# Patient Record
Sex: FEMALE | Race: BLACK OR AFRICAN AMERICAN | NOT HISPANIC OR LATINO | Employment: FULL TIME | URBAN - METROPOLITAN AREA
[De-identification: names, ages, dates, MRNs, and addresses within clinical notes are randomized per-mention and may not be internally consistent; named-entity substitution may affect disease eponyms.]

---

## 2017-01-05 ENCOUNTER — OFFICE VISIT (OUTPATIENT)
Dept: DERMATOLOGY | Facility: CLINIC | Age: 57
End: 2017-01-05
Payer: COMMERCIAL

## 2017-01-05 DIAGNOSIS — L65.9 ALOPECIA, UNSPECIFIED: Primary | ICD-10-CM

## 2017-01-05 DIAGNOSIS — D48.9 NEOPLASM OF UNCERTAIN BEHAVIOR: ICD-10-CM

## 2017-01-05 PROCEDURE — 99999 PR PBB SHADOW E&M-EST. PATIENT-LVL III: CPT | Mod: PBBFAC,,,

## 2017-01-05 PROCEDURE — 1159F MED LIST DOCD IN RCRD: CPT | Mod: S$GLB,,,

## 2017-01-05 PROCEDURE — 11100 PR BIOPSY OF SKIN LESION: CPT | Mod: S$GLB,,,

## 2017-01-05 PROCEDURE — 88305 TISSUE EXAM BY PATHOLOGIST: CPT | Performed by: PATHOLOGY

## 2017-01-05 PROCEDURE — 99201 PR OFFICE/OUTPT VISIT,NEW,LEVL I: CPT | Mod: 25,S$GLB,,

## 2017-01-05 PROCEDURE — 88305 TISSUE EXAM BY PATHOLOGIST: CPT | Mod: 26,,, | Performed by: PATHOLOGY

## 2017-01-05 RX ORDER — KETOCONAZOLE 20 MG/ML
SHAMPOO, SUSPENSION TOPICAL
Qty: 120 ML | Refills: 5 | Status: SHIPPED | OUTPATIENT
Start: 2017-01-05 | End: 2019-06-24 | Stop reason: ALTCHOICE

## 2017-01-05 NOTE — PROGRESS NOTES
Subjective:       Patient ID:  Ora Henderson is a 56 y.o. female who presents for   Chief Complaint   Patient presents with    Hair Loss     a few months    Warts     right elbow, more than a few months-less than a year, Tried Compound W.     HPI Comments: 55 yo with complaint of wart to right elbow x a few months. Asymptomatic. She has used compound w with improvement.   Also c/o hair loss to scalp x a few months that began after starting losartan and metoprolol. In addition, pt had hernia repair 1 month ago and has since noticed an increase in hair loss. She has used AG Pro vitamins which helped. + history of perm use every 6 weeks. Scalp is asymptomatic.     Lab Results:       Component                Value               Date                       TSH                      2.529               03/22/2016              Lab Results:       Component                Value               Date                       WBC                      5.75                12/08/2016                 HGB                      12.6                12/08/2016                 HCT                      37.4                12/08/2016                 MCV                      88                  12/08/2016                 PLT                      235                 12/08/2016                  Review of Systems   Constitutional: Negative for fever, chills, weight loss, weight gain, fatigue and malaise.   Genitourinary: Negative for frequency.   Skin: Negative for itching, daily sunscreen use and activity-related sunscreen use.        Objective:    Physical Exam   Constitutional: She appears well-developed and well-nourished. No distress.   Neurological: She is alert and oriented to person, place, and time. She is not disoriented.   Psychiatric: She has a normal mood and affect.   Skin:   Areas Examined (abnormalities noted in diagram):   Scalp / Hair Palpated and Inspected  RUE Inspected                   Diagram Legend      Erythematous scaling macule/papule c/w actinic keratosis       Vascular papule c/w angioma      Pigmented verrucoid papule/plaque c/w seborrheic keratosis      Yellow umbilicated papule c/w sebaceous hyperplasia      Irregularly shaped tan macule c/w lentigo     1-2 mm smooth white papules consistent with Milia      Movable subcutaneous cyst with punctum c/w epidermal inclusion cyst      Subcutaneous movable cyst c/w pilar cyst      Firm pink to brown papule c/w dermatofibroma      Pedunculated fleshy papule(s) c/w skin tag(s)      Evenly pigmented macule c/w junctional nevus     Mildly variegated pigmented, slightly irregular-bordered macule c/w mildly atypical nevus      Flesh colored to evenly pigmented papule c/w intradermal nevus       Pink pearly papule/plaque c/w basal cell carcinoma      Erythematous hyperkeratotic cursted plaque c/w SCC      Surgical scar with no sign of skin cancer recurrence      Open and closed comedones      Inflammatory papules and pustules      Verrucoid papule consistent consistent with wart     Erythematous eczematous patches and plaques     Dystrophic onycholytic nail with subungual debris c/w onychomycosis     Umbilicated papule    Erythematous-base heme-crusted tan verrucoid plaque consistent with inflamed seborrheic keratosis     Erythematous Silvery Scaling Plaque c/w Psoriasis     See annotation      Assessment / Plan:      Pathology Orders:      Normal Orders This Visit    Tissue Specimen To Pathology, Dermatology     Questions:    Directional Terms:  Other(comment)    Clinical information:  r/o verruca vulgaris vs other    Specific Site:  right elbow        Alopecia   Possible components of telogen effluvium 2/2 medications and surgery. Will begin ketoconazole shampoo and biotin 5000 mcg qd. F/u in 3 months to ensure symptoms are improving.   TSH, CBC normal  -     ketoconazole (NIZORAL) 2 % shampoo; Wash hair with medicated shampoo at least 2x/week - let sit on scalp at  least 5 minutes prior to rinsing  Dispense: 120 mL; Refill: 5  Biotin 5000 mcg qd  Discussed avoidance of perms and all damaging hair care techniques     Neoplasm of uncertain behavior- right elbow  -     Tissue Specimen To Pathology, Dermatology  Shave biopsy procedure note:  Shave biopsy performed after verbal consent including risk of infection, scar, recurrence, need for additional treatment of site. Area prepped with alcohol, anesthetized with approximately 1.0cc of 1% lidocaine with epinephrine. Lesional tissue shaved with razor blade. Hemostasis achieved with application of aluminum chloride followed by hyfrecation. No complications. Dressing applied. Wound care explained.             Return in about 3 months (around 4/5/2017).

## 2017-01-05 NOTE — PATIENT INSTRUCTIONS
Shave Biopsy Wound Care    Your doctor has performed a shave biopsy today.  A band aid and vaseline ointment has been placed over the site.  This should remain in place for 24 hours.  It is recommended that you keep the area dry for the first 24 hours.  After 24 hours, you may remove the band aid and wash the area with warm soap and water and apply Vaseline jelly.  Many patients prefer to use Neosporin or Bacitracin ointment.  This is acceptable; however, know that you can develop an allergy to this medication even if you have used it safely for years.  It is important to keep the area moist.  Letting it dry out and get air slows healing time, and will worsen the scar.  Band aid is optional after first 24 hours.      If you notice increasing redness, tenderness, pain, or yellow drainage at the biopsy site, please notify your doctor.  These are signs of an infection.    If your biopsy site is bleeding, apply firm pressure for 15 minutes straight.  Repeat for another 15 minutes, if it is still bleeding.   If the surgical site continues to bleed, then please contact your doctor.      [facility] [place] [address]/ [phone] FAX/ [email or web address]       Hair:  Wash hair with ketoconazole shampoo twice per week  Biotin 5000 mcg qd

## 2017-01-05 NOTE — MR AVS SNAPSHOT
[facility] - Dermatology  [address] [facility] [place] [address]  Phone: [phone]  Fax: [phone]                  Ora Henderson   2017 8:00 AM   Office Visit    Description:  Female : 1960   Provider:  Lynette Nunn NP   Department:  [facility] - Dermatology           Reason for Visit     Hair Loss     Warts           Diagnoses this Visit        Comments    Alopecia, unspecified    -  Primary     Neoplasm of uncertain behavior                To Do List           Future Appointments        Provider Department Dept Phone    2017 9:20 AM ISRRAEL Pittman [facility] Gastroenterology [phone]    2017 8:30 AM Liza Anand MD [facility] Dermatology [phone]    2017 9:00 AM [facility] [phone]      Goals (5 Years of Data)     None       These Medications        Disp Refills Start End    ketoconazole (NIZORAL) 2 % shampoo 120 mL 5 2017     Wash hair with medicated shampoo at least 2x/week - let sit on scalp at least 5 minutes prior to rinsing    Pharmacy: [facility] - [place], [place] - [address] [facility] Ph #: [phone]         [facility]     [facility] Care [facility] -  Assistance  Registered nurses in the [facility] provide clinical advisement, health education, appointment booking, and other advisory services.  Call for this free service at [phone].             Medications           Message regarding Medications     Verify the changes and/or additions to your medication regime listed below are the same as discussed with your clinician today.  If any of these changes or additions are incorrect, please notify your healthcare provider.        START taking these NEW medications        Refills    ketoconazole (NIZORAL) 2 % shampoo 5    Sig: Wash hair with medicated shampoo at least 2x/week - let sit on scalp at least 5 minutes prior to rinsing    Class: Normal           Verify  that the below list of medications is an accurate representation of the medications you are currently taking.  If none reported, the list may be blank. If incorrect, please contact your healthcare provider. Carry this list with you in case of emergency.           Current Medications     albuterol 90 mcg/actuation inhaler Inhale 1-2 puffs into the lungs every 6 (six) hours as needed for Wheezing (cough).    esomeprazole (NEXIUM) 40 MG capsule Take 1 capsule (40 mg total) by mouth before breakfast.    ketoconazole (NIZORAL) 2 % shampoo Wash hair with medicated shampoo at least 2x/week - let sit on scalp at least 5 minutes prior to rinsing    L.acidophilus-Bif. animalis (PROBIOTIC) 5 billion cell CpSP Take 1 tablet by mouth once daily.    losartan (COZAAR) 25 MG tablet Take 1 tablet (25 mg total) by mouth once daily.    metoprolol succinate (TOPROL-XL) 25 MG 24 hr tablet Take 1 tablet (25 mg total) by mouth once daily.    promethazine-dextromethorphan (PROMETHAZINE-DM) 6.25-15 mg/5 mL Syrp Take 5 mLs by mouth every 6 (six) hours as needed. May cause drowsiness    rosuvastatin (CRESTOR) 20 MG tablet Take 1 tablet (20 mg total) by mouth every evening.           Clinical Reference Information           Allergies as of 1/5/2017     Sulfa (Sulfonamide Antibiotics)      Immunizations Administered on Date of Encounter - 1/5/2017     None      Orders Placed During Today's Visit      Normal Orders This Visit    Tissue Specimen To Pathology, Dermatology       Instructions     Shave Biopsy Wound Care    Your doctor has performed a shave biopsy today.  A band aid and vaseline ointment has been placed over the site.  This should remain in place for 24 hours.  It is recommended that you keep the area dry for the first 24 hours.  After 24 hours, you may remove the band aid and wash the area with warm soap and water and apply Vaseline jelly.  Many patients prefer to use Neosporin or Bacitracin ointment.  This is acceptable; however,  know that you can develop an allergy to this medication even if you have used it safely for years.  It is important to keep the area moist.  Letting it dry out and get air slows healing time, and will worsen the scar.  Band aid is optional after first 24 hours.      If you notice increasing redness, tenderness, pain, or yellow drainage at the biopsy site, please notify your doctor.  These are signs of an infection.    If your biopsy site is bleeding, apply firm pressure for 15 minutes straight.  Repeat for another 15 minutes, if it is still bleeding.   If the surgical site continues to bleed, then please contact your doctor.      [address] [place] [address]/ [phone] FAX/ [email or web address]       Hair:  Wash hair with ketoconazole shampoo twice per week  Biotin 5000 mcg qd

## 2017-01-16 ENCOUNTER — TELEPHONE (OUTPATIENT)
Dept: GASTROENTEROLOGY | Facility: CLINIC | Age: 57
End: 2017-01-16

## 2017-01-16 ENCOUNTER — OFFICE VISIT (OUTPATIENT)
Dept: GASTROENTEROLOGY | Facility: CLINIC | Age: 57
End: 2017-01-16
Payer: COMMERCIAL

## 2017-01-16 VITALS
BODY MASS INDEX: 36.17 KG/M2 | DIASTOLIC BLOOD PRESSURE: 82 MMHG | WEIGHT: 204.13 LBS | HEIGHT: 63 IN | SYSTOLIC BLOOD PRESSURE: 150 MMHG | HEART RATE: 80 BPM

## 2017-01-16 DIAGNOSIS — K21.9 GASTROESOPHAGEAL REFLUX DISEASE, ESOPHAGITIS PRESENCE NOT SPECIFIED: ICD-10-CM

## 2017-01-16 PROCEDURE — 1159F MED LIST DOCD IN RCRD: CPT | Mod: S$GLB,,, | Performed by: NURSE PRACTITIONER

## 2017-01-16 PROCEDURE — 3079F DIAST BP 80-89 MM HG: CPT | Mod: S$GLB,,, | Performed by: NURSE PRACTITIONER

## 2017-01-16 PROCEDURE — 99214 OFFICE O/P EST MOD 30 MIN: CPT | Mod: S$GLB,,, | Performed by: NURSE PRACTITIONER

## 2017-01-16 PROCEDURE — 3077F SYST BP >= 140 MM HG: CPT | Mod: S$GLB,,, | Performed by: NURSE PRACTITIONER

## 2017-01-16 PROCEDURE — 99999 PR PBB SHADOW E&M-EST. PATIENT-LVL III: CPT | Mod: PBBFAC,,, | Performed by: NURSE PRACTITIONER

## 2017-01-16 RX ORDER — ESOMEPRAZOLE MAGNESIUM 40 MG/1
40 CAPSULE, DELAYED RELEASE ORAL
Qty: 30 CAPSULE | Refills: 11 | Status: SHIPPED | OUTPATIENT
Start: 2017-01-16 | End: 2017-10-28 | Stop reason: SDUPTHER

## 2017-01-16 NOTE — TELEPHONE ENCOUNTER
----- Message from Apolonia Tripathi sent at 1/16/2017  8:22 AM CST -----  Pt is running late but states she will be here in 10-15 mins.

## 2017-01-16 NOTE — PROGRESS NOTES
Clinic Follow Up:  [facility] Follow Up Note    Reason for Follow Up:  The encounter diagnosis was Gastroesophageal reflux disease, esophagitis presence not specified.    PCP: Zuly Montes       HPI:  This is a 56 y.o. female here for follow up of the above  She reports that since her last visit, she has been feeling well without any major complaints.  She has been taking the PPI daily at the higher dose.  She reports occasional heartburn, diet dependant.   Denies any abdominal pain.  No nausea or vomiting.  No change in bowel pattern.  No melena or hematochezia. No weight loss.      Review of Systems   Constitutional: Negative for chills, fever, malaise/fatigue and weight loss.   Respiratory: Negative for cough.    Cardiovascular: Negative for chest pain.   Gastrointestinal:        Per HPI   Musculoskeletal: Negative for myalgias.   Skin: Negative for itching and rash.   Neurological: Negative for headaches.   Psychiatric/Behavioral: The patient is not nervous/anxious.        Medical History:  Past Medical History   Diagnosis Date    Arthritis     Encounter for blood transfusion     GERD (gastroesophageal reflux disease)     Hepatomegaly      and fatty liver    Hernia, umbilical      reducible    Hyperlipidemia     Hypertension        Surgical History:   Past Surgical History   Procedure Laterality Date    Carpal tunnel release Bilateral     Brain surgery       tumor removal      section      Cervical biopsy  w/ loop electrode excision       [facility] '81    Total abdominal hysterectomy w/ bilateral salpingoophorectomy       STAR/BSO secondary to endometriosis    Colonoscopy N/A 2016     Procedure: COLONOSCOPY;  Surgeon: Quique Paul MD;  Location[facility];  Service: Endoscopy;  Laterality: N/A;       Family History:   Family History   Problem Relation Age of Onset    Heart disease Father     Stroke Father     Hypertension Father     Hypertension Mother      "Cancer Maternal Aunt      pancreas    Cancer Maternal Uncle      pancreas    Heart disease Paternal Uncle     Heart disease Paternal Grandmother     Diabetes Maternal Aunt        Social History:   Social History   Substance Use Topics    Smoking status: Never Smoker    Smokeless tobacco: Never Used    Alcohol use 0.0 oz/week     0 Standard drinks or equivalent per week      Comment: socially -no alcohol 72 hours prior to surgery       Allergies: Reviewed    Home Medications:  Current Outpatient Prescriptions on File Prior to Visit   Medication Sig Dispense Refill    albuterol 90 mcg/actuation inhaler Inhale 1-2 puffs into the lungs every 6 (six) hours as needed for Wheezing (cough). 1 Inhaler 0    ketoconazole (NIZORAL) 2 % shampoo Wash hair with medicated shampoo at least 2x/week - let sit on scalp at least 5 minutes prior to rinsing 120 mL 5    L.acidophilus-Bif. animalis (PROBIOTIC) 5 billion cell CpSP Take 1 tablet by mouth once daily. 10 capsule 0    losartan (COZAAR) 25 MG tablet Take 1 tablet (25 mg total) by mouth once daily. 30 tablet 12    metoprolol succinate (TOPROL-XL) 25 MG 24 hr tablet Take 1 tablet (25 mg total) by mouth once daily. 30 tablet 11    promethazine-dextromethorphan (PROMETHAZINE-DM) 6.25-15 mg/5 mL Syrp Take 5 mLs by mouth every 6 (six) hours as needed. May cause drowsiness 120 mL 0    rosuvastatin (CRESTOR) 20 MG tablet Take 1 tablet (20 mg total) by mouth every evening. 30 tablet 11    [DISCONTINUED] esomeprazole (NEXIUM) 40 MG capsule Take 1 capsule (40 mg total) by mouth before breakfast. 30 capsule 2     No current facility-administered medications on file prior to visit.        Physical Exam:  Vital Signs:  Visit Vitals    BP (!) 150/82    Pulse 80    Ht 5' 3" (1.6 m)    Wt 92.6 kg (204 lb 2.3 oz)    BMI 36.16 kg/m2     Body mass index is 36.16 kg/(m^2).  Physical Exam   Constitutional: She is oriented to person, place, and time. She appears well-developed and " well-nourished.   HENT:   Head: Normocephalic.   Eyes: No scleral icterus.   Neck: Normal range of motion.   Cardiovascular: Normal rate and regular rhythm.    Pulmonary/Chest: Effort normal and breath sounds normal.   Abdominal: Soft. Bowel sounds are normal. She exhibits no distension. There is no tenderness.   Musculoskeletal: Normal range of motion.   Neurological: She is alert and oriented to person, place, and time.   Skin: Skin is warm and dry.   Psychiatric: She has a normal mood and affect.   Vitals reviewed.      Labs: Pertinent labs reviewed.      Assessment:  1. Gastroesophageal reflux disease, esophagitis presence not specified        Recommendations:  Gastroesophageal reflux disease, esophagitis presence not specified  - Stable.    - GERD diet and lifestyle management discussed.  - Risks and benefits of daily PPI discussed.  Pt understands risks, wants to continue medication.   -     esomeprazole (NEXIUM) 40 MG capsule; Take 1 capsule (40 mg total) by mouth before breakfast.  Dispense: 30 capsule; Refill: 11        Return to Clinic:    Return in about 1 year (around 1/16/2018).

## 2017-01-16 NOTE — MR AVS SNAPSHOT
[facility]  [address]  [place] [address]  Phone: [phone]  Fax: [phone]                  Ora Henderson   2017 8:20 AM   Office Visit    Description:  Female : 1960   Provider:  ISRRAEL Pittman   Department:  [facility] - [facility]ology           Reason for Visit     Gastroesophageal Reflux     Medication Refill on Nexium           Diagnoses this Visit        Comments    Gastroesophageal reflux disease, esophagitis presence not specified                To Do List           Future Appointments        Provider Department Dept Phone    2017 8:30 AM Liza Anand MD [facility] - Dermatology [phone]    2017 9:00 AM [facility] [phone]      Goals (5 Years of Data)     None      Follow-Up and Disposition     Return in about 1 year (around 2018).       These Medications        Disp Refills Start End    esomeprazole (NEXIUM) 40 MG capsule 30 capsule 11 2017    Take 1 capsule (40 mg total) by mouth before breakfast. - Oral    Pharmacy: [facility] - [place] - [address] [facility] Ph #: [phone]         [facility]     [facility] Care [facility] -  Assistance  Registered nurses in the [facility] provide clinical advisement, health education, appointment booking, and other advisory services.  Call for this free service at [phone].             Medications           Message regarding Medications     Verify the changes and/or additions to your medication regime listed below are the same as discussed with your clinician today.  If any of these changes or additions are incorrect, please notify your healthcare provider.             Verify that the below list of medications is an accurate representation of the medications you are currently taking.  If none reported, the list may be blank. If incorrect, please contact your healthcare provider.  "Carry this list with you in case of emergency.           Current Medications     albuterol 90 mcg/actuation inhaler Inhale 1-2 puffs into the lungs every 6 (six) hours as needed for Wheezing (cough).    esomeprazole (NEXIUM) 40 MG capsule Take 1 capsule (40 mg total) by mouth before breakfast.    ketoconazole (NIZORAL) 2 % shampoo Wash hair with medicated shampoo at least 2x/week - let sit on scalp at least 5 minutes prior to rinsing    L.acidophilus-Bif. animalis (PROBIOTIC) 5 billion cell CpSP Take 1 tablet by mouth once daily.    losartan (COZAAR) 25 MG tablet Take 1 tablet (25 mg total) by mouth once daily.    metoprolol succinate (TOPROL-XL) 25 MG 24 hr tablet Take 1 tablet (25 mg total) by mouth once daily.    promethazine-dextromethorphan (PROMETHAZINE-DM) 6.25-15 mg/5 mL Syrp Take 5 mLs by mouth every 6 (six) hours as needed. May cause drowsiness    rosuvastatin (CRESTOR) 20 MG tablet Take 1 tablet (20 mg total) by mouth every evening.           Clinical Reference Information           Vital Signs - Last Recorded  Most recent update: 1/16/2017  8:58 AM by Kathleen Tinsley LPN    BP Pulse Ht Wt BMI    (!) 150/82 80 5' 3" (1.6 m) 92.6 kg (204 lb 2.3 oz) 36.16 kg/m2      Blood Pressure          Most Recent Value    BP  (!)  150/82      Allergies as of 1/16/2017     Sulfa (Sulfonamide Antibiotics)      Immunizations Administered on Date of Encounter - 1/16/2017     None      "

## 2017-01-26 ENCOUNTER — OFFICE VISIT (OUTPATIENT)
Dept: INTERNAL MEDICINE | Facility: CLINIC | Age: 57
End: 2017-01-26
Payer: COMMERCIAL

## 2017-01-26 VITALS
WEIGHT: 205.25 LBS | HEART RATE: 73 BPM | BODY MASS INDEX: 36.37 KG/M2 | DIASTOLIC BLOOD PRESSURE: 88 MMHG | HEIGHT: 63 IN | TEMPERATURE: 97 F | SYSTOLIC BLOOD PRESSURE: 128 MMHG

## 2017-01-26 DIAGNOSIS — K21.9 GASTROESOPHAGEAL REFLUX DISEASE, ESOPHAGITIS PRESENCE NOT SPECIFIED: ICD-10-CM

## 2017-01-26 DIAGNOSIS — I73.9 PVD (PERIPHERAL VASCULAR DISEASE): ICD-10-CM

## 2017-01-26 DIAGNOSIS — Z00.00 ROUTINE GENERAL MEDICAL EXAMINATION AT A HEALTH CARE FACILITY: Primary | ICD-10-CM

## 2017-01-26 DIAGNOSIS — R16.0 HEPATOMEGALY: ICD-10-CM

## 2017-01-26 DIAGNOSIS — E78.2 MIXED HYPERLIPIDEMIA: ICD-10-CM

## 2017-01-26 DIAGNOSIS — E66.01 SEVERE OBESITY (BMI 35.0-39.9) WITH COMORBIDITY: ICD-10-CM

## 2017-01-26 DIAGNOSIS — Z01.89 NEEDS SLEEP APNEA ASSESSMENT: ICD-10-CM

## 2017-01-26 DIAGNOSIS — I10 ESSENTIAL HYPERTENSION: ICD-10-CM

## 2017-01-26 PROCEDURE — 99999 PR PBB SHADOW E&M-EST. PATIENT-LVL III: CPT | Mod: PBBFAC,,, | Performed by: FAMILY MEDICINE

## 2017-01-26 PROCEDURE — 99396 PREV VISIT EST AGE 40-64: CPT | Mod: S$GLB,,, | Performed by: FAMILY MEDICINE

## 2017-01-26 PROCEDURE — 3079F DIAST BP 80-89 MM HG: CPT | Mod: S$GLB,,, | Performed by: FAMILY MEDICINE

## 2017-01-26 PROCEDURE — 3074F SYST BP LT 130 MM HG: CPT | Mod: S$GLB,,, | Performed by: FAMILY MEDICINE

## 2017-01-26 NOTE — PROGRESS NOTES
"Subjective:       Patient ID: Ora Henderson is a 56 y.o. female.    Chief Complaint: Back Pain    HPI Comments: 56-year-old -American female patient with Patient Active Problem List:     Hyperlipidemia     Hypertension     Hepatomegaly     Family history of cardiovascular disease     Abnormal ECG     PVD (peripheral vascular disease)     Gastroesophageal reflux disease  Here for routine annual physicals.  Patient reported that her acid reflex symptoms were getting worse for which she has seen gastroenterologist recently, and was placed on Nexium 40 mg daily, was having excessive burping, does not skip meals.  Patient stays physically active with diet and exercise, recently had ventral hernia repair done and has been doing well.   Would like to lose weight and requesting for any suggestion for diet pills.   Denies of any chest pain or shortness of breath, palpitations.   Patient does report having difficulty falling asleep and was notified by her  that she snores.     Back Pain   Pertinent negatives include no abdominal pain, chest pain or headaches.     Review of Systems   Constitutional: Negative for fatigue.   Eyes: Negative for visual disturbance.   Respiratory: Negative for shortness of breath.    Cardiovascular: Negative for chest pain and leg swelling.   Gastrointestinal: Negative for abdominal pain, nausea and vomiting.   Musculoskeletal: Negative for back pain and myalgias.   Skin: Negative for rash.   Neurological: Negative for light-headedness and headaches.   Psychiatric/Behavioral: Positive for sleep disturbance.         Visit Vitals    /88    Pulse 73    Temp 97.1 °F (36.2 °C) (Tympanic)    Ht 5' 3" (1.6 m)    Wt 93.1 kg (205 lb 4 oz)    BMI 36.36 kg/m2     Objective:      Physical Exam   Constitutional: She is oriented to person, place, and time. She appears well-developed and well-nourished.   HENT:   Head: Normocephalic and atraumatic.   Mouth/Throat: " Oropharynx is clear and moist.   Cardiovascular: Normal rate, regular rhythm and normal heart sounds.    No murmur heard.  Pulmonary/Chest: Effort normal and breath sounds normal. She has no wheezes.   Abdominal: Soft. Bowel sounds are normal. There is no tenderness.   Musculoskeletal: She exhibits no edema.   Neurological: She is alert and oriented to person, place, and time.   Skin: Skin is warm and dry. No rash noted.   Psychiatric: She has a normal mood and affect.       Admission on 12/08/2016, Discharged on 12/09/2016   Component Date Value Ref Range Status    WBC 12/08/2016 5.75  3.90 - 12.70 K/uL Final    RBC 12/08/2016 4.27  4.00 - 5.40 M/uL Final    Hemoglobin 12/08/2016 12.6  12.0 - 16.0 g/dL Final    Hematocrit 12/08/2016 37.4  37.0 - 48.5 % Final    MCV 12/08/2016 88  82 - 98 fL Final    MCH 12/08/2016 29.5  27.0 - 31.0 pg Final    MCHC 12/08/2016 33.7  32.0 - 36.0 % Final    RDW 12/08/2016 13.4  11.5 - 14.5 % Final    Platelets 12/08/2016 235  150 - 350 K/uL Final    MPV 12/08/2016 10.1  9.2 - 12.9 fL Final    Gran # 12/08/2016 2.5  1.8 - 7.7 K/uL Final    Lymph # 12/08/2016 2.8  1.0 - 4.8 K/uL Final    Mono # 12/08/2016 0.3  0.3 - 1.0 K/uL Final    Eos # 12/08/2016 0.2  0.0 - 0.5 K/uL Final    Baso # 12/08/2016 0.02  0.00 - 0.20 K/uL Final    Gran% 12/08/2016 42.8  38.0 - 73.0 % Final    Lymph% 12/08/2016 48.5* 18.0 - 48.0 % Final    Mono% 12/08/2016 5.6  4.0 - 15.0 % Final    Eosinophil% 12/08/2016 2.8  0.0 - 8.0 % Final    Basophil% 12/08/2016 0.3  0.0 - 1.9 % Final    Differential Method 12/08/2016 Automated   Final   Lab Visit on 11/14/2016   Component Date Value Ref Range Status    Sodium 11/14/2016 142  136 - 145 mmol/L Final    Potassium 11/14/2016 3.7  3.5 - 5.1 mmol/L Final    Chloride 11/14/2016 106  95 - 110 mmol/L Final    CO2 11/14/2016 29  23 - 29 mmol/L Final    Glucose 11/14/2016 97  70 - 110 mg/dL Final    BUN, Bld 11/14/2016 14  6 - 20 mg/dL Final     Creatinine 11/14/2016 0.9  0.5 - 1.4 mg/dL Final    Calcium 11/14/2016 9.4  8.7 - 10.5 mg/dL Final    Total Protein 11/14/2016 7.2  6.0 - 8.4 g/dL Final    Albumin 11/14/2016 4.0  3.5 - 5.2 g/dL Final    Total Bilirubin 11/14/2016 1.0  0.1 - 1.0 mg/dL Final    Comment: For infants and newborns, interpretation of results should be based  on gestational age, weight and in agreement with clinical  observations.  Premature Infant recommended reference ranges:  Up to 24 hours.............<8.0 mg/dL  Up to 48 hours............<12.0 mg/dL  3-5 days..................<15.0 mg/dL  6-29 days.................<15.0 mg/dL      Alkaline Phosphatase 11/14/2016 61  55 - 135 U/L Final    AST 11/14/2016 17  10 - 40 U/L Final    ALT 11/14/2016 18  10 - 44 U/L Final    Anion Gap 11/14/2016 7* 8 - 16 mmol/L Final    eGFR if African American 11/14/2016 >60.0  >60 mL/min/1.73 m^2 Final    eGFR if non African American 11/14/2016 >60.0  >60 mL/min/1.73 m^2 Final    Comment: Calculation used to obtain the estimated glomerular filtration  rate (eGFR) is the CKD-EPI equation. Since race is unknown   in our information system, the eGFR values for   -American and Non--American patients are given   for each creatinine result.      Cholesterol 11/14/2016 179  120 - 199 mg/dL Final    Comment: The National Cholesterol Education Program (NCEP) has set the  following guidelines (reference ranges) for Cholesterol:  Optimal.....................<200 mg/dL  Borderline High.............200-239 mg/dL  High........................> or = 240 mg/dL      Triglycerides 11/14/2016 135  30 - 150 mg/dL Final    Comment: The National Cholesterol Education Program (NCEP) has set the  following guidelines (reference values) for triglycerides:  Normal......................<150 mg/dL  Borderline High.............150-199 mg/dL  High........................200-499 mg/dL      HDL 11/14/2016 48  40 - 75 mg/dL Final    Comment: The National  Cholesterol Education Program (NCEP) has set the  following guidelines (reference values) for HDL Cholesterol:  Low...............<40 mg/dL  Optimal...........>60 mg/dL      LDL Cholesterol 11/14/2016 104.0  63.0 - 159.0 mg/dL Final    Comment: The National Cholesterol Education Program (NCEP) has set the  following guidelines (reference values) for LDL Cholesterol:  Optimal.......................<130 mg/dL  Borderline High...............130-159 mg/dL  High..........................160-189 mg/dL  Very High.....................>190 mg/dL      HDL/Chol Ratio 11/14/2016 26.8  20.0 - 50.0 % Final    Total Cholesterol/HDL Ratio 11/14/2016 3.7  2.0 - 5.0 Final    Non-HDL Cholesterol 11/14/2016 131  mg/dL Final    Comment: Risk category and Non-HDL cholesterol goals:  Coronary heart disease (CHD)or equivalent (10-year risk of CHD >20%):  Non-HDL cholesterol goal     <130 mg/dL  Two or more CHD risk factors and 10-year risk of CHD <= 20%:  Non-HDL cholesterol goal     <160 mg/dL  0 to 1 CHD risk factor:  Non-HDL cholesterol goal     <190 mg/dL         Assessment:       1. Routine general medical examination at a health care facility    2. Gastroesophageal reflux disease, esophagitis presence not specified    3. Essential hypertension    4. Mixed hyperlipidemia    5. Severe obesity (BMI 35.0-39.9) with comorbidity    6. PVD (peripheral vascular disease)    7. Hepatomegaly    8. Needs sleep apnea assessment        Plan:   Routine general medical examination at a health care facility  Vital signs stable today, clinical exam normal.  Reviewed previous labs which is been stable.  Encouraged to make an appointment with gynecology to discuss about Pap smears status post complete hysterectomy.   Encouraged to continue lifestyle modifications with low-fat and low-cholesterol diet and exercise 30 minutes daily, advised to avoid taking only over-the-counter diet pills      Gastroesophageal reflux disease, esophagitis presence not  specified- currently placed on Nexium 40 mg daily , advised to eat small frequent meals and avoid spicy diet and drink adequate fluids , follow-up with gastroenterology      Essential hypertension-blood pressure stable today currently on losartan 25 mg and metoprolol 25 mg alternatively , as per cardiology     Mixed hyperlipidemia-currently taking Crestor 20 mg daily     Severe obesity (BMI 35.0-39.9) with comorbidity- work on lifestyle modifications with diet and exercise to lose weight with BMI 36.3     PVD (peripheral vascular disease)- advised to discuss further with cardiology , graded exercise regimen recommended      Hepatomegaly- work on lifestyle modifications to lose weight , clinically stable and asymptomatic    Needs sleep apnea assessment  -     Ambulatory referral to Pulmonology  Will refer to pulmonology for further assessment for sleep apnea, as patient having difficulty falling asleep.

## 2017-01-26 NOTE — MR AVS SNAPSHOT
[facility] - Internal Medicine  [address] [facility] [address] [place] [address]  Phone: [phone]  Fax: [phone]                  Ora Henderson   2017 11:40 AM   Office Visit    Description:  Female : 1960   Provider:  Zuly Montes MD   Department:  [facility] - Internal Medicine           Reason for Visit     Back Pain           Diagnoses this Visit        Comments    Routine general medical examination at a health care facility    -  Primary     Gastroesophageal reflux disease, esophagitis presence not specified         Essential hypertension         Mixed hyperlipidemia         Severe obesity (BMI 35.0-39.9) with comorbidity         PVD (peripheral vascular disease)         Hepatomegaly         Needs sleep apnea assessment                To Do List           Future Appointments        Provider Department Dept Phone    2017 8:00 AM Vanessa Oliva NP [facility] - Pulmonary Services [phone]    2017 9:45 AM LIZA Guzman OD [facility] - Ophthalmology [phone]    2017 8:30 AM Liza Anand MD [facility] - Dermatology [phone]    2017 9:00 AM [facility] [id number] [facility] [phone]    2017 10:40 AM Zuly Montes MD [facility] Internal Medicine [phone]      Goals (5 Years of Data)     None      Follow-Up and Disposition     Return in about 6 months (around 2017), or if symptoms worsen or fail to improve.      [facility]     [facility] -  Assistance  Registered nurses in the [facility] provide clinical advisement, health education, appointment booking, and other advisory services.  Call for this free service at [phone].             Medications           Message regarding Medications     Verify the changes and/or additions to your medication regime listed below are the same as discussed with your clinician today.  If any of these changes or additions are incorrect, please notify your healthcare  "provider.             Verify that the below list of medications is an accurate representation of the medications you are currently taking.  If none reported, the list may be blank. If incorrect, please contact your healthcare provider. Carry this list with you in case of emergency.           Current Medications     albuterol 90 mcg/actuation inhaler Inhale 1-2 puffs into the lungs every 6 (six) hours as needed for Wheezing (cough).    esomeprazole (NEXIUM) 40 MG capsule Take 1 capsule (40 mg total) by mouth before breakfast.    ketoconazole (NIZORAL) 2 % shampoo Wash hair with medicated shampoo at least 2x/week - let sit on scalp at least 5 minutes prior to rinsing    L.acidophilus-Bif. animalis (PROBIOTIC) 5 billion cell CpSP Take 1 tablet by mouth once daily.    losartan (COZAAR) 25 MG tablet Take 1 tablet (25 mg total) by mouth once daily.    metoprolol succinate (TOPROL-XL) 25 MG 24 hr tablet Take 1 tablet (25 mg total) by mouth once daily.    promethazine-dextromethorphan (PROMETHAZINE-DM) 6.25-15 mg/5 mL Syrp Take 5 mLs by mouth every 6 (six) hours as needed. May cause drowsiness    rosuvastatin (CRESTOR) 20 MG tablet Take 1 tablet (20 mg total) by mouth every evening.           Clinical Reference Information           Vital Signs - Last Recorded  Most recent update: 1/26/2017 11:41 AM by Bryanna Curtis LPN    BP Pulse Temp Ht Wt BMI    128/88 73 97.1 °F (36.2 °C) (Tympanic) 5' 3" (1.6 m) 93.1 kg (205 lb 4 oz) 36.36 kg/m2      Blood Pressure          Most Recent Value    BP  128/88      Allergies as of 1/26/2017     Sulfa (Sulfonamide Antibiotics)      Immunizations Administered on Date of Encounter - 1/26/2017     None      Orders Placed During Today's Visit      Normal Orders This Visit    Ambulatory referral to Pulmonology       "

## 2017-02-02 ENCOUNTER — OFFICE VISIT (OUTPATIENT)
Dept: OBSTETRICS AND GYNECOLOGY | Facility: CLINIC | Age: 57
End: 2017-02-02
Payer: COMMERCIAL

## 2017-02-02 ENCOUNTER — OFFICE VISIT (OUTPATIENT)
Dept: OPHTHALMOLOGY | Facility: CLINIC | Age: 57
End: 2017-02-02
Payer: COMMERCIAL

## 2017-02-02 ENCOUNTER — OFFICE VISIT (OUTPATIENT)
Dept: PULMONOLOGY | Facility: CLINIC | Age: 57
End: 2017-02-02
Payer: COMMERCIAL

## 2017-02-02 VITALS
SYSTOLIC BLOOD PRESSURE: 122 MMHG | BODY MASS INDEX: 36.14 KG/M2 | WEIGHT: 203.94 LBS | HEIGHT: 63 IN | DIASTOLIC BLOOD PRESSURE: 78 MMHG

## 2017-02-02 VITALS
DIASTOLIC BLOOD PRESSURE: 88 MMHG | RESPIRATION RATE: 18 BRPM | SYSTOLIC BLOOD PRESSURE: 136 MMHG | BODY MASS INDEX: 36.21 KG/M2 | WEIGHT: 204.38 LBS | HEIGHT: 63 IN | HEART RATE: 75 BPM | OXYGEN SATURATION: 100 %

## 2017-02-02 DIAGNOSIS — Z01.419 ENCOUNTER FOR GYNECOLOGICAL EXAMINATION WITHOUT ABNORMAL FINDING: Primary | ICD-10-CM

## 2017-02-02 DIAGNOSIS — G47.30 SLEEP-DISORDERED BREATHING: ICD-10-CM

## 2017-02-02 DIAGNOSIS — F51.8 DISRUPTED SLEEP-WAKE CYCLE: Primary | ICD-10-CM

## 2017-02-02 DIAGNOSIS — Z01.00 VISIT FOR EYE AND VISION EXAM: Primary | ICD-10-CM

## 2017-02-02 DIAGNOSIS — Z13.5 GLAUCOMA SCREENING: ICD-10-CM

## 2017-02-02 DIAGNOSIS — H52.7 REFRACTIVE ERROR: ICD-10-CM

## 2017-02-02 DIAGNOSIS — G47.20 DISRUPTED SLEEP-WAKE CYCLE: Primary | ICD-10-CM

## 2017-02-02 PROCEDURE — 99999 PR PBB SHADOW E&M-EST. PATIENT-LVL II: CPT | Mod: PBBFAC,,, | Performed by: OPTOMETRIST

## 2017-02-02 PROCEDURE — 3074F SYST BP LT 130 MM HG: CPT | Mod: S$GLB,,, | Performed by: OBSTETRICS & GYNECOLOGY

## 2017-02-02 PROCEDURE — 3078F DIAST BP <80 MM HG: CPT | Mod: S$GLB,,, | Performed by: OPTOMETRIST

## 2017-02-02 PROCEDURE — 92004 COMPRE OPH EXAM NEW PT 1/>: CPT | Mod: S$GLB,,, | Performed by: OPTOMETRIST

## 2017-02-02 PROCEDURE — 3075F SYST BP GE 130 - 139MM HG: CPT | Mod: S$GLB,,, | Performed by: NURSE PRACTITIONER

## 2017-02-02 PROCEDURE — 92015 DETERMINE REFRACTIVE STATE: CPT | Mod: S$GLB,,, | Performed by: OPTOMETRIST

## 2017-02-02 PROCEDURE — 3074F SYST BP LT 130 MM HG: CPT | Mod: S$GLB,,, | Performed by: OPTOMETRIST

## 2017-02-02 PROCEDURE — 99204 OFFICE O/P NEW MOD 45 MIN: CPT | Mod: S$GLB,,, | Performed by: NURSE PRACTITIONER

## 2017-02-02 PROCEDURE — 3079F DIAST BP 80-89 MM HG: CPT | Mod: S$GLB,,, | Performed by: NURSE PRACTITIONER

## 2017-02-02 PROCEDURE — 99386 PREV VISIT NEW AGE 40-64: CPT | Mod: S$GLB,,, | Performed by: OBSTETRICS & GYNECOLOGY

## 2017-02-02 PROCEDURE — 3078F DIAST BP <80 MM HG: CPT | Mod: S$GLB,,, | Performed by: OBSTETRICS & GYNECOLOGY

## 2017-02-02 PROCEDURE — 99999 PR PBB SHADOW E&M-EST. PATIENT-LVL III: CPT | Mod: PBBFAC,,, | Performed by: NURSE PRACTITIONER

## 2017-02-02 PROCEDURE — 99999 PR PBB SHADOW E&M-EST. PATIENT-LVL III: CPT | Mod: PBBFAC,,, | Performed by: OBSTETRICS & GYNECOLOGY

## 2017-02-02 NOTE — PROGRESS NOTES
New patient    Subjective:      Patient ID: Ora Henderson is a 56 y.o. female.    Patient Active Problem List   Diagnosis    Hyperlipidemia    Hypertension    Hepatomegaly    Family history of cardiovascular disease    Abnormal ECG    PVD (peripheral vascular disease)    Gastroesophageal reflux disease       Problem list has been reviewed.    she has been referred by Zuly Montes MD for evaluation and management for   Chief Complaint   Patient presents with    Sleep Apnea       Chief Complaint: Sleep Apnea      HPI:  She presents for a sleep evaluation related to having difficulty falling asleep and being told she snores loudly by her .  Patient has observed restless sleep, frequent awakening.   Patient reports non restful' sleep.  She denies morning headache.   Shedenies impairment of activity during wakefulness.  She denies day time napping.   [place] sleepiness score was 9.  Neck circumference is 17 inches  She denies recent weight gain.  Mallampati score 3  Cardiovascular risk factors: hypertension, hyperlipidemia, peripheral vascular disease and obesity  Bed time is 0900  Wake time is 0600  Sleep onset is within  30 Minutes.  Sleep maintenance difficulties related to difficulty falling asleep and non-restful sleep  Wake after sleep onset occurs four times a night.  Nocturia occurs one time a night,   Sleep aids : Yes, aleve pm 2 tabs helps with sleep onset and with awakenings. Taken about 3 nights a week. Was on Zquil over the past 2-3 years 2-3 nights a week.   Dry mouth : No,   Sleep walking: in her youth, No sleep walking as an adult.   Sleep talking : No,   Sleep eating:No  Vivid Dreams : No,   Cataplexy : No,   Hypnogogic hallucinations:  No    STOP - BANG Questionnaire:     1. Snoring : Do you snore loudly ?    Yes    2. Tired : Do you often feel tired, fatigued, or sleepy during daytime? Yes    3. Observed: Has anyone observed you stop breathing during your sleep?   No      4. Blood pressure : Do you have or are you being treated for high blood pressure?   Yes    5. BMI :BMI more than 35 kg/m2?   Yes  Body mass index is 36.2 kg/(m^2).    6. Age : Age over 50 yr old?   Yes    7. Neck circumference: Neck circumference greater than 40 cm?   Yes    8. Gender: Gender male?   No    SCORE: 6    High risk of NATALIE: Yes 5 - 8  Intermediate risk of NATALIE: Yes 3 - 4  Low risk of NATALIE: Yes 0 - 2    Previous Report Reviewed: lab reports, office notes and radiology reports     Past Medical History: The following portions of the patient's history were reviewed and updated as appropriate:   She  has a past surgical history that includes Carpal tunnel release (Bilateral); Brain surgery;  section; Cervical biopsy w/ loop electrode excision; Total abdominal hysterectomy w/ bilateral salpingoophorectomy; Colonoscopy (N/A, 2016); Ventral hernia repair; and Hernia repair.  Her family history includes Cancer in her maternal aunt and maternal uncle; Diabetes in her maternal aunt; Heart disease in her father, paternal grandmother, and paternal uncle; Hypertension in her father and mother; Stroke in her father.  She  reports that she has never smoked. She has never used smokeless tobacco. She reports that she drinks alcohol. She reports that she does not use illicit drugs.  She has a current medication list which includes the following prescription(s): albuterol, esomeprazole, ketoconazole, l.acidophilus-bif. animalis, losartan, promethazine-dextromethorphan, and rosuvastatin.  She is allergic to sulfa (sulfonamide antibiotics)..    Review of Systems   Constitutional: Negative for fever, chills, weight loss, weight gain, activity change, appetite change, fatigue and night sweats.   HENT: Negative for postnasal drip, rhinorrhea, sinus pressure, voice change and congestion.    Eyes: Negative for redness and itching.   Respiratory: Positive for snoring. Negative for cough, sputum production, chest  "tightness, shortness of breath, wheezing, orthopnea, asthma nighttime symptoms, dyspnea on extertion, use of rescue inhaler and somnolence.    Cardiovascular: Negative for chest pain, palpitations and leg swelling.   Genitourinary: Negative for difficulty urinating and hematuria.   Endocrine: Negative for polydipsia, polyphagia, cold intolerance, heat intolerance and polyuria.    Musculoskeletal: Positive for arthralgias (knees). Negative for back pain, gait problem, joint swelling and myalgias.   Skin: Negative.    Gastrointestinal: Negative for nausea, vomiting, abdominal pain and acid reflux.   Neurological: Negative for dizziness, weakness, light-headedness and headaches.   Hematological: Negative for adenopathy. No excessive bruising.   Psychiatric/Behavioral: Positive for sleep disturbance. The patient is not nervous/anxious.         Objective:     Physical Exam   Constitutional: She is oriented to person, place, and time. She appears well-developed and well-nourished.   HENT:   Head: Normocephalic.   Right Ear: External ear normal.   Left Ear: External ear normal.   Nose: Nose normal.   Mouth/Throat: Oropharynx is clear and moist. No oropharyngeal exudate.   Eyes: Conjunctivae are normal.   Neck: Normal range of motion. Neck supple.   Cardiovascular: Normal rate, regular rhythm, normal heart sounds and intact distal pulses.    Pulmonary/Chest: Effort normal and breath sounds normal.   Abdominal: Soft. Bowel sounds are normal.   Musculoskeletal: Normal range of motion.   Neurological: She is alert and oriented to person, place, and time.   Skin: Skin is warm and dry.   Psychiatric: She has a normal mood and affect. Her behavior is normal. Judgment and thought content normal.   Vitals reviewed.    Vitals:    02/02/17 0831   BP: 136/88   BP Location: Right arm   Patient Position: Sitting   BP Method: Manual   Pulse: 75   Resp: 18   SpO2: 100%   Weight: 92.7 kg (204 lb 5.9 oz)   Height: 5' 3" (1.6 m)     Body " mass index is 36.2 kg/(m^2).    Personal Diagnostic Review  none pertinent    Assessment:     1. Disrupted sleep-wake cycle    2. Sleep-disordered breathing      Orders Placed This Encounter   Procedures    Home Sleep Studies     Standing Status:   Future     Standing Expiration Date:   2/2/2018     Plan:     Discussed diagnosis, its evaluation, treatment and usual course. All questions answered.    Disrupted sleep-wake cycle  Comments:  onset improves with benadryl or aleve pm and with soothing herbal non caffeine tea. complete 2 week sleep diary and return at next visit.   Orders:  -     Home Sleep Studies; Future    Sleep-disordered breathing  Comments:  frequent awakening, non restorative sleep, day time sleepiness with fatigue during day. plan home sleep study.   Orders:  -     Home Sleep Studies; Future     she does not want any prescription medication for sleep, she finds she does well on her current medication taken when needed, benadryl or benadryl pm.     TIME SPENT WITH PATIENT: Time spent:35 minutes in face to face  discussion concerning diagnosis, prognosis, review of lab and test results, benefits of treatment as well as management of disease, counseling of patient and coordination of care between various health  care providers . Greater than half the time spent was used for coordination of care and counseling of patient.     Return for Home Sleep Study Follow Up.

## 2017-02-02 NOTE — PROGRESS NOTES
CC: Well woman exam    Ora Henderson is a 56 y.o. female  presents for a well woman exam.  Reports right LLQ/inguinal sharp pain periodically over past 1month. Normal BM/appetite/urinary function  *has 29yo son; her father was preacher    Past Medical History   Diagnosis Date    Arthritis     Encounter for blood transfusion     GERD (gastroesophageal reflux disease)     Hepatomegaly      and fatty liver    Hernia, umbilical      reducible    Hyperlipidemia     Hypertension      Past Surgical History   Procedure Laterality Date    Carpal tunnel release Bilateral     Brain surgery       tumor removal     Cervical biopsy  w/ loop electrode excision       CKC '81    Colonoscopy N/A 2016     Procedure: COLONOSCOPY;  Surgeon: Quique Paul MD;  Location: [facility];  Service: Endoscopy;  Laterality: N/A;    Ventral hernia repair      Hernia repair       2016     section       x 1    Total abdominal hysterectomy w/ bilateral salpingoophorectomy       STAR/BSO secondary to endometriosis    Hysterectomy       fibroids and endometriosis     Family History   Problem Relation Age of Onset    Heart disease Father     Stroke Father     Hypertension Father     Hypertension Mother     Cancer Maternal Aunt      pancreas    Cancer Maternal Uncle      pancreas    Heart disease Paternal Uncle     Heart disease Paternal Grandmother     Diabetes Maternal Aunt      Social History   Substance Use Topics    Smoking status: Never Smoker    Smokeless tobacco: Never Used    Alcohol use 0.0 oz/week     0 Standard drinks or equivalent per week      Comment: socially     OB History      Para Term  AB TAB SAB Ectopic Multiple Living    2 1   1  1   1          Current Outpatient Prescriptions:     albuterol 90 mcg/actuation inhaler, Inhale 1-2 puffs into the lungs every 6 (six) hours as needed for Wheezing (cough)., Disp: 1 Inhaler, Rfl: 0     "esomeprazole (NEXIUM) 40 MG capsule, Take 1 capsule (40 mg total) by mouth before breakfast., Disp: 30 capsule, Rfl: 11    ketoconazole (NIZORAL) 2 % shampoo, Wash hair with medicated shampoo at least 2x/week - let sit on scalp at least 5 minutes prior to rinsing, Disp: 120 mL, Rfl: 5    L.acidophilus-Bif. animalis (PROBIOTIC) 5 billion cell CpSP, Take 1 tablet by mouth once daily., Disp: 10 capsule, Rfl: 0    losartan (COZAAR) 25 MG tablet, Take 1 tablet (25 mg total) by mouth once daily., Disp: 30 tablet, Rfl: 12    promethazine-dextromethorphan (PROMETHAZINE-DM) 6.25-15 mg/5 mL Syrp, Take 5 mLs by mouth every 6 (six) hours as needed. May cause drowsiness, Disp: 120 mL, Rfl: 0    rosuvastatin (CRESTOR) 20 MG tablet, Take 1 tablet (20 mg total) by mouth every evening., Disp: 30 tablet, Rfl: 11    GYNECOLOGY HISTORY:  Abnormal pap s/p LEEP; no other stds    DATA REVIEWED:  Last pap: normal Date: 2012  Last mmg: density in left breast Date: 11/2016; f/u 6m  Colonoscopy: 1/2016 hemorrhoids, repeat 5-10y    Visit Vitals    /78    Ht 5' 3" (1.6 m)    Wt 92.5 kg (203 lb 14.8 oz)    BMI 36.12 kg/m2       ROS:  GENERAL: Denies weight gain or weight loss. Feeling well overall.   SKIN: Denies rash or lesions.   HEAD: Denies head injury or headache.   NODES: Denies enlarged lymph nodes.   CHEST: Denies chest pain or shortness of breath.   CARDIOVASCULAR: Denies palpitations or left sided chest pain.   ABDOMEN: No abdominal pain, constipation, diarrhea, nausea, vomiting or rectal bleeding.   URINARY: No frequency, dysuria, hematuria, or burning on urination.  REPRODUCTIVE: See HPI.   BREASTS: The patient denies pain, lumps, or nipple discharge.   HEMATOLOGIC: No easy bruisability or excessive bleeding.   MUSCULOSKELETAL: Denies joint pain or swelling.   NEUROLOGIC: Denies syncope or weakness.   PSYCHIATRIC: Denies depression, anxiety or mood swings.    PE:   APPEARANCE: Well nourished, well developed, in no " acute distress.  AFFECT: WNL, alert and oriented x 3.  SKIN: No acne or hirsutism.  NECK: Neck symmetric without masses or thyromegaly.  NODES: No inguinal, cervical, axillary or femoral lymph node enlargement.  CHEST: Good respiratory effort.   ABDOMEN: Soft. No tenderness or masses. No hepatosplenomegaly. No hernias.  BREASTS: Symmetrical, no skin changes or visible lesions. No palpable masses, nipple discharge bilaterally.  PELVIC: Normal external female genitalia without lesions. Normal hair distribution. Adequate perineal body, normal urethral meatus. Vagina atrophic without lesions or discharge. No significant cystocele or rectocele. Bimanual exam shows uterus and cervix to be surgically absent. Adnexa without masses or tenderness.  EXTREMITIES: No edema.    Encounter for gynecological examination without abnormal finding    Musculoskeletal pain in RLQ    Patient was counseled today on A.C.S. Pap guidelines (none needed) and recommendations for q3-5y pelvic exams, yearly mammograms starting age 40, and clinical breast exams; to see her PCP for other health maintenance.

## 2017-02-02 NOTE — LETTER
February 2, 2017      Zuly Montes MD  [address] [place] [address]           [facility] - Pulmonary [facility]  [address] [place] [address]  Phone: [phone]  Fax: [phone]          Patient: Ora Henderson   MR Number: [id number]   YOB: 1960   Date of Visit: 2/2/2017       Dear Dr. Zuly Montes:    Thank you for referring Ora Henderson to me for evaluation. Attached you will find relevant portions of my assessment and plan of care.    If you have questions, please do not hesitate to call me. I look forward to following Ora Henderson along with you.    Sincerely,    Vanessa Oliva, NP    Enclosure  CC:  No Recipients    If you would like to receive this communication electronically, please contact [email or web address] or [phone] to request more information on [facility] access.    For providers and/or their staff who would like to refer a patient to [facility], please contact us through our one-stop-shop provider referral line, [facility], at [phone].    If you feel you have received this communication in error or would no longer like to receive these types of communications, please e-mail [email or web address]

## 2017-02-02 NOTE — MR AVS SNAPSHOT
[facility] - Pulmonary Services  [address] [facility] [place] [address]  Phone: [phone]  Fax: [phone]                  Ora Henderson   2017 8:00 AM   Office Visit    Description:  Female : 1960   Provider:  Vanessa Oliva NP   Department:  [facility] - Pulmonary Services           Reason for Visit     Sleep Apnea           Diagnoses this Visit        Comments    Disrupted sleep-wake cycle    -  Primary onset improves with benadryl or aleve pm and with soothing herbal non caffeine tea. complete 2 week sleep diary and return at next visit.     Sleep-disordered breathing     frequent awakening, non restorative sleep, day time sleepiness with fatigue during day. plan home sleep study.            To Do List           Future Appointments        Provider Department Dept Phone    2017 8:30 AM Liza Anand MD [facility] - Dermatology [phone]    2017 9:00 AM [facility] MAMMO2-DX [facility] [phone]    2017 10:40 AM Zuly Montes MD [facility] - Internal Medicine [phone]      Goals (5 Years of Data)     None      Follow-Up and Disposition     Return for Home Sleep Study Follow Up.    Follow-up and Disposition History      [facility]     [facility] On [facility] Nurse Care Line -  Assistance  Registered nurses in the [facility] Center provide clinical advisement, health education, appointment booking, and other advisory services.  Call for this free service at [phone].             Medications           Message regarding Medications     Verify the changes and/or additions to your medication regime listed below are the same as discussed with your clinician today.  If any of these changes or additions are incorrect, please notify your healthcare provider.             Verify that the below list of medications is an accurate representation of the medications you are currently taking.  If none reported, the list may be blank. If incorrect, please  "contact your healthcare provider. Carry this list with you in case of emergency.           Current Medications     albuterol 90 mcg/actuation inhaler Inhale 1-2 puffs into the lungs every 6 (six) hours as needed for Wheezing (cough).    esomeprazole (NEXIUM) 40 MG capsule Take 1 capsule (40 mg total) by mouth before breakfast.    ketoconazole (NIZORAL) 2 % shampoo Wash hair with medicated shampoo at least 2x/week - let sit on scalp at least 5 minutes prior to rinsing    L.acidophilus-Bif. animalis (PROBIOTIC) 5 billion cell CpSP Take 1 tablet by mouth once daily.    losartan (COZAAR) 25 MG tablet Take 1 tablet (25 mg total) by mouth once daily.    promethazine-dextromethorphan (PROMETHAZINE-DM) 6.25-15 mg/5 mL Syrp Take 5 mLs by mouth every 6 (six) hours as needed. May cause drowsiness    rosuvastatin (CRESTOR) 20 MG tablet Take 1 tablet (20 mg total) by mouth every evening.           Clinical Reference Information           Your Vitals Were     BP Pulse Resp Height Weight SpO2    136/88 (BP Location: Right arm, Patient Position: Sitting, BP Method: Manual) 75 18 5' 3" (1.6 m) 92.7 kg (204 lb 5.9 oz) 100%    BMI                36.2 kg/m2          Blood Pressure          Most Recent Value    BP  136/88      Allergies as of 2/2/2017     Sulfa (Sulfonamide Antibiotics)      Immunizations Administered on Date of Encounter - 2/2/2017     None      Orders Placed During Today's Visit     Future Labs/Procedures Expected by Expires    Home Sleep Studies  As directed 2/2/2018      Instructions      Monitoring Your Sleep: Home Sleep Study  A home sleep study tracks and records body functions while youre asleep in your own bed. The results of the study will help diagnose your sleep problem and plan your treatment.    How a home sleep study works  During a sleep study, sensors attached to your body measure your breathing, oxygen level, and other body functions. You will be shown how to attach the sensors to your body. You may " also have help from a technician. At bedtime you plug the sensors into a small computer and turn it on. In the morning, you will remove the sensors and return the computer so the results can be studied.  Tips  Youll be given instructions for how to set up the sensors and the computer. Doing so will be simple. For best results:  · Go through the instructions during the day so youll be ready to use the equipment at bedtime.  · Stick to your normal routine. Ask your healthcare provider if you should do anything differently the night of the study.  · If you get up during the night, reconnect the sensors to the computer or to yourself correctly.  · Get as many hours of sleep as you can.  Getting the results  The results of your sleep study need to be scored and interpreted. Once this is done, your healthcare provider will discuss the findings with you. The sleep study results will show whether you have apnea. This is when your breathing stops temporarily many times during the night, awakening you briefly.  It can also tell how severe the apnea is. The findings help your healthcare provider know which treatment or treatments may be the right ones for you.  © 8353-3366 [employer]. [address] [place] [address]. All rights reserved. This information is not intended as a substitute for professional medical care. Always follow your healthcare professional's instructions.        What Are Snoring and Obstructive Sleep Apnea?  If youve ever had a stuffed-up nose, you know the feeling of trying to breathe through a very narrow passageway. This is what happens in your throat when you snore. While you sleep, structures in your throat partially block your air passage, making the passage narrow and hard to breathe through. If the entire passage becomes blocked and you cant breathe at all, you have sleep apnea.     Air moves freely through the nose, mouth and throat.   Snoring  If your throat structures  are too large or the muscles relax too much during sleep, the air passage may be partially blocked. As air from the nose or mouth passes around this blockage, the throat structures vibrate, causing the familiar sound of snoring. At times, this sound can be so loud that snorers wake up others, or even themselves, during the night. Snoring gets worse as more and more of the air passage is blocked.     Air is blocked in the back of the mouth and throat.   Obstructive sleep apnea  If the structures completely block the throat, air cant flow to the lungs at all. This is called apnea (meaning no breathing). Since the lungs arent getting fresh air, the brain tells the body to wake up just enough to tighten the muscles and unblock the air passage. With a loud gasp, breathing begins again. This process may be repeated over and over again throughout the night, making your sleep fragmented with a lighter stage of sleep. Even though you do not remember waking up many times during the night to a lighter sleep, you feel tired the next day. The lack of sleep and fresh air can also strain your lungs, heart, and other organs, leading to problems such as high blood pressure, heart attack, or stroke.     Air may not be able to move freely past a deviated septum or swollen turbinates.   Problems in the nose and jaw  Problems in the structure of the nose may obstruct breathing. A crooked (deviated) septum or swollen turbinates can make snoring worse or lead to apnea. Also, a receding jaw may make the tongue sit too far back, so its more likely to block the airway when youre asleep.        © 8172-9773 [employer]. [address] [place] [address], [place] [address]. All rights reserved. This information is not intended as a substitute for professional medical care. Always follow your healthcare professional's instructions.             Language Assistance Services     ATTENTION: Language assistance services are available, free  of charge. Please call [phone].      ATENCIÓN: Si habla español, tiene a thurston disposición servicios gratuitos de asistencia lingüística. Llame al [phone].     CHÚ Ý: N?u b?n nói Ti?ng Vi?t, có các d?ch v? h? tr? ngôn ng? mi?n phí dành cho b?n. G?i s? [phone].         [facility] complies with applicable Federal civil rights laws and does not discriminate on the basis of race, color, national origin, age, disability, or sex.

## 2017-02-02 NOTE — PATIENT INSTRUCTIONS
Monitoring Your Sleep: Home Sleep Study  A home sleep study tracks and records body functions while youre asleep in your own bed. The results of the study will help diagnose your sleep problem and plan your treatment.    How a home sleep study works  During a sleep study, sensors attached to your body measure your breathing, oxygen level, and other body functions. You will be shown how to attach the sensors to your body. You may also have help from a technician. At bedtime you plug the sensors into a small computer and turn it on. In the morning, you will remove the sensors and return the computer so the results can be studied.  Tips  Youll be given instructions for how to set up the sensors and the computer. Doing so will be simple. For best results:  · Go through the instructions during the day so youll be ready to use the equipment at bedtime.  · Stick to your normal routine. Ask your healthcare provider if you should do anything differently the night of the study.  · If you get up during the night, reconnect the sensors to the computer or to yourself correctly.  · Get as many hours of sleep as you can.  Getting the results  The results of your sleep study need to be scored and interpreted. Once this is done, your healthcare provider will discuss the findings with you. The sleep study results will show whether you have apnea. This is when your breathing stops temporarily many times during the night, awakening you briefly.  It can also tell how severe the apnea is. The findings help your healthcare provider know which treatment or treatments may be the right ones for you.  © 5083-9796 [employer]. [address] [place] [address], [place] [address]. All rights reserved. This information is not intended as a substitute for professional medical care. Always follow your healthcare professional's instructions.        What Are Snoring and Obstructive Sleep Apnea?  If youve ever had a stuffed-up nose, you know  the feeling of trying to breathe through a very narrow passageway. This is what happens in your throat when you snore. While you sleep, structures in your throat partially block your air passage, making the passage narrow and hard to breathe through. If the entire passage becomes blocked and you cant breathe at all, you have sleep apnea.     Air moves freely through the nose, mouth and throat.   Snoring  If your throat structures are too large or the muscles relax too much during sleep, the air passage may be partially blocked. As air from the nose or mouth passes around this blockage, the throat structures vibrate, causing the familiar sound of snoring. At times, this sound can be so loud that snorers wake up others, or even themselves, during the night. Snoring gets worse as more and more of the air passage is blocked.     Air is blocked in the back of the mouth and throat.   Obstructive sleep apnea  If the structures completely block the throat, air cant flow to the lungs at all. This is called apnea (meaning no breathing). Since the lungs arent getting fresh air, the brain tells the body to wake up just enough to tighten the muscles and unblock the air passage. With a loud gasp, breathing begins again. This process may be repeated over and over again throughout the night, making your sleep fragmented with a lighter stage of sleep. Even though you do not remember waking up many times during the night to a lighter sleep, you feel tired the next day. The lack of sleep and fresh air can also strain your lungs, heart, and other organs, leading to problems such as high blood pressure, heart attack, or stroke.     Air may not be able to move freely past a deviated septum or swollen turbinates.   Problems in the nose and jaw  Problems in the structure of the nose may obstruct breathing. A crooked (deviated) septum or swollen turbinates can make snoring worse or lead to apnea. Also, a receding jaw may make the  tongue sit too far back, so its more likely to block the airway when youre asleep.        © 8061-0546 [employer]. [address] [place] [address], [place] [address]. All rights reserved. This information is not intended as a substitute for professional medical care. Always follow your healthcare professional's instructions.

## 2017-02-02 NOTE — PROGRESS NOTES
HPI     eye exam    Additional comments: pt states that she has problem seeing small print            Comments   No dm  lasik ou  2015         Last edited by Leslie Sewell on 2/2/2017  9:31 AM. (History)            Assessment /Plan     For exam results, see Encounter Report.    Visit for eye and vision exam    Glaucoma screening    Refractive error      OH OK OU.  Spec Rx given versus OTC readers.  RTC one year.

## 2017-02-03 ENCOUNTER — TELEPHONE (OUTPATIENT)
Dept: PULMONOLOGY | Facility: CLINIC | Age: 57
End: 2017-02-03

## 2017-02-11 DIAGNOSIS — I10 ESSENTIAL HYPERTENSION: ICD-10-CM

## 2017-02-13 RX ORDER — METOPROLOL SUCCINATE 25 MG/1
TABLET, EXTENDED RELEASE ORAL
Qty: 30 TABLET | Refills: 12 | Status: SHIPPED | OUTPATIENT
Start: 2017-02-13 | End: 2018-02-18 | Stop reason: SDUPTHER

## 2017-02-13 RX ORDER — LOSARTAN POTASSIUM 25 MG/1
TABLET ORAL
Qty: 30 TABLET | Refills: 12 | Status: SHIPPED | OUTPATIENT
Start: 2017-02-13 | End: 2017-05-08 | Stop reason: ALTCHOICE

## 2017-02-16 ENCOUNTER — OFFICE VISIT (OUTPATIENT)
Dept: SLEEP MEDICINE | Facility: CLINIC | Age: 57
End: 2017-02-16
Payer: COMMERCIAL

## 2017-02-16 DIAGNOSIS — G47.20 DISRUPTED SLEEP-WAKE CYCLE: ICD-10-CM

## 2017-02-16 DIAGNOSIS — F51.8 DISRUPTED SLEEP-WAKE CYCLE: ICD-10-CM

## 2017-02-16 DIAGNOSIS — G47.33 OSA (OBSTRUCTIVE SLEEP APNEA): ICD-10-CM

## 2017-02-16 PROCEDURE — 99999 PR PBB SHADOW E&M-EST. PATIENT-LVL II: CPT | Mod: PBBFAC,,,

## 2017-02-16 PROCEDURE — 99499 UNLISTED E&M SERVICE: CPT | Mod: S$GLB,,, | Performed by: INTERNAL MEDICINE

## 2017-02-16 PROCEDURE — 95806 SLEEP STUDY UNATT&RESP EFFT: CPT | Mod: S$GLB,,, | Performed by: INTERNAL MEDICINE

## 2017-02-16 NOTE — MR AVS SNAPSHOT
[facility]  [address] [facility] [place] [address]  Phone: [phone]                  Ora Henderson   2017 8:00 AM   Office Visit    Description:  Female : 1960   Provider:  HOME SLEEP [facility]   Department:  [facility] Sleep [facility]           Diagnoses this Visit        Comments    Disrupted sleep-wake cycle     onset improves with benadryl or aleve pm and with soothing herbal non caffeine tea. complete 2 week sleep diary and return at next visit.     Sleep-disordered breathing     frequent awakening, non restorative sleep, day time sleepiness with fatigue during day. plan home sleep study.            To Do List           Future Appointments        Provider Department Dept Phone    3/2/2017 8:30 AM [facility] [phone]    3/2/2017 8:45 AM ECHOCARDIOGRAM, [facility] [phone]    3/28/2017 8:00 AM Vinayak Moralez MD [facility] [phone]    2017 8:30 AM Liza Anand MD [facility] Dermatology [phone]    2017 9:00 AM [facility] MAMMO2-DX [facility] [phone]      Goals (5 Years of Data)     None      [facility]     [facility] On Call Nurse Care Line -  Assistance  Registered nurses in the [facility] Center provide clinical advisement, health education, appointment booking, and other advisory services.  Call for this free service at [phone].             Medications           Message regarding Medications     Verify the changes and/or additions to your medication regime listed below are the same as discussed with your clinician today.  If any of these changes or additions are incorrect, please notify your healthcare provider.             Verify that the below list of medications is an accurate representation of the medications you are currently taking.  If none reported, the list may be blank. If incorrect, please contact your healthcare provider. Carry this  list with you in case of emergency.           Current Medications     albuterol 90 mcg/actuation inhaler Inhale 1-2 puffs into the lungs every 6 (six) hours as needed for Wheezing (cough).    esomeprazole (NEXIUM) 40 MG capsule Take 1 capsule (40 mg total) by mouth before breakfast.    ketoconazole (NIZORAL) 2 % shampoo Wash hair with medicated shampoo at least 2x/week - let sit on scalp at least 5 minutes prior to rinsing    L.acidophilus-Bif. animalis (PROBIOTIC) 5 billion cell CpSP Take 1 tablet by mouth once daily.    losartan (COZAAR) 25 MG tablet TAKE ONE TABLET BY MOUTH ONCE DAILY    metoprolol succinate (TOPROL-XL) 25 MG 24 hr tablet TAKE ONE TABLET BY MOUTH ONCE DAILY    promethazine-dextromethorphan (PROMETHAZINE-DM) 6.25-15 mg/5 mL Syrp Take 5 mLs by mouth every 6 (six) hours as needed. May cause drowsiness    rosuvastatin (CRESTOR) 20 MG tablet Take 1 tablet (20 mg total) by mouth every evening.           Clinical Reference Information           Allergies as of 2/16/2017     Sulfa (Sulfonamide Antibiotics)      Immunizations Administered on Date of Encounter - 2/16/2017     None      Orders Placed During Today's Visit      Normal Orders This Visit    Home Sleep Studies       Language Assistance Services     ATTENTION: Language assistance services are available, free of charge. Please call [phone].      ATENCIÓN: Si habla wolfgang, tiene a thurston disposición servicios gratuitos de asistencia lingüística. Llame al [phone].     [facility] Ý: N?u b?n nói Ti?ng Vi?t, có các d?ch v? h? tr? ngôn ng? mi?n phí dành cho b?n. G?i s? [phone].         [facility] complies with applicable Federal civil rights laws and does not discriminate on the basis of race, color, national origin, age, disability, or sex.

## 2017-02-16 NOTE — Clinical Note
Assessment and Recommendations Monitor duration was 7 hours 34 minutes. Invalid respiratory signal 26 minutes, invalid O2 signal 1 minute. Lowest oxygen saturation was 82%. Average heart rate was 72 bpm. Maximum heart rate was 100 bpm. Snoring was recorded about 50 dB for 88% of the time. Apnea-hypopnea index/respiratory event index 6.7 events per hour. Total events were 51. Of this 10 obstructive apneas. Clinical notes indicate high suspicion for sleep-disordered breathing obstructive sleep apnea. Data collected by single night study is supportive of the presence of obstructive sleep apnea. Obstructive sleep apnea is detected. Patient with CPAP would be beneficial. CPAP titration or auto titrating device Weight loss recommended Follow-up with ordering clinician to ensure resolution of symptoms

## 2017-02-17 PROBLEM — G47.33 OSA (OBSTRUCTIVE SLEEP APNEA): Status: ACTIVE | Noted: 2017-02-17

## 2017-02-20 ENCOUNTER — OFFICE VISIT (OUTPATIENT)
Dept: INTERNAL MEDICINE | Facility: CLINIC | Age: 57
End: 2017-02-20
Payer: COMMERCIAL

## 2017-02-20 VITALS
HEIGHT: 63 IN | DIASTOLIC BLOOD PRESSURE: 78 MMHG | SYSTOLIC BLOOD PRESSURE: 124 MMHG | WEIGHT: 204.38 LBS | TEMPERATURE: 98 F | BODY MASS INDEX: 36.21 KG/M2

## 2017-02-20 DIAGNOSIS — Z02.1 PRE-EMPLOYMENT HEALTH SCREENING EXAMINATION: Primary | ICD-10-CM

## 2017-02-20 PROCEDURE — 99213 OFFICE O/P EST LOW 20 MIN: CPT | Mod: S$GLB,,, | Performed by: PHYSICIAN ASSISTANT

## 2017-02-20 PROCEDURE — 3078F DIAST BP <80 MM HG: CPT | Mod: S$GLB,,, | Performed by: PHYSICIAN ASSISTANT

## 2017-02-20 PROCEDURE — 3074F SYST BP LT 130 MM HG: CPT | Mod: S$GLB,,, | Performed by: PHYSICIAN ASSISTANT

## 2017-02-20 PROCEDURE — 86580 TB INTRADERMAL TEST: CPT | Mod: S$GLB,,, | Performed by: PHYSICIAN ASSISTANT

## 2017-02-20 PROCEDURE — 99999 PR PBB SHADOW E&M-EST. PATIENT-LVL III: CPT | Mod: PBBFAC,,, | Performed by: PHYSICIAN ASSISTANT

## 2017-02-20 NOTE — PROGRESS NOTES
Subjective:       Patient ID: Ora Henderson is a 56 y.o.B/ female.    Chief Complaint: need TB for work    HPI         She is here by herself today and has the above need.  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  It's a requirement that she have a PPD done to make sure she has no tuberculosis.  She denies any problem with cough, dyspnea of any modality, CP, or pleurisy.    Review of Systems    Otherwise negative concerning the PULMONARY, CV, and GI system review.    Objective:      Physical Exam    ENT: All within normal limits.  CHEST: Clear BS anterior to posterior.  Breath intensity is normal.  She has no wheeze, rhonchi, or rales.    Assessment:       1. Pre-employment health screening examination        Plan:     1.  PPD was administered today and she is to return on Thursday for interpretation.

## 2017-02-27 ENCOUNTER — TELEPHONE (OUTPATIENT)
Dept: PULMONOLOGY | Facility: CLINIC | Age: 57
End: 2017-02-27

## 2017-02-27 NOTE — TELEPHONE ENCOUNTER
Home sleep study done on 2/13/2017 that revealed shalini with AHI 6.7     Request staff schedule follow up appointment to discuss results and treatment recommendations.     Assessment and Recommendations  Monitor duration was 7 hours 34 minutes.  Invalid respiratory signal 26 minutes, invalid O2 signal 1 minute.  Lowest oxygen saturation was 82%.  Average heart rate was 72 bpm. Maximum heart rate was 100 bpm.  Snoring was recorded about 50 dB for 88% of the time.  Apnea-hypopnea index/respiratory event index 6.7 events per hour. Total events were 51. Of this 10 obstructive  apneas.  Clinical notes indicate high suspicion for sleep-disordered breathing obstructive sleep apnea.  Data collected by single night study is supportive of the presence of obstructive sleep apnea.  Obstructive sleep apnea is detected.  Patient with CPAP would be beneficial.  CPAP titration or auto titrating device  Weight loss recommended  Follow-up with ordering clinician to ensure resolution of symptoms

## 2017-02-28 ENCOUNTER — TELEPHONE (OUTPATIENT)
Dept: PULMONOLOGY | Facility: CLINIC | Age: 57
End: 2017-02-28

## 2017-02-28 NOTE — TELEPHONE ENCOUNTER
----- Message from Yanet Carr sent at 2/28/2017  3:37 PM CST -----  Contact: Pt  Pt is returning the nurse call. Pls call pt back at [phone].

## 2017-03-02 ENCOUNTER — OFFICE VISIT (OUTPATIENT)
Dept: PULMONOLOGY | Facility: CLINIC | Age: 57
End: 2017-03-02
Payer: COMMERCIAL

## 2017-03-02 ENCOUNTER — HOSPITAL ENCOUNTER (OUTPATIENT)
Dept: RADIOLOGY | Facility: HOSPITAL | Age: 57
Discharge: HOME | End: 2017-03-02
Attending: INTERNAL MEDICINE
Payer: COMMERCIAL

## 2017-03-02 ENCOUNTER — CLINICAL SUPPORT (OUTPATIENT)
Dept: CARDIOLOGY | Facility: CLINIC | Age: 57
End: 2017-03-02
Payer: COMMERCIAL

## 2017-03-02 VITALS
RESPIRATION RATE: 18 BRPM | HEART RATE: 87 BPM | HEIGHT: 63 IN | BODY MASS INDEX: 36.32 KG/M2 | OXYGEN SATURATION: 96 % | WEIGHT: 205 LBS | DIASTOLIC BLOOD PRESSURE: 96 MMHG | SYSTOLIC BLOOD PRESSURE: 140 MMHG

## 2017-03-02 DIAGNOSIS — R07.89 ATYPICAL CHEST PAIN: ICD-10-CM

## 2017-03-02 DIAGNOSIS — R94.31 ABNORMAL ECG: ICD-10-CM

## 2017-03-02 DIAGNOSIS — Z82.49 FAMILY HISTORY OF CARDIOVASCULAR DISEASE: ICD-10-CM

## 2017-03-02 DIAGNOSIS — G47.33 MILD OBSTRUCTIVE SLEEP APNEA: Primary | ICD-10-CM

## 2017-03-02 DIAGNOSIS — I10 ESSENTIAL HYPERTENSION: ICD-10-CM

## 2017-03-02 LAB
DIASTOLIC DYSFUNCTION: NO
DIASTOLIC DYSFUNCTION: NO
ESTIMATED PA SYSTOLIC PRESSURE: 27.6
MITRAL VALVE REGURGITATION: NORMAL
RETIRED EF AND QEF - SEE NOTES: 55 (ref 55–65)

## 2017-03-02 PROCEDURE — 78452 HT MUSCLE IMAGE SPECT MULT: CPT | Mod: TC,PO

## 2017-03-02 PROCEDURE — 93306 TTE W/DOPPLER COMPLETE: CPT | Mod: S$GLB,,, | Performed by: INTERNAL MEDICINE

## 2017-03-02 PROCEDURE — 99213 OFFICE O/P EST LOW 20 MIN: CPT | Mod: S$GLB,,, | Performed by: NURSE PRACTITIONER

## 2017-03-02 PROCEDURE — 3077F SYST BP >= 140 MM HG: CPT | Mod: S$GLB,,, | Performed by: NURSE PRACTITIONER

## 2017-03-02 PROCEDURE — 78452 HT MUSCLE IMAGE SPECT MULT: CPT | Mod: 26,,, | Performed by: NUCLEAR MEDICINE

## 2017-03-02 PROCEDURE — 99999 PR PBB SHADOW E&M-EST. PATIENT-LVL III: CPT | Mod: PBBFAC,,, | Performed by: NURSE PRACTITIONER

## 2017-03-02 PROCEDURE — 93015 CV STRESS TEST SUPVJ I&R: CPT | Mod: S$GLB,,, | Performed by: NUCLEAR MEDICINE

## 2017-03-02 PROCEDURE — 3080F DIAST BP >= 90 MM HG: CPT | Mod: S$GLB,,, | Performed by: NURSE PRACTITIONER

## 2017-03-02 PROCEDURE — 1160F RVW MEDS BY RX/DR IN RCRD: CPT | Mod: S$GLB,,, | Performed by: NURSE PRACTITIONER

## 2017-03-02 NOTE — PATIENT INSTRUCTIONS

## 2017-03-02 NOTE — MR AVS SNAPSHOT
[facility] - Pulmonary Services  [address] [place] [address]  Phone: [phone]  Fax: [phone]                  Ora Henderson   3/2/2017 11:00 AM   Office Visit    Description:  Female : 1960   Provider:  Vanessa Oliva NP   Department:  [facility] - Pulmonary Services           Reason for Visit     Sleep Apnea           Diagnoses this Visit        Comments    Mild obstructive sleep apnea    -  Primary initial set for Auto CPAP 4-20 cm. Home auto CPAP trial is pt perference for intiating cpap therapy. -    Obesity, Class II, BMI 35-39.9, with comorbidity     pt is working on janay. restriction and exercise. with a daily work out program, TM, bike, Ellipitical, wt. discussed impact of obesity on gen health/sleep apnea           To Do List           Future Appointments        Provider Department Dept Phone    3/28/2017 8:00 AM Vinayak Moralez MD [facility] Cardiology [phone]    2017 8:30 AM Liza Anand MD [facility] Dermatology [phone]    2017 9:00 AM [facility] [phone]    2017 10:40 AM Zuly Montes MD [facility] Internal Medicine [phone]      Goals (5 Years of Data)     None      Follow-Up and Disposition     Return in about 6 weeks (around 2017) for CPAP compliance follow up.    Follow-up and Disposition History      [facility]     [facility] -  Assistance  Registered nurses in the [facility] provide clinical advisement, health education, appointment booking, and other advisory services.  Call for this free service at [phone].             Medications           Message regarding Medications     Verify the changes and/or additions to your medication regime listed below are the same as discussed with your clinician today.  If any of these changes or additions are incorrect, please notify your healthcare provider.             Verify that the below list of  medications is an accurate representation of the medications you are currently taking.  If none reported, the list may be blank. If incorrect, please contact your healthcare provider. Carry this list with you in case of emergency.           Current Medications     albuterol 90 mcg/actuation inhaler Inhale 1-2 puffs into the lungs every 6 (six) hours as needed for Wheezing (cough).    esomeprazole (NEXIUM) 40 MG capsule Take 1 capsule (40 mg total) by mouth before breakfast.    ketoconazole (NIZORAL) 2 % shampoo Wash hair with medicated shampoo at least 2x/week - let sit on scalp at least 5 minutes prior to rinsing    L.acidophilus-Bif. animalis (PROBIOTIC) 5 billion cell CpSP Take 1 tablet by mouth once daily.    losartan (COZAAR) 25 MG tablet TAKE ONE TABLET BY MOUTH ONCE DAILY    metoprolol succinate (TOPROL-XL) 25 MG 24 hr tablet TAKE ONE TABLET BY MOUTH ONCE DAILY    promethazine-dextromethorphan (PROMETHAZINE-DM) 6.25-15 mg/5 mL Syrp Take 5 mLs by mouth every 6 (six) hours as needed. May cause drowsiness    rosuvastatin (CRESTOR) 20 MG tablet Take 1 tablet (20 mg total) by mouth every evening.           Clinical Reference Information           Your Vitals Were     BP                   140/96           Blood Pressure          Most Recent Value    BP  (!)  140/96      Allergies as of 3/2/2017     Sulfa (Sulfonamide Antibiotics)      Immunizations Administered on Date of Encounter - 3/2/2017     None      Orders Placed During Today's Visit      Normal Orders This Visit    CPAP FOR HOME USE     CPAP/BIPAP SUPPLIES       Instructions      Obstructive Sleep Apnea  Obstructive sleep apnea is a condition that causes your air passages to become narrowed or blocked during sleep. As a result, breathing stops for short periods. Your body wakes up enough for breathing to begin again, though you don't remember it. The cycle of stopped breathing and brief awakenings can repeat dozens of times a night. This prevents the body  from getting to the deeper stages of sleep that are needed for good rest.  Signs of sleep apnea include loud snoring, noisy breathing, and gasping sounds during sleep. Daytime symptoms include waking up tired after a full night's sleep, waking up with headaches, feeling very sleepy or falling asleep during the day, and having problems with memory or concentration.  Risk factors for sleep apnea include:  · Being overweight  · Being a man, or a woman in menopause  · Smoking  · Using alcohol or sedating medications or herbs  · Having enlarged structures in the nose or throat  Home care  Lifestyle changes that can help treat snoring and sleep apnea include the following:  · If you are overweight, lose weight. Talk to your healthcare provider about a weight-loss plan for you.  · Avoid alcohol for 3 to 4 hours before bedtime. Avoid sedating medications. Ask your healthcare provider about the medications you take.  · If you smoke, talk to your healthcare provider about ways to quit.  · Sleep on your side. This can help prevent gravity from pulling relaxed throat tissues into your breathing passages.  · If you have allergies or sinus problems that block your nose, ask your healthcare provider for help.  Follow up  Follow up with your healthcare provider as advised. A diagnosis of sleep apnea is made with a sleep study. Your healthcare provider can tell you more about this test.  When to seek medical care  Sleep apnea can make you more likely to have certain health problems. These include high blood pressure, heart attack, stroke, and sexual dysfunction. If you have sleep apnea, talk to your healthcare provider about the best treatments for you.  Date Last Reviewed: 5/3/2015  © 5033-0345 [employer]. [address] [place] [address], [place] [address]. All rights reserved. This information is not intended as a substitute for professional medical care. Always follow your healthcare professional's instructions.              Language Assistance Services     ATTENTION: Language assistance services are available, free of charge. Please call [phone].      ATENCIÓN: Si habla wolfgang, tiene a thurston disposición servicios gratuitos de asistencia lingüística. Llame al [phone].     CHÚ Ý: N?u b?n nói Ti?ng Vi?t, có các d?ch v? h? tr? ngôn ng? mi?n phí dành cho b?n. G?i s? [phone].         [facility] - Pulmonary Services complies with applicable Federal civil rights laws and does not discriminate on the basis of race, color, national origin, age, disability, or sex.

## 2017-03-02 NOTE — PROGRESS NOTES
Established Patient    Subjective:      Patient ID: Ora Henderson is a 56 y.o. female.    Patient Active Problem List   Diagnosis    Hyperlipidemia    Hypertension    Hepatomegaly    Family history of cardiovascular disease    Obesity, Class II, BMI 35-39.9, with comorbidity    Abnormal ECG    PVD (peripheral vascular disease)    Gastroesophageal reflux disease    NATALIE (obstructive sleep apnea)       Problem list has been reviewed.    she has been referred by No ref. provider found for evaluation and management for   Chief Complaint   Patient presents with    Sleep Apnea     review of HSAT done 2017       Chief Complaint: Sleep Apnea (review of HSAT done 2017)      HPI:  She presents for review of home sleep study that revealed mild sleep apnea with AHI 6.7.   Once discussed results with patient and treatment options of preferred in lab cpap titration vs home auto cpap titration.  Patient states she would like the home auto cpap trial to begin treatment for her sleep apnea.     Previous Report Reviewed: lab reports, office notes and radiology reports     Past Medical History: The following portions of the patient's history were reviewed and updated as appropriate:   She  has a past surgical history that includes Carpal tunnel release (Bilateral); Brain surgery; Cervical biopsy w/ loop electrode excision; Colonoscopy (N/A, 2016); Ventral hernia repair; Hernia repair;  section; Total abdominal hysterectomy w/ bilateral salpingoophorectomy; and Hysterectomy ().  Her family history includes Cancer in her maternal aunt and maternal uncle; Diabetes in her maternal aunt; Heart disease in her father, paternal grandmother, and paternal uncle; Hypertension in her father and mother; Stroke in her father.  She  reports that she has never smoked. She has never used smokeless tobacco. She reports that she drinks alcohol. She reports that she does not use illicit drugs.  She has  a current medication list which includes the following prescription(s): albuterol, esomeprazole, ketoconazole, l.acidophilus-bif. animalis, losartan, metoprolol succinate, promethazine-dextromethorphan, and rosuvastatin.  She is allergic to sulfa (sulfonamide antibiotics)..    Review of Systems   Constitutional: Negative for fever, chills, weight loss, weight gain, activity change, appetite change, fatigue and night sweats.   HENT: Negative for postnasal drip, rhinorrhea, sinus pressure, voice change and congestion.    Eyes: Negative for redness and itching.   Respiratory: Positive for snoring. Negative for cough, sputum production, chest tightness, shortness of breath, wheezing, orthopnea, asthma nighttime symptoms, dyspnea on extertion, use of rescue inhaler and somnolence.    Cardiovascular: Negative for chest pain, palpitations and leg swelling.   Genitourinary: Negative for difficulty urinating and hematuria.   Endocrine: Negative for polydipsia, polyphagia, cold intolerance, heat intolerance and polyuria.    Musculoskeletal: Positive for arthralgias (knees). Negative for back pain, gait problem, joint swelling and myalgias.   Skin: Negative.    Gastrointestinal: Negative for nausea, vomiting, abdominal pain and acid reflux.   Neurological: Negative for dizziness, weakness, light-headedness and headaches.   Hematological: Negative for adenopathy. No excessive bruising.   Psychiatric/Behavioral: Positive for sleep disturbance. The patient is not nervous/anxious.         Objective:     Physical Exam   Constitutional: She is oriented to person, place, and time. She appears well-developed and well-nourished.   HENT:   Head: Normocephalic.   Right Ear: External ear normal.   Left Ear: External ear normal.   Nose: Nose normal.   Mouth/Throat: Oropharynx is clear and moist. No oropharyngeal exudate.   Eyes: Conjunctivae are normal.   Neck: Normal range of motion. Neck supple.   Cardiovascular: Normal rate, regular  "rhythm, normal heart sounds and intact distal pulses.    Pulmonary/Chest: Effort normal and breath sounds normal.   Abdominal: Soft. Bowel sounds are normal.   Musculoskeletal: Normal range of motion.   Neurological: She is alert and oriented to person, place, and time.   Skin: Skin is warm and dry.   Psychiatric: She has a normal mood and affect. Her behavior is normal. Judgment and thought content normal.   Vitals reviewed.    Vitals:    03/02/17 1206   BP: (!) 140/96   Pulse: 87   Resp: 18   SpO2: 96%   Weight: 93 kg (205 lb 0.4 oz)   Height: 5' 3" (1.6 m)     Body mass index is 36.32 kg/(m^2).    Personal Diagnostic Review  HSAT 2/13/2017  Assessment and Recommendations  Monitor duration was 7 hours 34 minutes.  Invalid respiratory signal 26 minutes, invalid O2 signal 1 minute.  Lowest oxygen saturation was 82%.  Average heart rate was 72 bpm. Maximum heart rate was 100 bpm.  Snoring was recorded about 50 dB for 88% of the time.  Apnea-hypopnea index/respiratory event index 6.7 events per hour. Total events were 51. Of this 10 obstructive  apneas.  Clinical notes indicate high suspicion for sleep-disordered breathing obstructive sleep apnea.  Data collected by single night study is supportive of the presence of obstructive sleep apnea.  Obstructive sleep apnea is detected.  Patient with CPAP would be beneficial.  CPAP titration or auto titrating device  Weight loss recommended  Follow-up with ordering clinician to ensure resolution of symptoms    Assessment:     1. Mild obstructive sleep apnea    2. Obesity, Class II, BMI 35-39.9, with comorbidity      Orders Placed This Encounter   Procedures    CPAP FOR HOME USE     Initial set up for auto cpap. Positive for shalini on home sleep study with AHI 6.7.     Order Specific Question:   Type:     Answer:   Auto CPAP     Order Specific Question:   Auto CPAP pressure setting range (cmH20):     Answer:   4-20 cm     Order Specific Question:   Length of need (1-99 months): " "    Answer:   99     Order Specific Question:   Humidification:     Answer:   Heated     Order Specific Question:   Type of mask:     Answer:   FFM     Comments:   or patient preference     Order Specific Question:   Headgear?     Answer:   Yes     Order Specific Question:   Tubing?     Answer:   Yes     Order Specific Question:   Humidifier chamber?     Answer:   Yes     Order Specific Question:   Chin strap?     Answer:   Yes     Order Specific Question:   Filters?     Answer:   Yes    CPAP/BIPAP SUPPLIES     Order Specific Question:   Height:     Answer:   5' 3" (1.6 m)     Order Specific Question:   Weight:     Answer:   93 kg (205 lb 0.4 oz)     Order Specific Question:   Does patient have medical equipment at home?     Answer:   CPAP     Order Specific Question:   Type of mask:     Answer:   FFM     Comments:   Patient preference     Order Specific Question:   Tubing?     Answer:   Yes     Order Specific Question:   Humidifier chamber?     Answer:   Yes     Order Specific Question:   Chin strap?     Answer:   Yes     Order Specific Question:   Filters?     Answer:   Yes     Order Specific Question:   Length of need (1-99 months):     Answer:   99     Plan:     Discussed diagnosis, its evaluation, treatment and usual course. All questions answered.    Mild obstructive sleep apnea  Comments:  initial set for Auto CPAP 4-20 cm. Home auto CPAP trial is pt perference for intiating cpap therapy. -  Orders:  -     CPAP FOR HOME USE  -     CPAP/BIPAP SUPPLIES    Obesity, Class II, BMI 35-39.9, with comorbidity  Comments:  pt is working on janay. restriction and exercise. with a daily work out program, TM, bike, Ellipitical, wt. discussed impact of obesity on gen health/sleep apnea      TIME SPENT WITH PATIENT: Time spent:20  minutes in face to face  discussion concerning diagnosis, prognosis, review of lab and test results, benefits of treatment as well as management of disease, counseling of patient and coordination " of care between various health  care providers . Greater than half the time spent was used for coordination of care and counseling of patient.     Reviewed therapeutic goals for positive airway pressure therapy Auto CPAP  Ideal is usage 100% of nights for 6 - 8 hours per night. Minimum usage is 70% of night for at least 4 hours per night used. Pateint expressed understanding.     Return in about 6 weeks (around 4/13/2017) for CPAP compliance follow up.

## 2017-04-06 ENCOUNTER — OFFICE VISIT (OUTPATIENT)
Dept: URGENT CARE | Facility: CLINIC | Age: 57
End: 2017-04-06
Payer: COMMERCIAL

## 2017-04-06 VITALS
OXYGEN SATURATION: 95 % | TEMPERATURE: 98 F | SYSTOLIC BLOOD PRESSURE: 130 MMHG | DIASTOLIC BLOOD PRESSURE: 80 MMHG | HEIGHT: 63 IN | WEIGHT: 205.25 LBS | HEART RATE: 80 BPM | BODY MASS INDEX: 36.37 KG/M2

## 2017-04-06 DIAGNOSIS — H60.502 ACUTE OTITIS EXTERNA OF LEFT EAR, UNSPECIFIED TYPE: Primary | ICD-10-CM

## 2017-04-06 PROCEDURE — 3075F SYST BP GE 130 - 139MM HG: CPT | Mod: S$GLB,,, | Performed by: NURSE PRACTITIONER

## 2017-04-06 PROCEDURE — 3079F DIAST BP 80-89 MM HG: CPT | Mod: S$GLB,,, | Performed by: NURSE PRACTITIONER

## 2017-04-06 PROCEDURE — 99213 OFFICE O/P EST LOW 20 MIN: CPT | Mod: S$GLB,,, | Performed by: NURSE PRACTITIONER

## 2017-04-06 PROCEDURE — 99999 PR PBB SHADOW E&M-EST. PATIENT-LVL IV: CPT | Mod: PBBFAC,,, | Performed by: NURSE PRACTITIONER

## 2017-04-06 PROCEDURE — 1160F RVW MEDS BY RX/DR IN RCRD: CPT | Mod: S$GLB,,, | Performed by: NURSE PRACTITIONER

## 2017-04-06 RX ORDER — NEOMYCIN SULFATE, POLYMYXIN B SULFATE AND HYDROCORTISONE 10; 3.5; 1 MG/ML; MG/ML; [USP'U]/ML
4 SUSPENSION/ DROPS AURICULAR (OTIC) 3 TIMES DAILY
Qty: 10 ML | Refills: 0 | Status: SHIPPED | OUTPATIENT
Start: 2017-04-06 | End: 2017-04-19

## 2017-04-06 NOTE — MR AVS SNAPSHOT
[facility]  [address] [facility] [place] [address]  Phone: [phone]  Fax: [phone]                  Ora Henderson   2017 6:20 PM   Office Visit    Description:  Female : 1960   Provider:  George Bird NP   Department:  [facility]           Reason for Visit     Otalgia           Diagnoses this Visit        Comments    Acute otitis externa of left ear, unspecified type    -  Primary            To Do List           Future Appointments        Provider Department Dept Phone    2017 4:20 PM Zuly Montes MD [facility] [phone]    2017 4:40 PM Vinayak Moralez MD [facility] Cardiology [phone]    2017 9:00 AM [facility] [phone]      Goals (5 Years of Data)     None      Follow-Up and Disposition     Return if symptoms worsen or fail to improve.       These Medications        Disp Refills Start End    neomycin-polymyxin-hydrocortisone (CORTISPORIN) 3.5-10,000-1 mg/mL-unit/mL-% otic suspension 10 mL 0 2017     Place 4 drops into the left ear 3 (three) times daily. - Left Ear    Pharmacy: [facility] - [place] - [address] [facility] Ph #: [phone]         [facility]     [facility] -  Assistance  Unless otherwise directed by your provider, please contact [facility], our nurse care line that is available for  assistance.     Registered nurses in the [facility] provide: appointment scheduling, clinical advisement, health education, and other advisory services.  Call: [phone] (toll free)               Medications           Message regarding Medications     Verify the changes and/or additions to your medication regime listed below are the same as discussed with your clinician today.  If any of these changes or additions are incorrect, please notify your healthcare provider.        START taking these NEW  "medications        Refills    neomycin-polymyxin-hydrocortisone (CORTISPORIN) 3.5-10,000-1 mg/mL-unit/mL-% otic suspension 0    Sig: Place 4 drops into the left ear 3 (three) times daily.    Class: Normal    Route: Left Ear           Verify that the below list of medications is an accurate representation of the medications you are currently taking.  If none reported, the list may be blank. If incorrect, please contact your healthcare provider. Carry this list with you in case of emergency.           Current Medications     albuterol 90 mcg/actuation inhaler Inhale 1-2 puffs into the lungs every 6 (six) hours as needed for Wheezing (cough).    esomeprazole (NEXIUM) 40 MG capsule Take 1 capsule (40 mg total) by mouth before breakfast.    ketoconazole (NIZORAL) 2 % shampoo Wash hair with medicated shampoo at least 2x/week - let sit on scalp at least 5 minutes prior to rinsing    L.acidophilus-Bif. animalis (PROBIOTIC) 5 billion cell CpSP Take 1 tablet by mouth once daily.    losartan (COZAAR) 25 MG tablet TAKE ONE TABLET BY MOUTH ONCE DAILY    metoprolol succinate (TOPROL-XL) 25 MG 24 hr tablet TAKE ONE TABLET BY MOUTH ONCE DAILY    promethazine-dextromethorphan (PROMETHAZINE-DM) 6.25-15 mg/5 mL Syrp Take 5 mLs by mouth every 6 (six) hours as needed. May cause drowsiness    rosuvastatin (CRESTOR) 20 MG tablet Take 1 tablet (20 mg total) by mouth every evening.    neomycin-polymyxin-hydrocortisone (CORTISPORIN) 3.5-10,000-1 mg/mL-unit/mL-% otic suspension Place 4 drops into the left ear 3 (three) times daily.           Clinical Reference Information           Your Vitals Were     BP Pulse Temp Height Weight SpO2    130/80 (BP Location: Right arm, Patient Position: Sitting, BP Method: Manual) 80 97.8 °F (36.6 °C) (Tympanic) 5' 3" (1.6 m) 93.1 kg (205 lb 4 oz) 95%    BMI                36.36 kg/m2          Blood Pressure          Most Recent Value    BP  130/80      Allergies as of 4/6/2017     Sulfa (Sulfonamide " Antibiotics)      Immunizations Administered on Date of Encounter - 4/6/2017     None      Instructions      External Ear Infection (Adult)    External otitis (also called swimmers ear) is an infection in the ear canal. It is often caused by bacteria or fungus. It can occur a few days after water gets trapped in the ear canal (from swimming or bathing). It can also occur after cleaning too deeply in the ear canal with a cotton swab or other object. Sometimes, hair care products get into the ear canal and cause this problem.  Symptoms can include pain, fever, itching, redness, drainage, or swelling of the ear canal. Temporary hearing loss may also occur.  Home care  · Do not try to clean the ear canal. This can push pus and bacteria deeper into the canal.  · Use prescribed ear drops as directed. These help reduce swelling and fight the infection. If an ear wick was placed in the ear canal, apply drops right onto the end of the wick. The wick will draw the medication into the ear canal even if it is swollen closed.  · A cotton ball may be loosely placed in the outer ear to absorb any drainage.  · You may use acetaminophen or ibuprofen to control pain, unless another medication was prescribed. Note: If you have chronic liver or kidney disease or ever had a stomach ulcer or GI bleeding, talk to your health care provider before taking any of these medications.  · Do not allow water to get into your ear when bathing. Also, avoid swimming until the infection has cleared.  Prevention  · Keep your ears dry. This helps lower the risk of infection. Dry your ears with a towel or hair dryer after getting wet. Also, use ear plugs when swimming.  · Do not stick any objects in the ear to remove wax.  · If you feel water trapped in your ear, use ear drops right away. You can get these drops over the counter at most drugstores. They work by removing water from the ear canal.  Follow-up care  Follow up with your health care  provider in one week, or as advised.  When to seek medical advice  Call your health care provider right away if any of these occur:  · Ear pain becomes worse or doesnt improve after 3 days of treatment  · Redness or swelling of the outer ear occurs or gets worse  · Headache  · Painful or stiff neck  · Drowsiness or confusion  · Fever of 100.4ºF (38ºC) or higher, or as directed by your health care provider  · Seizure  Date Last Reviewed: 3/22/2015  © 7201-7595 [employer]. [address] [place] [address]. All rights reserved. This information is not intended as a substitute for professional medical care. Always follow your healthcare professional's instructions.             Language Assistance Services     ATTENTION: Language assistance services are available, free of charge. Please call [phone].      ATENCIÓN: Si alessia goss, tiene a thurston disposición servicios gratuitos de asistencia lingüística. Llame al [phone].     CHÚ Ý: N?u b?n nói Ti?ng Vi?t, có các d?ch v? h? tr? ngôn ng? mi?n phí dành cho b?n. G?i s? [phone].         [facility] complies with applicable Federal civil rights laws and does not discriminate on the basis of race, color, national origin, age, disability, or sex.

## 2017-04-07 NOTE — PATIENT INSTRUCTIONS

## 2017-04-19 ENCOUNTER — OFFICE VISIT (OUTPATIENT)
Dept: URGENT CARE | Facility: CLINIC | Age: 57
End: 2017-04-19
Payer: COMMERCIAL

## 2017-04-19 VITALS
SYSTOLIC BLOOD PRESSURE: 140 MMHG | WEIGHT: 207.69 LBS | OXYGEN SATURATION: 95 % | TEMPERATURE: 98 F | BODY MASS INDEX: 33.38 KG/M2 | DIASTOLIC BLOOD PRESSURE: 82 MMHG | HEART RATE: 78 BPM | HEIGHT: 66 IN

## 2017-04-19 DIAGNOSIS — H92.02 OTALGIA OF LEFT EAR: Primary | ICD-10-CM

## 2017-04-19 DIAGNOSIS — S40.021A ARM BRUISE, RIGHT, INITIAL ENCOUNTER: ICD-10-CM

## 2017-04-19 DIAGNOSIS — B37.9 ANTIBIOTIC-INDUCED YEAST INFECTION: ICD-10-CM

## 2017-04-19 DIAGNOSIS — T36.95XA ANTIBIOTIC-INDUCED YEAST INFECTION: ICD-10-CM

## 2017-04-19 DIAGNOSIS — H66.92 LEFT OTITIS MEDIA, UNSPECIFIED CHRONICITY, UNSPECIFIED OTITIS MEDIA TYPE: ICD-10-CM

## 2017-04-19 PROCEDURE — 3077F SYST BP >= 140 MM HG: CPT | Mod: S$GLB,,, | Performed by: NURSE PRACTITIONER

## 2017-04-19 PROCEDURE — 3079F DIAST BP 80-89 MM HG: CPT | Mod: S$GLB,,, | Performed by: NURSE PRACTITIONER

## 2017-04-19 PROCEDURE — 99214 OFFICE O/P EST MOD 30 MIN: CPT | Mod: S$GLB,,, | Performed by: NURSE PRACTITIONER

## 2017-04-19 PROCEDURE — 1160F RVW MEDS BY RX/DR IN RCRD: CPT | Mod: S$GLB,,, | Performed by: NURSE PRACTITIONER

## 2017-04-19 PROCEDURE — 99999 PR PBB SHADOW E&M-EST. PATIENT-LVL IV: CPT | Mod: PBBFAC,,, | Performed by: NURSE PRACTITIONER

## 2017-04-19 RX ORDER — IBUPROFEN 600 MG/1
600 TABLET ORAL 3 TIMES DAILY
Qty: 30 TABLET | Refills: 0 | Status: SHIPPED | OUTPATIENT
Start: 2017-04-19 | End: 2017-05-08

## 2017-04-19 RX ORDER — AMOXICILLIN AND CLAVULANATE POTASSIUM 875; 125 MG/1; MG/1
1 TABLET, FILM COATED ORAL 2 TIMES DAILY
Qty: 20 TABLET | Refills: 0 | Status: SHIPPED | OUTPATIENT
Start: 2017-04-19 | End: 2017-04-29

## 2017-04-19 RX ORDER — FLUCONAZOLE 150 MG/1
150 TABLET ORAL DAILY
Qty: 2 TABLET | Refills: 0 | Status: SHIPPED | OUTPATIENT
Start: 2017-04-19 | End: 2017-04-22

## 2017-04-19 NOTE — MR AVS SNAPSHOT
[facility]  [address]  [place] [address]  Phone: [phone]  Fax: [phone]                  Ora Henderson   2017 6:50 PM   Office Visit    Description:  Female : 1960   Provider:  Ly Menjivar NP   Department:  [facility]           Reason for Visit     Otalgia     Cough     Mass           Diagnoses this Visit        Comments    Otalgia of left ear    -  Primary Take Ibuprofen as ordered.     Left otitis media, unspecified chronicity, unspecified otitis media type     Take antibiotics until completed.  Hydrate.     Arm bruise, right, initial encounter     Continue to monitor area for any worsening changes.  If these changes occur, follow up.            To Do List           Future Appointments        Provider Department Dept Phone    2017 4:20 PM Zuly Montes MD [facility] - Internal Medicine [phone]    2017 4:40 PM Vinayak Moralez MD [facility] Cardiology [phone]    2017 9:00 AM [facility] [phone]      Goals (5 Years of Data)     None       These Medications        Disp Refills Start End    amoxicillin-clavulanate 875-125mg (AUGMENTIN) 875-125 mg per tablet 20 tablet 0 2017    Take 1 tablet by mouth 2 (two) times daily. Take with food. - Oral    Pharmacy: [facility] [address] [place] [address] [address] [facility] Ph #: [phone]       ibuprofen (ADVIL,MOTRIN) 600 MG tablet 30 tablet 0 2017     Take 1 tablet (600 mg total) by mouth 3 (three) times daily. With food - Oral    Pharmacy: [facility] [address] [place] [address] - [address] [facility] Ph #: [phone]         [facility]     [facility] Care [facility] - 24/ Assistance  Unless otherwise directed by your provider, please contact [facility], our nurse care line that is available for 24/7 assistance.     Registered nurses in the [facility] provide: appointment  scheduling, clinical advisement, health education, and other advisory services.  Call: [phone] (toll free)               Medications           Message regarding Medications     Verify the changes and/or additions to your medication regime listed below are the same as discussed with your clinician today.  If any of these changes or additions are incorrect, please notify your healthcare provider.        START taking these NEW medications        Refills    amoxicillin-clavulanate 875-125mg (AUGMENTIN) 875-125 mg per tablet 0    Sig: Take 1 tablet by mouth 2 (two) times daily. Take with food.    Class: Normal    Route: Oral    ibuprofen (ADVIL,MOTRIN) 600 MG tablet 0    Sig: Take 1 tablet (600 mg total) by mouth 3 (three) times daily. With food    Class: Normal    Route: Oral      STOP taking these medications     neomycin-polymyxin-hydrocortisone (CORTISPORIN) 3.5-10,000-1 mg/mL-unit/mL-% otic suspension Place 4 drops into the left ear 3 (three) times daily.           Verify that the below list of medications is an accurate representation of the medications you are currently taking.  If none reported, the list may be blank. If incorrect, please contact your healthcare provider. Carry this list with you in case of emergency.           Current Medications     albuterol 90 mcg/actuation inhaler Inhale 1-2 puffs into the lungs every 6 (six) hours as needed for Wheezing (cough).    esomeprazole (NEXIUM) 40 MG capsule Take 1 capsule (40 mg total) by mouth before breakfast.    ketoconazole (NIZORAL) 2 % shampoo Wash hair with medicated shampoo at least 2x/week - let sit on scalp at least 5 minutes prior to rinsing    L.acidophilus-Bif. animalis (PROBIOTIC) 5 billion cell CpSP Take 1 tablet by mouth once daily.    losartan (COZAAR) 25 MG tablet TAKE ONE TABLET BY MOUTH ONCE DAILY    metoprolol succinate (TOPROL-XL) 25 MG 24 hr tablet TAKE ONE TABLET BY MOUTH ONCE DAILY    promethazine-dextromethorphan (PROMETHAZINE-DM)  "6.25-15 mg/5 mL Syrp Take 5 mLs by mouth every 6 (six) hours as needed. May cause drowsiness    rosuvastatin (CRESTOR) 20 MG tablet Take 1 tablet (20 mg total) by mouth every evening.    amoxicillin-clavulanate 875-125mg (AUGMENTIN) 875-125 mg per tablet Take 1 tablet by mouth 2 (two) times daily. Take with food.    ibuprofen (ADVIL,MOTRIN) 600 MG tablet Take 1 tablet (600 mg total) by mouth 3 (three) times daily. With food           Clinical Reference Information           Your Vitals Were     BP Pulse Temp Height    140/82 (BP Location: Left arm, Patient Position: Sitting, BP Method: Manual) 78 98.1 °F (36.7 °C) (Tympanic) 5' 5.5" (1.664 m)    Weight SpO2 BMI    94.2 kg (207 lb 10.8 oz) 95% 34.03 kg/m2      Blood Pressure          Most Recent Value    BP  (!)  140/82      Allergies as of 4/19/2017     Sulfa (Sulfonamide Antibiotics)      Immunizations Administered on Date of Encounter - 4/19/2017     None      Instructions      Common Middle Ear Problems  Your middle ear may have been injured or infected recently. Over time, certain growths or bone disease can also harm the middle ear. Left untreated, middle ear problems often lead to lifelong hearing loss. There are two types of hearing loss: conductive and sensorineural. One or both kinds can occur. Injury, infection, certain growths, or bone disease can cause your symptoms. A ruptured eardrum or a long-lasting (chronic) ear infection may be painful and decrease hearing.  Symptoms  · Hearing loss in one or both ears  · Fluid, often smelly, draining from the ear  · Pain, pressure, or discomfort in the ear  · Ringing in the ear   Conductive and sensorineural hearing loss    Sound waves may be disrupted before they reach the inner ear. If this happens, conductive hearing loss may occur. The ear canal can be blocked by wax, infection, a tumor, or a foreign object. The eardrum can be injured or infected. Abnormal bone growth, infection, or tumors in the middle ear " can block sound waves.  Sound waves may not be processed correctly in the inner ear. If this happens, sensorineural hearing loss may occur. Permanent hearing loss is most commonly associated with sensorineural problems.  The tests and evaluations used to diagnose what type of hearing problem you have will depend on your symptoms.   Date Last Reviewed: 10/1/2016  © 3379-4079 [employer]. [address] [place] [address]. All rights reserved. This information is not intended as a substitute for professional medical care. Always follow your healthcare professional's instructions.             Language Assistance Services     ATTENTION: Language assistance services are available, free of charge. Please call [phone].      ATENCIÓN: Si duongla wolfgang, tiene a thurston disposición servicios gratuitos de asistencia lingüística. Llame al [phone].     CHÚ Ý: N?u b?n nói Ti?ng Vi?t, có các d?ch v? h? tr? ngôn ng? mi?n phí dành cho b?n. G?i s? [phone].         [facility] complies with applicable Federal civil rights laws and does not discriminate on the basis of race, color, national origin, age, disability, or sex.

## 2017-04-20 NOTE — PATIENT INSTRUCTIONS
Common Middle Ear Problems  Your middle ear may have been injured or infected recently. Over time, certain growths or bone disease can also harm the middle ear. Left untreated, middle ear problems often lead to lifelong hearing loss. There are two types of hearing loss: conductive and sensorineural. One or both kinds can occur. Injury, infection, certain growths, or bone disease can cause your symptoms. A ruptured eardrum or a long-lasting (chronic) ear infection may be painful and decrease hearing.  Symptoms  · Hearing loss in one or both ears  · Fluid, often smelly, draining from the ear  · Pain, pressure, or discomfort in the ear  · Ringing in the ear   Conductive and sensorineural hearing loss    Sound waves may be disrupted before they reach the inner ear. If this happens, conductive hearing loss may occur. The ear canal can be blocked by wax, infection, a tumor, or a foreign object. The eardrum can be injured or infected. Abnormal bone growth, infection, or tumors in the middle ear can block sound waves.  Sound waves may not be processed correctly in the inner ear. If this happens, sensorineural hearing loss may occur. Permanent hearing loss is most commonly associated with sensorineural problems.  The tests and evaluations used to diagnose what type of hearing problem you have will depend on your symptoms.   Date Last Reviewed: 10/1/2016  © 3320-8015 [employer]. [address] [place] [address], [place] [address]. All rights reserved. This information is not intended as a substitute for professional medical care. Always follow your healthcare professional's instructions.

## 2017-04-24 ENCOUNTER — PATIENT OUTREACH (OUTPATIENT)
Dept: ADMINISTRATIVE | Facility: HOSPITAL | Age: 57
End: 2017-04-24
Payer: COMMERCIAL

## 2017-04-24 NOTE — PROGRESS NOTES
Subjective:       Patient ID: Ora Henderson is a 56 y.o. female.    Chief Complaint: Otalgia ((L)); Cough; and Mass ((R) upper arm)    Otalgia    There is pain in the left ear. This is a recurrent problem. The current episode started 1 to 4 weeks ago. The problem occurs constantly. The problem has been unchanged. There has been no fever. The pain is at a severity of 9/10. The pain is moderate. Associated symptoms include coughing. Pertinent negatives include no ear discharge, headaches, hearing loss, neck pain, rhinorrhea or sore throat. She has tried ear drops for the symptoms. The treatment provided no relief.   Cough   This is a new problem. The current episode started 1 to 4 weeks ago. The problem has been unchanged. The problem occurs every few minutes. Associated symptoms include ear congestion, ear pain and nasal congestion. Pertinent negatives include no chills, eye redness, fever, headaches, myalgias, rhinorrhea, sore throat or shortness of breath. Nothing aggravates the symptoms. She has tried nothing for the symptoms.     Review of Systems   Constitutional: Negative for chills and fever.   HENT: Positive for congestion and ear pain. Negative for ear discharge, hearing loss, rhinorrhea and sore throat.    Eyes: Negative for discharge and redness.   Respiratory: Positive for cough. Negative for shortness of breath.    Genitourinary: Negative for dysuria.   Musculoskeletal: Negative for myalgias and neck pain.   Skin: Negative for color change and pallor.   Allergic/Immunologic: Negative.    Neurological: Negative for dizziness and headaches.   Psychiatric/Behavioral: Negative for agitation and confusion.       Objective:      Physical Exam   Constitutional: She is oriented to person, place, and time. Vital signs are normal. She appears well-developed and well-nourished.   HENT:   Head: Normocephalic and atraumatic.   Right Ear: Hearing, tympanic membrane, external ear and ear canal normal.    Left Ear: Hearing, external ear and ear canal normal. Tympanic membrane is erythematous.   Nose: Mucosal edema present.   Mouth/Throat: No oropharyngeal exudate or posterior oropharyngeal erythema.   Neck: Normal range of motion.   Cardiovascular: Normal rate and regular rhythm.    Pulmonary/Chest: Effort normal and breath sounds normal.   Musculoskeletal: Normal range of motion.   Neurological: She is alert and oriented to person, place, and time.   Skin: Skin is warm and dry. Bruising noted.        Bruise noted to right upper arm.  Tender to palpation.  Reports area has improved.     Psychiatric: She has a normal mood and affect. Her behavior is normal.   Nursing note and vitals reviewed.      Assessment:       1. Otalgia of left ear    2. Left otitis media, unspecified chronicity, unspecified otitis media type    3. Arm bruise, right, initial encounter    4. Antibiotic-induced yeast infection        Plan:         Otalgia of left ear  Comments:  Take Ibuprofen as ordered.   Orders:  -     amoxicillin-clavulanate 875-125mg (AUGMENTIN) 875-125 mg per tablet; Take 1 tablet by mouth 2 (two) times daily. Take with food.  Dispense: 20 tablet; Refill: 0  -     ibuprofen (ADVIL,MOTRIN) 600 MG tablet; Take 1 tablet (600 mg total) by mouth 3 (three) times daily. With food  Dispense: 30 tablet; Refill: 0    Left otitis media, unspecified chronicity, unspecified otitis media type  Comments:  Take antibiotics until completed.  Hydrate.   Orders:  -     amoxicillin-clavulanate 875-125mg (AUGMENTIN) 875-125 mg per tablet; Take 1 tablet by mouth 2 (two) times daily. Take with food.  Dispense: 20 tablet; Refill: 0    Arm bruise, right, initial encounter  Comments:  Continue to monitor area for any worsening changes.  If these changes occur, follow up.   Orders:  -     ibuprofen (ADVIL,MOTRIN) 600 MG tablet; Take 1 tablet (600 mg total) by mouth 3 (three) times daily. With food  Dispense: 30 tablet; Refill:  0    Antibiotic-induced yeast infection  -     fluconazole (DIFLUCAN) 150 MG Tab; Take 1 tablet (150 mg total) by mouth once daily. Take one tablet today and one in 72 hours.  Dispense: 2 tablet; Refill: 0        Instructed to take all medications as ordered.  Informed if no improvement or symptoms worsens to follow up with primary care physician.  Instructed to monitor bruise for any worsening signs and follow up with PCP.  Printed and review after visit summary with patient.

## 2017-05-08 ENCOUNTER — OFFICE VISIT (OUTPATIENT)
Dept: INTERNAL MEDICINE | Facility: CLINIC | Age: 57
End: 2017-05-08
Payer: COMMERCIAL

## 2017-05-08 ENCOUNTER — LAB VISIT (OUTPATIENT)
Dept: LAB | Facility: HOSPITAL | Age: 57
End: 2017-05-08
Attending: FAMILY MEDICINE
Payer: COMMERCIAL

## 2017-05-08 ENCOUNTER — OFFICE VISIT (OUTPATIENT)
Dept: CARDIOLOGY | Facility: CLINIC | Age: 57
End: 2017-05-08
Payer: COMMERCIAL

## 2017-05-08 VITALS
BODY MASS INDEX: 36.29 KG/M2 | SYSTOLIC BLOOD PRESSURE: 122 MMHG | WEIGHT: 204.81 LBS | HEIGHT: 63 IN | DIASTOLIC BLOOD PRESSURE: 88 MMHG | TEMPERATURE: 98 F

## 2017-05-08 VITALS
HEIGHT: 63 IN | SYSTOLIC BLOOD PRESSURE: 138 MMHG | WEIGHT: 205 LBS | DIASTOLIC BLOOD PRESSURE: 100 MMHG | BODY MASS INDEX: 36.32 KG/M2 | HEART RATE: 76 BPM

## 2017-05-08 DIAGNOSIS — Z82.49 FAMILY HISTORY OF CARDIOVASCULAR DISEASE: ICD-10-CM

## 2017-05-08 DIAGNOSIS — R94.31 ABNORMAL ECG: ICD-10-CM

## 2017-05-08 DIAGNOSIS — E78.2 MIXED HYPERLIPIDEMIA: Chronic | ICD-10-CM

## 2017-05-08 DIAGNOSIS — K21.9 GASTROESOPHAGEAL REFLUX DISEASE, ESOPHAGITIS PRESENCE NOT SPECIFIED: ICD-10-CM

## 2017-05-08 DIAGNOSIS — G47.33 OSA (OBSTRUCTIVE SLEEP APNEA): ICD-10-CM

## 2017-05-08 DIAGNOSIS — I10 ESSENTIAL HYPERTENSION: Primary | Chronic | ICD-10-CM

## 2017-05-08 DIAGNOSIS — I10 ESSENTIAL HYPERTENSION: Chronic | ICD-10-CM

## 2017-05-08 DIAGNOSIS — I73.9 PVD (PERIPHERAL VASCULAR DISEASE): ICD-10-CM

## 2017-05-08 DIAGNOSIS — Z01.419 WELL WOMAN EXAM: ICD-10-CM

## 2017-05-08 PROCEDURE — 80061 LIPID PANEL: CPT

## 2017-05-08 PROCEDURE — 1160F RVW MEDS BY RX/DR IN RCRD: CPT | Mod: S$GLB,,, | Performed by: INTERNAL MEDICINE

## 2017-05-08 PROCEDURE — 99214 OFFICE O/P EST MOD 30 MIN: CPT | Mod: S$GLB,,, | Performed by: INTERNAL MEDICINE

## 2017-05-08 PROCEDURE — 3074F SYST BP LT 130 MM HG: CPT | Mod: S$GLB,,, | Performed by: FAMILY MEDICINE

## 2017-05-08 PROCEDURE — 36415 COLL VENOUS BLD VENIPUNCTURE: CPT | Mod: PO

## 2017-05-08 PROCEDURE — 3079F DIAST BP 80-89 MM HG: CPT | Mod: S$GLB,,, | Performed by: FAMILY MEDICINE

## 2017-05-08 PROCEDURE — 99999 PR PBB SHADOW E&M-EST. PATIENT-LVL III: CPT | Mod: PBBFAC,,, | Performed by: INTERNAL MEDICINE

## 2017-05-08 PROCEDURE — 1160F RVW MEDS BY RX/DR IN RCRD: CPT | Mod: S$GLB,,, | Performed by: FAMILY MEDICINE

## 2017-05-08 PROCEDURE — 80053 COMPREHEN METABOLIC PANEL: CPT

## 2017-05-08 PROCEDURE — 99214 OFFICE O/P EST MOD 30 MIN: CPT | Mod: S$GLB,,, | Performed by: FAMILY MEDICINE

## 2017-05-08 PROCEDURE — 3078F DIAST BP <80 MM HG: CPT | Mod: S$GLB,,, | Performed by: INTERNAL MEDICINE

## 2017-05-08 PROCEDURE — 3075F SYST BP GE 130 - 139MM HG: CPT | Mod: S$GLB,,, | Performed by: INTERNAL MEDICINE

## 2017-05-08 PROCEDURE — 99999 PR PBB SHADOW E&M-EST. PATIENT-LVL III: CPT | Mod: PBBFAC,,, | Performed by: FAMILY MEDICINE

## 2017-05-08 RX ORDER — LOSARTAN POTASSIUM 50 MG/1
50 TABLET ORAL DAILY
Qty: 30 TABLET | Refills: 12 | Status: SHIPPED | OUTPATIENT
Start: 2017-05-08 | End: 2018-07-31

## 2017-05-08 NOTE — MR AVS SNAPSHOT
[facility] - Cardiology  [address] [facility] [address] [facility] [place] [address]  Phone: [phone]  Fax: [phone]                  Ora Henderson   2017 4:40 PM   Office Visit    Description:  Female : 1960   Provider:  Vinayak Moralez MD   Department:  [facility] - Cardiology           Reason for Visit     Hyperlipidemia     Hypertension     Risk Factor Management For Atherosclerosis           Diagnoses this Visit        Comments    NATALIE (obstructive sleep apnea)    -  Primary     Obesity, Class II, BMI 35-39.9, with comorbidity         Essential hypertension         Mixed hyperlipidemia         Family history of cardiovascular disease         Abnormal ECG                To Do List           Future Appointments        Provider Department Dept Phone    2017 9:00 AM YOEL MAMMO2-DX [facility] [phone]    2017 10:00 AM Radha Hoffman CNM [facility] - OB/ -[phone]    2017 11:20 AM ISRRAEL Pittman [facility] - Gastroenterology [phone]      Goals (5 Years of Data)     None      Follow-Up and Disposition     Return in about 3 months (around 2017).       These Medications        Disp Refills Start End    losartan (COZAAR) 50 MG tablet 30 tablet 12 2017    Take 1 tablet (50 mg total) by mouth once daily. - Oral    Pharmacy: [id number] - [place] - [address] [facility] Ph #: [phone]         [facility]     [facility] -  Assistance  Unless otherwise directed by your provider, please contact [facility], our nurse care line that is available for  assistance.     Registered nurses in the [facility] provide: appointment scheduling, clinical advisement, health education, and other advisory services.  Call: [phone] (toll free)               Medications           Message regarding Medications     Verify the changes and/or additions to your medication regime listed below are the  "same as discussed with your clinician today.  If any of these changes or additions are incorrect, please notify your healthcare provider.        START taking these NEW medications        Refills    losartan (COZAAR) 50 MG tablet 12    Sig: Take 1 tablet (50 mg total) by mouth once daily.    Class: Normal    Route: Oral           Verify that the below list of medications is an accurate representation of the medications you are currently taking.  If none reported, the list may be blank. If incorrect, please contact your healthcare provider. Carry this list with you in case of emergency.           Current Medications     albuterol 90 mcg/actuation inhaler Inhale 1-2 puffs into the lungs every 6 (six) hours as needed for Wheezing (cough).    esomeprazole (NEXIUM) 40 MG capsule Take 1 capsule (40 mg total) by mouth before breakfast.    ketoconazole (NIZORAL) 2 % shampoo Wash hair with medicated shampoo at least 2x/week - let sit on scalp at least 5 minutes prior to rinsing    L.acidophilus-Bif. animalis (PROBIOTIC) 5 billion cell CpSP Take 1 tablet by mouth once daily.    metoprolol succinate (TOPROL-XL) 25 MG 24 hr tablet TAKE ONE TABLET BY MOUTH ONCE DAILY    rosuvastatin (CRESTOR) 20 MG tablet Take 1 tablet (20 mg total) by mouth every evening.    losartan (COZAAR) 50 MG tablet Take 1 tablet (50 mg total) by mouth once daily.           Clinical Reference Information           Your Vitals Were     BP Pulse Height Weight BMI    138/100 (BP Location: Left arm, Patient Position: Sitting) 76 5' 3" (1.6 m) 93 kg (205 lb 0.4 oz) 36.32 kg/m2      Blood Pressure          Most Recent Value    Right Arm BP - Sitting  150/90    Left Arm BP - Sitting  138/100    BP  (!)  138/100      Allergies as of 5/8/2017     Sulfa (Sulfonamide Antibiotics)      Immunizations Administered on Date of Encounter - 5/8/2017     None      Language Assistance Services     ATTENTION: Language assistance services are available, free of charge. Please " call [phone].      ATENCIÓN: Si habla español, tiene a thurston disposición servicios gratuitos de asistencia lingüística. Llame al [phone].     CHÚ Ý: N?u b?n nói Ti?ng Vi?t, có các d?ch v? h? tr? ngôn ng? mi?n phí dành cho b?n. G?i s? [phone].         [facility] - Cardiology complies with applicable Federal civil rights laws and does not discriminate on the basis of race, color, national origin, age, disability, or sex.

## 2017-05-08 NOTE — PROGRESS NOTES
Subjective:    Patient ID:  Ora Henderson is a 56 y.o. female who presents for evaluation of Hyperlipidemia; Hypertension; and Risk Factor Management For Atherosclerosis      HPI Mrs. Henderson presents for yearly f/u.  Her current medical conditions include HTN, hyperlipidemia, obesity. Nonsmoker.   She has family h/o cad. Sisters x 2  of MIs. Brother  of MI. Father  of MI 42.   h/o LHC about 20 years ago, no specific findings.   H/o chronic abnormal ecgs showing nonspecific ST-T abnl.  Now here for f/u.  No typical anginal sxs.  No typical cp sxs with exertion.  No dyspnea, pnd/orthpnea.   Stress MPI 3/17 shows no ischemia.  Echo 3/17 shows normal LV function.  Lipids have improved on current dose of rosuvastatin.  HTN above goal today, on diastolic number.  No associated sxs.  DBP also runs high at home.   Has natalie but does not wear cpap due to expense.   Obesity stable, no significant gain/loss.      Patient Active Problem List   Diagnosis    Hyperlipidemia    Hypertension    Hepatomegaly    Family history of cardiovascular disease    Obesity, Class II, BMI 35-39.9, with comorbidity    Abnormal ECG    Gastroesophageal reflux disease    NATALIE (obstructive sleep apnea)     Past Medical History:   Diagnosis Date    Arthritis     Encounter for blood transfusion     GERD (gastroesophageal reflux disease)     Hepatomegaly     and fatty liver    Hernia, umbilical     reducible    Hyperlipidemia     Hypertension        Current Outpatient Prescriptions:     albuterol 90 mcg/actuation inhaler, Inhale 1-2 puffs into the lungs every 6 (six) hours as needed for Wheezing (cough)., Disp: 1 Inhaler, Rfl: 0    esomeprazole (NEXIUM) 40 MG capsule, Take 1 capsule (40 mg total) by mouth before breakfast., Disp: 30 capsule, Rfl: 11    ketoconazole (NIZORAL) 2 % shampoo, Wash hair with medicated shampoo at least 2x/week - let sit on scalp at least 5 minutes prior to rinsing, Disp: 120 mL,  "Rfl: 5    L.acidophilus-Bif. animalis (PROBIOTIC) 5 billion cell CpSP, Take 1 tablet by mouth once daily., Disp: 10 capsule, Rfl: 0    metoprolol succinate (TOPROL-XL) 25 MG 24 hr tablet, TAKE ONE TABLET BY MOUTH ONCE DAILY, Disp: 30 tablet, Rfl: 12    rosuvastatin (CRESTOR) 20 MG tablet, Take 1 tablet (20 mg total) by mouth every evening., Disp: 30 tablet, Rfl: 11    losartan (COZAAR) 50 MG tablet, Take 1 tablet (50 mg total) by mouth once daily., Disp: 30 tablet, Rfl: 12      Review of Systems   Constitution: Negative.   HENT: Negative.    Eyes: Negative.    Cardiovascular: Negative.    Respiratory: Negative.    Endocrine: Negative.    Hematologic/Lymphatic: Negative.    Skin: Negative.    Musculoskeletal: Negative.    Gastrointestinal: Negative.    Genitourinary: Negative.    Neurological: Negative.    Psychiatric/Behavioral: Negative.    Allergic/Immunologic: Negative.        BP (!) 138/100 (BP Location: Left arm, Patient Position: Sitting)  Pulse 76  Ht 5' 3" (1.6 m)  Wt 93 kg (205 lb 0.4 oz)  BMI 36.32 kg/m2    Wt Readings from Last 3 Encounters:   05/08/17 93 kg (205 lb 0.4 oz)   05/08/17 92.9 kg (204 lb 12.9 oz)   04/19/17 94.2 kg (207 lb 10.8 oz)     Temp Readings from Last 3 Encounters:   05/08/17 98.2 °F (36.8 °C) (Tympanic)   04/19/17 98.1 °F (36.7 °C) (Tympanic)   04/06/17 97.8 °F (36.6 °C) (Tympanic)     BP Readings from Last 3 Encounters:   05/08/17 (!) 138/100   05/08/17 122/88   04/19/17 (!) 140/82     Pulse Readings from Last 3 Encounters:   05/08/17 76   04/19/17 78   04/06/17 80          Objective:    Physical Exam   Constitutional: She is oriented to person, place, and time. Vital signs are normal. She appears well-developed and well-nourished. She is active and cooperative. She does not have a sickly appearance. She does not appear ill. No distress.   HENT:   Head: Normocephalic.   Neck: Neck supple. Normal carotid pulses, no hepatojugular reflux and no JVD present. Carotid bruit is not " present. No thyromegaly present.   Cardiovascular: Normal rate, regular rhythm, S1 normal, S2 normal, normal heart sounds and normal pulses.  PMI is not displaced.  Exam reveals no gallop and no friction rub.    No murmur heard.  Pulses:       Radial pulses are 2+ on the right side, and 2+ on the left side.   Pulmonary/Chest: Effort normal and breath sounds normal. She has no wheezes. She has no rales.   Abdominal: Soft. Normal appearance, normal aorta and bowel sounds are normal. She exhibits no pulsatile liver, no abdominal bruit, no ascites and no mass. There is no splenomegaly or hepatomegaly. There is no tenderness.   Musculoskeletal: She exhibits no edema.   Lymphadenopathy:     She has no cervical adenopathy.   Neurological: She is alert and oriented to person, place, and time.   Skin: Skin is warm. She is not diaphoretic.   Psychiatric: She has a normal mood and affect. Her behavior is normal.   Nursing note and vitals reviewed.      I have reviewed all pertinent labs and cardiac studies.      Chemistry        Component Value Date/Time     11/14/2016 0814    K 3.7 11/14/2016 0814     11/14/2016 0814    CO2 29 11/14/2016 0814    BUN 14 11/14/2016 0814    CREATININE 0.9 11/14/2016 0814    GLU 97 11/14/2016 0814        Component Value Date/Time    CALCIUM 9.4 11/14/2016 0814    ALKPHOS 61 11/14/2016 0814    AST 17 11/14/2016 0814    ALT 18 11/14/2016 0814    BILITOT 1.0 11/14/2016 0814        Lab Results   Component Value Date    WBC 5.75 12/08/2016    HGB 12.6 12/08/2016    HCT 37.4 12/08/2016    MCV 88 12/08/2016     12/08/2016     Lab Results   Component Value Date    HGBA1C 5.9 10/13/2014     Lab Results   Component Value Date    CHOL 179 11/14/2016    CHOL 234 (H) 06/30/2016    CHOL 218 (H) 02/23/2016     Lab Results   Component Value Date    HDL 48 11/14/2016    HDL 50 06/30/2016    HDL 50 02/23/2016     Lab Results   Component Value Date    LDLCALC 104.0 11/14/2016    LDLCALC 158.0  06/30/2016    LDLCALC 137.8 02/23/2016     Lab Results   Component Value Date    TRIG 135 11/14/2016    TRIG 130 06/30/2016    TRIG 151 (H) 02/23/2016     Lab Results   Component Value Date    CHOLHDL 26.8 11/14/2016    CHOLHDL 21.4 06/30/2016    CHOLHDL 22.9 02/23/2016     Narrative   Date of Procedure: 03/02/2017        TEST DESCRIPTION   Technical Quality: This is a technically adequate study.     Aorta: The aortic root is normal in size, measuring 2.3 cm at sinotubular junction and 2.4 cm at Sinuses of Valsalva. The proximal ascending aorta is normal in size, measuring 3.1 cm across.     Left Atrium: The left atrium is normal in size, measuring 3.3 cm across in the parasternal view, and 4.9 cm across in the apical view.     Left Ventricle: The left ventricle is normal in size, with an end-diastolic diameter of 4.0 cm, and an end-systolic diameter of 3.0 cm. Septal wall thickness is mildly increased, and posterior wall thickness is increased, with the septum measuring 1.3 cm   and the posterior wall measuring 1.5 cm across. Global left ventricular systolic function appears normal. Visually estimated ejection fraction is 55-60%. The LV Doppler derived stroke volume equals 70.0 ccs.   The E/e'(lat) is 7, consistent with normal diastolic function.     Right Atrium: The right atrium is normal in size, measuring 4.1 cm in length and 2.9 cm in width in the apical view.     Right Ventricle: The right ventricle is normal in size measuring 2.6 cm at the base in the apical right ventricle-focused view. Global right ventricular systolic function appears normal. The estimated PA systolic pressure is 28 mmHg.     Aortic Valve:  The peak gradient obtained across the aortic valve is 8.0 mmHg, with a mean gradient of 5.0 mmHg. Using a left ventricular outflow tract diameter of 2.0 cm, a left ventricular outflow tract velocity time integral of 22 cm, and a peak   instantaneous transvalvular velocity time integral of 35 cm, the  calculated aortic valve area is 1.99 cm2.     Mitral Valve:  There is trivial mitral regurgitation.     IVC: IVC is normal in size and collapses > 50% with a sniff, suggesting normal right atrial pressure of 3 mmHg.     Atrial Septum: The atrial septum is intact.     Intracavitary: There is no evidence of pericardial effusion, intracavity mass, thrombi, or vegetation.         CONCLUSIONS     1 - Normal left ventricular systolic function (EF 55-60%).     2 - Normal left ventricular diastolic function.     3 - Normal right ventricular systolic function .     4 - The estimated PA systolic pressure is 28 mmHg.     5 - Trivial mitral regurgitation.             This document has been electronically    SIGNED BY: Rachel Frazier MD On: 03/02/2017 17:31   Narrative   Date of Procedure: 03/02/2017    PRE-TEST DATA   EKG: Resting electrocardiogram reveals sinus arrhythmia at a rate of 77 bpm. There was non-specific abnormality, ST segment, and/or T wave.     TEST DESCRIPTION   The patient exercised for 6.0 minutes on a Christiano protocol, corresponding to a functional capacity of 7 estimated METS, achieving a peak heart rate of 139 bpm, which is 89% of the age predicted maximum heart rate. .     EKG Conclusions:    1. The EKG portion of this study is negative for ischemia at a moderate workload, and peak heart rate of 139 bpm (89% of predicted).   2. Blood pressure remained stable throughout the protocol  (Presenting BP: 148/107 Peak BP: 220/100).   3. No significant arrhythmias were present.   4. There were no symptoms of chest discomfort or significant dyspnea throughout the protocol.     Nuclear Procedure:  Following a single isotope protocol, 8.3 mCi of Tc99 labeled Tetrofosmin was given at rest and tomographic imaging was performed. Treadmill exercise testing was performed. At the height of maximal exercise (89% and 7 METS after 6 minutes on a Christiano   protocol), 28.2 mCi of Tc99 labeled Tetrofosmin was given and tomographic  imaging was performed. The site of the IV injection was the left AC. Images were obtained on a Sm[employer] Remark[employer] camera.     Comments:  This is a technically adequate study. Inspection of the transaxial images demonstrated no significant cranial, caudal, or lateral patient motion in the camera between rest and stress acquisitions. There is homogeneous uptake of radiotracer in all walls   of the myocardium on stress and rest images. The extracardiac distribution of radioactivity is normal. The left ventricular cavity is normal in size and does not increase with stress. On gated SPECT, left ventricular motion is normal at rest.     Nuclear Quantitative Functional Analysis:   LVEF: 69 %    Impression: NORMAL MYOCARDIAL PERFUSION  1. The perfusion scan is free of evidence for myocardial ischemia or injury.   2. Resting wall motion is physiologic.   3. Resting LV function is normal.   4. The ventricular volumes are normal at rest and stress.   5. The extracardiac distribution of radioactivity is normal.           This document has been electronically    SIGNED BY: Dash Narayan MD On: 03/02/2017 16:04                 Assessment:       1. Essential hypertension    2. NATALIE (obstructive sleep apnea)    3. Obesity, Class II, BMI 35-39.9, with comorbidity    4. Mixed hyperlipidemia    5. Family history of cardiovascular disease    6. Abnormal ECG         Plan:             INCREASE LOSARTAN FROM 25 MG DAILY TO 50 MG DAILY FOR MORE OPTIMAL HTN CONTROL.  PT COUNSELED ON NEED TO GET CPAP FOR NATALIE.  DISCUSSED RAMIFICATIONS OF UNTREATED NATALIE WITH PT.  CONTINUE OTHER MEDS.  CARDIAC DIET  CONTINUE REGULAR EXERCISE.  ALL TEST RESULTS REVIEWED WITH PT IN FULL DETAIL.  F/U 3 MONTHS TO REEVALUATE HTN.

## 2017-05-08 NOTE — PROGRESS NOTES
"Subjective:       Patient ID: Ora Henderson is a 56 y.o. female.    Chief Complaint: Follow-up    HPI Comments: 56-year-old female patient with Patient Active Problem List:     Hyperlipidemia     Hypertension     Hepatomegaly     Family history of cardiovascular disease     Obesity, Class II, BMI 35-39.9, with comorbidity     Abnormal ECG     PVD (peripheral vascular disease)     Gastroesophageal reflux disease     NATALIE (obstructive sleep apnea)  Here for follow-up on chronic medical conditions.  Patient reports that she's been having worsening acid reflex symptoms in spite of taking Nexium 40 mg daily, has belching on empty stomach with drinking water.  Patient denies of any chest pain or shortness of breath, abdominal discomfort nausea vomiting.  Occasionally has leg cramps    Review of Systems   Constitutional: Negative for fatigue.   Eyes: Negative for visual disturbance.   Respiratory: Negative for shortness of breath.    Cardiovascular: Negative for chest pain and leg swelling.   Gastrointestinal: Negative for abdominal pain, nausea and vomiting.   Musculoskeletal: Positive for myalgias.   Skin: Negative for rash.   Neurological: Negative for light-headedness and headaches.   Psychiatric/Behavioral: Negative for sleep disturbance.         /88  Temp 98.2 °F (36.8 °C) (Tympanic)   Ht 5' 3" (1.6 m)  Wt 92.9 kg (204 lb 12.9 oz)  BMI 36.28 kg/m2  Objective:      Physical Exam   Constitutional: She is oriented to person, place, and time. She appears well-developed and well-nourished.   HENT:   Head: Normocephalic and atraumatic.   Mouth/Throat: Oropharynx is clear and moist.   Cardiovascular: Normal rate, regular rhythm and normal heart sounds.    No murmur heard.  Pulmonary/Chest: Effort normal and breath sounds normal. She has no wheezes.   Abdominal: Soft. Bowel sounds are normal. There is no tenderness.   Musculoskeletal: She exhibits no edema or tenderness.   Neurological: She is " alert and oriented to person, place, and time.   Skin: Skin is warm and dry. No rash noted.   Psychiatric: She has a normal mood and affect.         Assessment:       1. Essential hypertension    2. Mixed hyperlipidemia    3. Gastroesophageal reflux disease, esophagitis presence not specified    4. NATALIE (obstructive sleep apnea)    5. Obesity, Class II, BMI 35-39.9, with comorbidity    6. PVD (peripheral vascular disease)    7. Well woman exam        Plan:   Essential hypertension  -     Comprehensive metabolic panel; Future; Expected date: 5/8/17  -     Lipid panel; Future; Expected date: 5/8/17  Blood pressure stable today currently on losartan 25 mg and metoprolol 25 mg daily    Mixed hyperlipidemia  -     Lipid panel; Future; Expected date: 5/8/17  Currently doing well on Crestor 20 mg daily    Gastroesophageal reflux disease, esophagitis presence not specified  -     Ambulatory referral to Gastroenterology  Continue Nexium 40 mg daily, encouraged to drink adequate fluids and avoid caffeine and carbonated drinks, will refer to Gastro Enterology for further evaluation    NATALIE (obstructive sleep apnea)-stable on CPAP machine    Obesity, Class II, BMI 35-39.9, with comorbidity-continue lifestyle modifications with diet and exercise    PVD (peripheral vascular disease)-continue graded Exercise regimen    Well woman exam  -     Ambulatory referral to Gynecology  Will refer to gynecology as patient would like to get Pap smear in spite of having complete hysterectomy

## 2017-05-08 NOTE — MR AVS SNAPSHOT
[facility]  [address] [facility] [place] [address]  Phone: [phone]  Fax: [phone]                  Ora Henderson   2017 4:20 PM   Office Visit    Description:  Female : 1960   Provider:  Zuly Montes MD   Department:  [facility] - Internal Medicine           Reason for Visit     Follow-up           Diagnoses this Visit        Comments    Essential hypertension    -  Primary     Mixed hyperlipidemia         Gastroesophageal reflux disease, esophagitis presence not specified         NATALIE (obstructive sleep apnea)         Obesity, Class II, BMI 35-39.9, with comorbidity         PVD (peripheral vascular disease)         Well woman exam                To Do List           Future Appointments        Provider Department Dept Phone    2017 5:10 PM [facility]AT[facility], [facility] [phone]    2017 9:00 AM [facility]2-DX [facility] [phone]    2017 10:00 AM Radha Hoffman CN[facility] OB/ -[phone]    2017 11:20 AM ISRRAEL Pittman [facility] Gastroenterology [phone]      Goals (5 Years of Data)     None      Follow-Up and Disposition     Return in about 6 months (around 2017), or if symptoms worsen or fail to improve.      [facility]     [facility] Care [facility] - 24/ Assistance  Unless otherwise directed by your provider, please contact [facility], our nurse care line that is available for  assistance.     Registered nurses in the [facility] provide: appointment scheduling, clinical advisement, health education, and other advisory services.  Call: [phone] (toll free)               Medications           Message regarding Medications     Verify the changes and/or additions to your medication regime listed below are the same as discussed with your clinician today.  If any of these changes or additions are incorrect, please notify your healthcare  "provider.        STOP taking these medications     ibuprofen (ADVIL,MOTRIN) 600 MG tablet Take 1 tablet (600 mg total) by mouth 3 (three) times daily. With food    promethazine-dextromethorphan (PROMETHAZINE-DM) 6.25-15 mg/5 mL Syrp Take 5 mLs by mouth every 6 (six) hours as needed. May cause drowsiness           Verify that the below list of medications is an accurate representation of the medications you are currently taking.  If none reported, the list may be blank. If incorrect, please contact your healthcare provider. Carry this list with you in case of emergency.           Current Medications     albuterol 90 mcg/actuation inhaler Inhale 1-2 puffs into the lungs every 6 (six) hours as needed for Wheezing (cough).    esomeprazole (NEXIUM) 40 MG capsule Take 1 capsule (40 mg total) by mouth before breakfast.    ketoconazole (NIZORAL) 2 % shampoo Wash hair with medicated shampoo at least 2x/week - let sit on scalp at least 5 minutes prior to rinsing    L.acidophilus-Bif. animalis (PROBIOTIC) 5 billion cell CpSP Take 1 tablet by mouth once daily.    losartan (COZAAR) 25 MG tablet TAKE ONE TABLET BY MOUTH ONCE DAILY    metoprolol succinate (TOPROL-XL) 25 MG 24 hr tablet TAKE ONE TABLET BY MOUTH ONCE DAILY    rosuvastatin (CRESTOR) 20 MG tablet Take 1 tablet (20 mg total) by mouth every evening.           Clinical Reference Information           Your Vitals Were     BP Temp Height Weight BMI    122/88 98.2 °F (36.8 °C) (Tympanic) 5' 3" (1.6 m) 92.9 kg (204 lb 12.9 oz) 36.28 kg/m2      Blood Pressure          Most Recent Value    BP  122/88      Allergies as of 5/8/2017     Sulfa (Sulfonamide Antibiotics)      Immunizations Administered on Date of Encounter - 5/8/2017     None      Orders Placed During Today's Visit      Normal Orders This Visit    Ambulatory referral to Gastroenterology     Ambulatory referral to Gynecology     Future Labs/Procedures Expected by Expires    Comprehensive metabolic panel  5/8/2017 " 7/7/2018    Lipid panel  5/8/2017 7/7/2018      Language Assistance Services     ATTENTION: Language assistance services are available, free of charge. Please call [phone].      ATENCIÓN: Si alessia goss, tiene a thurston disposición servicios gratuitos de asistencia lingüística. Llame al [phone].     CHÚ Ý: N?u b?n nói Ti?ng Vi?t, có các d?ch v? h? tr? ngôn ng? mi?n phí dành cho b?n. G?i s? [phone].         [facility] - [facility] complies with applicable Federal civil rights laws and does not discriminate on the basis of race, color, national origin, age, disability, or sex.

## 2017-05-09 LAB
ALBUMIN SERPL BCP-MCNC: 4.1 G/DL
ALP SERPL-CCNC: 65 U/L
ALT SERPL W/O P-5'-P-CCNC: 15 U/L
ANION GAP SERPL CALC-SCNC: 8 MMOL/L
AST SERPL-CCNC: 16 U/L
BILIRUB SERPL-MCNC: 0.7 MG/DL
BUN SERPL-MCNC: 11 MG/DL
CALCIUM SERPL-MCNC: 9.7 MG/DL
CHLORIDE SERPL-SCNC: 104 MMOL/L
CHOLEST/HDLC SERPL: 3.7 {RATIO}
CO2 SERPL-SCNC: 29 MMOL/L
CREAT SERPL-MCNC: 0.9 MG/DL
EST. GFR  (AFRICAN AMERICAN): >60 ML/MIN/1.73 M^2
EST. GFR  (NON AFRICAN AMERICAN): >60 ML/MIN/1.73 M^2
GLUCOSE SERPL-MCNC: 90 MG/DL
HDL/CHOLESTEROL RATIO: 27.4 %
HDLC SERPL-MCNC: 190 MG/DL
HDLC SERPL-MCNC: 52 MG/DL
LDLC SERPL CALC-MCNC: 112.4 MG/DL
NONHDLC SERPL-MCNC: 138 MG/DL
POTASSIUM SERPL-SCNC: 3.9 MMOL/L
PROT SERPL-MCNC: 7.5 G/DL
SODIUM SERPL-SCNC: 141 MMOL/L
TRIGL SERPL-MCNC: 128 MG/DL

## 2017-05-25 ENCOUNTER — TELEPHONE (OUTPATIENT)
Dept: OBSTETRICS AND GYNECOLOGY | Facility: CLINIC | Age: 57
End: 2017-05-25

## 2017-05-25 NOTE — TELEPHONE ENCOUNTER
Left detailed message requesting a call back in regards to rescheduling tomorrow appt with midwife.

## 2017-06-02 ENCOUNTER — HOSPITAL ENCOUNTER (OUTPATIENT)
Dept: RADIOLOGY | Facility: HOSPITAL | Age: 57
Discharge: HOME | End: 2017-06-02
Attending: FAMILY MEDICINE
Payer: COMMERCIAL

## 2017-06-02 ENCOUNTER — OFFICE VISIT (OUTPATIENT)
Dept: INTERNAL MEDICINE | Facility: CLINIC | Age: 57
End: 2017-06-02
Payer: COMMERCIAL

## 2017-06-02 VITALS
DIASTOLIC BLOOD PRESSURE: 82 MMHG | BODY MASS INDEX: 36.83 KG/M2 | HEIGHT: 63 IN | TEMPERATURE: 97 F | WEIGHT: 207.88 LBS | OXYGEN SATURATION: 99 % | SYSTOLIC BLOOD PRESSURE: 136 MMHG

## 2017-06-02 DIAGNOSIS — I10 ESSENTIAL HYPERTENSION: Primary | Chronic | ICD-10-CM

## 2017-06-02 DIAGNOSIS — Z87.898 HISTORY OF PITUITARY TUMOR: ICD-10-CM

## 2017-06-02 DIAGNOSIS — G47.33 OSA (OBSTRUCTIVE SLEEP APNEA): ICD-10-CM

## 2017-06-02 DIAGNOSIS — R92.8 FOLLOW-UP EXAMINATION OF ABNORMAL MAMMOGRAM: ICD-10-CM

## 2017-06-02 DIAGNOSIS — Z01.419 WELL WOMAN EXAM: ICD-10-CM

## 2017-06-02 DIAGNOSIS — H61.23 CERUMINOSIS, BILATERAL: ICD-10-CM

## 2017-06-02 DIAGNOSIS — E78.2 MIXED HYPERLIPIDEMIA: Chronic | ICD-10-CM

## 2017-06-02 DIAGNOSIS — K21.9 GASTROESOPHAGEAL REFLUX DISEASE, ESOPHAGITIS PRESENCE NOT SPECIFIED: ICD-10-CM

## 2017-06-02 PROCEDURE — 99214 OFFICE O/P EST MOD 30 MIN: CPT | Mod: S$GLB,,, | Performed by: FAMILY MEDICINE

## 2017-06-02 PROCEDURE — 99999 PR PBB SHADOW E&M-EST. PATIENT-LVL III: CPT | Mod: PBBFAC,,, | Performed by: FAMILY MEDICINE

## 2017-06-02 PROCEDURE — 77062 BREAST TOMOSYNTHESIS BI: CPT | Mod: 26,,, | Performed by: RADIOLOGY

## 2017-06-02 PROCEDURE — 77066 DX MAMMO INCL CAD BI: CPT | Mod: 26,,, | Performed by: RADIOLOGY

## 2017-06-02 PROCEDURE — 77062 BREAST TOMOSYNTHESIS BI: CPT | Mod: TC

## 2017-06-02 NOTE — PROGRESS NOTES
"Subjective:       Patient ID: Ora Henderson is a 57 y.o. female.    Chief Complaint: Annual Exam    57-year-old Afro-American female patient with Patien Active Problem List:     Hyperlipidemia     Hypertension     Hepatomegaly     Family history of cardiovascular disease     Obesity, Class II, BMI 35-39.9, with comorbidity     Abnormal ECG     Gastroesophageal reflux disease     NATALIE (obstructive sleep apnea)  Here for follow-up on chronic medical conditions.   Patient has been taking her medications regularly and does not need any refills today  Secondary to minimal changes noted in mammogram patient repeated mammogram in 6 months and no acute findings noted today.   Patient would like to get Pap smears status post complete hysterectomy and requesting gynecology appointment  Denies of any chest pain or shortness of breath, abdominal discomfort nausea vomiting  Patient has minimal discomfort in her ears and would like to get checked especially the left ear  Has been using Q-tips  Patient has not been using CPAP machine with history of sleep apnea  Has been exercising regularly  Patient with history of pituitary tumor status post removal, has not seen neurology since 2012, denies of any symptoms but would like to see neurology for follow-up      Review of Systems   Constitutional: Negative for fatigue.   HENT: Positive for ear pain.    Eyes: Negative for visual disturbance.   Respiratory: Negative for shortness of breath.    Cardiovascular: Negative for chest pain and leg swelling.   Gastrointestinal: Negative for abdominal pain, nausea and vomiting.   Musculoskeletal: Negative for myalgias.   Skin: Negative for rash.   Neurological: Negative for light-headedness and headaches.   Psychiatric/Behavioral: Negative for sleep disturbance.         /82   Temp 97.3 °F (36.3 °C) (Tympanic)   Ht 5' 3" (1.6 m)   Wt 94.3 kg (207 lb 14.3 oz)   SpO2 99%   BMI 36.83 kg/m²   Objective:      Physical Exam "   Constitutional: She is oriented to person, place, and time. She appears well-developed and well-nourished.   HENT:   Head: Normocephalic and atraumatic.   Mouth/Throat: Oropharynx is clear and moist.   Excess cerumen noted in bilateral tympanic membranes   Cardiovascular: Normal rate, regular rhythm and normal heart sounds.    No murmur heard.  Pulmonary/Chest: Effort normal and breath sounds normal. She has no wheezes.   Abdominal: Soft. Bowel sounds are normal. There is no tenderness.   Musculoskeletal: She exhibits no edema.   Neurological: She is alert and oriented to person, place, and time.   Skin: Skin is warm and dry. No rash noted.   Psychiatric: She has a normal mood and affect.       Lab Visit on 05/08/2017   Component Date Value Ref Range Status    Sodium 05/09/2017 141  136 - 145 mmol/L Final    Potassium 05/09/2017 3.9  3.5 - 5.1 mmol/L Final    Chloride 05/09/2017 104  95 - 110 mmol/L Final    CO2 05/09/2017 29  23 - 29 mmol/L Final    Glucose 05/09/2017 90  70 - 110 mg/dL Final    BUN, Bld 05/09/2017 11  6 - 20 mg/dL Final    Creatinine 05/09/2017 0.9  0.5 - 1.4 mg/dL Final    Calcium 05/09/2017 9.7  8.7 - 10.5 mg/dL Final    Total Protein 05/09/2017 7.5  6.0 - 8.4 g/dL Final    Albumin 05/09/2017 4.1  3.5 - 5.2 g/dL Final    Total Bilirubin 05/09/2017 0.7  0.1 - 1.0 mg/dL Final    Comment: For infants and newborns, interpretation of results should be based  on gestational age, weight and in agreement with clinical  observations.  Premature Infant recommended reference ranges:  Up to 24 hours.............<8.0 mg/dL  Up to 48 hours............<12.0 mg/dL  3-5 days..................<15.0 mg/dL  6-29 days.................<15.0 mg/dL      Alkaline Phosphatase 05/09/2017 65  55 - 135 U/L Final    AST 05/09/2017 16  10 - 40 U/L Final    ALT 05/09/2017 15  10 - 44 U/L Final    Anion Gap 05/09/2017 8  8 - 16 mmol/L Final    eGFR if African American 05/09/2017 >60.0  >60 mL/min/1.73 m^2  Final    eGFR if non African American 05/09/2017 >60.0  >60 mL/min/1.73 m^2 Final    Comment: Calculation used to obtain the estimated glomerular filtration  rate (eGFR) is the CKD-EPI equation. Since race is unknown   in our information system, the eGFR values for   -American and Non--American patients are given   for each creatinine result.      Cholesterol 05/09/2017 190  120 - 199 mg/dL Final    Comment: The National Cholesterol Education Program (NCEP) has set the  following guidelines (reference ranges) for Cholesterol:  Optimal.....................<200 mg/dL  Borderline High.............200-239 mg/dL  High........................> or = 240 mg/dL      Triglycerides 05/09/2017 128  30 - 150 mg/dL Final    Comment: The National Cholesterol Education Program (NCEP) has set the  following guidelines (reference values) for triglycerides:  Normal......................<150 mg/dL  Borderline High.............150-199 mg/dL  High........................200-499 mg/dL      HDL 05/09/2017 52  40 - 75 mg/dL Final    Comment: The National Cholesterol Education Program (NCEP) has set the  following guidelines (reference values) for HDL Cholesterol:  Low...............<40 mg/dL  Optimal...........>60 mg/dL      LDL Cholesterol 05/09/2017 112.4  63.0 - 159.0 mg/dL Final    Comment: The National Cholesterol Education Program (NCEP) has set the  following guidelines (reference values) for LDL Cholesterol:  Optimal.......................<130 mg/dL  Borderline High...............130-159 mg/dL  High..........................160-189 mg/dL  Very High.....................>190 mg/dL      HDL/Chol Ratio 05/09/2017 27.4  20.0 - 50.0 % Final    Total Cholesterol/HDL Ratio 05/09/2017 3.7  2.0 - 5.0 Final    Non-HDL Cholesterol 05/09/2017 138  mg/dL Final    Comment: Risk category and Non-HDL cholesterol goals:  Coronary heart disease (CHD)or equivalent (10-year risk of CHD >20%):  Non-HDL cholesterol goal     <130  mg/dL  Two or more CHD risk factors and 10-year risk of CHD <= 20%:  Non-HDL cholesterol goal     <160 mg/dL  0 to 1 CHD risk factor:  Non-HDL cholesterol goal     <190 mg/dL         Assessment:       1. Essential hypertension    2. Mixed hyperlipidemia    3. Gastroesophageal reflux disease, esophagitis presence not specified    4. NATALIE (obstructive sleep apnea)    5. Obesity, Class II, BMI 35-39.9, with comorbidity    6. Well woman exam    7. History of pituitary tumor    8. Ceruminosis, bilateral        Plan:   Essential hypertension-blood pressure stable today currently taking losartan 50 mg and metoprolol 25 mg daily  Encouraged to restrict salt intake and eat low-fat and low-cholesterol diet and exercise 30 minutes daily  Repeat blood pressure stable today  Reviewed labs available which looked stable    Mixed hyperlipidemia-stable on Crestor 20 mg daily    Gastroesophageal reflux disease, esophagitis presence not specified-doing well on Nexium 40 mg daily    NATALIE (obstructive sleep apnea) -encouraged to start using CPAP machine if having any sleep disturbances.    Obesity, Class II, BMI 35-39.9, with comorbidity-lifestyle modifications recommended with diet and exercise to lower BMI of 36    Well woman exam  -     Ambulatory referral to Gynecology  Patient status post hysterectomy  Would like to get Pap smear  Did mammogram today showing no acute findings in the left breast follow-up recommended in one year    History of pituitary tumor  -     Ambulatory referral to Neurology  Status post pituitary surgery  Clinically asymptomatic  Will refer to neurology for further exam as requested by patient today    Secondary to excess cerumen in both the ears, advised to avoid using Q-tips and start using over-the-counter Debrox earwax drops weekly

## 2017-06-05 ENCOUNTER — DOCUMENTATION ONLY (OUTPATIENT)
Dept: RADIOLOGY | Facility: HOSPITAL | Age: 57
End: 2017-06-05

## 2017-06-05 NOTE — PROGRESS NOTES
Breast Care Management Follow-Up:    Date of Mammogram:06/02/17    Mammogram Reason:Follow-up at short-interval from prior study    Mammogram Results:Stable small intramammary lymph nodes in the left breast      Referrals/Recommendations:Annual mammo        Patient Status:06/05/17 Results letter and report mailed to pt.

## 2017-06-17 ENCOUNTER — HOSPITAL ENCOUNTER (OUTPATIENT)
Dept: RADIOLOGY | Facility: HOSPITAL | Age: 57
Discharge: HOME | End: 2017-06-17
Attending: NURSE PRACTITIONER
Payer: COMMERCIAL

## 2017-06-17 ENCOUNTER — OFFICE VISIT (OUTPATIENT)
Dept: URGENT CARE | Facility: CLINIC | Age: 57
End: 2017-06-17
Payer: COMMERCIAL

## 2017-06-17 VITALS
HEIGHT: 63 IN | HEART RATE: 78 BPM | SYSTOLIC BLOOD PRESSURE: 145 MMHG | DIASTOLIC BLOOD PRESSURE: 88 MMHG | TEMPERATURE: 98 F | BODY MASS INDEX: 36.45 KG/M2 | WEIGHT: 205.69 LBS | OXYGEN SATURATION: 98 %

## 2017-06-17 DIAGNOSIS — T36.95XA ANTIBIOTIC-INDUCED YEAST INFECTION: ICD-10-CM

## 2017-06-17 DIAGNOSIS — J01.91 ACUTE RECURRENT SINUSITIS, UNSPECIFIED LOCATION: Primary | ICD-10-CM

## 2017-06-17 DIAGNOSIS — B37.9 ANTIBIOTIC-INDUCED YEAST INFECTION: ICD-10-CM

## 2017-06-17 DIAGNOSIS — M25.511 ACUTE PAIN OF RIGHT SHOULDER: ICD-10-CM

## 2017-06-17 PROCEDURE — 73030 X-RAY EXAM OF SHOULDER: CPT | Mod: TC,PO,RT

## 2017-06-17 PROCEDURE — 99213 OFFICE O/P EST LOW 20 MIN: CPT | Mod: 25,S$GLB,, | Performed by: NURSE PRACTITIONER

## 2017-06-17 PROCEDURE — 96372 THER/PROPH/DIAG INJ SC/IM: CPT | Mod: S$GLB,,, | Performed by: NURSE PRACTITIONER

## 2017-06-17 PROCEDURE — 99999 PR PBB SHADOW E&M-EST. PATIENT-LVL IV: CPT | Mod: PBBFAC,,, | Performed by: NURSE PRACTITIONER

## 2017-06-17 PROCEDURE — 73030 X-RAY EXAM OF SHOULDER: CPT | Mod: 26,RT,, | Performed by: RADIOLOGY

## 2017-06-17 RX ORDER — AMOXICILLIN AND CLAVULANATE POTASSIUM 875; 125 MG/1; MG/1
1 TABLET, FILM COATED ORAL 2 TIMES DAILY
Qty: 20 TABLET | Refills: 0 | Status: SHIPPED | OUTPATIENT
Start: 2017-06-17 | End: 2017-06-27

## 2017-06-17 RX ORDER — KETOROLAC TROMETHAMINE 30 MG/ML
30 INJECTION, SOLUTION INTRAMUSCULAR; INTRAVENOUS
Status: COMPLETED | OUTPATIENT
Start: 2017-06-17 | End: 2017-06-17

## 2017-06-17 RX ORDER — MELOXICAM 7.5 MG/1
7.5 TABLET ORAL DAILY
Qty: 30 TABLET | Refills: 0 | Status: SHIPPED | OUTPATIENT
Start: 2017-06-17 | End: 2017-08-19 | Stop reason: SDUPTHER

## 2017-06-17 RX ORDER — FLUCONAZOLE 150 MG/1
150 TABLET ORAL ONCE
Qty: 2 TABLET | Refills: 0 | Status: SHIPPED | OUTPATIENT
Start: 2017-06-17 | End: 2017-06-17

## 2017-06-17 RX ORDER — TRAMADOL HYDROCHLORIDE 50 MG/1
50 TABLET ORAL NIGHTLY PRN
Qty: 15 TABLET | Refills: 0 | Status: SHIPPED | OUTPATIENT
Start: 2017-06-17 | End: 2017-12-29

## 2017-06-17 RX ADMIN — KETOROLAC TROMETHAMINE 30 MG: 30 INJECTION, SOLUTION INTRAMUSCULAR; INTRAVENOUS at 12:06

## 2017-06-17 NOTE — PROGRESS NOTES
"Subjective:      Patient ID: Ora Henderson is a 57 y.o. female.    Chief Complaint: Arm Pain    Off balance x 2-3 days with quick movements. Feels like when she had vertigo in the past. Has been having some sinus symptoms over the last few weeks that are progressively worsening: congestion, facial pressure, ears popping, and PND.       Shoulder Pain    The pain is present in the right shoulder. This is a new problem. The current episode started in the past 7 days. There has been no history of extremity trauma. The problem occurs constantly. The problem has been gradually worsening. The quality of the pain is described as aching. The pain is moderate. Pertinent negatives include no fever, inability to bear weight, joint locking, joint swelling, limited range of motion, numbness or tingling. The symptoms are aggravated by lying down (pressure on shoulder). She has tried NSAIDS, rest and heat for the symptoms. The treatment provided no relief. Family history includes arthritis. There is no history of Injuries to Extremity.     Review of Systems   Constitutional: Negative.  Negative for fever.   HENT: Positive for congestion, postnasal drip, rhinorrhea and sinus pressure.    Respiratory: Negative.    Cardiovascular: Negative.    Gastrointestinal: Negative.    Musculoskeletal: Positive for arthralgias (right shoulder).   Skin: Negative.    Neurological: Positive for dizziness (intermittent; occurs with quick changes in position). Negative for tingling and numbness.   Hematological: Negative.        Objective:   BP (!) 145/88   Pulse 78   Temp 98.4 °F (36.9 °C)   Ht 5' 3" (1.6 m)   Wt 93.3 kg (205 lb 11 oz)   SpO2 98%   BMI 36.44 kg/m²   Physical Exam   Constitutional: She is oriented to person, place, and time. She appears well-developed and well-nourished. She is active and cooperative. No distress.   HENT:   Head: Normocephalic and atraumatic.   Right Ear: A middle ear effusion is present. "   Left Ear: A middle ear effusion is present.   Nose: Mucosal edema and sinus tenderness present.   Mouth/Throat: Uvula is midline and mucous membranes are normal. Posterior oropharyngeal erythema present. No oropharyngeal exudate or posterior oropharyngeal edema.   Eyes: Right eye exhibits no discharge. Left eye exhibits no discharge.   Neck: Normal range of motion. Neck supple.   Cardiovascular: Regular rhythm and normal heart sounds.    Pulmonary/Chest: Effort normal and breath sounds normal. She has no wheezes.   Musculoskeletal: Normal range of motion.        Right shoulder: She exhibits tenderness and bony tenderness. She exhibits normal range of motion, no swelling, no effusion, no crepitus, no deformity, no laceration, no pain, no spasm, normal pulse and normal strength.   Lymphadenopathy:     She has no cervical adenopathy.   Neurological: She is alert and oriented to person, place, and time.   Skin: Skin is warm. No rash noted. She is not diaphoretic.   Nursing note and vitals reviewed.    Assessment:      1. Acute recurrent sinusitis, unspecified location    2. Acute pain of right shoulder    3. Antibiotic-induced yeast infection       Plan:   Acute recurrent sinusitis, unspecified location  -     amoxicillin-clavulanate 875-125mg (AUGMENTIN) 875-125 mg per tablet; Take 1 tablet by mouth 2 (two) times daily.  Dispense: 20 tablet; Refill: 0    Acute pain of right shoulder  -     ketorolac injection 30 mg; Inject 30 mg into the muscle one time.  -     meloxicam (MOBIC) 7.5 MG tablet; Take 1 tablet (7.5 mg total) by mouth once daily.  Dispense: 30 tablet; Refill: 0  -     tramadol (ULTRAM) 50 mg tablet; Take 1 tablet (50 mg total) by mouth nightly as needed (severe pain).  Dispense: 15 tablet; Refill: 0  -     X-ray Shoulder 2 or More Views Right; Future; Expected date: 06/17/2017    Antibiotic-induced yeast infection  -     fluconazole (DIFLUCAN) 150 MG Tab; Take 1 tablet (150 mg total) by mouth once. May  repeat in 72 hours if symptoms persist.  Dispense: 2 tablet; Refill: 0    I will follow up with Ms. Henderson when official radiology reading of the shoulder x-ray is available.  Instructions, follow up, and supportive care as per AVS.  Follow up with PCP if not improved or for any new or worsening symptoms.

## 2017-06-17 NOTE — PATIENT INSTRUCTIONS
Take meloxicam with food to protect the stomach.  Rest.   Ice to shoulder 4-5 times daily for 20 minutes at a time.    · Rest and increase fluids.   · May apply warm compresses as needed.   · Saline nasal spray or saline irrigation (Neti pot) to loosen nasal congestion.  · Flonase or Nasacort to reduce inflammation in the sinus cavities.  · Take antibiotics exactly as prescribed. Make sure to complete the entire course of antibiotics even if you start feeling better. This will prevent recurrence of your infection and bacterial resistance.   · Do not drive, drink alcohol, or take any other sedating medications or substances while taking cough syrup.   · Follow up with your primary care provider or with ENT if not improved within a few days or sooner for any new or worsening symptoms.   · Go to the ER for any fever that does not improve with Tylenol/Ibuprofen, neck stiffness, rash, severe headache, vision changes, shortness of breath, chest pain, severe facial pain or swelling, or for any other new and concerning symptoms.     Sinusitis (Antibiotic Treatment)    The sinuses are air-filled spaces within the bones of the face. They connect to the inside of the nose. Sinusitis is an inflammation of the tissue lining the sinus cavity. Sinus inflammation can occur during a cold. It can also be due to allergies to pollens and other particles in the air. Sinusitis can cause symptoms of sinus congestion and fullness. A sinus infection causes fever, headache and facial pain. There is often green or yellow drainage from the nose or into the back of the throat (post-nasal drip). You have been given antibiotics to treat this condition.  Home care:  · Take the full course of antibiotics as instructed. Do not stop taking them, even if you feel better.  · Drink plenty of water, hot tea, and other liquids. This may help thin mucus. It also may promote sinus drainage.  · Heat may help soothe painful areas of the face. Use a towel  soaked in hot water. Or,  the shower and direct the hot spray onto your face. Using a vaporizer along with a menthol rub at night may also help.   · An expectorant containing guaifenesin may help thin the mucus and promote drainage from the sinuses.  · Over-the-counter decongestants may be used unless a similar medicine was prescribed. Nasal sprays work the fastest. Use one that contains phenylephrine or oxymetazoline. First blow the nose gently. Then use the spray. Do not use these medicines more often than directed on the label or symptoms may get worse. You may also use tablets containing pseudoephedrine. Avoid products that combine ingredients, because side effects may be increased. Read labels. You can also ask the pharmacist for help. (NOTE: Persons with high blood pressure should not use decongestants. They can raise blood pressure.)  · Over-the-counter antihistamines may help if allergies contributed to your sinusitis.    · Do not use nasal rinses or irrigation during an acute sinus infection, unless told to by your health care provider. Rinsing may spread the infection to other sinuses.  · Use acetaminophen or ibuprofen to control pain, unless another pain medicine was prescribed. (If you have chronic liver or kidney disease or ever had a stomach ulcer, talk with your doctor before using these medicines. Aspirin should never be used in anyone under 18 years of age who is ill with a fever. It may cause severe liver damage.)  · Don't smoke. This can worsen symptoms.  Follow-up care  Follow up with your healthcare provider or our staff if you are not improving within the next week.  When to seek medical advice  Call your healthcare provider if any of these occur:  · Facial pain or headache becoming more severe  · Stiff neck  · Unusual drowsiness or confusion  · Swelling of the forehead or eyelids  · Vision problems, including blurred or double vision  · Fever of 100.4ºF (38ºC) or higher, or as directed  by your healthcare provider  · Seizure  · Breathing problems  · Symptoms not resolving within 10 days  Date Last Reviewed: 4/13/2015 © 2000-2016 [employer]. [address] [place] [address], [place] [address]. All rights reserved. This information is not intended as a substitute for professional medical care. Always follow your healthcare professional's instructions.        R.I.C.E.    R.I.C.E. stands for Rest, Ice, Compression, and Elevation. Doing these things helps limit pain and swelling after an injury. R.I.C.E. also helps injuries heal faster. Use R.I.C.E. for sprains, strains, and severe bruises or bumps. Follow the tips on this handout and begin R.I.C.E. as soon as possible after an injury.  ? Rest  Pain is your bodys way of telling you to rest an injured area. Whether you have hurt an elbow, hand, foot, or knee, limiting its use will prevent further injury and help you heal.  ? Ice  Applying ice right after an injury helps prevent swelling and reduce pain. Dont place ice directly on your skin.  · Wrap a cold pack or bag of ice in a thin cloth. Place it over the injured area.  · Ice for 10 minutes every 3 hours. Dont ice for more than 20 minutes at a time.  ? Compression  Putting pressure (compression) on an injury helps prevent swelling and provides support.  · Wrap the injured area firmly with an elastic bandage. If your hand or foot tingles, becomes discolored, or feels cold to the touch, the bandage may be too tight. Rewrap it more loosely.  · If your bandage becomes too loose, rewrap it.  · Do not wear an elastic bandage overnight.  ? Elevation  Keeping an injury elevated helps reduce swelling, pain, and throbbing. Elevation is most effective when the injury is kept elevated higher than the heart.     Call your healthcare provider if you notice any of the following:  · Fingers or toes feel numb, are cold to the touch, or change color  · Skin looks shiny or tight  · Pain, swelling, or bruising  worsens and is not improved with elevation   Date Last Reviewed: 9/3/2015  © 9467-8874 [employer], [employer]. [address] [place] [address], [place] [address]. All rights reserved. This information is not intended as a substitute for professional medical care. Always follow your healthcare professional's instructions.

## 2017-06-17 NOTE — PROGRESS NOTES
Administered ketorolac injection 30 mg IM injection into patient's left ventrogluteal. Patient tolerated well. Patient was instructed to wait 20 minutes in lobby and if any adverse reactions occur to let the  know so we can address it. Patient stated understanding.

## 2017-08-02 ENCOUNTER — HOSPITAL ENCOUNTER (OUTPATIENT)
Dept: RADIOLOGY | Facility: HOSPITAL | Age: 57
Discharge: HOME | End: 2017-08-02
Attending: FAMILY MEDICINE
Payer: COMMERCIAL

## 2017-08-02 ENCOUNTER — OFFICE VISIT (OUTPATIENT)
Dept: INTERNAL MEDICINE | Facility: CLINIC | Age: 57
End: 2017-08-02
Payer: COMMERCIAL

## 2017-08-02 ENCOUNTER — OFFICE VISIT (OUTPATIENT)
Dept: PHYSICAL MEDICINE AND REHAB | Facility: CLINIC | Age: 57
End: 2017-08-02
Payer: COMMERCIAL

## 2017-08-02 VITALS
DIASTOLIC BLOOD PRESSURE: 103 MMHG | HEART RATE: 72 BPM | SYSTOLIC BLOOD PRESSURE: 159 MMHG | RESPIRATION RATE: 14 BRPM | HEIGHT: 63 IN | BODY MASS INDEX: 35.44 KG/M2 | WEIGHT: 200 LBS

## 2017-08-02 VITALS
WEIGHT: 200.81 LBS | TEMPERATURE: 97 F | DIASTOLIC BLOOD PRESSURE: 95 MMHG | HEIGHT: 63 IN | OXYGEN SATURATION: 96 % | HEART RATE: 71 BPM | BODY MASS INDEX: 35.58 KG/M2 | SYSTOLIC BLOOD PRESSURE: 145 MMHG

## 2017-08-02 DIAGNOSIS — M19.011 DJD OF RIGHT AC (ACROMIOCLAVICULAR) JOINT: ICD-10-CM

## 2017-08-02 DIAGNOSIS — M25.511 CHRONIC RIGHT SHOULDER PAIN: Primary | ICD-10-CM

## 2017-08-02 DIAGNOSIS — G89.29 CHRONIC RIGHT SHOULDER PAIN: Primary | ICD-10-CM

## 2017-08-02 DIAGNOSIS — M75.81 TENDINITIS OF RIGHT ROTATOR CUFF: ICD-10-CM

## 2017-08-02 DIAGNOSIS — M75.41 ROTATOR CUFF IMPINGEMENT SYNDROME OF RIGHT SHOULDER: Primary | ICD-10-CM

## 2017-08-02 DIAGNOSIS — I10 ESSENTIAL HYPERTENSION: Chronic | ICD-10-CM

## 2017-08-02 DIAGNOSIS — M25.511 CHRONIC RIGHT SHOULDER PAIN: ICD-10-CM

## 2017-08-02 DIAGNOSIS — G89.29 CHRONIC RIGHT SHOULDER PAIN: ICD-10-CM

## 2017-08-02 PROCEDURE — 73030 X-RAY EXAM OF SHOULDER: CPT | Mod: TC,PO,RT

## 2017-08-02 PROCEDURE — 73030 X-RAY EXAM OF SHOULDER: CPT | Mod: 26,RT,, | Performed by: RADIOLOGY

## 2017-08-02 PROCEDURE — 99999 PR PBB SHADOW E&M-EST. PATIENT-LVL V: CPT | Mod: PBBFAC,,, | Performed by: FAMILY MEDICINE

## 2017-08-02 PROCEDURE — 96372 THER/PROPH/DIAG INJ SC/IM: CPT | Mod: S$GLB,,, | Performed by: FAMILY MEDICINE

## 2017-08-02 PROCEDURE — 99999 PR PBB SHADOW E&M-EST. PATIENT-LVL III: CPT | Mod: PBBFAC,,, | Performed by: PHYSICAL MEDICINE & REHABILITATION

## 2017-08-02 PROCEDURE — 99214 OFFICE O/P EST MOD 30 MIN: CPT | Mod: S$GLB,,, | Performed by: PHYSICAL MEDICINE & REHABILITATION

## 2017-08-02 PROCEDURE — 99214 OFFICE O/P EST MOD 30 MIN: CPT | Mod: 25,S$GLB,, | Performed by: FAMILY MEDICINE

## 2017-08-02 RX ORDER — KETOROLAC TROMETHAMINE 30 MG/ML
15 INJECTION, SOLUTION INTRAMUSCULAR; INTRAVENOUS
Status: DISCONTINUED | OUTPATIENT
Start: 2017-08-02 | End: 2017-08-02

## 2017-08-02 RX ORDER — KETOROLAC TROMETHAMINE 30 MG/ML
15 INJECTION, SOLUTION INTRAMUSCULAR; INTRAVENOUS
Status: COMPLETED | OUTPATIENT
Start: 2017-08-02 | End: 2017-08-02

## 2017-08-02 RX ORDER — METHYLPREDNISOLONE 4 MG/1
TABLET ORAL
Qty: 1 PACKAGE | Refills: 0 | Status: SHIPPED | OUTPATIENT
Start: 2017-08-02 | End: 2017-08-15

## 2017-08-02 RX ORDER — TIZANIDINE 2 MG/1
2 TABLET ORAL NIGHTLY PRN
Qty: 30 TABLET | Refills: 1 | Status: SHIPPED | OUTPATIENT
Start: 2017-08-02 | End: 2017-12-29

## 2017-08-02 RX ADMIN — KETOROLAC TROMETHAMINE 15 MG: 30 INJECTION, SOLUTION INTRAMUSCULAR; INTRAVENOUS at 08:08

## 2017-08-02 NOTE — PROGRESS NOTES
"  HISTORY OF PRESENT ILLNESS      PROBLEM/CONDITION: NEW PROBLEM reported is shoulder pain. LOCATION is right shoulder, predominantly the superior and lateral aspect of the right shoulder. EXACERBATING FACTORS include abduction against resistance. ONSET of symptoms was several months ago, without clear precipi tating injury. However, she feels that over the last couple of days through various manual labor activities, she "overdid it", and she woke up this morning with the pain much worse. She also reports sleeping on that shoulder as and EXACERBATING FACTOR. She rates the pain as MODERATELY SEVERE. Physical exam is remarkable for tenderness on palpation over the lateral aspect of the shoulder, reproduction of pain on abduction again st resistance and supraspinatus stress test. We discussed differential diagnosis and presumptive diagnosis of chronic rotator cuff tendinitis with acute exacerbation. Given that this problem has been going on for so long, she agreed to accept my referral to podiatry for further evaluation and treatment. She has prescription analgesics to use as needed. She says that she has done well with IM analgesic injections (presumably Toradol) in  the past, and I agreed to accommodate her request for this today.     PROBLEM/CONDITION: Her hypertension appears worse, explainable by her pain. It was agreed that she would return to see her primary care physician for reevaluation and treatment of her hypertension.    No other complaints or concerns reported.      REVIEW OF SYSTEMS     CONSTITUTIONAL: No fever or chills reported.  CARDIOVASCULAR: No chest pain reported.  PULMONARY: No trouble breathing " reported.      PHYSICAL EXAM    CONSTITUTIONAL: Vital signs (BP, P, T, RR, et al) noted. No apparent distress. Does not appear acutely ill or sep tic. Appears adequately hydrated.  HEAD: Normocephalic.  ENT: Oropharynx moist.  PULM: Lungs clear. Breathing unlabored.  CV: Heart regular.  DERM: Warm and moist.  PSYCHIATRIC: Alert and oriented x 3. Mood is grossly neutral. Affect appropriate. Judgment and insight not grossly compromised.  MUSCULOSKELETAL: Grossly normal stance and gait. (Additional description of exam of this system is documented above in HPI.)     CHIEF COMPLAINT  Shoulder Pain      HISTORY OF PRESENT ILLNESS     Problem List Items Addressed This Visit     Chronic right shoulder pain - Primary    Relevant Medications    ketorolac injection 15 mg (Completed)    Other Relevant Orders    X-ray Shoulder 2 or More Views Right (Completed)    Ambulatory Referral to Physiatry    Chronic right rotator cuff tendonitis    Relevant Medications    ketorolac injection 15 mg (Completed)    Other Relevant Orders    X-ray Shoulder 2 or More Views Right (Completed)    Ambulatory Referral to Physiatry    Hypertension (Chronic)      Other Visit Diagnoses    None.         PAST MEDICAL HISTORY, FAMILY HISTORY, SOCIAL HISTORY, CURRENT MEDICATION LIST, and ALLERGY LIST reviewed by me (CHRISTIANO Davison MD) and are updated consistent with the patient's report.    ASSESSMENT and PLAN  Chronic right shoulder pain  -     Discontinue: ketorolac injection 15 mg; Inject 15 mg into the vein one time.  -     X-ray Shoulder 2 or More Views Right; Future; Expected date: 08/02/2017  -     Cancel: Ambulatory referral to Physical Medicine Rehab  -     Ambulatory Referral to Physiatry  -     ketorolac injection 15 mg; Inject 15 mg into the muscle one time.    Chronic right rotator cuff tendonitis  -      Discontinue: ketorolac injection 15 mg; Inject 15 mg into the vein one time.  -     X-ray Shoulder 2 or More Views Right; Future; Expected date: 08/02/2017  -     Cancel: Ambulatory referral to Physical Medicine Rehab  -     Ambulatory Referral to Physiatry  -     ketorolac injection 15 mg; Inject 15 mg into the muscle one time.    Essential hypertension        Medication List with Changes/Refills   New Medications    METHYLPREDNISOLONE (MEDROL DOSEPACK) 4 MG TABLET    use as directed    TIZANIDINE (ZANAFLEX) 2 MG TABLET    Take 1 tablet (2 mg total) by mouth nightly as needed.   Current Medications    ALBUTEROL 90 MCG/ACTUATION INHALER    Inhale 1-2 puffs into the lungs every 6 (six) hours as needed for Wheezing (cough).    ESOMEPRAZOLE (NEXIUM) 40 MG CAPSULE    Take 1 capsule (40 mg total) by mouth before breakfast.    KETOCONAZOLE (NIZORAL) 2 % SHAMPOO    Wash hair with medicated shampoo at least 2x/week - let sit on scalp at least 5 minutes prior to rinsing    L.ACIDOPHILUS-BIF. ANIMALIS (PROBIOTIC) 5 BILLION CELL CPSP    Take 1 tablet by mouth once daily.    LOSARTAN (COZAAR) 50 MG TABLET    Take 1 tablet (50 mg total) by mouth once daily.    MELOXICAM (MOBIC) 7.5 MG TABLET    Take 1 tablet (7.5 mg total) by mouth once daily.    METOPROLOL SUCCINATE (TOPROL-XL) 25 MG 24 HR TABLET    TAKE ONE TABLET BY MOUTH ONCE DAILY    ROSUVASTATIN (CRESTOR) 20 MG TABLET    Take 1 tablet (20 mg total) by mouth every evening.    TRAMADOL (ULTRAM) 50 MG TABLET    Take 1 tablet (50 mg total) by mouth nightly as needed (severe pain).       Return if symptoms worsen or fail to improve.    ABOUT THIS DOCUMENTATION:  1. The order of the conditions listed in the HPI is one of convenience and does not necessarily reflect the chronology of the appointment, nor the relative importance of a condition. It is possible that additional description or status details about condition(s) may be found elsewhere in the documentation for today's  encounter.  2. Documentation entered by me for this encounter was done in part using speech-recognition technology. Although I have made an effort to ensure accuracy, malapropisms may exist and should be interpreted in context.                        -CHRISTIANO Davison MD

## 2017-08-02 NOTE — PROGRESS NOTES
PM&R CLINIC NOTE    Chief Complaint   Patient presents with    Shoulder Pain     right, to upper arm       HPI: This is a 57 y.o.  female being seen in clinic today for evaluation of right shoulder/upper arm pain that worsened after overworking for a surprise party and doing housework. She complains of achy pain and tightness with throbbing. She tried ice and heat. In the past she was exercising daily at [employer] (cardio and light weights, core strengthening)    History obtained from patient    Review of Systems:     ROS  General- denies lethargy, weight change, fever, chills  Head/neck- denies swallowing difficulties  ENT- denies hearing changes+tinnitus   Cardiovascular-denies chest pain  Pulmonary- denies shortness of breath  GI- denies constipation or bowel incontinence  - denies bladder incontinence  Skin- denies wounds or rashes  Musculoskeletal- denies weakness, +pain  Neurologic- +/-numbness and tingling  Psychiatric- denies depressive or psychotic features, denies anxiety  Lymphatic-denies swelling  Endocrine- denies hypoglycemic symptoms/DM history      Physical Examination:  General: Well developed, well nourished female, NAD    Spinal Examination: CERVICAL  Active ROM is within normal limits.  Inspection: No deformity of spinal alignment.  Palpation: ttp trapezius muscles, rhomboids on right    Bilateral upper and lower Extremities:  Shoulder/Elbow/Wrist/Hand ROM wnl except limited rom at right shoulder in all planes, +berry, neers, painful arc, neg drop arm  Hip,knee, ankle ROM wnl  Bilateral Extremities show normal capillary refill.  No signs of cyanosis, rubor, edema, skin changes, or dysvascular changes of appendages.  Nails appear intact.    Neurological Exam:  Cranial Nerves:  II-XII grossly intact    Manual Muscle Testing: (Motor 5=normal)    RIGHT Upper extremity: Shoulder abduction 3p/5, Biceps 4+p/5, Triceps 4+p/5, Wrist extension 5/5, Abductor pollicis brevis 5/5, Ulnar hand  intrinsics 5/5,  LEFT Upper extremity: Shoulder abduction 5/5, Biceps 5/5, Triceps 5/5, Wrist extension 5/5, Abductor pollicis brevis 5/5, Ulnar hand intrinsics 5/5,    No focal atrophy is noted of either upper or lower extremity.  Gait: Narrow base and good arm swing.    IMPRESSION/PLAN: This is a 57 y.o.  female with right rotator cuff impingement, possible tear, calcific tendonitis, h/o cervical DJD, myofascial pain, rotator cuff weakness    1. Rx for [facility]--Rotator cuff ROM, dec pain, modalities, strength, HEP  2. Medrol dose pack and zanaflex 2mg  3. Ice/heat compress 20 min alternation  4. OTC topical compound  5. If not improving with conservative measures will get MRI and refer to ortho  6. Handouts on rot cuff impingement, DJD, and exercises provided    Amanda Up M.D.  Physical Medicine and Rehab

## 2017-08-02 NOTE — PATIENT INSTRUCTIONS
AC Arthritis (Acromioclavicular Arthritis)  Arthritis is a type of damage to a joint that can cause inflammation. AC arthritis affects the acromioclavicular (AC) joint. This joins the shoulder blade (scapula) and the collarbone (clavicle). The joint has ligaments and cartilage. Various things can inflame the area around the joint. The damage is done by wear and tear over time or other causes. It can create pain, swelling, and a stiff feeling in the joint. This can limit a persons range of motion. AC arthritis is fairly common in older adults.  What causes AC arthritis?  AC arthritis can be caused by:  · Osteoarthritis. This happens from gradual wear and tear. Over time, the outer cartilage of the joint is worn away. This causes the bones of the joint to scrape together. It leads to stiffness, limited motion, pain, and inflammation.  · Rheumatoid arthritis (RA). The immune system attacks the lining of the joint. This leads to pain, limited motion, and inflammation.  · Injury to the joint. This can be a fracture or a shoulder separation. For example, a shoulder separation damages the ligaments around the AC joint. The bones may no longer line up correctly, and they may scrape against each other. This can cause inflammation, limited motion, and pain.  · Bacterial infection of the joint. This can cause sudden and severe inflammation of the joint.  Certain factors may increase your risk for AC arthritis. These include:  · Being an older adult  · Arthritis in other joints  · Having family members with arthritis  · Injury of your AC joint in the past  · A history of AC joint overuse  · A weak immune system, which increases the risk for joint infection  · Use of illegal intravenous medicines, which increases the risk for joint infection  Symptoms of AC arthritis  In most cases, the symptoms of AC arthritis start slowly and get worse with time. Some common symptoms include:  · Pain at the top of the shoulder that may  spread to the side of the neck  · Snapping or clicking sound as you move your shoulder  · Limited range of motion, such as when lifting up your arm  Your symptoms might get worse with certain activities that use the joint. For example, lifting something over your head might cause pain. Crossing your arm in front of your body might hurt. Over time, the joint pain may even keep you from sleeping well at night, especially when you lie on that shoulder.  Many people who have AC arthritis have symptoms in other joints or parts of the body. For example, if you have osteoarthritis, you may also have arthritis of your fingers, knees, or hips. People with RA also often get symptoms in several joints. If you have RA, you may have problems with other organs like your heart and lungs. And a person with a joint infection may have very sudden symptoms with fever and a lot of swelling.  Diagnosing AC arthritis  AC arthritis can be diagnosed by your primary healthcare provider. Or, it may be diagnosed by an orthopedic healthcare provider or rheumatologist. Your healthcare provider will ask about your medical history and your symptoms. He or she will give you a physical exam. This will include a careful exam of the shoulder, arm, and collarbone. This will likely include a test of your range of motion, your strength, and joint pain. Your healthcare provider may check your whole body for joint problems.  Your healthcare provider may also order imaging tests. These are to help diagnose the cause of your arthritis and see how severe it is. An X-ray is the most common first test. You may also need other tests, such as:  · Bone scan, to get more information  · MRI, if more detail is needed  · Injection of a pain medicine or steroid in the joint (diagnostic injection)  · Ultrasound of the joint, often done at the same time as a diagnostic injection  Treatment for AC arthritis  Your healthcare provider may advise these treatments for your  AC arthritis:  · Changing the way you move your arm to avoid pain  · Taking over-the-counter pain medicines  · Doing physical therapy exercises  · Icing your shoulder a couple of times a day  · Taking a medicine to treat rheumatoid arthritis, if you have RA  · Taking dietary supplements such as glucosamine  · Having corticosteroid injections in the joint to ease inflammation  If you still have severe symptoms after trying other treatments, your healthcare provider may talk with you about surgery. An orthopedic healthcare provider might advise a surgery called distal clavicle resection. This surgery removes a small amount of bone from the end of the collarbone. The bone then no longer scrapes the other bones of the joint. Your healthcare provider may do this through a small incision, using small tools and a tiny camera. Talk with your healthcare provider about the benefits and possible complications of this surgery.  Date Last Reviewed: 1/27/2016 © 2000-2016 [employer]. [address] [place] [address]. All rights reserved. This information is not intended as a substitute for professional medical care. Always follow your healthcare professional's instructions.        Shoulder Impingement Syndrome  The rotator cuff is a group of muscles and tendons that surround the shoulder joint. These muscles and tendons hold the arm in its joint. They help the shoulder move. The rotator cuff muscles and tendons can become irritated from repeated rubbing against the shoulder bone. This is called shoulder impingement syndrome or rotator cuff tendonitis.    If your case is mild, you may only need to rest the shoulder and then do certain exercises to strengthen the muscles. You can also take anti-inflammatory medicines. Steroid injections into the shoulder can ease inflammation. But you can have only a limited number of these. If the condition gets worse, your shoulder muscles may become thin and weak. This  can lead to a rotator cuff tear.  Symptoms of shoulder impingement syndrome may include:  · Shoulder pain that gets worse when you raise your arm overhead  · Weakness of the shoulder muscles when you use your arm overhead  · Popping and clicking when you move your shoulder  · Shoulder pain that wakes you up at night, especially when you sleep on the affected shoulder  · Sudden pain in your shoulder when you lift or reach  Home care  Follow these tips to take care of yourself at home:  · Avoid activities that make your pain worse. These include raising your arms overhead, repeating the same motion over and over, or lifting heavy objects.  · Dont hold your arm in one position for a long time. Keep it moving.  · Put an ice pack on the sore area for 20 minutes every 1 to 2 hours for the first day. You can make an ice pack by putting ice cubes in a plastic bag. Wrap the bag in a towel before putting it on your shoulder. A frozen bag of peas or something similar can also be used as an ice pack. Use the ice packs 3 to 4 times a day for the next 2 days. Continue using the ice to relieve of pain and swelling as needed.  · You may take acetaminophen or ibuprofen to control pain, unless another medicine was prescribed. If prednisone was prescribed, dont take anti-inflammatory medicines. If you have chronic liver or kidney disease or ever had a stomach ulcer or gastrointestinal bleeding, talk with your doctor before using these medicines.  · After your symptoms ease, you may get physical therapy or start a home exercise program. This can strengthen your shoulder muscles and help your range of motion. Talk with your doctor about what is best for your condition.  Follow-up care  Follow up with your healthcare provider, or as advised.  When to seek medical advice  Call your healthcare provider right away if any of these occur:  · Shoulder pain that gets worse and wakes you up at night  · Your shoulder or arm swells  · Numbness,  tingling, or pain that travels down the arm to the hand  · Loss of shoulder strength  · Fever or chills  Date Last Reviewed: 8/1/2016 © 2000-2016 [employer]. [address] [place] [address], [place] [address]. All rights reserved. This information is not intended as a substitute for professional medical care. Always follow your healthcare professional's instructions.        Understanding Shoulder Impingement Syndrome    Shoulder impingement syndrome is a problem with the shoulder joint. It occurs when certain parts within the joint swell and are pinched. This can cause nagging pain and problems with moving the arm.  What causes shoulder impingement syndrome?  It is possible to develop impingement after years of normal shoulder use. But in most cases the condition occurs because of repeated overhead movements. These include such things as stocking shelves, painting, swimming, and throwing. These movements can irritate parts of the shoulder, leading to swelling. Swollen parts of the shoulder take up more room, making the joint space smaller. Some parts that may be involved include:  · A sac of fluid (bursa) that cushions the shoulder joint.  When the bursa is irritated, it may lead to a condition called bursitis. This is when the bursa swells with fluid, filling and squeezing the joint space.  · Fibrous tissues (tendons) that connect muscle to bone. When tendons are irritated, they may become swollen. This is called tendonitis.  · The end (acromion) of the shoulder blade. This bone may be flat or hooked. If the acromion is hooked, the joint space may be smaller than normal. Growths (bone spurs) on the acromion can also narrow the joint space. Acromion problems dont often cause impingement. But they can make it worse.  Symptoms of shoulder impingement syndrome  Symptoms include pain, pinching, or stiffness in your shoulder. Pain often comes with movement, particularly reaching overhead or backward. It may also  be felt when the shoulder is at rest. Pain at night during sleep is common.  Treatment for shoulder impingement syndrome  Treatment will depend on the cause of the problem, how bad the problem is, and if other parts of the shoulder are damaged. Treatment may include the following:  · Active rest. This allows the shoulder to heal. It means using the arm and shoulder, but avoiding activities that cause pain. These likely include reaching overhead or sleeping on your shoulder.  · Cold packs. These help reduce swelling and relieve pain.  · Prescription or over-the-counter pain medicines. These help relieve pain and swelling.  · Arm and shoulder exercises. These help keep your shoulder joint mobile as it heals. They also help improve muscle strength around the joint.  · Shots of medicine into the joint. This can help reduce swelling and pain for a short time.  If other measures dont work to relieve symptoms, you may need surgery. This opens up space in the joint to allow pain-free motion.  Possible complications of shoulder impingement syndrome  It might be tempting to stop using your shoulder completely to avoid pain. But doing so may lead to a condition called frozen shoulder. To help prevent this, follow instructions you are given for active rest and for doing exercises to help your shoulder heal.  When to call your healthcare provider  Call your healthcare provider right away if you have any of these:  · Fever of 100.4°F (38°C) or higher, or as directed  · Symptoms that dont get better, or get worse  · New symptoms   Date Last Reviewed: 3/10/2016  © 6690-9268 [employer]. [address] [place] [address], [place] [address]. All rights reserved. This information is not intended as a substitute for professional medical care. Always follow your healthcare professional's instructions.        Nonsurgical Treatment Options for Shoulder Impingement    Rest is key to healing your shoulder. If an activity hurts, dont  do it. Otherwise, you may prevent healing and increase pain. Your shoulder needs active rest. This means avoiding overhead movements and activities that cause pain. But DO NOT stop using your shoulder completely. This can cause it to stiffen or freeze. In addition to rest, impingement can be treated a number of ways. Your healthcare provider can help you find which of these is best for you.  A physical therapist can also help you with exercises specific for your condition.  ? Ice  Ice reduces inflammation and relieves pain. Apply an ice pack for about 15 minutes, 3 times a day. You can also use a bag of frozen peas instead of an ice pack. A pillow placed under your arm may help make you more comfortable.  Note: Dont put the cold item directly on your skin. Place it on top of your shirt, or wrap it in a thin towel or washcloth.  ? Heat  Heat may soothe aching muscles, but it wont reduce inflammation. Use a heating pad or take a warm shower or bath. Do this for 15 minutes at a time.  Note: Avoid heat when pain is constant. Heat is best when used for warming up before an activity. You can also alternate ice and heat.  ? Medicine  To relieve pain and inflammation, try over-the-counter pain relievers, such as acetaminophen or ibuprofen. Or, your healthcare provider may prescribe medicines. Ask how and when to take your medicine. Be sure to follow all instructions youre given.  ? Electrical stimulation  Electrical stimulation can help reduce pain and swelling. Your healthcare provider attaches small pads to your shoulder. A mild electric current then flows into your shoulder. You may feel tingling, but you should not feel pain.  ? Ultrasound  Ultrasound can help reduce pain. First a slick gel or medicated cream is applied to your shoulder. Then your healthcare provider places a small device over the area. The device uses sound waves to loosen shoulder tightness. This treatment should be pain-free.  ? Injection  therapy  Injection therapy may be used to help diagnose your problem. It may also be used to reduce pain and inflammation. The injection typically includes two medicines. One is an anesthetic to numb the shoulder. The other is a steroid, such as cortisone, to help reduce painful swelling. It can take from a few hours to a couple of days before the injection helps. Talk to your healthcare provider about the possible risks and benefits of this therapy.  Date Last Reviewed: 10/14/2015  © 8556-5952 [employer]. [address] [place] [address]. All rights reserved. This information is not intended as a substitute for professional medical care. Always follow your healthcare professional's instructions.        Shoulder Clock Exercise    To start, stand tall with your ears, shoulders, and hips in line. Your feet should be slightly apart, positioned just under your hips. Focus your eyes directly in front of you.  this position for a few seconds before starting your exercise. This helps increase your awareness of proper posture.  · Imagine that your right shoulder is the center of a clock. With the outer point of your shoulder, roll it around to slowly trace the outer edge of the clock.  · Move clockwise first, then counterclockwise.  · Repeat 3 to 5 times. Switch shoulders.   Date Last Reviewed: 10/2/2015  © 2054-6217 [employer]. [address] [place] [address]. All rights reserved. This information is not intended as a substitute for professional medical care. Always follow your healthcare professional's instructions.        Shoulder Girdle Stretch      To start, sit in a chair with your feet flat on the floor. Your weight should be slightly forward so that youre balanced evenly on your buttocks. Relax your shoulders and keep your head level. Using a chair with arms may help you keep your balance:  · Place 1 hand on the outside elbow of the other arm.  · Pull the arm  across your body. Hold for 30 to 60 seconds. Repeat once.  · Switch sides.  For your safety, check with your healthcare provider before starting an exercise program.   Date Last Reviewed: 8/16/2015 © 2000-2016 [employer]. [address] [place] [address]. All rights reserved. This information is not intended as a substitute for professional medical care. Always follow your healthcare professional's instructions.        Shoulder Shrug Exercise  To start, sit in a chair with your feet flat on the floor. Shift your weight slightly forward to avoid rounding your back. Relax. Keep your ears, shoulders, and hips aligned:  · Raise both of your shoulders as high as you can, as if you were trying to touch them to your ears. Keep your head and neck still and relaxed.  · Hold for a count of 10. Release.  · Repeat 5 times.  For your safety, check with your healthcare provider before starting an exercise program.   Date Last Reviewed: 8/16/2015 © 2000-2016 [employer]. [address] [place] [address]. All rights reserved. This information is not intended as a substitute for professional medical care. Always follow your healthcare professional's instructions.        Shoulder Squeeze Exercise      To start, sit in a chair with your feet flat on the floor. Shift your weight slightly forward to avoid rounding your back. Relax. Keep your ears, shoulders, and hips aligned:  · Raise your arms to shoulder height, elbows bent and palms forward.  · Move your arms back, squeezing your shoulder blades together.  · Hold for 10 seconds. Return to starting position.   · Repeat 5 times.   For your safety, check with your healthcare provider before starting an exercise program.   Date Last Reviewed: 8/16/2015 © 2000-2016 [employer]. [address] [place] [address]. All rights reserved. This information is not intended as a substitute for professional medical care. Always  follow your healthcare professional's instructions.        Pendulum (Flexibility)    1. Lean over next to a table, with your left arm supporting your weight on the table.  2. Relax your right arm and let it hang straight down.  3. Slowly begin to swing your right arm in a small [place]. Gradually make the [place] bigger if you can. Change direction after 1 minute of motion.  4. Next, swing your right arm backward and forward. Then move it side to side. Change direction after 1 minute of motion.  5. Repeat these movements for about 5 minutes.  6. Do this exercise 3 times a day, or as often as instructed.  Date Last Reviewed: 3/10/2016  © 6975-6828 [employer]. [address] [place] [address]. All rights reserved. This information is not intended as a substitute for professional medical care. Always follow your healthcare professional's instructions.        Shoulder Internal Rotation, Isometric (Strength)    7. Bend your right arm in front of your body, palm up.  Hold your wrist with your left hand.   8. Try to push your right arm inward, while pulling back with your left arm.  Try not to let either arm move.  Push and pull both arms firmly in opposite directions.  9. Hold for 5 seconds. Then relax.  10. Repeat 5 times.  11. Repeat this exercise 3 times a day, or as instructed.  Date Last Reviewed: 3/10/2016  © 8929-5776 [employer]. [address] [place] [address]. All rights reserved. This information is not intended as a substitute for professional medical care. Always follow your healthcare professional's instructions.        Shoulder Exercises: Wall Pushup    Strengthening exercises help make your injured shoulder more stable by making the muscles that support your shoulder stronger. To warm up, do flexibility (stretching) exercises first.  · With feet and hands shoulder-width apart, place your palms on the wall, standing about an arms length away.  · Keeping your  knees straight and heels on the floor, bend your elbows and lean forward as far as you comfortably can. Your elbows should be pointing downward. Then push away from the wall to the starting position. Repeat.  · Work up to 15 wall pushups.     Note: Wear shoes that keep you from slipping.   Date Last Reviewed: 9/3/2015  © 7209-9093 [employer]. [address] [place] [address]. All rights reserved. This information is not intended as a substitute for professional medical care. Always follow your healthcare professional's instructions.        Shoulder Exercises: Internal Rotation    Strengthening exercises help make your injured shoulder more stable. To warm up, do flexibility (stretching) exercises first. Your healthcare provider will tell you what size hand weights to use for the strengthening exercise below. If you dont have hand weights, try using cans of soup instead:  · With knees bent, lie on a firm surface. Using the hand on the same side as your injured shoulder, grasp a weight. Bend that arm to a right angle (90 degrees).  · Rest your elbow on the floor.  · Keeping your elbow next to your side, lower your forearm toward the floor, away from your body. Do not lower your hand all the way to the floor.  · Slowly return your forearm to your side. Repeat.  · Work up to 5 to 15 lifts.     Note: Support your head and neck with a pillow.   Date Last Reviewed: 9/30/2015  © 7324-9299 [employer]. [address] [place] [address]. All rights reserved. This information is not intended as a substitute for professional medical care. Always follow your healthcare professional's instructions.

## 2017-08-15 ENCOUNTER — OFFICE VISIT (OUTPATIENT)
Dept: CARDIOLOGY | Facility: CLINIC | Age: 57
End: 2017-08-15
Payer: COMMERCIAL

## 2017-08-15 ENCOUNTER — OFFICE VISIT (OUTPATIENT)
Dept: OBSTETRICS AND GYNECOLOGY | Facility: CLINIC | Age: 57
End: 2017-08-15
Payer: COMMERCIAL

## 2017-08-15 ENCOUNTER — OFFICE VISIT (OUTPATIENT)
Dept: GASTROENTEROLOGY | Facility: CLINIC | Age: 57
End: 2017-08-15
Payer: COMMERCIAL

## 2017-08-15 VITALS
BODY MASS INDEX: 34.82 KG/M2 | DIASTOLIC BLOOD PRESSURE: 102 MMHG | SYSTOLIC BLOOD PRESSURE: 148 MMHG | HEIGHT: 64 IN | WEIGHT: 203.94 LBS | HEART RATE: 74 BPM

## 2017-08-15 VITALS
SYSTOLIC BLOOD PRESSURE: 177 MMHG | HEIGHT: 64 IN | WEIGHT: 203.94 LBS | DIASTOLIC BLOOD PRESSURE: 99 MMHG | BODY MASS INDEX: 34.82 KG/M2

## 2017-08-15 VITALS
SYSTOLIC BLOOD PRESSURE: 148 MMHG | WEIGHT: 202 LBS | HEIGHT: 63 IN | BODY MASS INDEX: 35.79 KG/M2 | DIASTOLIC BLOOD PRESSURE: 92 MMHG | HEART RATE: 74 BPM

## 2017-08-15 DIAGNOSIS — E78.2 MIXED HYPERLIPIDEMIA: Chronic | ICD-10-CM

## 2017-08-15 DIAGNOSIS — I10 ESSENTIAL HYPERTENSION: Chronic | ICD-10-CM

## 2017-08-15 DIAGNOSIS — K21.9 GASTROESOPHAGEAL REFLUX DISEASE, ESOPHAGITIS PRESENCE NOT SPECIFIED: Primary | ICD-10-CM

## 2017-08-15 DIAGNOSIS — Z82.49 FAMILY HISTORY OF CARDIOVASCULAR DISEASE: Primary | ICD-10-CM

## 2017-08-15 DIAGNOSIS — Z01.419 ENCOUNTER FOR GYNECOLOGICAL EXAMINATION WITHOUT ABNORMAL FINDING: Primary | ICD-10-CM

## 2017-08-15 PROCEDURE — 3077F SYST BP >= 140 MM HG: CPT | Mod: S$GLB,,, | Performed by: NURSE PRACTITIONER

## 2017-08-15 PROCEDURE — 3008F BODY MASS INDEX DOCD: CPT | Mod: S$GLB,,, | Performed by: NURSE PRACTITIONER

## 2017-08-15 PROCEDURE — 3080F DIAST BP >= 90 MM HG: CPT | Mod: S$GLB,,, | Performed by: INTERNAL MEDICINE

## 2017-08-15 PROCEDURE — 99999 PR PBB SHADOW E&M-EST. PATIENT-LVL III: CPT | Mod: PBBFAC,,, | Performed by: INTERNAL MEDICINE

## 2017-08-15 PROCEDURE — 3077F SYST BP >= 140 MM HG: CPT | Mod: S$GLB,,, | Performed by: INTERNAL MEDICINE

## 2017-08-15 PROCEDURE — 3080F DIAST BP >= 90 MM HG: CPT | Mod: S$GLB,,, | Performed by: NURSE PRACTITIONER

## 2017-08-15 PROCEDURE — 99214 OFFICE O/P EST MOD 30 MIN: CPT | Mod: S$GLB,,, | Performed by: NURSE PRACTITIONER

## 2017-08-15 PROCEDURE — 99396 PREV VISIT EST AGE 40-64: CPT | Mod: S$GLB,,, | Performed by: NURSE PRACTITIONER

## 2017-08-15 PROCEDURE — 99999 PR PBB SHADOW E&M-EST. PATIENT-LVL III: CPT | Mod: PBBFAC,,, | Performed by: NURSE PRACTITIONER

## 2017-08-15 PROCEDURE — 99213 OFFICE O/P EST LOW 20 MIN: CPT | Mod: S$GLB,,, | Performed by: INTERNAL MEDICINE

## 2017-08-15 PROCEDURE — 3008F BODY MASS INDEX DOCD: CPT | Mod: S$GLB,,, | Performed by: INTERNAL MEDICINE

## 2017-08-15 PROCEDURE — 88175 CYTOPATH C/V AUTO FLUID REDO: CPT

## 2017-08-15 NOTE — PROGRESS NOTES
"CC: Well woman exam    Ora Henderson is a 57 y.o. female  presents for a well woman exam.  No issues, problems, or complaints.      Past Medical History:   Diagnosis Date    Arthritis     Encounter for blood transfusion     GERD (gastroesophageal reflux disease)     Hepatomegaly     and fatty liver    Hernia, umbilical     reducible    Hyperlipidemia     Hypertension      Past Surgical History:   Procedure Laterality Date    BRAIN SURGERY      tumor removal     CARPAL TUNNEL RELEASE Bilateral     CERVICAL BIOPSY  W/ LOOP ELECTRODE EXCISION      CKC '81     SECTION      x 1    COLONOSCOPY N/A 2016    Procedure: COLONOSCOPY;  Surgeon: Quique Paul MD;  Location: [facility] ENDO;  Service: Endoscopy;  Laterality: N/A;    HERNIA REPAIR      2016    HYSTERECTOMY  2006    fibroids and endometriosis    TOTAL ABDOMINAL HYSTERECTOMY W/ BILATERAL SALPINGOOPHORECTOMY      STAR/BSO secondary to endometriosis    VENTRAL HERNIA REPAIR       Family History   Problem Relation Age of Onset    Heart disease Father     Stroke Father     Hypertension Father     Hypertension Mother     Cancer Maternal Aunt      pancreas    Cancer Maternal Uncle      pancreas    Heart disease Paternal Uncle     Heart disease Paternal Grandmother     Diabetes Maternal Aunt      Social History   Substance Use Topics    Smoking status: Never Smoker    Smokeless tobacco: Never Used    Alcohol use 0.0 oz/week      Comment: socially     OB History      Para Term  AB Living    2 1     1 1    SAB TAB Ectopic Multiple Live Births    1       1          BP (!) 177/99 (BP Location: Left arm)   Ht 5' 3.6" (1.615 m)   Wt 92.5 kg (203 lb 14.8 oz)   BMI 35.45 kg/m²     ROS:  GENERAL: Denies weight gain or weight loss. Feeling well overall.   SKIN: Denies rash or lesions.   HEAD: Denies head injury or headache.   NODES: Denies enlarged lymph nodes.   CHEST: Denies chest pain or " shortness of breath.   CARDIOVASCULAR: Denies palpitations or left sided chest pain.   ABDOMEN: No abdominal pain, constipation, diarrhea, nausea, vomiting or rectal bleeding.   URINARY: No frequency, dysuria, hematuria, or burning on urination.  REPRODUCTIVE: See HPI.   BREASTS: The patient performs breast self-examination and denies pain, lumps, or nipple discharge.   HEMATOLOGIC: No easy bruisability or excessive bleeding.   MUSCULOSKELETAL: Denies joint pain or swelling.   NEUROLOGIC: Denies syncope or weakness.   PSYCHIATRIC: Denies depression, anxiety or mood swings.    PE:   APPEARANCE: Well nourished, well developed, in no acute distress.  AFFECT: WNL, alert and oriented x 3.  SKIN: No acne or hirsutism.  NECK: Neck symmetric without masses or thyromegaly.  NODES: No inguinal, cervical, axillary or femoral lymph node enlargement.  CHEST: Good respiratory effort.   ABDOMEN: Soft. No tenderness or masses. No hepatosplenomegaly. No hernias.  BREASTS: Symmetrical, no skin changes or visible lesions. No palpable masses, nipple discharge bilaterally.  PELVIC: Normal external female genitalia without lesions. Normal hair distribution. Adequate perineal body, normal urethral meatus. Vagina atrophic without lesions or discharge. No significant cystocele or rectocele. Bimanual exam shows uterus and cervix to be surgically absent. Adnexa absent.     1. Encounter for gynecological examination without abnormal finding  Liquid-based pap smear, screening    and PLAN:    Patient was counseled today on A.C.S. Pap guidelines and recommendations for yearly pelvic exams, mammograms and monthly self breast exams; to see her PCP for other health maintenance.       MMG done and was normal to repeat in one year

## 2017-08-15 NOTE — LETTER
August 15, 2017      Zuly Montes MD  [address] [facility] [place] [address]           [facility] - Gastroenterology  [address] [facility] [address] [place] [address]  Phone: [phone]  Fax: [phone]          Patient: Ora Henderson   MR Number: [id number]   YOB: 1960   Date of Visit: 8/15/2017       Dear Dr. Zuly Montes:    Thank you for referring Ora Henderson to me for evaluation. Attached you will find relevant portions of my assessment and plan of care.    If you have questions, please do not hesitate to call me. I look forward to following Ora Henderson along with you.    Sincerely,    Morelia Garcia, [facility]    Enclosure  CC:  No Recipients    If you would like to receive this communication electronically, please contact [email or web address] or [phone] to request more information on [facility] Link access.    For providers and/or their staff who would like to refer a patient to [facility], please contact us through our one-stop-shop provider referral line, [facility] , at [phone].    If you feel you have received this communication in error or would no longer like to receive these types of communications, please e-mail [email or web address]

## 2017-08-15 NOTE — PROGRESS NOTES
Clinic Follow Up:  [facility] Follow Up Note    Reason for Follow Up:  The primary encounter diagnosis was Gastroesophageal reflux disease, esophagitis presence not specified. A diagnosis of Essential hypertension was also pertinent to this visit.    PCP: Zuly Montes   [address] [place] [address]    HPI:  This is a 57 y.o. female here for follow up of the above  She reports that since her last visit, she has been overall feeling well without any major complaints.  She has been taking her PPI daily without only a few breakthrough symptoms, mostly diet related.   Today, her BP is elevated, however, she reports she has not taken her medication yet.  She was seen by cardiology today as well.     Review of Systems   Constitutional: Negative for chills, fever, malaise/fatigue and weight loss.   Respiratory: Negative for cough.    Cardiovascular: Negative for chest pain.   Gastrointestinal:        Per HPI   Musculoskeletal: Negative for myalgias.   Skin: Negative for itching and rash.   Neurological: Negative for headaches.   Psychiatric/Behavioral: The patient is not nervous/anxious.        Medical History:  Past Medical History:   Diagnosis Date    Arthritis     Encounter for blood transfusion     GERD (gastroesophageal reflux disease)     Hepatomegaly     and fatty liver    Hernia, umbilical     reducible    Hyperlipidemia     Hypertension        Surgical History:   Past Surgical History:   Procedure Laterality Date    BRAIN SURGERY      tumor removal     CARPAL TUNNEL RELEASE Bilateral     CERVICAL BIOPSY  W/ LOOP ELECTRODE EXCISION      CKC '81     SECTION      x 1    COLONOSCOPY N/A 2016    Procedure: COLONOSCOPY;  Surgeon: Quique Paul MD;  Location: [facility];  Service: Endoscopy;  Laterality: N/A;    HERNIA REPAIR      2016    HYSTERECTOMY  2006    fibroids and endometriosis    TOTAL ABDOMINAL HYSTERECTOMY W/ BILATERAL  SALPINGOOPHORECTOMY      STAR/BSO secondary to endometriosis    VENTRAL HERNIA REPAIR         Family History:   Family History   Problem Relation Age of Onset    Heart disease Father     Stroke Father     Hypertension Father     Hypertension Mother     Cancer Maternal Aunt      pancreas    Cancer Maternal Uncle      pancreas    Heart disease Paternal Uncle     Heart disease Paternal Grandmother     Diabetes Maternal Aunt        Social History:   Social History   Substance Use Topics    Smoking status: Never Smoker    Smokeless tobacco: Never Used    Alcohol use 0.0 oz/week      Comment: socially       Allergies: Reviewed    Home Medications:  Current Outpatient Prescriptions on File Prior to Visit   Medication Sig Dispense Refill    albuterol 90 mcg/actuation inhaler Inhale 1-2 puffs into the lungs every 6 (six) hours as needed for Wheezing (cough). 1 Inhaler 0    esomeprazole (NEXIUM) 40 MG capsule Take 1 capsule (40 mg total) by mouth before breakfast. 30 capsule 11    ketoconazole (NIZORAL) 2 % shampoo Wash hair with medicated shampoo at least 2x/week - let sit on scalp at least 5 minutes prior to rinsing 120 mL 5    L.acidophilus-Bif. animalis (PROBIOTIC) 5 billion cell CpSP Take 1 tablet by mouth once daily. 10 capsule 0    losartan (COZAAR) 50 MG tablet Take 1 tablet (50 mg total) by mouth once daily. 30 tablet 12    meloxicam (MOBIC) 7.5 MG tablet Take 1 tablet (7.5 mg total) by mouth once daily. 30 tablet 0    metoprolol succinate (TOPROL-XL) 25 MG 24 hr tablet TAKE ONE TABLET BY MOUTH ONCE DAILY 30 tablet 12    rosuvastatin (CRESTOR) 20 MG tablet Take 1 tablet (20 mg total) by mouth every evening. 30 tablet 11    tizanidine (ZANAFLEX) 2 MG tablet Take 1 tablet (2 mg total) by mouth nightly as needed. 30 tablet 1    tramadol (ULTRAM) 50 mg tablet Take 1 tablet (50 mg total) by mouth nightly as needed (severe pain). 15 tablet 0    [DISCONTINUED] methylPREDNISolone (MEDROL DOSEPACK) 4 mg  "tablet use as directed 1 Package 0     No current facility-administered medications on file prior to visit.        Physical Exam:  Vital Signs:  BP (!) 148/102   Pulse 74   Ht 5' 3.6" (1.615 m)   Wt 92.5 kg (203 lb 14.8 oz)   BMI 35.45 kg/m²   Body mass index is 35.45 kg/m².  Physical Exam   Constitutional: She is oriented to person, place, and time. She appears well-developed and well-nourished.   HENT:   Head: Normocephalic.   Eyes: No scleral icterus.   Neck: Normal range of motion.   Cardiovascular: Normal rate and regular rhythm.    Pulmonary/Chest: Effort normal and breath sounds normal.   Abdominal: Soft. Bowel sounds are normal. She exhibits no distension. There is no tenderness.   Musculoskeletal: Normal range of motion.   Neurological: She is alert and oriented to person, place, and time.   Skin: Skin is warm and dry.   Psychiatric: She has a normal mood and affect.   Vitals reviewed.      Labs: Pertinent labs reviewed.      Assessment:  1. Gastroesophageal reflux disease, esophagitis presence not specified    2. Essential hypertension        Recommendations:  Gastroesophageal reflux disease, esophagitis presence not specified  - Stable  - Continue current medications  - GERD diet and lifestyle discussed    Essential hypertension  - Follow up with cardiology as planned  - Monitor BP at home.         Return to Clinic:    Return in about 1 year (around 8/15/2018).      "

## 2017-08-15 NOTE — PROGRESS NOTES
"Subjective:    Patient ID:  Ora Henderson is a 57 y.o. female who presents for evaluation of Hypertension; Hyperlipidemia; and Risk Factor Management For Atherosclerosis      HPI Mrs. Henderson presents for  f/u.  Her current medical conditions include HTN, hyperlipidemia, obesity. Nonsmoker.   She has family h/o cad. Sisters x 2  of MIs. Brother  of MI. Father  of MI 42.   h/o LHC about 20 years ago, no specific findings.   H/o chronic abnormal ecgs showing nonspecific ST-T abnl.  Stress MPI 3/17 showed no ischemia.  Echo 3/17 showed normal LV function.  She feels good overall.   No cp sxs or dyspnea.  States her BP is controlled most of time at home with bp in range of 130/80.  On steroids right now.   Losartan increased last appt.  Exercises.  Mindful of diet.  Lipids controlled on statin.    Review of Systems   Constitution: Negative.   HENT: Negative.    Eyes: Negative.    Cardiovascular: Negative.    Respiratory: Negative.    Endocrine: Negative.    Hematologic/Lymphatic: Negative.    Skin: Negative.    Musculoskeletal: Positive for arthritis and joint pain.   Gastrointestinal: Negative.    Genitourinary: Negative.    Neurological: Negative.    Psychiatric/Behavioral: Negative.    Allergic/Immunologic: Negative.        Blood pressure (!) 148/92, pulse 74, height 5' 3" (1.6 m), weight 91.6 kg (202 lb).    Wt Readings from Last 3 Encounters:   08/15/17 91.6 kg (202 lb)   17 90.7 kg (200 lb)   17 91.1 kg (200 lb 13.4 oz)     Temp Readings from Last 3 Encounters:   17 97.3 °F (36.3 °C) (Tympanic)   17 98.4 °F (36.9 °C)   17 97.3 °F (36.3 °C) (Tympanic)     BP Readings from Last 3 Encounters:   08/15/17 (!) 148/92   17 (!) 159/103   17 (!) 145/95     Pulse Readings from Last 3 Encounters:   08/15/17 74   17 72   17 71          Objective:    Physical Exam   Constitutional: She is oriented to person, place, and time. Vital signs are " normal. She appears well-developed and well-nourished. She is active and cooperative. She does not have a sickly appearance. She does not appear ill. No distress.   HENT:   Head: Normocephalic.   Neck: Neck supple. No JVD present. Carotid bruit is not present.   Cardiovascular: Normal rate, regular rhythm, S1 normal, S2 normal, normal heart sounds and normal pulses.  PMI is not displaced.  Exam reveals no gallop and no friction rub.    No murmur heard.  Pulses:       Radial pulses are 2+ on the right side, and 2+ on the left side.   Pulmonary/Chest: Effort normal and breath sounds normal. She has no wheezes. She has no rales.   Abdominal: Soft. Normal appearance and bowel sounds are normal. She exhibits no abdominal bruit. There is no tenderness.   Musculoskeletal: She exhibits no edema.   Lymphadenopathy:     She has no cervical adenopathy.   Neurological: She is alert and oriented to person, place, and time.   Skin: Skin is warm. She is not diaphoretic.   Psychiatric: She has a normal mood and affect. Her behavior is normal.   Nursing note and vitals reviewed.      I have reviewed all pertinent labs and cardiac studies.      Chemistry        Component Value Date/Time     05/08/2017 1752    K 3.9 05/08/2017 1752     05/08/2017 1752    CO2 29 05/08/2017 1752    BUN 11 05/08/2017 1752    CREATININE 0.9 05/08/2017 1752    GLU 90 05/08/2017 1752        Component Value Date/Time    CALCIUM 9.7 05/08/2017 1752    ALKPHOS 65 05/08/2017 1752    AST 16 05/08/2017 1752    ALT 15 05/08/2017 1752    BILITOT 0.7 05/08/2017 1752    ESTGFRAFRICA >60.0 05/08/2017 1752    EGFRNONAA >60.0 05/08/2017 1752        Lab Results   Component Value Date    WBC 5.75 12/08/2016    HGB 12.6 12/08/2016    HCT 37.4 12/08/2016    MCV 88 12/08/2016     12/08/2016     Lab Results   Component Value Date    CHOL 190 05/08/2017    CHOL 179 11/14/2016    CHOL 234 (H) 06/30/2016     Lab Results   Component Value Date    HDL 52  05/08/2017    HDL 48 11/14/2016    HDL 50 06/30/2016     Lab Results   Component Value Date    LDLCALC 112.4 05/08/2017    LDLCALC 104.0 11/14/2016    LDLCALC 158.0 06/30/2016     Lab Results   Component Value Date    TRIG 128 05/08/2017    TRIG 135 11/14/2016    TRIG 130 06/30/2016     Lab Results   Component Value Date    CHOLHDL 27.4 05/08/2017    CHOLHDL 26.8 11/14/2016    CHOLHDL 21.4 06/30/2016           Assessment:       Stable cv conditions.    1. Family history of cardiovascular disease    2. Mixed hyperlipidemia    3. Essential hypertension         Plan:             Continue current medical tx.  Pt counseled on risk factor modification and BP goals.  Daily exercise  Cardiac low salt diet  She will call if BP at home runs > 140/90 on consistent basis.    F/u 6 months.

## 2017-08-19 ENCOUNTER — OFFICE VISIT (OUTPATIENT)
Dept: URGENT CARE | Facility: CLINIC | Age: 57
End: 2017-08-19
Payer: COMMERCIAL

## 2017-08-19 VITALS
BODY MASS INDEX: 35.86 KG/M2 | HEIGHT: 63 IN | SYSTOLIC BLOOD PRESSURE: 126 MMHG | WEIGHT: 202.38 LBS | DIASTOLIC BLOOD PRESSURE: 72 MMHG

## 2017-08-19 DIAGNOSIS — M75.41 ROTATOR CUFF IMPINGEMENT SYNDROME OF RIGHT SHOULDER: Primary | ICD-10-CM

## 2017-08-19 PROCEDURE — 99999 PR PBB SHADOW E&M-EST. PATIENT-LVL III: CPT | Mod: PBBFAC,,, | Performed by: FAMILY MEDICINE

## 2017-08-19 PROCEDURE — 96372 THER/PROPH/DIAG INJ SC/IM: CPT | Mod: S$GLB,,, | Performed by: FAMILY MEDICINE

## 2017-08-19 PROCEDURE — 3078F DIAST BP <80 MM HG: CPT | Mod: S$GLB,,, | Performed by: FAMILY MEDICINE

## 2017-08-19 PROCEDURE — 3008F BODY MASS INDEX DOCD: CPT | Mod: S$GLB,,, | Performed by: FAMILY MEDICINE

## 2017-08-19 PROCEDURE — 3074F SYST BP LT 130 MM HG: CPT | Mod: S$GLB,,, | Performed by: FAMILY MEDICINE

## 2017-08-19 PROCEDURE — 99214 OFFICE O/P EST MOD 30 MIN: CPT | Mod: 25,S$GLB,, | Performed by: FAMILY MEDICINE

## 2017-08-19 RX ORDER — MELOXICAM 7.5 MG/1
7.5 TABLET ORAL DAILY
Qty: 30 TABLET | Refills: 0 | Status: SHIPPED | OUTPATIENT
Start: 2017-08-19 | End: 2017-10-02 | Stop reason: SDUPTHER

## 2017-08-19 RX ORDER — KETOROLAC TROMETHAMINE 30 MG/ML
15 INJECTION, SOLUTION INTRAMUSCULAR; INTRAVENOUS ONCE
Status: COMPLETED | OUTPATIENT
Start: 2017-08-19 | End: 2017-08-19

## 2017-08-19 RX ADMIN — KETOROLAC TROMETHAMINE 15 MG: 30 INJECTION, SOLUTION INTRAMUSCULAR; INTRAVENOUS at 09:08

## 2017-08-19 NOTE — PROGRESS NOTES
Subjective:       Patient ID: Ora Henderson is a 57 y.o. female.    Chief Complaint: Arm Pain (right)      HPI  Ms. Henderson presents to clinic today for complaints of right arm pain.   This is a chronic problem.   She was diagnosed with rotator cuff impingement syndrome by Dr. Up.   She has tried zanaflex and ice without any relief.   She is also using topical cream without relief.   She has been to PT once.   She states she has a few sessions next week.   She is in a lot of pain now and really needs a shot.     Review of Systems   Musculoskeletal: Positive for arthralgias.        Right arm pain        Medication List with Changes/Refills   Current Medications    ALBUTEROL 90 MCG/ACTUATION INHALER    Inhale 1-2 puffs into the lungs every 6 (six) hours as needed for Wheezing (cough).    ESOMEPRAZOLE (NEXIUM) 40 MG CAPSULE    Take 1 capsule (40 mg total) by mouth before breakfast.    KETOCONAZOLE (NIZORAL) 2 % SHAMPOO    Wash hair with medicated shampoo at least 2x/week - let sit on scalp at least 5 minutes prior to rinsing    L.ACIDOPHILUS-BIF. ANIMALIS (PROBIOTIC) 5 BILLION CELL CPSP    Take 1 tablet by mouth once daily.    LOSARTAN (COZAAR) 50 MG TABLET    Take 1 tablet (50 mg total) by mouth once daily.    METOPROLOL SUCCINATE (TOPROL-XL) 25 MG 24 HR TABLET    TAKE ONE TABLET BY MOUTH ONCE DAILY    ROSUVASTATIN (CRESTOR) 20 MG TABLET    Take 1 tablet (20 mg total) by mouth every evening.    TIZANIDINE (ZANAFLEX) 2 MG TABLET    Take 1 tablet (2 mg total) by mouth nightly as needed.    TRAMADOL (ULTRAM) 50 MG TABLET    Take 1 tablet (50 mg total) by mouth nightly as needed (severe pain).   Changed and/or Refilled Medications    Modified Medication Previous Medication    MELOXICAM (MOBIC) 7.5 MG TABLET meloxicam (MOBIC) 7.5 MG tablet       Take 1 tablet (7.5 mg total) by mouth once daily.    Take 1 tablet (7.5 mg total) by mouth once daily.       Patient Active Problem List   Diagnosis     Hyperlipidemia    Hypertension    Hepatomegaly    Family history of cardiovascular disease    Obesity, Class II, BMI 35-39.9, with comorbidity    Abnormal ECG    Gastroesophageal reflux disease    NATALIE (obstructive sleep apnea)    Chronic right shoulder pain    Chronic right rotator cuff tendonitis    Rotator cuff impingement syndrome of right shoulder    DJD of right AC (acromioclavicular) joint         Objective:     Physical Exam   Constitutional: She is oriented to person, place, and time. She appears well-developed and well-nourished. No distress.   HENT:   Head: Normocephalic and atraumatic.   Right Ear: External ear normal.   Left Ear: External ear normal.   Eyes: EOM are normal. Right eye exhibits no discharge. Left eye exhibits no discharge.   Cardiovascular: Normal rate and regular rhythm.    Pulmonary/Chest: Effort normal and breath sounds normal. No respiratory distress. She has no wheezes.   Musculoskeletal:   Upper extremity: Shoulder abduction 3p/5,  Wrist extension 5/5,  is mildly weak on right vs left   Sensation is  Intact    Neurological: She is alert and oriented to person, place, and time.   Skin: Skin is warm and dry. She is not diaphoretic. No erythema.   Psychiatric: She has a normal mood and affect.   Vitals reviewed.    Vitals:    08/19/17 0917   BP: 126/72       Assessment/  PLAN     Rotator cuff impingement syndrome of right shoulder  -     ketorolac injection 15 mg; Inject 15 mg into the muscle once.  -     meloxicam (MOBIC) 7.5 MG tablet; Take 1 tablet (7.5 mg total) by mouth once daily.  Dispense: 30 tablet; Refill: 0  - continue PT , follow up with PM&R for possible MRI     Plan as above   rtc prn     Preethi Menjivar MD  [facility]

## 2017-08-21 ENCOUNTER — PATIENT MESSAGE (OUTPATIENT)
Dept: OBSTETRICS AND GYNECOLOGY | Facility: CLINIC | Age: 57
End: 2017-08-21

## 2017-09-18 ENCOUNTER — OFFICE VISIT (OUTPATIENT)
Dept: PHYSICAL MEDICINE AND REHAB | Facility: CLINIC | Age: 57
End: 2017-09-18
Payer: COMMERCIAL

## 2017-09-18 VITALS
SYSTOLIC BLOOD PRESSURE: 150 MMHG | HEIGHT: 63 IN | BODY MASS INDEX: 35.79 KG/M2 | RESPIRATION RATE: 14 BRPM | DIASTOLIC BLOOD PRESSURE: 93 MMHG | HEART RATE: 77 BPM | WEIGHT: 202 LBS

## 2017-09-18 DIAGNOSIS — M75.41 ROTATOR CUFF IMPINGEMENT SYNDROME OF RIGHT SHOULDER: ICD-10-CM

## 2017-09-18 DIAGNOSIS — M75.101 TEAR OF RIGHT ROTATOR CUFF, UNSPECIFIED TEAR EXTENT: Primary | ICD-10-CM

## 2017-09-18 PROCEDURE — 3080F DIAST BP >= 90 MM HG: CPT | Mod: S$GLB,,, | Performed by: PHYSICAL MEDICINE & REHABILITATION

## 2017-09-18 PROCEDURE — 3008F BODY MASS INDEX DOCD: CPT | Mod: S$GLB,,, | Performed by: PHYSICAL MEDICINE & REHABILITATION

## 2017-09-18 PROCEDURE — 99214 OFFICE O/P EST MOD 30 MIN: CPT | Mod: S$GLB,,, | Performed by: PHYSICAL MEDICINE & REHABILITATION

## 2017-09-18 PROCEDURE — 3077F SYST BP >= 140 MM HG: CPT | Mod: S$GLB,,, | Performed by: PHYSICAL MEDICINE & REHABILITATION

## 2017-09-18 PROCEDURE — 99999 PR PBB SHADOW E&M-EST. PATIENT-LVL III: CPT | Mod: PBBFAC,,, | Performed by: PHYSICAL MEDICINE & REHABILITATION

## 2017-09-18 NOTE — PROGRESS NOTES
PM&R CLINIC NOTE    Chief Complaint   Patient presents with    Shoulder Pain     right, to the arm       HPI: This is a 57 y.o.  female being seen in clinic today for follow up of right shoulder/upper arm pain that worsened after overworking for a surprise party and doing housework in August. She has some improvement of discomfort with PT over the past 6 wks, but is still having significant loss of ROM-abduction and flexion and increased pain at the end of the day.  She has limitations at work due to being right-handed. Aleve provides minimal relief.     History obtained from patient    Review of Systems:     General- denies lethargy, weight change, fever, chills  Head/neck- denies swallowing difficulties  ENT- denies hearing changes+tinnitus   Cardiovascular-denies chest pain  Pulmonary- denies shortness of breath  GI- denies constipation or bowel incontinence  - denies bladder incontinence  Skin- denies wounds or rashes  Musculoskeletal- +weakness, +pain  Neurologic- denies numbness and tingling  Psychiatric- denies depressive or psychotic features, denies anxiety  Lymphatic-denies swelling  Endocrine- denies hypoglycemic symptoms/DM history      Physical Examination:  General: Well developed, well nourished female, NAD  HEENT: NCAT EOMI  Pulmonary: Normal respirations    Spinal Examination: CERVICAL  Active ROM is within normal limits.  Inspection: No deformity of spinal alignment.  Palpation: still ttp trapezius muscles, rhomboids on right    Bilateral upper and lower Extremities:  Shoulder/Elbow/Wrist/Hand ROM wnl except limited rom at right shoulder in abd, flex, ex. +berry, neers, unable to elevate above horizontal, limited forward flexion  Hip,knee, ankle ROM wnl  Bilateral Extremities show normal capillary refill.  No signs of cyanosis, rubor, edema, skin changes, or dysvascular changes of appendages.  Nails appear intact.    Neurological Exam:  Cranial Nerves:  II-XII grossly intact    Manual Muscle  Testing: (Motor 5=normal)    RIGHT Upper extremity: Shoulder abduction 3p/5, Biceps 4+p/5, Triceps 5/5, Wrist extension 5/5, Abductor pollicis brevis 5/5, Ulnar hand intrinsics 5/5,  LEFT Upper extremity: Shoulder abduction 5/5, Biceps 5/5, Triceps 5/5, Wrist extension 5/5, Abductor pollicis brevis 5/5, Ulnar hand intrinsics 5/5,    No focal atrophy is noted of either upper or lower extremity.  Gait: Narrow base and good arm swing.    IMPRESSION/PLAN: This is a 57 y.o.  female with right rotator cuff impingement, probable tear, calcific tendonitis    1. Cont PT for now--Rotator cuff ROM, dec pain, modalities, strength, HEP  2. Cont aleve and anaflex 2mg prn  3. Ice/heat compress 20 min alternation  4. OTC topical compound  5. Will order MRI and refer to orthopedics for surgical consultation    Amanda Up M.D.  Physical Medicine and Rehab

## 2017-09-18 NOTE — PATIENT INSTRUCTIONS
Understanding a Rotator Cuff Tendon Tear    The rotator cuff is a group of 4 muscles and their tendons in the shoulder. The muscles are located in the front, back, and top of the shoulder joint. They each have a strong band of tissue (tendon) that attaches to the top of the upper arm bone. This helps keep the arm bone firmly in place in the socket of the shoulder joint. The muscles and tendons of the rotator cuff also help the shoulder joint with certain movements. These include reaching the arm over the head and rotating the arm.  Any one of the rotator cuff tendons can fray or tear from causes such as injury and overuse. A tear may be partial, with some of the tendon still intact. Or it may be a complete tear, with the tendon fully torn. Both types can cause pain and weakness, and limit arm and shoulder movement. A rotator cuff tear often needs treatment to heal properly.  Causes of a rotator cuff tendon tear  Causes can include:  · Wear and tear of the tendons from aging or normal use over time  · Overuse of the tendons from sports or work activities, especially those that involve repeated overhead movements  · Injury to the tendons from a fall or other accident  Symptoms of a rotator cuff tendon tear  Some people with a rotator cuff tendon tear have few or no symptoms. Others may have symptoms that range from mild to severe. Possible symptoms include:  · Pain in your shoulder, which may be worse with overhead movements or at night from lying on the affected side  · Weakness in your arm and shoulder  · Trouble lifting your arm up or rotating your arm  · Clicking or crackling sounds when moving or using your arm and shoulder  Treating a rotator cuff tendon tear  Treatment for a rotator cuff tendon tear depends on several factors. These include the severity of the tear and your symptoms. Options may include:  · Resting your arm and shoulder. This involves limiting certain movements, such as reaching above your  head or lifting your arm up. These can slow healing and worsen symptoms. You may also need to avoid certain sports and types of work for a time.  · Cold packs or heat packs. These help reduce pain and swelling.  · Prescription or over-the-counter pain medicines. These help reduce pain and swelling.  · Injections of medicine into your shoulder. These help relieve pain and swelling for a time.  · Physical therapy and exercises.  These help improve strength, flexibility, and range of motion in your arm and shoulder.   · Surgery. You may need surgery if your tendon is completely torn or if other treatments dont relieve your symptoms. Different options are available. In many cases, the damaged tendon is repaired. It is then reattached to your arm bone.  Possible complications  · If a partial tear isnt given time to heal, it may get larger or tear completely. You may then need more treatment.  · Even with treatment, a partial or complete tear may sometimes have trouble healing. The problem may become long-term (chronic). This can cause ongoing pain, weakness, and limited movement of your arm and shoulder.     When to call your healthcare provider  Call your healthcare provider right away if you have any of these:  · Fever of 100.4°F (38°C) or higher, or as directed  · Symptoms that dont get better with treatment, or get worse  · After surgery, symptoms at the incision sites such as redness, warmth, swelling, bleeding, or drainage  · New symptoms   Date Last Reviewed: 3/10/2016  © 6017-6291 [employer]. [address] [place] [address]. All rights reserved. This information is not intended as a substitute for professional medical care. Always follow your healthcare professional's instructions.        Understanding Shoulder Impingement Syndrome    Shoulder impingement syndrome is a problem with the shoulder joint. It occurs when certain parts within the joint swell and are pinched. This can cause  nagging pain and problems with moving the arm.  What causes shoulder impingement syndrome?  It is possible to develop impingement after years of normal shoulder use. But in most cases the condition occurs because of repeated overhead movements. These include such things as stocking shelves, painting, swimming, and throwing. These movements can irritate parts of the shoulder, leading to swelling. Swollen parts of the shoulder take up more room, making the joint space smaller. Some parts that may be involved include:  · A sac of fluid (bursa) that cushions the shoulder joint.  When the bursa is irritated, it may lead to a condition called bursitis. This is when the bursa swells with fluid, filling and squeezing the joint space.  · Fibrous tissues (tendons) that connect muscle to bone. When tendons are irritated, they may become swollen. This is called tendonitis.  · The end (acromion) of the shoulder blade. This bone may be flat or hooked. If the acromion is hooked, the joint space may be smaller than normal. Growths (bone spurs) on the acromion can also narrow the joint space. Acromion problems dont often cause impingement. But they can make it worse.  Symptoms of shoulder impingement syndrome  Symptoms include pain, pinching, or stiffness in your shoulder. Pain often comes with movement, particularly reaching overhead or backward. It may also be felt when the shoulder is at rest. Pain at night during sleep is common.  Treatment for shoulder impingement syndrome  Treatment will depend on the cause of the problem, how bad the problem is, and if other parts of the shoulder are damaged. Treatment may include the following:  · Active rest. This allows the shoulder to heal. It means using the arm and shoulder, but avoiding activities that cause pain. These likely include reaching overhead or sleeping on your shoulder.  · Cold packs. These help reduce swelling and relieve pain.  · Prescription or over-the-counter pain  medicines. These help relieve pain and swelling.  · Arm and shoulder exercises. These help keep your shoulder joint mobile as it heals. They also help improve muscle strength around the joint.  · Shots of medicine into the joint. This can help reduce swelling and pain for a short time.  If other measures dont work to relieve symptoms, you may need surgery. This opens up space in the joint to allow pain-free motion.  Possible complications of shoulder impingement syndrome  It might be tempting to stop using your shoulder completely to avoid pain. But doing so may lead to a condition called frozen shoulder. To help prevent this, follow instructions you are given for active rest and for doing exercises to help your shoulder heal.  When to call your healthcare provider  Call your healthcare provider right away if you have any of these:  · Fever of 100.4°F (38°C) or higher, or as directed  · Symptoms that dont get better, or get worse  · New symptoms   Date Last Reviewed: 3/10/2016  © 2148-9585 [employer] [address] [place] [address], [place] [address]. All rights reserved. This information is not intended as a substitute for professional medical care. Always follow your healthcare professional's instructions.

## 2017-09-22 ENCOUNTER — TELEPHONE (OUTPATIENT)
Dept: RADIOLOGY | Facility: HOSPITAL | Age: 57
End: 2017-09-22

## 2017-09-22 ENCOUNTER — TELEPHONE (OUTPATIENT)
Dept: INTERNAL MEDICINE | Facility: CLINIC | Age: 57
End: 2017-09-22

## 2017-09-23 ENCOUNTER — HOSPITAL ENCOUNTER (OUTPATIENT)
Dept: RADIOLOGY | Facility: HOSPITAL | Age: 57
Discharge: HOME | End: 2017-09-23
Attending: PHYSICAL MEDICINE & REHABILITATION
Payer: COMMERCIAL

## 2017-09-23 DIAGNOSIS — M75.101 TEAR OF RIGHT ROTATOR CUFF, UNSPECIFIED TEAR EXTENT: ICD-10-CM

## 2017-09-23 DIAGNOSIS — M75.41 ROTATOR CUFF IMPINGEMENT SYNDROME OF RIGHT SHOULDER: ICD-10-CM

## 2017-09-23 PROCEDURE — 73221 MRI JOINT UPR EXTREM W/O DYE: CPT | Mod: 26,RT,, | Performed by: RADIOLOGY

## 2017-09-23 PROCEDURE — 73221 MRI JOINT UPR EXTREM W/O DYE: CPT | Mod: TC,PO,RT

## 2017-09-25 ENCOUNTER — PATIENT MESSAGE (OUTPATIENT)
Dept: PHYSICAL MEDICINE AND REHAB | Facility: CLINIC | Age: 57
End: 2017-09-25

## 2017-09-29 NOTE — PROGRESS NOTES
Subjective:          Chief Complaint: Ora Henderson is a 57 y.o. female who had no chief complaint listed for this encounter.    HPI    ROS                Objective:        General: Ora is well-developed, well-nourished, appears stated age, in no acute distress, alert and oriented to time, place and person.     Ortho/SPM Exam            Assessment:       No diagnosis found.       Plan:                           Patient questionnaires may have been collected.

## 2017-10-01 NOTE — PROGRESS NOTES
Subjective:      Patient ID: Ora Henderson is a 57 y.o. female.    Chief Complaint: No chief complaint on file.    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   HENT: Negative for hearing loss.    Eyes: Negative for blurred vision and visual disturbance.   Cardiovascular: Negative for chest pain and leg swelling.   Respiratory: Negative for shortness of breath.    Endocrine: Negative for polyuria.   Hematologic/Lymphatic: Negative for bleeding problem.   Skin: Negative for rash.   Musculoskeletal: Negative for back pain, joint pain, joint swelling, muscle cramps and muscle weakness.   Gastrointestinal: Negative for melena.   Genitourinary: Negative for hematuria.   Neurological: Negative for loss of balance, numbness and paresthesias.   Psychiatric/Behavioral: Negative for altered mental status.         Objective:    Ortho Exam       Assessment:       No diagnosis found.      Plan:       ***

## 2017-10-02 ENCOUNTER — HOSPITAL ENCOUNTER (OUTPATIENT)
Dept: RADIOLOGY | Facility: HOSPITAL | Age: 57
Discharge: HOME | End: 2017-10-02
Attending: ORTHOPAEDIC SURGERY
Payer: COMMERCIAL

## 2017-10-02 ENCOUNTER — OFFICE VISIT (OUTPATIENT)
Dept: ORTHOPEDICS | Facility: CLINIC | Age: 57
End: 2017-10-02
Payer: COMMERCIAL

## 2017-10-02 VITALS
DIASTOLIC BLOOD PRESSURE: 104 MMHG | SYSTOLIC BLOOD PRESSURE: 170 MMHG | WEIGHT: 201.94 LBS | HEART RATE: 76 BPM | TEMPERATURE: 98 F | BODY MASS INDEX: 35.77 KG/M2

## 2017-10-02 DIAGNOSIS — S46.001A ROTATOR CUFF INJURY, RIGHT, INITIAL ENCOUNTER: ICD-10-CM

## 2017-10-02 DIAGNOSIS — M19.011 DJD OF RIGHT AC (ACROMIOCLAVICULAR) JOINT: ICD-10-CM

## 2017-10-02 DIAGNOSIS — M25.511 RIGHT SHOULDER PAIN, UNSPECIFIED CHRONICITY: ICD-10-CM

## 2017-10-02 DIAGNOSIS — G89.29 CHRONIC RIGHT SHOULDER PAIN: Primary | ICD-10-CM

## 2017-10-02 DIAGNOSIS — M75.41 ROTATOR CUFF IMPINGEMENT SYNDROME OF RIGHT SHOULDER: ICD-10-CM

## 2017-10-02 DIAGNOSIS — M25.511 RIGHT SHOULDER PAIN, UNSPECIFIED CHRONICITY: Primary | ICD-10-CM

## 2017-10-02 DIAGNOSIS — M25.511 CHRONIC RIGHT SHOULDER PAIN: Primary | ICD-10-CM

## 2017-10-02 PROCEDURE — 3077F SYST BP >= 140 MM HG: CPT | Mod: S$GLB,,, | Performed by: ORTHOPAEDIC SURGERY

## 2017-10-02 PROCEDURE — 3080F DIAST BP >= 90 MM HG: CPT | Mod: S$GLB,,, | Performed by: ORTHOPAEDIC SURGERY

## 2017-10-02 PROCEDURE — 3008F BODY MASS INDEX DOCD: CPT | Mod: S$GLB,,, | Performed by: ORTHOPAEDIC SURGERY

## 2017-10-02 PROCEDURE — 73030 X-RAY EXAM OF SHOULDER: CPT | Mod: 26,RT,, | Performed by: RADIOLOGY

## 2017-10-02 PROCEDURE — 99999 PR PBB SHADOW E&M-EST. PATIENT-LVL IV: CPT | Mod: PBBFAC,,, | Performed by: ORTHOPAEDIC SURGERY

## 2017-10-02 PROCEDURE — 73030 X-RAY EXAM OF SHOULDER: CPT | Mod: TC,PO,RT

## 2017-10-02 PROCEDURE — 99204 OFFICE O/P NEW MOD 45 MIN: CPT | Mod: S$GLB,,, | Performed by: ORTHOPAEDIC SURGERY

## 2017-10-02 RX ORDER — MELOXICAM 7.5 MG/1
7.5 TABLET ORAL DAILY
Qty: 30 TABLET | Refills: 0 | Status: SHIPPED | OUTPATIENT
Start: 2017-10-02 | End: 2018-07-31

## 2017-10-02 NOTE — PROGRESS NOTES
Subjective:          Chief Complaint: Ora Henderson is a 57 y.o. female who had no chief complaint listed for this encounter.    Shoulder Injury          ROS                Objective:        General: Ora is well-developed, well-nourished, appears stated age, in no acute distress, alert and oriented to time, place and person.     Ortho/SPM Exam            Assessment:       No diagnosis found.       Plan:                           Patient questionnaires may have been collected.

## 2017-10-02 NOTE — PROGRESS NOTES
Subjective:          Chief Complaint: Ora Henderson is a 57 y.o. female who had concerns including Pain of the Right Shoulder.    Ora Henderson is a 57 y.o. female here for right shoulder pain. Worsened in 2017. Did some PT since then with mild improvement.      Pain   This is a new problem. The current episode started more than 1 year ago. The problem occurs daily. The problem has been gradually improving. Associated symptoms include joint swelling and numbness. Pertinent negatives include no chest pain, fever or rash. The symptoms are aggravated by bending. She has tried NSAIDs, ice and heat for the symptoms. The treatment provided mild relief.       Review of Systems   Constitution: Negative for fever and night sweats.   HENT: Negative for hearing loss.    Eyes: Negative for blurred vision and visual disturbance.   Cardiovascular: Negative for chest pain and leg swelling.   Respiratory: Negative for shortness of breath.    Endocrine: Negative for polyuria.   Hematologic/Lymphatic: Negative for bleeding problem.   Skin: Negative for rash.   Musculoskeletal: Positive for joint pain, joint swelling and muscle weakness. Negative for back pain and muscle cramps.   Gastrointestinal: Negative for melena.   Genitourinary: Negative for hematuria.   Neurological: Positive for numbness. Negative for loss of balance and paresthesias.   Psychiatric/Behavioral: Negative for altered mental status.     Past Medical History:   Diagnosis Date    Arthritis     Encounter for blood transfusion     GERD (gastroesophageal reflux disease)     Hepatomegaly     and fatty liver    Hernia, umbilical     reducible    Hyperlipidemia     Hypertension      Past Surgical History:   Procedure Laterality Date    BRAIN SURGERY      tumor removal     CARPAL TUNNEL RELEASE Bilateral     CERVICAL BIOPSY  W/ LOOP ELECTRODE EXCISION      [facility] '81     SECTION      x 1    COLONOSCOPY N/A  1/8/2016    Procedure: COLONOSCOPY;  Surgeon: Quique Paul MD;  Location[facility];  Service: Endoscopy;  Laterality: N/A;    HERNIA REPAIR      12/08/2016    HYSTERECTOMY  2006    fibroids and endometriosis    TOTAL ABDOMINAL HYSTERECTOMY W/ BILATERAL SALPINGOOPHORECTOMY      STAR/BSO secondary to endometriosis    VENTRAL HERNIA REPAIR       Social History     Social History    Marital status:      Spouse name: N/A    Number of children: 1    Years of education: N/A     Occupational History    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[facility]     Social History Main Topics    Smoking status: Never Smoker    Smokeless tobacco: Never Used    Alcohol use 0.0 oz/week      Comment: socially    Drug use: No    Sexual activity: Yes     Partners: Male     Birth control/ protection: None, Surgical     Other Topics Concern    Not on file     Social History Narrative    No narrative on file     Vitals:    10/02/17 0902   BP: (!) 170/104   Pulse: 76   Temp: 98.4 °F (36.9 °C)                   Objective:        General: Ora is well-developed, well-nourished, appears stated age, in no acute distress, alert and oriented to time, place and person.     General    Vitals reviewed.  Constitutional: She is oriented to person, place, and time. She appears well-developed and well-nourished. No distress.   HENT:   Nose: Nose normal.   Mouth/Throat: No oropharyngeal exudate.   Eyes: Pupils are equal, round, and reactive to light. Right eye exhibits no discharge. Left eye exhibits no discharge.   Neck: Normal range of motion.   Cardiovascular: Normal rate and intact distal pulses.    Pulmonary/Chest: Effort normal and breath sounds normal. No respiratory distress.   Neurological: She is alert and oriented to person, place, and time. She has normal reflexes. She displays normal reflexes. No cranial nerve deficit. Coordination normal.   Psychiatric: She has a normal mood and affect. Her behavior is normal. Judgment and  thought content normal.         Right Shoulder Exam     Inspection/Observation   Swelling: absent  Bruising: absent  Scars: absent  Deformity: absent  Scapular Winging: absent  Scapular Dyskinesia: negative  Atrophy: absent    Tenderness   The patient is tender to palpation of the biceps tendon.    Range of Motion   Active Abduction:  90 normal   Passive Abduction:  100 normal   Extension:  0 normal   Forward Flexion:  180 normal   Forward Elevation: 180 normal  Adduction: 40 normal  External Rotation 0 degrees:  50 normal   External Rotation 90 degrees: 90 normal  Internal Rotation 0 degrees:  T8 normal   Internal Rotation 90 degrees:  30 normal     Tests & Signs   Apprehension: negative  Cross Arm: negative  Drop Arm: negative  Hawkin's test: negative  Impingement: negative  Sulcus: absent  Anterosuperior Escape: negative  Lag Sign 0 degrees: negative  Lag Sign 90 degrees: negative  Lift Off Sign: negative  Belly Press: negative  Active Compression Test ([place]'s Sign): positive  Yergason's Test: negative  Speed's Test: positive  Anterior Drawer Test: 1+   Posterior Drawer Test: 1+  Relocation 90 degrees: negative  Relocation > 90 degrees: negative  Bear Hug: negative  Moving Valgus: negative  Jerk Test: negative    Other   Sensation: normal    Left Shoulder Exam     Inspection/Observation   Swelling: absent  Bruising: absent  Scars: absent  Deformity: absent  Scapular Winging: absent  Scapular Dyskinesia: negative  Atrophy: absent    Tenderness   The patient is experiencing no tenderness.         Range of Motion   Active Abduction:  90 normal   Passive Abduction:  100 normal   Extension:  0 normal   Forward Flexion:  180 normal   Forward Elevation: 180 normal  Adduction: 40 normal  External Rotation 0 degrees:  50 normal   External Rotation 90 degrees: 90 normal  Internal Rotation 0 degrees:  T8 normal   Internal Rotation 90 degrees:  30 normal     Tests & Signs   Apprehension: negative  Cross Arm:  negative  Drop Arm: negative  Hawkin's test: negative  Impingement: negative  Sulcus: absent  Anterosuperior Escape: negative  Lag Sign 0 degrees: negative  Lag Sign 90 degrees: negative  Lift Off Sign: negative  Belly Press: negative  Active Compression test ([place]'s Sign): negative  Yergasons's Test: negative  Speed's Test: negative  Anterior Drawer Test: 1+  Posterior Drawer Test: 1+  Relocation 90 degrees: negative  Relocation > 90 degrees: negative  Bear Hug: negative  Moving Valgus: negative  Jerk Test: negative    Other   Sensation: normal       Muscle Strength   Right Upper Extremity   Shoulder Abduction: 5/5   Shoulder Internal Rotation: 5/5   Shoulder External Rotation: 5/5   Supraspinatus: 4/5/5   Subscapularis: 5/5/5   Biceps: 5/5/5   Left Upper Extremity  Shoulder Abduction: 5/5   Shoulder Internal Rotation: 5/5   Shoulder External Rotation: 5/5   Supraspinatus: 5/5/5   Subscapularis: 5/5/5   Biceps: 5/5/5     Reflexes     Left Side  Biceps:  2+  Triceps:  2+  Brachioradialis:  2+    Right Side   Biceps:  2+  Triceps:  2+  Brachioradialis:  2+    Vascular Exam     Right Pulses      Radial:                    2+      Left Pulses      Radial:                    2+      Capillary Refill  Right Hand: normal capillary refill  Left Hand: normal capillary refill      Technique: Standard multiplanar multisequence imaging of the right shoulder was performed.    Findings: There is motion artifact on selected sequences which limits evaluation.    There is a full-thickness tear of the distal supraspinatus tendon with a possible 0.5 cm fluid filled tendon defect demonstrated.  Distal full-thickness or high-grade partial infraspinatus tendon tear also noted with attenuation and poor delineation of the distal tendon fibers.  Fluid and edema extends proximally along the infraspinatus myotendinous junction.  There are chronic tendinosis changes of the subscapularis tendon which is otherwise intact.  Teres minor tendon  is unremarkable.  There is contiguous complex fluid within the glenohumeral joint, subacromial subdeltoid bursa, subscapularis recess, and biceps tendon sheath.  Small loose bodies or debris within the aforementioned fluid fluid with concomitant synovitis changes.    There is degenerative osseous and capsular hypertrophy of the acromioclavicular joint.  The acromion is a type II configuration with a concave undersurface.    There is chronic degeneration of the proximal intracapsular course of the biceps tendon and biceps labral anchor.  No discrete labral tear or detachment.  Mild intraosseous cystic or edematous changes regional to the greater tuberosity of the humerus and superior glenoid.   Impression         1.  Full-thickness retracted tear supraspinatus tendon and probable smaller full-thickness or high-grade partial tear distal infraspinatus tendon.    2.  Degenerative arthropathy of the acromioclavicular joint.    3.  Complex joint and bursal fluid with concomitant synovitis changes.    4.  Chronic degeneration biceps tendon and biceps labral anchor.             Assessment:       Encounter Diagnoses   Name Primary?    Chronic right shoulder pain Yes    Rotator cuff injury, right, initial encounter     Rotator cuff impingement syndrome of right shoulder     DJD of right AC (acromioclavicular) joint           Plan:       1. We reviewed with Ora today, the pathology and natural history of her diagnosis. We also discussed a variety of non operative treatment options including medications, physical therapy, alternative treatments as well as surgery.    All of the patient's questions were answered. At this time Ora would not like to proceed with surgery at this time. She declined injection today, will call when she is ready for one.    2. Continue with Mobic    3. Continue home exercise program                    Patient questionnaires may have been collected.

## 2017-10-02 NOTE — PATIENT INSTRUCTIONS
Shoulder Arthroscopy  The shoulder is your bodys most flexible joint. It lets the arm move in almost any direction. But this flexibility has a price -- it makes the joint prone to injury. If you have a shoulder problem, a surgical procedure called arthroscopy can help.     A camera in the arthroscope allows your surgeon to view your shoulder joint on a monitor.   Your orthopaedic evaluation  Your doctor will ask about your symptoms and the history of your shoulder problem. He or she will examine your shoulder and may give you tests, such as an X-ray or MRI. These help your doctor find the cause of your shoulder problem.  Arthroscopy: Looking inside your joint  Arthroscopy is a procedure that allows your doctor to see and work inside your shoulder joint. Your doctor makes small incision in your shoulder and inserts a long, thin, lighted instrument, called an arthroscope. During surgery, the arthroscope sends live video images from inside your joint to a screen that your doctor views. Using these images, your doctor can diagnose and treat your shoulder problem. Because arthroscopy uses much smaller incisions than open surgery, recovery is often shorter and less painful.  Risks and possible complications of shoulder arthroscopy  · Stiffness or ongoing pain in your shoulder  · Bleeding or blood clots  · Infection  · Damage to nerves or blood vessels  You may still need open surgery after having arthroscopy.  Date Last Reviewed: 9/10/2015  © 1870-1123 [employer]. [address] [place] [address], [place] [address]. All rights reserved. This information is not intended as a substitute for professional medical care. Always follow your healthcare professional's instructions.        Shoulder Exercises: External Rotation    Strengthening exercises help make your injured shoulder more stable. To warm up, do flexibility (stretching) exercises first. Your healthcare provider will tell you what size hand weights to use  for the strengthening exercise below. If you dont have hand weights, try using cans of soup instead:  · Lie on your uninjured side with your head supported by a pillow or your arm. Place a small rolled-up towel under your top elbow.  · Grasp a hand weight with your top hand and bend that arm to a right angle, resting your forearm against your stomach.  · Keeping your elbow against the towel, slowly lift the weight until your forearm is slightly higher than your elbow. Return to the starting position. Repeat.  · Work up to 5 to 15 lifts.  Date Last Reviewed: 8/16/2015  © 7816-7043 [employer]. [address] [place] [address], [place] [address]. All rights reserved. This information is not intended as a substitute for professional medical care. Always follow your healthcare professional's instructions.        After Shoulder Arthroscopy     Physical therapy can help you regain full use of your shoulder. Your program will be tailored to your surgery.   After your arthroscopy, you will recover in the hospital or surgery center for a few hours. In some cases, you may stay overnight. When you are able to go home, your healthcare provider will instruct you how to relieve any pain and how to care for your shoulder as it heals. To help with healing, your healthcare provider may prescribe a program of physical therapy (PT).  In the recovery room  After surgery, youll be taken to a recovery area to rest. Youll have a bandage to protect your incisions, and a sling to hold your arm in place. In some cases, cold packs or a cooling unit may be used to reduce swelling in your shoulder.  Going home  Before leaving the hospital or surgery center, be sure to know how to care for your shoulder at home. Ask any questions you have. Also know who to contact if you have questions later. When you are ready to leave the hospital or surgery center, an adult family member or friend will need to drive you home.  At home  · Take  prescribed pain medicines as directed. Dont wait for pain to get bad before you take them.  · Ice your shoulder 3 times a day for 20 minutes at a time. Use an ice machine (if given one) or a bag of ice or frozen peas. Put a thin cloth between your skin and the ice source.  · Take care of your incisions as directed. You can begin bathing again in 3 days.  · See your doctor for a follow-up visit, typically around 5 days after surgery.  · Wear your sling as directed until your healthcare provider indicates you no longer need it.  · Complete your physical therapy program.  · Avoid these activities: _____________, _____________, ______________  Call your healthcare provider if you have  · Fever over 100.4°F (38°C)  · Bleeding from an incision  · Increased shoulder pain or swelling  · A red or oozing incision  · Numbness or tingling that doesnt go away 24 hours after surgery  Date Last Reviewed: 9/13/2015 © 2000-2017 [employer]. [address] [place] [address]. All rights reserved. This information is not intended as a substitute for professional medical care. Always follow your healthcare professional's instructions.        Shoulder Arthroscopy: Conditions Treated  You will be given instructions for how to prepare for your surgery and when you should arrive at the hospital or surgery center. Just before surgery, a doctor will talk to you about the anesthesia that will be used to keep you free of pain during surgery. You may be asked by several people to confirm which shoulder is being operated on. This is for your safety. Below are common shoulder problems and how they are treated during arthroscopy.       Impingement  · Repeated overhead movements can inflame your rotator cuff and bursa. A bone spur may also form. This causes pain and problems with certain arm movements. Impingement is also called bursitis or tendinitis.  · During surgery, an inflamed bursa may be removed. Bone may be  trimmed. And the coracoacromial ligament may be detached. These steps make more room, relieving pressure and allowing the arm to move more freely.    Torn rotator cuff  · A rotator cuff can tear due to a sudden injury or from overuse. This can cause pain, weakness, and loss of normal shoulder movement.  · During surgery, torn rotator cuff tendons may be trimmed. The tendons are then reattached to the humerus. This is done with stitches, anchors, or surgical tacks.    Stretched capsule  · A stretched capsule will remain loose. A loose capsule cant hold the joint firmly in place. The bones of the joint may feel like they move too much and the shoulder can dislocate.  · During arthroscopy, a stretched capsule is folded over itself and sutured in place. (This is done from inside the capsule.) This tightens the capsule, helping make the shoulder joint more stable.    Torn labrum  · The shoulder is a ball and socket joint.  The labrum normally supports and cushions the shoulder joint. In the case of a torn labrum, the socket that the ball (the upper end of your arm) fits into is not very deep. The shallow socket increases the  potential for instability.  · The labrum may tear off the rim of the glenoid. This can cause the joint to catch or feel like its slipping out of place. The shoulder may even dislocate.  · A torn labrum is repaired by reattaching it to the glenoid. This is often done with special anchors put into the glenoid bone. Sutures attached to the anchors are tied to hold the labrum in place. The joint then feels more stable.       Arthritis and loose bodies  · Arthritis is damage to joint cartilage with age and use. Injury or disease can also cause it. Wear and tear may also lead to loose bodies (pieces of bone or cartilage) or bone spurs in a joint.  · During surgery, bone spurs are removed. Rough parts of the joint are smoothed. Any loose body is removed from the joint. Bone may also be scraped or shaved  to promote new cartilage growth.  Date Last Reviewed: 8/31/2015  © 1364-0376 [employer]. [address] [place] [address], [place] [address]. All rights reserved. This information is not intended as a substitute for professional medical care. Always follow your healthcare professional's instructions.

## 2017-10-03 ENCOUNTER — TELEPHONE (OUTPATIENT)
Dept: ORTHOPEDICS | Facility: CLINIC | Age: 57
End: 2017-10-03

## 2017-10-16 ENCOUNTER — LAB VISIT (OUTPATIENT)
Dept: LAB | Facility: HOSPITAL | Age: 57
End: 2017-10-16
Attending: NURSE PRACTITIONER
Payer: COMMERCIAL

## 2017-10-16 ENCOUNTER — OFFICE VISIT (OUTPATIENT)
Dept: INTERNAL MEDICINE | Facility: CLINIC | Age: 57
End: 2017-10-16
Payer: COMMERCIAL

## 2017-10-16 VITALS
HEART RATE: 80 BPM | TEMPERATURE: 97 F | HEIGHT: 63 IN | DIASTOLIC BLOOD PRESSURE: 100 MMHG | WEIGHT: 207.81 LBS | OXYGEN SATURATION: 97 % | BODY MASS INDEX: 36.82 KG/M2 | SYSTOLIC BLOOD PRESSURE: 130 MMHG

## 2017-10-16 DIAGNOSIS — M79.651 PAIN IN BOTH THIGHS: Primary | ICD-10-CM

## 2017-10-16 DIAGNOSIS — R16.0 HEPATOMEGALY: ICD-10-CM

## 2017-10-16 DIAGNOSIS — I10 ESSENTIAL HYPERTENSION: Chronic | ICD-10-CM

## 2017-10-16 DIAGNOSIS — M79.652 PAIN IN BOTH THIGHS: Primary | ICD-10-CM

## 2017-10-16 DIAGNOSIS — M79.18 MUSCULOSKELETAL PAIN: ICD-10-CM

## 2017-10-16 LAB
ALBUMIN SERPL BCP-MCNC: 3.8 G/DL
ALP SERPL-CCNC: 60 U/L
ALT SERPL W/O P-5'-P-CCNC: 21 U/L
ANION GAP SERPL CALC-SCNC: 7 MMOL/L
AST SERPL-CCNC: 18 U/L
BILIRUB SERPL-MCNC: 0.8 MG/DL
BUN SERPL-MCNC: 13 MG/DL
CALCIUM SERPL-MCNC: 9.7 MG/DL
CHLORIDE SERPL-SCNC: 104 MMOL/L
CO2 SERPL-SCNC: 32 MMOL/L
CREAT SERPL-MCNC: 0.8 MG/DL
EST. GFR  (AFRICAN AMERICAN): >60 ML/MIN/1.73 M^2
EST. GFR  (NON AFRICAN AMERICAN): >60 ML/MIN/1.73 M^2
GLUCOSE SERPL-MCNC: 99 MG/DL
POTASSIUM SERPL-SCNC: 3.7 MMOL/L
PROT SERPL-MCNC: 7.3 G/DL
SODIUM SERPL-SCNC: 143 MMOL/L

## 2017-10-16 PROCEDURE — 99213 OFFICE O/P EST LOW 20 MIN: CPT | Mod: S$GLB,,, | Performed by: NURSE PRACTITIONER

## 2017-10-16 PROCEDURE — 36415 COLL VENOUS BLD VENIPUNCTURE: CPT | Mod: PO

## 2017-10-16 PROCEDURE — 80053 COMPREHEN METABOLIC PANEL: CPT | Mod: PO

## 2017-10-16 PROCEDURE — 99999 PR PBB SHADOW E&M-EST. PATIENT-LVL IV: CPT | Mod: PBBFAC,,, | Performed by: NURSE PRACTITIONER

## 2017-10-16 NOTE — PROGRESS NOTES
Subjective:       Patient ID: Ora Henderson is a 57 y.o. female.    Chief Complaint: Hip Pain (back every morning for x2 wks)    Patient presents with bilateral leg pain (thigh) pain that started about 2 weeks ago.  Was diagnosed with tendon tear of right shoulder about 2 weeks ago.  Reports that she's been sleeping on left side only for the past two weeks due to right shoulder. Patient requesting labs for kidney/liver function.  Has hepatomegaly (mildly).          Leg Pain    There was no injury mechanism. The pain is present in the left thigh and right thigh. The quality of the pain is described as aching. The pain is at a severity of 6/10. The pain is moderate. The pain has been intermittent since onset. Pertinent negatives include no numbness or tingling. She reports no foreign bodies present. The symptoms are aggravated by movement. She has tried NSAIDs for the symptoms. The treatment provided mild relief.     Review of Systems   Constitutional: Negative for chills and fever.   Respiratory: Negative for cough and shortness of breath.    Musculoskeletal: Positive for myalgias. Negative for gait problem, joint swelling and neck pain.   Skin: Negative for color change and rash.   Neurological: Negative for tingling and numbness.   Psychiatric/Behavioral: Negative for agitation and confusion.       Objective:      Physical Exam   Constitutional: She appears well-developed and well-nourished.   HENT:   Head: Normocephalic and atraumatic.   Neck: Normal range of motion.   Cardiovascular: Normal rate.    Pulmonary/Chest: Effort normal.   Musculoskeletal: Normal range of motion. She exhibits tenderness. She exhibits no edema.        Left upper leg: She exhibits tenderness. She exhibits no bony tenderness and no swelling.        Legs:  Neurological: She is alert.   Skin: Skin is warm.   Psychiatric: She has a normal mood and affect. Her behavior is normal.       Assessment:       1. Pain in both thighs     2. Hepatomegaly    3. Essential hypertension    4. Musculoskeletal pain        Plan:         Pain in both thighs    Hepatomegaly  -     COMPREHENSIVE METABOLIC PANEL; Future; Expected date: 10/16/2017    Essential hypertension  -     COMPREHENSIVE METABOLIC PANEL; Future; Expected date: 10/16/2017    Musculoskeletal pain        Instructed to restart taking Zanaflex at bedtime.  Will notify of lab report when available.  If no improvement in symptoms, instructed to follow up.

## 2017-10-28 DIAGNOSIS — K21.9 GASTROESOPHAGEAL REFLUX DISEASE, ESOPHAGITIS PRESENCE NOT SPECIFIED: ICD-10-CM

## 2017-10-28 DIAGNOSIS — E78.5 HYPERLIPIDEMIA: ICD-10-CM

## 2017-10-28 RX ORDER — ROSUVASTATIN CALCIUM 20 MG/1
TABLET, COATED ORAL
Qty: 30 TABLET | Refills: 11 | Status: SHIPPED | OUTPATIENT
Start: 2017-10-28 | End: 2019-02-14 | Stop reason: DRUGHIGH

## 2017-10-29 RX ORDER — ESOMEPRAZOLE MAGNESIUM 40 MG/1
CAPSULE, DELAYED RELEASE ORAL
Qty: 30 CAPSULE | Refills: 11 | Status: SHIPPED | OUTPATIENT
Start: 2017-10-29 | End: 2018-12-10 | Stop reason: SDUPTHER

## 2017-11-20 ENCOUNTER — OFFICE VISIT (OUTPATIENT)
Dept: NEUROLOGY | Facility: CLINIC | Age: 57
End: 2017-11-20
Payer: COMMERCIAL

## 2017-11-20 VITALS
HEART RATE: 84 BPM | SYSTOLIC BLOOD PRESSURE: 172 MMHG | HEIGHT: 63 IN | WEIGHT: 209.69 LBS | DIASTOLIC BLOOD PRESSURE: 80 MMHG | BODY MASS INDEX: 37.15 KG/M2

## 2017-11-20 DIAGNOSIS — Z86.018 HISTORY OF BENIGN PITUITARY TUMOR: ICD-10-CM

## 2017-11-20 PROCEDURE — 99205 OFFICE O/P NEW HI 60 MIN: CPT | Mod: S$GLB,,, | Performed by: PSYCHIATRY & NEUROLOGY

## 2017-11-20 PROCEDURE — 99999 PR PBB SHADOW E&M-EST. PATIENT-LVL III: CPT | Mod: PBBFAC,,, | Performed by: PSYCHIATRY & NEUROLOGY

## 2017-11-20 NOTE — PROGRESS NOTES
Subjective:       Patient ID: Ora Henderson is a 57 y.o. female.    Chief Complaint:  Pituitary Tumor s/p resection    HPI  HISTORY OF PRESENT ILLNESS:  This is a 57-year-old right-handed pleasant lady,   presented today in the clinic for checkup.  She said that she has been diagnosed   with pituitary tumor back in  and it was resected in [facility] in   [place] in .  Since then, she was having serial checkup with MRI of the   brain for seeing if there is any recurrence or regrowth.  Last MRI was done in   2012 in January.  She said that before the tumor she had severe headache and   also visual disturbances.  Those symptoms are normal.  She has history of high   blood pressure and cholesterol problem for a long time.  She takes medications.    Otherwise, she is stable, fully functional lady.      Past Medical History:   Diagnosis Date    Arthritis     Encounter for blood transfusion     GERD (gastroesophageal reflux disease)     Hepatomegaly     and fatty liver    Hernia, umbilical     reducible    Hyperlipidemia     Hypertension      Past Surgical History:   Procedure Laterality Date    BRAIN SURGERY      tumor removal     CARPAL TUNNEL RELEASE Bilateral     CERVICAL BIOPSY  W/ LOOP ELECTRODE EXCISION      [facility] '81     SECTION      x 1    COLONOSCOPY N/A 2016    Procedure: COLONOSCOPY;  Surgeon: Quique Paul MD;  Location: [facility];  Service: Endoscopy;  Laterality: N/A;    HERNIA REPAIR      2016    HYSTERECTOMY  2006    fibroids and endometriosis    TOTAL ABDOMINAL HYSTERECTOMY W/ BILATERAL SALPINGOOPHORECTOMY      STAR/BSO secondary to endometriosis    VENTRAL HERNIA REPAIR       Family History   Problem Relation Age of Onset    Heart disease Father     Stroke Father     Hypertension Father     Hypertension Mother     Cancer Maternal Aunt      pancreas    Cancer Maternal Uncle      pancreas    Heart disease Paternal Uncle      Heart disease Paternal Grandmother     Diabetes Maternal Aunt    .  Social History   Substance Use Topics    Smoking status: Never Smoker    Smokeless tobacco: Never Used    Alcohol use 0.0 oz/week      Comment: socially     Review of patient's allergies indicates:   Allergen Reactions    Sulfa (sulfonamide antibiotics) Hives       Scheduled Meds:          Review of Systems   Constitutional: Negative for activity change, appetite change, diaphoresis, fatigue, fever and unexpected weight change.   HENT: Negative for drooling, facial swelling, hearing loss, tinnitus and voice change.    Eyes: Negative for photophobia, pain and visual disturbance.   Respiratory: Negative for apnea, cough, chest tightness and shortness of breath.    Cardiovascular: Negative for chest pain, palpitations and leg swelling.   Gastrointestinal: Negative for nausea.   Genitourinary: Negative for difficulty urinating, dyspareunia, flank pain, frequency and pelvic pain.   Musculoskeletal: Positive for arthralgias and joint swelling. Negative for back pain, gait problem, myalgias, neck pain and neck stiffness.   Skin: Negative for pallor and rash.   Neurological: Negative for dizziness, tremors, seizures, syncope, facial asymmetry, speech difficulty, weakness, light-headedness, numbness and headaches.   Hematological: Does not bruise/bleed easily.   Psychiatric/Behavioral: Negative for agitation, behavioral problems, confusion, decreased concentration, dysphoric mood, hallucinations, sleep disturbance and suicidal ideas. The patient is not nervous/anxious.        Objective:      Neurologic Exam     Mental Status   Oriented to person, place, and time.   Recall at 5 minutes: recalls 3 of 3 objects. Follows 3 step commands.   Speech: speech is normal   Level of consciousness: alert  Knowledge: good and consistent with education. Able to perform simple calculations.   Able to name object. Able to read. Able to repeat. Normal comprehension.      Cranial Nerves     CN II   Visual fields full to confrontation.   Visual acuity: normal    CN III, IV, VI   Pupils are equal, round, and reactive to light.  Extraocular motions are normal.   Right pupil: Shape: regular. Reactivity: brisk. Consensual response: intact. Accommodation: intact.   Left pupil: Shape: regular. Reactivity: brisk. Consensual response: intact. Accommodation: intact.   CN III: no CN III palsy  CN VI: no CN VI palsy  Nystagmus: none   Diplopia: none  Ophthalmoparesis: none  Upgaze: normal  Downgaze: normal  Conjugate gaze: present  Vestibulo-ocular reflex: present    CN VIII   CN VIII normal.     CN IX, X   CN IX normal.   CN X normal.   Palate: symmetric  Right gag reflex: normal  Left gag reflex: normal    CN XI   CN XI normal.     CN XII   CN XII normal.     Motor Exam   Muscle bulk: normal  Overall muscle tone: normal  Right arm tone: normal  Left arm tone: normal  Right leg tone: normal  Left leg tone: normal    Strength   Strength 5/5 throughout.     Sensory Exam   Light touch normal.   Proprioception normal.   Pinprick normal.   Graphesthesia: normal  Stereognosis: normal    Gait, Coordination, and Reflexes     Gait  Gait: normal    Coordination   Romberg: negative  Finger to nose coordination: normal  Heel to shin coordination: normal  Tandem walking coordination: normal    Tremor   Resting tremor: absent  Intention tremor: absent  Action tremor: absent    Reflexes   Right brachioradialis: 2+  Left brachioradialis: 2+  Right biceps: 2+  Left biceps: 2+  Right triceps: 2+  Left triceps: 2+  Right patellar: 2+  Left patellar: 2+  Right achilles: 2+  Left achilles: 2+  Right : 2+  Left : 2+  Right plantar: normal  Left plantar: normal  Right ankle clonus: absent  Left ankle clonus: absent           Laboratory:  Lab Results   Component Value Date    WBC 5.75 12/08/2016    HGB 12.6 12/08/2016    HCT 37.4 12/08/2016     12/08/2016    CHOL 190 05/08/2017    TRIG 128  05/08/2017    HDL 52 05/08/2017    ALT 21 10/16/2017    AST 18 10/16/2017     10/16/2017    K 3.7 10/16/2017     10/16/2017    CREATININE 0.8 10/16/2017    BUN 13 10/16/2017    CO2 32 (H) 10/16/2017    TSH 2.529 03/22/2016    INR 1.0 12/03/2012    HGBA1C 5.9 10/13/2014   MRI of the brain: 1/24/2017    IMPRESSION:   1.  POST SURGICAL CHANGES NOTED INVOLVING THE SELLA AS DESCRIBED.  2.  NO GROSS EVIDENCE OF RESIDUAL OR RECURRENT PITUITARY TUMOR.  3.  ADDITIONAL INCIDENTAL FINDINGS AS DESCRIBED IN THE BODY OF THE   REPORT.  FOLLOW UP IMAGING AND/OR FURTHER EVALUATION SHOULD BE BASED ON   CLINICAL AND LABORATORY FINDINGS      Assessment:     Problem List Items Addressed This Visit     History of benign pituitary tumor    Overview     She had pituitary Tumor s/p resection in 2001. She is asymptomatic but still concern of recurrence. She had no MRI follow up for long time. It was explained that there is remote chance of recurrence but can happens.                 Patient Active Problem List   Diagnosis    Hyperlipidemia    Hypertension    Hepatomegaly    Family history of cardiovascular disease    Obesity, Class II, BMI 35-39.9, with comorbidity    Abnormal ECG    Gastroesophageal reflux disease    NATALIE (obstructive sleep apnea)    Chronic right shoulder pain    Chronic right rotator cuff tendonitis    Rotator cuff impingement syndrome of right shoulder    DJD of right AC (acromioclavicular) joint    Rotator cuff injury, right, initial encounter       Plan:    will do MRI of the brain focusing on Pituitary Tumor.

## 2017-11-24 ENCOUNTER — OFFICE VISIT (OUTPATIENT)
Dept: INTERNAL MEDICINE | Facility: CLINIC | Age: 57
End: 2017-11-24
Payer: COMMERCIAL

## 2017-11-24 VITALS
RESPIRATION RATE: 16 BRPM | HEART RATE: 72 BPM | TEMPERATURE: 99 F | OXYGEN SATURATION: 98 % | BODY MASS INDEX: 37.13 KG/M2 | HEIGHT: 63 IN | WEIGHT: 209.56 LBS | SYSTOLIC BLOOD PRESSURE: 122 MMHG | DIASTOLIC BLOOD PRESSURE: 92 MMHG

## 2017-11-24 DIAGNOSIS — J98.8 RESPIRATORY INFECTION: Primary | ICD-10-CM

## 2017-11-24 DIAGNOSIS — K29.00 OTHER ACUTE GASTRITIS WITHOUT HEMORRHAGE: ICD-10-CM

## 2017-11-24 PROBLEM — M19.90 ARTHRITIS: Status: ACTIVE | Noted: 2017-11-24

## 2017-11-24 PROBLEM — M25.511 CHRONIC RIGHT SHOULDER PAIN: Chronic | Status: ACTIVE | Noted: 2017-08-02

## 2017-11-24 PROBLEM — G89.29 CHRONIC RIGHT SHOULDER PAIN: Chronic | Status: ACTIVE | Noted: 2017-08-02

## 2017-11-24 PROBLEM — G47.33 OSA (OBSTRUCTIVE SLEEP APNEA): Chronic | Status: ACTIVE | Noted: 2017-02-17

## 2017-11-24 PROBLEM — M19.90 ARTHRITIS: Chronic | Status: ACTIVE | Noted: 2017-11-24

## 2017-11-24 PROBLEM — Z86.018 HISTORY OF BENIGN PITUITARY TUMOR: Chronic | Status: ACTIVE | Noted: 2017-11-20

## 2017-11-24 PROBLEM — M75.81 TENDINITIS OF RIGHT ROTATOR CUFF: Chronic | Status: ACTIVE | Noted: 2017-08-02

## 2017-11-24 PROCEDURE — 99213 OFFICE O/P EST LOW 20 MIN: CPT | Mod: S$GLB,,, | Performed by: PHYSICIAN ASSISTANT

## 2017-11-24 PROCEDURE — 99999 PR PBB SHADOW E&M-EST. PATIENT-LVL IV: CPT | Mod: PBBFAC,,, | Performed by: PHYSICIAN ASSISTANT

## 2017-11-24 RX ORDER — NICOTINE POLACRILEX 2 MG
1 GUM BUCCAL DAILY
COMMUNITY
End: 2019-03-08

## 2017-11-24 RX ORDER — AZITHROMYCIN 500 MG/1
500 TABLET, FILM COATED ORAL DAILY
Qty: 5 TABLET | Refills: 0 | Status: SHIPPED | OUTPATIENT
Start: 2017-11-24 | End: 2017-11-29

## 2017-11-24 RX ORDER — FAMOTIDINE 20 MG/1
20 TABLET, FILM COATED ORAL
COMMUNITY
End: 2018-07-31

## 2017-11-24 RX ORDER — NAPROXEN SODIUM 220 MG
220 TABLET ORAL
COMMUNITY
End: 2020-06-09 | Stop reason: ALTCHOICE

## 2017-11-24 RX ORDER — NAPROXEN SODIUM 220 MG/1
81 TABLET, FILM COATED ORAL
COMMUNITY

## 2017-11-24 RX ORDER — ONDANSETRON 4 MG/1
4 TABLET, ORALLY DISINTEGRATING ORAL EVERY 8 HOURS PRN
Qty: 12 TABLET | Refills: 2 | Status: SHIPPED | OUTPATIENT
Start: 2017-11-24 | End: 2018-02-27

## 2017-11-24 RX ORDER — AMOXICILLIN 250 MG
1 CAPSULE ORAL
COMMUNITY
End: 2019-12-23

## 2017-11-24 NOTE — PROGRESS NOTES
Subjective:       Patient ID: Ora Henderson is a 57 y.o.B/ female.    Chief Complaint: Cough (w/ sore throat); Emesis (Last episode last night); and Leg Pain (BL)    HPI         She comes in by herself today and has the above problem.  She just got sick about 7 days ago and he got really worse in the past 3.  She's had nose congestion, chest congestion, hamstring aches, coughing a lot, coughing so severe it has produced some emesis on Wednesday night and also Thursday but none today.  She is also had a low-grade fever but hasn't taken her temperature at home, her nose became very congested 2 days ago on Wednesday and she can hardly breathe her nose.  She started getting a sore throat but it's very mild at this point and she also developed some hoarseness.  Prior to her vomiting she has had increased salivation.  She's had no diarrhea to this point.        She has just been taking some generic homemade honey ENT and concoction that she made up.  She really has not been taking any expectorant's or antihistamines.    Review of Systems    Otherwise negative concerning the ENT, RESPIRATORY, PULMONARY, CV, GI, and  system review.    Objective:      Physical Exam    When I entered the exam room to see her, she was laying on her left side of the exam table.  She was not in the fetal position.  EARS: Usual amount of wax is present in the canals and they are not swollen or red.  Her tympanic membranes are transparent clear.  The left one has a rim of redness in the superior posterior quadrant.  NOSE: Quite a bit of mucopurulence is present on the left side of the nose but the right side is fairly open.  The turbinates are noninflamed and there is no swelling of them.  THROAT: Appears normal without any swelling redness or PND evident.  She has no exudates or halitosis.  There are no tender glands or adenopathy present.  CHEST: Clear BS anterior to posterior.  There is no wheeze, rhonchi, or rales.  Breath  intensity is 85% of normal.  She is not in any respiratory distress.    Assessment:       1. Respiratory infection    2. Other acute gastritis without hemorrhage        Plan:     1.  Although she looks and acts miserable, she is not as bad as she seems.  2.  Will start antihistamine/decongestion of choice and also Mucinex DM 1200 mg twice a day with 6 ounces fluid 4 times a day.  3.  Start Tessalon Perles 200 mg daily at bedtime to suppress cough.  4.  Gave her Zofran 4 mg ODT to help stop the nausea.  5.  Also take Zithromax 500 mg daily with food ×5 days.  6.  Increased rest, liquids, Tylenol, and recheck in 5 days if symptoms increase or persist.

## 2017-12-16 ENCOUNTER — PATIENT MESSAGE (OUTPATIENT)
Dept: ADMINISTRATIVE | Facility: OTHER | Age: 57
End: 2017-12-16

## 2017-12-23 ENCOUNTER — OFFICE VISIT (OUTPATIENT)
Dept: URGENT CARE | Facility: CLINIC | Age: 57
End: 2017-12-23
Payer: COMMERCIAL

## 2017-12-23 ENCOUNTER — HOSPITAL ENCOUNTER (OUTPATIENT)
Dept: RADIOLOGY | Facility: HOSPITAL | Age: 57
Discharge: HOME | End: 2017-12-23
Attending: PHYSICIAN ASSISTANT
Payer: COMMERCIAL

## 2017-12-23 VITALS
HEART RATE: 104 BPM | SYSTOLIC BLOOD PRESSURE: 140 MMHG | WEIGHT: 210.88 LBS | DIASTOLIC BLOOD PRESSURE: 90 MMHG | BODY MASS INDEX: 37.35 KG/M2 | OXYGEN SATURATION: 98 % | TEMPERATURE: 98 F

## 2017-12-23 DIAGNOSIS — M54.42 ACUTE MIDLINE LOW BACK PAIN WITH BILATERAL SCIATICA: ICD-10-CM

## 2017-12-23 DIAGNOSIS — M54.41 ACUTE MIDLINE LOW BACK PAIN WITH BILATERAL SCIATICA: ICD-10-CM

## 2017-12-23 DIAGNOSIS — M54.42 ACUTE MIDLINE LOW BACK PAIN WITH BILATERAL SCIATICA: Primary | ICD-10-CM

## 2017-12-23 DIAGNOSIS — M54.41 ACUTE MIDLINE LOW BACK PAIN WITH BILATERAL SCIATICA: Primary | ICD-10-CM

## 2017-12-23 PROCEDURE — 72100 X-RAY EXAM L-S SPINE 2/3 VWS: CPT | Mod: TC,PO

## 2017-12-23 PROCEDURE — 96372 THER/PROPH/DIAG INJ SC/IM: CPT | Mod: S$GLB,,, | Performed by: FAMILY MEDICINE

## 2017-12-23 PROCEDURE — 72100 X-RAY EXAM L-S SPINE 2/3 VWS: CPT | Mod: 26,,, | Performed by: RADIOLOGY

## 2017-12-23 PROCEDURE — 99999 PR PBB SHADOW E&M-EST. PATIENT-LVL III: CPT | Mod: PBBFAC,,, | Performed by: PHYSICIAN ASSISTANT

## 2017-12-23 PROCEDURE — 99214 OFFICE O/P EST MOD 30 MIN: CPT | Mod: 25,S$GLB,, | Performed by: PHYSICIAN ASSISTANT

## 2017-12-23 RX ORDER — KETOROLAC TROMETHAMINE 30 MG/ML
30 INJECTION, SOLUTION INTRAMUSCULAR; INTRAVENOUS
Status: COMPLETED | OUTPATIENT
Start: 2017-12-23 | End: 2017-12-23

## 2017-12-23 RX ADMIN — KETOROLAC TROMETHAMINE 30 MG: 30 INJECTION, SOLUTION INTRAMUSCULAR; INTRAVENOUS at 02:12

## 2017-12-23 NOTE — PROGRESS NOTES
Subjective:      Patient ID: Ora Henderson is a 57 y.o. female.    Chief Complaint: Hip Pain    No trauma/injury  No recent strenuous activity      Back Pain   This is a new problem. The current episode started more than 1 month ago (2mths). The pain is present in the lumbar spine. The pain radiates to the right thigh and left thigh. Pertinent negatives include no fever, numbness or weakness. Treatments tried: Zanaflex (doesn't like to take d/t drowsiness), heat, Tylenol, Aleve.     Review of Systems   Constitutional: Negative for chills, diaphoresis and fever.   Cardiovascular: Negative for leg swelling.   Genitourinary:        (-) bowel/bladder dysfunction   Musculoskeletal: Positive for arthralgias (bilateral thighs) and back pain. Negative for joint swelling.   Skin: Negative for color change, rash and wound.   Neurological: Negative for weakness and numbness.       Objective:   BP (!) 140/90 (BP Location: Right arm, Patient Position: Sitting, BP Method: Large (Manual))   Pulse 104   Temp 97.8 °F (36.6 °C) (Tympanic)   Wt 95.7 kg (210 lb 13.9 oz)   SpO2 98%   BMI 37.35 kg/m²   Physical Exam   Constitutional: She appears well-developed and well-nourished. She does not appear ill. No distress.   Cardiovascular: Normal rate, regular rhythm and normal heart sounds.    No murmur heard.  Pulmonary/Chest: Effort normal and breath sounds normal. No respiratory distress. She has no decreased breath sounds. She has no wheezes. She has no rhonchi. She has no rales.   Musculoskeletal:        Lumbar back: She exhibits tenderness and pain. She exhibits normal range of motion, no swelling and no deformity.   Normal gait  (-) decreased ROM   Skin: Skin is warm and dry. No rash noted. She is not diaphoretic.   Psychiatric: She has a normal mood and affect. Her speech is normal and behavior is normal. Thought content normal.     Assessment:      1. Acute midline low back pain with bilateral sciatica        Plan:   Acute midline low back pain with bilateral sciatica  -     ketorolac injection 30 mg; Inject 30 mg into the muscle one time.  -     X-Ray Lumbar Spine AP And Lateral; Future; Expected date: 12/23/2017    Discussed d/c Mobic and Aleve until tomorrow d/t Toradol.  Only take 1 NSAID at a time.  Tylenol or heat as needed for breakthrough pain.  Take muscle relaxer as needed.     Patient requesting x-ray at today's appointment.  Discussed that without trauma/injury would not expect any significant findings, patient would like to proceed.  Will janay with x-ray results (likely Tuesday).    Schedule follow up with PCP to determine whether further eval or referral is necessary.     Gave handout on low back pain.  Printed AVS and reviewed treatment plan in detail.    Discussed worsening signs/symptoms and when to return to clinic or go to ED.   Patient expresses understanding and agrees with treatment plan.

## 2017-12-23 NOTE — PATIENT INSTRUCTIONS
Causes of Lumbar (Low Back) Pain  Low back pain can be caused by problems with any part of the lumbar spine. A disk can herniate (push out) and press on a nerve. Vertebrae can rub against each other or slip out of place. This can irritate facet joints and nerves. It can also lead to stenosis, a narrowing of the spinal canal or foramen.  Pressure from a disk  Constant wear and tear on a disk can cause it to weaken and push outward. Part of the disk may then press on nearby nerves. There are two common types of herniated disks:  Contained means the soft nucleus is protruding outward.   Extruded means the firm annulus has torn, letting the soft center squeeze through.     Pressure from bone  An unstable spine   With age, a disk may thin and wear out. Vertebrae above and below the disk may begin to touch. This can put pressure on nerves. It can also cause bone spurs (growths) to form where the bones rub together.    Stenosis results when bone spurs narrow the foramen or spinal canal. This also puts pressure on nerves. Slipping vertebrae can irritate nerves and joints. They can also worsen stenosis.    In some cases, vertebrae become unstable and slip forward. This is called spondylolisthesis.     Date Last Reviewed: 10/12/2015  © 1291-7115 [employer]. [address] [place] [address], [place] [address]. All rights reserved. This information is not intended as a substitute for professional medical care. Always follow your healthcare professional's instructions.

## 2017-12-29 ENCOUNTER — OFFICE VISIT (OUTPATIENT)
Dept: INTERNAL MEDICINE | Facility: CLINIC | Age: 57
End: 2017-12-29
Payer: COMMERCIAL

## 2017-12-29 VITALS
TEMPERATURE: 98 F | WEIGHT: 211 LBS | HEIGHT: 63 IN | DIASTOLIC BLOOD PRESSURE: 80 MMHG | SYSTOLIC BLOOD PRESSURE: 130 MMHG | HEART RATE: 91 BPM | BODY MASS INDEX: 37.39 KG/M2 | OXYGEN SATURATION: 99 %

## 2017-12-29 DIAGNOSIS — M47.812 DJD (DEGENERATIVE JOINT DISEASE), CERVICAL: Chronic | ICD-10-CM

## 2017-12-29 DIAGNOSIS — I10 ESSENTIAL HYPERTENSION: Chronic | ICD-10-CM

## 2017-12-29 DIAGNOSIS — M47.26 OSTEOARTHRITIS OF SPINE WITH RADICULOPATHY, LUMBAR REGION: Primary | ICD-10-CM

## 2017-12-29 PROCEDURE — 99999 PR PBB SHADOW E&M-EST. PATIENT-LVL IV: CPT | Mod: PBBFAC,,, | Performed by: FAMILY MEDICINE

## 2017-12-29 PROCEDURE — 99214 OFFICE O/P EST MOD 30 MIN: CPT | Mod: S$GLB,,, | Performed by: FAMILY MEDICINE

## 2017-12-29 RX ORDER — GABAPENTIN 300 MG/1
300 CAPSULE ORAL NIGHTLY
Qty: 30 CAPSULE | Refills: 0 | Status: SHIPPED | OUTPATIENT
Start: 2017-12-29 | End: 2018-07-31

## 2017-12-29 NOTE — PROGRESS NOTES
"Subjective:       Patient ID: Ora Henderson is a 57 y.o. female.    Chief Complaint: Follow-up (go over X-ray results )    57-year-old Afro-American female patient with Patient Active Problem List:     DJD (degenerative joint disease), cervical-mild     Hyperlipidemia     Hypertension     Hepatomegaly     Family history of cardiovascular disease     Obesity, Class II, BMI 35-39.9, with comorbidity     GERD (gastroesophageal reflux disease)     NATALIE (obstructive sleep apnea)     Chronic right rotator cuff tendonitis     Rotator cuff impingement syndrome of right shoulder     DJD of right AC (acromioclavicular) joint     History of benign pituitary tumor     Arthritis  Here with complaint of bilateral lower legs, with  sharp shooting pains up to the knees off and on lately for the past few weeks, denies of any back pain.   Reports shooting pains in the legs as 5/10, usually gets worse early in the morning, 8/10, and gets better as the day goes by.  Patient has been taking Mobic and Aleve as needed but still continues to have ongoing pains  Denies of any injury or trauma  Denies of any tingling or numbness but reports as sharp shooting pains  Patient came into urgent care and had x-rays and would like to go over x-ray test results.         Review of Systems   Constitutional: Negative for fever.   Eyes: Negative for visual disturbance.   Respiratory: Negative for shortness of breath.    Cardiovascular: Negative for chest pain and leg swelling.   Gastrointestinal: Negative for abdominal pain, nausea and vomiting.   Musculoskeletal: Positive for arthralgias and myalgias. Negative for back pain.   Skin: Negative for rash.   Neurological: Negative for weakness, light-headedness, numbness and headaches.   Psychiatric/Behavioral: Negative for sleep disturbance.         /80   Pulse 91   Temp 97.5 °F (36.4 °C) (Tympanic)   Ht 5' 3" (1.6 m)   Wt 95.7 kg (210 lb 15.7 oz)   SpO2 99%   BMI 37.37 kg/m² "   Objective:      Physical Exam   Constitutional: She is oriented to person, place, and time. She appears well-developed and well-nourished.   HENT:   Head: Normocephalic and atraumatic.   Mouth/Throat: Oropharynx is clear and moist.   Cardiovascular: Normal rate, regular rhythm and normal heart sounds.    No murmur heard.  Pulmonary/Chest: Effort normal and breath sounds normal. She has no wheezes.   Abdominal: Soft. Bowel sounds are normal. There is no tenderness.   Musculoskeletal: She exhibits tenderness. She exhibits no edema.   Positive for tenderness to bilateral thighs posteriorly.   Minimal paraspinal lumbar muscle tenderness noted in the midline and bilaterally.   Straight leg raise test negative bilaterally   Neurological: She is alert and oriented to person, place, and time.   Skin: Skin is warm and dry. No rash noted. No erythema.   Psychiatric: She has a normal mood and affect.         Assessment:       1. Osteoarthritis of spine with radiculopathy, lumbar region    2. DJD (degenerative joint disease), cervical-mild    3. Essential hypertension    4. Obesity, Class II, BMI 35-39.9, with comorbidity        Plan:   Osteoarthritis of spine with radiculopathy, lumbar region  -     gabapentin (NEURONTIN) 300 MG capsule; Take 1 capsule (300 mg total) by mouth every evening.  Dispense: 30 capsule; Refill: 0  Patient has been taking Mobic/Aleve as needed, will add Neurontin 300 mg daily at bedtime.   Follow-up in one month  Reviewed x-ray of the lumbar spine showing degenerative changes with arthritis.  Discussed test results with patient  Findings: The vertebral bodies demonstrate normal height.  There is grade 1 spondylolisthesis of L4 on L5. There is mild disc space narrowing and spondylosis present at the L1-L2 through the L4-L5 levels. Prominent facet arthropathy noted bilaterally at L4-L5 level.       Handout on back exercises has been given to patient today    DJD (degenerative joint disease),  cervical-mild-previous x-rays of the cervical spine done in 2013 showed mild arthritis  Patient clinically asymptomatic at this time    Essential hypertension  -     Hypertension Digital Medicine (HDMP) Enrollment Order  Reports usually has stable blood pressures at home, just took her blood pressure medication prior to the visit currently on losartan 50 mg  Will enroll in hypertension Digital  program    Obesity, Class II, BMI 35-39.9, with comorbidity  -Lifestyle modifications recommended with diet and exercise to lower her weight with BMI 37    Follow-up with me in 1 month for back pain as well as blood pressure and physicals

## 2017-12-29 NOTE — PATIENT INSTRUCTIONS
Exercises to Strengthen Your Lower Back  Strong lower back and abdominal muscles work together to support your spine. The exercises below will help strengthen the lower back. It is important that you begin exercising slowly and increase levels gradually.  Always begin any exercise program with stretching. If you feel pain while doing any of these exercises, stop and talk to your doctor about a more specific exercise program that better suits your condition.   Low back stretch  The point of stretching is to make you more flexible and increase your range of motion. Stretch only as much as you are able. Stretch slowly. Do not push your stretch to the limit. If at any point you feel pain while stretching, this is your (temporary) limit.  · Lie on your back with your knees bent and both feet on the ground.  · Slowly raise your left knee to your chest as you flatten your lower back against the floor. Hold for 5 seconds.  · Relax and repeat the exercise with your right knee.  · Do 10 of these exercises for each leg.  · Repeat hugging both knees to your chest at the same time.  Building lower back strength  Start your exercise routine with 10 to 30 minutes a day, 1 to 3 times a day.  Initial exercises  Lying on your back:  1. Ankle pumps: Move your foot up and down, towards your head, and then away. Repeat 10 times with each foot.  2. Heel slides: Slowly bend your knee, drawing the heel of your foot towards you. Then slide your heel/foot from you, straightening your knee. Do not lift your foot off the floor (this is not a leg lift).  3. Abdominal contraction: Bend your knees and put your hands on your stomach. Tighten your stomach muscles. Hold for 5 seconds, then relax. Repeat 10 times.  4. Straight leg raise: Bend one leg at the knee and keep the other leg straight. Tighten your stomach muscles. Slowly lift your straight leg 6 to 12 inches off the floor and hold for up to 5 seconds. Repeat 10 times on each  side.  Standin. Wall squats: Stand with your back against the wall. Move your feet about 12 inches away from the wall. Tighten your stomach muscles, and slowly bend your knees until they are at about a 45 degree angle. Do not go down too far. Hold about 5 seconds. Then slowly return to your starting position. Repeat 10 times.  2. Heel raises: Stand facing the wall. Slowly raise the heels of your feet up and down, while keeping your toes on the floor. If you have trouble balancing, you can touch the wall with your hands. Repeat 10 times.  More advanced exercises  When you feel comfortable enough, try these exercises.  1. Kneeling lumbar extension: Begin on your hands and knees. At the same time, raise and straighten your right arm and left leg until they are parallel to the ground. Hold for 2 seconds and come back slowly to a starting position. Repeat with left arm and right leg, alternating 10 times.  2. Prone lumbar extension: Lie face down, arms extended overhead, palms on the floor. At the same time, raise your right arm and left leg as high as comfortably possible. Hold for 10 seconds and slowly return to start. Repeat with left arm and right leg, alternating 10 times. Gradually build up to 20 times. (Advanced: Repeat this exercise raising both arms and both legs a few inches off the floor at the same time. Hold for 5 seconds and release.)  3. Pelvic tilt: Lie on the floor on your back with your knees bent at 90 degrees. Your feet should be flat on the floor. Inhale, exhale, then slowly contract your abdominal muscles bringing your navel toward your spine. Let your pelvis rock back until your lower back is flat on the floor. Hold for 10 seconds while breathing smoothly.  4. Abdominal crunch: Perform a pelvic tilt (above) flattening your lower back against the floor. Holding the tension in your abdominal muscles, take another breath and raise your shoulder blades off the ground (this is not a full sit-up).  Keep your head in line with your body (dont bend your neck forward). Hold for 2 seconds, then slowly lower.  Date Last Reviewed: 6/1/2016  © 1552-1761 [employer]. [address] [place] [address], [place] [address]. All rights reserved. This information is not intended as a substitute for professional medical care. Always follow your healthcare professional's instructions.

## 2018-01-04 ENCOUNTER — HOSPITAL ENCOUNTER (OUTPATIENT)
Dept: RADIOLOGY | Facility: HOSPITAL | Age: 58
Discharge: HOME | End: 2018-01-04
Attending: PSYCHIATRY & NEUROLOGY
Payer: COMMERCIAL

## 2018-01-04 DIAGNOSIS — Z86.018 HISTORY OF BENIGN PITUITARY TUMOR: ICD-10-CM

## 2018-01-04 PROCEDURE — 25500020 PHARM REV CODE 255: Mod: PO | Performed by: PSYCHIATRY & NEUROLOGY

## 2018-01-04 PROCEDURE — A9585 GADOBUTROL INJECTION: HCPCS | Mod: PO | Performed by: PSYCHIATRY & NEUROLOGY

## 2018-01-04 PROCEDURE — 70553 MRI BRAIN STEM W/O & W/DYE: CPT | Mod: 26,,, | Performed by: RADIOLOGY

## 2018-01-04 PROCEDURE — 70553 MRI BRAIN STEM W/O & W/DYE: CPT | Mod: TC,PO

## 2018-01-04 RX ORDER — GADOBUTROL 604.72 MG/ML
4.5 INJECTION INTRAVENOUS
Status: COMPLETED | OUTPATIENT
Start: 2018-01-04 | End: 2018-01-04

## 2018-01-04 RX ADMIN — GADOBUTROL 4.5 ML: 604.72 INJECTION INTRAVENOUS at 08:01

## 2018-01-15 ENCOUNTER — OFFICE VISIT (OUTPATIENT)
Dept: URGENT CARE | Facility: CLINIC | Age: 58
End: 2018-01-15
Payer: COMMERCIAL

## 2018-01-15 VITALS
DIASTOLIC BLOOD PRESSURE: 100 MMHG | OXYGEN SATURATION: 97 % | TEMPERATURE: 99 F | HEIGHT: 63 IN | SYSTOLIC BLOOD PRESSURE: 160 MMHG | BODY MASS INDEX: 37.11 KG/M2 | HEART RATE: 110 BPM | WEIGHT: 209.44 LBS

## 2018-01-15 DIAGNOSIS — J11.1 INFLUENZA-LIKE ILLNESS: Primary | ICD-10-CM

## 2018-01-15 DIAGNOSIS — R09.81 SINUS CONGESTION: ICD-10-CM

## 2018-01-15 DIAGNOSIS — R05.9 COUGH: ICD-10-CM

## 2018-01-15 PROCEDURE — 99999 PR PBB SHADOW E&M-EST. PATIENT-LVL V: CPT | Mod: PBBFAC,,, | Performed by: NURSE PRACTITIONER

## 2018-01-15 PROCEDURE — 99214 OFFICE O/P EST MOD 30 MIN: CPT | Mod: S$GLB,,, | Performed by: NURSE PRACTITIONER

## 2018-01-15 RX ORDER — FLUTICASONE PROPIONATE 50 MCG
2 SPRAY, SUSPENSION (ML) NASAL DAILY
Qty: 16 G | Refills: 0 | Status: SHIPPED | OUTPATIENT
Start: 2018-01-15 | End: 2018-02-14

## 2018-01-15 RX ORDER — OSELTAMIVIR PHOSPHATE 75 MG/1
75 CAPSULE ORAL 2 TIMES DAILY
Qty: 10 CAPSULE | Refills: 0 | Status: SHIPPED | OUTPATIENT
Start: 2018-01-15 | End: 2018-01-20

## 2018-01-15 RX ORDER — PROMETHAZINE HYDROCHLORIDE AND DEXTROMETHORPHAN HYDROBROMIDE 6.25; 15 MG/5ML; MG/5ML
5 SYRUP ORAL NIGHTLY PRN
Qty: 118 ML | Refills: 0 | Status: SHIPPED | OUTPATIENT
Start: 2018-01-15 | End: 2018-01-25

## 2018-01-15 RX ORDER — BENZONATATE 100 MG/1
100 CAPSULE ORAL 3 TIMES DAILY PRN
Qty: 30 CAPSULE | Refills: 0 | Status: SHIPPED | OUTPATIENT
Start: 2018-01-15 | End: 2018-01-25

## 2018-01-15 RX ORDER — LORATADINE 10 MG/1
10 TABLET ORAL DAILY
Qty: 30 TABLET | Refills: 0 | Status: SHIPPED | OUTPATIENT
Start: 2018-01-15 | End: 2019-03-08

## 2018-01-15 NOTE — PATIENT INSTRUCTIONS

## 2018-01-15 NOTE — PROGRESS NOTES
Subjective:       Patient ID: Ora Henderson is a 57 y.o. female.    Chief Complaint: Generalized Body Aches    Pt is a 57 year old female to clinic today with complaints of cough, congestion, myalgia, ST and fever (99.8) that began yesterday.       Sinusitis   This is a new problem. The current episode started yesterday. The problem has been gradually worsening since onset. Maximum temperature: 99.8. Her pain is at a severity of 10/10. The pain is severe. Associated symptoms include chills, congestion, coughing, headaches, sinus pressure and a sore throat. Pertinent negatives include no diaphoresis, ear pain, hoarse voice, neck pain, shortness of breath, sneezing or swollen glands. Treatments tried: robitussin. The treatment provided no relief.     Review of Systems   Constitutional: Positive for chills, fatigue and fever. Negative for diaphoresis.   HENT: Positive for congestion, postnasal drip, rhinorrhea, sinus pressure and sore throat. Negative for ear discharge, ear pain, hoarse voice, sinus pain, sneezing and trouble swallowing.    Eyes: Negative for pain.   Respiratory: Positive for cough. Negative for shortness of breath and wheezing.    Cardiovascular: Negative for chest pain and palpitations.   Gastrointestinal: Positive for nausea. Negative for abdominal pain, diarrhea and vomiting.   Genitourinary: Negative for dysuria.   Musculoskeletal: Positive for myalgias. Negative for back pain and neck pain.   Skin: Negative for rash.   Neurological: Positive for headaches. Negative for dizziness and light-headedness.       Objective:      Physical Exam   Constitutional: She is oriented to person, place, and time. She appears well-developed and well-nourished. No distress.   HENT:   Head: Normocephalic.   Right Ear: Tympanic membrane, external ear and ear canal normal. No tenderness. Tympanic membrane is not bulging.   Left Ear: Tympanic membrane, external ear and ear canal normal. Tympanic  membrane is not bulging.   Nose: Mucosal edema and rhinorrhea present. Right sinus exhibits frontal sinus tenderness. Right sinus exhibits no maxillary sinus tenderness. Left sinus exhibits frontal sinus tenderness. Left sinus exhibits no maxillary sinus tenderness.   Mouth/Throat: Uvula is midline, oropharynx is clear and moist and mucous membranes are normal. No oropharyngeal exudate, posterior oropharyngeal edema or posterior oropharyngeal erythema.   Eyes: Conjunctivae and EOM are normal. Pupils are equal, round, and reactive to light.   Neck: Normal range of motion. Neck supple.   Cardiovascular: Normal rate, regular rhythm, S1 normal, S2 normal and normal heart sounds.  Exam reveals no gallop and no friction rub.    No murmur heard.  Pulmonary/Chest: Effort normal and breath sounds normal. No accessory muscle usage. No apnea, no tachypnea and no bradypnea. No respiratory distress. She has no decreased breath sounds. She has no wheezes. She has no rhonchi. She has no rales.   Lymphadenopathy:        Head (right side): No submental, no submandibular and no tonsillar adenopathy present.        Head (left side): No submental, no submandibular and no tonsillar adenopathy present.     She has no cervical adenopathy.   Neurological: She is alert and oriented to person, place, and time.   Skin: Skin is warm and dry. No rash noted. She is not diaphoretic.   Psychiatric: She has a normal mood and affect. Her speech is normal and behavior is normal. Thought content normal.   Nursing note and vitals reviewed.      Assessment:       1. Influenza-like illness    2. Cough    3. Sinus congestion        Plan:   Influenza-like illness  -     oseltamivir (TAMIFLU) 75 MG capsule; Take 1 capsule (75 mg total) by mouth 2 (two) times daily.  Dispense: 10 capsule; Refill: 0    Cough  -     promethazine-dextromethorphan (PROMETHAZINE-DM) 6.25-15 mg/5 mL Syrp; Take 5 mLs by mouth nightly as needed.  Dispense: 118 mL; Refill: 0  -      benzonatate (TESSALON) 100 MG capsule; Take 1 capsule (100 mg total) by mouth 3 (three) times daily as needed for Cough (Do not take more than 6 capsules in a 24 hour period.).  Dispense: 30 capsule; Refill: 0    Sinus congestion  -     loratadine (CLARITIN) 10 mg tablet; Take 1 tablet (10 mg total) by mouth once daily.  Dispense: 30 tablet; Refill: 0  -     fluticasone (FLONASE) 50 mcg/actuation nasal spray; 2 sprays (100 mcg total) by Each Nare route once daily.  Dispense: 16 g; Refill: 0      · Your symptoms are likely due to a viral infection. These infections can last up to 14 days, but you should notice some improvement of your symptoms within the first 7-10 days. Viral infections will not improve with antibiotics. If your symptoms persist >10 days without improvement or if you have any new or worsening symptoms, this is an indication that you may have developed a bacterial infection and should return to your primary care provider or to Urgent Care.   · Getting plenty of rest is very important to fighting infections.  · Increase fluids.   · May apply warm compresses as needed for facial pain and congestion.   · Saline nasal spray to loosen nasal congestion.  · Flonase or Nasacort to reduce inflammation in the sinus cavities.  · You may take an over the counter antihistamine for allergy symptoms such as sneezing, itchy/watery eyes, scratchy throat, or congestion.  · Take Tylenol or Ibuprofen as needed for sore throat, body aches, or fever.  · Don't drive, drink alcohol, or take any other medication or substance that causes sedation while taking cough syrup.   · Follow up with your primary care provider if symptoms persist >10 days or sooner for any new or worsening symptoms.   · Go to the ER for any fever that does not improve with Tylenol/Ibuprofen, neck stiffness, rash, severe headache, vision changes, shortness of breath, chest pain, facial swelling, severe facial pain, or any other new and concerning  symptoms.

## 2018-01-18 ENCOUNTER — TELEPHONE (OUTPATIENT)
Dept: INTERNAL MEDICINE | Facility: CLINIC | Age: 58
End: 2018-01-18

## 2018-01-18 NOTE — TELEPHONE ENCOUNTER
Spoke with patient. States she was dx with flu on 1/15/2018 at urgent care. Pt was Rx tamiflu, tessalon perles, promethazine DM, sinus medication and nasal spray. C/o chest congestion. Request Rx for chest congestion to be sent to [facility] in [place]. Please advise.//ddw

## 2018-01-18 NOTE — TELEPHONE ENCOUNTER
----- Message from Ana Bledsoe sent at 1/18/2018  7:33 AM CST -----  Contact: self  Patient states went to UC on Monday   Pt states was diagnosed with flu.   Patient states need to speak with nurse  Pt requesting if doctor can call in medication chest congested.  Please call pt for more detail info [phone]

## 2018-01-21 ENCOUNTER — OFFICE VISIT (OUTPATIENT)
Dept: URGENT CARE | Facility: CLINIC | Age: 58
End: 2018-01-21
Payer: COMMERCIAL

## 2018-01-21 VITALS
HEART RATE: 88 BPM | TEMPERATURE: 98 F | OXYGEN SATURATION: 95 % | HEIGHT: 63 IN | BODY MASS INDEX: 36.6 KG/M2 | WEIGHT: 206.56 LBS

## 2018-01-21 DIAGNOSIS — B37.0 ORAL THRUSH: ICD-10-CM

## 2018-01-21 DIAGNOSIS — R06.2 WHEEZE: ICD-10-CM

## 2018-01-21 DIAGNOSIS — R05.9 COUGH: ICD-10-CM

## 2018-01-21 DIAGNOSIS — J02.9 PHARYNGITIS, UNSPECIFIED ETIOLOGY: Primary | ICD-10-CM

## 2018-01-21 LAB
CTP QC/QA: YES
S PYO RRNA THROAT QL PROBE: NEGATIVE

## 2018-01-21 PROCEDURE — 87081 CULTURE SCREEN ONLY: CPT

## 2018-01-21 PROCEDURE — 87880 STREP A ASSAY W/OPTIC: CPT | Mod: QW,S$GLB,, | Performed by: NURSE PRACTITIONER

## 2018-01-21 PROCEDURE — 99213 OFFICE O/P EST LOW 20 MIN: CPT | Mod: S$GLB,,, | Performed by: NURSE PRACTITIONER

## 2018-01-21 PROCEDURE — 99999 PR PBB SHADOW E&M-EST. PATIENT-LVL V: CPT | Mod: PBBFAC,,, | Performed by: NURSE PRACTITIONER

## 2018-01-21 RX ORDER — METHYLPREDNISOLONE 4 MG/1
TABLET ORAL
Qty: 1 PACKAGE | Refills: 0 | Status: SHIPPED | OUTPATIENT
Start: 2018-01-21 | End: 2018-01-27 | Stop reason: ALTCHOICE

## 2018-01-21 RX ORDER — NYSTATIN 100000 [USP'U]/ML
SUSPENSION ORAL
Qty: 60 ML | Refills: 1 | Status: SHIPPED | OUTPATIENT
Start: 2018-01-21 | End: 2018-02-27

## 2018-01-21 RX ORDER — AZITHROMYCIN 250 MG/1
TABLET, FILM COATED ORAL
Qty: 6 TABLET | Refills: 0 | Status: SHIPPED | OUTPATIENT
Start: 2018-01-21 | End: 2018-01-27

## 2018-01-21 NOTE — PATIENT INSTRUCTIONS
Candida Infection: Thrush  Thrush is a type of fungal infection in the mouth and throat. Thrush does not usually affect healthy adults. It is more common in people with a weakened immune system. It is also more likely if you take antibiotics. Thrush is normally not contagious.  Understanding fungus in the mouth and throat  Your mouth and throat normally contain millions of tiny organisms. These include bacteria and yeasts. Many of these do not cause any problems. In fact, they may help fight disease.  Yeasts are a type of fungus. A type of yeast called Candida normally lives on your skin. It is also found on the membranes of your mouth and throat. Usually, this yeast grows only in small amounts and is harmless. But in some cases, Candida can grow out of control and cause thrush. Thrush is related to other kinds of Candida infections that can grow all over the body. Thrush refers to an infection of only the mouth and throat.  What causes thrush?  Thrush happens when something lets too much Candida grow inside your mouth and throat. Certain things that change the normal balance of organisms in the mouth can lead to thrush. One example is antibiotic medicine. This medicine may kill some of the normal bacteria in your mouth. Candida can then grow freely. People on antibiotics have an increased risk for thrush.  You have a higher risk for thrush if you:  · Wear dentures  · Are getting chemotherapy  · Are getting radiation therapy  · Have diabetes  · Have a transplanted organ  · Use corticosteroids  · Have a weakened immune system, such as from AIDS  · Are an older adult  Symptoms of thrush  Some people with thrush do not have any symptoms. Symptoms of thrush can include:  · A dry, cottony feeling in your mouth  · Loss of taste  · Pain while eating or swallowing  · White or red patches inside your mouth or on the back of your throat  Diagnosing thrush  Your health care provider will ask about your medical history and  your symptoms. He or she will look closely at your mouth and throat. White or red patches will be scraped with a tongue depressor. The sample will be sent to a lab to test. This test can usually confirm thrush.  If you have thrush, you may also have esophageal candidiasis. This is common in people who have HIV. Your health care provider may check for this condition with an upper endoscopy. This is a procedure to look at the esophagus. A tissue sample may be taken to test.  Treatment for thrush  It is important to treat thrush early. Untreated, it may turn into a more serious form of widespread infection. Thrush is usually treated with antifungal medicine. The medicine is put directly in your mouth and throat. You may be given a swish and swallow medicine or an antifungal lozenge.  In some cases, you may need an antifungal pill. This can remove Candida throughout your body. Or you may need medicine through an IV. These treatments depend on how severe your infection is, and what other health conditions you have.  If you are at high risk for thrush, you may need to keep taking oral antifungal medicine. This is to help prevent thrush in the future.  What happens if you dont get treated for thrush?  If untreated, the Candida may spread throughout your body. They may even enter your bloodstream. This can cause serious problems, such as organ failure and even death. Bloodstream infection may need to be treated with high doses of antifungal medicine through an IV.  Systemic infection is much more likely in people who are very ill. It is also more common in those who have serious problems with their immune system. Additional risk factors for systemic infection in very ill people include:  · Central venous lines  · IV nutrition  · Broad-spectrum antibiotics  · Kidney failure  · Recent surgery  Preventing thrush  You may be able to help prevent some cases of thrush. Make sure to:  · Practice good oral hygiene. Try using a  chlorhexidine mouthwash.  · Clean your dentures regularly as instructed. Make sure they fit you correctly.  · After using a corticosteroid inhaler, rinse out your mouth with water or mouthwash.  · Do not use broad-spectrum antibiotics, if possible.  · Get treated for health problems that increase your risk for thrush, such as diabetes.     When to call the health care provider  Call your health care provider right away if you have any of these:  · Cottony feeling in your mouth  · Loss of taste  · Pain while eating or swallowing  · White or red patches inside your mouth or on the back of your throat   Date Last Reviewed: 3/19/2015  © 6076-7653 [employer]. [address] [place] [address], [place] [address]. All rights reserved. This information is not intended as a substitute for professional medical care. Always follow your healthcare professional's instructions.        Candida Infection: Thrush  Thrush is a type of fungal infection in the mouth and throat. Thrush does not usually affect healthy adults. It is more common in people with a weakened immune system. It is also more likely if you take antibiotics. Thrush is normally not contagious.  Understanding fungus in the mouth and throat  Your mouth and throat normally contain millions of tiny organisms. These include bacteria and yeasts. Many of these do not cause any problems. In fact, they may help fight disease.  Yeasts are a type of fungus. A type of yeast called Candida normally lives on your skin. It is also found on the membranes of your mouth and throat. Usually, this yeast grows only in small amounts and is harmless. But in some cases, Candida can grow out of control and cause thrush. Thrush is related to other kinds of Candida infections that can grow all over the body. Thrush refers to an infection of only the mouth and throat.  What causes thrush?  Thrush happens when something lets too much Candida grow inside your mouth and throat. Certain things  that change the normal balance of organisms in the mouth can lead to thrush. One example is antibiotic medicine. This medicine may kill some of the normal bacteria in your mouth. Candida can then grow freely. People on antibiotics have an increased risk for thrush.  You have a higher risk for thrush if you:  · Wear dentures  · Are getting chemotherapy  · Are getting radiation therapy  · Have diabetes  · Have a transplanted organ  · Use corticosteroids  · Have a weakened immune system, such as from AIDS  · Are an older adult  Symptoms of thrush  Some people with thrush do not have any symptoms. Symptoms of thrush can include:  · A dry, cottony feeling in your mouth  · Loss of taste  · Pain while eating or swallowing  · White or red patches inside your mouth or on the back of your throat  Diagnosing thrush  Your health care provider will ask about your medical history and your symptoms. He or she will look closely at your mouth and throat. White or red patches will be scraped with a tongue depressor. The sample will be sent to a lab to test. This test can usually confirm thrush.  If you have thrush, you may also have esophageal candidiasis. This is common in people who have HIV. Your health care provider may check for this condition with an upper endoscopy. This is a procedure to look at the esophagus. A tissue sample may be taken to test.  Treatment for thrush  It is important to treat thrush early. Untreated, it may turn into a more serious form of widespread infection. Thrush is usually treated with antifungal medicine. The medicine is put directly in your mouth and throat. You may be given a swish and swallow medicine or an antifungal lozenge.  In some cases, you may need an antifungal pill. This can remove Candida throughout your body. Or you may need medicine through an IV. These treatments depend on how severe your infection is, and what other health conditions you have.  If you are at high risk for thrush,  you may need to keep taking oral antifungal medicine. This is to help prevent thrush in the future.  What happens if you dont get treated for thrush?  If untreated, the Candida may spread throughout your body. They may even enter your bloodstream. This can cause serious problems, such as organ failure and even death. Bloodstream infection may need to be treated with high doses of antifungal medicine through an IV.  Systemic infection is much more likely in people who are very ill. It is also more common in those who have serious problems with their immune system. Additional risk factors for systemic infection in very ill people include:  · Central venous lines  · IV nutrition  · Broad-spectrum antibiotics  · Kidney failure  · Recent surgery  Preventing thrush  You may be able to help prevent some cases of thrush. Make sure to:  · Practice good oral hygiene. Try using a chlorhexidine mouthwash.  · Clean your dentures regularly as instructed. Make sure they fit you correctly.  · After using a corticosteroid inhaler, rinse out your mouth with water or mouthwash.  · Do not use broad-spectrum antibiotics, if possible.  · Get treated for health problems that increase your risk for thrush, such as diabetes.     When to call the health care provider  Call your health care provider right away if you have any of these:  · Cottony feeling in your mouth  · Loss of taste  · Pain while eating or swallowing  · White or red patches inside your mouth or on the back of your throat   Date Last Reviewed: 3/19/2015  © 2323-1952 [employer]. [address] [place] [address], [place] [address]. All rights reserved. This information is not intended as a substitute for professional medical care. Always follow your healthcare professional's instructions.        Candida Infection: Thrush  Thrush is a type of fungal infection in the mouth and throat. Thrush does not usually affect healthy adults. It is more common in people with a  weakened immune system. It is also more likely if you take antibiotics. Thrush is normally not contagious.  Understanding fungus in the mouth and throat  Your mouth and throat normally contain millions of tiny organisms. These include bacteria and yeasts. Many of these do not cause any problems. In fact, they may help fight disease.  Yeasts are a type of fungus. A type of yeast called Candida normally lives on your skin. It is also found on the membranes of your mouth and throat. Usually, this yeast grows only in small amounts and is harmless. But in some cases, Candida can grow out of control and cause thrush. Thrush is related to other kinds of Candida infections that can grow all over the body. Thrush refers to an infection of only the mouth and throat.  What causes thrush?  Thrush happens when something lets too much Candida grow inside your mouth and throat. Certain things that change the normal balance of organisms in the mouth can lead to thrush. One example is antibiotic medicine. This medicine may kill some of the normal bacteria in your mouth. Candida can then grow freely. People on antibiotics have an increased risk for thrush.  You have a higher risk for thrush if you:  · Wear dentures  · Are getting chemotherapy  · Are getting radiation therapy  · Have diabetes  · Have a transplanted organ  · Use corticosteroids  · Have a weakened immune system, such as from AIDS  · Are an older adult  Symptoms of thrush  Some people with thrush do not have any symptoms. Symptoms of thrush can include:  · A dry, cottony feeling in your mouth  · Loss of taste  · Pain while eating or swallowing  · White or red patches inside your mouth or on the back of your throat  Diagnosing thrush  Your health care provider will ask about your medical history and your symptoms. He or she will look closely at your mouth and throat. White or red patches will be scraped with a tongue depressor. The sample will be sent to a lab to test.  This test can usually confirm thrush.  If you have thrush, you may also have esophageal candidiasis. This is common in people who have HIV. Your health care provider may check for this condition with an upper endoscopy. This is a procedure to look at the esophagus. A tissue sample may be taken to test.  Treatment for thrush  It is important to treat thrush early. Untreated, it may turn into a more serious form of widespread infection. Thrush is usually treated with antifungal medicine. The medicine is put directly in your mouth and throat. You may be given a swish and swallow medicine or an antifungal lozenge.  In some cases, you may need an antifungal pill. This can remove Candida throughout your body. Or you may need medicine through an IV. These treatments depend on how severe your infection is, and what other health conditions you have.  If you are at high risk for thrush, you may need to keep taking oral antifungal medicine. This is to help prevent thrush in the future.  What happens if you dont get treated for thrush?  If untreated, the Candida may spread throughout your body. They may even enter your bloodstream. This can cause serious problems, such as organ failure and even death. Bloodstream infection may need to be treated with high doses of antifungal medicine through an IV.  Systemic infection is much more likely in people who are very ill. It is also more common in those who have serious problems with their immune system. Additional risk factors for systemic infection in very ill people include:  · Central venous lines  · IV nutrition  · Broad-spectrum antibiotics  · Kidney failure  · Recent surgery  Preventing thrush  You may be able to help prevent some cases of thrush. Make sure to:  · Practice good oral hygiene. Try using a chlorhexidine mouthwash.  · Clean your dentures regularly as instructed. Make sure they fit you correctly.  · After using a corticosteroid inhaler, rinse out your mouth with  water or mouthwash.  · Do not use broad-spectrum antibiotics, if possible.  · Get treated for health problems that increase your risk for thrush, such as diabetes.     When to call the health care provider  Call your health care provider right away if you have any of these:  · Cottony feeling in your mouth  · Loss of taste  · Pain while eating or swallowing  · White or red patches inside your mouth or on the back of your throat   Date Last Reviewed: 3/19/2015  © 1561-8271 [employer]. [address] [place] [address]. All rights reserved. This information is not intended as a substitute for professional medical care. Always follow your healthcare professional's instructions.        Candida Infection: Thrush  Thrush is a type of fungal infection in the mouth and throat. Thrush does not usually affect healthy adults. It is more common in people with a weakened immune system. It is also more likely if you take antibiotics. Thrush is normally not contagious.  Understanding fungus in the mouth and throat  Your mouth and throat normally contain millions of tiny organisms. These include bacteria and yeasts. Many of these do not cause any problems. In fact, they may help fight disease.  Yeasts are a type of fungus. A type of yeast called Candida normally lives on your skin. It is also found on the membranes of your mouth and throat. Usually, this yeast grows only in small amounts and is harmless. But in some cases, Candida can grow out of control and cause thrush. Thrush is related to other kinds of Candida infections that can grow all over the body. Thrush refers to an infection of only the mouth and throat.  What causes thrush?  Thrush happens when something lets too much Candida grow inside your mouth and throat. Certain things that change the normal balance of organisms in the mouth can lead to thrush. One example is antibiotic medicine. This medicine may kill some of the normal bacteria in your  mouth. Candida can then grow freely. People on antibiotics have an increased risk for thrush.  You have a higher risk for thrush if you:  · Wear dentures  · Are getting chemotherapy  · Are getting radiation therapy  · Have diabetes  · Have a transplanted organ  · Use corticosteroids  · Have a weakened immune system, such as from AIDS  · Are an older adult  Symptoms of thrush  Some people with thrush do not have any symptoms. Symptoms of thrush can include:  · A dry, cottony feeling in your mouth  · Loss of taste  · Pain while eating or swallowing  · White or red patches inside your mouth or on the back of your throat  Diagnosing thrush  Your health care provider will ask about your medical history and your symptoms. He or she will look closely at your mouth and throat. White or red patches will be scraped with a tongue depressor. The sample will be sent to a lab to test. This test can usually confirm thrush.  If you have thrush, you may also have esophageal candidiasis. This is common in people who have HIV. Your health care provider may check for this condition with an upper endoscopy. This is a procedure to look at the esophagus. A tissue sample may be taken to test.  Treatment for thrush  It is important to treat thrush early. Untreated, it may turn into a more serious form of widespread infection. Thrush is usually treated with antifungal medicine. The medicine is put directly in your mouth and throat. You may be given a swish and swallow medicine or an antifungal lozenge.  In some cases, you may need an antifungal pill. This can remove Candida throughout your body. Or you may need medicine through an IV. These treatments depend on how severe your infection is, and what other health conditions you have.  If you are at high risk for thrush, you may need to keep taking oral antifungal medicine. This is to help prevent thrush in the future.  What happens if you dont get treated for thrush?  If untreated, the  Candida may spread throughout your body. They may even enter your bloodstream. This can cause serious problems, such as organ failure and even death. Bloodstream infection may need to be treated with high doses of antifungal medicine through an IV.  Systemic infection is much more likely in people who are very ill. It is also more common in those who have serious problems with their immune system. Additional risk factors for systemic infection in very ill people include:  · Central venous lines  · IV nutrition  · Broad-spectrum antibiotics  · Kidney failure  · Recent surgery  Preventing thrush  You may be able to help prevent some cases of thrush. Make sure to:  · Practice good oral hygiene. Try using a chlorhexidine mouthwash.  · Clean your dentures regularly as instructed. Make sure they fit you correctly.  · After using a corticosteroid inhaler, rinse out your mouth with water or mouthwash.  · Do not use broad-spectrum antibiotics, if possible.  · Get treated for health problems that increase your risk for thrush, such as diabetes.     When to call the health care provider  Call your health care provider right away if you have any of these:  · Cottony feeling in your mouth  · Loss of taste  · Pain while eating or swallowing  · White or red patches inside your mouth or on the back of your throat   Date Last Reviewed: 3/19/2015  © 8051-3619 [employer]. [address] [place] [address], [place] [address]. All rights reserved. This information is not intended as a substitute for professional medical care. Always follow your healthcare professional's instructions.      Candida Infection: Thrush  Thrush is a type of fungal infection in the mouth and throat. Thrush does not usually affect healthy adults. It is more common in people with a weakened immune system. It is also more likely if you take antibiotics. Thrush is normally not contagious.  Understanding fungus in the mouth and throat  Your mouth and throat  normally contain millions of tiny organisms. These include bacteria and yeasts. Many of these do not cause any problems. In fact, they may help fight disease.  Yeasts are a type of fungus. A type of yeast called Candida normally lives on your skin. It is also found on the membranes of your mouth and throat. Usually, this yeast grows only in small amounts and is harmless. But in some cases, Candida can grow out of control and cause thrush. Thrush is related to other kinds of Candida infections that can grow all over the body. Thrush refers to an infection of only the mouth and throat.  What causes thrush?  Thrush happens when something lets too much Candida grow inside your mouth and throat. Certain things that change the normal balance of organisms in the mouth can lead to thrush. One example is antibiotic medicine. This medicine may kill some of the normal bacteria in your mouth. Candida can then grow freely. People on antibiotics have an increased risk for thrush.  You have a higher risk for thrush if you:  · Wear dentures  · Are getting chemotherapy  · Are getting radiation therapy  · Have diabetes  · Have a transplanted organ  · Use corticosteroids  · Have a weakened immune system, such as from AIDS  · Are an older adult  Symptoms of thrush  Some people with thrush do not have any symptoms. Symptoms of thrush can include:  · A dry, cottony feeling in your mouth  · Loss of taste  · Pain while eating or swallowing  · White or red patches inside your mouth or on the back of your throat  Diagnosing thrush  Your health care provider will ask about your medical history and your symptoms. He or she will look closely at your mouth and throat. White or red patches will be scraped with a tongue depressor. The sample will be sent to a lab to test. This test can usually confirm thrush.  If you have thrush, you may also have esophageal candidiasis. This is common in people who have HIV. Your health care provider may check  for this condition with an upper endoscopy. This is a procedure to look at the esophagus. A tissue sample may be taken to test.  Treatment for thrush  It is important to treat thrush early. Untreated, it may turn into a more serious form of widespread infection. Thrush is usually treated with antifungal medicine. The medicine is put directly in your mouth and throat. You may be given a swish and swallow medicine or an antifungal lozenge.  In some cases, you may need an antifungal pill. This can remove Candida throughout your body. Or you may need medicine through an IV. These treatments depend on how severe your infection is, and what other health conditions you have.  If you are at high risk for thrush, you may need to keep taking oral antifungal medicine. This is to help prevent thrush in the future.  What happens if you dont get treated for thrush?  If untreated, the Candida may spread throughout your body. They may even enter your bloodstream. This can cause serious problems, such as organ failure and even death. Bloodstream infection may need to be treated with high doses of antifungal medicine through an IV.  Systemic infection is much more likely in people who are very ill. It is also more common in those who have serious problems with their immune system. Additional risk factors for systemic infection in very ill people include:  · Central venous lines  · IV nutrition  · Broad-spectrum antibiotics  · Kidney failure  · Recent surgery  Preventing thrush  You may be able to help prevent some cases of thrush. Make sure to:  · Practice good oral hygiene. Try using a chlorhexidine mouthwash.  · Clean your dentures regularly as instructed. Make sure they fit you correctly.  · After using a corticosteroid inhaler, rinse out your mouth with water or mouthwash.  · Do not use broad-spectrum antibiotics, if possible.  · Get treated for health problems that increase your risk for thrush, such as diabetes.     When to  call the health care provider  Call your health care provider right away if you have any of these:  · Cottony feeling in your mouth  · Loss of taste  · Pain while eating or swallowing  · White or red patches inside your mouth or on the back of your throat   Date Last Reviewed: 3/19/2015  © 7967-8021 [employer]. [address] [place] [address], [place] [address]. All rights reserved. This information is not intended as a substitute for professional medical care. Always follow your healthcare professional's instructions.

## 2018-01-21 NOTE — PROGRESS NOTES
"Subjective:       Patient ID: Ora Henderson is a 57 y.o. female.    Chief Complaint: Sore Throat (x5 days) and Blister (In mouth)    HPI  Ora presents with a cootony feeling in her mouth and a white coating on her tongue. She states she can't taste anything. She states her throat has been hurting as well. Symptoms started 2 days ago. She was tested for the flu last week and given several medications. She continues to cough with a rattle in her chest. The cough and rattle is worse at night. She is using Mucinex DM with some relief.   Pulse 88   Temp 97.5 °F (36.4 °C) (Tympanic)   Ht 5' 3" (1.6 m)   Wt 93.7 kg (206 lb 9.1 oz)   SpO2 95%   BMI 36.59 kg/m²     Review of Systems   Constitutional: Negative for chills, diaphoresis and fever.   HENT: Positive for mouth sores and sore throat. Negative for congestion, postnasal drip and sinus pressure.    Respiratory: Negative for cough, chest tightness and shortness of breath.    Cardiovascular: Negative for chest pain and palpitations.   Gastrointestinal: Negative for abdominal distention, abdominal pain, diarrhea, nausea and vomiting.   Genitourinary: Negative for difficulty urinating.   Musculoskeletal: Negative for myalgias.   Skin: Negative for rash and wound.   Allergic/Immunologic: Negative for immunocompromised state.   Neurological: Negative for headaches.   Hematological: Does not bruise/bleed easily.   Psychiatric/Behavioral: Negative for behavioral problems and confusion.       Objective:      Physical Exam   Constitutional: She is oriented to person, place, and time. She appears well-developed and well-nourished. No distress.   HENT:   Head: Normocephalic and atraumatic.   Right Ear: Tympanic membrane and ear canal normal.   Left Ear: Tympanic membrane and ear canal normal.   Nose: Nose normal. No mucosal edema. Right sinus exhibits no maxillary sinus tenderness and no frontal sinus tenderness. Left sinus exhibits no maxillary sinus " tenderness and no frontal sinus tenderness.   Mouth/Throat: Uvula is midline. Posterior oropharyngeal erythema present. No oropharyngeal exudate or posterior oropharyngeal edema.   Raised white coating over tongue.    Eyes: Conjunctivae and EOM are normal. Pupils are equal, round, and reactive to light.   Neck: Neck supple.   Cardiovascular: Normal rate, regular rhythm and intact distal pulses.    No murmur heard.  Pulmonary/Chest: Breath sounds normal. No respiratory distress. She has no wheezes.   Abdominal: Soft. Bowel sounds are normal. There is no tenderness.   Musculoskeletal: Normal range of motion. She exhibits no edema or deformity.   Lymphadenopathy:     She has no cervical adenopathy.   Neurological: She is alert and oriented to person, place, and time.   Skin: Skin is warm and dry. No rash noted. She is not diaphoretic.   Psychiatric: She has a normal mood and affect. Her behavior is normal.   Vitals reviewed.      Assessment:       1. Pharyngitis, unspecified etiology    2. Oral thrush    3. Cough    4. Wheeze        Plan:       Ora was seen today for sore throat and blister.    Diagnoses and all orders for this visit:    Pharyngitis, unspecified etiology  -     POCT Rapid Strep A  -     Strep A culture, throat    Oral thrush  -     nystatin (MYCOSTATIN) 100,000 unit/mL suspension; Use 2.5 mL on each side of mouth. Retain in mouth as long as possible before swallowing. Continue for 2 days after resolution    Cough  -     methylPREDNISolone (MEDROL DOSEPACK) 4 mg tablet; use as directed    Wheeze  -     methylPREDNISolone (MEDROL DOSEPACK) 4 mg tablet; use as directed    Other orders  -     azithromycin (Z-RAJESH) 250 MG tablet; Take 2 tablets by mouth on day 1; Take 1 tablet by mouth on days 2-5      Will not use inhaled meds with the thrush. Will cover with antibiotic since using steroids after the flu.   If symptoms worsen or fail to improve, follow-up with primary care doctor or nearest ER. After  visit summary given and discussed. Patient verbalized understanding and agrees with treatment plan. Patient remained stable and was discharged in no acute distress.

## 2018-01-23 LAB — BACTERIA THROAT CULT: NORMAL

## 2018-01-27 ENCOUNTER — OFFICE VISIT (OUTPATIENT)
Dept: URGENT CARE | Facility: CLINIC | Age: 58
End: 2018-01-27
Payer: COMMERCIAL

## 2018-01-27 VITALS
HEIGHT: 63 IN | TEMPERATURE: 98 F | BODY MASS INDEX: 36.32 KG/M2 | DIASTOLIC BLOOD PRESSURE: 82 MMHG | WEIGHT: 205 LBS | HEART RATE: 92 BPM | OXYGEN SATURATION: 98 % | SYSTOLIC BLOOD PRESSURE: 130 MMHG

## 2018-01-27 DIAGNOSIS — R09.89 CHEST CONGESTION: ICD-10-CM

## 2018-01-27 DIAGNOSIS — J40 BRONCHITIS: Primary | ICD-10-CM

## 2018-01-27 PROCEDURE — 99999 PR PBB SHADOW E&M-EST. PATIENT-LVL V: CPT | Mod: PBBFAC,,, | Performed by: NURSE PRACTITIONER

## 2018-01-27 PROCEDURE — 99214 OFFICE O/P EST MOD 30 MIN: CPT | Mod: S$GLB,,, | Performed by: NURSE PRACTITIONER

## 2018-01-27 RX ORDER — ALBUTEROL SULFATE 90 UG/1
1-2 AEROSOL, METERED RESPIRATORY (INHALATION) EVERY 4 HOURS PRN
Qty: 1 INHALER | Refills: 0 | Status: SHIPPED | OUTPATIENT
Start: 2018-01-27 | End: 2020-06-09

## 2018-01-27 RX ORDER — GUAIFENESIN 600 MG/1
600 TABLET, EXTENDED RELEASE ORAL 2 TIMES DAILY
Qty: 30 TABLET | Refills: 0 | Status: SHIPPED | OUTPATIENT
Start: 2018-01-27 | End: 2018-02-06

## 2018-01-27 NOTE — PROGRESS NOTES
Subjective:       Patient ID: Ora Henderson is a 57 y.o. female.    Chief Complaint: Cough    Pt is a 57 year old female to clinic today with complaints of cough that began 2 weeks ago. Pt was recently dx with influenza and tx with tamiflu on 01/15/2018.       Cough   This is a recurrent problem. The current episode started 1 to 4 weeks ago. The problem has been gradually worsening. The problem occurs every few minutes. The cough is productive of sputum. Associated symptoms include headaches, nasal congestion, postnasal drip, rhinorrhea and wheezing. Pertinent negatives include no chest pain, chills, ear congestion, ear pain, fever, heartburn, hemoptysis, myalgias, rash, sore throat, shortness of breath, sweats or weight loss. Treatments tried: robitussin dm. The treatment provided mild relief. Her past medical history is significant for bronchitis. There is no history of asthma or pneumonia.     Review of Systems   Constitutional: Negative for chills, diaphoresis, fatigue, fever and weight loss.   HENT: Positive for congestion, postnasal drip and rhinorrhea. Negative for ear discharge, ear pain, sinus pain, sinus pressure, sneezing, sore throat and trouble swallowing.    Eyes: Negative for pain.   Respiratory: Positive for cough and wheezing. Negative for hemoptysis, chest tightness and shortness of breath.    Cardiovascular: Negative for chest pain and palpitations.   Gastrointestinal: Negative for abdominal pain, diarrhea, heartburn, nausea and vomiting.   Genitourinary: Negative for dysuria.   Musculoskeletal: Negative for back pain, myalgias and neck pain.   Skin: Negative for rash.   Neurological: Positive for headaches. Negative for dizziness and light-headedness.       Objective:      Physical Exam   Constitutional: She is oriented to person, place, and time. She appears well-developed and well-nourished. No distress.   HENT:   Head: Normocephalic.   Right Ear: Tympanic membrane, external  ear and ear canal normal. No tenderness. Tympanic membrane is not bulging.   Left Ear: Tympanic membrane, external ear and ear canal normal. No tenderness. Tympanic membrane is not bulging.   Nose: Mucosal edema present. No rhinorrhea. Right sinus exhibits no maxillary sinus tenderness and no frontal sinus tenderness. Left sinus exhibits no maxillary sinus tenderness and no frontal sinus tenderness.   Mouth/Throat: Uvula is midline, oropharynx is clear and moist and mucous membranes are normal. No oropharyngeal exudate, posterior oropharyngeal edema or posterior oropharyngeal erythema.   Eyes: Conjunctivae and EOM are normal. Pupils are equal, round, and reactive to light.   Neck: Normal range of motion. Neck supple.   Cardiovascular: Normal rate, regular rhythm, S1 normal, S2 normal and normal heart sounds.  Exam reveals no gallop and no friction rub.    No murmur heard.  Pulmonary/Chest: Effort normal and breath sounds normal. No accessory muscle usage. No apnea, no tachypnea and no bradypnea. No respiratory distress. She has no decreased breath sounds. She has no wheezes. She has no rhonchi. She has no rales.   Lymphadenopathy:        Head (right side): No submental, no submandibular and no tonsillar adenopathy present.        Head (left side): No submental, no submandibular and no tonsillar adenopathy present.     She has no cervical adenopathy.   Neurological: She is alert and oriented to person, place, and time.   Skin: Skin is warm and dry. No rash noted. She is not diaphoretic.   Psychiatric: She has a normal mood and affect. Her speech is normal and behavior is normal. Thought content normal.   Nursing note and vitals reviewed.      Assessment:       1. Bronchitis    2. Chest congestion        Plan:   Bronchitis  -     albuterol 90 mcg/actuation inhaler; Inhale 1-2 puffs into the lungs every 4 (four) hours as needed for Wheezing or Shortness of Breath (cough).  Dispense: 1 Inhaler; Refill: 0    Chest  congestion  -     guaiFENesin (MUCINEX) 600 mg 12 hr tablet; Take 1 tablet (600 mg total) by mouth 2 (two) times daily.  Dispense: 30 tablet; Refill: 0      · Your symptoms are likely due to a viral infection. These infections can last up to 14 days, but you should notice some improvement of your symptoms within the first 7-10 days. Viral infections will not improve with antibiotics. If your symptoms persist >10 days without improvement or if you have any new or worsening symptoms, this is an indication that you may have developed a bacterial infection and should return to your primary care provider or to Urgent Care.   · Getting plenty of rest is very important to fighting infections.  · Increase fluids.   · May apply warm compresses as needed for facial pain and congestion.   · Saline nasal spray to loosen nasal congestion.  · Flonase or Nasacort to reduce inflammation in the sinus cavities.  · You may take an over the counter antihistamine for allergy symptoms such as sneezing, itchy/watery eyes, scratchy throat, or congestion.  · Take Tylenol or Ibuprofen as needed for sore throat, body aches, or fever.  · Don't drive, drink alcohol, or take any other medication or substance that causes sedation while taking cough syrup.   · Follow up with your primary care provider if symptoms persist >10 days or sooner for any new or worsening symptoms.   · Go to the ER for any fever that does not improve with Tylenol/Ibuprofen, neck stiffness, rash, severe headache, vision changes, shortness of breath, chest pain, facial swelling, severe facial pain, or any other new and concerning symptoms.

## 2018-01-27 NOTE — PATIENT INSTRUCTIONS
What Is Acute Bronchitis?  Acute bronchitis is when the airways in your lungs (bronchial tubes) become red and swollen (inflamed). It is usually caused by a viral infection. But it can also occur because of a bacteria or allergen. Symptoms include a cough that produces yellow or greenish mucus and can last for days or sometimes weeks.  Inside healthy lungs    Air travels in and out of the lungs through the airways. The linings of these airways produce sticky mucus. This mucus traps particles that enter the lungs. Tiny structures called cilia then sweep the particles out of the airways.     Healthy airway: Airways are normally open. Air moves in and out easily.      Healthy cilia: Tiny, hairlike cilia sweep mucus and particles up and out of the airways.   Lungs with bronchitis  Bronchitis often occurs with a cold or the flu virus. The airways become inflamed (red and swollen). There is a deep hacking cough from the extra mucus. Other symptoms may include:  · Wheezing  · Chest discomfort  · Shortness of breath  · Mild fever  A second infection, this time due to bacteria, may then occur. And airways irritated by allergens or smoke are more likely to get infected.        Inflamed airway: Inflammation and extra mucus narrow the airway, causing shortness of breath.      Impaired cilia: Extra mucus impairs cilia, causing congestion and wheezing. Smoking makes the problem worse.   Making a diagnosis  A physical exam, health history, and certain tests help your healthcare provider make the diagnosis.  Health history  Your healthcare provider will ask you about your symptoms.  The exam  Your provider listens to your chest for signs of congestion. He or she may also check your ears, nose, and throat.  Possible tests  · A sputum test for bacteria. This requires a sample of mucus from your lungs.  · A nasal or throat swab. This tests to see if you have a bacterial infection.  · A chest X-ray. This is done if your healthcare  provider thinks you have pneumonia.  · Tests to check for an underlying condition. Other tests may be done to check for things such as allergies, asthma, or COPD (chronic obstructive pulmonary disease). You may need to see a specialist for more lung function testing.  Treating a cough  The main treatment for bronchitis is easing symptoms. Avoiding smoke, allergens, and other things that trigger coughing can often help. If the infection is bacterial, you may be given antibiotics. During the illness, it's important to get plenty of sleep. To ease symptoms:  · Dont smoke. Also avoid secondhand smoke.  · Use a humidifier. Or try breathing in steam from a hot shower. This may help loosen mucus.  · Drink a lot of water and juice. They can soothe the throat and may help thin mucus.  · Sit up or use extra pillows when in bed. This helps to lessen coughing and congestion.  · Ask your provider about using medicine. Ask about using cough medicine, pain and fever medicine, or a decongestant.  Antibiotics  Most cases of bronchitis are caused by cold or flu viruses. They dont need antibiotics to treat them, even if your mucus is thick and green or yellow. Antibiotics dont treat viral illness and antibiotics have not been shown to have any benefit in cases of acute bronchitis. Taking antibiotics when they are not needed increases your risk of getting an infection later that is antibiotic-resistant. Antibiotics can also cause severe cases of diarrhea that require other antibiotics to treat.  It is important that you accept your healthcare provider's opinion to not use antibiotics. Your provider will prescribe antibiotics if the infection is caused by bacteria. If they are prescribed:  · Take all of the medicine. Take the medicine until it is used up, even if symptoms have improved. If you dont, the bronchitis may come back.  · Take the medicines as directed. For instance, some medicines should be taken with food.  · Ask about  side effects. Ask your provider or pharmacist what side effects are common, and what to do about them.  Follow-up care  You should see your provider again in 2 to 3 weeks. By this time, symptoms should have improved. An infection that lasts longer may mean you have a more serious problem.  Prevention  · Avoid tobacco smoke. If you smoke, quit. Stay away from smoky places. Ask friends and family not to smoke around you, or in your home or car.  · Get checked for allergies.  · Ask your provider about getting a yearly flu shot. Also ask about pneumococcal or pneumonia shots.  · Wash your hands often. This helps reduce the chance of picking up viruses that cause colds and flu.  Call your healthcare provider if:  · Symptoms worsen, or you have new symptoms  · Breathing problems worsen or  become severe  · Symptoms dont get better within a week, or within 3 days of taking antibiotics   Date Last Reviewed: 2/1/2017  © 3057-1026 [employer]. [address] [place] [address], [place] [address]. All rights reserved. This information is not intended as a substitute for professional medical care. Always follow your healthcare professional's instructions.

## 2018-02-18 DIAGNOSIS — I10 ESSENTIAL HYPERTENSION: ICD-10-CM

## 2018-02-18 RX ORDER — METOPROLOL SUCCINATE 25 MG/1
TABLET, EXTENDED RELEASE ORAL
Qty: 30 TABLET | Refills: 12 | Status: SHIPPED | OUTPATIENT
Start: 2018-02-18 | End: 2018-11-12

## 2018-02-27 ENCOUNTER — OFFICE VISIT (OUTPATIENT)
Dept: NEUROLOGY | Facility: CLINIC | Age: 58
End: 2018-02-27
Payer: COMMERCIAL

## 2018-02-27 ENCOUNTER — OFFICE VISIT (OUTPATIENT)
Dept: INTERNAL MEDICINE | Facility: CLINIC | Age: 58
End: 2018-02-27
Payer: COMMERCIAL

## 2018-02-27 VITALS
SYSTOLIC BLOOD PRESSURE: 130 MMHG | HEIGHT: 63 IN | TEMPERATURE: 98 F | HEART RATE: 97 BPM | OXYGEN SATURATION: 96 % | DIASTOLIC BLOOD PRESSURE: 84 MMHG | BODY MASS INDEX: 36.99 KG/M2 | WEIGHT: 208.75 LBS

## 2018-02-27 VITALS
HEIGHT: 63 IN | WEIGHT: 208.75 LBS | SYSTOLIC BLOOD PRESSURE: 118 MMHG | BODY MASS INDEX: 36.99 KG/M2 | DIASTOLIC BLOOD PRESSURE: 80 MMHG | HEART RATE: 72 BPM

## 2018-02-27 DIAGNOSIS — Z86.018 HISTORY OF BENIGN PITUITARY TUMOR: Chronic | ICD-10-CM

## 2018-02-27 DIAGNOSIS — J30.1 CHRONIC SEASONAL ALLERGIC RHINITIS DUE TO POLLEN: ICD-10-CM

## 2018-02-27 DIAGNOSIS — H81.10 BENIGN PAROXYSMAL POSITIONAL VERTIGO, UNSPECIFIED LATERALITY: Primary | ICD-10-CM

## 2018-02-27 DIAGNOSIS — M75.41 ROTATOR CUFF IMPINGEMENT SYNDROME OF RIGHT SHOULDER: ICD-10-CM

## 2018-02-27 DIAGNOSIS — M47.26 OSTEOARTHRITIS OF SPINE WITH RADICULOPATHY, LUMBAR REGION: ICD-10-CM

## 2018-02-27 DIAGNOSIS — K21.9 GASTROESOPHAGEAL REFLUX DISEASE, ESOPHAGITIS PRESENCE NOT SPECIFIED: Chronic | ICD-10-CM

## 2018-02-27 DIAGNOSIS — E78.2 MIXED HYPERLIPIDEMIA: Chronic | ICD-10-CM

## 2018-02-27 DIAGNOSIS — G47.33 OSA (OBSTRUCTIVE SLEEP APNEA): Chronic | ICD-10-CM

## 2018-02-27 DIAGNOSIS — M47.812 OSTEOARTHRITIS OF CERVICAL SPINE, UNSPECIFIED SPINAL OSTEOARTHRITIS COMPLICATION STATUS: ICD-10-CM

## 2018-02-27 DIAGNOSIS — I10 ESSENTIAL HYPERTENSION: Chronic | ICD-10-CM

## 2018-02-27 PROBLEM — M75.81 TENDINITIS OF RIGHT ROTATOR CUFF: Chronic | Status: RESOLVED | Noted: 2017-08-02 | Resolved: 2018-02-27

## 2018-02-27 PROCEDURE — 99215 OFFICE O/P EST HI 40 MIN: CPT | Mod: S$GLB,,, | Performed by: PSYCHIATRY & NEUROLOGY

## 2018-02-27 PROCEDURE — 99214 OFFICE O/P EST MOD 30 MIN: CPT | Mod: S$GLB,,, | Performed by: FAMILY MEDICINE

## 2018-02-27 PROCEDURE — 99999 PR PBB SHADOW E&M-EST. PATIENT-LVL III: CPT | Mod: PBBFAC,,, | Performed by: PSYCHIATRY & NEUROLOGY

## 2018-02-27 PROCEDURE — 3008F BODY MASS INDEX DOCD: CPT | Mod: S$GLB,,, | Performed by: FAMILY MEDICINE

## 2018-02-27 PROCEDURE — 99999 PR PBB SHADOW E&M-EST. PATIENT-LVL III: CPT | Mod: PBBFAC,,, | Performed by: FAMILY MEDICINE

## 2018-02-27 RX ORDER — MECLIZINE HCL 12.5 MG 12.5 MG/1
12.5 TABLET ORAL 2 TIMES DAILY PRN
Qty: 30 TABLET | Refills: 0 | Status: SHIPPED | OUTPATIENT
Start: 2018-02-27 | End: 2018-07-31

## 2018-02-27 NOTE — PROGRESS NOTES
Subjective:       Patient ID: Ora Henderson is a 57 y.o. female.    Chief Complaint: Follow-up (possible ear infection)    57-year-old Afro-American female patient with Patient Active Problem List:     DJD (degenerative joint disease), cervical-mild     Hyperlipidemia     Hypertension     Hepatomegaly     Family history of cardiovascular disease     Obesity, Class II, BMI 35-39.9, with comorbidity     GERD (gastroesophageal reflux disease)     NATALIE (obstructive sleep apnea)     Rotator cuff impingement syndrome of right shoulder     DJD of right AC (acromioclavicular) joint     History of benign pituitary tumor     Arthritis     Hx of colonic polyp  Reports that she's been having dizziness lately with position changes, has been having tinnitus for a long time.  Patient has been drinking adequate fluids.  Reports that she has chronic low back pain, radiating to lower extremities for which she has been to chiropractor, patient does have minimal neck pain, but has been doing well with chiropractor.   Denies of any discomfort secondary to rotator cuff dysfunction to the right shoulder with therapy.   Has been taking her medications regularly  Reports that she has been doing well with sleep taking melatonin, has not been using CPAP machine.   Patient has been taking Mobic as needed for low back pain.  Reports low back pain as 8/10      Review of Systems   Constitutional: Negative for fatigue.   HENT: Positive for tinnitus. Negative for ear discharge.    Eyes: Negative for visual disturbance.   Respiratory: Negative for shortness of breath.    Cardiovascular: Negative for chest pain, palpitations and leg swelling.   Gastrointestinal: Negative for abdominal pain, nausea and vomiting.   Musculoskeletal: Positive for back pain, myalgias and neck pain.   Skin: Negative for rash.   Neurological: Positive for dizziness, light-headedness and numbness. Negative for syncope, weakness and headaches.  "  Psychiatric/Behavioral: Negative for sleep disturbance.         /84 (BP Location: Right arm, Patient Position: Sitting)   Pulse 97   Temp 97.8 °F (36.6 °C) (Tympanic)   Ht 5' 3" (1.6 m)   Wt 94.7 kg (208 lb 12.4 oz)   SpO2 96%   BMI 36.98 kg/m²   Objective:      Physical Exam   Constitutional: She is oriented to person, place, and time. She appears well-developed and well-nourished.   HENT:   Head: Normocephalic and atraumatic.   Right Ear: External ear normal.   Left Ear: External ear normal.   Mouth/Throat: Oropharynx is clear and moist. No oropharyngeal exudate.   Cardiovascular: Normal rate, regular rhythm and normal heart sounds.    No murmur heard.  Negative for carotid bruit bilaterally   Pulmonary/Chest: Effort normal and breath sounds normal. She has no wheezes.   Abdominal: Soft. Bowel sounds are normal. There is no tenderness.   Musculoskeletal: She exhibits tenderness. She exhibits no edema.   Positive for paraspinal cervical and lumbar muscle tenderness in the midline   Neurological: She is alert and oriented to person, place, and time.   Skin: Skin is warm and dry. No rash noted.   Psychiatric: She has a normal mood and affect.         Assessment:       1. Benign paroxysmal positional vertigo, unspecified laterality    2. Osteoarthritis of spine with radiculopathy, lumbar region    3. Osteoarthritis of cervical spine, unspecified spinal osteoarthritis complication status    4. Chronic seasonal allergic rhinitis due to pollen    5. History of benign pituitary tumor    6. NATALIE (obstructive sleep apnea)    7. Essential hypertension    8. Obesity, Class II, BMI 35-39.9, with comorbidity    9. Rotator cuff impingement syndrome of right shoulder    10. Gastroesophageal reflux disease, esophagitis presence not specified    11. Mixed hyperlipidemia        Plan:   Benign paroxysmal positional vertigo, unspecified laterality  -     meclizine (ANTIVERT) 12.5 mg tablet; Take 1 tablet (12.5 mg total) " by mouth 2 (two) times daily as needed.  Dispense: 30 tablet; Refill: 0  -     CBC auto differential; Future; Expected date: 02/27/2018  Likely secondary to vertigo.  Meclizine prescribed today advised to take it sparingly.  Patient was advised to be cautious with position changes and drink adequate fluids  If symptoms continue to persist may refer to ENT    Osteoarthritis of spine with radiculopathy, lumbar region  -     Ambulatory referral to Pain Clinic  -     CBC auto differential; Future; Expected date: 02/27/2018  Osteoarthritis of cervical spine, unspecified spinal osteoarthritis complication status  -     Ambulatory referral to Pain Clinic  -     CBC auto differential; Future; Expected date: 02/27/2018  Reviewed previous x-rays showing arthritis to the lumbar as well as cervical spine.   Will refer to spine specialist as patient has been having ongoing pain  Advised to take Mobic as needed for pain    Chronic seasonal allergic rhinitis due to pollen-patient reports that she has Flonase nasal spray and takes Claritin as needed    History of benign pituitary tumor-has been followed by urology and had recent MRI showing no acute changes    NATALIE (obstructive sleep apnea)- reports that she's currently not on CPAP machine and has been sleeping well on melatonin    Essential hypertension  -     Comprehensive metabolic panel; Future; Expected date: 02/27/2018  -     Lipid panel; Future; Expected date: 02/27/2018  Blood pressure stable today currently on losartan 50 mg daily and metoprolol 25 mg daily    Obesity, Class II, BMI 35-39.9, with comorbidity  Lifestyle modifications recommended to lose weight with BMI 36    Rotator cuff impingement syndrome of right shoulder-stable and asymptomatic after physical therapy    Gastroesophageal reflux disease, esophagitis presence not specified-doing well on Nexium 40 mg daily and Pepcid 20 mg as needed  Advised to eat small frequent meals and avoid spicy diet    Mixed  hyperlipidemia-currently taking Crestor 20 mg daily

## 2018-02-28 ENCOUNTER — CLINICAL SUPPORT (OUTPATIENT)
Dept: INTERNAL MEDICINE | Facility: CLINIC | Age: 58
End: 2018-02-28
Payer: COMMERCIAL

## 2018-02-28 ENCOUNTER — LAB VISIT (OUTPATIENT)
Dept: LAB | Facility: HOSPITAL | Age: 58
End: 2018-02-28
Attending: FAMILY MEDICINE
Payer: COMMERCIAL

## 2018-02-28 DIAGNOSIS — H81.10 BENIGN PAROXYSMAL POSITIONAL VERTIGO, UNSPECIFIED LATERALITY: ICD-10-CM

## 2018-02-28 DIAGNOSIS — M47.812 OSTEOARTHRITIS OF CERVICAL SPINE, UNSPECIFIED SPINAL OSTEOARTHRITIS COMPLICATION STATUS: ICD-10-CM

## 2018-02-28 DIAGNOSIS — Z29.9 PREVENTIVE MEASURE: Primary | ICD-10-CM

## 2018-02-28 DIAGNOSIS — I10 ESSENTIAL HYPERTENSION: Chronic | ICD-10-CM

## 2018-02-28 DIAGNOSIS — M47.26 OSTEOARTHRITIS OF SPINE WITH RADICULOPATHY, LUMBAR REGION: ICD-10-CM

## 2018-02-28 LAB
ALBUMIN SERPL BCP-MCNC: 3.8 G/DL
ALP SERPL-CCNC: 64 U/L
ALT SERPL W/O P-5'-P-CCNC: 20 U/L
ANION GAP SERPL CALC-SCNC: 6 MMOL/L
AST SERPL-CCNC: 16 U/L
BASOPHILS # BLD AUTO: 0.03 K/UL
BASOPHILS NFR BLD: 0.5 %
BILIRUB SERPL-MCNC: 1.1 MG/DL
BUN SERPL-MCNC: 14 MG/DL
CALCIUM SERPL-MCNC: 9.6 MG/DL
CHLORIDE SERPL-SCNC: 104 MMOL/L
CHOLEST SERPL-MCNC: 185 MG/DL
CHOLEST/HDLC SERPL: 3.7 {RATIO}
CO2 SERPL-SCNC: 30 MMOL/L
CREAT SERPL-MCNC: 0.9 MG/DL
DIFFERENTIAL METHOD: ABNORMAL
EOSINOPHIL # BLD AUTO: 0.3 K/UL
EOSINOPHIL NFR BLD: 4.6 %
ERYTHROCYTE [DISTWIDTH] IN BLOOD BY AUTOMATED COUNT: 13.2 %
EST. GFR  (AFRICAN AMERICAN): >60 ML/MIN/1.73 M^2
EST. GFR  (NON AFRICAN AMERICAN): >60 ML/MIN/1.73 M^2
GLUCOSE SERPL-MCNC: 105 MG/DL
HCT VFR BLD AUTO: 36.7 %
HDLC SERPL-MCNC: 50 MG/DL
HDLC SERPL: 27 %
HGB BLD-MCNC: 12.1 G/DL
IMM GRANULOCYTES # BLD AUTO: 0.01 K/UL
IMM GRANULOCYTES NFR BLD AUTO: 0.2 %
LDLC SERPL CALC-MCNC: 107 MG/DL
LYMPHOCYTES # BLD AUTO: 2.7 K/UL
LYMPHOCYTES NFR BLD: 47.8 %
MCH RBC QN AUTO: 29.3 PG
MCHC RBC AUTO-ENTMCNC: 33 G/DL
MCV RBC AUTO: 89 FL
MONOCYTES # BLD AUTO: 0.5 K/UL
MONOCYTES NFR BLD: 8.7 %
NEUTROPHILS # BLD AUTO: 2.1 K/UL
NEUTROPHILS NFR BLD: 38.2 %
NONHDLC SERPL-MCNC: 135 MG/DL
NRBC BLD-RTO: 0 /100 WBC
PLATELET # BLD AUTO: 286 K/UL
PMV BLD AUTO: 10.8 FL
POTASSIUM SERPL-SCNC: 3.8 MMOL/L
PROT SERPL-MCNC: 7.3 G/DL
RBC # BLD AUTO: 4.13 M/UL
SODIUM SERPL-SCNC: 140 MMOL/L
TRIGL SERPL-MCNC: 140 MG/DL
WBC # BLD AUTO: 5.61 K/UL

## 2018-02-28 PROCEDURE — 99999 PR PBB SHADOW E&M-EST. PATIENT-LVL II: CPT | Mod: PBBFAC,,,

## 2018-02-28 PROCEDURE — 80061 LIPID PANEL: CPT

## 2018-02-28 PROCEDURE — 80053 COMPREHEN METABOLIC PANEL: CPT

## 2018-02-28 PROCEDURE — 86580 TB INTRADERMAL TEST: CPT | Mod: S$GLB,,, | Performed by: FAMILY MEDICINE

## 2018-02-28 PROCEDURE — 36415 COLL VENOUS BLD VENIPUNCTURE: CPT | Mod: PO

## 2018-02-28 PROCEDURE — 85025 COMPLETE CBC W/AUTO DIFF WBC: CPT

## 2018-02-28 NOTE — PROGRESS NOTES
Subjective:       Follow up;Pituitary Tumor s/p resection    HPI  She is here to go over the test results.    Past Medical History:   Diagnosis Date    Arthritis     Encounter for blood transfusion     GERD (gastroesophageal reflux disease)     Hepatomegaly     and fatty liver    Hernia, umbilical     reducible    Hyperlipidemia     Hypertension      Past Surgical History:   Procedure Laterality Date    BRAIN SURGERY      tumor removal     CARPAL TUNNEL RELEASE Bilateral     CERVICAL BIOPSY  W/ LOOP ELECTRODE EXCISION      [facility] '81     SECTION      x 1    COLONOSCOPY N/A 2016    Procedure: COLONOSCOPY;  Surgeon: Quique Paul MD;  Location: [facility];  Service: Endoscopy;  Laterality: N/A;    HERNIA REPAIR      2016    HYSTERECTOMY  2006    fibroids and endometriosis    TOTAL ABDOMINAL HYSTERECTOMY W/ BILATERAL SALPINGOOPHORECTOMY      STAR/BSO secondary to endometriosis    VENTRAL HERNIA REPAIR       Family History   Problem Relation Age of Onset    Heart disease Father     Stroke Father     Hypertension Father     Hypertension Mother     Cancer Maternal Aunt      pancreas    Cancer Maternal Uncle      pancreas    Heart disease Paternal Uncle     Heart disease Paternal Grandmother     Diabetes Maternal Aunt    .  Social History   Substance Use Topics    Smoking status: Never Smoker    Smokeless tobacco: Never Used    Alcohol use 0.0 oz/week      Comment: socially     Review of patient's allergies indicates:   Allergen Reactions    Sulfa (sulfonamide antibiotics) Hives       Review of Systems   Constitutional: Negative for activity change, appetite change, diaphoresis, fatigue, fever and unexpected weight change.   HENT: Negative for drooling, facial swelling, hearing loss, tinnitus and voice change.    Eyes: Negative for photophobia, pain and visual disturbance.   Respiratory: Negative for apnea, cough, chest tightness and shortness of breath.     Cardiovascular: Negative for chest pain, palpitations and leg swelling.   Gastrointestinal: Negative for nausea.   Genitourinary: Negative for difficulty urinating, dyspareunia, flank pain, frequency and pelvic pain.   Musculoskeletal: Positive for arthralgias and joint swelling. Negative for back pain, gait problem, myalgias, neck pain and neck stiffness.   Skin: Negative for pallor and rash.   Neurological: Negative for dizziness, tremors, seizures, syncope, facial asymmetry, speech difficulty, weakness, light-headedness, numbness and headaches.   Hematological: Does not bruise/bleed easily.   Psychiatric/Behavioral: Negative for agitation, behavioral problems, confusion, decreased concentration, dysphoric mood, hallucinations, sleep disturbance and suicidal ideas. The patient is not nervous/anxious.        Objective:      Neurologic Exam     Mental Status   Oriented to person, place, and time.   Recall at 5 minutes: recalls 3 of 3 objects. Follows 3 step commands.   Speech: speech is normal   Level of consciousness: alert  Knowledge: good and consistent with education. Able to perform simple calculations.   Able to name object. Able to read. Able to repeat. Normal comprehension.     Cranial Nerves     CN II   Visual fields full to confrontation.   Visual acuity: normal    CN III, IV, VI   Pupils are equal, round, and reactive to light.  Extraocular motions are normal.   Right pupil: Shape: regular. Reactivity: brisk. Consensual response: intact. Accommodation: intact.   Left pupil: Shape: regular. Reactivity: brisk. Consensual response: intact. Accommodation: intact.   CN III: no CN III palsy  CN VI: no CN VI palsy  Nystagmus: none   Diplopia: none  Ophthalmoparesis: none  Upgaze: normal  Downgaze: normal  Conjugate gaze: present  Vestibulo-ocular reflex: present    CN VIII   CN VIII normal.     CN IX, X   CN IX normal.   CN X normal.   Palate: symmetric  Right gag reflex: normal  Left gag reflex: normal    CN  XI   CN XI normal.     CN XII   CN XII normal.     Motor Exam   Muscle bulk: normal  Overall muscle tone: normal  Right arm tone: normal  Left arm tone: normal  Right leg tone: normal  Left leg tone: normal    Strength   Strength 5/5 throughout.     Sensory Exam   Light touch normal.   Proprioception normal.   Pinprick normal.   Graphesthesia: normal  Stereognosis: normal    Gait, Coordination, and Reflexes     Gait  Gait: normal    Coordination   Romberg: negative  Finger to nose coordination: normal  Heel to shin coordination: normal  Tandem walking coordination: normal    Tremor   Resting tremor: absent  Intention tremor: absent  Action tremor: absent    Reflexes   Right brachioradialis: 2+  Left brachioradialis: 2+  Right biceps: 2+  Left biceps: 2+  Right triceps: 2+  Left triceps: 2+  Right patellar: 2+  Left patellar: 2+  Right achilles: 2+  Left achilles: 2+  Right : 2+  Left : 2+  Right plantar: normal  Left plantar: normal  Right ankle clonus: absent  Left ankle clonus: absent           Laboratory:  Lab Results   Component Value Date    WBC 5.75 12/08/2016    HGB 12.6 12/08/2016    HCT 37.4 12/08/2016     12/08/2016    CHOL 190 05/08/2017    TRIG 128 05/08/2017    HDL 52 05/08/2017    ALT 21 10/16/2017    AST 18 10/16/2017     10/16/2017    K 3.7 10/16/2017     10/16/2017    CREATININE 0.8 10/16/2017    BUN 13 10/16/2017    CO2 32 (H) 10/16/2017    TSH 2.529 03/22/2016    INR 1.0 12/03/2012    HGBA1C 5.9 10/13/2014   MRI of the brain: 1/24/2012    IMPRESSION:   1.  POST SURGICAL CHANGES NOTED INVOLVING THE SELLA AS DESCRIBED.  2.  NO GROSS EVIDENCE OF RESIDUAL OR RECURRENT PITUITARY TUMOR.  3.  ADDITIONAL INCIDENTAL FINDINGS AS DESCRIBED IN THE BODY OF THE   REPORT.  FOLLOW UP IMAGING AND/OR FURTHER EVALUATION SHOULD BE BASED ON   CLINICAL AND LABORATORY FINDINGS    MRI of the brain: 1/4/2018  Impression           1.  Stable exam.  Postsurgical changes again noted involving the  sella as detailed above.    2.  No evidence of recurrent pituitary tumor.    3.  Minimal bilateral ethmoid and sphenoid sinus disease.    4.  Additional incidental findings as above.  Followup and/or further evaluation as warranted.            Assessment:     1. Post pituitary surgery changes but stable.    Patient Active Problem List   Diagnosis    Hyperlipidemia    Hypertension    Hepatomegaly    Family history of cardiovascular disease    Obesity, Class II, BMI 35-39.9, with comorbidity    Abnormal ECG    Gastroesophageal reflux disease    NATALIE (obstructive sleep apnea)    Chronic right shoulder pain    Chronic right rotator cuff tendonitis    Rotator cuff impingement syndrome of right shoulder    DJD of right AC (acromioclavicular) joint    Rotator cuff injury, right, initial encounter       Plan:    Will see PRN.

## 2018-02-28 NOTE — PROGRESS NOTES
PPD administered to Lt forearm.  Requested to remain in room 15 min to monitor for s/s adverse reaction.  Pt tolerated well./rpr

## 2018-03-02 LAB
TB INDURATION - 48 HR READ: 0 MM
TB INDURATION - 72 HR READ: NORMAL MM
TB SKIN TEST - 48 HR READ: NEGATIVE
TB SKIN TEST - 72 HR READ: NORMAL

## 2018-03-05 PROBLEM — I10 ESSENTIAL HYPERTENSION: Status: ACTIVE | Noted: 2018-03-05

## 2018-04-20 ENCOUNTER — TELEPHONE (OUTPATIENT)
Dept: INTERNAL MEDICINE | Facility: CLINIC | Age: 58
End: 2018-04-20

## 2018-04-20 NOTE — TELEPHONE ENCOUNTER
----- Message from Mary Falk sent at 4/20/2018  8:23 AM CDT -----  Contact: Pt   Pt requested a callback to discuss injections in her back to help ease the pain in her back and legs..[phone] (home)

## 2018-04-20 NOTE — TELEPHONE ENCOUNTER
Returned call to pt regarding getting injections in her back? No answer but left a message just advising to call us back to get more information in detail as to why she would need the injections. YONATHAN

## 2018-04-23 ENCOUNTER — TELEPHONE (OUTPATIENT)
Dept: INTERNAL MEDICINE | Facility: CLINIC | Age: 58
End: 2018-04-23

## 2018-04-23 NOTE — TELEPHONE ENCOUNTER
Pt requesting a referral to someone for back pain and to receive an injection.  I've attached a referral to Dr. Charlton if you agree.  Please advise./rpr

## 2018-04-23 NOTE — TELEPHONE ENCOUNTER
----- Message from Apolonia Major sent at 4/23/2018  8:00 AM CDT -----  Patient state she is having back pain and state she would like to be referred to someone to get an injection. Please adv/call [phone].//[email or web address]

## 2018-04-24 ENCOUNTER — TELEPHONE (OUTPATIENT)
Dept: INTERNAL MEDICINE | Facility: CLINIC | Age: 58
End: 2018-04-24

## 2018-04-24 NOTE — TELEPHONE ENCOUNTER
LVM for pt advisng her to call appt desk to sched appt w/ Dr. Charlton/[facility] since referral in chart since placed 02/2018; message sent to that dept as well for their office to call pt and sched.

## 2018-04-24 NOTE — TELEPHONE ENCOUNTER
----- Message from Kirsten Norton sent at 4/24/2018  8:52 AM CDT -----  Contact: Patient  Pateint needs to speak to nurse regarding a referral, please call her  [phone]. Thank you

## 2018-05-21 ENCOUNTER — TELEPHONE (OUTPATIENT)
Dept: INTERNAL MEDICINE | Facility: CLINIC | Age: 58
End: 2018-05-21

## 2018-05-21 DIAGNOSIS — Z12.39 BREAST SCREENING: Primary | ICD-10-CM

## 2018-05-21 NOTE — TELEPHONE ENCOUNTER
----- Message from Radha Ellison sent at 5/21/2018  1:43 PM CDT -----  Contact: Pt   Pt is requesting orders for her annual mammogram         Pt can be contacted at [phone]

## 2018-06-13 ENCOUNTER — HOSPITAL ENCOUNTER (OUTPATIENT)
Dept: RADIOLOGY | Facility: HOSPITAL | Age: 58
Discharge: HOME | End: 2018-06-13
Attending: FAMILY MEDICINE
Payer: COMMERCIAL

## 2018-06-13 VITALS — HEIGHT: 63 IN | BODY MASS INDEX: 36.86 KG/M2 | WEIGHT: 208 LBS

## 2018-06-13 DIAGNOSIS — Z12.39 BREAST SCREENING: ICD-10-CM

## 2018-06-13 PROCEDURE — 77067 SCR MAMMO BI INCL CAD: CPT | Mod: TC,PO

## 2018-06-13 PROCEDURE — 77067 SCR MAMMO BI INCL CAD: CPT | Mod: 26,,, | Performed by: RADIOLOGY

## 2018-07-12 ENCOUNTER — TELEPHONE (OUTPATIENT)
Dept: INTERNAL MEDICINE | Facility: CLINIC | Age: 58
End: 2018-07-12

## 2018-07-12 NOTE — TELEPHONE ENCOUNTER
----- Message from Lisbeth Angeles sent at 7/12/2018 12:51 PM CDT -----  Contact: Pt   States she's calling to follow up on paperwork that was brought in for Dr to fill out /wants to know if it's ready and can be reached at [phone]/[email or web address]

## 2018-07-12 NOTE — TELEPHONE ENCOUNTER
Returned call to pt regarding paperwork, advised that Dr. Montes does have paperwork in her office but hasn't not signed it yet. Informed that I will give her a call back once paperwork is completed. YONTAHAN

## 2018-07-31 ENCOUNTER — OFFICE VISIT (OUTPATIENT)
Dept: INTERNAL MEDICINE | Facility: CLINIC | Age: 58
End: 2018-07-31
Payer: COMMERCIAL

## 2018-07-31 ENCOUNTER — CLINICAL SUPPORT (OUTPATIENT)
Dept: CARDIOLOGY | Facility: CLINIC | Age: 58
End: 2018-07-31
Payer: COMMERCIAL

## 2018-07-31 ENCOUNTER — LAB VISIT (OUTPATIENT)
Dept: LAB | Facility: HOSPITAL | Age: 58
End: 2018-07-31
Attending: FAMILY MEDICINE
Payer: COMMERCIAL

## 2018-07-31 VITALS
OXYGEN SATURATION: 99 % | HEIGHT: 63 IN | WEIGHT: 207.69 LBS | BODY MASS INDEX: 36.8 KG/M2 | TEMPERATURE: 96 F | HEART RATE: 80 BPM | SYSTOLIC BLOOD PRESSURE: 134 MMHG | DIASTOLIC BLOOD PRESSURE: 88 MMHG

## 2018-07-31 DIAGNOSIS — M47.26 OSTEOARTHRITIS OF SPINE WITH RADICULOPATHY, LUMBAR REGION: ICD-10-CM

## 2018-07-31 DIAGNOSIS — Z29.9 PREVENTIVE MEASURE: ICD-10-CM

## 2018-07-31 DIAGNOSIS — M75.41 ROTATOR CUFF IMPINGEMENT SYNDROME OF RIGHT SHOULDER: ICD-10-CM

## 2018-07-31 DIAGNOSIS — G47.33 OSA (OBSTRUCTIVE SLEEP APNEA): Chronic | ICD-10-CM

## 2018-07-31 DIAGNOSIS — I10 ESSENTIAL HYPERTENSION: ICD-10-CM

## 2018-07-31 DIAGNOSIS — N64.4 BREAST PAIN, LEFT: ICD-10-CM

## 2018-07-31 DIAGNOSIS — K21.9 GASTROESOPHAGEAL REFLUX DISEASE, ESOPHAGITIS PRESENCE NOT SPECIFIED: Chronic | ICD-10-CM

## 2018-07-31 DIAGNOSIS — Z86.018 HISTORY OF BENIGN PITUITARY TUMOR: Chronic | ICD-10-CM

## 2018-07-31 DIAGNOSIS — E78.2 MIXED HYPERLIPIDEMIA: Chronic | ICD-10-CM

## 2018-07-31 DIAGNOSIS — M47.812 DJD (DEGENERATIVE JOINT DISEASE), CERVICAL: Chronic | ICD-10-CM

## 2018-07-31 LAB
ALBUMIN SERPL BCP-MCNC: 3.9 G/DL
ALP SERPL-CCNC: 73 U/L
ALT SERPL W/O P-5'-P-CCNC: 21 U/L
ANION GAP SERPL CALC-SCNC: 5 MMOL/L
AST SERPL-CCNC: 18 U/L
BASOPHILS # BLD AUTO: 0.04 K/UL
BASOPHILS NFR BLD: 0.6 %
BILIRUB SERPL-MCNC: 0.5 MG/DL
BUN SERPL-MCNC: 14 MG/DL
CALCIUM SERPL-MCNC: 9.6 MG/DL
CHLORIDE SERPL-SCNC: 104 MMOL/L
CHOLEST SERPL-MCNC: 196 MG/DL
CHOLEST/HDLC SERPL: 4.3 {RATIO}
CK SERPL-CCNC: 195 U/L
CO2 SERPL-SCNC: 31 MMOL/L
CREAT SERPL-MCNC: 0.9 MG/DL
DIFFERENTIAL METHOD: ABNORMAL
EOSINOPHIL # BLD AUTO: 0.3 K/UL
EOSINOPHIL NFR BLD: 5 %
ERYTHROCYTE [DISTWIDTH] IN BLOOD BY AUTOMATED COUNT: 12.9 %
EST. GFR  (AFRICAN AMERICAN): >60 ML/MIN/1.73 M^2
EST. GFR  (NON AFRICAN AMERICAN): >60 ML/MIN/1.73 M^2
GLUCOSE SERPL-MCNC: 106 MG/DL
HCT VFR BLD AUTO: 37.3 %
HDLC SERPL-MCNC: 46 MG/DL
HDLC SERPL: 23.5 %
HGB BLD-MCNC: 11.7 G/DL
IMM GRANULOCYTES # BLD AUTO: 0.01 K/UL
IMM GRANULOCYTES NFR BLD AUTO: 0.2 %
LDLC SERPL CALC-MCNC: 127.2 MG/DL
LYMPHOCYTES # BLD AUTO: 2.5 K/UL
LYMPHOCYTES NFR BLD: 41 %
MCH RBC QN AUTO: 29.2 PG
MCHC RBC AUTO-ENTMCNC: 31.4 G/DL
MCV RBC AUTO: 93 FL
MONOCYTES # BLD AUTO: 0.5 K/UL
MONOCYTES NFR BLD: 7.8 %
NEUTROPHILS # BLD AUTO: 2.8 K/UL
NEUTROPHILS NFR BLD: 45.4 %
NONHDLC SERPL-MCNC: 150 MG/DL
NRBC BLD-RTO: 0 /100 WBC
PLATELET # BLD AUTO: 267 K/UL
PMV BLD AUTO: 10 FL
POTASSIUM SERPL-SCNC: 4.4 MMOL/L
PROT SERPL-MCNC: 7.2 G/DL
RBC # BLD AUTO: 4.01 M/UL
SODIUM SERPL-SCNC: 140 MMOL/L
TRIGL SERPL-MCNC: 114 MG/DL
TROPONIN I SERPL DL<=0.01 NG/ML-MCNC: <0.006 NG/ML
TSH SERPL DL<=0.005 MIU/L-ACNC: 1.84 UIU/ML
WBC # BLD AUTO: 6.19 K/UL

## 2018-07-31 PROCEDURE — 3075F SYST BP GE 130 - 139MM HG: CPT | Mod: CPTII,S$GLB,, | Performed by: FAMILY MEDICINE

## 2018-07-31 PROCEDURE — 3008F BODY MASS INDEX DOCD: CPT | Mod: CPTII,S$GLB,, | Performed by: FAMILY MEDICINE

## 2018-07-31 PROCEDURE — 99214 OFFICE O/P EST MOD 30 MIN: CPT | Mod: S$GLB,,, | Performed by: FAMILY MEDICINE

## 2018-07-31 PROCEDURE — 93000 ELECTROCARDIOGRAM COMPLETE: CPT | Mod: S$GLB,,, | Performed by: INTERNAL MEDICINE

## 2018-07-31 PROCEDURE — 80061 LIPID PANEL: CPT

## 2018-07-31 PROCEDURE — 3079F DIAST BP 80-89 MM HG: CPT | Mod: CPTII,S$GLB,, | Performed by: FAMILY MEDICINE

## 2018-07-31 PROCEDURE — 80053 COMPREHEN METABOLIC PANEL: CPT

## 2018-07-31 PROCEDURE — 84484 ASSAY OF TROPONIN QUANT: CPT

## 2018-07-31 PROCEDURE — 36415 COLL VENOUS BLD VENIPUNCTURE: CPT | Mod: PO

## 2018-07-31 PROCEDURE — 84443 ASSAY THYROID STIM HORMONE: CPT

## 2018-07-31 PROCEDURE — 82550 ASSAY OF CK (CPK): CPT

## 2018-07-31 PROCEDURE — 99999 PR PBB SHADOW E&M-EST. PATIENT-LVL III: CPT | Mod: PBBFAC,,, | Performed by: FAMILY MEDICINE

## 2018-07-31 PROCEDURE — 85025 COMPLETE CBC W/AUTO DIFF WBC: CPT

## 2018-07-31 RX ORDER — LOSARTAN POTASSIUM AND HYDROCHLOROTHIAZIDE 12.5; 5 MG/1; MG/1
1 TABLET ORAL DAILY
Qty: 30 TABLET | Refills: 1 | Status: SHIPPED | OUTPATIENT
Start: 2018-07-31 | End: 2018-10-25 | Stop reason: SDUPTHER

## 2018-07-31 NOTE — PROGRESS NOTES
Subjective:       Patient ID: Ora Henderson is a 58 y.o. female.    Chief Complaint: Breast Pain    58-year-old  female patient with Patient Active Problem List:     DJD (degenerative joint disease), cervical-mild     Hyperlipidemia     Hepatomegaly     Family history of cardiovascular disease     Obesity, Class II, BMI 35-39.9, with comorbidity     GERD (gastroesophageal reflux disease)     NATALIE (obstructive sleep apnea)     Rotator cuff impingement syndrome of right shoulder     DJD of right AC (acromioclavicular) joint     History of benign pituitary tumor     Arthritis     Hx of colonic polyp     Essential hypertension     Osteoarthritis of spine with radiculopathy, lumbar region  Here with complaint of left breast pain for the past 2 weeks off and on lately, patient recently had mammogram done which has been normal, reports that her pain has been between 5 to 6/10 and just started having some discomfort to the right breast this morning.  Reports that she has been drinking excess coffee and has been trying to cut down.  Denies any chest pain or difficulty breathing .  Has been taking her blood pressure medications regularly, reports that she always has mildly elevated diastolic blood pressure  Patient secondary to low back pain has been followed by Dr. Cortés and had a steroid injection a month ago  Patient continues to have right-sided shoulder pain secondary to rotator cuff.   Acid reflux has been stable on Nexium  Denies any other complaints today          Review of Systems   Constitutional: Negative for fatigue.   Eyes: Negative for visual disturbance.   Respiratory: Negative for shortness of breath.         Positive for left breast pain   Cardiovascular: Negative for chest pain and leg swelling.   Gastrointestinal: Negative for abdominal pain, nausea and vomiting.   Musculoskeletal: Positive for arthralgias, back pain, myalgias and neck pain.   Skin: Negative for rash.  "  Neurological: Negative for weakness, light-headedness, numbness and headaches.   Psychiatric/Behavioral: Negative for sleep disturbance.         /88 (BP Location: Left arm, Patient Position: Sitting)   Pulse 80   Temp 96.3 °F (35.7 °C) (Tympanic)   Ht 5' 3" (1.6 m)   Wt 94.2 kg (207 lb 10.8 oz)   SpO2 99%   BMI 36.79 kg/m²   Objective:      Physical Exam   Constitutional: She is oriented to person, place, and time. She appears well-developed and well-nourished.   HENT:   Head: Normocephalic and atraumatic.   Mouth/Throat: Oropharynx is clear and moist.   Cardiovascular: Normal rate, regular rhythm and normal heart sounds.    No murmur heard.  Normal bilateral breast exam   Pulmonary/Chest: Effort normal and breath sounds normal. She has no wheezes.   Abdominal: Soft. Bowel sounds are normal. There is no tenderness.   Musculoskeletal: She exhibits tenderness. She exhibits no edema.   Neurological: She is alert and oriented to person, place, and time.   Skin: Skin is warm and dry. No rash noted.   Psychiatric: She has a normal mood and affect.         Assessment:       1. Breast pain, left    2. Essential hypertension    3. Mixed hyperlipidemia    4. Gastroesophageal reflux disease, esophagitis presence not specified    5. DJD (degenerative joint disease), cervical-mild    6. Rotator cuff impingement syndrome of right shoulder    7. Osteoarthritis of spine with radiculopathy, lumbar region    8. History of benign pituitary tumor    9. NATALIE (obstructive sleep apnea)    10. Obesity, Class II, BMI 35-39.9, with comorbidity    11. Preventive measure        Plan:   Breast pain, left  -     US Breast Left Complete; Future; Expected date: 07/31/2018  -     EKG 12-lead; Future  -     CK; Future; Expected date: 07/31/2018  Reviewed recent mammogram test results which was normal but will get ultrasound of the left breast and diagnostic mammogram for further evaluation  Patient was advised to limit caffeine and can " take Aleve as needed for pain    Essential hypertension  -     losartan-hydrochlorothiazide 50-12.5 mg (HYZAAR) 50-12.5 mg per tablet; Take 1 tablet by mouth once daily.  Dispense: 30 tablet; Refill: 1  Noted blood pressure with mildly elevated diastolic blood pressure, will change losartan 50 mg to losartan hydrochlorothiazide 50/12.5 mg and continue taking metoprolol 25 mg daily  Restrict salt intake and eat low-fat and low-cholesterol diet    Mixed hyperlipidemia-currently taking Crestor 20 mg daily    Gastroesophageal reflux disease, esophagitis presence not specified-stable on Nexium 40 mg daily    DJD (degenerative joint disease), cervical-mild  Rotator cuff impingement syndrome of right shoulder  Osteoarthritis of spine with radiculopathy, lumbar region  Followed by Dr. Cortés, status post ARMANDO and taking Aleve as needed for pain    History of benign pituitary tumor    NATALIE (obstructive sleep apnea)-stable    Obesity, Class II, BMI 35-39.9, with comorbidity-Lifestyle modifications recommended to lose weight with BMI 36 with diet and exercise    Preventive measure  -     losartan-hydrochlorothiazide 50-12.5 mg (HYZAAR) 50-12.5 mg per tablet; Take 1 tablet by mouth once daily.  Dispense: 30 tablet; Refill: 1  -     CBC auto differential; Future; Expected date: 07/31/2018  -     Comprehensive metabolic panel; Future; Expected date: 07/31/2018  -     Lipid panel; Future; Expected date: 07/31/2018  -     TSH; Future; Expected date: 07/31/2018  Will check fasting labs today in follow-up in 1 week

## 2018-08-01 DIAGNOSIS — R94.31 ABNORMAL EKG: Primary | ICD-10-CM

## 2018-08-02 ENCOUNTER — HOSPITAL ENCOUNTER (OUTPATIENT)
Dept: RADIOLOGY | Facility: HOSPITAL | Age: 58
Discharge: HOME | End: 2018-08-02
Attending: FAMILY MEDICINE
Payer: COMMERCIAL

## 2018-08-02 DIAGNOSIS — N64.4 BREAST PAIN, LEFT: ICD-10-CM

## 2018-08-02 PROCEDURE — 76642 ULTRASOUND BREAST LIMITED: CPT | Mod: TC,PO,LT

## 2018-08-02 PROCEDURE — 77065 DX MAMMO INCL CAD UNI: CPT | Mod: TC,PO,LT

## 2018-08-02 PROCEDURE — 77065 DX MAMMO INCL CAD UNI: CPT | Mod: 26,LT,, | Performed by: RADIOLOGY

## 2018-08-02 PROCEDURE — 76642 ULTRASOUND BREAST LIMITED: CPT | Mod: 26,LT,, | Performed by: RADIOLOGY

## 2018-08-06 ENCOUNTER — PATIENT MESSAGE (OUTPATIENT)
Dept: INTERNAL MEDICINE | Facility: CLINIC | Age: 58
End: 2018-08-06

## 2018-08-07 ENCOUNTER — TELEPHONE (OUTPATIENT)
Dept: INTERNAL MEDICINE | Facility: CLINIC | Age: 58
End: 2018-08-07

## 2018-08-07 NOTE — TELEPHONE ENCOUNTER
Returned call to pt, stated that se was running late today so she decided to reschedule her appt. Pt rescheduled appt for 8/13/2018 at 11:40am. YONATHAN

## 2018-08-13 ENCOUNTER — OFFICE VISIT (OUTPATIENT)
Dept: INTERNAL MEDICINE | Facility: CLINIC | Age: 58
End: 2018-08-13
Payer: COMMERCIAL

## 2018-08-13 VITALS
WEIGHT: 206.38 LBS | HEIGHT: 63 IN | BODY MASS INDEX: 36.57 KG/M2 | OXYGEN SATURATION: 99 % | SYSTOLIC BLOOD PRESSURE: 126 MMHG | HEART RATE: 97 BPM | TEMPERATURE: 98 F | DIASTOLIC BLOOD PRESSURE: 86 MMHG

## 2018-08-13 DIAGNOSIS — E78.2 MIXED HYPERLIPIDEMIA: Chronic | ICD-10-CM

## 2018-08-13 DIAGNOSIS — Z86.010 HX OF COLONIC POLYP: ICD-10-CM

## 2018-08-13 DIAGNOSIS — M75.41 ROTATOR CUFF IMPINGEMENT SYNDROME OF RIGHT SHOULDER: ICD-10-CM

## 2018-08-13 DIAGNOSIS — Z00.00 ROUTINE GENERAL MEDICAL EXAMINATION AT A HEALTH CARE FACILITY: Primary | ICD-10-CM

## 2018-08-13 DIAGNOSIS — K21.9 GASTROESOPHAGEAL REFLUX DISEASE, ESOPHAGITIS PRESENCE NOT SPECIFIED: Chronic | ICD-10-CM

## 2018-08-13 DIAGNOSIS — Z86.018 HISTORY OF BENIGN PITUITARY TUMOR: Chronic | ICD-10-CM

## 2018-08-13 DIAGNOSIS — M47.26 OSTEOARTHRITIS OF SPINE WITH RADICULOPATHY, LUMBAR REGION: ICD-10-CM

## 2018-08-13 DIAGNOSIS — M47.812 DJD (DEGENERATIVE JOINT DISEASE), CERVICAL: Chronic | ICD-10-CM

## 2018-08-13 DIAGNOSIS — I10 ESSENTIAL HYPERTENSION: ICD-10-CM

## 2018-08-13 DIAGNOSIS — G47.33 OSA (OBSTRUCTIVE SLEEP APNEA): Chronic | ICD-10-CM

## 2018-08-13 PROCEDURE — 3074F SYST BP LT 130 MM HG: CPT | Mod: CPTII,S$GLB,, | Performed by: FAMILY MEDICINE

## 2018-08-13 PROCEDURE — 99999 PR PBB SHADOW E&M-EST. PATIENT-LVL IV: CPT | Mod: PBBFAC,,, | Performed by: FAMILY MEDICINE

## 2018-08-13 PROCEDURE — 3079F DIAST BP 80-89 MM HG: CPT | Mod: CPTII,S$GLB,, | Performed by: FAMILY MEDICINE

## 2018-08-13 PROCEDURE — 99396 PREV VISIT EST AGE 40-64: CPT | Mod: S$GLB,,, | Performed by: FAMILY MEDICINE

## 2018-08-13 NOTE — PROGRESS NOTES
Subjective:       Patient ID: Ora Henderson is a 58 y.o. female.    Chief Complaint: Follow-up    58-year-old  female patient with Patient Active Problem List:     DJD (degenerative joint disease), cervical-mild     Hyperlipidemia     Hepatomegaly     Family history of cardiovascular disease     Obesity, Class II, BMI 35-39.9, with comorbidity     GERD (gastroesophageal reflux disease)     NATALIE (obstructive sleep apnea)     Rotator cuff impingement syndrome of right shoulder     DJD of right AC (acromioclavicular) joint     History of benign pituitary tumor     Arthritis     Hx of colonic polyp     Essential hypertension     Osteoarthritis of spine with radiculopathy, lumbar region  Here for routine annual physicals and to discuss about her mammogram test results secondary to the breast pain.   Patient reports that she continues to have chronic neck and back pain and right shoulder pain, patient has been followed by Dr. Cortés, who has been giving steroid injections to her back, does not want to go back to orthopedic physician regarding chronic right shoulder pain due to rotator cuff impairment, and will discuss further with Dr. Cortés.   Patient has not seen Cardiology for more than a year, denies any chest pain or shortness of breath.   No changes noted in the recent ultrasound and diagnostic mammogram secondary to breast pain  Has been taking over-the-counter Aleve secondary to ongoing right shoulder pain, reports pain as 7 to 8/10 intermittently, denies any tingling or numbness sensation to extremities      Review of Systems   Constitutional: Negative for fatigue.   Eyes: Negative for visual disturbance.   Respiratory: Negative for shortness of breath.    Cardiovascular: Negative for chest pain and leg swelling.   Gastrointestinal: Negative for abdominal pain, nausea and vomiting.   Musculoskeletal: Positive for arthralgias, back pain, myalgias and neck pain.   Skin: Negative for  "rash.   Neurological: Negative for weakness, light-headedness, numbness and headaches.   Psychiatric/Behavioral: Negative for sleep disturbance.         /86 (BP Location: Left arm, Patient Position: Sitting)   Pulse 97   Temp 98.1 °F (36.7 °C) (Tympanic)   Ht 5' 3" (1.6 m)   Wt 93.6 kg (206 lb 5.6 oz)   SpO2 99%   BMI 36.55 kg/m²   Objective:      Physical Exam   Constitutional: She is oriented to person, place, and time. She appears well-developed and well-nourished.   HENT:   Head: Normocephalic and atraumatic.   Mouth/Throat: Oropharynx is clear and moist.   Cardiovascular: Normal rate, regular rhythm and normal heart sounds.   No murmur heard.  Pulmonary/Chest: Effort normal and breath sounds normal. She has no wheezes. She exhibits no tenderness.   Abdominal: Soft. Bowel sounds are normal. There is no tenderness.   Musculoskeletal: She exhibits tenderness. She exhibits no edema.   Positive for paraspinal cervical, lumbar muscle tenderness bilaterally and right shoulder tenderness anteriorly, no restricted range of motion noted on exam today   Neurological: She is alert and oriented to person, place, and time.   Skin: Skin is warm and dry. No rash noted. No erythema.   Psychiatric: She has a normal mood and affect.       Lab Visit on 07/31/2018   Component Date Value Ref Range Status    WBC 07/31/2018 6.19  3.90 - 12.70 K/uL Final    RBC 07/31/2018 4.01  4.00 - 5.40 M/uL Final    Hemoglobin 07/31/2018 11.7* 12.0 - 16.0 g/dL Final    Hematocrit 07/31/2018 37.3  37.0 - 48.5 % Final    MCV 07/31/2018 93  82 - 98 fL Final    MCH 07/31/2018 29.2  27.0 - 31.0 pg Final    MCHC 07/31/2018 31.4* 32.0 - 36.0 g/dL Final    RDW 07/31/2018 12.9  11.5 - 14.5 % Final    Platelets 07/31/2018 267  150 - 350 K/uL Final    MPV 07/31/2018 10.0  9.2 - 12.9 fL Final    Immature Granulocytes 07/31/2018 0.2  0.0 - 0.5 % Final    Gran # (ANC) 07/31/2018 2.8  1.8 - 7.7 K/uL Final    Immature Grans (Abs) " 07/31/2018 0.01  0.00 - 0.04 K/uL Final    Comment: Mild elevation in immature granulocytes is non specific and   can be seen in a variety of conditions including stress response,   acute inflammation, trauma and pregnancy. Correlation with other   laboratory and clinical findings is essential.      Lymph # 07/31/2018 2.5  1.0 - 4.8 K/uL Final    Mono # 07/31/2018 0.5  0.3 - 1.0 K/uL Final    Eos # 07/31/2018 0.3  0.0 - 0.5 K/uL Final    Baso # 07/31/2018 0.04  0.00 - 0.20 K/uL Final    nRBC 07/31/2018 0  0 /100 WBC Final    Gran% 07/31/2018 45.4  38.0 - 73.0 % Final    Lymph% 07/31/2018 41.0  18.0 - 48.0 % Final    Mono% 07/31/2018 7.8  4.0 - 15.0 % Final    Eosinophil% 07/31/2018 5.0  0.0 - 8.0 % Final    Basophil% 07/31/2018 0.6  0.0 - 1.9 % Final    Differential Method 07/31/2018 Automated   Final    Sodium 07/31/2018 140  136 - 145 mmol/L Final    Potassium 07/31/2018 4.4  3.5 - 5.1 mmol/L Final    Chloride 07/31/2018 104  95 - 110 mmol/L Final    CO2 07/31/2018 31* 23 - 29 mmol/L Final    Glucose 07/31/2018 106  70 - 110 mg/dL Final    BUN, Bld 07/31/2018 14  6 - 20 mg/dL Final    Creatinine 07/31/2018 0.9  0.5 - 1.4 mg/dL Final    Calcium 07/31/2018 9.6  8.7 - 10.5 mg/dL Final    Total Protein 07/31/2018 7.2  6.0 - 8.4 g/dL Final    Albumin 07/31/2018 3.9  3.5 - 5.2 g/dL Final    Total Bilirubin 07/31/2018 0.5  0.1 - 1.0 mg/dL Final    Comment: For infants and newborns, interpretation of results should be based  on gestational age, weight and in agreement with clinical  observations.  Premature Infant recommended reference ranges:  Up to 24 hours.............<8.0 mg/dL  Up to 48 hours............<12.0 mg/dL  3-5 days..................<15.0 mg/dL  6-29 days.................<15.0 mg/dL      Alkaline Phosphatase 07/31/2018 73  55 - 135 U/L Final    AST 07/31/2018 18  10 - 40 U/L Final    ALT 07/31/2018 21  10 - 44 U/L Final    Anion Gap 07/31/2018 5* 8 - 16 mmol/L Final    eGFR if   07/31/2018 >60.0  >60 mL/min/1.73 m^2 Final    eGFR if non African American 07/31/2018 >60.0  >60 mL/min/1.73 m^2 Final    Comment: Calculation used to obtain the estimated glomerular filtration  rate (eGFR) is the CKD-EPI equation.       Cholesterol 07/31/2018 196  120 - 199 mg/dL Final    Comment: The National Cholesterol Education Program (NCEP) has set the  following guidelines (reference ranges) for Cholesterol:  Optimal.....................<200 mg/dL  Borderline High.............200-239 mg/dL  High........................> or = 240 mg/dL      Triglycerides 07/31/2018 114  30 - 150 mg/dL Final    Comment: The National Cholesterol Education Program (NCEP) has set the  following guidelines (reference values) for triglycerides:  Normal......................<150 mg/dL  Borderline High.............150-199 mg/dL  High........................200-499 mg/dL      HDL 07/31/2018 46  40 - 75 mg/dL Final    Comment: The National Cholesterol Education Program (NCEP) has set the  following guidelines (reference values) for HDL Cholesterol:  Low...............<40 mg/dL  Optimal...........>60 mg/dL      LDL Cholesterol 07/31/2018 127.2  63.0 - 159.0 mg/dL Final    Comment: The National Cholesterol Education Program (NCEP) has set the  following guidelines (reference values) for LDL Cholesterol:  Optimal.......................<130 mg/dL  Borderline High...............130-159 mg/dL  High..........................160-189 mg/dL  Very High.....................>190 mg/dL      HDL/Chol Ratio 07/31/2018 23.5  20.0 - 50.0 % Final    Total Cholesterol/HDL Ratio 07/31/2018 4.3  2.0 - 5.0 Final    Non-HDL Cholesterol 07/31/2018 150  mg/dL Final    Comment: Risk category and Non-HDL cholesterol goals:  Coronary heart disease (CHD)or equivalent (10-year risk of CHD >20%):  Non-HDL cholesterol goal     <130 mg/dL  Two or more CHD risk factors and 10-year risk of CHD <= 20%:  Non-HDL cholesterol goal     <160 mg/dL  0  to 1 CHD risk factor:  Non-HDL cholesterol goal     <190 mg/dL      TSH 07/31/2018 1.845  0.400 - 4.000 uIU/mL Final    CPK 07/31/2018 195* 20 - 180 U/L Final    Troponin I 07/31/2018 <0.006  0.000 - 0.026 ng/mL Final    Comment: The reference interval for Troponin I represents the 99th percentile   cutoff   for our facility and is consistent with 3rd generation assay   performance.         Assessment:       1. Routine general medical examination at a health care facility    2. Essential hypertension    3. Mixed hyperlipidemia    4. Gastroesophageal reflux disease, esophagitis presence not specified    5. Rotator cuff impingement syndrome of right shoulder    6. DJD (degenerative joint disease), cervical-mild    7. Osteoarthritis of spine with radiculopathy, lumbar region    8. Hx of colonic polyp    9. NATALIE (obstructive sleep apnea)    10. History of benign pituitary tumor    11. Obesity, Class II, BMI 35-39.9, with comorbidity        Plan:   Routine general medical examination at a health care facility  Vital signs stable today.  Clinical exam stable.  Reviewed recent labs which looks stable  Encouraged to eat iron and protein rich diet and maintain lifestyle modifications with low-fat and low-cholesterol diet and exercise 30 min daily to lose weight with BMI 36  Up-to-date with screenings  Does not get yearly flu vaccinations    Essential hypertension-blood pressure is stable today currently on losartan hydrochlorothiazide 50/12.5 mg and metoprolol 25 mg, patient was advised to discuss further with Cardiology regarding to recent EKG changes, clinically stable and asymptomatic at this time    Mixed hyperlipidemia-stable cholesterol levels on Crestor 20 mg daily    Gastroesophageal reflux disease, esophagitis presence not specified-stable on Nexium 40 mg daily    Rotator cuff impingement syndrome of right shoulder  DJD (degenerative joint disease), cervical-mild  Osteoarthritis of spine with radiculopathy, lumbar  region  Patient would like to discuss further with Dr. Cortés secondary to ongoing pain, currently taking over-the-counter Aleve as needed for pain    Hx of colonic polyp    NATALIE (obstructive sleep apnea)-stable    History of benign pituitary tumor    Obesity, Class II, BMI 35-39.9, with comorbidity-Lifestyle modifications recommended as noted

## 2018-09-23 ENCOUNTER — OFFICE VISIT (OUTPATIENT)
Dept: URGENT CARE | Facility: CLINIC | Age: 58
End: 2018-09-23
Payer: COMMERCIAL

## 2018-09-23 VITALS
SYSTOLIC BLOOD PRESSURE: 130 MMHG | BODY MASS INDEX: 36.78 KG/M2 | TEMPERATURE: 99 F | HEIGHT: 63 IN | RESPIRATION RATE: 17 BRPM | WEIGHT: 207.56 LBS | HEART RATE: 83 BPM | DIASTOLIC BLOOD PRESSURE: 86 MMHG | OXYGEN SATURATION: 97 %

## 2018-09-23 DIAGNOSIS — B37.31 CANDIDIASIS OF VAGINA: Primary | ICD-10-CM

## 2018-09-23 LAB
BILIRUB SERPL-MCNC: NEGATIVE MG/DL
BLOOD URINE, POC: NEGATIVE
COLOR, POC UA: YELLOW
GLUCOSE UR QL STRIP: NORMAL
KETONES UR QL STRIP: NEGATIVE
LEUKOCYTE ESTERASE URINE, POC: NEGATIVE
NITRITE, POC UA: NEGATIVE
PH, POC UA: 7
PROTEIN, POC: NORMAL
SPECIFIC GRAVITY, POC UA: 1.01
UROBILINOGEN, POC UA: NORMAL

## 2018-09-23 PROCEDURE — 99213 OFFICE O/P EST LOW 20 MIN: CPT | Mod: 25,S$GLB,, | Performed by: FAMILY MEDICINE

## 2018-09-23 PROCEDURE — 3008F BODY MASS INDEX DOCD: CPT | Mod: CPTII,S$GLB,, | Performed by: FAMILY MEDICINE

## 2018-09-23 PROCEDURE — 3075F SYST BP GE 130 - 139MM HG: CPT | Mod: CPTII,S$GLB,, | Performed by: FAMILY MEDICINE

## 2018-09-23 PROCEDURE — 3079F DIAST BP 80-89 MM HG: CPT | Mod: CPTII,S$GLB,, | Performed by: FAMILY MEDICINE

## 2018-09-23 PROCEDURE — 99999 PR PBB SHADOW E&M-EST. PATIENT-LVL IV: CPT | Mod: PBBFAC,,, | Performed by: FAMILY MEDICINE

## 2018-09-23 PROCEDURE — 81002 URINALYSIS NONAUTO W/O SCOPE: CPT | Mod: S$GLB,,, | Performed by: FAMILY MEDICINE

## 2018-09-23 RX ORDER — FLUCONAZOLE 150 MG/1
150 TABLET ORAL DAILY
Qty: 1 TABLET | Refills: 3 | Status: SHIPPED | OUTPATIENT
Start: 2018-09-23 | End: 2018-09-27

## 2018-09-23 NOTE — PROGRESS NOTES
Subjective:       Patient ID: Ora Henderson is a 58 y.o. female.    Chief Complaint: Vaginitis    Yeast infection  O: 1 wk ago  L: vagina  D: constant  C: burning, itching  Mj: monistat  Exac:        Review of Systems   Respiratory: Negative for shortness of breath.    Cardiovascular: Negative for chest pain.   Gastrointestinal: Negative for abdominal pain.       Objective:      Physical Exam   Constitutional: She appears well-developed and well-nourished. She appears distressed.   HENT:   Head: Normocephalic and atraumatic.   Pulmonary/Chest: Effort normal and breath sounds normal. No respiratory distress. She has no wheezes.   Skin: Skin is warm and dry. No rash noted. She is not diaphoretic. No erythema.   Nursing note and vitals reviewed.      Assessment:       1. Candidiasis of vagina        Plan:     Problem List Items Addressed This Visit        Renal/    Candidiasis of vagina - Primary    Relevant Medications    fluconazole (DIFLUCAN) 150 MG Tab    Other Relevant Orders    POCT URINE DIPSTICK WITHOUT MICROSCOPE

## 2018-10-09 ENCOUNTER — OFFICE VISIT (OUTPATIENT)
Dept: URGENT CARE | Facility: CLINIC | Age: 58
End: 2018-10-09
Payer: COMMERCIAL

## 2018-10-09 VITALS
HEIGHT: 63 IN | HEART RATE: 81 BPM | DIASTOLIC BLOOD PRESSURE: 92 MMHG | BODY MASS INDEX: 36.72 KG/M2 | RESPIRATION RATE: 17 BRPM | TEMPERATURE: 98 F | WEIGHT: 207.25 LBS | SYSTOLIC BLOOD PRESSURE: 140 MMHG | OXYGEN SATURATION: 97 %

## 2018-10-09 DIAGNOSIS — R10.9 ABDOMINAL WALL PAIN: Primary | ICD-10-CM

## 2018-10-09 PROCEDURE — 3008F BODY MASS INDEX DOCD: CPT | Mod: CPTII,S$GLB,, | Performed by: FAMILY MEDICINE

## 2018-10-09 PROCEDURE — 99999 PR PBB SHADOW E&M-EST. PATIENT-LVL III: CPT | Mod: PBBFAC,,, | Performed by: FAMILY MEDICINE

## 2018-10-09 PROCEDURE — 3077F SYST BP >= 140 MM HG: CPT | Mod: CPTII,S$GLB,, | Performed by: FAMILY MEDICINE

## 2018-10-09 PROCEDURE — 99213 OFFICE O/P EST LOW 20 MIN: CPT | Mod: S$GLB,,, | Performed by: FAMILY MEDICINE

## 2018-10-09 PROCEDURE — 3080F DIAST BP >= 90 MM HG: CPT | Mod: CPTII,S$GLB,, | Performed by: FAMILY MEDICINE

## 2018-10-09 NOTE — PROGRESS NOTES
"Subjective:       Patient ID: Ora Henderson is a 58 y.o. female.    Chief Complaint: Abdominal Pain    BP (!) 140/92   Pulse 81   Temp 97.8 °F (36.6 °C) (Tympanic)   Resp 17   Ht 5' 3" (1.6 m)   Wt 94 kg (207 lb 3.7 oz)   SpO2 97%   BMI 36.71 kg/m²     HPI  Abdominal pain around naval for a week. Hx of abdominal hernia repair in the same area 2 years ago. Has been doing 60 sit ups lately for the past few weeks.   Took Pepto bismol and Alkasazer without relief. Stool is a greenish color, formed. Pt brought photos to show. Wonders if its blood. Ate blackberries    Review of Systems   Gastrointestinal: Negative for abdominal distention, constipation, diarrhea, nausea, rectal pain and vomiting.       Objective:      Physical Exam   Constitutional: She is oriented to person, place, and time. She appears well-developed and well-nourished. No distress.   HENT:   Head: Normocephalic and atraumatic.   Eyes: EOM are normal. Pupils are equal, round, and reactive to light.   Neck: Normal range of motion. Neck supple.   Cardiovascular: Normal rate.   Pulmonary/Chest: Effort normal. No respiratory distress.   Abdominal: Soft. Bowel sounds are normal. She exhibits no distension and no mass. There is tenderness (around umblicus more proximal). There is no rebound and no guarding. No hernia.   Musculoskeletal: Normal range of motion.   Neurological: She is alert and oriented to person, place, and time. No cranial nerve deficit.   Skin: Skin is warm and dry. She is not diaphoretic.   Nursing note and vitals reviewed.      Assessment:       1. Abdominal wall pain        Plan:     Ora was seen today for abdominal pain.    Diagnoses and all orders for this visit:    Abdominal wall pain - likely adhesion of old surgical scar      Instructions  1. No sit ups for a week.   2. OTC ibuprofen 2-3 pills every 6-8 hours as needed for pain    Discussed with pt/family all information and results pertaining to this " visit. Discussed diagnosis and plan of treatment.  All questions and concerns were addressed at this time. Pt/family expresses understanding of information and instructions.  Care and follow up instruction provided.

## 2018-10-25 DIAGNOSIS — Z29.9 PREVENTIVE MEASURE: ICD-10-CM

## 2018-10-25 DIAGNOSIS — I10 ESSENTIAL HYPERTENSION: ICD-10-CM

## 2018-10-25 RX ORDER — LOSARTAN POTASSIUM AND HYDROCHLOROTHIAZIDE 12.5; 5 MG/1; MG/1
1 TABLET ORAL DAILY
Qty: 90 TABLET | Refills: 1 | Status: SHIPPED | OUTPATIENT
Start: 2018-10-25 | End: 2018-10-26 | Stop reason: SDUPTHER

## 2018-10-26 ENCOUNTER — IMMUNIZATION (OUTPATIENT)
Dept: INTERNAL MEDICINE | Facility: CLINIC | Age: 58
End: 2018-10-26
Payer: COMMERCIAL

## 2018-10-26 PROCEDURE — 90471 IMMUNIZATION ADMIN: CPT | Mod: S$GLB,,, | Performed by: FAMILY MEDICINE

## 2018-10-26 PROCEDURE — 99999 PR PBB SHADOW E&M-EST. PATIENT-LVL II: CPT | Mod: PBBFAC,,,

## 2018-10-26 PROCEDURE — 90686 IIV4 VACC NO PRSV 0.5 ML IM: CPT | Mod: S$GLB,,, | Performed by: FAMILY MEDICINE

## 2018-10-26 RX ORDER — LOSARTAN POTASSIUM AND HYDROCHLOROTHIAZIDE 12.5; 5 MG/1; MG/1
1 TABLET ORAL DAILY
Qty: 90 TABLET | Refills: 1 | Status: SHIPPED | OUTPATIENT
Start: 2018-10-26 | End: 2018-11-12

## 2018-11-11 NOTE — PROGRESS NOTES
Subjective:    Patient ID:  Ora Henderson is a 58 y.o. female who presents for evaluation of Follow-up; Hypertension; Hyperlipidemia; and Risk Factor Management For Atherosclerosis      HPI Mrs. Henderson presents for yearly f/u.  Her current medical conditions include HTN, hyperlipidemia, obesity. Nonsmoker.   She has family h/o cad. Sisters x 2  of MIs. Brother  of MI. Father  of MI 42.   Past hx pertinent for following:  h/o LHC about 20 years ago, no specific findings.   H/o chronic abnormal ecgs showing nonspecific ST-T abnl.  Stress MPI 3/17 showed no ischemia.  Echo 3/17 showed normal LV function.  Here for f/u.  She has arthritis issues, shoulder pains/rotator cuff sxs.  BP above goal.  She states she doesn't take her BP meds in am of doctor appts.  No associated sxs.  She denies chest pain sxs.  She has some occasional RICHARDSON, stable.   She is exercising.  ecg 18 NSR, low precordial R waves.   Lipids controlled, on statin tx.       Current Outpatient Medications:     aspirin 81 MG Chew, Take 81 mg by mouth as needed., Disp: , Rfl:     esomeprazole (NEXIUM) 40 MG capsule, TAKE ONE CAPSULE BY MOUTH BEFORE BREAKFAST, Disp: 30 capsule, Rfl: 11    MULTIVIT,CALC,MINS/IRON/FOLIC (DAILY MULTIPLE FOR WOMEN ORAL), Take 1 tablet by mouth once daily., Disp: , Rfl:     naproxen sodium (ALEVE) 220 MG tablet, Take 220 mg by mouth as needed., Disp: , Rfl:     rosuvastatin (CRESTOR) 20 MG tablet, TAKE ONE TABLET BY MOUTH IN THE EVENING, Disp: 30 tablet, Rfl: 11    albuterol 90 mcg/actuation inhaler, Inhale 1-2 puffs into the lungs every 4 (four) hours as needed for Wheezing or Shortness of Breath (cough)., Disp: 1 Inhaler, Rfl: 0    A5-M7-L1-B5-B6-iron-met-choln (GERITOL TONIC WITH FERREX 18) 2.5 mg-50 mg-18 iron/15 mL Liqd, Take 5 mLs by mouth once daily., Disp: , Rfl:     biotin 1 mg Cap, Take 1 capsule by mouth once daily., Disp: , Rfl:     fluconazole (DIFLUCAN) 150 MG Tab, ,  "Disp: , Rfl:     ketoconazole (NIZORAL) 2 % shampoo, Wash hair with medicated shampoo at least 2x/week - let sit on scalp at least 5 minutes prior to rinsing, Disp: 120 mL, Rfl: 5    L.acidophilus-Bif. animalis (PROBIOTIC) 5 billion cell CpSP, Take 1 tablet by mouth once daily., Disp: 10 capsule, Rfl: 0    loratadine (CLARITIN) 10 mg tablet, Take 1 tablet (10 mg total) by mouth once daily., Disp: 30 tablet, Rfl: 0    losartan-hydrochlorothiazide 100-25 mg (HYZAAR) 100-25 mg per tablet, Take 1 tablet by mouth once daily., Disp: 90 tablet, Rfl: 3    senna-docusate 8.6-50 mg (PERICOLACE) 8.6-50 mg per tablet, Take 1 tablet by mouth as needed for Constipation., Disp: , Rfl:       Review of Systems   Constitution: Negative.   HENT: Negative.    Eyes: Negative.    Cardiovascular: Negative.    Respiratory: Negative.    Endocrine: Negative.    Hematologic/Lymphatic: Negative.    Skin: Negative.    Musculoskeletal: Positive for arthritis and joint pain.   Gastrointestinal: Negative.    Genitourinary: Negative.    Neurological: Negative.    Psychiatric/Behavioral: Negative.    Allergic/Immunologic: Negative.        BP (!) 160/90 (BP Location: Right arm, Patient Position: Sitting, BP Method: Large (Manual))   Pulse 80   Ht 5' 3" (1.6 m)   Wt 95.1 kg (209 lb 10.5 oz)   BMI 37.14 kg/m²     Wt Readings from Last 3 Encounters:   11/12/18 95.1 kg (209 lb 10.5 oz)   10/09/18 94 kg (207 lb 3.7 oz)   09/23/18 94.2 kg (207 lb 9 oz)     Temp Readings from Last 3 Encounters:   10/09/18 97.8 °F (36.6 °C) (Tympanic)   09/23/18 98.5 °F (36.9 °C) (Tympanic)   08/13/18 98.1 °F (36.7 °C) (Tympanic)     BP Readings from Last 3 Encounters:   11/12/18 (!) 160/90   10/09/18 (!) 140/92   09/23/18 130/86     Pulse Readings from Last 3 Encounters:   11/12/18 80   10/09/18 81   09/23/18 83          Objective:    Physical Exam   Constitutional: She is oriented to person, place, and time. Vital signs are normal. She appears well-developed and " well-nourished. She is active and cooperative. She does not have a sickly appearance. She does not appear ill. No distress.   HENT:   Head: Normocephalic.   Neck: Neck supple. Normal carotid pulses, no hepatojugular reflux and no JVD present. Carotid bruit is not present. No thyromegaly present.   Cardiovascular: Normal rate, regular rhythm, S1 normal, S2 normal, normal heart sounds and normal pulses. PMI is not displaced. Exam reveals no gallop and no friction rub.   No murmur heard.  Pulses:       Radial pulses are 2+ on the right side, and 2+ on the left side.   Pulmonary/Chest: Effort normal and breath sounds normal. She has no wheezes. She has no rales.   Abdominal: Soft. Normal appearance, normal aorta and bowel sounds are normal. She exhibits no pulsatile liver, no abdominal bruit, no ascites and no mass. There is no splenomegaly or hepatomegaly. There is no tenderness.   OBESE   Musculoskeletal: She exhibits no edema.   Lymphadenopathy:     She has no cervical adenopathy.   Neurological: She is alert and oriented to person, place, and time.   Skin: Skin is warm. She is not diaphoretic.   Psychiatric: She has a normal mood and affect. Her behavior is normal.   Nursing note and vitals reviewed.      I have reviewed all pertinent labs and cardiac studies.      Chemistry        Component Value Date/Time     07/31/2018 0924    K 4.4 07/31/2018 0924     07/31/2018 0924    CO2 31 (H) 07/31/2018 0924    BUN 14 07/31/2018 0924    CREATININE 0.9 07/31/2018 0924     07/31/2018 0924        Component Value Date/Time    CALCIUM 9.6 07/31/2018 0924    ALKPHOS 73 07/31/2018 0924    AST 18 07/31/2018 0924    ALT 21 07/31/2018 0924    BILITOT 0.5 07/31/2018 0924    ESTGFRAFRICA >60.0 07/31/2018 0924    EGFRNONAA >60.0 07/31/2018 0924        Lab Results   Component Value Date    WBC 6.19 07/31/2018    HGB 11.7 (L) 07/31/2018    HCT 37.3 07/31/2018    MCV 93 07/31/2018     07/31/2018     Lab Results    Component Value Date    HGBA1C 5.9 10/13/2014     Lab Results   Component Value Date    CHOL 196 07/31/2018    CHOL 185 02/28/2018    CHOL 190 05/08/2017     Lab Results   Component Value Date    HDL 46 07/31/2018    HDL 50 02/28/2018    HDL 52 05/08/2017     Lab Results   Component Value Date    LDLCALC 127.2 07/31/2018    LDLCALC 107.0 02/28/2018    LDLCALC 112.4 05/08/2017     Lab Results   Component Value Date    TRIG 114 07/31/2018    TRIG 140 02/28/2018    TRIG 128 05/08/2017     Lab Results   Component Value Date    CHOLHDL 23.5 07/31/2018    CHOLHDL 27.0 02/28/2018    CHOLHDL 27.4 05/08/2017           Assessment:       1. Mixed hyperlipidemia    2. Essential hypertension    3. Family history of cardiovascular disease    4. NATALIE (obstructive sleep apnea)    5. Abnormal ECG    6. Class 2 obesity due to excess calories without serious comorbidity with body mass index (BMI) of 37.0 to 37.9 in adult         Plan:             Suboptimal HTN control.  Increase Hyzaar to 100/25 mg qd.  Indications, side effects discussed.  Goal BP < 130/80, preferably near 115 - 120/60's.    CPAP for NATALIE advised  Exercise  Weight loss needed  Continue statin for lipids  CMP one month  F/u 6 weeks (take BP pill before coming to clinic).

## 2018-11-12 ENCOUNTER — OFFICE VISIT (OUTPATIENT)
Dept: CARDIOLOGY | Facility: CLINIC | Age: 58
End: 2018-11-12
Payer: COMMERCIAL

## 2018-11-12 VITALS
DIASTOLIC BLOOD PRESSURE: 90 MMHG | SYSTOLIC BLOOD PRESSURE: 160 MMHG | HEART RATE: 80 BPM | WEIGHT: 209.69 LBS | BODY MASS INDEX: 37.15 KG/M2 | HEIGHT: 63 IN

## 2018-11-12 DIAGNOSIS — E78.2 MIXED HYPERLIPIDEMIA: Primary | Chronic | ICD-10-CM

## 2018-11-12 DIAGNOSIS — I10 ESSENTIAL HYPERTENSION: ICD-10-CM

## 2018-11-12 DIAGNOSIS — G47.33 OSA (OBSTRUCTIVE SLEEP APNEA): Chronic | ICD-10-CM

## 2018-11-12 DIAGNOSIS — E66.09 CLASS 2 OBESITY DUE TO EXCESS CALORIES WITHOUT SERIOUS COMORBIDITY WITH BODY MASS INDEX (BMI) OF 37.0 TO 37.9 IN ADULT: ICD-10-CM

## 2018-11-12 DIAGNOSIS — R94.31 ABNORMAL ECG: ICD-10-CM

## 2018-11-12 DIAGNOSIS — Z82.49 FAMILY HISTORY OF CARDIOVASCULAR DISEASE: ICD-10-CM

## 2018-11-12 PROBLEM — E66.812 CLASS 2 OBESITY DUE TO EXCESS CALORIES WITHOUT SERIOUS COMORBIDITY WITH BODY MASS INDEX (BMI) OF 37.0 TO 37.9 IN ADULT: Status: ACTIVE | Noted: 2018-11-12

## 2018-11-12 PROCEDURE — 3077F SYST BP >= 140 MM HG: CPT | Mod: CPTII,S$GLB,, | Performed by: INTERNAL MEDICINE

## 2018-11-12 PROCEDURE — 3080F DIAST BP >= 90 MM HG: CPT | Mod: CPTII,S$GLB,, | Performed by: INTERNAL MEDICINE

## 2018-11-12 PROCEDURE — 99214 OFFICE O/P EST MOD 30 MIN: CPT | Mod: S$GLB,,, | Performed by: INTERNAL MEDICINE

## 2018-11-12 PROCEDURE — 3008F BODY MASS INDEX DOCD: CPT | Mod: CPTII,S$GLB,, | Performed by: INTERNAL MEDICINE

## 2018-11-12 PROCEDURE — 99999 PR PBB SHADOW E&M-EST. PATIENT-LVL III: CPT | Mod: PBBFAC,,, | Performed by: INTERNAL MEDICINE

## 2018-11-12 RX ORDER — FLUCONAZOLE 150 MG/1
TABLET ORAL
COMMUNITY
Start: 2018-10-01 | End: 2019-02-06

## 2018-11-12 RX ORDER — LOSARTAN POTASSIUM AND HYDROCHLOROTHIAZIDE 25; 100 MG/1; MG/1
1 TABLET ORAL DAILY
Qty: 90 TABLET | Refills: 3 | Status: SHIPPED | OUTPATIENT
Start: 2018-11-12 | End: 2022-01-11 | Stop reason: SDUPTHER

## 2018-11-26 DIAGNOSIS — K21.9 GASTROESOPHAGEAL REFLUX DISEASE, ESOPHAGITIS PRESENCE NOT SPECIFIED: ICD-10-CM

## 2018-11-26 RX ORDER — ESOMEPRAZOLE MAGNESIUM 40 MG/1
CAPSULE, DELAYED RELEASE ORAL
Qty: 30 CAPSULE | Refills: 11 | OUTPATIENT
Start: 2018-11-26

## 2018-12-10 ENCOUNTER — LAB VISIT (OUTPATIENT)
Dept: LAB | Facility: HOSPITAL | Age: 58
End: 2018-12-10
Attending: INTERNAL MEDICINE
Payer: COMMERCIAL

## 2018-12-10 ENCOUNTER — OFFICE VISIT (OUTPATIENT)
Dept: INTERNAL MEDICINE | Facility: CLINIC | Age: 58
End: 2018-12-10
Payer: COMMERCIAL

## 2018-12-10 VITALS
BODY MASS INDEX: 36.41 KG/M2 | OXYGEN SATURATION: 98 % | HEART RATE: 82 BPM | TEMPERATURE: 98 F | SYSTOLIC BLOOD PRESSURE: 132 MMHG | WEIGHT: 205.5 LBS | HEIGHT: 63 IN | DIASTOLIC BLOOD PRESSURE: 94 MMHG

## 2018-12-10 DIAGNOSIS — I10 ESSENTIAL HYPERTENSION: ICD-10-CM

## 2018-12-10 DIAGNOSIS — K21.9 GASTROESOPHAGEAL REFLUX DISEASE, ESOPHAGITIS PRESENCE NOT SPECIFIED: ICD-10-CM

## 2018-12-10 DIAGNOSIS — M67.432 GANGLION CYST OF WRIST, LEFT: Primary | ICD-10-CM

## 2018-12-10 DIAGNOSIS — Z23 NEED FOR PNEUMOCOCCAL VACCINATION: ICD-10-CM

## 2018-12-10 DIAGNOSIS — M79.2 NEURALGIA: ICD-10-CM

## 2018-12-10 LAB
ALBUMIN SERPL BCP-MCNC: 3.9 G/DL
ALP SERPL-CCNC: 66 U/L
ALT SERPL W/O P-5'-P-CCNC: 17 U/L
ANION GAP SERPL CALC-SCNC: 9 MMOL/L
AST SERPL-CCNC: 16 U/L
BILIRUB SERPL-MCNC: 0.6 MG/DL
BUN SERPL-MCNC: 13 MG/DL
CALCIUM SERPL-MCNC: 9.8 MG/DL
CHLORIDE SERPL-SCNC: 104 MMOL/L
CO2 SERPL-SCNC: 29 MMOL/L
CREAT SERPL-MCNC: 0.9 MG/DL
EST. GFR  (AFRICAN AMERICAN): >60 ML/MIN/1.73 M^2
EST. GFR  (NON AFRICAN AMERICAN): >60 ML/MIN/1.73 M^2
GLUCOSE SERPL-MCNC: 98 MG/DL
POTASSIUM SERPL-SCNC: 4 MMOL/L
PROT SERPL-MCNC: 7.3 G/DL
SODIUM SERPL-SCNC: 142 MMOL/L

## 2018-12-10 PROCEDURE — 3080F DIAST BP >= 90 MM HG: CPT | Mod: CPTII,S$GLB,, | Performed by: PHYSICIAN ASSISTANT

## 2018-12-10 PROCEDURE — 3075F SYST BP GE 130 - 139MM HG: CPT | Mod: CPTII,S$GLB,, | Performed by: PHYSICIAN ASSISTANT

## 2018-12-10 PROCEDURE — 90732 PPSV23 VACC 2 YRS+ SUBQ/IM: CPT | Mod: S$GLB,,, | Performed by: PHYSICIAN ASSISTANT

## 2018-12-10 PROCEDURE — 3008F BODY MASS INDEX DOCD: CPT | Mod: CPTII,S$GLB,, | Performed by: PHYSICIAN ASSISTANT

## 2018-12-10 PROCEDURE — 36415 COLL VENOUS BLD VENIPUNCTURE: CPT | Mod: PO

## 2018-12-10 PROCEDURE — 99999 PR PBB SHADOW E&M-EST. PATIENT-LVL V: CPT | Mod: PBBFAC,,, | Performed by: PHYSICIAN ASSISTANT

## 2018-12-10 PROCEDURE — 80053 COMPREHEN METABOLIC PANEL: CPT

## 2018-12-10 PROCEDURE — 99214 OFFICE O/P EST MOD 30 MIN: CPT | Mod: 25,S$GLB,, | Performed by: PHYSICIAN ASSISTANT

## 2018-12-10 PROCEDURE — 90471 IMMUNIZATION ADMIN: CPT | Mod: S$GLB,,, | Performed by: PHYSICIAN ASSISTANT

## 2018-12-10 RX ORDER — GABAPENTIN 300 MG/1
300 CAPSULE ORAL NIGHTLY
Qty: 30 CAPSULE | Refills: 1 | Status: SHIPPED | OUTPATIENT
Start: 2018-12-10 | End: 2019-07-02

## 2018-12-10 RX ORDER — ESOMEPRAZOLE MAGNESIUM 40 MG/1
40 CAPSULE, DELAYED RELEASE ORAL
Qty: 90 CAPSULE | Refills: 0 | Status: SHIPPED | OUTPATIENT
Start: 2018-12-10 | End: 2019-02-07 | Stop reason: SDUPTHER

## 2018-12-10 NOTE — PROGRESS NOTES
Subjective:      Patient ID: Ora Henderson is a 58 y.o. female.    Chief Complaint: knot to hand (left hand)    Also reports a shooting pain that goes down her legs intermittently. Worse at night. Better with activity.       Hand Pain    Incident onset: 1 year. There was no injury mechanism. Pain location: left hand. The quality of the pain is described as aching, shooting and stabbing. The pain does not radiate. The pain is moderate. The pain has been constant since the incident. Associated symptoms include numbness and tingling. Pertinent negatives include no chest pain or muscle weakness. Nothing aggravates the symptoms. She has tried heat and NSAIDs for the symptoms. The treatment provided no relief.   Pain   This is a new problem. The current episode started 1 to 4 weeks ago. The problem occurs intermittently. The problem has been waxing and waning. Associated symptoms include arthralgias and numbness. Pertinent negatives include no abdominal pain, anorexia, change in bowel habit, chest pain, chills, congestion, coughing, diaphoresis, fatigue, fever, headaches, joint swelling, myalgias, nausea, neck pain, rash, sore throat, swollen glands, urinary symptoms, vertigo, visual change, vomiting or weakness. Nothing aggravates the symptoms. She has tried NSAIDs and acetaminophen for the symptoms. The treatment provided moderate relief.   Additionally, pt is requesting a refill on her nexium for reflex. Scheduled for follow up with pcp in feb. States that reflux is controlled with nexium.     Review of Systems   Constitutional: Negative for activity change, appetite change, chills, diaphoresis, fatigue, fever and unexpected weight change.   HENT: Negative.  Negative for congestion, hearing loss, postnasal drip, rhinorrhea, sore throat, trouble swallowing and voice change.    Eyes: Negative.  Negative for visual disturbance.   Respiratory: Negative.  Negative for cough, choking and chest tightness.   "  Cardiovascular: Negative for chest pain and leg swelling.   Gastrointestinal: Negative for abdominal distention, abdominal pain, anorexia, blood in stool, change in bowel habit, constipation, diarrhea, nausea and vomiting.   Endocrine: Negative for cold intolerance, heat intolerance, polydipsia and polyuria.   Genitourinary: Negative.  Negative for difficulty urinating and frequency.   Musculoskeletal: Positive for arthralgias. Negative for back pain, gait problem, joint swelling, myalgias, neck pain and neck stiffness.   Skin: Negative for color change, pallor, rash and wound.   Neurological: Positive for tingling and numbness. Negative for vertigo, tremors, seizures, syncope, speech difficulty, weakness, light-headedness and headaches.   Hematological: Negative for adenopathy.   Psychiatric/Behavioral: Negative for behavioral problems, self-injury and sleep disturbance.     Objective:   BP (!) 132/94   Pulse 82   Temp 98.2 °F (36.8 °C) (Tympanic)   Ht 5' 3" (1.6 m)   Wt 93.2 kg (205 lb 7.5 oz)   SpO2 98%   BMI 36.40 kg/m²     Physical Exam   Constitutional: She is oriented to person, place, and time. She appears well-developed and well-nourished. No distress.   HENT:   Head: Normocephalic and atraumatic.   Right Ear: External ear normal.   Left Ear: External ear normal.   Nose: Nose normal.   Mouth/Throat: Oropharynx is clear and moist.   Eyes: Conjunctivae and EOM are normal. Pupils are equal, round, and reactive to light.   Neck: Normal range of motion. Neck supple.   Cardiovascular: Normal rate, regular rhythm and normal heart sounds. Exam reveals no gallop and no friction rub.   No murmur heard.  Pulmonary/Chest: Effort normal and breath sounds normal. No respiratory distress. She has no wheezes. She has no rales. She exhibits no tenderness.   Musculoskeletal: Normal range of motion. She exhibits no edema or deformity.        Hands:  Neurological: She is alert and oriented to person, place, and time. "   Skin: Skin is warm and dry. No rash noted. She is not diaphoretic. No erythema.   Psychiatric: She has a normal mood and affect. Her behavior is normal. Judgment and thought content normal.   Vitals reviewed.      Assessment:     1. Ganglion cyst of wrist, left    2. Gastroesophageal reflux disease, esophagitis presence not specified    3. Neuralgia    4. Need for pneumococcal vaccination      Plan:   Ganglion cyst of wrist, left  -     Ambulatory referral to Orthopedics  -splint, NSAIDS  -RICE  -Educational handout on over-the-counter medications and at-home conservative care, pertinent to the patients diagnosis today, was handed to the patient and discussed in detail.    Gastroesophageal reflux disease, esophagitis presence not specified  -     esomeprazole (NEXIUM) 40 MG capsule; Take 1 capsule (40 mg total) by mouth before breakfast.  Dispense: 90 capsule; Refill: 0    Neuralgia  -     gabapentin (NEURONTIN) 300 MG capsule; Take 1 capsule (300 mg total) by mouth every evening.  Dispense: 30 capsule; Refill: 1  -advised that gabapentin can cause drowsiness.     Need for pneumococcal vaccination  -     (In Office Administered) Pneumococcal Polysaccharide Vaccine (23 Valent) (SQ/IM)    -scheduled for follow up in February with PCP.     Follow-up if symptoms worsen or fail to improve.

## 2018-12-10 NOTE — PATIENT INSTRUCTIONS
Sciatica    Sciatica is a condition that causes pain in the lower back that spreads down into the buttock, hip, and leg. Sometimes the leg pain can happen without any back pain. Sciatica happens when a spinal nerve is irritated or has pressure put on it as comes out of the spinal canal in the lower back. This most often happens when a bulge or rupture of a nearby spinal disk presses on the nerve. Sciatica can also be caused by a narrowing of the spinal canal (spinal stenosis) or spasm of the muscle in the buttocks that the sciatic nerve passes through (pyriform muscle). Sciatica is also called lumbar radiculopathy.  Sciatica may begin after a sudden twisting or bending force, such as in a car accident. Or it can happen after a simple awkward movement. In either case, muscle spasm often also happens. Muscle spasm makes the pain worse.  A healthcare provider makes a diagnosis of sciatica from your symptoms and a physical exam. Unless you had an injury from a car accident or fall, you usually wont have X-rays taken at this time. This is because the nerves and disks in your back cant be seen on an X-ray. If the provider sees signs of a compressed nerve, you will need to schedule an MRI scan as an outpatient. Signs of a compressed nerve include loss of strength in a leg.  Most sciatica gets better with medicine, exercise, and physical therapy. If your symptoms continue after at least 3 months of medical treatment, you may need surgery or injections to your lower back.  Home care  Follow these tips when caring for yourself at home:  · You may need to stay in bed the first few days. But as soon as possible, begin sitting up or walking. This will help you avoid problems that come from staying in bed for long periods.  · When in bed, try to find a position that is comfortable. A firm mattress is best. Try lying flat on your back with pillows under your knees. You can also try lying on your side with your knees bent up  toward your chest and a pillow between your knees.  · Avoid sitting for long periods. This puts more stress on your lower back than standing or walking.  · Use heat from a hot shower, hot bath, or heating pad to help ease pain. Massage can also help. You can also try using an ice pack. You can make your own ice pack by putting ice cubes in a plastic bag. Wrap the bag in a thin towel. Try both heat and cold to see which works best. Use the method that feels best for 20 minutes several times a day.  · You may use acetaminophen or ibuprofen to ease pain, unless another pain medicine was prescribed. Note: If you have chronic liver or kidney disease, talk with your healthcare provider before taking these medicines. Also talk with your provider if youve had a stomach ulcer or gastrointestinal bleeding.  · Use safe lifting methods. Dont lift anything heavier than 15 pounds until all of the pain is gone.  Follow-up care  Follow up with your healthcare provider, or as advised. You may need physical therapy or additional tests.  If X-rays were taken, a radiologist will look at them. You will be told of any new findings that may affect your care.  When to seek medical advice  Call your healthcare provider right away if any of these occur:  · Pain gets worse even after taking prescribed medicine  · Weakness or numbness in 1 or both legs or hips  · Numbness in your groin or genital area  · You cant control your bowel or bladder  · Fever  · Redness or swelling over your back or spine   Date Last Reviewed: 8/1/2016  © 7089-8966 [employer]. [address] [place] [address], [place] [address]. All rights reserved. This information is not intended as a substitute for professional medical care. Always follow your healthcare professional's instructions.        Understanding Lumbar Radiculopathy    Lumbar radiculopathy is irritation or inflammation of a nerve root in the low back. It causes symptoms that spread out from the back  down one or both legs. To understand this condition, it helps to understand the parts of the spine:  · Vertebrae. These are bones that stack to form the spine. The lumbar spine contains the 5 bottom vertebrae.  · Disks. These are soft pads of tissue between the vertebrae. They act as shock absorbers for the spine.  · Spinal canal. This is a tunnel formed within the stacked vertebrae. In the lumbar spine, nerves run through this canal.  · Nerves. These branch off and leave the spinal canal, traveling out to parts of the body. As they leave the spinal canal, nerves pass through openings between the vertebrae. The nerve root is the part of the nerve that is closest to the spinal canal.  · Sciatic nerve. This is a large nerve formed from several nerve roots in the low back. This nerve extends down the back of the leg to the foot.  With lumbar radiculopathy, nerve roots in the low back become irritated. This leads to pain and symptoms. The sciatic nerve is commonly involved, so the condition is often called sciatica.  What causes lumbar radiculopathy?  Aging, injury, poor posture, extra body weight, and other issues can lead to problems in the low back. These problems may then irritate nerve roots. They include:  · Damage to a disk in the lumbar spine. The damaged disk may then press on nearby nerve roots.  · Degeneration from wear and tear, and aging. This can lead to narrowing (stenosis) of the openings between the vertebrae. The narrowed openings press on nerve roots as they leave the spinal canal.  · Unstable spine. This is when a vertebra slips forward. It can then press on a nerve root.  Other, less common things can put pressure on nerves in the low back. These include diabetes, infection, or a tumor.  Symptoms of lumbar radiculopathy  These include:  · Pain in the low back  · Pain, numbness, tingling, or weakness that travels into the buttocks, hip, groin, or leg  · Muscle spasms  Treatment for lumbar  radiculopathy  In most cases, your healthcare provider will first try treatments that help relieve symptoms. These may include:  · Prescription and over-the-counter pain medicines. These help relieve pain, swelling, and irritation.  · Limits on positions and activities that increase pain. But lying in bed or avoiding all movement is only recommended for a short period of time.  · Physical therapy, including exercises and stretches. This helps decrease pain and increase movement and function.  · Steroid shots into the lower back. This may help relieve symptoms for a time.  · Weight-loss program. If you are overweight, losing extra pounds may help relieve symptoms.  In some cases, you may need surgery to fix the underlying problem. This depends on the cause, the symptoms, and how long the pain has lasted.  Possible complications  Over time, an irritated and inflamed nerve may become damaged. This may lead to long-lasting (permanent) numbness or weakness in your legs and feet. If symptoms change suddenly or get worse, be sure to let your healthcare provider know.  When to call your healthcare provider  Call your healthcare provider right away if you have any of these:  · New pain or pain that gets worse  · New or increasing weakness, tingling, or numbness in your leg or foot  · Problems controlling your bladder or bowel   Date Last Reviewed: 3/10/2016  © 2643-5098 [employer]. [address] [place] [address], [place] [address]. All rights reserved. This information is not intended as a substitute for professional medical care. Always follow your healthcare professional's instructions.        Ganglion Cyst    A ganglion cyst usually is a painless bump on the wrist or finger joint. It connects to the joint capsule and grows like a balloon on a stalk. It is filled with joint fluid. The cause of a ganglion cyst is not known.   If the cyst puts pressure on a nearby nerve it may cause pain. Otherwise, cysts are painless  and harmless. Most tend to disappear over time without treatment. Do not try to drain or break the cyst at home. This can cause harm and does not cure the problem.  If you are having pain from the cyst, a temporary wrist splint may be helpful to limit wrist motion. If this does not help, the fluid can be removed from the cyst. This should shrink the size of the cyst. If this doesnt give relief, the ganglion can be removed by surgery.  Home care  · If you are having wrist pain, use a wrist splint for 1-2 weeks at a time. You can buy one at many drug stores without a prescription.  · You may use over-the-counter pain medicine to control pain, unless another medicine was prescribed. If you have chronic liver or kidney disease or ever had a stomach ulcer or GI bleeding, talk with your healthcare provider before using these medicines.    · If a needle was used to drain the cyst fluid or inject medicine into it, keep the site clean and covered with a bandage for the first 24 hours. If a pressure dressing was applied, leave it in place for the time advised.  Follow-up care  Follow up with your healthcare provider, or as advised by our staff. Make an appointment for a repeat exam if pain continues for more than 2 weeks in a wrist splint.  When to seek medical advice  Call your healthcare provider right away if any of these occur:  · Increasing pain in the wrist  · Redness over the cyst  · Fluid draining from the cyst  · Numbness or tingling in the hand or arm  Date Last Reviewed: 11/20/2015  © 0702-9363 [employer]. [address] [place] [address], [place] [address]. All rights reserved. This information is not intended as a substitute for professional medical care. Always follow your healthcare professional's instructions.        Ganglion Cyst: Hand    A ganglion cyst is a firm, fluid-filled lump that can suddenly appear on the front or back of the wrist or at the base of a finger. These cysts grow from normal tissue  in the wrist and fingers, and range in size from a pea to a peach pit. Although ganglion cysts are common, they dont spread, and they dont become cancerous. They can occur after an injury, but many times it isnt known why they grow. Ganglion cysts can change in size, and may go away on their own.  Symptoms  A ganglion cyst is sometimes painful, especially when it first occurs. Constantly using your hand or wrist can make the cyst enlarge and hurt more. Some hand and wrist movements, such as grasping things, may also be difficult.  How a ganglion cyst develops  Your wrist and hand are made up of many small bones that meet at joints. Tendons attach muscles to the bones at the joints. The tendons allow the joints to bend and straighten. Both tendons and joints are lined with tissue called synovium. This tissue makes a thick fluid that keeps the joints and tendons moving easily. Sometimes the tissue balloons out from the joint or tendons and forms a cyst. As the cyst fills with fluid and grows, it appears as a lump you can feel.  Where ganglion cysts occur  A ganglion cyst can occur anywhere on the hand near a joint. Cysts most commonly appear on the back or palm side of the wrist, or on the palm at the base of a finger. Your doctor can usually diagnose a cyst by examining the lump. He or she may draw off a little fluid or order an X-ray to rule out other problems.  Treating a ganglion cyst  Your healthcare provider may just watch your ganglion cyst. Many shrink and become painless without treatment. Some disappear altogether. If the cyst is unsightly or painful, or makes it hard for you to use your hand, your healthcare provider can treat it or, if needed, remove it surgically.  Nonsurgical treatment  To shrink the cyst, your provider may remove (aspirate) the fluid with a needle. If the cyst hurts, your provider may also give you an injection of an anti-inflammatory, such as cortisone, to relieve the irritation.  Your hand may then be wrapped to help keep the cyst from recurring.  Surgery  If the cyst reappears after treatment, your healthcare provider may remove it surgically. A section of the tissue that lines the joint or tendon is removed along with the cyst. This helps prevent another cyst from forming, although recurrence of the cyst is still possible after surgery. Usually, only your hand or arm is numbed, and you can go home a few hours after surgery. Your hand may be in a splint for several days.  Date Last Reviewed: 9/10/2015  © 8580-6403 [employer]. [address] [place] [address]. All rights reserved. This information is not intended as a substitute for professional medical care. Always follow your healthcare professional's instructions.

## 2018-12-11 ENCOUNTER — TELEPHONE (OUTPATIENT)
Dept: ORTHOPEDICS | Facility: CLINIC | Age: 58
End: 2018-12-11

## 2018-12-11 ENCOUNTER — HOSPITAL ENCOUNTER (OUTPATIENT)
Dept: RADIOLOGY | Facility: HOSPITAL | Age: 58
Discharge: HOME | End: 2018-12-11
Attending: PHYSICIAN ASSISTANT
Payer: COMMERCIAL

## 2018-12-11 ENCOUNTER — OFFICE VISIT (OUTPATIENT)
Dept: ORTHOPEDICS | Facility: CLINIC | Age: 58
End: 2018-12-11
Payer: COMMERCIAL

## 2018-12-11 VITALS
HEIGHT: 63 IN | DIASTOLIC BLOOD PRESSURE: 89 MMHG | RESPIRATION RATE: 12 BRPM | HEART RATE: 85 BPM | SYSTOLIC BLOOD PRESSURE: 137 MMHG | BODY MASS INDEX: 36.41 KG/M2 | WEIGHT: 205.5 LBS

## 2018-12-11 DIAGNOSIS — R22.32 MASS OF LEFT HAND: Primary | ICD-10-CM

## 2018-12-11 DIAGNOSIS — M25.532 LEFT WRIST PAIN: ICD-10-CM

## 2018-12-11 DIAGNOSIS — M25.532 LEFT WRIST PAIN: Primary | ICD-10-CM

## 2018-12-11 PROCEDURE — 99999 PR PBB SHADOW E&M-EST. PATIENT-LVL V: CPT | Mod: PBBFAC,,, | Performed by: PHYSICIAN ASSISTANT

## 2018-12-11 PROCEDURE — 3075F SYST BP GE 130 - 139MM HG: CPT | Mod: CPTII,S$GLB,, | Performed by: PHYSICIAN ASSISTANT

## 2018-12-11 PROCEDURE — 73110 X-RAY EXAM OF WRIST: CPT | Mod: TC,FY,PO,LT

## 2018-12-11 PROCEDURE — 99214 OFFICE O/P EST MOD 30 MIN: CPT | Mod: S$GLB,,, | Performed by: PHYSICIAN ASSISTANT

## 2018-12-11 PROCEDURE — 3008F BODY MASS INDEX DOCD: CPT | Mod: CPTII,S$GLB,, | Performed by: PHYSICIAN ASSISTANT

## 2018-12-11 PROCEDURE — 73110 X-RAY EXAM OF WRIST: CPT | Mod: 26,LT,, | Performed by: RADIOLOGY

## 2018-12-11 PROCEDURE — 3079F DIAST BP 80-89 MM HG: CPT | Mod: CPTII,S$GLB,, | Performed by: PHYSICIAN ASSISTANT

## 2018-12-11 NOTE — LETTER
December 11, 2018      Luma Burton PA-C  [address] [facility] [place] [address]           [facility]  [address] [facility] [address] [place] [address]  Phone: [phone]  Fax: [phone]          Patient: Ora Henderson   MR Number: [id number]   YOB: 1960   Date of Visit: 12/11/2018       Dear Luma Burton:    Thank you for referring Ora Henderson to me for evaluation. Attached you will find relevant portions of my assessment and plan of care.    If you have questions, please do not hesitate to call me. I look forward to following Ora Henderson along with you.    Sincerely,    Leslie Person PA-C    Enclosure  CC:  No Recipients    If you would like to receive this communication electronically, please contact [email or web address] or [phone] to request more information on [email or web address] Link access.    For providers and/or their staff who would like to refer a patient to [facility], please contact us through our one-stop-shop provider referral line, [facility], at [phone].    If you feel you have received this communication in error or would no longer like to receive these types of communications, please e-mail [email or web address]

## 2018-12-11 NOTE — PROGRESS NOTES
Subjective:      Patient ID: Ora Henderson is a 58 y.o. female.    Chief Complaint: Pain of the Left Wrist      HPI: Ora Henderson  is a 58 y.o. female who c/o Pain of the Left Wrist   for duration of close to a year now.  She has minimal discomfort during the day.  However at nighttime, pain flares up on her significantly.  Alleviating factors include Aleve in warm water.  She feels like the mass moves any time she flexes and extends her middle and ring fingers specifically on the left hand.  Quality is occasional sharp and burning.  It is intermittent in nature.  She denies associated fevers.  She denies numbness and tingling.    Past Medical History:   Diagnosis Date    Arthritis     GERD (gastroesophageal reflux disease)     Hepatomegaly     and fatty liver    Hernia, umbilical     reducible    Hyperlipidemia     Hypertension      Past Surgical History:   Procedure Laterality Date    BRAIN SURGERY      tumor removal     CARPAL TUNNEL RELEASE Bilateral     CERVICAL BIOPSY  W/ LOOP ELECTRODE EXCISION      [facility] '81     SECTION      x 1    COLONOSCOPY N/A 2016    Procedure: COLONOSCOPY;  Surgeon: Quique Paul MD;  Location: [facility];  Service: Endoscopy;  Laterality: N/A;    COLONOSCOPY N/A 2016    Performed by Quique Paul MD at [facility] ENDO    EGD (ESOPHAGOGASTRODUODENOSCOPY) N/A 2013    Performed by Holly Guajardo MD at [facility] ENDO    HERNIA REPAIR      2016    HYSTERECTOMY  2006    fibroids and endometriosis    TOTAL ABDOMINAL HYSTERECTOMY W/ BILATERAL SALPINGOOPHORECTOMY      STAR/BSO secondary to endometriosis    VENTRAL HERNIA REPAIR      XI ROBOTIC ASSISTED VENTRAL HERNIA REPAIR N/A 2016    Performed by Gio Acosta MD at [facility] OR     Family History   Problem Relation Age of Onset    Heart disease Father     Stroke Father     Hypertension Father     Hypertension Mother     Cancer Maternal Aunt         pancreas     Cancer Maternal Uncle         pancreas    Heart disease Paternal Uncle     Heart disease Paternal Grandmother     Diabetes Maternal Aunt      Social History     Socioeconomic History    Marital status:      Spouse name: Not on file    Number of children: 1    Years of education: Not on file    Highest education level: Not on file   Social Needs    Financial resource strain: Not on file    Food insecurity - worry: Not on file    Food insecurity - inability: Not on file    Transportation needs - medical: Not on file    Transportation needs - non-medical: Not on file   Occupational History    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 If you have any questions, ask your nurse or doctor.               CONTINUE taking these medications    albuterol 90 mcg/actuation inhaler  Commonly known as:  PROVENTIL/VENTOLIN HFA  Inhale 1-2 puffs into the lungs every 4 (four) hours as needed for Wheezing or Shortness of Breath (cough).     ALEVE 220 MG tablet  Generic drug:  naproxen sodium     aspirin 81 MG Chew     biotin 1 mg Cap     DAILY MULTIPLE FOR WOMEN ORAL     esomeprazole 40 MG capsule  Commonly known as:  NEXIUM  Take 1 capsule (40 mg total) by mouth before breakfast.     fluconazole 150 MG Tab  Commonly known as:  DIFLUCAN     gabapentin 300 MG capsule  Commonly known as:  NEURONTIN  Take 1 capsule (300 mg total) by mouth every evening.     GERITOL TONIC WITH FERREX 18 2.5 mg-50 mg-18 iron/15 mL Liqd  Generic drug:  I6-I3-O8-B5-B6-iron-met-choln      ketoconazole 2 % shampoo  Commonly known as:  NIZORAL  Wash hair with medicated shampoo at least 2x/week - let sit on scalp at least 5 minutes prior to rinsing     Lacto.acidophilus-Bif.animalis 5 billion cell Cpsp  Commonly known as:  PROBIOTIC  Take 1 tablet by mouth once daily.     loratadine 10 mg tablet  Commonly known as:  CLARITIN  Take 1 tablet (10 mg total) by mouth once daily.     losartan-hydrochlorothiazide 100-25 mg 100-25 mg per tablet  Commonly known as:  HYZAAR  Take 1 tablet by mouth once daily.     rosuvastatin 20 MG tablet  Commonly known as:  CRESTOR  TAKE ONE TABLET BY MOUTH IN THE EVENING     senna-docusate 8.6-50 mg 8.6-50 mg per tablet  Commonly known as:  PERICOLACE          Review of patient's allergies indicates:   Allergen Reactions    Sulfa (sulfonamide antibiotics) Hives       Review of Systems   Constitution: Negative for fever.   Cardiovascular: Negative for chest pain.   Respiratory: Negative for cough and shortness of breath.    Skin: Negative for rash.   Musculoskeletal: Negative for joint pain, joint swelling and stiffness.   Gastrointestinal: Negative for heartburn.   Neurological: Negative for headaches and numbness.         Objective:        General    Nursing note and vitals reviewed.  Constitutional: She is oriented to person, place, and time. She appears well-developed and well-nourished.   HENT:   Head: Normocephalic and atraumatic.   Eyes: EOM are normal.   Cardiovascular: Normal rate and regular rhythm.    Pulmonary/Chest: Effort normal.   Abdominal: Soft.   Neurological: She is alert and oriented to person, place, and time.   Psychiatric: She has a normal mood and affect. Her behavior is normal.         Right Ankle/Foot Exam     Tests   Heel Walk: able to perform  Tiptoe Walk: able to perform    Left Ankle/Foot Exam     Tests   Heel Walk: able to perform  Tiptoe Walk: able to perform  Back (L-Spine & T-Spine) / Neck (C-Spine) Exam     Tenderness Right paramedian  tenderness of the Lower L-Spine. Left paramedian tenderness of the Lower L-Spine.     Spinal Sensation   Right Side Sensation  L-Spine Level: normal  Left Side Sensation  L-Spine Level: normal    Back (L-Spine & T-Spine) Tests   Right Side Tests  Squat Test: able to perform  Left Side Tests  Squat: able to perform    Comments:  (-) SLR test BLE.      Right Hand/Wrist Exam     Inspection   Scars: Wrist - absent Hand -  absent  Effusion: Wrist - absent Hand -  absent  Bruising: Wrist - absent Hand -  absent  Deformity: Wrist - deformity Hand -  deformity    Range of Motion     Wrist   Extension: normal   Flexion: normal   Pronation: normal   Supination: normal     Tests   Phalens Sign: negative  Tinel's sign (median nerve): negative  Finkelstein's test: negative  Carpal Tunnel Compression Test: negative  Cubital Tunnel Compression Test: negative    Atrophy   Thenar:  negative  Hypothenar:  negative  Intrinsic:  negative  1st Dorsal Interosseous: negative    Other     Neuorologic Exam    Median Distribution: normal  Ulnar Distribution: normal  Radial Distribution: normal    Comments:  2+ radial pulse      Left Hand/Wrist Exam     Inspection   Scars: Wrist - absent Hand -  absent  Effusion: Wrist - absent Hand -  absent  Bruising: Wrist - absent Hand -  absent  Deformity: Wrist - absent Hand -  absent    Range of Motion     Wrist   Extension: normal   Flexion: normal   Pronation: normal   Supination: normal     Tests   Phalens Sign: negative  Tinel's sign (median nerve): negative  Finkelstein's test: negative  Carpal Tunnel Compression Test: negative  Cubital Tunnel Compression Test: negative    Atrophy  Thenar:  Negative  Hypothenar:  negative  Intrinsic: negative  1st Dorsal Interosseous:  negative    Other     Sensory Exam  Median Distribution: normal  Ulnar Distribution: normal  Radial Distribution: normal    Comments:  2+ radial pulse  Mass dorsal wrist  Nonpulsatile  No erythema  No fluid fluctuance  2cm x 1cm -  appears to overlie the extensor tendons      Right Elbow Exam     Tests   Tinel's sign (cubital tunnel): negative      Left Elbow Exam     Tests   Tinel's sign (cubital tunnel): negative        Muscle Strength   Right Upper Extremity   Wrist extension:    Wrist flexion:    :    Pinch Mechanism:   Left Upper Extremity  Wrist extension:    Wrist flexion:    :     Pinch Mechanism:   Right Lower Extremity   Hip Abduction:    Hip Flexion:    Quadriceps:     Hamstrin/5   Anterior tibial:    Gastrocsoleus:    EHL:    Left Lower Extremity   Hip Abduction:    Hip Flexion:    Quadriceps:     Hamstrin/5   Anterior tibial:     Gastrocsoleus:    EHL:      Reflexes     Left Side  Quadriceps:  2+  Achilles:  2+    Right Side   Quadriceps:  2+  Achilles:  2+    Vascular Exam       Capillary Refill  Right Hand: normal capillary refill  Left Hand: normal capillary refill            Xray:   Left wrist from today images and report were reviewed today.  I agree with the radiologist's interpretation.  No acute fracture or dislocation.  Mild degenerative changes noted at the 1st CMC, triscaphe joints and radiocarpal joints    Assessment:       Encounter Diagnosis   Name Primary?    Mass of left hand Yes          Plan:       Ora was seen today for pain.    Diagnoses and all orders for this visit:    Mass of left hand  -     MRI Hand Fingers W WO Contrast Left; Future  -     Ambulatory referral to Orthopedics     Ms. Hudson comes in today for new problem of the left wrist. She has a mass overlying the extensor tendons dorsally. It is causing her some discomfort, particularly at nighttime.  At this point, I recommend further workup.  It does not appear to be a ganglion cyst.  I have ordered an MRI with and without contrast for further evaluation. I would like her to see Dr. Ojeda, hand specialist at [facility] once the MRI is  completed.  I will defer any further workup or treatment to him directly.  She verbalizes understanding and agrees.    Follow-up for consult with Dr. Ojeda at [facility].          The patient understands, chooses and consents to this plan and accepts all   the risks which include but are not limited to the risks mentioned above.     Disclaimer: This note was prepared using a voice recognition system and is likely to have sound alike errors within the text.

## 2018-12-11 NOTE — TELEPHONE ENCOUNTER
Appt Tues 12/18/18 at 830 w/Dr Ojeda at the [facility] scheduled for after patient's MRI this thursday. Patient aware and verbalized understanding.

## 2018-12-12 ENCOUNTER — TELEPHONE (OUTPATIENT)
Dept: RADIOLOGY | Facility: HOSPITAL | Age: 58
End: 2018-12-12

## 2018-12-13 ENCOUNTER — HOSPITAL ENCOUNTER (OUTPATIENT)
Dept: RADIOLOGY | Facility: HOSPITAL | Age: 58
Discharge: HOME | End: 2018-12-13
Attending: PHYSICIAN ASSISTANT
Payer: COMMERCIAL

## 2018-12-13 DIAGNOSIS — R22.32 MASS OF LEFT HAND: ICD-10-CM

## 2018-12-13 PROCEDURE — 25500020 PHARM REV CODE 255: Mod: PO | Performed by: PHYSICIAN ASSISTANT

## 2018-12-13 PROCEDURE — A9585 GADOBUTROL INJECTION: HCPCS | Mod: PO | Performed by: PHYSICIAN ASSISTANT

## 2018-12-13 PROCEDURE — 73220 MRI UPPR EXTREMITY W/O&W/DYE: CPT | Mod: TC,PO,LT

## 2018-12-13 PROCEDURE — 73220 MRI UPPR EXTREMITY W/O&W/DYE: CPT | Mod: 26,LT,, | Performed by: RADIOLOGY

## 2018-12-13 RX ORDER — GADOBUTROL 604.72 MG/ML
9 INJECTION INTRAVENOUS
Status: COMPLETED | OUTPATIENT
Start: 2018-12-13 | End: 2018-12-13

## 2018-12-13 RX ADMIN — GADOBUTROL 9 ML: 604.72 INJECTION INTRAVENOUS at 09:12

## 2018-12-16 ENCOUNTER — PATIENT MESSAGE (OUTPATIENT)
Dept: INTERNAL MEDICINE | Facility: CLINIC | Age: 58
End: 2018-12-16

## 2018-12-24 ENCOUNTER — OFFICE VISIT (OUTPATIENT)
Dept: CARDIOLOGY | Facility: CLINIC | Age: 58
End: 2018-12-24
Payer: COMMERCIAL

## 2018-12-24 VITALS
DIASTOLIC BLOOD PRESSURE: 82 MMHG | SYSTOLIC BLOOD PRESSURE: 138 MMHG | BODY MASS INDEX: 36.99 KG/M2 | HEIGHT: 63 IN | WEIGHT: 208.75 LBS | HEART RATE: 82 BPM

## 2018-12-24 DIAGNOSIS — E66.09 CLASS 2 OBESITY DUE TO EXCESS CALORIES WITHOUT SERIOUS COMORBIDITY WITH BODY MASS INDEX (BMI) OF 37.0 TO 37.9 IN ADULT: ICD-10-CM

## 2018-12-24 DIAGNOSIS — I10 ESSENTIAL HYPERTENSION: ICD-10-CM

## 2018-12-24 DIAGNOSIS — R94.31 ABNORMAL ECG: ICD-10-CM

## 2018-12-24 DIAGNOSIS — Z01.810 PREOP CARDIOVASCULAR EXAM: Primary | ICD-10-CM

## 2018-12-24 DIAGNOSIS — G47.33 OSA (OBSTRUCTIVE SLEEP APNEA): Chronic | ICD-10-CM

## 2018-12-24 DIAGNOSIS — E78.2 MIXED HYPERLIPIDEMIA: Chronic | ICD-10-CM

## 2018-12-24 DIAGNOSIS — Z82.49 FAMILY HISTORY OF CARDIOVASCULAR DISEASE: ICD-10-CM

## 2018-12-24 PROCEDURE — 99999 PR PBB SHADOW E&M-EST. PATIENT-LVL III: CPT | Mod: PBBFAC,,, | Performed by: INTERNAL MEDICINE

## 2018-12-24 PROCEDURE — 3075F SYST BP GE 130 - 139MM HG: CPT | Mod: CPTII,S$GLB,, | Performed by: INTERNAL MEDICINE

## 2018-12-24 PROCEDURE — 99214 OFFICE O/P EST MOD 30 MIN: CPT | Mod: S$GLB,,, | Performed by: INTERNAL MEDICINE

## 2018-12-24 PROCEDURE — 3079F DIAST BP 80-89 MM HG: CPT | Mod: CPTII,S$GLB,, | Performed by: INTERNAL MEDICINE

## 2018-12-24 PROCEDURE — 3008F BODY MASS INDEX DOCD: CPT | Mod: CPTII,S$GLB,, | Performed by: INTERNAL MEDICINE

## 2018-12-24 NOTE — PROGRESS NOTES
Subjective:    Patient ID:  Ora Henderson is a 58 y.o. female who presents for evaluation of Preop Evaluation, Risk Factor Modification,  Hypertension and Hyperlipidemia        HPI Mrs. Henderson presents for f/u.  Her current medical conditions include HTN, hyperlipidemia, obesity. Nonsmoker.   She has family h/o cad. Sisters x 2  of MIs. Brother  of MI. Father  of MI 42.   Past hx pertinent for following:  h/o LHC about 20 years ago, no specific findings.   H/o chronic abnormal ecgs showing nonspecific ST-T abnl.  Stress MPI 3/17 showed no ischemia.  Echo 3/17 showed normal LV function.  Now here for f/u.  She is here to get clearance for hand surgery with Dr. DALTON Ojeda, [facility], next few weeks.  She denies cp sxs.   No unusual dyspnea, pnd/orthopnea.  Hyzaar increased last appt and she checks her BP at home and most numbers seem < 130/80.  Obesity unchanged, no significant weight gain/loss since last visit.  Some exercise.  Compliant with meds.  Has some sciatica sxs.  Last ecg  NSR, low precordial R waves, no acute changes.   Has not tolerated CPAP in past for NATALIE.  Lipids controlled on statin.      Current Outpatient Medications:     albuterol 90 mcg/actuation inhaler, Inhale 1-2 puffs into the lungs every 4 (four) hours as needed for Wheezing or Shortness of Breath (cough)., Disp: 1 Inhaler, Rfl: 0    aspirin 81 MG Chew, Take 81 mg by mouth as needed., Disp: , Rfl:     I6-E1-U4-B5-B6-iron-met-choln (GERITOL TONIC WITH FERREX 18) 2.5 mg-50 mg-18 iron/15 mL Liqd, Take 5 mLs by mouth once daily., Disp: , Rfl:     biotin 1 mg Cap, Take 1 capsule by mouth once daily., Disp: , Rfl:     esomeprazole (NEXIUM) 40 MG capsule, Take 1 capsule (40 mg total) by mouth before breakfast., Disp: 90 capsule, Rfl: 0    fluconazole (DIFLUCAN) 150 MG Tab, , Disp: , Rfl:     gabapentin (NEURONTIN) 300 MG capsule, Take 1 capsule (300 mg total) by mouth every evening., Disp:  "30 capsule, Rfl: 1    ketoconazole (NIZORAL) 2 % shampoo, Wash hair with medicated shampoo at least 2x/week - let sit on scalp at least 5 minutes prior to rinsing, Disp: 120 mL, Rfl: 5    L.acidophilus-Bif. animalis (PROBIOTIC) 5 billion cell CpSP, Take 1 tablet by mouth once daily., Disp: 10 capsule, Rfl: 0    loratadine (CLARITIN) 10 mg tablet, Take 1 tablet (10 mg total) by mouth once daily., Disp: 30 tablet, Rfl: 0    losartan-hydrochlorothiazide 100-25 mg (HYZAAR) 100-25 mg per tablet, Take 1 tablet by mouth once daily., Disp: 90 tablet, Rfl: 3    MULTIVIT,CALC,MINS/IRON/FOLIC (DAILY MULTIPLE FOR WOMEN ORAL), Take 1 tablet by mouth once daily., Disp: , Rfl:     naproxen sodium (ALEVE) 220 MG tablet, Take 220 mg by mouth as needed., Disp: , Rfl:     rosuvastatin (CRESTOR) 20 MG tablet, TAKE ONE TABLET BY MOUTH IN THE EVENING, Disp: 30 tablet, Rfl: 11    senna-docusate 8.6-50 mg (PERICOLACE) 8.6-50 mg per tablet, Take 1 tablet by mouth as needed for Constipation., Disp: , Rfl:       Review of Systems   Constitution: Negative.   HENT: Negative.    Eyes: Negative.    Cardiovascular: Negative.    Respiratory: Negative.    Endocrine: Negative.    Hematologic/Lymphatic: Negative.    Skin: Negative.    Musculoskeletal: Positive for arthritis and joint pain.   Gastrointestinal: Negative.    Genitourinary: Negative.    Neurological: Negative.    Psychiatric/Behavioral: Negative.    Allergic/Immunologic: Negative.        /82 (BP Location: Right arm, Patient Position: Sitting, BP Method: Large (Manual))   Pulse 82   Ht 5' 3" (1.6 m)   Wt 94.7 kg (208 lb 12.4 oz)   BMI 36.98 kg/m²     Wt Readings from Last 3 Encounters:   12/24/18 94.7 kg (208 lb 12.4 oz)   12/11/18 93.2 kg (205 lb 7.5 oz)   12/10/18 93.2 kg (205 lb 7.5 oz)     Temp Readings from Last 3 Encounters:   12/10/18 98.2 °F (36.8 °C) (Tympanic)   10/09/18 97.8 °F (36.6 °C) (Tympanic)   09/23/18 98.5 °F (36.9 °C) (Tympanic)     BP Readings from " Last 3 Encounters:   12/24/18 138/82   12/11/18 137/89   12/10/18 (!) 132/94     Pulse Readings from Last 3 Encounters:   12/24/18 82   12/11/18 85   12/10/18 82          Objective:    Physical Exam   Constitutional: She is oriented to person, place, and time. Vital signs are normal. She appears well-developed and well-nourished. She is active and cooperative. She does not have a sickly appearance. She does not appear ill. No distress.   HENT:   Head: Normocephalic.   Neck: Neck supple. Normal carotid pulses, no hepatojugular reflux and no JVD present. Carotid bruit is not present. No thyromegaly present.   Cardiovascular: Normal rate, regular rhythm, S1 normal, S2 normal, normal heart sounds and normal pulses. PMI is not displaced. Exam reveals no gallop and no friction rub.   No murmur heard.  Pulses:       Radial pulses are 2+ on the right side, and 2+ on the left side.   Pulmonary/Chest: Effort normal and breath sounds normal. She has no wheezes. She has no rales.   Abdominal: Soft. Normal appearance and bowel sounds are normal. She exhibits no mass. There is no tenderness.   Musculoskeletal: She exhibits no edema.   Lymphadenopathy:     She has no cervical adenopathy.   Neurological: She is alert and oriented to person, place, and time.   Skin: Skin is warm. She is not diaphoretic.   Psychiatric: She has a normal mood and affect. Her behavior is normal.   Nursing note and vitals reviewed.      I have reviewed all pertinent labs and cardiac studies.      Chemistry        Component Value Date/Time     12/10/2018 0955    K 4.0 12/10/2018 0955     12/10/2018 0955    CO2 29 12/10/2018 0955    BUN 13 12/10/2018 0955    CREATININE 0.9 12/10/2018 0955    GLU 98 12/10/2018 0955        Component Value Date/Time    CALCIUM 9.8 12/10/2018 0955    ALKPHOS 66 12/10/2018 0955    AST 16 12/10/2018 0955    ALT 17 12/10/2018 0955    BILITOT 0.6 12/10/2018 0955    ESTGFRAFRICA >60.0 12/10/2018 0955    EGFRNONAA >60.0  12/10/2018 0955        Lab Results   Component Value Date    WBC 6.19 07/31/2018    HGB 11.7 (L) 07/31/2018    HCT 37.3 07/31/2018    MCV 93 07/31/2018     07/31/2018     Lab Results   Component Value Date    CHOL 196 07/31/2018    CHOL 185 02/28/2018    CHOL 190 05/08/2017     Lab Results   Component Value Date    HDL 46 07/31/2018    HDL 50 02/28/2018    HDL 52 05/08/2017     Lab Results   Component Value Date    LDLCALC 127.2 07/31/2018    LDLCALC 107.0 02/28/2018    LDLCALC 112.4 05/08/2017     Lab Results   Component Value Date    TRIG 114 07/31/2018    TRIG 140 02/28/2018    TRIG 128 05/08/2017     Lab Results   Component Value Date    CHOLHDL 23.5 07/31/2018    CHOLHDL 27.0 02/28/2018    CHOLHDL 27.4 05/08/2017           Assessment:       1. Preop cardiovascular exam    2. Mixed hyperlipidemia    3. Class 2 obesity due to excess calories without serious comorbidity with body mass index (BMI) of 37.0 to 37.9 in adult    4. Abnormal ECG    5. Essential hypertension    6. Family history of cardiovascular disease    7. NATALIE (obstructive sleep apnea)         Plan:             Pt may proceed with surgery at low CV risk.  Will order preop labs and fax clearance to her surgeon.  Stable CV conditions on current medical tx.  Continue current meds.  Cardiac diet  Exercise daily  Weight loss advised  CPAP for NATALIE  F/u 6 months.

## 2019-02-05 ENCOUNTER — OFFICE VISIT (OUTPATIENT)
Dept: INTERNAL MEDICINE | Facility: CLINIC | Age: 59
End: 2019-02-05
Payer: COMMERCIAL

## 2019-02-05 VITALS
BODY MASS INDEX: 36.83 KG/M2 | DIASTOLIC BLOOD PRESSURE: 82 MMHG | TEMPERATURE: 99 F | OXYGEN SATURATION: 98 % | HEIGHT: 63 IN | SYSTOLIC BLOOD PRESSURE: 140 MMHG | WEIGHT: 207.88 LBS | HEART RATE: 82 BPM

## 2019-02-05 DIAGNOSIS — H10.9 BACTERIAL CONJUNCTIVITIS OF LEFT EYE: Primary | ICD-10-CM

## 2019-02-05 PROCEDURE — 3079F PR MOST RECENT DIASTOLIC BLOOD PRESSURE 80-89 MM HG: ICD-10-PCS | Mod: CPTII,S$GLB,, | Performed by: FAMILY MEDICINE

## 2019-02-05 PROCEDURE — 99214 PR OFFICE/OUTPT VISIT, EST, LEVL IV, 30-39 MIN: ICD-10-PCS | Mod: S$GLB,,, | Performed by: FAMILY MEDICINE

## 2019-02-05 PROCEDURE — 99214 OFFICE O/P EST MOD 30 MIN: CPT | Mod: S$GLB,,, | Performed by: FAMILY MEDICINE

## 2019-02-05 PROCEDURE — 3077F SYST BP >= 140 MM HG: CPT | Mod: CPTII,S$GLB,, | Performed by: FAMILY MEDICINE

## 2019-02-05 PROCEDURE — 99999 PR PBB SHADOW E&M-EST. PATIENT-LVL III: ICD-10-PCS | Mod: PBBFAC,,, | Performed by: FAMILY MEDICINE

## 2019-02-05 PROCEDURE — 3008F BODY MASS INDEX DOCD: CPT | Mod: CPTII,S$GLB,, | Performed by: FAMILY MEDICINE

## 2019-02-05 PROCEDURE — 3008F PR BODY MASS INDEX (BMI) DOCUMENTED: ICD-10-PCS | Mod: CPTII,S$GLB,, | Performed by: FAMILY MEDICINE

## 2019-02-05 PROCEDURE — 3079F DIAST BP 80-89 MM HG: CPT | Mod: CPTII,S$GLB,, | Performed by: FAMILY MEDICINE

## 2019-02-05 PROCEDURE — 99999 PR PBB SHADOW E&M-EST. PATIENT-LVL III: CPT | Mod: PBBFAC,,, | Performed by: FAMILY MEDICINE

## 2019-02-05 PROCEDURE — 3077F PR MOST RECENT SYSTOLIC BLOOD PRESSURE >= 140 MM HG: ICD-10-PCS | Mod: CPTII,S$GLB,, | Performed by: FAMILY MEDICINE

## 2019-02-05 RX ORDER — POLYMYXIN B SULFATE AND TRIMETHOPRIM 1; 10000 MG/ML; [USP'U]/ML
1 SOLUTION OPHTHALMIC EVERY 6 HOURS
Qty: 10 ML | Refills: 0 | Status: SHIPPED | OUTPATIENT
Start: 2019-02-05 | End: 2019-02-06

## 2019-02-05 NOTE — PROGRESS NOTES
"Subjective:       Patient ID: Ora Henderson is a 58 y.o. female.    Chief Complaint: Conjunctivitis    57 yo female here with redness to left eye, itchy, "sand paper" feel that started when she woke this morning. +mucus discharge from eye. No h/o eye disease. Otherwise feeling ok. No photophobia or eye pain. No surrounding eyelid edema or erythema.       does not have any pertinent problems on file.  Past Medical History:   Diagnosis Date    Arthritis     GERD (gastroesophageal reflux disease)     Hepatomegaly     and fatty liver    Hernia, umbilical     reducible    Hyperlipidemia     Hypertension      Past Surgical History:   Procedure Laterality Date    BRAIN SURGERY      tumor removal     CARPAL TUNNEL RELEASE Bilateral     CERVICAL BIOPSY  W/ LOOP ELECTRODE EXCISION      [facility] '81     SECTION      x 1    COLONOSCOPY N/A 2016    Performed by Quique Paul [facility] at [facility] ENDO    EGD (ESOPHAGOGASTRODUODENOSCOPY) N/A 2013    Performed by Holly Guajardo MD at [facility] ENDO    HERNIA REPAIR      2016    HYSTERECTOMY  2006    fibroids and endometriosis    TOTAL ABDOMINAL HYSTERECTOMY W/ BILATERAL SALPINGOOPHORECTOMY      STAR/BSO secondary to endometriosis    VENTRAL HERNIA REPAIR      XI ROBOTIC ASSISTED VENTRAL HERNIA REPAIR N/A 2016    Performed by Gio Acosta MD at [facility] OR     Family History   Problem Relation Age of Onset    Heart disease Father     Stroke Father     Hypertension Father     Hypertension Mother     Cancer Maternal Aunt         pancreas    Cancer Maternal Uncle         pancreas    Heart disease Paternal Uncle     Heart disease Paternal Grandmother     Diabetes Maternal Aunt      Social History     Socioeconomic History    Marital status:      Spouse name: Not on file    Number of children: 1    Years of education: Not on file    Highest education level: Not on file   Social Needs    Financial resource strain: Not on " file    Food insecurity - worry: Not on file    Food insecurity - inability: Not on file    Transportation needs - medical: Not on file    Transportation needs - non-medical: Not on file   Occupational History    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   HENT: Negative.    Eyes: Positive for discharge, redness and itching. Negative for photophobia, pain and visual disturbance.   All other systems reviewed and are negative.      Objective:     Vitals:    02/05/19 0826   BP: (!) 140/82   Pulse: 82   Temp: 98.5 °F (36.9 °C)        Physical Exam   Constitutional: She is oriented to person, place, and time. She appears well-developed and well-nourished.   HENT:   Head: Normocephalic and atraumatic.   Right Ear: External ear normal.   Left Ear: External ear normal.   Nose: Nose normal.   Eyes: Pupils are equal, round, and reactive to light. Left eye exhibits discharge and exudate. Left eye exhibits no chemosis and no hordeolum. No foreign body present in the left eye. Left conjunctiva is injected. Left conjunctiva has no hemorrhage. No scleral icterus. Left eye exhibits normal extraocular motion and no nystagmus.   Neurological: She is alert and oriented to person, place, and time.   Normal gait. No speech abnormality   Psychiatric: She has a normal mood and affect. Her behavior is normal. Judgment and thought content normal.   Nursing note and vitals reviewed.      Assessment:       1. Bacterial  conjunctivitis of left eye        Plan:           Problem List Items Addressed This Visit     None      Visit Diagnoses     Bacterial conjunctivitis of left eye    -  Primary

## 2019-02-05 NOTE — LETTER
February 5, 2019                   [facility] Internal Medicine  Internal Medicine  [address]  [place] [address]  Phone: [phone]  Fax: [phone]   February 5, 2019     Patient: Ora Henderson   YOB: 1960   Date of Visit: 2/5/2019       To Whom it May Concern:    Ora Henderson was seen in my clinic on 2/5/2019. She may return to work on 2/6/2019.    If you have any questions or concerns, please don't hesitate to call.    Sincerely,         Sonja Friedman M.D.

## 2019-02-05 NOTE — PATIENT INSTRUCTIONS
Conjunctivitis Caused by Infection     Wash hands often to help prevent spreading infection.     Infections are caused by viruses or germs (bacteria). Treatment includes keeping your eyes and hands clean. Your healthcare provider may prescribe eye drops, and tell you to stay home from work or school if youre contagious. Untreated infections can be serious. It's important to see your provider for a diagnosis.  Viral infections  A cold, flu, or other virus can spread to your eyes. This causes a watery discharge. Your eyes may burn or itch and get red. Your eyelids may also be puffy and sore.  Treatment  Most viral infections go away on their own. Artificial tears and warm compresses can relieve symptoms. Your provider may also prescribe eye drops. A viral infection can be very contagious and spreads quickly. To prevent this, wash your hands often. Use a separate tissue to wipe each eye. Dont touch your eyes or share bedding or towels.   Bacterial infections  Bacterial infections often occur in one eye. There may be a watery or a thick discharge from the eye. These infections can cause serious damage to your eye if not treated promptly.  Treatment  Your provider may prescribe eye drops or ointment to kill the bacteria. Warm compresses can help keep the eyelids clean. To keep the bacteria from spreading, wash your hands often. Use a separate tissue to wipe each eye. Dont touch your eyes or share bedding or towels.  Date Last Reviewed: 6/11/2015  © 1647-3403 [employer]. [address] [place] [address], [place] [address]. All rights reserved. This information is not intended as a substitute for professional medical care. Always follow your healthcare professional's instructions.

## 2019-02-06 ENCOUNTER — OFFICE VISIT (OUTPATIENT)
Dept: URGENT CARE | Facility: CLINIC | Age: 59
End: 2019-02-06
Payer: COMMERCIAL

## 2019-02-06 VITALS
SYSTOLIC BLOOD PRESSURE: 148 MMHG | BODY MASS INDEX: 36.83 KG/M2 | OXYGEN SATURATION: 96 % | RESPIRATION RATE: 18 BRPM | HEART RATE: 94 BPM | DIASTOLIC BLOOD PRESSURE: 96 MMHG | WEIGHT: 207.88 LBS

## 2019-02-06 DIAGNOSIS — T36.95XA ANTIBIOTIC-INDUCED YEAST INFECTION: ICD-10-CM

## 2019-02-06 DIAGNOSIS — H10.9 CONJUNCTIVITIS, UNSPECIFIED CONJUNCTIVITIS TYPE, UNSPECIFIED LATERALITY: ICD-10-CM

## 2019-02-06 DIAGNOSIS — B37.9 ANTIBIOTIC-INDUCED YEAST INFECTION: ICD-10-CM

## 2019-02-06 DIAGNOSIS — J32.9 SINUSITIS, UNSPECIFIED CHRONICITY, UNSPECIFIED LOCATION: Primary | ICD-10-CM

## 2019-02-06 PROCEDURE — 3080F PR MOST RECENT DIASTOLIC BLOOD PRESSURE >= 90 MM HG: ICD-10-PCS | Mod: CPTII,S$GLB,, | Performed by: NURSE PRACTITIONER

## 2019-02-06 PROCEDURE — 99999 PR PBB SHADOW E&M-EST. PATIENT-LVL IV: CPT | Mod: PBBFAC,,, | Performed by: NURSE PRACTITIONER

## 2019-02-06 PROCEDURE — 3008F PR BODY MASS INDEX (BMI) DOCUMENTED: ICD-10-PCS | Mod: CPTII,S$GLB,, | Performed by: NURSE PRACTITIONER

## 2019-02-06 PROCEDURE — 96372 THER/PROPH/DIAG INJ SC/IM: CPT | Mod: S$GLB,,, | Performed by: NURSE PRACTITIONER

## 2019-02-06 PROCEDURE — 3077F SYST BP >= 140 MM HG: CPT | Mod: CPTII,S$GLB,, | Performed by: NURSE PRACTITIONER

## 2019-02-06 PROCEDURE — 99214 OFFICE O/P EST MOD 30 MIN: CPT | Mod: 25,S$GLB,, | Performed by: NURSE PRACTITIONER

## 2019-02-06 PROCEDURE — 99214 PR OFFICE/OUTPT VISIT, EST, LEVL IV, 30-39 MIN: ICD-10-PCS | Mod: 25,S$GLB,, | Performed by: NURSE PRACTITIONER

## 2019-02-06 PROCEDURE — 3077F PR MOST RECENT SYSTOLIC BLOOD PRESSURE >= 140 MM HG: ICD-10-PCS | Mod: CPTII,S$GLB,, | Performed by: NURSE PRACTITIONER

## 2019-02-06 PROCEDURE — 3008F BODY MASS INDEX DOCD: CPT | Mod: CPTII,S$GLB,, | Performed by: NURSE PRACTITIONER

## 2019-02-06 PROCEDURE — 96372 PR INJECTION,THERAP/PROPH/DIAG2ST, IM OR SUBCUT: ICD-10-PCS | Mod: S$GLB,,, | Performed by: NURSE PRACTITIONER

## 2019-02-06 PROCEDURE — 99999 PR PBB SHADOW E&M-EST. PATIENT-LVL IV: ICD-10-PCS | Mod: PBBFAC,,, | Performed by: NURSE PRACTITIONER

## 2019-02-06 PROCEDURE — 3080F DIAST BP >= 90 MM HG: CPT | Mod: CPTII,S$GLB,, | Performed by: NURSE PRACTITIONER

## 2019-02-06 RX ORDER — AZITHROMYCIN 250 MG/1
TABLET, FILM COATED ORAL
Qty: 6 TABLET | Refills: 0 | Status: SHIPPED | OUTPATIENT
Start: 2019-02-06 | End: 2019-02-11

## 2019-02-06 RX ORDER — OFLOXACIN 3 MG/ML
1 SOLUTION/ DROPS OPHTHALMIC 4 TIMES DAILY
Qty: 10 ML | Refills: 0 | Status: SHIPPED | OUTPATIENT
Start: 2019-02-06 | End: 2019-02-16

## 2019-02-06 RX ORDER — FLUCONAZOLE 150 MG/1
150 TABLET ORAL ONCE
Qty: 2 TABLET | Refills: 0 | Status: SHIPPED | OUTPATIENT
Start: 2019-02-06 | End: 2019-02-06

## 2019-02-06 RX ORDER — PROMETHAZINE HYDROCHLORIDE AND DEXTROMETHORPHAN HYDROBROMIDE 6.25; 15 MG/5ML; MG/5ML
5 SYRUP ORAL NIGHTLY PRN
Qty: 118 ML | Refills: 0 | Status: SHIPPED | OUTPATIENT
Start: 2019-02-06 | End: 2019-02-16

## 2019-02-06 RX ORDER — BETAMETHASONE SODIUM PHOSPHATE AND BETAMETHASONE ACETATE 3; 3 MG/ML; MG/ML
6 INJECTION, SUSPENSION INTRA-ARTICULAR; INTRALESIONAL; INTRAMUSCULAR; SOFT TISSUE
Status: COMPLETED | OUTPATIENT
Start: 2019-02-06 | End: 2019-02-06

## 2019-02-06 RX ADMIN — BETAMETHASONE SODIUM PHOSPHATE AND BETAMETHASONE ACETATE 6 MG: 3; 3 INJECTION, SUSPENSION INTRA-ARTICULAR; INTRALESIONAL; INTRAMUSCULAR; SOFT TISSUE at 08:02

## 2019-02-06 NOTE — LETTER
February 6, 2019      [facility]  [address] [place] [address]  Phone: [phone]  Fax: [phone]       Patient: Ora Henderson   YOB: 1960  Date of Visit: 02/06/2019    To Whom It May Concern:    Kassandra Henderson  was at [facility] on 02/06/2019. She may return to work/school on 2/9/19 with no restrictions. If you have any questions or concerns, or if I can be of further assistance, please do not hesitate to contact me.    Sincerely,    Viridiana Stewart, NP

## 2019-02-07 DIAGNOSIS — K21.9 GASTROESOPHAGEAL REFLUX DISEASE, ESOPHAGITIS PRESENCE NOT SPECIFIED: ICD-10-CM

## 2019-02-07 RX ORDER — ESOMEPRAZOLE MAGNESIUM 40 MG/1
CAPSULE, DELAYED RELEASE ORAL
Qty: 90 CAPSULE | Refills: 0 | Status: SHIPPED | OUTPATIENT
Start: 2019-02-07 | End: 2019-03-06 | Stop reason: SDUPTHER

## 2019-02-07 NOTE — PATIENT INSTRUCTIONS
· May use Landry's baby shampoo to clean crust/drainage from eyes.  · Do not wear eye make up. Discard any makeup that may have come into contact with eyes just prior to onset of symptoms.  · Do not wear contact lenses and discard contacts used just prior to/during symptoms.    · Stop eye drops if eyes hurt/burn or worsen with treatment and call or return to clinic.   · Wash all of your towels, pillow cases, and sheets in hot water.  · Wash your hands or use hand  frequently and especially before touching anyone to prevent spread of infection.  · If symptoms not improving in 2 days or if symptoms worsen at any point, please follow up with ophthalmology or your primary care provider.   · Go to the ER for any vision changes (especially loss of vision), severe headache or eye pain, vomiting, or any other new and concerning symptoms.       · Rest and increase fluids.   · May apply warm compresses as needed.   · Saline nasal spray or saline irrigation (Neti pot) to loosen nasal congestion.  · Flonase or Nasacort to reduce inflammation in the sinus cavities.  · Take antibiotics exactly as prescribed. Make sure to complete the entire course of antibiotics even if you start feeling better. This will prevent recurrence of your infection and bacterial resistance.   · Do not drive, drink alcohol, or take any other sedating medications or substances while taking cough syrup.   · Follow up with your primary care provider or with ENT if not improved within a few days or sooner for any new or worsening symptoms.   · Go to the ER for any fever that does not improve with Tylenol/Ibuprofen, neck stiffness, rash, severe headache, vision changes, shortness of breath, chest pain, severe facial pain or swelling, or for any other new and concerning symptoms.     Conjunctivitis, Nonspecific    The membrane that covers the white part of your eye (the conjunctiva) is inflamed. Inflammation happens when your body responds to an  injury, allergic reaction, infection, or illness. Symptoms of inflammation in the eye may include redness, irritation, itching, swelling, or burning. These symptoms should go away within the next 24 hours. Conjunctivitis may be related to a particle that was in your eye. If so, it may wash out with your tears or irrigation treatment. Being exposed to liquid chemicals or fumes may also cause this reaction.   Home care  · Apply a cold pack (ice in a plastic bag, wrapped in a towel) over the eye for 20 minutes at a time. This will reduce pain.  · Artificial tears may be prescribed to reduce irritation or redness.  These should be used 3 to 4 times a day.  · You may use acetaminophen or ibuprofen to control pain, unless another medicine was prescribed.(Note: If you have chronic liver or kidney disease, or if you have ever had a stomach ulcer or gastrointestinal bleeding, talk with your healthcare provider before using these medicines.)  Follow-up care  Follow up with your healthcare provider, or as advised.  When to seek medical advice  Call your healthcare provider right away if any of these occur:  · Increased eyelid swelling  · Increased eye pain  · Increased redness or drainage from the eye  · Increased blurry vision or increased sensitivity to light  · Failure of normal vision to return within 24 to 48 hours  Date Last Reviewed: 6/14/2015  © 3790-0535 [employer]. [address] [place] [address], [place] [address]. All rights reserved. This information is not intended as a substitute for professional medical care. Always follow your healthcare professional's instructions.

## 2019-02-07 NOTE — PROGRESS NOTES
Subjective:      Patient ID: Ora Henderson is a 58 y.o. female.    Chief Complaint: Shortness of Breath    Ms. Henderson presents to Urgent Care today with complaints of cough, congestion, and sinus pressure. Also has redness and irritation to left eye (was seen by primary care for this issue yesterday and using prescribed drops with some improvement since yesterday). Here today for treatment of the sinus symptoms, cough, and congestion. She denies any fever. No rash or sores in the mouth or throat. No facial swelling. She denies wheezing. Reports some SOB with coughing spells. No orthopnea or dyspnea on exertion. Tried OTC cough/cold meds with no improvement.       Review of Systems   Constitutional: Positive for fatigue. Negative for fever.   HENT: Positive for congestion, postnasal drip and sinus pressure.    Eyes: Positive for discharge (improving), redness (improving) and itching (improving). Negative for photophobia and visual disturbance.   Respiratory: Positive for cough and shortness of breath. Negative for wheezing.    Cardiovascular: Negative.    Gastrointestinal: Negative.    Musculoskeletal: Negative.    Skin: Negative.    Neurological: Negative.    Hematological: Negative.        Objective:   BP (!) 148/96   Pulse 94   Resp 18   Wt 94.3 kg (207 lb 14.3 oz)   SpO2 96%   BMI 36.83 kg/m²   Physical Exam   Constitutional: She is oriented to person, place, and time. She appears well-developed and well-nourished. She is active and cooperative. No distress.   HENT:   Head: Normocephalic and atraumatic.   Right Ear: A middle ear effusion is present.   Left Ear: A middle ear effusion is present.   Nose: Mucosal edema and sinus tenderness present.   Mouth/Throat: Uvula is midline and mucous membranes are normal. Posterior oropharyngeal erythema present. No oropharyngeal exudate or posterior oropharyngeal edema.   Eyes: EOM and lids are normal. Pupils are equal, round, and reactive to  light. Right eye exhibits no chemosis, no discharge and no exudate. Left eye exhibits no chemosis, no discharge and no exudate. Right conjunctiva is injected (mild). Left conjunctiva is injected.   Neck: Normal range of motion. Neck supple.   Cardiovascular: Regular rhythm and normal heart sounds.   Pulmonary/Chest: Effort normal and breath sounds normal. She has no wheezes.   Musculoskeletal: Normal range of motion.   Lymphadenopathy:     She has no cervical adenopathy.   Neurological: She is alert and oriented to person, place, and time.   Skin: Skin is warm. No rash noted. She is not diaphoretic.   Nursing note and vitals reviewed.    Assessment:      1. Sinusitis, unspecified chronicity, unspecified location    2. Conjunctivitis, unspecified conjunctivitis type, unspecified laterality    3. Antibiotic-induced yeast infection       Plan:   Sinusitis, unspecified chronicity, unspecified location  -     betamethasone acetate-betamethasone sodium phosphate injection 6 mg  -     azithromycin (Z-RAJESH) 250 MG tablet; Take 2 tablets by mouth on day 1; Take 1 tablet by mouth on days 2-5  Dispense: 6 tablet; Refill: 0  -     promethazine-dextromethorphan (PROMETHAZINE-DM) 6.25-15 mg/5 mL Syrp; Take 5 mLs by mouth nightly as needed.  Dispense: 118 mL; Refill: 0    Conjunctivitis, unspecified conjunctivitis type, unspecified laterality  -     ofloxacin (OCUFLOX) 0.3 % ophthalmic solution; Place 1 drop into the left eye 4 (four) times daily. for 10 days  Dispense: 10 mL; Refill: 0  -     Ambulatory referral to Ophthalmology    Antibiotic-induced yeast infection  -     fluconazole (DIFLUCAN) 150 MG Tab; Take 1 tablet (150 mg total) by mouth once. May repeat after 72 hours if symptoms persist for 1 dose  Dispense: 2 tablet; Refill: 0    Possible allergy to Bactrim. Listed in allergy list as sulfa allergy, which does not prompt ordering provider in Epic of possible contraindication to sulf-trimethoprim ingredient in the eye  drops. Will switch as a safety measure, although, Ms. Henderson reports that her eye has improved a good bit since starting the drops yesterday.   Instructions, follow up, and supportive care as per AVS.  Advised of potential for drowsiness with cough syrup. No driving, alcohol, or other potentially sedating medications while taking this medication.

## 2019-02-13 ENCOUNTER — OFFICE VISIT (OUTPATIENT)
Dept: INTERNAL MEDICINE | Facility: CLINIC | Age: 59
End: 2019-02-13
Payer: COMMERCIAL

## 2019-02-13 ENCOUNTER — HOSPITAL ENCOUNTER (OUTPATIENT)
Dept: RADIOLOGY | Facility: HOSPITAL | Age: 59
Discharge: HOME | End: 2019-02-13
Attending: FAMILY MEDICINE
Payer: COMMERCIAL

## 2019-02-13 VITALS
TEMPERATURE: 98 F | OXYGEN SATURATION: 99 % | BODY MASS INDEX: 37.23 KG/M2 | HEART RATE: 92 BPM | DIASTOLIC BLOOD PRESSURE: 84 MMHG | SYSTOLIC BLOOD PRESSURE: 120 MMHG | HEIGHT: 63 IN | WEIGHT: 210.13 LBS

## 2019-02-13 DIAGNOSIS — I10 ESSENTIAL HYPERTENSION: ICD-10-CM

## 2019-02-13 DIAGNOSIS — J40 SINOBRONCHITIS: ICD-10-CM

## 2019-02-13 DIAGNOSIS — J32.9 SINOBRONCHITIS: ICD-10-CM

## 2019-02-13 DIAGNOSIS — J32.9 SINOBRONCHITIS: Primary | ICD-10-CM

## 2019-02-13 DIAGNOSIS — M47.26 OSTEOARTHRITIS OF SPINE WITH RADICULOPATHY, LUMBAR REGION: ICD-10-CM

## 2019-02-13 DIAGNOSIS — R16.0 HEPATOMEGALY: ICD-10-CM

## 2019-02-13 DIAGNOSIS — K21.9 GASTROESOPHAGEAL REFLUX DISEASE, ESOPHAGITIS PRESENCE NOT SPECIFIED: Chronic | ICD-10-CM

## 2019-02-13 DIAGNOSIS — Z86.018 HISTORY OF BENIGN PITUITARY TUMOR: Chronic | ICD-10-CM

## 2019-02-13 DIAGNOSIS — E55.9 VITAMIN D DEFICIENCY: ICD-10-CM

## 2019-02-13 DIAGNOSIS — E78.2 MIXED HYPERLIPIDEMIA: Chronic | ICD-10-CM

## 2019-02-13 DIAGNOSIS — M47.22 OSTEOARTHRITIS OF SPINE WITH RADICULOPATHY, CERVICAL REGION: Chronic | ICD-10-CM

## 2019-02-13 DIAGNOSIS — J40 SINOBRONCHITIS: Primary | ICD-10-CM

## 2019-02-13 DIAGNOSIS — Z86.010 HX OF COLONIC POLYP: ICD-10-CM

## 2019-02-13 DIAGNOSIS — G47.33 OSA (OBSTRUCTIVE SLEEP APNEA): Chronic | ICD-10-CM

## 2019-02-13 DIAGNOSIS — E66.01 SEVERE OBESITY (BMI 35.0-35.9 WITH COMORBIDITY): ICD-10-CM

## 2019-02-13 PROCEDURE — 3074F PR MOST RECENT SYSTOLIC BLOOD PRESSURE < 130 MM HG: ICD-10-PCS | Mod: CPTII,S$GLB,, | Performed by: FAMILY MEDICINE

## 2019-02-13 PROCEDURE — 99999 PR PBB SHADOW E&M-EST. PATIENT-LVL III: ICD-10-PCS | Mod: PBBFAC,,, | Performed by: FAMILY MEDICINE

## 2019-02-13 PROCEDURE — 99214 OFFICE O/P EST MOD 30 MIN: CPT | Mod: S$GLB,,, | Performed by: FAMILY MEDICINE

## 2019-02-13 PROCEDURE — 99999 PR PBB SHADOW E&M-EST. PATIENT-LVL III: CPT | Mod: PBBFAC,,, | Performed by: FAMILY MEDICINE

## 2019-02-13 PROCEDURE — 71046 X-RAY EXAM CHEST 2 VIEWS: CPT | Mod: 26,,, | Performed by: RADIOLOGY

## 2019-02-13 PROCEDURE — 3008F BODY MASS INDEX DOCD: CPT | Mod: CPTII,S$GLB,, | Performed by: FAMILY MEDICINE

## 2019-02-13 PROCEDURE — 71046 X-RAY EXAM CHEST 2 VIEWS: CPT | Mod: TC

## 2019-02-13 PROCEDURE — 99214 PR OFFICE/OUTPT VISIT, EST, LEVL IV, 30-39 MIN: ICD-10-PCS | Mod: S$GLB,,, | Performed by: FAMILY MEDICINE

## 2019-02-13 PROCEDURE — 3008F PR BODY MASS INDEX (BMI) DOCUMENTED: ICD-10-PCS | Mod: CPTII,S$GLB,, | Performed by: FAMILY MEDICINE

## 2019-02-13 PROCEDURE — 3074F SYST BP LT 130 MM HG: CPT | Mod: CPTII,S$GLB,, | Performed by: FAMILY MEDICINE

## 2019-02-13 PROCEDURE — 3079F PR MOST RECENT DIASTOLIC BLOOD PRESSURE 80-89 MM HG: ICD-10-PCS | Mod: CPTII,S$GLB,, | Performed by: FAMILY MEDICINE

## 2019-02-13 PROCEDURE — 71046 XR CHEST PA AND LATERAL: ICD-10-PCS | Mod: 26,,, | Performed by: RADIOLOGY

## 2019-02-13 PROCEDURE — 3079F DIAST BP 80-89 MM HG: CPT | Mod: CPTII,S$GLB,, | Performed by: FAMILY MEDICINE

## 2019-02-13 RX ORDER — MONTELUKAST SODIUM 10 MG/1
10 TABLET ORAL NIGHTLY
Qty: 30 TABLET | Refills: 0 | Status: SHIPPED | OUTPATIENT
Start: 2019-02-13 | End: 2019-03-15

## 2019-02-13 RX ORDER — HYDROCODONE BITARTRATE AND ACETAMINOPHEN 5; 325 MG/1; MG/1
TABLET ORAL
COMMUNITY
Start: 2018-12-31 | End: 2019-02-13

## 2019-02-13 RX ORDER — FLUCONAZOLE 150 MG/1
TABLET ORAL
COMMUNITY
Start: 2019-02-06 | End: 2019-02-13 | Stop reason: ALTCHOICE

## 2019-02-13 NOTE — PROGRESS NOTES
Subjective:       Patient ID: Ora Henderson is a 58 y.o. female.    Chief Complaint: Conjunctivitis    58-year-old  female patient with Patient Active Problem List:     DJD (degenerative joint disease), cervical-mild     Hyperlipidemia     Hepatomegaly     Family history of cardiovascular disease     GERD (gastroesophageal reflux disease)     NATALIE (obstructive sleep apnea)     Rotator cuff impingement syndrome of right shoulder     DJD of right AC (acromioclavicular) joint     History of benign pituitary tumor     Arthritis     Hx of colonic polyp     Essential hypertension     Osteoarthritis of spine with radiculopathy, lumbar region     Candidiasis of vagina     Abnormal ECG     Severe obesity (BMI 35.0-35.9 with comorbidity)     Neuralgia  Here with complaint of productive cough occasionally dry off and on for the past few weeks, with scratchy throat.  Patient unable to get rid of cough, denies any wheezing, fever with chills, nausea vomiting.   Patient has been treated for sinuses recently at urgent care with Z-Aaron, steroid shot, cough syrup but still continues to have ongoing symptoms.  Patient has been clearing up from bacterial conjunctivitis to the left eye.   Denies any chest pain or difficulty breathing      Review of Systems   Constitutional: Negative for chills, fatigue and fever.   HENT: Positive for congestion and postnasal drip.    Eyes: Negative for discharge, redness, itching and visual disturbance.   Respiratory: Positive for cough. Negative for shortness of breath and wheezing.    Cardiovascular: Negative for chest pain and leg swelling.   Gastrointestinal: Negative for abdominal pain, nausea and vomiting.   Musculoskeletal: Negative for myalgias.   Skin: Negative for rash.   Neurological: Negative for light-headedness and headaches.   Psychiatric/Behavioral: Negative for sleep disturbance.         /84   Pulse 92   Temp 98 °F (36.7 °C) (Tympanic)   Ht 5'  "3" (1.6 m)   Wt 95.3 kg (210 lb 1.6 oz)   SpO2 99%   BMI 37.22 kg/m²   Objective:      Physical Exam   Constitutional: She is oriented to person, place, and time. She appears well-developed and well-nourished.   HENT:   Head: Normocephalic and atraumatic.   Right Ear: External ear normal.   Left Ear: External ear normal.   Mouth/Throat: Oropharynx is clear and moist. No oropharyngeal exudate.   Neck: Neck supple.   Cardiovascular: Normal rate, regular rhythm and normal heart sounds.   No murmur heard.  Pulmonary/Chest: Effort normal and breath sounds normal. She has no wheezes.   Abdominal: Soft. Bowel sounds are normal. There is no tenderness.   Musculoskeletal: She exhibits no edema.   Neurological: She is alert and oriented to person, place, and time.   Skin: Skin is warm and dry. No rash noted.   Psychiatric: She has a normal mood and affect.         Assessment:       1. Sinobronchitis    2. Essential hypertension    3. Vitamin D deficiency    4. Mixed hyperlipidemia    5. Gastroesophageal reflux disease, esophagitis presence not specified    6. Osteoarthritis of spine with radiculopathy, cervical region    7. Osteoarthritis of spine with radiculopathy, lumbar region    8. NATALIE (obstructive sleep apnea)    9. History of benign pituitary tumor    10. Hepatomegaly    11. Severe obesity (BMI 35.0-35.9 with comorbidity)    12. Hx of colonic polyp        Plan:   Sinobronchitis  -     X-Ray Chest PA And Lateral; Future; Expected date: 02/13/2019  -     montelukast (SINGULAIR) 10 mg tablet; Take 1 tablet (10 mg total) by mouth every evening.  Dispense: 30 tablet; Refill: 0  -     CBC auto differential; Future; Expected date: 02/13/2019  Will get chest x-ray to look into further etiology secondary to ongoing cough but differential includes acid reflex versus allergies.   Patient currently on Nexium 40 mg daily  Will add Singulair in addition to Zyrtec and advised to drink adequate fluids  Patient to use albuterol " inhaler as needed for persistent cough  Continue promethazine DM cough syrup and Mucinex as needed  Recently finished Z-Aaron and had received steroid injection    Essential hypertension  -     Comprehensive metabolic panel; Future; Expected date: 02/13/2019  -     Lipid panel; Future; Expected date: 02/13/2019  -     TSH; Future; Expected date: 02/13/2019  -     Hemoglobin A1c; Future; Expected date: 02/13/2019  Blood pressure stable today currently on losartan hydrochlorothiazide 100/25 mg daily    Vitamin D deficiency  -     Vitamin D; Future; Expected date: 02/13/2019  Will check vitamin-D levels as it was low in the past    Mixed hyperlipidemia-currently on Crestor 20 mg daily, Will check nonfasting labs today    Gastroesophageal reflux disease, esophagitis presence not specified-stable on Nexium 40 mg daily    Osteoarthritis of spine with radiculopathy, cervical region  Osteoarthritis of spine with radiculopathy, lumbar region    NATALIE (obstructive sleep apnea)    History of benign pituitary tumor    Hepatomegaly    Severe obesity (BMI 35.0-35.9 with comorbidity)-Lifestyle modifications recommended to lose weight with BMI 37    Hx of colonic polyp

## 2019-02-14 DIAGNOSIS — E78.2 MIXED HYPERLIPIDEMIA: Primary | Chronic | ICD-10-CM

## 2019-02-14 RX ORDER — ROSUVASTATIN CALCIUM 40 MG/1
40 TABLET, COATED ORAL NIGHTLY
Qty: 90 TABLET | Refills: 3 | Status: SHIPPED | OUTPATIENT
Start: 2019-02-14 | End: 2020-06-09 | Stop reason: SDUPTHER

## 2019-03-01 ENCOUNTER — OFFICE VISIT (OUTPATIENT)
Dept: OPHTHALMOLOGY | Facility: CLINIC | Age: 59
End: 2019-03-01
Payer: COMMERCIAL

## 2019-03-01 DIAGNOSIS — H10.9 BACTERIAL CONJUNCTIVITIS OF LEFT EYE: Primary | ICD-10-CM

## 2019-03-01 PROCEDURE — 92012 PR EYE EXAM, EST PATIENT,INTERMED: ICD-10-PCS | Mod: S$GLB,,, | Performed by: OPTOMETRIST

## 2019-03-01 PROCEDURE — 99999 PR PBB SHADOW E&M-EST. PATIENT-LVL I: ICD-10-PCS | Mod: PBBFAC,,, | Performed by: OPTOMETRIST

## 2019-03-01 PROCEDURE — 92012 INTRM OPH EXAM EST PATIENT: CPT | Mod: S$GLB,,, | Performed by: OPTOMETRIST

## 2019-03-01 PROCEDURE — 99999 PR PBB SHADOW E&M-EST. PATIENT-LVL I: CPT | Mod: PBBFAC,,, | Performed by: OPTOMETRIST

## 2019-03-01 NOTE — LETTER
March 1, 2019      [place]  [address]  [place] [address]           [facility]  [address]  [place] [address]  Phone: [phone]  Fax: [phone]          Patient: Ora Henderson   MR Number: [id number]   YOB: 1960   Date of Visit: 3/1/2019       Dear Viridiana Stewart:    Thank you for referring Ora Henderson to me for evaluation. Attached you will find relevant portions of my assessment and plan of care.    If you have questions, please do not hesitate to call me. I look forward to following Ora Henderson along with you.    Sincerely,    Anibal Bojorquez, OD    Enclosure  CC:  No Recipients    If you would like to receive this communication electronically, please contact [email or web address] or [phone] to request more information on [employer] access.    For providers and/or their staff who would like to refer a patient to [facility], please contact us through our one-stop-shop provider referral line, [facility] , at [phone].    If you feel you have received this communication in error or would no longer like to receive these types of communications, please e-mail [email or web address]

## 2019-03-01 NOTE — PROGRESS NOTES
HPI     PT states she was diagnosed with conjunctivitis 2/5 in her left eye. PT   states her eyes have gotten better she just wants to make sure they are   ok.   Was given ofloxacin which she has been using qid.  C/o dryness.   No longer experiencing discharge and rednses.   Last exam 2/2/17 with [facility].  Pain Scale:  0  Onset:   1 month  OD, OS, OU:   OS*  Discharge:   no  A.M. Matting:  no  Itch:   no  Redness:   no  Photophobia:   no  Foreign body sensation:   no  Deep pain:   no  Previous occurrence:   no  Drops:   Ofloxacin qid OU      Last edited by Shireen Rich MA on 3/1/2019  8:09 AM. (History)            Assessment /Plan     For exam results, see Encounter Report.    Bacterial conjunctivitis of left eye      Resolved nicely   Clear tear lake  No conjunctivitis today, reassurance to pt  Ok to d/c ocuflox gtts  Continue AT PRN    RTC PRN

## 2019-03-06 DIAGNOSIS — K21.9 GASTROESOPHAGEAL REFLUX DISEASE, ESOPHAGITIS PRESENCE NOT SPECIFIED: ICD-10-CM

## 2019-03-06 RX ORDER — ESOMEPRAZOLE MAGNESIUM 40 MG/1
40 CAPSULE, DELAYED RELEASE ORAL
Qty: 90 CAPSULE | Refills: 0 | Status: SHIPPED | OUTPATIENT
Start: 2019-03-06 | End: 2019-08-12 | Stop reason: SDUPTHER

## 2019-03-06 NOTE — TELEPHONE ENCOUNTER
----- Message from Dana Chi sent at 3/6/2019  7:20 AM CST -----  Contact: self  Type:  RX Refill Request    Who Called:ms patricio  Refill or New Rx-refill  RX Name and Strength:esomeprazole (NEXIUM) 40 MG capsule  How is the patient currently taking it? (ex. 1XDay):1xday  Is this a 30 day or 90 day RX:30 day  Preferred Pharmacy with phone number:  [facility] - [place], [place] - [address] [facility]  [address] [facility]  [place] [address]  Phone: [phone] Fax: [phone]  Local or Mail Order:local  Ordering Provider:dr umanzor  Would the patient rather a call back or a response via [facility]? call  Best Call Back Number:[phone]  Additional Information: n/a

## 2019-03-08 ENCOUNTER — HOSPITAL ENCOUNTER (EMERGENCY)
Facility: HOSPITAL | Age: 59
Discharge: HOME | End: 2019-03-09
Attending: EMERGENCY MEDICINE
Payer: COMMERCIAL

## 2019-03-08 ENCOUNTER — OFFICE VISIT (OUTPATIENT)
Dept: URGENT CARE | Facility: CLINIC | Age: 59
End: 2019-03-08
Payer: COMMERCIAL

## 2019-03-08 VITALS
DIASTOLIC BLOOD PRESSURE: 100 MMHG | HEIGHT: 63 IN | TEMPERATURE: 99 F | SYSTOLIC BLOOD PRESSURE: 132 MMHG | HEART RATE: 87 BPM | RESPIRATION RATE: 20 BRPM | BODY MASS INDEX: 37.19 KG/M2 | WEIGHT: 209.88 LBS | OXYGEN SATURATION: 98 %

## 2019-03-08 VITALS
WEIGHT: 209.13 LBS | RESPIRATION RATE: 19 BRPM | SYSTOLIC BLOOD PRESSURE: 139 MMHG | TEMPERATURE: 98 F | OXYGEN SATURATION: 97 % | HEART RATE: 74 BPM | DIASTOLIC BLOOD PRESSURE: 86 MMHG | HEIGHT: 63 IN | BODY MASS INDEX: 37.05 KG/M2

## 2019-03-08 DIAGNOSIS — R07.89 CHEST PRESSURE: Primary | ICD-10-CM

## 2019-03-08 DIAGNOSIS — B34.9 VIRAL SYNDROME: Primary | ICD-10-CM

## 2019-03-08 DIAGNOSIS — R07.9 CHEST PAIN: ICD-10-CM

## 2019-03-08 LAB
ALBUMIN SERPL BCP-MCNC: 3.9 G/DL
ALP SERPL-CCNC: 70 U/L
ALT SERPL W/O P-5'-P-CCNC: 12 U/L
ANION GAP SERPL CALC-SCNC: 8 MMOL/L
AST SERPL-CCNC: 13 U/L
BASOPHILS # BLD AUTO: 0.03 K/UL
BASOPHILS NFR BLD: 0.4 %
BILIRUB SERPL-MCNC: 0.6 MG/DL
BNP SERPL-MCNC: <10 PG/ML
BUN SERPL-MCNC: 9 MG/DL
CALCIUM SERPL-MCNC: 10.1 MG/DL
CHLORIDE SERPL-SCNC: 102 MMOL/L
CO2 SERPL-SCNC: 31 MMOL/L
CREAT SERPL-MCNC: 1 MG/DL
DIFFERENTIAL METHOD: ABNORMAL
EOSINOPHIL # BLD AUTO: 0.2 K/UL
EOSINOPHIL NFR BLD: 3 %
ERYTHROCYTE [DISTWIDTH] IN BLOOD BY AUTOMATED COUNT: 13.5 %
EST. GFR  (AFRICAN AMERICAN): >60 ML/MIN/1.73 M^2
EST. GFR  (NON AFRICAN AMERICAN): >60 ML/MIN/1.73 M^2
GLUCOSE SERPL-MCNC: 98 MG/DL
HCT VFR BLD AUTO: 35.4 %
HGB BLD-MCNC: 11.8 G/DL
LYMPHOCYTES # BLD AUTO: 4.2 K/UL
LYMPHOCYTES NFR BLD: 51.4 %
MCH RBC QN AUTO: 29.6 PG
MCHC RBC AUTO-ENTMCNC: 33.3 G/DL
MCV RBC AUTO: 89 FL
MONOCYTES # BLD AUTO: 0.5 K/UL
MONOCYTES NFR BLD: 5.8 %
NEUTROPHILS # BLD AUTO: 3.2 K/UL
NEUTROPHILS NFR BLD: 39.4 %
PLATELET # BLD AUTO: 261 K/UL
PMV BLD AUTO: 9.7 FL
POTASSIUM SERPL-SCNC: 3.5 MMOL/L
PROT SERPL-MCNC: 7.3 G/DL
RBC # BLD AUTO: 3.98 M/UL
SODIUM SERPL-SCNC: 141 MMOL/L
TROPONIN I SERPL DL<=0.01 NG/ML-MCNC: <0.006 NG/ML
WBC # BLD AUTO: 8.11 K/UL

## 2019-03-08 PROCEDURE — 93010 ELECTROCARDIOGRAM REPORT: CPT | Mod: ,,, | Performed by: INTERNAL MEDICINE

## 2019-03-08 PROCEDURE — 3078F PR MOST RECENT DIASTOLIC BLOOD PRESSURE < 80 MM HG: ICD-10-PCS | Mod: CPTII,S$GLB,, | Performed by: PHYSICIAN ASSISTANT

## 2019-03-08 PROCEDURE — 84484 ASSAY OF TROPONIN QUANT: CPT

## 2019-03-08 PROCEDURE — 99214 OFFICE O/P EST MOD 30 MIN: CPT | Mod: S$GLB,,, | Performed by: PHYSICIAN ASSISTANT

## 2019-03-08 PROCEDURE — 83880 ASSAY OF NATRIURETIC PEPTIDE: CPT

## 2019-03-08 PROCEDURE — 3078F DIAST BP <80 MM HG: CPT | Mod: CPTII,S$GLB,, | Performed by: PHYSICIAN ASSISTANT

## 2019-03-08 PROCEDURE — 93010 EKG 12-LEAD: ICD-10-PCS | Mod: ,,, | Performed by: NUCLEAR MEDICINE

## 2019-03-08 PROCEDURE — 3075F PR MOST RECENT SYSTOLIC BLOOD PRESS GE 130-139MM HG: ICD-10-PCS | Mod: CPTII,S$GLB,, | Performed by: PHYSICIAN ASSISTANT

## 2019-03-08 PROCEDURE — 80053 COMPREHEN METABOLIC PANEL: CPT

## 2019-03-08 PROCEDURE — 25000003 PHARM REV CODE 250: Performed by: EMERGENCY MEDICINE

## 2019-03-08 PROCEDURE — 99999 PR PBB SHADOW E&M-EST. PATIENT-LVL V: ICD-10-PCS | Mod: PBBFAC,,, | Performed by: PHYSICIAN ASSISTANT

## 2019-03-08 PROCEDURE — 85025 COMPLETE CBC W/AUTO DIFF WBC: CPT

## 2019-03-08 PROCEDURE — 3008F BODY MASS INDEX DOCD: CPT | Mod: CPTII,S$GLB,, | Performed by: PHYSICIAN ASSISTANT

## 2019-03-08 PROCEDURE — 3008F PR BODY MASS INDEX (BMI) DOCUMENTED: ICD-10-PCS | Mod: CPTII,S$GLB,, | Performed by: PHYSICIAN ASSISTANT

## 2019-03-08 PROCEDURE — 99285 EMERGENCY DEPT VISIT HI MDM: CPT | Mod: 25

## 2019-03-08 PROCEDURE — 99999 PR PBB SHADOW E&M-EST. PATIENT-LVL V: CPT | Mod: PBBFAC,,, | Performed by: PHYSICIAN ASSISTANT

## 2019-03-08 PROCEDURE — 99214 PR OFFICE/OUTPT VISIT, EST, LEVL IV, 30-39 MIN: ICD-10-PCS | Mod: S$GLB,,, | Performed by: PHYSICIAN ASSISTANT

## 2019-03-08 PROCEDURE — 93010 EKG 12-LEAD: ICD-10-PCS | Mod: ,,, | Performed by: INTERNAL MEDICINE

## 2019-03-08 PROCEDURE — 3075F SYST BP GE 130 - 139MM HG: CPT | Mod: CPTII,S$GLB,, | Performed by: PHYSICIAN ASSISTANT

## 2019-03-08 PROCEDURE — 93010 ELECTROCARDIOGRAM REPORT: CPT | Mod: ,,, | Performed by: NUCLEAR MEDICINE

## 2019-03-08 RX ORDER — ASPIRIN 325 MG
325 TABLET ORAL
Status: COMPLETED | OUTPATIENT
Start: 2019-03-08 | End: 2019-03-08

## 2019-03-08 RX ADMIN — NITROGLYCERIN 1 INCH: 20 OINTMENT TOPICAL at 08:03

## 2019-03-08 RX ADMIN — ASPIRIN 325 MG ORAL TABLET 325 MG: 325 PILL ORAL at 08:03

## 2019-03-09 NOTE — DISCHARGE INSTRUCTIONS
Alternate Motrin and Tylenol every 4 hr for fevers or body aches.  Warm baths for breakthrough fevers.  Warm salt water gargles and Chloraseptic for throat pain.  Robitussin for cough.  Follow up with her doctor on Monday for re-evaluation.  Your heart workup is normal and benign.

## 2019-03-09 NOTE — PROGRESS NOTES
"Subjective:      Patient ID: Ora Henderson is a 58 y.o. female.    Chief Complaint: Sean Payan is a 58 year old female who presents to urgent care with intermittent substernal chest pressure that does not radiate since last night.  She has been coughing for the past three days, and the pain is worse with the cough and she also admits.  She thinks the pain is from the cough, but she is worried if it could be related to her heart.  The pain is worst with coughing and not worse with exertion and deep breathing.  She denies palpitations, numbness.        Cough   Associated symptoms include chest pain. Pertinent negatives include no chills, eye redness, fever, rash, shortness of breath or wheezing.     Review of Systems   Constitutional: Negative for chills and fever.   Eyes: Negative for pain and redness.   Respiratory: Positive for cough. Negative for shortness of breath and wheezing.    Cardiovascular: Positive for chest pain. Negative for palpitations.   Gastrointestinal: Negative for abdominal pain, diarrhea, nausea and vomiting.   Skin: Negative for rash.   Neurological: Negative for facial asymmetry, weakness and numbness.       Objective:   BP (!) 132/100 (BP Location: Right arm, Patient Position: Sitting)   Pulse 87   Temp 99.1 °F (37.3 °C) (Oral)   Resp 20   Ht 5' 3" (1.6 m)   Wt 95.2 kg (209 lb 14.1 oz)   SpO2 98%   BMI 37.18 kg/m²   Physical Exam   Constitutional: She is oriented to person, place, and time. She appears well-developed and well-nourished.  Non-toxic appearance. She does not have a sickly appearance. She does not appear ill. No distress.   Cardiovascular: Normal rate, regular rhythm, S1 normal, S2 normal and normal heart sounds.   Pulses:       Radial pulses are 2+ on the right side, and 2+ on the left side.   Pulmonary/Chest: Effort normal and breath sounds normal. No accessory muscle usage. No apnea, no tachypnea and no bradypnea. No respiratory distress. She has " no decreased breath sounds. She has no wheezes. She has no rhonchi. She has no rales. She exhibits no tenderness and no bony tenderness.   Neurological: She is alert and oriented to person, place, and time. She is not disoriented. No cranial nerve deficit or sensory deficit.   Skin: Skin is warm and dry. She is not diaphoretic.     Assessment:      1. Chest pressure       Plan:   Chest pressure  -     SCHEDULED EKG 12-LEAD (to Muse); Future  -     Troponin I; Future; Expected date: 03/08/2019    Substernal Chest Pressure    -  Onset of chest pressure last night - has had a cough and she thinks it is likely related- but Ora is concerned that it could be her heart - EKG and Ora was willing to drive to [facility] location for troponin lab draw -  However, in office EKG showed t wave changes - recommended further evaluation in the ED and patient agreed to go to ED        AVS provided and instructions reviewed with patient.      Juanita Jewell PA-C   Physician Assistant   [facility]

## 2019-03-09 NOTE — ED PROVIDER NOTES
SCRIBE #1 NOTE: I, Arnaldo Gilmore, am scribing for, and in the presence of, Elmer Reynoso Jr., MD. I have scribed the entire note.       History     Chief Complaint   Patient presents with    Abnormal ECG     had ekg at urgent care was told to come in      Review of patient's allergies indicates:   Allergen Reactions    Sulfa (sulfonamide antibiotics) Hives         History of Present Illness     HPI    3/8/2019, 8:20 PM  History obtained from the patient      History of Present Illness: Ora Henderson is a 58 y.o. female patient with a PMHx of HLD, hernia who presents to the Emergency Department for evaluation of CP which onset yesterday. Pt was seen at an urgent care today and recommended to f/u in the ED after having an abnormal ECG.  Symptoms are intermittent and moderate in severity. Coughing exacerbates. Nothing mitigates. Associated sxs include cough and sinus pressure. Patient denies any palpitations, leg swelling, numbness, weakness, dizziness, HA, lightheadedness, SOB, n/v/d, rhinorrhea, sore throat, and all other sxs at this time. No further complaints or concerns at this time.         Arrival mode: Personal vehicle    PCP: Zuly Montes MD        Past Medical History:  Past Medical History:   Diagnosis Date    Arthritis     GERD (gastroesophageal reflux disease)     Hepatomegaly     and fatty liver    Hernia, umbilical     reducible    Hyperlipidemia     Hypertension        Past Surgical History:  Past Surgical History:   Procedure Laterality Date    BRAIN SURGERY      tumor removal     CARPAL TUNNEL RELEASE Bilateral     CERVICAL BIOPSY  W/ LOOP ELECTRODE EXCISION      [facility] '81     SECTION      x 1    COLONOSCOPY N/A 2016    Performed by Quique Paul [facility] at [facility]    CYST REMOVAL Left 2018    EGD (ESOPHAGOGASTRODUODENOSCOPY) N/A 2013    Performed by Holly Guajardo MD at [facility] ENDO    HERNIA REPAIR      2016    HYSTERECTOMY       fibroids and endometriosis    TOTAL ABDOMINAL HYSTERECTOMY W/ BILATERAL SALPINGOOPHORECTOMY      STAR/BSO secondary to endometriosis    VENTRAL HERNIA REPAIR      XI ROBOTIC ASSISTED VENTRAL HERNIA REPAIR N/A 12/8/2016    Performed [facility]io [facility]osta [facility]         Family History:  Family History   Problem Relation Age of Onset    Heart disease Father     Stroke Father     Hypertension Father     Hypertension Mother     Cancer Maternal Aunt         pancreas    Cancer Maternal Uncle         pancreas    Heart disease Paternal Uncle     Heart disease Paternal Grandmother     Diabetes Maternal Aunt        Social History:  Social History     Tobacco Use    Smoking status: Never Smoker    Smokeless tobacco: Never Used   Substance and Sexual Activity    Alcohol use: Yes     Alcohol/week: 0.0 oz     Comment: socially    Drug use: No    Sexual activity: Yes     Partners: Male     Birth control/protection: None, Surgical        Review of Systems     Review of Systems   Constitutional: Negative for chills and fever.   HENT: Positive for sinus pressure. Negative for ear discharge, ear pain and sore throat.    Respiratory: Positive for cough. Negative for choking and shortness of breath.    Cardiovascular: Positive for chest pain. Negative for palpitations and leg swelling.   Gastrointestinal: Negative for abdominal pain, diarrhea, nausea and vomiting.   Genitourinary: Negative for dysuria.   Musculoskeletal: Negative for back pain.   Skin: Negative for rash.   Neurological: Negative for dizziness, weakness, light-headedness, numbness and headaches.   Hematological: Does not bruise/bleed easily.   All other systems reviewed and are negative.     Physical Exam     Initial Vitals [03/08/19 1947]   BP Pulse Resp Temp SpO2   (!) 171/103 77 18 98 °F (36.7 °C) 99 %      MAP       --          Physical Exam  Nursing Notes and Vital Signs Reviewed.  Constitutional: Patient is in no acute distress. Well-developed and  "well-nourished.  Head: Atraumatic. Normocephalic.  Eyes: PERRL. EOM intact. Conjunctivae are not pale. No scleral icterus.  ENT: Mucous membranes are moist. Oropharynx is clear and symmetric.    Neck: Supple. Full ROM. No lymphadenopathy.  Cardiovascular: Regular rate. Regular rhythm. No murmurs, rubs, or gallops. Distal pulses are 2+ and symmetric.  Pulmonary/Chest: No respiratory distress. Clear to auscultation bilaterally. No wheezing or rales.  Abdominal: Soft and non-distended.  There is no tenderness.  No rebound, guarding, or rigidity.  Musculoskeletal: Moves all extremities. No obvious deformities. No edema. No calf tenderness.  Skin: Warm and dry. No rash.  Neurological:  Alert, awake, and appropriate.  Normal speech.  No acute focal neurological deficits are appreciated.  Psychiatric: Normal affect. Good eye contact. Appropriate in content.     ED Course   Procedures  ED Vital Signs:  Vitals:    03/08/19 1947 03/08/19 2003   BP: (!) 171/103    Pulse: 77 68   Resp: 18    Temp: 98 °F (36.7 °C)    TempSrc: Oral    SpO2: 99%    Weight: 94.8 kg (209 lb 1.7 oz)    Height: 5' 3" (1.6 m)        Abnormal Lab Results:  Labs Reviewed   CBC W/ AUTO DIFFERENTIAL - Abnormal; Notable for the following components:       Result Value    RBC 3.98 (*)     Hemoglobin 11.8 (*)     Hematocrit 35.4 (*)     Lymph% 51.4 (*)     All other components within normal limits   COMPREHENSIVE METABOLIC PANEL - Abnormal; Notable for the following components:    CO2 31 (*)     All other components within normal limits   TROPONIN I   B-TYPE NATRIURETIC PEPTIDE        All Lab Results:  Results for orders placed or performed during the hospital encounter of 03/08/19   CBC auto differential   Result Value Ref Range    WBC 8.11 3.90 - 12.70 K/uL    RBC 3.98 (L) 4.00 - 5.40 M/uL    Hemoglobin 11.8 (L) 12.0 - 16.0 g/dL    Hematocrit 35.4 (L) 37.0 - 48.5 %    MCV 89 82 - 98 fL    MCH 29.6 27.0 - 31.0 pg    MCHC 33.3 32.0 - 36.0 g/dL    RDW 13.5 " 11.5 - 14.5 %    Platelets 261 150 - 350 K/uL    MPV 9.7 9.2 - 12.9 fL    Gran # (ANC) 3.2 1.8 - 7.7 K/uL    Lymph # 4.2 1.0 - 4.8 K/uL    Mono # 0.5 0.3 - 1.0 K/uL    Eos # 0.2 0.0 - 0.5 K/uL    Baso # 0.03 0.00 - 0.20 K/uL    Gran% 39.4 38.0 - 73.0 %    Lymph% 51.4 (H) 18.0 - 48.0 %    Mono% 5.8 4.0 - 15.0 %    Eosinophil% 3.0 0.0 - 8.0 %    Basophil% 0.4 0.0 - 1.9 %    Differential Method Automated    Comprehensive metabolic panel   Result Value Ref Range    Sodium 141 136 - 145 mmol/L    Potassium 3.5 3.5 - 5.1 mmol/L    Chloride 102 95 - 110 mmol/L    CO2 31 (H) 23 - 29 mmol/L    Glucose 98 70 - 110 mg/dL    BUN, Bld 9 6 - 20 mg/dL    Creatinine 1.0 0.5 - 1.4 mg/dL    Calcium 10.1 8.7 - 10.5 mg/dL    Total Protein 7.3 6.0 - 8.4 g/dL    Albumin 3.9 3.5 - 5.2 g/dL    Total Bilirubin 0.6 0.1 - 1.0 mg/dL    Alkaline Phosphatase 70 55 - 135 U/L    AST 13 10 - 40 U/L    ALT 12 10 - 44 U/L    Anion Gap 8 8 - 16 mmol/L    eGFR if African American >60 >60 mL/min/1.73 m^2    eGFR if non African American >60 >60 mL/min/1.73 m^2   Troponin I #1   Result Value Ref Range    Troponin I <0.006 0.000 - 0.026 ng/mL   B-Type natriuretic peptide (BNP)   Result Value Ref Range    BNP <10 0 - 99 pg/mL         Imaging Results:  Imaging Results          X-Ray Chest AP Portable (Final result)  Result time 03/08/19 21:25:48    Final result by Jaquan Milton Jr., MD (03/08/19 21:25:48)                 Impression:      No acute findings.      Electronically signed by: Jaquan Milton MD  Date:    03/08/2019  Time:    21:25             Narrative:    EXAMINATION:  XR CHEST AP PORTABLE    CLINICAL HISTORY:  Chest Pain;    COMPARISON:  02/13/2019    FINDINGS:  Heart size is normal.  Lungs appear clear of active disease.  No infiltrates or effusions.  No suspicious mass. No significant change                                 Initial ECG:  Interpretation time: 0940  Rate: 71 BPM  Rhythm: normal sinus rhythm  Interpretation: Cannot rule out  anterior infarct. No STEMI.      The EKG was ordered, reviewed, and independently interpreted by the ED provider.  Interpretation time: 1957  Rate: 74 BPM  Rhythm: normal sinus rhythm and sinus arrhythmia  Interpretation: Low voltage QRS. No STEMI.             The Emergency Provider reviewed the vital signs and test results, which are outlined above.     ED Discussion     9:34 PM: Reassessed pt at this time.  Pt states her condition has improved at this time. Discussed with pt all pertinent ED information and results. Discussed pt dx and plan of tx. Gave pt all f/u and return to the ED instructions. All questions and concerns were addressed at this time. Pt expresses understanding of information and instructions, and is comfortable with plan to discharge. Pt is stable for discharge.    I have discussed with patient and/or family/caretaker chest pain precautions, specifically to return for worsening chest pain, shortness of breath, fever, or any concern.  I have low suspicion for cardiopulmonary, vascular, infectious, respiratory, or other emergent medical condition based on my evaluation in the ED.    I discussed with patient and/or family/caretaker that evaluation in the ED does not suggest any emergent or life threatening medical conditions requiring immediate intervention beyond what was provided in the ED, and I believe patient is safe for discharge.  Regardless, an unremarkable evaluation in the ED does not preclude the development or presence of a serious of life threatening condition. As such, patient was instructed to return immediately for any worsening or change in current symptoms.    9:36 PM  Patient is stable nontoxic.  Cardiac workup is negative. EKG is normal as well.  She has had prolonged symptoms over 24 hr.  Clinically the patient has a viral syndrome with cough and chest pain secondary to her coughing.  She has no wheezing.  Lungs are clear vital signs are good.  She is safe for discharge in my  opinion.    ED Medication(s):  Medications   aspirin tablet 325 mg (325 mg Oral Given 3/8/19 2043)   nitroGLYCERIN 2% TD oint ointment 1 inch (1 inch Topical (Top) Given 3/8/19 2043)       New Prescriptions    No medications on file       Follow-up Information     Zuly Montes MD In 2 days.    Specialty:  Family Medicine  Contact information:  [address]  [place] [address] [phone]                         Medical Decision Making:   Clinical Tests:   Lab Tests: Ordered and Reviewed  Radiological Study: Ordered and Reviewed  Medical Tests: Ordered and Reviewed             Scribe Attestation:   Scribe #1: I performed the above scribed service and the documentation accurately describes the services I performed. I attest to the accuracy of the note.     Attending:   Physician Attestation Statement for Scribe #1: I, Elmer Reynoso Jr., MD, personally performed the services described in this documentation, as scribed by Arnaldo Gilmore, in my presence, and it is both accurate and complete.           Clinical Impression       ICD-10-CM ICD-9-CM   1. Viral syndrome B34.9 079.99   2. Chest pain R07.9 786.50       Disposition:   Disposition: Discharged  Condition: Stable         Elmer Reynoso Jr., MD  03/08/19 4929

## 2019-04-24 ENCOUNTER — OFFICE VISIT (OUTPATIENT)
Dept: INTERNAL MEDICINE | Facility: CLINIC | Age: 59
End: 2019-04-24
Payer: COMMERCIAL

## 2019-04-24 VITALS
HEART RATE: 85 BPM | SYSTOLIC BLOOD PRESSURE: 150 MMHG | WEIGHT: 203.25 LBS | HEIGHT: 63 IN | DIASTOLIC BLOOD PRESSURE: 94 MMHG | TEMPERATURE: 98 F | BODY MASS INDEX: 36.01 KG/M2 | OXYGEN SATURATION: 100 %

## 2019-04-24 DIAGNOSIS — I10 ESSENTIAL HYPERTENSION: ICD-10-CM

## 2019-04-24 DIAGNOSIS — J06.9 VIRAL URI WITH COUGH: Primary | ICD-10-CM

## 2019-04-24 PROCEDURE — 96372 THER/PROPH/DIAG INJ SC/IM: CPT | Mod: S$GLB,,, | Performed by: PHYSICIAN ASSISTANT

## 2019-04-24 PROCEDURE — 3077F PR MOST RECENT SYSTOLIC BLOOD PRESSURE >= 140 MM HG: ICD-10-PCS | Mod: CPTII,S$GLB,, | Performed by: PHYSICIAN ASSISTANT

## 2019-04-24 PROCEDURE — 96372 PR INJECTION,THERAP/PROPH/DIAG2ST, IM OR SUBCUT: ICD-10-PCS | Mod: S$GLB,,, | Performed by: PHYSICIAN ASSISTANT

## 2019-04-24 PROCEDURE — 99213 OFFICE O/P EST LOW 20 MIN: CPT | Mod: 25,S$GLB,, | Performed by: PHYSICIAN ASSISTANT

## 2019-04-24 PROCEDURE — 99999 PR PBB SHADOW E&M-EST. PATIENT-LVL IV: ICD-10-PCS | Mod: PBBFAC,,, | Performed by: PHYSICIAN ASSISTANT

## 2019-04-24 PROCEDURE — 3080F PR MOST RECENT DIASTOLIC BLOOD PRESSURE >= 90 MM HG: ICD-10-PCS | Mod: CPTII,S$GLB,, | Performed by: PHYSICIAN ASSISTANT

## 2019-04-24 PROCEDURE — 3008F PR BODY MASS INDEX (BMI) DOCUMENTED: ICD-10-PCS | Mod: CPTII,S$GLB,, | Performed by: PHYSICIAN ASSISTANT

## 2019-04-24 PROCEDURE — 3077F SYST BP >= 140 MM HG: CPT | Mod: CPTII,S$GLB,, | Performed by: PHYSICIAN ASSISTANT

## 2019-04-24 PROCEDURE — 99213 PR OFFICE/OUTPT VISIT, EST, LEVL III, 20-29 MIN: ICD-10-PCS | Mod: 25,S$GLB,, | Performed by: PHYSICIAN ASSISTANT

## 2019-04-24 PROCEDURE — 3080F DIAST BP >= 90 MM HG: CPT | Mod: CPTII,S$GLB,, | Performed by: PHYSICIAN ASSISTANT

## 2019-04-24 PROCEDURE — 99999 PR PBB SHADOW E&M-EST. PATIENT-LVL IV: CPT | Mod: PBBFAC,,, | Performed by: PHYSICIAN ASSISTANT

## 2019-04-24 PROCEDURE — 3008F BODY MASS INDEX DOCD: CPT | Mod: CPTII,S$GLB,, | Performed by: PHYSICIAN ASSISTANT

## 2019-04-24 RX ORDER — BETAMETHASONE SODIUM PHOSPHATE AND BETAMETHASONE ACETATE 3; 3 MG/ML; MG/ML
6 INJECTION, SUSPENSION INTRA-ARTICULAR; INTRALESIONAL; INTRAMUSCULAR; SOFT TISSUE
Status: COMPLETED | OUTPATIENT
Start: 2019-04-24 | End: 2019-04-24

## 2019-04-24 RX ADMIN — BETAMETHASONE SODIUM PHOSPHATE AND BETAMETHASONE ACETATE 6 MG: 3; 3 INJECTION, SUSPENSION INTRA-ARTICULAR; INTRALESIONAL; INTRAMUSCULAR; SOFT TISSUE at 09:04

## 2019-04-24 NOTE — PROGRESS NOTES
Subjective:      Patient ID: Ora Henderson is a 58 y.o. female.    Chief Complaint: Cough; Emesis; and Sore Throat    URI    This is a new problem. The current episode started in the past 7 days. The problem has been gradually worsening. Associated symptoms include congestion, coughing, diarrhea, nausea, rhinorrhea, sinus pain, a sore throat and vomiting. Pertinent negatives include no abdominal pain, chest pain, dysuria, ear pain, headaches, joint pain, joint swelling, neck pain, plugged ear sensation, rash, sneezing, swollen glands or wheezing. Treatments tried: tylenol sinus, mucinex DM. The treatment provided mild relief.   Blood pressure a little elevated today because she forgot to take her blood pressure medication. Patient states that she has the medication on her and will take it as soon as she gets some water. Denies any chest pain, palpitations, or shortness of breath.   Requesting steroid shot today    Review of Systems   Constitutional: Negative for activity change, appetite change, chills, diaphoresis, fatigue, fever and unexpected weight change.   HENT: Positive for congestion, postnasal drip, rhinorrhea, sinus pressure, sinus pain and sore throat. Negative for ear pain, hearing loss, sneezing, trouble swallowing and voice change.    Eyes: Negative.  Negative for visual disturbance.   Respiratory: Positive for cough. Negative for choking, chest tightness, shortness of breath and wheezing.    Cardiovascular: Negative for chest pain, palpitations and leg swelling.   Gastrointestinal: Positive for diarrhea, nausea and vomiting. Negative for abdominal distention, abdominal pain, anal bleeding, blood in stool, constipation and rectal pain.   Endocrine: Negative for cold intolerance, heat intolerance, polydipsia and polyuria.   Genitourinary: Negative.  Negative for difficulty urinating, dysuria and frequency.   Musculoskeletal: Negative for arthralgias, back pain, gait problem, joint  "pain, joint swelling, myalgias and neck pain.   Skin: Negative for color change, pallor, rash and wound.   Neurological: Negative for dizziness, tremors, weakness, light-headedness, numbness and headaches.   Hematological: Negative for adenopathy.   Psychiatric/Behavioral: Negative for behavioral problems, confusion, self-injury, sleep disturbance and suicidal ideas. The patient is not nervous/anxious.      Objective:   BP (!) 150/94   Pulse 85   Temp 97.5 °F (36.4 °C) (Tympanic)   Ht 5' 3" (1.6 m)   Wt 92.2 kg (203 lb 4.2 oz)   SpO2 100%   BMI 36.01 kg/m²     Physical Exam   Constitutional: She is oriented to person, place, and time. She appears well-developed and well-nourished. No distress.   HENT:   Head: Normocephalic and atraumatic.   Right Ear: Hearing, tympanic membrane, external ear and ear canal normal. No tenderness.   Left Ear: Hearing, tympanic membrane, external ear and ear canal normal. No tenderness.   Nose: Mucosal edema and rhinorrhea present. No sinus tenderness. Right sinus exhibits maxillary sinus tenderness and frontal sinus tenderness. Left sinus exhibits maxillary sinus tenderness and frontal sinus tenderness.   Mouth/Throat: Uvula is midline and mucous membranes are normal. No oral lesions. Normal dentition. No dental abscesses, uvula swelling or dental caries. Oropharyngeal exudate present. No posterior oropharyngeal edema, posterior oropharyngeal erythema or tonsillar abscesses. No tonsillar exudate.   Eyes: Pupils are equal, round, and reactive to light. Conjunctivae and EOM are normal. Right eye exhibits no discharge. Left eye exhibits no discharge.   Neck: Normal range of motion. Neck supple.   Cardiovascular: Normal rate, regular rhythm and normal heart sounds. Exam reveals no gallop and no friction rub.   No murmur heard.  Pulmonary/Chest: Effort normal and breath sounds normal. No respiratory distress. She has no wheezes. She has no rales.   Lymphadenopathy:     She has no " cervical adenopathy.   Neurological: She is alert and oriented to person, place, and time.   Skin: Skin is warm. No rash noted. She is not diaphoretic. No erythema. No pallor.   Psychiatric: She has a normal mood and affect. Her behavior is normal. Judgment and thought content normal.   Nursing note and vitals reviewed.      Assessment:     1. Viral URI with cough    2. Essential hypertension      Plan:   Viral URI with cough  -     betamethasone acetate-betamethasone sodium phosphate injection 6 mg  -Educational handout on over-the-counter medications and at-home conservative care, pertinent to the patients diagnosis today, was handed to the patient and discussed in detail.  -coricidin HBP     Essential hypertension  -advised patient to take her bp meds as soon as possible. Monitor. If elevated above 140/90, needs follow up with pcp.   -Steroids can increase blood sugar and blood pressure. Therefore, diabetics need to check their blood sugar regularly while taking a steroid. Patients with hypertension need to check their blood pressure regularly as well.     Follow up if symptoms worsen or fail to improve.

## 2019-04-24 NOTE — PATIENT INSTRUCTIONS
Viral Upper Respiratory Illness (Adult)  You have a viral upper respiratory illness (URI), which is another term for the common cold. This illness is contagious during the first few days. It is spread through the air by coughing and sneezing. It may also be spread by direct contact (touching the sick person and then touching your own eyes, nose, or mouth). Frequent handwashing will decrease risk of spread. Most viral illnesses go away within 7 to 10 days with rest and simple home remedies. Sometimes the illness may last for several weeks. Antibiotics will not kill a virus, and they are generally not prescribed for this condition.    Home care  · If symptoms are severe, rest at home for the first 2 to 3 days. When you resume activity, don't let yourself get too tired.  · Avoid being exposed to cigarette smoke (yours or others).  · You may use acetaminophen or ibuprofen to control pain and fever, unless another medicine was prescribed. (Note: If you have chronic liver or kidney disease, have ever had a stomach ulcer or gastrointestinal bleeding, or are taking blood-thinning medicines, talk with your healthcare provider before using these medicines.) Aspirin should never be given to anyone under 18 years of age who is ill with a viral infection or fever. It may cause severe liver or brain damage.  · Your appetite may be poor, so a light diet is fine. Avoid dehydration by drinking 6 to 8 glasses of fluids per day (water, soft drinks, juices, tea, or soup). Extra fluids will help loosen secretions in the nose and lungs.  · Over-the-counter cold medicines will not shorten the length of time youre sick, but they may be helpful for the following symptoms: cough, sore throat, and nasal and sinus congestion. (Note: Do not use decongestants if you have high blood pressure.)  Follow-up care  Follow up with your healthcare provider, or as advised.  When to seek medical advice  Call your healthcare provider right away if any  of these occur:  · Cough with lots of colored sputum (mucus)  · Severe headache; face, neck, or ear pain  · Difficulty swallowing due to throat pain  · Fever of 100.4°F (38°C)  Call 911, or get immediate medical care  Call emergency services right away if any of these occur:  · Chest pain, shortness of breath, wheezing, or difficulty breathing  · Coughing up blood  · Inability to swallow due to throat pain  Date Last Reviewed: 9/13/2015  © 1384-4458 [employer]. [address] [place] [address]. All rights reserved. This information is not intended as a substitute for professional medical care. Always follow your healthcare professional's instructions.

## 2019-06-24 ENCOUNTER — PATIENT MESSAGE (OUTPATIENT)
Dept: CARDIOLOGY | Facility: CLINIC | Age: 59
End: 2019-06-24

## 2019-06-24 ENCOUNTER — PATIENT MESSAGE (OUTPATIENT)
Dept: ADMINISTRATIVE | Facility: OTHER | Age: 59
End: 2019-06-24

## 2019-06-24 ENCOUNTER — OFFICE VISIT (OUTPATIENT)
Dept: CARDIOLOGY | Facility: CLINIC | Age: 59
End: 2019-06-24
Payer: COMMERCIAL

## 2019-06-24 VITALS
BODY MASS INDEX: 36.14 KG/M2 | HEIGHT: 63 IN | DIASTOLIC BLOOD PRESSURE: 82 MMHG | SYSTOLIC BLOOD PRESSURE: 134 MMHG | WEIGHT: 203.94 LBS | HEART RATE: 80 BPM

## 2019-06-24 DIAGNOSIS — E66.01 SEVERE OBESITY (BMI 35.0-35.9 WITH COMORBIDITY): ICD-10-CM

## 2019-06-24 DIAGNOSIS — Z82.49 FAMILY HISTORY OF CARDIOVASCULAR DISEASE: ICD-10-CM

## 2019-06-24 DIAGNOSIS — G47.33 OSA (OBSTRUCTIVE SLEEP APNEA): Chronic | ICD-10-CM

## 2019-06-24 DIAGNOSIS — R94.31 ABNORMAL ECG: ICD-10-CM

## 2019-06-24 DIAGNOSIS — E78.2 MIXED HYPERLIPIDEMIA: Chronic | ICD-10-CM

## 2019-06-24 DIAGNOSIS — I10 ESSENTIAL HYPERTENSION: Primary | ICD-10-CM

## 2019-06-24 PROCEDURE — 99214 OFFICE O/P EST MOD 30 MIN: CPT | Mod: S$GLB,,, | Performed by: INTERNAL MEDICINE

## 2019-06-24 PROCEDURE — 3079F PR MOST RECENT DIASTOLIC BLOOD PRESSURE 80-89 MM HG: ICD-10-PCS | Mod: CPTII,S$GLB,, | Performed by: INTERNAL MEDICINE

## 2019-06-24 PROCEDURE — 3075F SYST BP GE 130 - 139MM HG: CPT | Mod: CPTII,S$GLB,, | Performed by: INTERNAL MEDICINE

## 2019-06-24 PROCEDURE — 99999 PR PBB SHADOW E&M-EST. PATIENT-LVL II: CPT | Mod: PBBFAC,,, | Performed by: INTERNAL MEDICINE

## 2019-06-24 PROCEDURE — 99214 PR OFFICE/OUTPT VISIT, EST, LEVL IV, 30-39 MIN: ICD-10-PCS | Mod: S$GLB,,, | Performed by: INTERNAL MEDICINE

## 2019-06-24 PROCEDURE — 3075F PR MOST RECENT SYSTOLIC BLOOD PRESS GE 130-139MM HG: ICD-10-PCS | Mod: CPTII,S$GLB,, | Performed by: INTERNAL MEDICINE

## 2019-06-24 PROCEDURE — 3008F PR BODY MASS INDEX (BMI) DOCUMENTED: ICD-10-PCS | Mod: CPTII,S$GLB,, | Performed by: INTERNAL MEDICINE

## 2019-06-24 PROCEDURE — 99999 PR PBB SHADOW E&M-EST. PATIENT-LVL II: ICD-10-PCS | Mod: PBBFAC,,, | Performed by: INTERNAL MEDICINE

## 2019-06-24 PROCEDURE — 3079F DIAST BP 80-89 MM HG: CPT | Mod: CPTII,S$GLB,, | Performed by: INTERNAL MEDICINE

## 2019-06-24 PROCEDURE — 3008F BODY MASS INDEX DOCD: CPT | Mod: CPTII,S$GLB,, | Performed by: INTERNAL MEDICINE

## 2019-06-24 NOTE — PROGRESS NOTES
Subjective:    Patient ID:  Ora Henderson is a 59 y.o. female who presents for evaluation of Obesity,  Hypertension; Hyperlipidemia; and Risk Factor Management For Atherosclerosis      HPI Mrs. Henderson presents for f/u.  Her current medical conditions include HTN, hyperlipidemia, NATALIE, obesity. Nonsmoker.   She has family h/o cad. Sisters x 2  of MIs. Brother  of MI. Father  of MI 42.   Past hx pertinent for following:  h/o LHC about 20 years ago, no specific findings.   H/o chronic abnormal ecgs showing nonspecific ST-T abnl.  Stress MPI 3/17 showed no ischemia.  Echo 3/17 showed normal LV function.  ecg  NSR, low precordial R waves, no acute changes.   Has not tolerated CPAP in past for NATALIE.  Now here.   ecg 3/8/19 NSR, nonspecific st- t abnl.  No chest pain sxs.  No CHF sxs. Denies dyspnea, pnd/orthopnea.  BP controlled on current meds but above goal when she checks it at home.  On Hyzaar.  She does not want additional HTN meds.  No CPAP for NATALIE.  Exercises daily.  Weight stable.  Lipids worse.  On Rosuvastatin.      Current Outpatient Medications:     albuterol 90 mcg/actuation inhaler, Inhale 1-2 puffs into the lungs every 4 (four) hours as needed for Wheezing or Shortness of Breath (cough)., Disp: 1 Inhaler, Rfl: 0    aspirin 81 MG Chew, Take 81 mg by mouth as needed., Disp: , Rfl:     esomeprazole (NEXIUM) 40 MG capsule, Take 1 capsule (40 mg total) by mouth before breakfast., Disp: 90 capsule, Rfl: 0    gabapentin (NEURONTIN) 300 MG capsule, Take 1 capsule (300 mg total) by mouth every evening., Disp: 30 capsule, Rfl: 1    L.acidophilus-Bif. animalis (PROBIOTIC) 5 billion cell CpSP, Take 1 tablet by mouth once daily., Disp: 10 capsule, Rfl: 0    losartan-hydrochlorothiazide 100-25 mg (HYZAAR) 100-25 mg per tablet, Take 1 tablet by mouth once daily., Disp: 90 tablet, Rfl: 3    MULTIVIT,CALC,MINS/IRON/FOLIC (DAILY MULTIPLE FOR WOMEN ORAL), Take 1 tablet by mouth once  "daily., Disp: , Rfl:     naproxen sodium (ALEVE) 220 MG tablet, Take 220 mg by mouth as needed., Disp: , Rfl:     rosuvastatin (CRESTOR) 40 MG Tab, Take 1 tablet (40 mg total) by mouth every evening. (Patient taking differently: Take 40 mg by mouth once daily. ), Disp: 90 tablet, Rfl: 3    senna-docusate 8.6-50 mg (PERICOLACE) 8.6-50 mg per tablet, Take 1 tablet by mouth as needed for Constipation., Disp: , Rfl:       Review of Systems   Constitution: Negative.   HENT: Negative.    Eyes: Negative.    Cardiovascular: Negative.    Respiratory: Negative.    Endocrine: Negative.    Hematologic/Lymphatic: Negative.    Skin: Negative.    Musculoskeletal: Negative.    Gastrointestinal: Negative.    Genitourinary: Negative.    Neurological: Negative.    Psychiatric/Behavioral: Negative.    Allergic/Immunologic: Negative.        /82 (BP Location: Right arm, Patient Position: Sitting, BP Method: Large (Manual))   Pulse 80   Ht 5' 3" (1.6 m)   Wt 92.5 kg (203 lb 14.8 oz)   BMI 36.12 kg/m²     Wt Readings from Last 3 Encounters:   06/24/19 92.5 kg (203 lb 14.8 oz)   04/24/19 92.2 kg (203 lb 4.2 oz)   03/08/19 94.8 kg (209 lb 1.7 oz)     Temp Readings from Last 3 Encounters:   04/24/19 97.5 °F (36.4 °C) (Tympanic)   03/08/19 98.4 °F (36.9 °C) (Oral)   03/08/19 99.1 °F (37.3 °C) (Oral)     BP Readings from Last 3 Encounters:   06/24/19 134/82   04/24/19 (!) 150/94   03/08/19 139/86     Pulse Readings from Last 3 Encounters:   06/24/19 80   04/24/19 85   03/08/19 74          Objective:    Physical Exam   Constitutional: She is oriented to person, place, and time. Vital signs are normal. She appears well-developed and well-nourished. She is active and cooperative. She does not have a sickly appearance. She does not appear ill. No distress.   HENT:   Head: Normocephalic.   Neck: Neck supple. Normal carotid pulses, no hepatojugular reflux and no JVD present. Carotid bruit is not present. No thyromegaly present. "   Cardiovascular: Normal rate, regular rhythm, S1 normal, S2 normal, normal heart sounds and normal pulses. PMI is not displaced. Exam reveals no gallop and no friction rub.   No murmur heard.  Pulses:       Radial pulses are 2+ on the right side, and 2+ on the left side.   Pulmonary/Chest: Effort normal and breath sounds normal. She has no wheezes. She has no rales.   Abdominal: Soft. Normal appearance, normal aorta and bowel sounds are normal. She exhibits no pulsatile liver, no abdominal bruit, no ascites and no mass. There is no splenomegaly or hepatomegaly. There is no tenderness.   Musculoskeletal: She exhibits no edema.   Lymphadenopathy:     She has no cervical adenopathy.   Neurological: She is alert and oriented to person, place, and time.   Skin: Skin is warm. She is not diaphoretic.   Psychiatric: She has a normal mood and affect. Her behavior is normal.   Nursing note and vitals reviewed.      I have reviewed all pertinent labs and cardiac studies.      Chemistry        Component Value Date/Time     03/08/2019 2036    K 3.5 03/08/2019 2036     03/08/2019 2036    CO2 31 (H) 03/08/2019 2036    BUN 9 03/08/2019 2036    CREATININE 1.0 03/08/2019 2036    GLU 98 03/08/2019 2036        Component Value Date/Time    CALCIUM 10.1 03/08/2019 2036    ALKPHOS 70 03/08/2019 2036    AST 13 03/08/2019 2036    ALT 12 03/08/2019 2036    BILITOT 0.6 03/08/2019 2036    ESTGFRAFRICA >60 03/08/2019 2036    EGFRNONAA >60 03/08/2019 2036        Lab Results   Component Value Date    WBC 8.11 03/08/2019    HGB 11.8 (L) 03/08/2019    HCT 35.4 (L) 03/08/2019    MCV 89 03/08/2019     03/08/2019     Lab Results   Component Value Date    HGBA1C 6.0 (H) 02/13/2019     Lab Results   Component Value Date    CHOL 227 (H) 02/13/2019    CHOL 196 07/31/2018    CHOL 185 02/28/2018     Lab Results   Component Value Date    HDL 59 02/13/2019    HDL 46 07/31/2018    HDL 50 02/28/2018     Lab Results   Component Value Date     LDLCALC 126.0 02/13/2019    LDLCALC 127.2 07/31/2018    LDLCALC 107.0 02/28/2018     Lab Results   Component Value Date    TRIG 210 (H) 02/13/2019    TRIG 114 07/31/2018    TRIG 140 02/28/2018     Lab Results   Component Value Date    CHOLHDL 26.0 02/13/2019    CHOLHDL 23.5 07/31/2018    CHOLHDL 27.0 02/28/2018           Assessment:       1. Essential hypertension    2. Severe obesity (BMI 35.0-35.9 with comorbidity)    3. NATALIE (obstructive sleep apnea)    4. Mixed hyperlipidemia    5. Family history of cardiovascular disease    6. Abnormal ECG         Plan:             Discussed HTN goals with pt.  Goal < 130/80.  She is advised to get BP to goal.  Risk factor modification discussed.  Enroll in digital HTN program.   Discussed how program works.  She does not want additional HTN med right now.  She was advised of adverse CV health effects of HTN above goal.  Exercise daily.  Weight loss.  CPAP for NATALIE but has not tolerated.  Stable abnl ecg.  Lower lipids to goal.  Recheck lipids -- phone review.   F/u 6 months.

## 2019-06-28 ENCOUNTER — PATIENT OUTREACH (OUTPATIENT)
Dept: OTHER | Facility: OTHER | Age: 59
End: 2019-06-28

## 2019-06-28 NOTE — LETTER
June 28, 2019     [facility]  [address] [place] [place] [address]       Dear Ora,    Welcome to [facility]! Our goal is to make care effective, proactive and convenient by using data you send us from home to better treat your chronic conditions.          My name is Courtney Chi, and I am your dedicated Digital Medicine clinician. As an expert in medication management, I will help ensure that the medications you are taking continue to provide the intended benefits and help you reach your goals. You can reach me directly at [phone] or by sending me a message directly through your [facility] account.      I am Ashvin Burt and I will be your health . My job is to help you identify lifestyle changes to improve your disease control. We will talk about nutrition, exercise, and other ways you may be able to adjust your current habits to better your health. Additionally, we will help ensure you are completing the tests and screenings that are necessary to help manage your conditions. You can reach me directly at [phone] or by sending me a message directly through your [facility] account.    Most importantly, YOU are at the center of this team. Together, we will work to improve your overall health and encourage you to meet your goals for a healthier lifestyle.     What we expect from YOU:  · Please take frequent home blood pressure measurements. We ask that you take at least 1 blood pressure reading per week, but more information will better help us get you know you. Be sure you rest for a few minutes before taking the reading in a quiet, comfortable place.     Be available to receive phone calls or [facility] messages, when appropriate, from your care team. Please let us know if there are any specific days or times that work best for us to reach you via phone.     Complete routine tests and screenings. Dont worry, we will help keep you on track!            What you should expect from your Digital Medicine Care Team:   We will work with you to create a personalized plan of care and provide you with encouragement and education, including regarding lifestyle changes, that could help you manage your disease states.     We will adjust your current medications, if needed, and continue to monitor your long-term progress.     We will provide you and your physician with monthly progress reports after you have been in the program for more than 30 days.     We will send you reminders through [facility] and text messages to help ensure you do not miss any testing deadlines to help manage your disease states.    You will be able to reach us by phone or through your [facility] account by clicking our names under Care Team on the right side of the home screen.    I look forward to working with you to achieve your blood pressure goals!    We look forward to working with you to help manage your health,    Sincerely,    Your Digital Medicine Team    Please visit our websites to learn more:   · Hypertension: [email or web address]      Remember, we are not available for emergencies. If you have an emergency, please contact your doctors office directly or call [facility] on-call ([phone] or [phone]) or 115.

## 2019-06-28 NOTE — PROGRESS NOTES
"Last 5 Patient Entered Readings                                      Current 30 Day Average: 133/93     Recent Readings 6/27/2019 6/27/2019 6/26/2019 6/26/2019 6/26/2019    SBP (mmHg) 133 170 134 144 151    DBP (mmHg) 90 102 91 101 91    Pulse 83 83 83 92 103        Digital Medicine: Health  Introduction    Introduced Mrs. Ora Henderson to Digital Medicine. Discussed health  role and recommended lifestyle modifications.    Lifestyle Assessment:  Current Dietary Habits(i.e. low sodium, food labels, dining out):  Patient stated that she will usually eat oatmeal or an egg white delight from [employer] (750 mg of sodium), sometimes for lunch she will eat a "healthy" frozen tv dinner, or 1/2 broiled chicken from [employer]. Patient states that she and her  try to stay away from fried foods. Patient and her  will eat out about once a week. Patient also tries not to eat past 8 o'clock. Patient states that she will have a cup of coffee on somedays but not all days. Reviewed AHA recommendations for daily water intake.     Exercise:  Patient works out 6 days a week for 30 minutes to an hour daily. She'll do treadmill workouts, kickboxing, regular boxing, some resistance training, and aerobics style training    Alcohol/Tobacco:  No tobacco products and patient will drink on occasion.   Reminded patient that moderation is smart and that if she is going to have more than 1-2 drinks than to make sure she's having a glass of water between every drink.    Medication Adherence: has been compliant with the medicaiton regimen  No symptoms or side effects to report.    Reviewed AHA/AACE recommendations:  Limit sodium intake to <2000mg/day  Perform 150 minutes of physical activity per week    Reviewed the importance of self-monitoring, medication adherence, and that the health  can be used as a resource for lifestyle modifications to help reduce or maintain a healthy lifestyle. Reviewed that the " Digital Medicine team is not available for emergencies and instructed the patient to call 911 or [facility] ([phone] or [phone]) if one arises.    Notes:  Will f/u on patient's diet and go over proper BP protocol and snacking at next outreach.

## 2019-07-02 ENCOUNTER — OFFICE VISIT (OUTPATIENT)
Dept: INTERNAL MEDICINE | Facility: CLINIC | Age: 59
End: 2019-07-02
Payer: COMMERCIAL

## 2019-07-02 VITALS
WEIGHT: 202.19 LBS | HEIGHT: 63 IN | OXYGEN SATURATION: 98 % | BODY MASS INDEX: 35.82 KG/M2 | HEART RATE: 75 BPM | SYSTOLIC BLOOD PRESSURE: 124 MMHG | TEMPERATURE: 98 F | DIASTOLIC BLOOD PRESSURE: 84 MMHG

## 2019-07-02 DIAGNOSIS — Z98.890 S/P BILATERAL CARPAL TUNNEL RELEASE: ICD-10-CM

## 2019-07-02 DIAGNOSIS — M75.41 ROTATOR CUFF IMPINGEMENT SYNDROME OF RIGHT SHOULDER: ICD-10-CM

## 2019-07-02 DIAGNOSIS — K21.9 GASTROESOPHAGEAL REFLUX DISEASE, ESOPHAGITIS PRESENCE NOT SPECIFIED: ICD-10-CM

## 2019-07-02 DIAGNOSIS — M47.26 OSTEOARTHRITIS OF SPINE WITH RADICULOPATHY, LUMBAR REGION: ICD-10-CM

## 2019-07-02 DIAGNOSIS — M25.532 CHRONIC PAIN OF LEFT WRIST: Primary | ICD-10-CM

## 2019-07-02 DIAGNOSIS — I10 ESSENTIAL HYPERTENSION: ICD-10-CM

## 2019-07-02 DIAGNOSIS — E78.2 MIXED HYPERLIPIDEMIA: Chronic | ICD-10-CM

## 2019-07-02 DIAGNOSIS — M47.22 OSTEOARTHRITIS OF SPINE WITH RADICULOPATHY, CERVICAL REGION: Chronic | ICD-10-CM

## 2019-07-02 DIAGNOSIS — Z98.890 S/P EXCISION OF GANGLION CYST: ICD-10-CM

## 2019-07-02 DIAGNOSIS — G89.29 CHRONIC PAIN OF LEFT WRIST: Primary | ICD-10-CM

## 2019-07-02 PROBLEM — M79.2 NEURALGIA: Status: RESOLVED | Noted: 2018-12-10 | Resolved: 2019-07-02

## 2019-07-02 PROBLEM — B37.31 CANDIDIASIS OF VAGINA: Status: RESOLVED | Noted: 2018-09-23 | Resolved: 2019-07-02

## 2019-07-02 PROBLEM — Z01.810 PREOP CARDIOVASCULAR EXAM: Status: RESOLVED | Noted: 2018-12-24 | Resolved: 2019-07-02

## 2019-07-02 PROBLEM — R94.31 ABNORMAL ECG: Status: RESOLVED | Noted: 2018-11-12 | Resolved: 2019-07-02

## 2019-07-02 PROCEDURE — 99214 PR OFFICE/OUTPT VISIT, EST, LEVL IV, 30-39 MIN: ICD-10-PCS | Mod: S$GLB,,, | Performed by: FAMILY MEDICINE

## 2019-07-02 PROCEDURE — 3008F PR BODY MASS INDEX (BMI) DOCUMENTED: ICD-10-PCS | Mod: CPTII,S$GLB,, | Performed by: FAMILY MEDICINE

## 2019-07-02 PROCEDURE — 3008F BODY MASS INDEX DOCD: CPT | Mod: CPTII,S$GLB,, | Performed by: FAMILY MEDICINE

## 2019-07-02 PROCEDURE — 3074F SYST BP LT 130 MM HG: CPT | Mod: CPTII,S$GLB,, | Performed by: FAMILY MEDICINE

## 2019-07-02 PROCEDURE — 3079F PR MOST RECENT DIASTOLIC BLOOD PRESSURE 80-89 MM HG: ICD-10-PCS | Mod: CPTII,S$GLB,, | Performed by: FAMILY MEDICINE

## 2019-07-02 PROCEDURE — 99999 PR PBB SHADOW E&M-EST. PATIENT-LVL IV: ICD-10-PCS | Mod: PBBFAC,,, | Performed by: FAMILY MEDICINE

## 2019-07-02 PROCEDURE — 3074F PR MOST RECENT SYSTOLIC BLOOD PRESSURE < 130 MM HG: ICD-10-PCS | Mod: CPTII,S$GLB,, | Performed by: FAMILY MEDICINE

## 2019-07-02 PROCEDURE — 99214 OFFICE O/P EST MOD 30 MIN: CPT | Mod: S$GLB,,, | Performed by: FAMILY MEDICINE

## 2019-07-02 PROCEDURE — 3079F DIAST BP 80-89 MM HG: CPT | Mod: CPTII,S$GLB,, | Performed by: FAMILY MEDICINE

## 2019-07-02 PROCEDURE — 99999 PR PBB SHADOW E&M-EST. PATIENT-LVL IV: CPT | Mod: PBBFAC,,, | Performed by: FAMILY MEDICINE

## 2019-07-02 NOTE — PROGRESS NOTES
Subjective:       Patient ID: Ora Henderson is a 59 y.o. female.    Chief Complaint: Left wrist swollen    59-year-old  female patient with Patient Active Problem List:     DJD (degenerative joint disease), cervical-mild     Hyperlipidemia     Hepatomegaly     Family history of cardiovascular disease     GERD (gastroesophageal reflux disease)     NATALIE (obstructive sleep apnea)     Rotator cuff impingement syndrome of right shoulder     DJD of right AC (acromioclavicular) joint     History of benign pituitary tumor     Arthritis     Hx of colonic polyp     Essential hypertension     Osteoarthritis of spine with radiculopathy, lumbar region     Severe obesity (BMI 35.0-35.9 with comorbidity)  Reports that she has been having left wrist swelling and pain off and on lately, has had ganglion cyst removed on December 31st by Dr. Sanchez  Patient had bilateral carpal tunnel release surgery done in the past but has been having bilateral wrist pain and left wrist, pain , occasionally causing tingling and numbness sensation  Patient does not want to get rotator cuff surgery to the right shoulder but has been having numbness from the right hand radiating to the shoulder.   Has been taking Aleve daily  Patient would like to get a 2nd opinion discussing further with our orthopedic physician here    Reports that she has been having worsening acid reflux symptoms with burping in spite of taking Nexium 40 mg daily  Denies any chest pain or difficulty breathing      Review of Systems   Constitutional: Negative for fatigue.   Eyes: Negative for visual disturbance.   Respiratory: Negative for shortness of breath.    Cardiovascular: Negative for chest pain and leg swelling.   Gastrointestinal: Negative for abdominal pain, nausea and vomiting.   Musculoskeletal: Positive for arthralgias, joint swelling and myalgias.   Skin: Negative for rash.   Neurological: Negative for light-headedness and headaches.  "  Psychiatric/Behavioral: Negative for sleep disturbance.         /84 (BP Location: Right arm, Patient Position: Sitting)   Pulse 75   Temp 97.9 °F (36.6 °C) (Tympanic)   Ht 5' 3" (1.6 m)   Wt 91.7 kg (202 lb 2.6 oz)   SpO2 98%   BMI 35.81 kg/m²   Objective:      Physical Exam   Constitutional: She is oriented to person, place, and time. She appears well-developed and well-nourished.   HENT:   Head: Normocephalic and atraumatic.   Mouth/Throat: Oropharynx is clear and moist.   Cardiovascular: Normal rate, regular rhythm and normal heart sounds.   No murmur heard.  Pulmonary/Chest: Effort normal and breath sounds normal. She has no wheezes.   Abdominal: Soft. Bowel sounds are normal. There is no tenderness.   Musculoskeletal: She exhibits tenderness. She exhibits no edema.   Positive for tenderness to the bilateral wrists and right shoulder   Neurological: She is alert and oriented to person, place, and time.   Skin: Skin is warm and dry. No rash noted.   Noted scars to the left wrist dorsally status post ganglion cyst removal   Psychiatric: She has a normal mood and affect.         Assessment/Plan:   1. Chronic pain of left wrist  - Ambulatory Referral to Orthopedics  2. S/P excision of ganglion cyst  - Ambulatory Referral to Orthopedics  3. S/p bilateral carpal tunnel release  - Ambulatory Referral to Orthopedics  6. Rotator cuff impingement syndrome of right shoulder  - Ambulatory Referral to Orthopedics    Will refer to Orthopedic for further evaluation  Patient was advised to avoid taking NSAIDs which can worsen acid reflux symptoms  Advised to take extra-strength Tylenol and discuss further with orthopedic physician  Patient has done physical therapy for the shoulder in the past    4. Essential hypertension  Blood pressure is stable but mildly elevated today currently on losartan hydrochlorothiazide 100/25 mg, currently enrolled with hypertension digital program    5. Gastroesophageal reflux " disease, esophagitis presence not specified  - ranitidine (ZANTAC) 150 MG tablet; Take 1 tablet (150 mg total) by mouth 2 (two) times daily.  Dispense: 60 tablet; Refill: 0  - Ambulatory consult to Gastroenterology  Will add Zantac 150 mg twice daily in addition to Nexium but secondary to excessive burping Will refer to Gastroenterology for further evaluation  Patient was advised to avoid taking NSAIDs    7. Mixed hyperlipidemia  Currently on Crestor 40 mg daily    8. Osteoarthritis of spine with radiculopathy, cervical region  9. Osteoarthritis of spine with radiculopathy, lumbar region

## 2019-07-03 ENCOUNTER — TELEPHONE (OUTPATIENT)
Dept: ORTHOPEDICS | Facility: CLINIC | Age: 59
End: 2019-07-03

## 2019-07-19 DIAGNOSIS — M25.532 PAIN IN BOTH WRISTS: Primary | ICD-10-CM

## 2019-07-19 DIAGNOSIS — M25.531 PAIN IN BOTH WRISTS: Primary | ICD-10-CM

## 2019-07-22 ENCOUNTER — HOSPITAL ENCOUNTER (OUTPATIENT)
Dept: RADIOLOGY | Facility: HOSPITAL | Age: 59
Discharge: HOME | End: 2019-07-22
Attending: PHYSICIAN ASSISTANT
Payer: COMMERCIAL

## 2019-07-22 ENCOUNTER — OFFICE VISIT (OUTPATIENT)
Dept: ORTHOPEDICS | Facility: CLINIC | Age: 59
End: 2019-07-22
Payer: COMMERCIAL

## 2019-07-22 ENCOUNTER — PATIENT MESSAGE (OUTPATIENT)
Dept: ADMINISTRATIVE | Facility: OTHER | Age: 59
End: 2019-07-22

## 2019-07-22 ENCOUNTER — TELEPHONE (OUTPATIENT)
Dept: PAIN MEDICINE | Facility: CLINIC | Age: 59
End: 2019-07-22

## 2019-07-22 VITALS
HEART RATE: 86 BPM | BODY MASS INDEX: 35.79 KG/M2 | HEIGHT: 63 IN | SYSTOLIC BLOOD PRESSURE: 142 MMHG | DIASTOLIC BLOOD PRESSURE: 88 MMHG | WEIGHT: 202 LBS

## 2019-07-22 DIAGNOSIS — R20.2 NUMBNESS AND TINGLING OF LEFT UPPER EXTREMITY: Primary | ICD-10-CM

## 2019-07-22 DIAGNOSIS — M54.2 NECK PAIN: Primary | ICD-10-CM

## 2019-07-22 DIAGNOSIS — R20.0 NUMBNESS AND TINGLING OF LEFT UPPER EXTREMITY: Primary | ICD-10-CM

## 2019-07-22 DIAGNOSIS — M54.2 NECK PAIN: ICD-10-CM

## 2019-07-22 DIAGNOSIS — M25.532 PAIN IN BOTH WRISTS: ICD-10-CM

## 2019-07-22 DIAGNOSIS — M25.531 PAIN IN BOTH WRISTS: ICD-10-CM

## 2019-07-22 DIAGNOSIS — R20.0 NUMBNESS AND TINGLING OF RIGHT UPPER EXTREMITY: ICD-10-CM

## 2019-07-22 DIAGNOSIS — R20.2 NUMBNESS AND TINGLING OF RIGHT UPPER EXTREMITY: ICD-10-CM

## 2019-07-22 PROCEDURE — 3008F PR BODY MASS INDEX (BMI) DOCUMENTED: ICD-10-PCS | Mod: CPTII,S$GLB,, | Performed by: PHYSICIAN ASSISTANT

## 2019-07-22 PROCEDURE — 3077F PR MOST RECENT SYSTOLIC BLOOD PRESSURE >= 140 MM HG: ICD-10-PCS | Mod: CPTII,S$GLB,, | Performed by: PHYSICIAN ASSISTANT

## 2019-07-22 PROCEDURE — 3079F PR MOST RECENT DIASTOLIC BLOOD PRESSURE 80-89 MM HG: ICD-10-PCS | Mod: CPTII,S$GLB,, | Performed by: PHYSICIAN ASSISTANT

## 2019-07-22 PROCEDURE — 99999 PR PBB SHADOW E&M-EST. PATIENT-LVL IV: CPT | Mod: PBBFAC,,, | Performed by: PHYSICIAN ASSISTANT

## 2019-07-22 PROCEDURE — 73110 X-RAY EXAM OF WRIST: CPT | Mod: 26,RT,, | Performed by: RADIOLOGY

## 2019-07-22 PROCEDURE — 73110 PR  X-RAY WRIST 3+ VW: ICD-10-PCS | Mod: 26,LT,, | Performed by: RADIOLOGY

## 2019-07-22 PROCEDURE — 72050 X-RAY EXAM NECK SPINE 4/5VWS: CPT | Mod: TC

## 2019-07-22 PROCEDURE — 99999 PR PBB SHADOW E&M-EST. PATIENT-LVL IV: ICD-10-PCS | Mod: PBBFAC,,, | Performed by: PHYSICIAN ASSISTANT

## 2019-07-22 PROCEDURE — 99214 OFFICE O/P EST MOD 30 MIN: CPT | Mod: S$GLB,,, | Performed by: PHYSICIAN ASSISTANT

## 2019-07-22 PROCEDURE — 99214 PR OFFICE/OUTPT VISIT, EST, LEVL IV, 30-39 MIN: ICD-10-PCS | Mod: S$GLB,,, | Performed by: PHYSICIAN ASSISTANT

## 2019-07-22 PROCEDURE — 3079F DIAST BP 80-89 MM HG: CPT | Mod: CPTII,S$GLB,, | Performed by: PHYSICIAN ASSISTANT

## 2019-07-22 PROCEDURE — 3008F BODY MASS INDEX DOCD: CPT | Mod: CPTII,S$GLB,, | Performed by: PHYSICIAN ASSISTANT

## 2019-07-22 PROCEDURE — 73110 X-RAY EXAM OF WRIST: CPT | Mod: 26,LT,, | Performed by: RADIOLOGY

## 2019-07-22 PROCEDURE — 72050 X-RAY EXAM NECK SPINE 4/5VWS: CPT | Mod: 26,,, | Performed by: RADIOLOGY

## 2019-07-22 PROCEDURE — 73110 X-RAY EXAM OF WRIST: CPT | Mod: 50,TC

## 2019-07-22 PROCEDURE — 72050 XR CERVICAL SPINE COMPLETE 5 VIEW: ICD-10-PCS | Mod: 26,,, | Performed by: RADIOLOGY

## 2019-07-22 PROCEDURE — 3077F SYST BP >= 140 MM HG: CPT | Mod: CPTII,S$GLB,, | Performed by: PHYSICIAN ASSISTANT

## 2019-07-22 RX ORDER — DIAZEPAM 5 MG/1
TABLET ORAL
Qty: 2 TABLET | Refills: 0 | Status: SHIPPED | OUTPATIENT
Start: 2019-07-22 | End: 2019-09-17

## 2019-07-22 NOTE — LETTER
July 22, 2019      Zuly Montes MD  [address] [place] [address]           [facility]  [address] [place] [address]  Phone: [phone]  Fax: [phone]          Patient: Ora Henderson   MR Number: [id number]   YOB: 1960   Date of Visit: 7/22/2019       Dear Dr. Zuly Montes:    Thank you for referring Ora Henderson to me for evaluation. Attached you will find relevant portions of my assessment and plan of care.    If you have questions, please do not hesitate to call me. I look forward to following Ora Henderson along with you.    Sincerely,    TAD Heardosure  CC:  No Recipients    If you would like to receive this communication electronically, please contact [email or web address] or [phone] to request more information on [employer] [facility] access.    For providers and/or their staff who would like to refer a patient to [facility], please contact us through our one-stop-shop provider referral line, [facility], at [phone].    If you feel you have received this communication in error or would no longer like to receive these types of communications, please e-mail [email or web address]

## 2019-07-22 NOTE — TELEPHONE ENCOUNTER
----- Message from Uriah Padilla MA sent at 7/22/2019  4:49 PM CDT -----  Can you please get this patient scheduled

## 2019-07-22 NOTE — PROGRESS NOTES
Patient ID: Ora Henderson is a 59 y.o. female.    Chief Complaint: Pain of the Right Wrist; Pain of the Left Wrist; Pain of the Left Hand; Pain of the Right Hand; and Pain of the Neck      HPI: Ora Henderson  is a 59 y.o. female who c/o Pain of the Right Wrist; Pain of the Left Wrist; Pain of the Left Hand; Pain of the Right Hand; and Pain of the Neck   for duration of 3 or 4 months.  She denies any inciting injury.  She comes in today to be evaluated for bilateral carpal tunnel syndrome.  She started developing numbness and tingling in the bilateral upper extremities.  She states to that radiates all the way down the arm into the forearm and into the hands.  Severity is 6/10.  Quality is intermittent aching pain.  Alleviating factors include shaking her arms out.  Aggravating factors include nothing in particular that she can think of.  Of note, she had a ganglion cyst removal done by Dr. Ojeda on the left wrist back in December.  Her symptoms are new since then.    Past Medical History:   Diagnosis Date    Arthritis     GERD (gastroesophageal reflux disease)     Hepatomegaly     and fatty liver    Hernia, umbilical     reducible    Hyperlipidemia     Hypertension     Sleep apnea      Past Surgical History:   Procedure Laterality Date    BRAIN SURGERY      tumor removal     CARPAL TUNNEL RELEASE Bilateral     CERVICAL BIOPSY  W/ LOOP ELECTRODE EXCISION      [facility] '81     SECTION      x 1    COLONOSCOPY N/A 2016    Performed by Quique Paul MD at [facility]    CYST REMOVAL Left 2018    EGD (ESOPHAGOGASTRODUODENOSCOPY) N/A 2013    Performed by Holly Guajardo MD at [facility] END[facility]    HERNIA REPAIR      2016    HYSTERECTOMY  2006    fibroids and endometriosis    TOTAL ABDOMINAL HYSTERECTOMY W/ BILATERAL SALPINGOOPHORECTOMY      STAR/BSO secondary to endometriosis    VENTRAL HERNIA REPAIR      XI ROBOTIC ASSISTED VENTRAL HERNIA REPAIR  N/A 12/8/2016    Performed by Gio Acosta [facility] at [facility]     Family History   Problem Relation Age of Onset    Heart disease Father     Stroke Father     Hypertension Father     Hypertension Mother     Cancer Maternal Aunt         pancreas    Cancer Maternal Uncle         pancreas    Heart disease Paternal Uncle     Heart disease Paternal Grandmother     Diabetes Maternal Aunt      Social History     Socioeconomic History    Marital status:      Spouse name: Not on file    Number of children: 1    Years of education: Not on file    Highest education level: Not on file   Occupational History    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Employer: [employer]   Social Needs    Financial resource strain: Not on file    Food insecurity:     Worry: Not on file     Inability: Not on file    Transportation needs:     Medical: Not on file     Non-medical: Not on file   Tobacco Use    Smoking status: Never Smoker    Smokeless tobacco: Never Used   Substance and Sexual Activity    Alcohol use: Yes     Alcohol/week: 0.0 oz     Comment: socially    Drug use: No    Sexual activity: Yes     Partners: Male     Birth control/protection: None, Surgical   Lifestyle    Physical activity:     Days per week: Not on file     Minutes per session: Not on file    Stress: Not on file   Relationships    Social connections:     Talks on phone: Not on file     Gets together: Not on file     Attends Spiritism service: Not on file     Active member of club or organization: Not on file     Attends meetings of clubs or organizations: Not on file     Relationship status: Not on file   Other Topics Concern    Not on file   Social History Narrative    Not on file     Medication List with Changes/Refills   New Medications    DIAZEPAM (VALIUM) 5 MG TABLET    1 PO 1hr prior to nerve test.  Repeat 20 min prior to test if needed.  Do not drive   Current Medications    ALBUTEROL 90 MCG/ACTUATION INHALER    Inhale 1-2 puffs  into the lungs every 4 (four) hours as needed for Wheezing or Shortness of Breath (cough).    ASPIRIN 81 MG CHEW    Take 81 mg by mouth as needed.    ESOMEPRAZOLE (NEXIUM) 40 MG CAPSULE    Take 1 capsule (40 mg total) by mouth before breakfast.    L.ACIDOPHILUS-BIF. ANIMALIS (PROBIOTIC) 5 BILLION CELL CPSP    Take 1 tablet by mouth once daily.    LOSARTAN-HYDROCHLOROTHIAZIDE 100-25 MG (HYZAAR) 100-25 MG PER TABLET    Take 1 tablet by mouth once daily.    MULTIVIT,CALC,MINS/IRON/FOLIC (DAILY MULTIPLE FOR WOMEN ORAL)    Take 1 tablet by mouth once daily.    NAPROXEN SODIUM (ALEVE) 220 MG TABLET    Take 220 mg by mouth as needed.    RANITIDINE (ZANTAC) 150 MG TABLET    Take 1 tablet (150 mg total) by mouth 2 (two) times daily.    ROSUVASTATIN (CRESTOR) 40 MG TAB    Take 1 tablet (40 mg total) by mouth every evening.    SENNA-DOCUSATE 8.6-50 MG (PERICOLACE) 8.6-50 MG PER TABLET    Take 1 tablet by mouth as needed for Constipation.     Review of patient's allergies indicates:   Allergen Reactions    Sulfa (sulfonamide antibiotics) Hives           Objective:        General    Nursing note and vitals reviewed.  Constitutional: She is oriented to person, place, and time. She appears well-developed and well-nourished.   HENT:   Head: Normocephalic and atraumatic.   Eyes: EOM are normal.   Cardiovascular: Normal rate and regular rhythm.    Pulmonary/Chest: Effort normal.   Abdominal: Soft.   Neurological: She is alert and oriented to person, place, and time.   Psychiatric: She has a normal mood and affect. Her behavior is normal.         Back (L-Spine & T-Spine) / Neck (C-Spine) Exam     Spinal Sensation   Right Side Sensation  C-Spine Level: normal   Left Side Sensation  C-Spine Level: normal      Right Hand/Wrist Exam     Inspection   Scars: Wrist - absent Hand -  absent  Effusion: Wrist - absent Hand -  absent  Bruising: Wrist - absent Hand -  absent  Deformity: Wrist - deformity Hand -  deformity    Range of Motion      Wrist   Extension: normal   Flexion: normal   Pronation: normal   Supination: normal     Tests   Phalens Sign: negative  Tinel's sign (median nerve): negative  Finkelstein's test: negative  Carpal Tunnel Compression Test: negative  Cubital Tunnel Compression Test: negative    Atrophy   Thenar:  negative  Hypothenar:  negative  Intrinsic:  negative  1st Dorsal Interosseous: negative    Other     Neuorologic Exam    Median Distribution: normal  Ulnar Distribution: normal  Radial Distribution: normal    Comments:  2+ radial pulse      Left Hand/Wrist Exam     Inspection   Scars: Wrist - present (healed - No SOI) Hand -  absent  Effusion: Wrist - absent Hand -  absent  Bruising: Wrist - absent Hand -  absent  Deformity: Wrist - absent Hand -  absent    Range of Motion     Wrist   Extension: normal   Flexion: normal   Pronation: normal   Supination: normal     Tests   Phalens Sign: negative  Tinel's sign (median nerve): negative  Finkelstein's test: negative  Carpal Tunnel Compression Test: negative  Cubital Tunnel Compression Test: negative    Atrophy  Thenar:  Negative  Hypothenar:  negative  Intrinsic: negative  1st Dorsal Interosseous:  negative    Other     Sensory Exam  Median Distribution: normal  Ulnar Distribution: normal  Radial Distribution: normal    Comments:  2+ radial pulse      Right Elbow Exam     Tests   Tinel's sign (cubital tunnel): negative      Left Elbow Exam     Tests   Tinel's sign (cubital tunnel): negative    Right Shoulder Exam     Inspection/Observation   Swelling: absent  Bruising: absent  Scars: absent  Deformity: absent  Scapular Dyskinesia: negative    Tenderness   The patient is experiencing no tenderness.    Range of Motion   Active abduction: normal   Passive abduction: normal   Extension: normal   Forward Flexion: normal   Forward Elevation: normal  Adduction: normal  External Rotation 0 degrees: normal   Internal rotation 0 degrees: normal     Tests & Signs   Cross arm:  negative  Drop arm: negative  Foreman test: negative  Impingement: negative  Active Compression Test ([place]'s Sign): negative  Speed's Test: negative    Other   Sensation: normal    Left Shoulder Exam     Inspection/Observation   Swelling: absent  Bruising: absent  Scars: absent  Deformity: absent  Scapular Dyskinesia: negative    Range of Motion   Active abduction: normal   Passive abduction: normal   Extension: normal   Forward Flexion: normal   Forward Elevation: normal  Adduction: normal  External Rotation 0 degrees: normal   Internal rotation 0 degrees: normal     Other   Sensation: normal       Muscle Strength   Right Upper Extremity   Shoulder Abduction: 5/5   Shoulder Internal Rotation: 5/5   Shoulder External Rotation: 5/5   Subscapularis: 5/5/5   Biceps: 5/5/5   Deltoid:  5/5  Triceps:  5/5  Wrist extension: 5/5/5   Wrist flexion: 5/5/5   : 5/5/5   Finger Flexors:  5/5  Finger Extensors:  5/5  Pinch Mechanism: 5/5  Left Upper Extremity  Shoulder Abduction: 5/5   Shoulder Internal Rotation: 5/5   Shoulder External Rotation: 5/5   Subscapularis: 5/5/5   Biceps: 5/5/5   Deltoid:  5/5  Triceps:  5/5  Wrist extension: 4/5/5   Wrist flexion: 5/5/5   :  5/5/5   Finger Flexors:  5/5  Finger Extensors:  5/5  Pinch Mechanism: 5/5    Vascular Exam     Right Pulses      Radial:                    2+      Left Pulses      Radial:                    2+      Capillary Refill  Right Hand: normal capillary refill  Left Hand: normal capillary refill            Xray Images and report were reviewed today.  I agree with the radiologist's interpretation.    X-Ray Cervical Spine Complete 5 view  Narrative: EXAMINATION:  XR CERVICAL SPINE COMPLETE 5 VIEW    CLINICAL HISTORY:  . Cervicalgia    TECHNIQUE:  AP, Lateral, bilateral oblique and open mouth views of the cervical spine were performed.    COMPARISON:  07/05/2013    FINDINGS:  There is some straightening of the normal cervical lordosis with slight retrolisthesis  of C5 on C6. Anterior disc osteophyte complex production and possible slight disc height loss noted at C5-6.  No appreciable change from prior.  Posterior elements appear intact without acute fractures or subluxations demonstrated.  Odontoid process appears intact.  Atlantoaxial articulations appear normal.  Prevertebral soft tissues are within normal limits.  Impression: Degenerative changes as above.  No acute findings.    Electronically signed by: George Bojorquez MD  Date:    07/22/2019  Time:    16:21  X-Ray Wrist Complete Bilateral  Narrative: EXAMINATION:  XR WRIST COMPLETE 3 VIEWS BILATERAL    CLINICAL HISTORY:  Pain in right wrist    TECHNIQUE:  AP, lateral, and oblique views of both wrists were performed.    COMPARISON:  12/11/2018    FINDINGS:  Right:    There is no radiographic evidence of acute osseous, articular, or soft tissue abnormality. There are mild degenerative changes present at the 1st CMC, triscaphe, and radiocarpal joints.  Carpal alignment is within normal limits.  No erosive osseous changes demonstrated.    Left:    Suggestion of possible nonspecific soft tissue edema along the dorsal surface of the wrist.  No acute fractures or dislocations visualized.  There are mild degenerative findings involving the 1st CMC, triscaphe, and radiocarpal joints which appear unchanged from prior.  Carpal alignment is within normal limits.  No erosive osseous changes demonstrated.  Impression: As Above    Electronically signed by: George Bojorquez MD  Date:    07/22/2019  Time:    16:20        Assessment:       Encounter Diagnoses   Name Primary?    Numbness and tingling of left upper extremity Yes    Numbness and tingling of right upper extremity           Plan:       Ora was seen today for pain, pain, pain, pain and pain.    Diagnoses and all orders for this visit:    Numbness and tingling of left upper extremity  -     Nerve conduction test; Future  -     diazePAM (VALIUM) 5 MG tablet; 1 PO 1hr  prior to nerve test.  Repeat 20 min prior to test if needed.  Do not drive    Numbness and tingling of right upper extremity  -     Nerve conduction test; Future  -     diazePAM (VALIUM) 5 MG tablet; 1 PO 1hr prior to nerve test.  Repeat 20 min prior to test if needed.  Do not drive        Ora Henderson is an established pt who comes in today for evaluation of new problems as above. I am concerned that this could be radicular in nature originally being from the cervical spine. I would like her to get an EMG nerve conduction study to further evaluate.  I will plan to see her back in the office after the nerve study has been completed.  She is extremely leery of needles, so I have given her prescription of Valium as above to take 1 hr prior to the test.  She has been instructed not to drive herself after taking the Valium.  Her right shoulder exam is essentially normal today despite having complaints of pain in the right shoulder.  This may also indicate radiculopathy.  She verbalizes understanding and agrees.    No follow-ups on file.    The patient understands, chooses and consents to this plan and accepts all   the risks which include but are not limited to the risks mentioned above.     Disclaimer: This note was prepared using a voice recognition system and is likely to have sound alike errors within the text.

## 2019-07-23 ENCOUNTER — TELEPHONE (OUTPATIENT)
Dept: PHYSICAL MEDICINE AND REHAB | Facility: CLINIC | Age: 59
End: 2019-07-23

## 2019-07-23 ENCOUNTER — PATIENT MESSAGE (OUTPATIENT)
Dept: PHYSICAL MEDICINE AND REHAB | Facility: CLINIC | Age: 59
End: 2019-07-23

## 2019-07-24 ENCOUNTER — PATIENT MESSAGE (OUTPATIENT)
Dept: ORTHOPEDICS | Facility: CLINIC | Age: 59
End: 2019-07-24

## 2019-07-26 ENCOUNTER — OFFICE VISIT (OUTPATIENT)
Dept: PHYSICAL MEDICINE AND REHAB | Facility: CLINIC | Age: 59
End: 2019-07-26
Payer: COMMERCIAL

## 2019-07-26 ENCOUNTER — PATIENT OUTREACH (OUTPATIENT)
Dept: OTHER | Facility: OTHER | Age: 59
End: 2019-07-26

## 2019-07-26 ENCOUNTER — TELEPHONE (OUTPATIENT)
Dept: ORTHOPEDICS | Facility: CLINIC | Age: 59
End: 2019-07-26

## 2019-07-26 VITALS
DIASTOLIC BLOOD PRESSURE: 94 MMHG | SYSTOLIC BLOOD PRESSURE: 144 MMHG | WEIGHT: 202 LBS | HEIGHT: 63 IN | HEART RATE: 88 BPM | BODY MASS INDEX: 35.79 KG/M2

## 2019-07-26 DIAGNOSIS — G56.03 BILATERAL CARPAL TUNNEL SYNDROME: ICD-10-CM

## 2019-07-26 PROCEDURE — 3080F DIAST BP >= 90 MM HG: CPT | Mod: CPTII,S$GLB,, | Performed by: PHYSICAL MEDICINE & REHABILITATION

## 2019-07-26 PROCEDURE — 95911 PR NERVE CONDUCTION STUDY; 9-10 STUDIES: ICD-10-PCS | Mod: S$GLB,,, | Performed by: PHYSICAL MEDICINE & REHABILITATION

## 2019-07-26 PROCEDURE — 99214 OFFICE O/P EST MOD 30 MIN: CPT | Mod: 25,S$GLB,, | Performed by: PHYSICAL MEDICINE & REHABILITATION

## 2019-07-26 PROCEDURE — 95911 NRV CNDJ TEST 9-10 STUDIES: CPT | Mod: S$GLB,,, | Performed by: PHYSICAL MEDICINE & REHABILITATION

## 2019-07-26 PROCEDURE — 3008F BODY MASS INDEX DOCD: CPT | Mod: CPTII,S$GLB,, | Performed by: PHYSICAL MEDICINE & REHABILITATION

## 2019-07-26 PROCEDURE — 3077F SYST BP >= 140 MM HG: CPT | Mod: CPTII,S$GLB,, | Performed by: PHYSICAL MEDICINE & REHABILITATION

## 2019-07-26 PROCEDURE — 3008F PR BODY MASS INDEX (BMI) DOCUMENTED: ICD-10-PCS | Mod: CPTII,S$GLB,, | Performed by: PHYSICAL MEDICINE & REHABILITATION

## 2019-07-26 PROCEDURE — 3080F PR MOST RECENT DIASTOLIC BLOOD PRESSURE >= 90 MM HG: ICD-10-PCS | Mod: CPTII,S$GLB,, | Performed by: PHYSICAL MEDICINE & REHABILITATION

## 2019-07-26 PROCEDURE — 3077F PR MOST RECENT SYSTOLIC BLOOD PRESSURE >= 140 MM HG: ICD-10-PCS | Mod: CPTII,S$GLB,, | Performed by: PHYSICAL MEDICINE & REHABILITATION

## 2019-07-26 PROCEDURE — 99214 PR OFFICE/OUTPT VISIT, EST, LEVL IV, 30-39 MIN: ICD-10-PCS | Mod: 25,S$GLB,, | Performed by: PHYSICAL MEDICINE & REHABILITATION

## 2019-07-26 PROCEDURE — 99999 PR PBB SHADOW E&M-EST. PATIENT-LVL III: CPT | Mod: PBBFAC,,, | Performed by: PHYSICAL MEDICINE & REHABILITATION

## 2019-07-26 PROCEDURE — 99999 PR PBB SHADOW E&M-EST. PATIENT-LVL III: ICD-10-PCS | Mod: PBBFAC,,, | Performed by: PHYSICAL MEDICINE & REHABILITATION

## 2019-07-26 NOTE — TELEPHONE ENCOUNTER
Phoned patient at all numbers to reschedule their appointment for 07/29/2019 due to Leslie Person PA-C being out of clinic. Left a message for patient to call back to reschedule their appointment.

## 2019-07-26 NOTE — PROGRESS NOTES
Last 5 Patient Entered Readings                                      Current 30 Day Average: 145/93     Recent Readings 7/26/2019 7/26/2019 7/19/2019 7/19/2019 7/16/2019    SBP (mmHg) 152 157 137 149 147    DBP (mmHg) 95 99 91 90 92    Pulse 81 82 102 86 107        Digital Medicine: Health  Follow Up    Lifestyle Modifications:  Dietary Modifications (Sodium intake <2,000mg/day, food labels, dining out):   Patient states that her and her  are checking out nutrition labels and eat out about once per week.   Patient mentioned [employer], [employer], and [employer]. I reminded patient that these places served plenty of fried food and that they have their nutrition facts posted on their menus (usually) and also on their website.     Physical Activity:   Patient states that she is still working out 6x per week and only takes off Sundays.     Medication Therapy:   Patient has been compliant with the medication regimen.    Patient has the following medication side effects/concerns: none  (Frequency/Alleviating factors/Precipitating factors, etc.)     Follow up with Mrs. Ora HOFFMAN Earline Henderson completed. No further questions or concerns. Will continue to follow up to achieve health goals.    Notes:  Patient seems to be making some progress. BP is trending down slightly. Encouraged patient to keep reading those nutrition labels and to pick low sodium options when eating out.  Will f/u in 4 weeks.

## 2019-07-26 NOTE — PROGRESS NOTES
[facility]   [address]  [place] [address]  Phone: [phone]        Full Name: sintia anderson YOB: 1960  Patient ID: [id number]      Visit Date: 7/26/2019 09:12  Age: 59 Years 1 Months Old  Examining Physician: Amanda Up M.D.  Referring Physician: damien  Reason for Referral: uex numbness      Chief Complaint   Patient presents with    Numbness     bilateral hand/arm numbness       HPI: This is a 59 y.o.  female being seen in clinic today for evaluation of chronic numbness/tingling and occasional achy pain in her hands.  With increased hand usage her symptoms worsen.  She has a history of CTS with release surgeries in the past.  Wearing a wrist brace does provide some relief.      History obtained from patient    Past family, medical, social, and surgical history reviewed in chart    Review of Systems:     General- denies lethargy, weight change, fever, chills  Head/neck- denies swallowing difficulties  ENT- denies hearing changes  Cardiovascular-denies chest pain  Pulmonary- denies shortness of breath  GI- denies constipation or bowel incontinence  - denies bladder incontinence  Skin- denies wounds or rashes  Musculoskeletal- +/-weakness, +pain  Neurologic- +numbness and tingling  Psychiatric- denies depressive or psychotic features, denies anxiety  Lymphatic-denies swelling  Endocrine- denies hypoglycemic symptoms/DM history  All other pertinent systems negative     Physical Examination:  General: Well developed, well nourished female, NAD  HEENT:NCAT EOMI bilaterally   Pulmonary:Normal respirations    Spinal Examination: CERVICAL  Active ROM is within normal limits.  Inspection: No deformity of spinal alignment.    Spinal Examination: LUMBAR or THORACIC  Active ROM is within normal limits.  Inspection: No deformity of spinal alignment.      Musculoskeletal Tests:  Phalen: neg  Elbow compression (ulnar): neg  Tinels at wrist: +bilaterally    Bilateral Upper  and Lower Extremities:  Pulses are 2+ at radial bilaterally.  Shoulder/Elbow/Wrist/Hand ROM wnl  Hip/Knee/Ankle ROM   Bilateral Extremities show normal capillary refill.  No signs of cyanosis, rubor, edema, skin changes, or dysvascular changes of appendages.  Nails appear intact.    Neurological Exam:  Cranial Nerves:  II-XII grossly intact    Manual Muscle Testing: (Motor 5=normal)  5/5 strength bilateral upper extremities    No focal atrophy is noted of either upper extremity.    Bilateral Reflexes:hypo at bic tric br  Persaud's response is absent bilaterally.  Sensation: tested to light touch  - intact in arms  Gait: Narrow base and good arm swing.      Entire procedure explained to patient prior to proceeding.  Verbal consent obtained          SNC      Nerve / Sites Rec. Site Onset Lat Peak Lat Amp Segments Distance Velocity     ms ms µV  mm m/s   L Median - Digit II (Antidromic)      Wrist Dig II 3.1 4.0 15.7 Wrist - Dig  45   R Median - Digit II (Antidromic)      Wrist Dig II 3.1 4.0 22.5 Wrist - Dig  45   L Ulnar - Digit V (Antidromic)      Wrist Dig V 2.6 3.5 12.2 Wrist - Dig V 140 55   R Ulnar - Digit V (Antidromic)      Wrist Dig V 2.9 3.7 16.4 Wrist - Dig V 140 48   L Radial - Anatomical snuff box (Forearm)      Forearm Wrist 1.7 2.1 12.5 Forearm - Wrist 100 60   R Radial - Anatomical snuff box (Forearm)      Forearm Wrist 2.0 2.3 18.2 Forearm - Wrist 100 51       MNC      Nerve / Sites Muscle Latency Amplitude Duration Rel Amp Segments Distance Lat Diff Velocity     ms mV ms %  mm ms m/s   L Median - APB      Wrist APB 4.0 6.8 6.5 100 Wrist - APB 80        Elbow APB 8.2 6.0 6.5 88.7 Elbow - Wrist 220 4.3 52   R Median - APB      Wrist APB 4.0 9.7 6.7 100 Wrist - APB 80        Elbow APB 8.1 8.0 6.7 82.4 Elbow - Wrist 220 4.1 53   L Ulnar - ADM      Wrist ADM 3.1 9.2 5.8 100 Wrist - ADM 80        B.Elbow ADM 7.4 9.1 6.3 98.7 B.Elbow - Wrist 220 4.3 51      A.Elbow ADM 9.6 7.9 6.8 87.1 A.Elbow -  B.Elbow 110 2.2 50         A.Elbow - Wrist  6.5    R Ulnar - ADM      Wrist ADM 3.3 11.1 5.3 100 Wrist - ADM 80        B.Elbow ADM 6.8 9.1 6.0 82.1 B.Elbow - Wrist 210 3.5 60      A.Elbow ADM 8.4 10.8 5.5 118 A.Elbow - B.Elbow 110 1.7 66         A.Elbow - Wrist  5.2                                        INTERPRETATION  -Bilateral median motor nerve conduction study showed normal latency, amplitude, and conduction velocity  -Bilateral median sensory nerve conduction study showed prolonged peak latency and normal amplitude  -Bilateral ulnar motor nerve conduction study showed normal latency, amplitude, and conduction velocity  -Bilateral ulnar sensory nerve conduction study showed normal peak latency and amplitude  -Bilateral radial sensory nerve conduction study showed normal peak latency and amplitude      IMPRESSION  1. ABNORMAL study  2. There is electrodiagnostic evidence of a MILD demyelinating median neuropathy (Carpal tunnel syndrome) across BILATERAL wrists    PLAN  1. Follow up with referring provider: Leslie Person  2. Handouts on CTS prevention provided.  Cont wrist braces. Consider OT for body mechanics, nerve desensitization  3. This study is good for one year. If symptoms worsen or do not improve, please re-consult.    Amanda Up M.D.  Physical Medicine and Rehab

## 2019-07-26 NOTE — LETTER
July 26, 2019      Leslie Person PA-C  [address] [facility] [address]           [facility]  [address] [place] [address]  Phone: [phone]  Fax: [phone]          Patient: Ora Henderson   MR Number: [id number]   YOB: 1960   Date of Visit: 7/26/2019       Dear Leslie Person:    Thank you for referring Ora Henderson to me for evaluation. Attached you will find relevant portions of my assessment and plan of care.    If you have questions, please do not hesitate to call me. I look forward to following Ora Henderson along with you.    Sincerely,    Amanda Up MD    Enclosure  CC:  No Recipients    If you would like to receive this communication electronically, please contact [email or web address] or [phone] to request more information on [facility] Link access.    For providers and/or their staff who would like to refer a patient to [facility], please contact us through our one-stop-shop provider referral line, [facility], at [phone].    If you feel you have received this communication in error or would no longer like to receive these types of communications, please e-mail [email or web address]

## 2019-07-26 NOTE — PATIENT INSTRUCTIONS
Carpal Tunnel Syndrome    Carpal tunnel syndrome is a painful condition of the wrist and arm. It is caused by pressure on the median nerve.  The median nerve is one of the nerves that give feeling and movement to the hand. It passes through a tunnel in the wrist called the carpal tunnel. This tunnel is made up of bones and ligaments. Narrowing of this tunnel or swelling of the tissues inside the tunnel puts pressure on the median nerve. This causes numbness, pins and needles, or electric shooting pains in your hand and forearm. Often the pain is worse at night and may wake you when you are asleep.  Carpal tunnel syndrome may occur during pregnancy and with use of birth control pills. It is more common in workers who must often bend their wrists. It is also common in people who work with power tools that cause strong vibrations.  Home care  · Rest the painful wrist. Avoid repeated bending of the wrist back and forth. This puts pressure on the median nerve. Avoid using power tools with strong vibrations.  · If you were given a splint, wear it at night while you sleep. You may also wear it during the day for comfort.  · Move your fingers and wrists often to avoid stiffness.  · Elevate your arms on pillows when you lie down.  · Try using the unaffected hand more.  · Try not to hold your wrists in a bent, downward position.  · Sometimes changes in the work place may ease symptoms. If you type most of the day, it may help to change the position of your keyboard or add a wrist support. Your wrist should be in a neutral position and not bent back when typing.  · You may use over-the-counter pain medicine to treat pain and inflammation, unless another medicine was prescribed. Anti-inflammatory pain medicines, such as ibuprofen or naproxen may be more effective than acetaminophen, which treats pain, but not inflammation. If you have chronic liver or kidney disease or ever had a stomach ulcer or GI bleeding, talk with your  doctor before using these medicines.  · Opioid pain medicine will only give temporary relief and does not treat the problem. If pain continues, you may need a shot of a steroid drug into your wrist.  · If the above methods fail, you may need surgery. This will open the carpal tunnel and release the pressure on the trapped nerve.  Follow-up care  Follow up with your healthcare provider, or as advised, if the pain doesnt begin to improve within the next week.  If X-rays were taken, you will be notified of any new findings that may affect your care.  When to seek medical advice  Call your healthcare provider right away if any of these occur:  · Pain not improving with the above treatment  · Fingers or hand become cold, blue, numb, or tingly  · Your whole arm becomes swollen or weak  Date Last Reviewed: 11/23/2015  © 8386-8611 [employer]. [address] [place] [address]. All rights reserved. This information is not intended as a substitute for professional medical care. Always follow your healthcare professional's instructions.        Carpal Tunnel Syndrome Prevention Tips  Some repetitive hand activities put you at higher risk for carpal tunnel syndrome (CTS). But you can reduce your risk. Learn how to change the way you use your hands. Below are tips for at home and on the job. Be sure to also follow the hand and wrist safety policies at your workplace.      Keep your wrist in a neutral (straight) position when exercising.      Keep your wrist in neutral  Keep a neutral (straight) wrist position as often as you can. Dont use your wrist in a bent (flexed) position for long periods of time. This includes extended or twisted positions.  Watch your   Dont just use your thumb and index finger to grasp or lift. This can put stress on your wrist. When you can, use your whole hand and all its fingers to grasp an object.  Minimize repetition  Dont move your arms or hands or hold an object in  the same way for long periods of time. Even simple, light tasks can cause injury this way. Instead, alternate tasks or switch hands.  Rest your hands  Give your hands a break from time to time with a rest. Even a few minutes once an hour can help.  Reduce speed and force  Slow down the speed in which you do a forceful, repetitive motion. This gives your wrist time to recover from the effort. Use power tools to help reduce the force.  Strengthen the muscles  Weak muscles may lead to a poor wrist or arm position. Exercises will make your hand and arm muscles stronger. This can help you keep a better position.  Date Last Reviewed: 9/11/2015 © 2000-2017 [employer]. [address] [place] [address], [place] [address]. All rights reserved. This information is not intended as a substitute for professional medical care. Always follow your healthcare professional's instructions.        Understanding Carpal Tunnel Syndrome    The carpal tunnel is a narrow space inside the wrist. It is ringed by bone and a band of tough tissue called the transverse carpal ligament. A major nerve called the median nerve runs from the forearm into the hand through the carpal tunnel. Tendons also run through the carpal tunnel.  With carpal tunnel syndrome, the tendons or nearby tissues within the carpal tunnel may swell or thicken. Or the transverse carpal ligament may harden and shorten. This narrows the space in the carpal tunnel and puts pressure on the median nerve. This pressure leads to tingling and numbness of the hand and wrist. In time, the condition can make even simple tasks hard to do.  What causes carpal tunnel syndrome?  Doctors arent entirely clear why the condition occurs. Certain things may make a person more likely to have it. These include:  · Being female  · Being pregnant  · Being overweight  · Having diabetes or rheumatoid arthritis  Symptoms of carpal tunnel syndrome  Symptoms often come and go. At first, symptoms  may occur mainly at night. Later, they may be noticed during the day as well. They may get worse with activities such as driving, reading, typing, or holding a phone. Symptoms can include:  · Tingling and numbness in the hand or wrist  · Sharp pain that shoots up the arm or down to the fingers  · Hand stiffness or cramping, especially in the morning  · Trouble making a fist  · Hand weakness and clumsiness  Treatment for carpal tunnel syndrome  Certain treatments help reduce the pressure on the median nerve and relieve symptoms. Choices for treatment may include one or more of the following:  · Wrist splint. This involves wearing a special brace on the wrist and hand. The splint holds the wrist straight, in a neutral position. This helps keep the carpal tunnel as open as possible.  · Cortisone shots. Cortisone is a medicine that helps reduce swelling. It is injected directly into the wrist. It helps shrink tissues inside the carpal tunnel. This relieves symptoms for a time.  · Pain medicines. You may take over-the-counter or prescription medicines to help reduce swelling and relieve symptoms.  · Surgery. If the condition doesnt respond to other treatments and doesnt go away on its own, you may need surgery. During surgery, the surgeon cuts the transverse carpal ligament to relieve pressure on the median nerve.     When to call your healthcare provider  Call your healthcare provider right away if you have any of these:  · Fever of 100.4°F (38°C) or higher, or as directed  · Symptoms that dont get better, or get worse  · New symptoms   Date Last Reviewed: 3/10/2016  © 7753-3615 [employer]. [address] [place] [address], [place] [address]. All rights reserved. This information is not intended as a substitute for professional medical care. Always follow your healthcare professional's instructions.

## 2019-08-01 ENCOUNTER — TELEPHONE (OUTPATIENT)
Dept: INTERNAL MEDICINE | Facility: CLINIC | Age: 59
End: 2019-08-01

## 2019-08-01 DIAGNOSIS — Z12.39 BREAST SCREENING: Primary | ICD-10-CM

## 2019-08-01 NOTE — TELEPHONE ENCOUNTER
----- Message from Gianna Ahmadi MA sent at 7/31/2019 12:11 PM CDT -----  Patient would like MMG order placed so she can be able to schedule. Please Advise.  ----- Message -----  From: Eliz Krishnamurthy  Sent: 7/31/2019  11:18 AM  To: [facility] Staff    .Type:  Mammogram    Caller is requesting to schedule their annual mammogram appointment.  Order is not listed in EPIC.  Please enter order and contact patient to schedule.  Name of Caller:self  Where would they like the mammogram performed[facility]  Would the patient rather a call back or a response via [facility]? call  Best Call Back Number:.[phone] (home)   Additional Information: preferably 8/8 at [facility] after 4

## 2019-08-05 ENCOUNTER — LAB VISIT (OUTPATIENT)
Dept: LAB | Facility: HOSPITAL | Age: 59
End: 2019-08-05
Attending: INTERNAL MEDICINE
Payer: COMMERCIAL

## 2019-08-05 DIAGNOSIS — E78.2 MIXED HYPERLIPIDEMIA: Chronic | ICD-10-CM

## 2019-08-05 PROCEDURE — 36415 COLL VENOUS BLD VENIPUNCTURE: CPT | Mod: PO

## 2019-08-05 PROCEDURE — 80061 LIPID PANEL: CPT

## 2019-08-05 PROCEDURE — 80053 COMPREHEN METABOLIC PANEL: CPT

## 2019-08-06 ENCOUNTER — PATIENT OUTREACH (OUTPATIENT)
Dept: OTHER | Facility: OTHER | Age: 59
End: 2019-08-06

## 2019-08-06 ENCOUNTER — PATIENT MESSAGE (OUTPATIENT)
Dept: ADMINISTRATIVE | Facility: OTHER | Age: 59
End: 2019-08-06

## 2019-08-06 ENCOUNTER — PATIENT MESSAGE (OUTPATIENT)
Dept: CARDIOLOGY | Facility: CLINIC | Age: 59
End: 2019-08-06

## 2019-08-06 ENCOUNTER — PATIENT MESSAGE (OUTPATIENT)
Dept: INTERNAL MEDICINE | Facility: CLINIC | Age: 59
End: 2019-08-06

## 2019-08-06 LAB
ALBUMIN SERPL BCP-MCNC: 4 G/DL (ref 3.5–5.2)
ALP SERPL-CCNC: 59 U/L (ref 55–135)
ALT SERPL W/O P-5'-P-CCNC: 18 U/L (ref 10–44)
ANION GAP SERPL CALC-SCNC: 10 MMOL/L (ref 8–16)
AST SERPL-CCNC: 17 U/L (ref 10–40)
BILIRUB SERPL-MCNC: 0.7 MG/DL (ref 0.1–1)
BUN SERPL-MCNC: 15 MG/DL (ref 6–20)
CALCIUM SERPL-MCNC: 9.9 MG/DL (ref 8.7–10.5)
CHLORIDE SERPL-SCNC: 102 MMOL/L (ref 95–110)
CHOLEST SERPL-MCNC: 154 MG/DL (ref 120–199)
CHOLEST/HDLC SERPL: 3.1 {RATIO} (ref 2–5)
CO2 SERPL-SCNC: 30 MMOL/L (ref 23–29)
CREAT SERPL-MCNC: 0.9 MG/DL (ref 0.5–1.4)
EST. GFR  (AFRICAN AMERICAN): >60 ML/MIN/1.73 M^2
EST. GFR  (NON AFRICAN AMERICAN): >60 ML/MIN/1.73 M^2
GLUCOSE SERPL-MCNC: 97 MG/DL (ref 70–110)
HDLC SERPL-MCNC: 49 MG/DL (ref 40–75)
HDLC SERPL: 31.8 % (ref 20–50)
LDLC SERPL CALC-MCNC: 82.2 MG/DL (ref 63–159)
NONHDLC SERPL-MCNC: 105 MG/DL
POTASSIUM SERPL-SCNC: 3.8 MMOL/L (ref 3.5–5.1)
PROT SERPL-MCNC: 7.2 G/DL (ref 6–8.4)
SODIUM SERPL-SCNC: 142 MMOL/L (ref 136–145)
TRIGL SERPL-MCNC: 114 MG/DL (ref 30–150)

## 2019-08-06 NOTE — PROGRESS NOTES
HPI:  Called Mrs. Ora Nguyenspoon [facility] to introduce myself as her clinician for the hypertension digital medicine program. Patient reports compliance to medication regimen with no complaints. She takes blood pressure medication in the morning once she arrives to work. It is important to note that she does not take medications at night because they cause her to have hallucinations and bad dreams.    Patient has rotator cuff injury that has her in constant pain which she believes is also impacting her blood pressure. She is under care with a provider for this issue. She is very active where she works out 6 days a week (morning) excluding Sundays.    Reported blood pressure readings are mostly in the morning without medications on board. She will measure reading this evening when she returns home.    Last 5 Patient Entered Readings                                      Current 30 Day Average: 146/93     Recent Readings 8/5/2019 8/5/2019 8/4/2019 8/4/2019 8/4/2019    SBP (mmHg) 143 146 157 154 150    DBP (mmHg) 92 98 88 111 95    Pulse 93 95 72 80 85        Patient denies s/s of hypotension (lightheadedness, dizziness, nausea, fatigue) associated with low readings. Instructed patient to inform me if this occurs, patient confirms understanding.    Patient denies s/s of hypertension (SOB, CP, severe headaches, changes in vision) associated with high readings. Instructed patient to go to the ED if BP >180/110 and accompanied by hypertensive s/s, patient confirms understanding.    Assessment:  Patient's current 30-day average is uncontrolled, not at goal of <130/80 mmHg. However, most of her reported readings are first thing in the before BEFORE she has taken blood pressure medications.  Reviewed labs from 8/5/19: electrolytes are good  Discussed HCTZ diuretic: counseled on hydration and substitution of water with Pedialyte, Gatorade or Powerade during workouts to prevent electrolytes from  lowering    Plan:  Continue current regimen.  Patient to start measuring blood pressure 1 hour or more after medication has been taken. Will measure at least once daily.  Patients health , Ashvin Burt, will follow-up as scheduled.    I will continue to monitor regularly and will follow-up in 2 weeks, sooner if blood pressure begins to trend upward or downward.     Current medication regimen:  Hypertension Medications             losartan-hydrochlorothiazide 100-25 mg (HYZAAR) 100-25 mg per tablet Take 1 tablet by mouth once daily.        Patient has my contact information and knows to call with any concerns or clinical changes.

## 2019-08-08 ENCOUNTER — HOSPITAL ENCOUNTER (OUTPATIENT)
Dept: RADIOLOGY | Facility: HOSPITAL | Age: 59
Discharge: HOME | End: 2019-08-08
Attending: FAMILY MEDICINE
Payer: COMMERCIAL

## 2019-08-08 ENCOUNTER — OFFICE VISIT (OUTPATIENT)
Dept: ORTHOPEDICS | Facility: CLINIC | Age: 59
End: 2019-08-08
Payer: COMMERCIAL

## 2019-08-08 VITALS
WEIGHT: 202 LBS | WEIGHT: 202 LBS | HEART RATE: 85 BPM | HEIGHT: 63 IN | BODY MASS INDEX: 35.79 KG/M2 | BODY MASS INDEX: 35.79 KG/M2 | DIASTOLIC BLOOD PRESSURE: 83 MMHG | SYSTOLIC BLOOD PRESSURE: 137 MMHG | HEIGHT: 63 IN

## 2019-08-08 DIAGNOSIS — Z12.39 BREAST SCREENING: ICD-10-CM

## 2019-08-08 DIAGNOSIS — M54.2 NECK PAIN: ICD-10-CM

## 2019-08-08 DIAGNOSIS — G56.03 CARPAL TUNNEL SYNDROME, BILATERAL: ICD-10-CM

## 2019-08-08 DIAGNOSIS — M79.18 MYOFASCIAL PAIN: Primary | ICD-10-CM

## 2019-08-08 PROCEDURE — 77067 SCR MAMMO BI INCL CAD: CPT | Mod: 26,,, | Performed by: RADIOLOGY

## 2019-08-08 PROCEDURE — 99214 PR OFFICE/OUTPT VISIT, EST, LEVL IV, 30-39 MIN: ICD-10-PCS | Mod: S$GLB,,, | Performed by: PHYSICIAN ASSISTANT

## 2019-08-08 PROCEDURE — 77067 MAMMO DIGITAL SCREENING BILAT WITH TOMOSYNTHESIS_CAD: ICD-10-PCS | Mod: 26,,, | Performed by: RADIOLOGY

## 2019-08-08 PROCEDURE — 77063 MAMMO DIGITAL SCREENING BILAT WITH TOMOSYNTHESIS_CAD: ICD-10-PCS | Mod: 26,,, | Performed by: RADIOLOGY

## 2019-08-08 PROCEDURE — 77067 SCR MAMMO BI INCL CAD: CPT | Mod: TC

## 2019-08-08 PROCEDURE — 3079F PR MOST RECENT DIASTOLIC BLOOD PRESSURE 80-89 MM HG: ICD-10-PCS | Mod: CPTII,S$GLB,, | Performed by: PHYSICIAN ASSISTANT

## 2019-08-08 PROCEDURE — 99999 PR PBB SHADOW E&M-EST. PATIENT-LVL IV: ICD-10-PCS | Mod: PBBFAC,,, | Performed by: PHYSICIAN ASSISTANT

## 2019-08-08 PROCEDURE — 99999 PR PBB SHADOW E&M-EST. PATIENT-LVL IV: CPT | Mod: PBBFAC,,, | Performed by: PHYSICIAN ASSISTANT

## 2019-08-08 PROCEDURE — 3008F BODY MASS INDEX DOCD: CPT | Mod: CPTII,S$GLB,, | Performed by: PHYSICIAN ASSISTANT

## 2019-08-08 PROCEDURE — 3008F PR BODY MASS INDEX (BMI) DOCUMENTED: ICD-10-PCS | Mod: CPTII,S$GLB,, | Performed by: PHYSICIAN ASSISTANT

## 2019-08-08 PROCEDURE — 3075F PR MOST RECENT SYSTOLIC BLOOD PRESS GE 130-139MM HG: ICD-10-PCS | Mod: CPTII,S$GLB,, | Performed by: PHYSICIAN ASSISTANT

## 2019-08-08 PROCEDURE — 99214 OFFICE O/P EST MOD 30 MIN: CPT | Mod: S$GLB,,, | Performed by: PHYSICIAN ASSISTANT

## 2019-08-08 PROCEDURE — 77063 BREAST TOMOSYNTHESIS BI: CPT | Mod: 26,,, | Performed by: RADIOLOGY

## 2019-08-08 PROCEDURE — 3075F SYST BP GE 130 - 139MM HG: CPT | Mod: CPTII,S$GLB,, | Performed by: PHYSICIAN ASSISTANT

## 2019-08-08 PROCEDURE — 3079F DIAST BP 80-89 MM HG: CPT | Mod: CPTII,S$GLB,, | Performed by: PHYSICIAN ASSISTANT

## 2019-08-08 RX ORDER — METHYLPREDNISOLONE 4 MG/1
TABLET ORAL
Qty: 1 PACKAGE | Refills: 0 | Status: SHIPPED | OUTPATIENT
Start: 2019-08-08 | End: 2019-08-19

## 2019-08-08 RX ORDER — METHOCARBAMOL 500 MG/1
500 TABLET, FILM COATED ORAL 3 TIMES DAILY PRN
Qty: 20 TABLET | Refills: 1 | Status: SHIPPED | OUTPATIENT
Start: 2019-08-08 | End: 2019-12-23

## 2019-08-08 NOTE — Clinical Note
I'm gonna have this lady see you for myofascial pain.  I gave her some exercises to do.  She said she has already done needling but can't take off work right now for more PT.  I also gave her some medrol and robaxin.  I really think its more myofasial than shoulder.  Kind of surprised its not a radic.  The CTS didn't seem to be bothering her too much, but I can always inject later on if needed.

## 2019-08-08 NOTE — PROGRESS NOTES
Patient ID: Ora Henderson is a 59 y.o. female.    Chief Complaint: Pain of the Left Shoulder      HPI: Ora Henderson  is a 59 y.o. female who c/o Pain of the Left Shoulder   for duration of months.  I sent her for a nerve conduction study of the bilateral upper extremities to evaluate for cervical radiculopathy.  She comes in today for those results.  Pain is moderate in severity.  She complains of radiating numbness shooting from the neck all the way down into the hand.  Her biggest complaint today is a myofascial region on the right side as well as the radiating numbness from the neck and shoulder down.  The hand does not seem to be bothering her too much.  Quality aching, throbbing, sharp, shooting.  Alleviating factors include nothing.  She has failed dry needling with physical therapy.  Aggravating factors morning time, nighttime, and overuse.    Past Medical History:   Diagnosis Date    Arthritis     GERD (gastroesophageal reflux disease)     Hepatomegaly     and fatty liver    Hernia, umbilical     reducible    Hyperlipidemia     Hypertension     Sleep apnea      Past Surgical History:   Procedure Laterality Date    BRAIN SURGERY      tumor removal     CARPAL TUNNEL RELEASE Bilateral     CERVICAL BIOPSY  W/ LOOP ELECTRODE EXCISION      [facility] '81     SECTION      x 1    COLONOSCOPY N/A 2016    Performed by [facility]O    CYST REMOVAL Left 2018    EGD (ESOPHAGOGASTRODUODENOSCOPY) N/A 2013    [facility] Holly [facility]ardo [facility] at [facility] ENDO    HERNIA REPAIR      2016    HYSTERECTOMY  2006    fibroids and endometriosis    TOTAL ABDOMINAL HYSTERECTOMY W/ BILATERAL SALPINGOOPHORECTOMY      STAR/BSO secondary to endometriosis    VENTRAL HERNIA REPAIR      XI ROBOTIC ASSISTED VENTRAL HERNIA REPAIR N/A 2016    [facility]formed by [facility]osta [facility] at [facility] OR     Family History   Problem Relation Age of Onset    Heart  disease Father     Stroke Father     Hypertension Father     Hypertension Mother     Cancer Maternal Aunt         pancreas    Cancer Maternal Uncle         pancreas    Heart disease Paternal Uncle     Heart disease Paternal Grandmother     Diabetes Maternal Aunt      Social History     Socioeconomic History    Marital status:      Spouse name: Not on file    Number of children: 1    Years of education: Not on file    Highest education level: Not on file   Occupational History    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Employer: [employer]   Social Needs    Financial resource strain: Not on file    Food insecurity:     Worry: Not on file     Inability: Not on file    Transportation needs:     Medical: Not on file     Non-medical: Not on file   Tobacco Use    Smoking status: Never Smoker    Smokeless tobacco: Never Used   Substance and Sexual Activity    Alcohol use: Yes     Alcohol/week: 0.0 oz     Comment: socially    Drug use: No    Sexual activity: Yes     Partners: Male     Birth control/protection: None, Surgical   Lifestyle    Physical activity:     Days per week: Not on file     Minutes per session: Not on file    Stress: Not on file   Relationships    Social connections:     Talks on phone: Not on file     Gets together: Not on file     Attends Voodoo service: Not on file     Active member of club or organization: Not on file     Attends meetings of clubs or organizations: Not on file     Relationship status: Not on file   Other Topics Concern    Not on file   Social History Narrative    Not on file     Medication List with Changes/Refills   New Medications    METHOCARBAMOL (ROBAXIN) 500 MG TAB    Take 1 tablet (500 mg total) by mouth 3 (three) times daily as needed (muscle spasms).    METHYLPREDNISOLONE (MEDROL DOSEPACK) 4 MG TABLET    use as directed   Current Medications    ALBUTEROL 90 MCG/ACTUATION INHALER    Inhale 1-2 puffs into the lungs every 4 (four) hours as  needed for Wheezing or Shortness of Breath (cough).    ASPIRIN 81 MG CHEW    Take 81 mg by mouth as needed.    DIAZEPAM (VALIUM) 5 MG TABLET    1 PO 1hr prior to nerve test.  Repeat 20 min prior to test if needed.  Do not drive    ESOMEPRAZOLE (NEXIUM) 40 MG CAPSULE    Take 1 capsule (40 mg total) by mouth before breakfast.    L.ACIDOPHILUS-BIF. ANIMALIS (PROBIOTIC) 5 BILLION CELL CPSP    Take 1 tablet by mouth once daily.    LOSARTAN-HYDROCHLOROTHIAZIDE 100-25 MG (HYZAAR) 100-25 MG PER TABLET    Take 1 tablet by mouth once daily.    MULTIVIT,CALC,MINS/IRON/FOLIC (DAILY MULTIPLE FOR WOMEN ORAL)    Take 1 tablet by mouth once daily.    NAPROXEN SODIUM (ALEVE) 220 MG TABLET    Take 220 mg by mouth as needed.    RANITIDINE (ZANTAC) 150 MG TABLET    Take 1 tablet (150 mg total) by mouth 2 (two) times daily.    ROSUVASTATIN (CRESTOR) 40 MG TAB    Take 1 tablet (40 mg total) by mouth every evening.    SENNA-DOCUSATE 8.6-50 MG (PERICOLACE) 8.6-50 MG PER TABLET    Take 1 tablet by mouth as needed for Constipation.     Review of patient's allergies indicates:   Allergen Reactions    Sulfa (sulfonamide antibiotics) Hives           Objective:        General    Nursing note and vitals reviewed.  Constitutional: She is oriented to person, place, and time. She appears well-developed and well-nourished.   HENT:   Head: Normocephalic and atraumatic.   Eyes: EOM are normal.   Cardiovascular: Normal rate and regular rhythm.    Pulmonary/Chest: Effort normal.   Abdominal: Soft.   Neurological: She is alert and oriented to person, place, and time.   Psychiatric: She has a normal mood and affect. Her behavior is normal.         Back (L-Spine & T-Spine) / Neck (C-Spine) Exam     Spinal Sensation   Right Side Sensation  C-Spine Level: normal   Left Side Sensation  C-Spine Level: normal      Right Hand/Wrist Exam     Inspection   Scars: Wrist - absent Hand -  absent  Effusion: Wrist - absent Hand -  absent  Bruising: Wrist - absent Hand -   absent  Deformity: Wrist - deformity Hand -  deformity    Range of Motion     Wrist   Extension: normal   Flexion: normal   Pronation: normal   Supination: normal     Tests   Phalens Sign: negative  Tinel's sign (median nerve): negative  Finkelstein's test: negative  Carpal Tunnel Compression Test: negative  Cubital Tunnel Compression Test: negative    Atrophy   Thenar:  negative  Hypothenar:  negative  Intrinsic:  negative  1st Dorsal Interosseous: negative    Other     Neuorologic Exam    Median Distribution: normal  Ulnar Distribution: normal  Radial Distribution: normal    Comments:  2+ radial pulse      Left Hand/Wrist Exam     Inspection   Scars: Wrist - present (healed - No SOI) Hand -  absent  Effusion: Wrist - absent Hand -  absent  Bruising: Wrist - absent Hand -  absent  Deformity: Wrist - absent Hand -  absent    Range of Motion     Wrist   Extension: normal   Flexion: normal   Pronation: normal   Supination: normal     Tests   Phalens Sign: negative  Tinel's sign (median nerve): negative  Finkelstein's test: negative  Carpal Tunnel Compression Test: negative  Cubital Tunnel Compression Test: negative    Atrophy  Thenar:  Negative  Hypothenar:  negative  Intrinsic: negative  1st Dorsal Interosseous:  negative    Other     Sensory Exam  Median Distribution: normal  Ulnar Distribution: normal  Radial Distribution: normal    Comments:  2+ radial pulse      Right Elbow Exam     Tests   Tinel's sign (cubital tunnel): negative      Left Elbow Exam     Tests   Tinel's sign (cubital tunnel): negative    Right Shoulder Exam     Inspection/Observation   Swelling: absent  Bruising: absent  Scars: absent  Deformity: absent  Scapular Dyskinesia: negative    Tenderness   The patient is experiencing no tenderness.    Range of Motion   Active abduction: normal   Passive abduction: normal   Extension: normal   Forward Flexion: normal   Forward Elevation: normal  Adduction: normal  External Rotation 0 degrees: normal    Internal rotation 0 degrees: normal     Tests & Signs   Cross arm: negative  Drop arm: negative  Foreman test: negative  Impingement: negative  Active Compression Test (Karnes's Sign): negative  Speed's Test: negative    Other   Sensation: normal    Left Shoulder Exam     Inspection/Observation   Swelling: absent  Bruising: absent  Scars: absent  Deformity: absent  Scapular Dyskinesia: negative    Range of Motion   Active abduction: normal   Passive abduction: normal   Extension: normal   Forward Flexion: normal   Forward Elevation: normal  Adduction: normal  External Rotation 0 degrees: normal   Internal rotation 0 degrees: normal     Other   Sensation: normal       Muscle Strength   Right Upper Extremity   Shoulder Abduction: 5/5   Shoulder Internal Rotation: 5/5   Shoulder External Rotation: 5/5   Subscapularis: 5/5/5   Biceps: 5/5/5   Deltoid:  5/5  Triceps:  5/5  Wrist extension: 5/5/5   Wrist flexion: 5/5/5   : 5/5/5   Finger Flexors:  5/5  Finger Extensors:  5/5  Pinch Mechanism: 5/5  Left Upper Extremity  Shoulder Abduction: 5/5   Shoulder Internal Rotation: 5/5   Shoulder External Rotation: 5/5   Subscapularis: 5/5/5   Biceps: 5/5/5   Deltoid:  5/5  Triceps:  5/5  Wrist extension: 4/5/5   Wrist flexion: 5/5/5   :  5/5/5   Finger Flexors:  5/5  Finger Extensors:  5/5  Pinch Mechanism: 5/5    Vascular Exam     Right Pulses      Radial:                    2+      Left Pulses      Radial:                    2+      Capillary Refill  Right Hand: normal capillary refill  Left Hand: normal capillary refill          EMG/NCS from 7/26/19 with Dr. Anne  IMPRESSION  1. ABNORMAL study  2. There is electrodiagnostic evidence of a MILD demyelinating median neuropathy (Carpal tunnel syndrome) across BILATERAL wrists      Assessment:       Encounter Diagnoses   Name Primary?    Myofascial pain Yes    Neck pain     Carpal tunnel syndrome, bilateral           Plan:       Ora was seen today for  pain.    Diagnoses and all orders for this visit:    Myofascial pain  -     methylPREDNISolone (MEDROL DOSEPACK) 4 mg tablet; use as directed  -     methocarbamol (ROBAXIN) 500 MG Tab; Take 1 tablet (500 mg total) by mouth 3 (three) times daily as needed (muscle spasms).  -     Ambulatory referral to Physiatry    Neck pain  -     methylPREDNISolone (MEDROL DOSEPACK) 4 mg tablet; use as directed  -     methocarbamol (ROBAXIN) 500 MG Tab; Take 1 tablet (500 mg total) by mouth 3 (three) times daily as needed (muscle spasms).  -     Ambulatory referral to Physiatry    Carpal tunnel syndrome, bilateral  -     methylPREDNISolone (MEDROL DOSEPACK) 4 mg tablet; use as directed  -     methocarbamol (ROBAXIN) 500 MG Tab; Take 1 tablet (500 mg total) by mouth 3 (three) times daily as needed (muscle spasms).        Ora HOFFMAN Earline Henderson is an established pt here for follow-up on nerve conduction study. She is still complaining of radiating numbness and tingling from the neck down to the hand.  We briefly discussed a carpal tunnel injection on the right side.  However, I do not think that would help the symptoms further up the arm.  On her exam, pain seems to be more related to myofascial pain than truly to the shoulder region. I would like her to see Dr. Ott to discuss possible trigger point injections. I have offered formal therapy.  She has done that in the past with dry needling in minimal relief.  She states she really cannot take off more time from work.  I have given her some stretches to work on on her own instead.  Additionally, I have given her a Medrol Dosepak as well as a prescription for Robaxin as above. I will be happy to see her back in the office in 6 weeks to re-evaluate the carpal tunnels and shoulders if symptoms persist.  She verbalizes understanding and agrees.    Follow up for consult with Dr. Up for myofascial pain .          The patient understands, chooses and consents to this plan  and accepts all   the risks which include but are not limited to the risks mentioned above.     Disclaimer: This note was prepared using a voice recognition system and is likely to have sound alike errors within the text.

## 2019-08-10 DIAGNOSIS — K21.9 GASTROESOPHAGEAL REFLUX DISEASE, ESOPHAGITIS PRESENCE NOT SPECIFIED: ICD-10-CM

## 2019-08-12 ENCOUNTER — PATIENT MESSAGE (OUTPATIENT)
Dept: INTERNAL MEDICINE | Facility: CLINIC | Age: 59
End: 2019-08-12

## 2019-08-12 DIAGNOSIS — K21.9 GASTROESOPHAGEAL REFLUX DISEASE, ESOPHAGITIS PRESENCE NOT SPECIFIED: ICD-10-CM

## 2019-08-12 RX ORDER — ESOMEPRAZOLE MAGNESIUM 40 MG/1
40 CAPSULE, DELAYED RELEASE ORAL
Qty: 90 CAPSULE | Refills: 3 | Status: SHIPPED | OUTPATIENT
Start: 2019-08-12 | End: 2023-01-20

## 2019-08-12 RX ORDER — ESOMEPRAZOLE MAGNESIUM 40 MG/1
CAPSULE, DELAYED RELEASE ORAL
Qty: 90 CAPSULE | Refills: 0 | Status: SHIPPED | OUTPATIENT
Start: 2019-08-12 | End: 2019-08-12 | Stop reason: SDUPTHER

## 2019-08-12 NOTE — TELEPHONE ENCOUNTER
See mychart request, refill request sent to Dr. Montes for authorization [Nexium].    LV 07/02/19  NV 09/03/19

## 2019-08-19 ENCOUNTER — OFFICE VISIT (OUTPATIENT)
Dept: PHYSICAL MEDICINE AND REHAB | Facility: CLINIC | Age: 59
End: 2019-08-19
Payer: COMMERCIAL

## 2019-08-19 VITALS
WEIGHT: 202 LBS | HEART RATE: 87 BPM | DIASTOLIC BLOOD PRESSURE: 85 MMHG | BODY MASS INDEX: 35.79 KG/M2 | RESPIRATION RATE: 16 BRPM | SYSTOLIC BLOOD PRESSURE: 135 MMHG | HEIGHT: 63 IN

## 2019-08-19 DIAGNOSIS — M53.82 MUSCULOSKELETAL DISORDER OF THE SUBOCCIPITAL: ICD-10-CM

## 2019-08-19 DIAGNOSIS — M75.81 TENDINITIS OF RIGHT ROTATOR CUFF: ICD-10-CM

## 2019-08-19 PROCEDURE — 3075F SYST BP GE 130 - 139MM HG: CPT | Mod: CPTII,S$GLB,, | Performed by: PHYSICAL MEDICINE & REHABILITATION

## 2019-08-19 PROCEDURE — 99999 PR PBB SHADOW E&M-EST. PATIENT-LVL III: CPT | Mod: PBBFAC,,, | Performed by: PHYSICAL MEDICINE & REHABILITATION

## 2019-08-19 PROCEDURE — 99214 PR OFFICE/OUTPT VISIT, EST, LEVL IV, 30-39 MIN: ICD-10-PCS | Mod: 25,S$GLB,, | Performed by: PHYSICAL MEDICINE & REHABILITATION

## 2019-08-19 PROCEDURE — 20553 NJX 1/MLT TRIGGER POINTS 3/>: CPT | Mod: S$GLB,,, | Performed by: PHYSICAL MEDICINE & REHABILITATION

## 2019-08-19 PROCEDURE — 3079F DIAST BP 80-89 MM HG: CPT | Mod: CPTII,S$GLB,, | Performed by: PHYSICAL MEDICINE & REHABILITATION

## 2019-08-19 PROCEDURE — 3075F PR MOST RECENT SYSTOLIC BLOOD PRESS GE 130-139MM HG: ICD-10-PCS | Mod: CPTII,S$GLB,, | Performed by: PHYSICAL MEDICINE & REHABILITATION

## 2019-08-19 PROCEDURE — 99214 OFFICE O/P EST MOD 30 MIN: CPT | Mod: 25,S$GLB,, | Performed by: PHYSICAL MEDICINE & REHABILITATION

## 2019-08-19 PROCEDURE — 3079F PR MOST RECENT DIASTOLIC BLOOD PRESSURE 80-89 MM HG: ICD-10-PCS | Mod: CPTII,S$GLB,, | Performed by: PHYSICAL MEDICINE & REHABILITATION

## 2019-08-19 PROCEDURE — 20553 PR INJECT TRIGGER POINTS, > 3: ICD-10-PCS | Mod: S$GLB,,, | Performed by: PHYSICAL MEDICINE & REHABILITATION

## 2019-08-19 PROCEDURE — 99999 PR PBB SHADOW E&M-EST. PATIENT-LVL III: ICD-10-PCS | Mod: PBBFAC,,, | Performed by: PHYSICAL MEDICINE & REHABILITATION

## 2019-08-19 PROCEDURE — 3008F PR BODY MASS INDEX (BMI) DOCUMENTED: ICD-10-PCS | Mod: CPTII,S$GLB,, | Performed by: PHYSICAL MEDICINE & REHABILITATION

## 2019-08-19 PROCEDURE — 3008F BODY MASS INDEX DOCD: CPT | Mod: CPTII,S$GLB,, | Performed by: PHYSICAL MEDICINE & REHABILITATION

## 2019-08-19 RX ORDER — METHYLPREDNISOLONE ACETATE 40 MG/ML
40 INJECTION, SUSPENSION INTRA-ARTICULAR; INTRALESIONAL; INTRAMUSCULAR; SOFT TISSUE
Status: COMPLETED | OUTPATIENT
Start: 2019-08-19 | End: 2019-08-19

## 2019-08-19 RX ADMIN — METHYLPREDNISOLONE ACETATE 40 MG: 40 INJECTION, SUSPENSION INTRA-ARTICULAR; INTRALESIONAL; INTRAMUSCULAR; SOFT TISSUE at 01:08

## 2019-08-19 NOTE — PROGRESS NOTES
[facility] CLINIC NOTE    Chief Complaint   Patient presents with    Shoulder Pain     right, to upper arm       HPI: This is a 59 y.o.  female being seen in clinic today for re-evaluation of chronic muscle tightness and spasms.  She has a know rotator cuff tear and has focused on conservative measures for relief/strengthening.    History obtained from patient    Past family, medical, social, and surgical history reviewed in chart    Review of Systems:     General- denies lethargy, weight change, fever, chills  Head/neck- denies swallowing difficulties  ENT- denies hearing changes  Cardiovascular-denies chest pain  Pulmonary- denies shortness of breath  GI- denies constipation or bowel incontinence  - denies bladder incontinence  Skin- denies wounds or rashes  Musculoskeletal- +/-weakness, +pain  Neurologic- +numbness and tingling  Psychiatric- denies depressive or psychotic features, denies anxiety  Lymphatic-denies swelling  Endocrine- denies hypoglycemic symptoms/DM history  All other pertinent systems negative     Physical Examination:  General: Well developed, well nourished female, NAD  HEENT:NCAT EOMI bilaterally   Pulmonary:Normal respirations    Spinal Examination: CERVICAL  Active ROM is within normal limits.  Inspection: No deformity of spinal alignment.  Very tight and tender bands at right trapezius, teres minor, deltoid, and triceps , local twitch at right trapezius and deltoid    Spinal Examination: LUMBAR or THORACIC  Active ROM is within normal limits.  Inspection: No deformity of spinal alignment.      Musculoskeletal Tests:  Phalen: neg  Elbow compression (ulnar): neg  Tinels at wrist: +bilaterally    Bilateral Upper and Lower Extremities:  Pulses are 2+ at radial bilaterally.  Shoulder/Elbow/Wrist/Hand ROM wnl  Hip/Knee/Ankle ROM   Bilateral Extremities show normal capillary refill.  No signs of cyanosis, rubor, edema, skin changes, or dysvascular changes of appendages.  Nails appear  intact.    Neurological Exam:  Cranial Nerves:  II-XII grossly intact    Manual Muscle Testing: (Motor 5=normal)  5/5 strength bilateral upper extremities    No focal atrophy is noted of either upper extremity.    Bilateral Reflexes:hypo at bic tric br  Persaud's response is absent bilaterally.  Sensation: tested to light touch  - intact in arms  Gait: Narrow base and good arm swing.    IMPRESSION/PLAN: This is a 57 y.o.  female with right rotator cuff tendonopathy, myofascial pain     1. Cont exercises  2. Cont aleve and muscle relaxant prn  3. Ice/heat compress 20 min alternation  4. Trigger pt injections     Amanda Up M.D.  Physical Medicine and Rehab    PROCEDURE NOTE    Diagnosis: Myofascial pain  Procedure: trigger point injections to right trapezius, teres minor, deltoid, triceps    Risks and benefits of procedure explained to patient including risks of infection, bleeding, pain, or damage to surrounding tissues. All questions answered. Informed consent obtained prior to proceeding. Areas marked and prepped in sterile fashion. Using a 27g 1.25inch needle, a 6cc mixture of depo medrol 1cc (40mg) and 1% lidocaine was injected evenly into the above mentioned muscles. None to minimal bleeding noted. ER and post injection instructions given.    Amanda Up M.D.

## 2019-08-19 NOTE — PATIENT INSTRUCTIONS
Myofascial Pain Syndrome: Fibrositis  Your pain is caused by a state of chronic muscle tension. This condition is called by various names: myofascial pain, fibrositis and trigger point pain. This can also be due to mechanical stress (such as working at a computer terminal for long periods; or work that requires repetitive motions of the arms or hands) or emotional stress (such as problems on the job or in your personal life). Sometimes there is no obvious cause. The pain can occur in the area of the muscle spasm or at a site distant to it. For example, spasm of a neck muscle can cause headache. Spasm of the muscle near the shoulder blade can cause pain shooting down the arm.  Home Care:  · Try to identify the factors that may be causing your problem and change them:  ¨ If you feel that emotional stress is a cause of your pain, learn methods to deal more effectively with the stress in your life. These may include regular exercise, muscle relaxation techniques, meditation or simply taking time out for yourself. Consult your doctor or go to a local bookstore and review the many books and tapes available on the subject of stress reduction.  ¨ If you feel that physical stress is a cause for your pain, try to modify any poor work habits.  · You may use acetaminophen (Tylenol) or ibuprofen (Motrin, Advil) to control pain, unless another medicine was prescribed. [NOTE: If you have chronic liver or kidney disease or ever had a stomach ulcer or GI bleeding, talk with your doctor before using these medicines.]  · The use of heat to the muscle (hot compress or heating pad) will be helpful to reduce muscle spasm. Some persons get relief with ice packs. Apply an ice pack (crushed or cubed ice in a plastic bag, wrapped in a towel) for 20 minutes at a time as needed. Use the method that feels best to you.  · Massaging the trigger point and stretching out the muscle are an important parts of prevention and treatment. Trigger point  massage can be done by first applying heat to the area to warm and prepare the muscle. Have someone apply steady thumb pressure directly on the knot in the muscle (the most tender point) for 30 seconds. Release the pressure, then massage the surrounding muscle. Repeat the process, applying more pressure to the trigger point each time. Do this up to the limit of pain. With each treatment, the trigger point should become less tender and the pain should decrease. You can apply local pressure to trigger points in the back by lying on the floor with a tennis ball under the trigger point.  Follow Up  with your doctor as advised or if not improving within the next week. It may be necessary for you to receive physical therapy if you do not respond to home treatment alone.  Get Prompt Medical Attention  if any of the following occur:  · If your trigger point is in the chest muscles, observe for pain that becomes more severe, lasts longer, or spreads into your shoulder/arm, neck or back; you develop trouble breathing, sweating, nausea or vomiting in association with chest pain  · If you develop weakness or numbness in an extremity  · If your pain worsens, regardless of its location  © 0158-5354 [employer]. [address] [place] [address]. All rights reserved. This information is not intended as a substitute for professional medical care. Always follow your healthcare professional's instructions.          Trigger Point Injection  The cause of your muscle pain or spasms may be one or more trigger points. Your health care provider may decide to inject the painful spots to relax the muscle. This can help relieve your pain. Relaxing the muscle can also make movement easier. You may then be able to exercise to strengthen the muscle and help it heal.    What is a trigger point?  A trigger point is a tight, painful knot of muscle fiber. It can form where a muscle is strained or injured. The knot can  sometimes be felt under the skin. A trigger point is very tender to the touch. Pain may also spread to other parts of the affected muscle. Muscles around a knee, shoulder blade, or other bones are prone to trigger points. This is because these muscles are more likely to be injured.    About the injections  Any muscle in the body can have one or more trigger points. Several injections may be needed in each trigger point to best relieve pain. These injections may be given in sessions about 2 weeks apart, depending on the preference of your health care provider. In some cases, you may not feel much change in your symptoms until after the third injection.     © 3017-1455 [employer]. [address] [place] [address]. All rights reserved. This information is not intended as a substitute for professional medical care. Always follow your healthcare professional's instructions.            Your Neck Muscles  The muscles in the neck and shoulders need to be strong to hold the neck and head in place. These muscles also help move the neck and shoulders. Your health care provider can recommend exercises to help stretch and strengthen your neck muscles.    © 5900-5677 [employer]. [address] [place] [address]. All rights reserved. This information is not intended as a substitute for professional medical care. Always follow your healthcare professional's instructions.          Neck Problems: Relieving Your Symptoms  The first goal of treatment is to relieve your symptoms. Your health care provider may recommend self-care treatments. These include resting, applying ice and heat, taking medication, and doing exercises. Your health care provider may also recommend that you see a physical therapist, who can teach you ways to care for and strengthen your neck.    Self-Care Treatments  Pain can end quickly or last awhile. Either way, youll want relief as soon as possible. Your health  care provider can tell you which treatments to do at home to help relieve your pain.  · Lying down for a short time takes pressure from the head off the neck.  · Ice and heat can help reduce pain. To bring down swelling, rest an ice pack wrapped in a thin towel on your neck for 15 minutes. To relax sore muscles, apply a warm, wet towel to the area. Or take a warm bath or shower.  · Over-the-counter medications, such as ibuprofen, naproxen, and aspirin, can help reduce pain and swelling. Acetaminophen can help relieve pain. Use these only as directed.  · Exercises can relax muscles and prevent stiffness. To prepare, drape a warm, wet towel around your neck and shoulders for 5 minutes. Remove the towel. Then do any exercises recommended to you by your health care provider.  Physical Therapy  If self-care treatments arent helping relieve neck pain, your health care provider may suggest one or more sessions of physical therapy. Physical therapy is performed by a specialist trained to treat injuries. Your physical therapist (PT) will teach you how to strengthen muscles, improve the spines alignment, and help you move properly. Treatment methods used in physical therapy may include:  · Heat. A special heating pad called a neck pack may be applied to your neck.  · Exercises. Your PT will teach you exercises to help strengthen your neck and improve its range of motion.  · Joint mobilization. The PT gently moves your vertebrae to help restore motion in your neck joints and reduce neck pain.  · Soft tissue mobilization. The PT massages and stretches the muscles in your neck and shoulders.  · Electrical stimulation. Electrical impulses are sent into your neck. This helps reduce soreness and inflammation.  · Education in body mechanics. The PT shows you ways to position and move your body that protect the neck.  Other Treatments  If physical therapy doesnt relieve your neck pain, your health care provider may suggest other  treatments. For example, medications or injections can help relieve pain and swelling. In some cases, surgery may be needed to treat neck problems.  © 3969-0957 [employer]. [address] [place] [address], [place] [address]. All rights reserved. This information is not intended as a substitute for professional medical care. Always follow your healthcare professional's instructions.          Understanding Neck Problems       If you suffer from neck pain, youre not alone. Many people have neck pain at some point in their lives. Problems such as poor posture, injury, and wear and tear can lead to neck pain. Your health care provider will work with you to find the treatment thats best for your neck.  Types of Neck Problems  The following problems can cause pain or injury in your neck:  · Strains and sprains: Strains (stretched or torn muscles) and sprains (stretched or torn ligaments) can cause neck pain. Strains and sprains can occur during an accident, or when you overuse your neck through repetitive motion. They can also cause your muscles and ligaments to become inflamed (swollen and painful).  · Whiplash and other injuries: Whiplash can result when an impact throws your head, forcing your neck too far forward (hyperflexion), then too far backward (hyperextension). When combined, the two motions can cause a painful injury to different parts of your neck, such as muscles, ligaments, or joints. The most common cause of whiplash is a car accident. But it can also happen during a fall or sports injury.  · Weakened disks: A simple action, such as a sneeze or a cough, can cause one of your disks to bulge (herniate). A herniated disk can put pressure on your nerve and cause pain. Over time, disks can also thin out (degenerate). Flattened disks dont cushion vertebrae well and can cause vertebrae to rub together. Rubbing vertebrae can pinch nerves and cause pain.  · Weakened joints: Aging and injury can cause joints  to slowly degenerate. Thinned joints can also cause vertebrae to rub together. This can cause abnormal growths of bone (bone spurs) to form on vertebrae. Bone spurs put pressure on nerves, causing pain.  Common Symptoms  If you have a neck problem, you may have one or more of the following symptoms:  · Muscle tension and spasm: You may not be able to move your neck, arms, or shoulders comfortably if you have muscle tension or stiffness in your neck. If your symptoms arent relieved, you may experience muscle spasms, or knots of contracted tissue (trigger points) in areas of your neck and shoulders.  · Aches and pains: Dull aches in your head or neck, sharp pains, and swelling of the soft tissue of your neck and shoulders are common symptoms. If theres pressure on the nerves in your neck, you may feel pain in your arms or hands (referred pain).  · Numbness or weakness: If you injure the nerves in your neck, you may experience numbness, tingling, or weakness in your shoulders, arms, or hands. These symptoms arise when disks or bone spurs press on the nerves in your neck.  © 8816-4670 [employer]. [address] [place] [address]. All rights reserved. This information is not intended as a substitute for professional medical care. Always follow your healthcare professional's instructions.          Neck Spasm [No Trauma]    Spasm of the neck muscles can occur after a sudden awkward neck movement. Sleeping with your neck in a crooked position can also cause spasm. Some persons respond to emotional stress by tensing the muscles of their neck, shoulders and upper back. If neck spasm lasts long enough, it can cause headache.  The treatment described below will usually help the pain to go away in 5-7 days. Pain that continues may require further evaluation or other types of treatment such as physical therapy.  Home Care:  1. Rest and relax the muscles. Use a comfortable pillow that supports the head  and keeps the spine in a neutral position. The position of the head should not be tilted forward or backward. A rolled up towel may help for a custom fit.  2. Some persons find relief with heat (hot shower, hot bath or heating pad) and massage, while others prefer cold packs (crushed or cubed ice in a plastic bag, wrapped in a towel). Try both and use the method that feels best for 20 minutes several times a day.  3. You may use acetaminophen (Tylenol) or ibuprofen (Motrin, Advil) to control pain, unless another medicine was prescribed. [NOTE: If you have chronic liver or kidney disease or ever had a stomach ulcer or GI bleeding, talk with your doctor before using these medicines.]  Follow Up  with your doctor or this facility if your symptoms do not show signs of improvement after one week. Physical therapy or further evaluation may be needed.  [NOTE: If x-rays were taken, they will be reviewed by a radiologist. You will be notified of any new findings that may affect your care.]  Return Promptly  or contact your doctor if any of the following occurs:  · Pain becomes worse or spreads into one or both arms  · Weakness or numbness in one or both arms  · Increasing headache with nausea or vomiting  · Fever over 100.4ºF (38.0ºC)  © 3632-6356 [employer]. [address] [place] [address], [place] [address]. All rights reserved. This information is not intended as a substitute for professional medical care. Always follow your healthcare professional's instructions.          Know Your Neck: The Cervical Spine  By learning about the parts of the neck, you can better understand your neck problem. The bones of the neck are called cervical vertebrae, commonly identified as C1 through C7. Together, they form a bony column called the spine. Vertebrae also protect the spinal cord, a pathway for messages to reach the brain. Surrounding the spine are soft tissues such as muscles, tendons, and nerves.        Flexibility Is  Key  For the neck to function normally, it has to be flexible enough to move without discomfort. A healthy neck can move easily in six different directions.    © 8486-6429 [employer]. [address] [place] [address], [place] [address]. All rights reserved. This information is not intended as a substitute for professional medical care. Always follow your healthcare professional's instructions.          Protecting Your Neck: Posture and Body Mechanics  Protecting your neck from injuries and pain involves practicing good posture and body mechanics. This may mean correcting bad habits you have related to the way you hold and move your body. The tips below can help you improve your posture and body mechanics.    What Is Posture and Why Does It Matter?  Posture is the way you hold your body. For many of us, this means hunching over, thrusting the chin forward, and slouching the shoulders. But this kind of poor posture keeps muscles from properly supporting the neck and puts stress on muscles, disks, ligaments, and joints in your neck. As a result, injury and pain can occur.  How Is Your Posture?  Use a full-length mirror to check your posture. To begin, stand normally. Then slowly back up against a wall. Is there space between your head and the wall? Do you slouch your shoulders? Is your chin pointing up or down? All these can cause neck pain and injury.  Improving Your Posture  Follow these steps to improve your posture:  · Pull your shoulders back.  · Think of the ears, shoulders, and hips as a series of dots. Now, adjust your body to connect the dots in a straight line.  · Keep your chin level.  What Are Body Mechanics and Why Do They Matter?  The way you move and position your body during daily activities is called body mechanics. Good body mechanics help protect the neck. This means learning the right ways to stand, sit, and even sleep. So do whats best for your neck and practice good body  mechanics.  Standing   To protect your neck while standing:  · Carry objects close to your body.  · Keep your ears and shoulders in a line while standing or walking.  · To lower yourself, bend at the knees with a straight back. Do this instead of looking down and reaching for objects.  · Work at eye level. Dont reach above your head or tilt your head back.  Sitting   To protect your neck while sitting:  · Set up your workstation so your monitor is at eye level. Also, use a document mulligan when viewing papers or books.  · Keep your knees at or slightly below the level of your hips.  · Sit up straight, with feet flat on the floor. If your feet dont touch the floor, use a footrest.  · Avoid sitting or driving for long periods. Take frequent breaks.  Sleeping   To protect your neck while sleeping:  · Sleep on your back with a pillow under your knees, or on your side with a pillow between bent knees. This helps align the spine.  · Avoid using pillows that are too high or too low. Instead, use a neck roll or pillow under your neck while you sleep to keep the neck straight.  · Sleep on a mattress that supports you, with a pillow under your neck.  © 7233-6285 [employer]. [address] [place] [address], [place] [address]. All rights reserved. This information is not intended as a substitute for professional medical care. Always follow your healthcare professional's instructions.          Exercises at Your Workstation: Eyes, Neck, and Head     Tired eyes? Stiff neck? A few easy moves can help prevent these kinds of problems. Take a few minutes during your day to do these exercises--right at your desk. They'll loosen up your muscles, keep you more alert, and make a big difference in how you work and feel.    For your eyes  Eye cup  · Lean forward with your elbows on your desk.  · Cup your hands and place them lightly over your closed eyes. Hold for a minute, while breathing deeply in and out.  · Slowly uncover and  open your eyes. Repeat 2 times.  Eye roll  · Close your eyes. Slowly roll your eyeballs clockwise all the way around. Repeat 3 times.  · Now slowly roll them all the way around counterclockwise. Repeat 3 times.  Eye rest  · Every 20 minutes, look away from the computer screen. Focus on an object at least 20 feet away. Stay focused on this object for a full 20 seconds.    For your neck and head  Warm-up  · Drop your head gently to your chest. While breathing in, slowly roll your head up to your left shoulder. While breathing out, slowly roll your head back to center. Repeat to the right.  · Repeat 3 times on each side.  Head tilt  · Sit up straight. Tuck in your chin.  · Slowly tip your head to the left. Return to the center. Then, tip your head to the right.  · Repeat 3 times on each side.    Head turn  · Sit up straight.  · Slowly turn your head and look over your left shoulder. Hold for a few seconds. Go back to the center, then repeat to your right.  · Repeat 3 times on each side.  © 3482-6502 [employer], [employer]. [address] [place], [place] [id number]. All rights reserved. This information is not intended as a substitute for professional medical care. Always follow your healthcare professional's instructions.          Reach and Hold Exercise    Do this exercise on your hands and knees. Keep your knees under your hips and your hands under your shoulders. Keep your spine in a neutral position (not arched or sagging). Keep your ears in line with your shoulders. Hold for a few seconds before starting the exercise:  4. Tighten your abdominal muscles and raise one arm straight in front of you, palm down. Hold for 5 seconds, then lower. Repeat 5 times.  5. Do the exercise again, this time lifting your arm to the side. Repeat 5 times.  6. Do the exercise again, this time lifting your arm backward, palm up. Repeat 5 times.  Switch sides and do each exercise with the other arm.  © 2913-6274 [employer],  [employer]. [address] [place] [address]. All rights reserved. This information is not intended as a substitute for professional medical care. Always follow your healthcare professional's instructions.        Shoulder and Upper Back Stretch  To start, stand tall with your ears, shoulders, and hips in line. Your feet should be slightly apart, positioned just under your hips. Focus your eyes directly in front of you.  this position for a few seconds before starting your exercise. This helps increase your awareness of proper posture.  Reach overhead and slightly back with both arms. Keep your shoulders and neck aligned and your elbows behind your shoulders:  · With your palms facing the ceiling, turn your fingers inward.  · Take a deep breath. Breathe out, and lower your elbows toward your buttocks. Hold for 5 seconds, then return to starting position.  · Repeat 3 times.    © 5724-3309 [employer]. [address] [place] [address]. All rights reserved. This information is not intended as a substitute for professional medical care. Always follow your healthcare professional's instructions.          Shoulder Clock Exercise  To start, stand tall with your ears, shoulders, and hips in line. Your feet should be slightly apart, positioned just under your hips. Focus your eyes directly in front of you.  this position for a few seconds before starting your exercise. This helps increase your awareness of proper posture.  · Imagine that your right shoulder is the center of a clock. With the outer point of your shoulder, roll it around to slowly trace the outer edge of the clock.  · Move clockwise first, then counterclockwise.  · Repeat 3 times. Switch shoulders.   © 1200-1882 [employer]. [address] [place] [address]. All rights reserved. This information is not intended as a substitute for professional medical care. Always follow your healthcare  professional's instructions.          Shoulder Girdle Stretch     To start, sit in a chair with your feet flat on the floor. Your weight should be slightly forward so that youre balanced evenly on your buttocks. Relax your shoulders and keep your head level. Using a chair with arms may help you keep your balance:  · Place 1 hand on the outside elbow of the other arm.  · Pull the arm across your body. Hold for 30 to 60 seconds. Repeat once.  · Switch sides.    © 8301-9079 [employer]. [address] [place] [address]. All rights reserved. This information is not intended as a substitute for professional medical care. Always follow your healthcare professional's instructions.          Shoulder Exercises      To start, sit in a chair with your feet flat on the floor. Your weight should be slightly forward so that youre balanced evenly on your buttocks. Relax your shoulders and keep your head level. Avoid arching your back or rounding your shoulders. Using a chair with arms may help you keep your balance.  · Raise your arms, elbows bent, to shoulder height.  · Slowly move your forearms together. Hold for 5 seconds.  · Return to starting position. Repeat 5 times.  © 4486-1453 [employer]. [address] [place] [address]. All rights reserved. This information is not intended as a substitute for professional medical care. Always follow your healthcare professional's instructions.        Shoulder Shrug Exercise  To start, sit in a chair with your feet flat on the floor. Shift your weight slightly forward to avoid rounding your back. Relax. Keep your ears, shoulders, and hips aligned:  · Raise both of your shoulders as high as you can, as if you were trying to touch them to your ears. Keep your head and neck still and relaxed.  · Hold for a count of 10. Release.  · Repeat 5 times.    © 4855-7515 [employer]. [address] [place] [address]. All rights  reserved. This information is not intended as a substitute for professional medical care. Always follow your healthcare professional's instructions.          Shoulder Squeeze Exercise     To start, sit in a chair with your feet flat on the floor. Shift your weight slightly forward to avoid rounding your back. Relax. Keep your ears, shoulders, and hips aligned:  · Raise your arms to shoulder height, elbows bent and palms forward.  · Move your arms back, squeezing your shoulder blades together.  · Hold for 10 seconds. Return to starting position.   · Repeat 5 times.     © 6181-5333 [employer]. [address] [place] [address]. All rights reserved. This information is not intended as a substitute for professional medical care. Always follow your healthcare professional's instructions.

## 2019-08-26 ENCOUNTER — PATIENT MESSAGE (OUTPATIENT)
Dept: PHYSICAL MEDICINE AND REHAB | Facility: CLINIC | Age: 59
End: 2019-08-26

## 2019-08-26 ENCOUNTER — PATIENT OUTREACH (OUTPATIENT)
Dept: OTHER | Facility: OTHER | Age: 59
End: 2019-08-26

## 2019-08-26 DIAGNOSIS — M77.8 TENDONITIS OF ELBOW, RIGHT: Primary | ICD-10-CM

## 2019-08-26 RX ORDER — KETOROLAC TROMETHAMINE 10 MG/1
10 TABLET, FILM COATED ORAL 2 TIMES DAILY
Qty: 10 TABLET | Refills: 0 | Status: SHIPPED | OUTPATIENT
Start: 2019-08-26 | End: 2019-08-31

## 2019-08-26 NOTE — PROGRESS NOTES
Last 5 Patient Entered Readings                                      Current 30 Day Average: 142/90     Recent Readings 8/24/2019 8/18/2019 8/16/2019 8/15/2019 8/14/2019    SBP (mmHg) 147 150 135 119 127    DBP (mmHg) 81 87 81 72 83    Pulse 72 91 92 77 86        Called patient for hypertension follow-up. 30-day blood pressure average is not at goal; however, improvement in blood pressure readings noticed since beginning of the month. She reports compliance to her medication and is still working out daily in spite of shoulder injury. Continuing to follow a healthy, low sodium diet. No questions or concerns at this time. No medication adjustments planned at this time. Will continue to monitor readings and f/u in 4 weeks.

## 2019-08-30 ENCOUNTER — PATIENT MESSAGE (OUTPATIENT)
Dept: PHYSICAL MEDICINE AND REHAB | Facility: CLINIC | Age: 59
End: 2019-08-30

## 2019-08-30 RX ORDER — METHYLPREDNISOLONE 4 MG/1
TABLET ORAL
Qty: 1 PACKAGE | Refills: 0 | Status: SHIPPED | OUTPATIENT
Start: 2019-08-30 | End: 2019-09-17

## 2019-09-03 ENCOUNTER — OFFICE VISIT (OUTPATIENT)
Dept: PHYSICAL MEDICINE AND REHAB | Facility: CLINIC | Age: 59
End: 2019-09-03
Payer: COMMERCIAL

## 2019-09-03 ENCOUNTER — PATIENT OUTREACH (OUTPATIENT)
Dept: ADMINISTRATIVE | Facility: HOSPITAL | Age: 59
End: 2019-09-03

## 2019-09-03 VITALS
HEIGHT: 63 IN | RESPIRATION RATE: 14 BRPM | DIASTOLIC BLOOD PRESSURE: 94 MMHG | HEART RATE: 81 BPM | WEIGHT: 202 LBS | BODY MASS INDEX: 35.79 KG/M2 | SYSTOLIC BLOOD PRESSURE: 151 MMHG

## 2019-09-03 DIAGNOSIS — M50.30 DDD (DEGENERATIVE DISC DISEASE), CERVICAL: ICD-10-CM

## 2019-09-03 DIAGNOSIS — M79.18 CERVICAL MYOFASCIAL PAIN SYNDROME: Primary | ICD-10-CM

## 2019-09-03 PROCEDURE — 3077F PR MOST RECENT SYSTOLIC BLOOD PRESSURE >= 140 MM HG: ICD-10-PCS | Mod: CPTII,S$GLB,, | Performed by: PHYSICAL MEDICINE & REHABILITATION

## 2019-09-03 PROCEDURE — 99999 PR PBB SHADOW E&M-EST. PATIENT-LVL III: ICD-10-PCS | Mod: PBBFAC,,, | Performed by: PHYSICAL MEDICINE & REHABILITATION

## 2019-09-03 PROCEDURE — 3080F DIAST BP >= 90 MM HG: CPT | Mod: CPTII,S$GLB,, | Performed by: PHYSICAL MEDICINE & REHABILITATION

## 2019-09-03 PROCEDURE — 3080F PR MOST RECENT DIASTOLIC BLOOD PRESSURE >= 90 MM HG: ICD-10-PCS | Mod: CPTII,S$GLB,, | Performed by: PHYSICAL MEDICINE & REHABILITATION

## 2019-09-03 PROCEDURE — 3077F SYST BP >= 140 MM HG: CPT | Mod: CPTII,S$GLB,, | Performed by: PHYSICAL MEDICINE & REHABILITATION

## 2019-09-03 PROCEDURE — 3008F BODY MASS INDEX DOCD: CPT | Mod: CPTII,S$GLB,, | Performed by: PHYSICAL MEDICINE & REHABILITATION

## 2019-09-03 PROCEDURE — 3008F PR BODY MASS INDEX (BMI) DOCUMENTED: ICD-10-PCS | Mod: CPTII,S$GLB,, | Performed by: PHYSICAL MEDICINE & REHABILITATION

## 2019-09-03 PROCEDURE — 99214 PR OFFICE/OUTPT VISIT, EST, LEVL IV, 30-39 MIN: ICD-10-PCS | Mod: S$GLB,,, | Performed by: PHYSICAL MEDICINE & REHABILITATION

## 2019-09-03 PROCEDURE — 99214 OFFICE O/P EST MOD 30 MIN: CPT | Mod: S$GLB,,, | Performed by: PHYSICAL MEDICINE & REHABILITATION

## 2019-09-03 PROCEDURE — 99999 PR PBB SHADOW E&M-EST. PATIENT-LVL III: CPT | Mod: PBBFAC,,, | Performed by: PHYSICAL MEDICINE & REHABILITATION

## 2019-09-03 NOTE — PROGRESS NOTES
PMR CLINIC NOTE    Chief Complaint   Patient presents with    Arm Pain       HPI: This is a 59 y.o.  female being seen in clinic today for continued achy, throbbing shoulder/arm pain into her right elbow.  She responded to NSAID and medrol dose pack.  She has a known rotator cuff tear and continues her desire to focus on conservative measures for relief/strengthening instead of surgery    History obtained from patient    Past family, medical, social, and surgical history reviewed in chart    Review of Systems:     General- denies lethargy, weight change, fever, chills  Head/neck- denies swallowing difficulties  ENT- denies hearing changes  Cardiovascular-denies chest pain  Pulmonary- denies shortness of breath  GI- denies constipation or bowel incontinence  - denies bladder incontinence  Skin- denies wounds or rashes  Musculoskeletal- +/-weakness, +pain  Neurologic- +numbness and tingling  Psychiatric- denies depressive or psychotic features, denies anxiety  Lymphatic-denies swelling  Endocrine- denies hypoglycemic symptoms/DM history  All other pertinent systems negative     Physical Examination:  General: Well developed, well nourished female, NAD  HEENT:NCAT EOMI bilaterally   Pulmonary:Normal respirations    Spinal Examination: CERVICAL  Active ROM is within normal limits.  Inspection: No deformity of spinal alignment.  Very tight and tender bands at right trapezius, rhomboids    Spinal Examination: LUMBAR or THORACIC  Active ROM is within normal limits.  Inspection: No deformity of spinal alignment.      Bilateral Upper and Lower Extremities:  Pulses are 2+ at radial bilaterally.  Shoulder/Elbow/Wrist/Hand ROM wnl, mild weakness in right cuff, limited full rom. Ttp at right ac joint  Hip/Knee/Ankle ROM   Bilateral Extremities show normal capillary refill.  No signs of cyanosis, rubor, edema, skin changes, or dysvascular changes of appendages.  Nails appear intact.    Neurological Exam:  Cranial  Nerves:  II-XII grossly intact    Manual Muscle Testing: (Motor 5=normal)  5/5 strength bilateral upper extremities except 3+/5 right sab    No focal atrophy is noted of either upper extremity.    Bilateral Reflexes:hypo at bic tric br  Persaud's response is absent bilaterally.  Sensation: tested to light touch  - intact in arms  Gait: Narrow base and good arm swing.    IMPRESSION/PLAN: This is a 57 y.o.  female with right rotator cuff tendonopathy, myofascial pain, cuff weakness    1. Cont exercises for cuff strengthening, ROM  2. Cont aleve and muscle relaxant prn, finish medrol dose pack  3. Ice/heat compress 20 min alternation  4. Ergonomics/posture      Amanda Up M.D.  Physical Medicine and Rehab

## 2019-09-16 ENCOUNTER — PATIENT OUTREACH (OUTPATIENT)
Dept: OTHER | Facility: OTHER | Age: 59
End: 2019-09-16

## 2019-09-16 NOTE — PROGRESS NOTES
Digital Medicine: Health  Follow-Up    Patient stated that she has been dealing with a torn rotator cuff and patient also had a stomach virus over the weekend so she hasn't submitted a BP reading since the 10th. Patient stated that she would submit one in the next day or so depending on how she was feeling.    The history is provided by the patient.     Follow Up  Follow-up reason(s): reading review and routine education      Readings are trending down due to lifestyle change.  Patient is consistently working out and being mindful of sodium intake through healthy food choices and exercising consistently.          Diet:   Patient reports eating or drinking the following: fruit, water, fresh vegetables and lean protein such as chicken, fish, pork. Patient and  will eat out at a restaurant once a week.    Intervention(s): reducing sodium intake    Physical Activity:   When asked if exercising, patient responded: yes    Patient participates in the following activities: walking, weights and physical therapy/rehab    Patient works out 6 days a week and has incorporated some of the shoulder exercises she learned in physical therapy to help strengthen the injured shoulder.     Medication Adherence:       Patient reports no s/s or issues taking medications as prescribed.       [facility]    INTERVENTION(S)  encouragement/support      There are no preventive care reminders to display for this patient.    Last 5 Patient Entered Readings                                      Current 30 Day Average: 144/87     Recent Readings 9/10/2019 9/6/2019 9/3/2019 8/28/2019 8/28/2019    SBP (mmHg) 127 151 159 139 159    DBP (mmHg) 81 88 94 86 96    Pulse 74 81 85 75 79

## 2019-09-17 ENCOUNTER — OFFICE VISIT (OUTPATIENT)
Dept: OPHTHALMOLOGY | Facility: CLINIC | Age: 59
End: 2019-09-17
Payer: COMMERCIAL

## 2019-09-17 ENCOUNTER — OFFICE VISIT (OUTPATIENT)
Dept: CARDIOLOGY | Facility: CLINIC | Age: 59
End: 2019-09-17
Payer: COMMERCIAL

## 2019-09-17 ENCOUNTER — OFFICE VISIT (OUTPATIENT)
Dept: PHYSICAL MEDICINE AND REHAB | Facility: CLINIC | Age: 59
End: 2019-09-17
Payer: COMMERCIAL

## 2019-09-17 ENCOUNTER — TELEPHONE (OUTPATIENT)
Dept: INTERNAL MEDICINE | Facility: CLINIC | Age: 59
End: 2019-09-17

## 2019-09-17 ENCOUNTER — OFFICE VISIT (OUTPATIENT)
Dept: INTERNAL MEDICINE | Facility: CLINIC | Age: 59
End: 2019-09-17
Payer: COMMERCIAL

## 2019-09-17 VITALS
SYSTOLIC BLOOD PRESSURE: 138 MMHG | TEMPERATURE: 97 F | DIASTOLIC BLOOD PRESSURE: 88 MMHG | WEIGHT: 203 LBS | RESPIRATION RATE: 16 BRPM | BODY MASS INDEX: 35.97 KG/M2 | HEIGHT: 63 IN | HEART RATE: 77 BPM

## 2019-09-17 VITALS
HEIGHT: 63 IN | SYSTOLIC BLOOD PRESSURE: 150 MMHG | BODY MASS INDEX: 36.09 KG/M2 | DIASTOLIC BLOOD PRESSURE: 88 MMHG | HEART RATE: 88 BPM | WEIGHT: 203.69 LBS

## 2019-09-17 VITALS
DIASTOLIC BLOOD PRESSURE: 94 MMHG | BODY MASS INDEX: 35.79 KG/M2 | SYSTOLIC BLOOD PRESSURE: 157 MMHG | HEIGHT: 63 IN | WEIGHT: 202 LBS | RESPIRATION RATE: 14 BRPM | HEART RATE: 88 BPM

## 2019-09-17 DIAGNOSIS — Z00.00 ROUTINE GENERAL MEDICAL EXAMINATION AT A HEALTH CARE FACILITY: Primary | ICD-10-CM

## 2019-09-17 DIAGNOSIS — M79.18 CERVICAL MYOFASCIAL PAIN SYNDROME: ICD-10-CM

## 2019-09-17 DIAGNOSIS — I10 ESSENTIAL HYPERTENSION: Primary | ICD-10-CM

## 2019-09-17 DIAGNOSIS — I10 ESSENTIAL HYPERTENSION: ICD-10-CM

## 2019-09-17 DIAGNOSIS — E78.2 MIXED HYPERLIPIDEMIA: Chronic | ICD-10-CM

## 2019-09-17 DIAGNOSIS — M47.26 OSTEOARTHRITIS OF SPINE WITH RADICULOPATHY, LUMBAR REGION: ICD-10-CM

## 2019-09-17 DIAGNOSIS — M47.22 OSTEOARTHRITIS OF SPINE WITH RADICULOPATHY, CERVICAL REGION: Chronic | ICD-10-CM

## 2019-09-17 DIAGNOSIS — H53.19 PHOTOPSIA: Primary | ICD-10-CM

## 2019-09-17 DIAGNOSIS — Z86.010 HX OF COLONIC POLYP: ICD-10-CM

## 2019-09-17 DIAGNOSIS — E66.01 SEVERE OBESITY (BMI 35.0-35.9 WITH COMORBIDITY): ICD-10-CM

## 2019-09-17 DIAGNOSIS — M19.011 DJD OF RIGHT AC (ACROMIOCLAVICULAR) JOINT: ICD-10-CM

## 2019-09-17 DIAGNOSIS — M75.81 TENDINITIS OF RIGHT ROTATOR CUFF: ICD-10-CM

## 2019-09-17 DIAGNOSIS — E66.09 CLASS 2 OBESITY DUE TO EXCESS CALORIES WITHOUT SERIOUS COMORBIDITY WITH BODY MASS INDEX (BMI) OF 35.0 TO 35.9 IN ADULT: ICD-10-CM

## 2019-09-17 DIAGNOSIS — K21.9 GASTROESOPHAGEAL REFLUX DISEASE, ESOPHAGITIS PRESENCE NOT SPECIFIED: Chronic | ICD-10-CM

## 2019-09-17 DIAGNOSIS — R94.31 ABNORMAL ECG: ICD-10-CM

## 2019-09-17 DIAGNOSIS — G56.03 BILATERAL CARPAL TUNNEL SYNDROME: ICD-10-CM

## 2019-09-17 DIAGNOSIS — G47.33 OSA (OBSTRUCTIVE SLEEP APNEA): Chronic | ICD-10-CM

## 2019-09-17 DIAGNOSIS — M75.41 ROTATOR CUFF IMPINGEMENT SYNDROME OF RIGHT SHOULDER: ICD-10-CM

## 2019-09-17 DIAGNOSIS — Z86.018 HISTORY OF BENIGN PITUITARY TUMOR: Chronic | ICD-10-CM

## 2019-09-17 DIAGNOSIS — Z82.49 FAMILY HISTORY OF CARDIOVASCULAR DISEASE: ICD-10-CM

## 2019-09-17 DIAGNOSIS — M53.82 MUSCULOSKELETAL DISORDER OF THE SUBOCCIPITAL: ICD-10-CM

## 2019-09-17 PROBLEM — E66.812 CLASS 2 OBESITY DUE TO EXCESS CALORIES WITHOUT SERIOUS COMORBIDITY WITH BODY MASS INDEX (BMI) OF 35.0 TO 35.9 IN ADULT: Status: RESOLVED | Noted: 2019-09-17 | Resolved: 2019-09-17

## 2019-09-17 PROBLEM — M50.30 DDD (DEGENERATIVE DISC DISEASE), CERVICAL: Status: RESOLVED | Noted: 2019-09-03 | Resolved: 2019-09-17

## 2019-09-17 PROBLEM — E66.812 CLASS 2 OBESITY DUE TO EXCESS CALORIES WITHOUT SERIOUS COMORBIDITY WITH BODY MASS INDEX (BMI) OF 35.0 TO 35.9 IN ADULT: Status: ACTIVE | Noted: 2019-09-17

## 2019-09-17 PROCEDURE — 3075F PR MOST RECENT SYSTOLIC BLOOD PRESS GE 130-139MM HG: ICD-10-PCS | Mod: CPTII,S$GLB,, | Performed by: FAMILY MEDICINE

## 2019-09-17 PROCEDURE — 3079F PR MOST RECENT DIASTOLIC BLOOD PRESSURE 80-89 MM HG: ICD-10-PCS | Mod: CPTII,S$GLB,, | Performed by: FAMILY MEDICINE

## 2019-09-17 PROCEDURE — 99999 PR PBB SHADOW E&M-EST. PATIENT-LVL III: ICD-10-PCS | Mod: PBBFAC,,, | Performed by: INTERNAL MEDICINE

## 2019-09-17 PROCEDURE — 90471 IMMUNIZATION ADMIN: CPT | Mod: S$GLB,,, | Performed by: FAMILY MEDICINE

## 2019-09-17 PROCEDURE — 99999 PR PBB SHADOW E&M-EST. PATIENT-LVL III: ICD-10-PCS | Mod: PBBFAC,,, | Performed by: FAMILY MEDICINE

## 2019-09-17 PROCEDURE — 3080F DIAST BP >= 90 MM HG: CPT | Mod: CPTII,S$GLB,, | Performed by: PHYSICAL MEDICINE & REHABILITATION

## 2019-09-17 PROCEDURE — 99999 PR PBB SHADOW E&M-EST. PATIENT-LVL I: ICD-10-PCS | Mod: PBBFAC,,, | Performed by: OPTOMETRIST

## 2019-09-17 PROCEDURE — 3080F PR MOST RECENT DIASTOLIC BLOOD PRESSURE >= 90 MM HG: ICD-10-PCS | Mod: CPTII,S$GLB,, | Performed by: PHYSICAL MEDICINE & REHABILITATION

## 2019-09-17 PROCEDURE — 99214 PR OFFICE/OUTPT VISIT, EST, LEVL IV, 30-39 MIN: ICD-10-PCS | Mod: 25,S$GLB,, | Performed by: PHYSICAL MEDICINE & REHABILITATION

## 2019-09-17 PROCEDURE — 99999 PR PBB SHADOW E&M-EST. PATIENT-LVL III: ICD-10-PCS | Mod: PBBFAC,,, | Performed by: PHYSICAL MEDICINE & REHABILITATION

## 2019-09-17 PROCEDURE — 3077F PR MOST RECENT SYSTOLIC BLOOD PRESSURE >= 140 MM HG: ICD-10-PCS | Mod: CPTII,S$GLB,, | Performed by: INTERNAL MEDICINE

## 2019-09-17 PROCEDURE — 99999 PR PBB SHADOW E&M-EST. PATIENT-LVL III: CPT | Mod: PBBFAC,,, | Performed by: INTERNAL MEDICINE

## 2019-09-17 PROCEDURE — 3077F PR MOST RECENT SYSTOLIC BLOOD PRESSURE >= 140 MM HG: ICD-10-PCS | Mod: CPTII,S$GLB,, | Performed by: PHYSICAL MEDICINE & REHABILITATION

## 2019-09-17 PROCEDURE — 99396 PR PREVENTIVE VISIT,EST,40-64: ICD-10-PCS | Mod: 25,S$GLB,, | Performed by: FAMILY MEDICINE

## 2019-09-17 PROCEDURE — 3079F DIAST BP 80-89 MM HG: CPT | Mod: CPTII,S$GLB,, | Performed by: INTERNAL MEDICINE

## 2019-09-17 PROCEDURE — 3008F BODY MASS INDEX DOCD: CPT | Mod: CPTII,S$GLB,, | Performed by: INTERNAL MEDICINE

## 2019-09-17 PROCEDURE — 99214 OFFICE O/P EST MOD 30 MIN: CPT | Mod: 25,S$GLB,, | Performed by: PHYSICAL MEDICINE & REHABILITATION

## 2019-09-17 PROCEDURE — 90686 IIV4 VACC NO PRSV 0.5 ML IM: CPT | Mod: S$GLB,,, | Performed by: FAMILY MEDICINE

## 2019-09-17 PROCEDURE — 92014 PR EYE EXAM, EST PATIENT,COMPREHESV: ICD-10-PCS | Mod: S$GLB,,, | Performed by: OPTOMETRIST

## 2019-09-17 PROCEDURE — 99396 PREV VISIT EST AGE 40-64: CPT | Mod: 25,S$GLB,, | Performed by: FAMILY MEDICINE

## 2019-09-17 PROCEDURE — 90686 FLU VACCINE (QUAD) GREATER THAN OR EQUAL TO 3YO PRESERVATIVE FREE IM: ICD-10-PCS | Mod: S$GLB,,, | Performed by: FAMILY MEDICINE

## 2019-09-17 PROCEDURE — 3008F BODY MASS INDEX DOCD: CPT | Mod: CPTII,S$GLB,, | Performed by: PHYSICAL MEDICINE & REHABILITATION

## 2019-09-17 PROCEDURE — 3079F PR MOST RECENT DIASTOLIC BLOOD PRESSURE 80-89 MM HG: ICD-10-PCS | Mod: CPTII,S$GLB,, | Performed by: INTERNAL MEDICINE

## 2019-09-17 PROCEDURE — 3079F DIAST BP 80-89 MM HG: CPT | Mod: CPTII,S$GLB,, | Performed by: FAMILY MEDICINE

## 2019-09-17 PROCEDURE — 99999 PR PBB SHADOW E&M-EST. PATIENT-LVL III: CPT | Mod: PBBFAC,,, | Performed by: PHYSICAL MEDICINE & REHABILITATION

## 2019-09-17 PROCEDURE — 20553 NJX 1/MLT TRIGGER POINTS 3/>: CPT | Mod: S$GLB,,, | Performed by: PHYSICAL MEDICINE & REHABILITATION

## 2019-09-17 PROCEDURE — 99214 OFFICE O/P EST MOD 30 MIN: CPT | Mod: S$GLB,,, | Performed by: INTERNAL MEDICINE

## 2019-09-17 PROCEDURE — 99999 PR PBB SHADOW E&M-EST. PATIENT-LVL III: CPT | Mod: PBBFAC,,, | Performed by: FAMILY MEDICINE

## 2019-09-17 PROCEDURE — 3008F PR BODY MASS INDEX (BMI) DOCUMENTED: ICD-10-PCS | Mod: CPTII,S$GLB,, | Performed by: PHYSICAL MEDICINE & REHABILITATION

## 2019-09-17 PROCEDURE — 92014 COMPRE OPH EXAM EST PT 1/>: CPT | Mod: S$GLB,,, | Performed by: OPTOMETRIST

## 2019-09-17 PROCEDURE — 3008F PR BODY MASS INDEX (BMI) DOCUMENTED: ICD-10-PCS | Mod: CPTII,S$GLB,, | Performed by: INTERNAL MEDICINE

## 2019-09-17 PROCEDURE — 3077F SYST BP >= 140 MM HG: CPT | Mod: CPTII,S$GLB,, | Performed by: INTERNAL MEDICINE

## 2019-09-17 PROCEDURE — 3077F SYST BP >= 140 MM HG: CPT | Mod: CPTII,S$GLB,, | Performed by: PHYSICAL MEDICINE & REHABILITATION

## 2019-09-17 PROCEDURE — 20553 PR INJECT TRIGGER POINTS, > 3: ICD-10-PCS | Mod: S$GLB,,, | Performed by: PHYSICAL MEDICINE & REHABILITATION

## 2019-09-17 PROCEDURE — 99999 PR PBB SHADOW E&M-EST. PATIENT-LVL I: CPT | Mod: PBBFAC,,, | Performed by: OPTOMETRIST

## 2019-09-17 PROCEDURE — 90471 FLU VACCINE (QUAD) GREATER THAN OR EQUAL TO 3YO PRESERVATIVE FREE IM: ICD-10-PCS | Mod: S$GLB,,, | Performed by: FAMILY MEDICINE

## 2019-09-17 PROCEDURE — 3075F SYST BP GE 130 - 139MM HG: CPT | Mod: CPTII,S$GLB,, | Performed by: FAMILY MEDICINE

## 2019-09-17 PROCEDURE — 99214 PR OFFICE/OUTPT VISIT, EST, LEVL IV, 30-39 MIN: ICD-10-PCS | Mod: S$GLB,,, | Performed by: INTERNAL MEDICINE

## 2019-09-17 NOTE — PROGRESS NOTES
Subjective:       Patient ID: Ora Henderson is a 59 y.o. female.    Chief Complaint: Follow-up (2 Month)    59-year-old  female patient with Patient Active Problem List:     DJD (degenerative joint disease), cervical-mild     Hyperlipidemia     Hepatomegaly     Family history of cardiovascular disease     GERD (gastroesophageal reflux disease)     NATALIE (obstructive sleep apnea)     Rotator cuff impingement syndrome of right shoulder     DJD of right AC (acromioclavicular) joint     History of benign pituitary tumor     Hx of colonic polyp     Essential hypertension     Osteoarthritis of spine with radiculopathy, lumbar region     Severe obesity (BMI 35.0-35.9 with comorbidity)     Bilateral carpal tunnel syndrome     Cervical myofascial pain syndrome     Abnormal ECG  Here for routine annual physicals and to discuss about her recent test results  Patient reports that she had seen her cardiologist prior to seeing me and and has been doing well  Patient has been getting trigger point injections to the right shoulder and has been going to chiropractor secondary to rotator cuff impingement to the right shoulder, Has finished physical therapy in the past with no relief  Continues to have pain to the right side of the neck radiating to the right arm up to 7 to 8/10 , in taking muscle relaxants regularly  Continues to have bilateral carpal tunnel symptoms and has been using carpal tunnel braces  Denies any back pain, tingling or numbness sensation to bilateral lower extremities, chest pain or difficulty breathing      Review of Systems   Constitutional: Negative for fatigue.   Eyes: Negative for visual disturbance.   Respiratory: Negative for shortness of breath.    Cardiovascular: Negative for chest pain and leg swelling.   Gastrointestinal: Negative for abdominal pain, nausea and vomiting.   Musculoskeletal: Positive for arthralgias, myalgias and neck pain. Negative for back pain.   Skin:  "Negative for rash.   Neurological: Positive for numbness. Negative for weakness, light-headedness and headaches.   Psychiatric/Behavioral: Negative for sleep disturbance.         /88 (BP Location: Left arm, Patient Position: Sitting, BP Method: Large (Manual))   Pulse 77   Temp 97.3 °F (36.3 °C) (Tympanic)   Resp 16   Ht 5' 3" (1.6 m)   Wt 92.1 kg (203 lb)   BMI 35.96 kg/m²   Objective:      Physical Exam   Constitutional: She is oriented to person, place, and time. She appears well-developed and well-nourished.   HENT:   Head: Normocephalic and atraumatic.   Mouth/Throat: Oropharynx is clear and moist.   Cardiovascular: Normal rate, regular rhythm and normal heart sounds.   No murmur heard.  Pulmonary/Chest: Effort normal and breath sounds normal. She has no wheezes.   Abdominal: Soft. Bowel sounds are normal. There is no tenderness.   Musculoskeletal: She exhibits tenderness. She exhibits no edema.   Positive for paraspinal cervical muscle tenderness on the right side, right shoulder tenderness anteriorly   Neurological: She is alert and oriented to person, place, and time.   Skin: Skin is warm and dry. No rash noted. No erythema.   Psychiatric: She has a normal mood and affect.       No visits with results within 2 Week(s) from this visit.   Latest known visit with results is:   Lab Visit on 08/05/2019   Component Date Value Ref Range Status    Sodium 08/05/2019 142  136 - 145 mmol/L Final    Potassium 08/05/2019 3.8  3.5 - 5.1 mmol/L Final    Chloride 08/05/2019 102  95 - 110 mmol/L Final    CO2 08/05/2019 30* 23 - 29 mmol/L Final    Glucose 08/05/2019 97  70 - 110 mg/dL Final    BUN, Bld 08/05/2019 15  6 - 20 mg/dL Final    Creatinine 08/05/2019 0.9  0.5 - 1.4 mg/dL Final    Calcium 08/05/2019 9.9  8.7 - 10.5 mg/dL Final    Total Protein 08/05/2019 7.2  6.0 - 8.4 g/dL Final    Albumin 08/05/2019 4.0  3.5 - 5.2 g/dL Final    Total Bilirubin 08/05/2019 0.7  0.1 - 1.0 mg/dL Final    Comment: " For infants and newborns, interpretation of results should be based  on gestational age, weight and in agreement with clinical  observations.  Premature Infant recommended reference ranges:  Up to 24 hours.............<8.0 mg/dL  Up to 48 hours............<12.0 mg/dL  3-5 days..................<15.0 mg/dL  6-29 days.................<15.0 mg/dL      Alkaline Phosphatase 08/05/2019 59  55 - 135 U/L Final    AST 08/05/2019 17  10 - 40 U/L Final    ALT 08/05/2019 18  10 - 44 U/L Final    Anion Gap 08/05/2019 10  8 - 16 mmol/L Final    eGFR if African American 08/05/2019 >60.0  >60 mL/min/1.73 m^2 Final    eGFR if non African American 08/05/2019 >60.0  >60 mL/min/1.73 m^2 Final    Comment: Calculation used to obtain the estimated glomerular filtration  rate (eGFR) is the CKD-EPI equation.       Cholesterol 08/05/2019 154  120 - 199 mg/dL Final    Comment: The National Cholesterol Education Program (NCEP) has set the  following guidelines (reference ranges) for Cholesterol:  Optimal.....................<200 mg/dL  Borderline High.............200-239 mg/dL  High........................> or = 240 mg/dL      Triglycerides 08/05/2019 114  30 - 150 mg/dL Final    Comment: The National Cholesterol Education Program (NCEP) has set the  following guidelines (reference values) for triglycerides:  Normal......................<150 mg/dL  Borderline High.............150-199 mg/dL  High........................200-499 mg/dL      HDL 08/05/2019 49  40 - 75 mg/dL Final    Comment: The National Cholesterol Education Program (NCEP) has set the  following guidelines (reference values) for HDL Cholesterol:  Low...............<40 mg/dL  Optimal...........>60 mg/dL      LDL Cholesterol 08/05/2019 82.2  63.0 - 159.0 mg/dL Final    Comment: The National Cholesterol Education Program (NCEP) has set the  following guidelines (reference values) for LDL Cholesterol:  Optimal.......................<130 mg/dL  Borderline  High...............130-159 mg/dL  High..........................160-189 mg/dL  Very High.....................>190 mg/dL      Hdl/Cholesterol Ratio 08/05/2019 31.8  20.0 - 50.0 % Final    Total Cholesterol/HDL Ratio 08/05/2019 3.1  2.0 - 5.0 Final    Non-HDL Cholesterol 08/05/2019 105  mg/dL Final    Comment: Risk category and Non-HDL cholesterol goals:  Coronary heart disease (CHD)or equivalent (10-year risk of CHD >20%):  Non-HDL cholesterol goal     <130 mg/dL  Two or more CHD risk factors and 10-year risk of CHD <= 20%:  Non-HDL cholesterol goal     <160 mg/dL  0 to 1 CHD risk factor:  Non-HDL cholesterol goal     <190 mg/dL         Assessment/Plan:   1. Routine general medical examination at a health care facility  Vital signs stable today  Clinical exam stable  Continue lifestyle modifications with low-fat and low-cholesterol diet and exercise 30 min daily  Flu shot given today  Advised to consider getting shingles vaccination if interested at outside pharmacy    2. Essential hypertension  Blood pressure is stable today currently on losartan hydrochlorothiazide 100/25 mg daily    3. Mixed hyperlipidemia  Stable lipid profile currently taking Crestor 40 mg daily    4. Gastroesophageal reflux disease, esophagitis presence not specified  Stable on Zantac 150 mg twice daily and Nexium 40 mg daily    5. Osteoarthritis of spine with radiculopathy, cervical region  6. Osteoarthritis of spine with radiculopathy, lumbar region  7. Cervical myofascial pain syndrome  9. Rotator cuff impingement syndrome of right shoulder  10. DJD of right AC (acromioclavicular) joint  13. Bilateral carpal tunnel syndrome    Patient currently followed by Dr Ott, not interested in getting rotator cuff repair  Would like to continue treatment with chiropractor getting ultrasonic wave therapy  Continue taking Naprosyn and methocarbamol as needed  Continue using carpal tunnel sleeves    8. Hx of colonic polyp    11. History of benign  pituitary tumor    12. Severe obesity (BMI 35.0-35.9 with comorbidity)  Lifestyle modifications recommended to lose weight with BMI 35    13. Bilateral carpal tunnel syndrome

## 2019-09-17 NOTE — PROGRESS NOTES
Subjective:    Patient ID:  Ora Henderson is a 59 y.o. female who presents for evaluation of Hypertension; Hyperlipidemia; and Risk Factor Management For Atherosclerosis        HPI Mrs. Henderson presents for f/u.  Her current medical conditions include HTN, hyperlipidemia, NATALIE, obesity. Nonsmoker.   She has family h/o cad. Sisters x 2  of MIs. Brother  of MI. Father  of MI 42.   Past hx pertinent for following:  h/o LHC about 20 years ago, no specific findings.   H/o chronic abnormal ecgs showing nonspecific ST-T abnl.  Stress MPI 3/17 showed no ischemia.  Echo 3/17 showed normal LV function.  ecg  NSR, low precordial R waves, no acute changes.   Has not tolerated CPAP in past for NATALIE.  Now here.  She has rotator cuff injury, being addressed by other doctors.  Lipids are much improved on  labs.  States lipids improved by diet, exercise.  Obesity down a bit from last year.   HTN not at goal.  She did not want more meds last appt  when her BP was high.  No associated sxs.   She denies chest pain sxs.  No CHF sxs.  Denies dyspnea, pnd/orthopnea.  Compliant with meds.  H/o abnl ecg, stable last check.      Current Outpatient Medications:     albuterol 90 mcg/actuation inhaler, Inhale 1-2 puffs into the lungs every 4 (four) hours as needed for Wheezing or Shortness of Breath (cough)., Disp: 1 Inhaler, Rfl: 0    aspirin 81 MG Chew, Take 81 mg by mouth as needed., Disp: , Rfl:     esomeprazole (NEXIUM) 40 MG capsule, Take 1 capsule (40 mg total) by mouth before breakfast., Disp: 90 capsule, Rfl: 3    L.acidophilus-Bif. animalis (PROBIOTIC) 5 billion cell CpSP, Take 1 tablet by mouth once daily., Disp: 10 capsule, Rfl: 0    losartan-hydrochlorothiazide 100-25 mg (HYZAAR) 100-25 mg per tablet, Take 1 tablet by mouth once daily., Disp: 90 tablet, Rfl: 3    methocarbamol (ROBAXIN) 500 MG Tab, Take 1 tablet (500 mg total) by mouth 3 (three) times daily as needed (muscle spasms).,  "Disp: 20 tablet, Rfl: 1    MULTIVIT,CALC,MINS/IRON/FOLIC (DAILY MULTIPLE FOR WOMEN ORAL), Take 1 tablet by mouth once daily., Disp: , Rfl:     naproxen sodium (ALEVE) 220 MG tablet, Take 220 mg by mouth as needed., Disp: , Rfl:     ranitidine (ZANTAC) 150 MG tablet, Take 1 tablet (150 mg total) by mouth 2 (two) times daily., Disp: 60 tablet, Rfl: 0    rosuvastatin (CRESTOR) 40 MG Tab, Take 1 tablet (40 mg total) by mouth every evening. (Patient taking differently: Take 40 mg by mouth once daily. ), Disp: 90 tablet, Rfl: 3    senna-docusate 8.6-50 mg (PERICOLACE) 8.6-50 mg per tablet, Take 1 tablet by mouth as needed for Constipation., Disp: , Rfl:       Review of Systems   Constitution: Positive for weight loss.   HENT: Negative.    Eyes: Negative.    Cardiovascular: Negative.    Respiratory: Negative.    Endocrine: Negative.    Hematologic/Lymphatic: Negative.    Skin: Negative.    Musculoskeletal: Positive for arthritis and joint pain.   Gastrointestinal: Negative.    Genitourinary: Negative.    Neurological: Negative.    Psychiatric/Behavioral: Negative.    Allergic/Immunologic: Negative.        BP (!) 150/88 (BP Location: Left arm, Patient Position: Sitting, BP Method: Large (Manual))   Pulse 88   Ht 5' 3" (1.6 m)   Wt 92.4 kg (203 lb 11.3 oz)   BMI 36.08 kg/m²     Wt Readings from Last 3 Encounters:   09/17/19 92.4 kg (203 lb 11.3 oz)   09/17/19 91.6 kg (202 lb)   09/03/19 91.6 kg (202 lb)     Temp Readings from Last 3 Encounters:   07/02/19 97.9 °F (36.6 °C) (Tympanic)   04/24/19 97.5 °F (36.4 °C) (Tympanic)   03/08/19 98.4 °F (36.9 °C) (Oral)     BP Readings from Last 3 Encounters:   09/17/19 (!) 150/88   09/17/19 (!) 157/94   09/03/19 (!) 151/94     Pulse Readings from Last 3 Encounters:   09/17/19 88   09/17/19 88   09/03/19 81          Objective:    Physical Exam   Constitutional: She is oriented to person, place, and time. Vital signs are normal. She appears well-developed and well-nourished. She " is active and cooperative. She does not have a sickly appearance. She does not appear ill. No distress.   HENT:   Head: Normocephalic.   Neck: Neck supple. Normal carotid pulses, no hepatojugular reflux and no JVD present. Carotid bruit is not present. No thyromegaly present.   Cardiovascular: Normal rate, regular rhythm, S1 normal, S2 normal, normal heart sounds and normal pulses. PMI is not displaced. Exam reveals no gallop and no friction rub.   No murmur heard.  Pulses:       Radial pulses are 2+ on the right side, and 2+ on the left side.   Pulmonary/Chest: Effort normal and breath sounds normal. She has no wheezes. She has no rales.   Abdominal: Soft. Normal appearance, normal aorta and bowel sounds are normal. She exhibits no pulsatile liver, no abdominal bruit, no ascites and no mass. There is no splenomegaly or hepatomegaly. There is no tenderness.   obese   Musculoskeletal: She exhibits no edema.   Lymphadenopathy:     She has no cervical adenopathy.   Neurological: She is alert and oriented to person, place, and time.   Skin: Skin is warm. She is not diaphoretic.   Psychiatric: She has a normal mood and affect. Her behavior is normal.   Nursing note and vitals reviewed.      I have reviewed all pertinent labs and cardiac studies.      Chemistry        Component Value Date/Time     08/05/2019 0803    K 3.8 08/05/2019 0803     08/05/2019 0803    CO2 30 (H) 08/05/2019 0803    BUN 15 08/05/2019 0803    CREATININE 0.9 08/05/2019 0803    GLU 97 08/05/2019 0803        Component Value Date/Time    CALCIUM 9.9 08/05/2019 0803    ALKPHOS 59 08/05/2019 0803    AST 17 08/05/2019 0803    ALT 18 08/05/2019 0803    BILITOT 0.7 08/05/2019 0803    ESTGFRAFRICA >60.0 08/05/2019 0803    EGFRNONAA >60.0 08/05/2019 0803        Lab Results   Component Value Date    WBC 8.11 03/08/2019    HGB 11.8 (L) 03/08/2019    HCT 35.4 (L) 03/08/2019    MCV 89 03/08/2019     03/08/2019     Lab Results   Component Value  Date    HGBA1C 6.0 (H) 02/13/2019     Lab Results   Component Value Date    CHOL 154 08/05/2019    CHOL 227 (H) 02/13/2019    CHOL 196 07/31/2018     Lab Results   Component Value Date    HDL 49 08/05/2019    HDL 59 02/13/2019    HDL 46 07/31/2018     Lab Results   Component Value Date    LDLCALC 82.2 08/05/2019    LDLCALC 126.0 02/13/2019    LDLCALC 127.2 07/31/2018     Lab Results   Component Value Date    TRIG 114 08/05/2019    TRIG 210 (H) 02/13/2019    TRIG 114 07/31/2018     Lab Results   Component Value Date    CHOLHDL 31.8 08/05/2019    CHOLHDL 26.0 02/13/2019    CHOLHDL 23.5 07/31/2018           Assessment:       1. Essential hypertension    2. NATALIE (obstructive sleep apnea)    3. Mixed hyperlipidemia    4. Family history of cardiovascular disease    5. Abnormal ECG    6. Class 2 obesity due to excess calories without serious comorbidity with body mass index (BMI) of 35.0 to 35.9 in adult         Plan:             Stable CV conditions on current medical tx.  Reviewed tests and above medical conditions in detail and formulated tx plan.  Pt counseled on risk factor modification.  Cardiac diet discussed.  Weight loss needed.  Discussed HTN goals.  Goal < 130/80.   Pt declines additional medical tx for HTN and wants to stay on current dose of Hyzaar and continue with diet, exercise and attempted weight loss.  CPAP for NATALIE typically advised.  Continue current meds.  Continue statin for lipids.  F/u 6 months with stress test + lipids.

## 2019-09-17 NOTE — TELEPHONE ENCOUNTER
----- Message from Eliz Krishnamurthy sent at 9/17/2019  3:58 PM CDT -----  Contact: self  still waiting to be seen by dr simons ..[phone] (home)

## 2019-09-17 NOTE — PROGRESS NOTES
PMR CLINIC NOTE    Chief Complaint   Patient presents with    Neck Pain    Arm Pain     right       HPI: This is a 59 y.o.  female being seen in clinic today for continued achy, throbbing shoulder/arm pain.  She has some relief after last injections and chiropractic care.  She still has achy shoulder pain and limited ROM due to her cuff tear.  She has fu with orthopedics tp discuss CTS.  She still doesn't want to do cuff surgery.      History obtained from patient    Past family, medical, social, and surgical history reviewed in chart    Review of Systems:     General- denies lethargy, weight change, fever, chills  Head/neck- denies swallowing difficulties  ENT- denies hearing changes  Cardiovascular-denies chest pain  Pulmonary- denies shortness of breath  GI- denies constipation or bowel incontinence  - denies bladder incontinence  Skin- denies wounds or rashes  Musculoskeletal- +/-weakness, +pain  Neurologic- +numbness and tingling  Psychiatric- denies depressive or psychotic features, denies anxiety  Lymphatic-denies swelling  Endocrine- denies hypoglycemic symptoms/DM history  All other pertinent systems negative     Physical Examination:  General: Well developed, well nourished female, NAD  HEENT:NCAT EOMI bilaterally   Pulmonary:Normal respirations    Spinal Examination: CERVICAL  Active ROM is within normal limits.  Inspection: No deformity of spinal alignment.  Very tight and tender bands at right trapezius, levator scapula, rhomboids, deltoid     Spinal Examination: LUMBAR or THORACIC  Active ROM is within normal limits.  Inspection: No deformity of spinal alignment.      Bilateral Upper and Lower Extremities:  Pulses are 2+ at radial bilaterally.  Shoulder/Elbow/Wrist/Hand ROM wnl, mild weakness in right cuff, limited full rom. Ttp at right ac joint  Hip/Knee/Ankle ROM   Bilateral Extremities show normal capillary refill.  No signs of cyanosis, rubor, edema, skin changes, or dysvascular changes of  appendages.  Nails appear intact.    Neurological Exam:  Cranial Nerves:  II-XII grossly intact    Manual Muscle Testing: (Motor 5=normal)  5/5 strength bilateral upper extremities except 3+/5 right sab    No focal atrophy is noted of either upper extremity.    Bilateral Reflexes:hypo at bic tric br  Persaud's response is absent bilaterally.  Sensation: tested to light touch  - intact in arms  Gait: Narrow base and good arm swing.    IMPRESSION/PLAN: This is a 57 y.o.  female with right rotator cuff tendonopathy, myofascial pain, cuff weakness    1. Cont exercises for cuff strengthening, ROM, Ergonomics/posture   2. Cont aleve and muscle relaxant prn,  3. Ice/heat compress 20 min alternation  4. Trigger pt injections  5. Fu 6 wks    Amanda Up M.D.  Physical Medicine and Rehab      PROCEDURE NOTE    Diagnosis: Myofascial pain  Procedure: trigger point injections to right trapezius, rhomboid, deltoid, levator scapula     Risks and benefits of procedure explained to patient including risks of infection, bleeding, pain, or damage to surrounding tissues. All questions answered. Informed consent obtained prior to proceeding. Areas marked and prepped in sterile fashion. Using a 27g 1.25inch needle, 8 cc of 1% lidocaine was injected evenly into the above mentioned muscles. None to minimal bleeding noted. ER and post injection instructions given.    Amanda Up M.D.

## 2019-09-18 NOTE — PROGRESS NOTES
HPI     HPI    Any vision changes since last exam: no  Eye pain: no  Other ocular symptoms: yes, flashes of lights that come and go and has   lasted for about a month    Do you wear currently wear glasses or contacts? no    Interested in contacts today? no    Do you plan on getting new glasses today? No fine with readers only      Last edited by Miya Barr on 9/17/2019  2:17 PM. (History)            Assessment /Plan     For exam results, see Encounter Report.    Photopsia      Likely impending PVD  Retina intact OD, OS    The nature of posterior vitreous detachment was discussed with the patient in detail and in lay terms.  Signs and symptoms of retinal detachment were discussed with patient.   Return to clinic as soon as possible (same day) if you notice any new floaters, flashes of light, curtain/veil over your vision from any direction, or any change in vision.  Daily monitoring recommended by covering each eye separately and checking for new signs and symptoms as above.  Written educational material about PVD/flashes/floaters as well as instructions for getting in touch with on call eye doctor after hours if needed were provided for the patient.      RTC 6 wks for dilation, pvd f/u  PRN sooner as discussed above

## 2019-09-19 ENCOUNTER — PATIENT MESSAGE (OUTPATIENT)
Dept: PHYSICAL MEDICINE AND REHAB | Facility: CLINIC | Age: 59
End: 2019-09-19

## 2019-10-01 ENCOUNTER — OFFICE VISIT (OUTPATIENT)
Dept: ORTHOPEDICS | Facility: CLINIC | Age: 59
End: 2019-10-01
Payer: COMMERCIAL

## 2019-10-01 VITALS
DIASTOLIC BLOOD PRESSURE: 89 MMHG | BODY MASS INDEX: 35.97 KG/M2 | HEART RATE: 80 BPM | WEIGHT: 203 LBS | HEIGHT: 63 IN | SYSTOLIC BLOOD PRESSURE: 141 MMHG

## 2019-10-01 DIAGNOSIS — G56.03 CARPAL TUNNEL SYNDROME, BILATERAL: Primary | ICD-10-CM

## 2019-10-01 PROCEDURE — 99214 OFFICE O/P EST MOD 30 MIN: CPT | Mod: 25,S$GLB,, | Performed by: PHYSICIAN ASSISTANT

## 2019-10-01 PROCEDURE — 3077F SYST BP >= 140 MM HG: CPT | Mod: CPTII,S$GLB,, | Performed by: PHYSICIAN ASSISTANT

## 2019-10-01 PROCEDURE — 3079F DIAST BP 80-89 MM HG: CPT | Mod: CPTII,S$GLB,, | Performed by: PHYSICIAN ASSISTANT

## 2019-10-01 PROCEDURE — 20550 PR INJECT TENDON SHEATH/LIGAMENT: ICD-10-PCS | Mod: 50,S$GLB,, | Performed by: PHYSICIAN ASSISTANT

## 2019-10-01 PROCEDURE — 99999 PR PBB SHADOW E&M-EST. PATIENT-LVL IV: ICD-10-PCS | Mod: PBBFAC,,, | Performed by: PHYSICIAN ASSISTANT

## 2019-10-01 PROCEDURE — 3008F PR BODY MASS INDEX (BMI) DOCUMENTED: ICD-10-PCS | Mod: CPTII,S$GLB,, | Performed by: PHYSICIAN ASSISTANT

## 2019-10-01 PROCEDURE — 3077F PR MOST RECENT SYSTOLIC BLOOD PRESSURE >= 140 MM HG: ICD-10-PCS | Mod: CPTII,S$GLB,, | Performed by: PHYSICIAN ASSISTANT

## 2019-10-01 PROCEDURE — 3079F PR MOST RECENT DIASTOLIC BLOOD PRESSURE 80-89 MM HG: ICD-10-PCS | Mod: CPTII,S$GLB,, | Performed by: PHYSICIAN ASSISTANT

## 2019-10-01 PROCEDURE — 20550 NJX 1 TENDON SHEATH/LIGAMENT: CPT | Mod: 50,S$GLB,, | Performed by: PHYSICIAN ASSISTANT

## 2019-10-01 PROCEDURE — 99214 PR OFFICE/OUTPT VISIT, EST, LEVL IV, 30-39 MIN: ICD-10-PCS | Mod: 25,S$GLB,, | Performed by: PHYSICIAN ASSISTANT

## 2019-10-01 PROCEDURE — 99999 PR PBB SHADOW E&M-EST. PATIENT-LVL IV: CPT | Mod: PBBFAC,,, | Performed by: PHYSICIAN ASSISTANT

## 2019-10-01 PROCEDURE — 3008F BODY MASS INDEX DOCD: CPT | Mod: CPTII,S$GLB,, | Performed by: PHYSICIAN ASSISTANT

## 2019-10-01 RX ORDER — METHYLPREDNISOLONE ACETATE 40 MG/ML
40 INJECTION, SUSPENSION INTRA-ARTICULAR; INTRALESIONAL; INTRAMUSCULAR; SOFT TISSUE ONCE
Status: COMPLETED | OUTPATIENT
Start: 2019-10-01 | End: 2019-10-01

## 2019-10-01 RX ADMIN — METHYLPREDNISOLONE ACETATE 40 MG: 40 INJECTION, SUSPENSION INTRA-ARTICULAR; INTRALESIONAL; INTRAMUSCULAR; SOFT TISSUE at 03:10

## 2019-10-01 NOTE — PROGRESS NOTES
Patient ID: Ora Henderson is a 59 y.o. female.    Chief Complaint: Pain of the Left Wrist and Pain of the Right Wrist      HPI: Ora Henderson  is a 59 y.o. female who c/o Pain of the Left Wrist and Pain of the Right Wrist   for duration of years.  I have seen her previously.  She has carpal tunnel syndrome bilaterally. She had a nerve conduction study done earlier this year.  Pain level 6/10.  Left greater than right.  Quality is aching, numb, tingling.  She states the pain radiates from the neck down the arm and into the hands.  Also worsened nighttime.  Alleviating factors include nothing.  Nerve conduction study was positive for carpal tunnel syndrome negative for radiculopathy.  She is seeing Dr. Up for trigger point injections.    Past Medical History:   Diagnosis Date    Arthritis     GERD (gastroesophageal reflux disease)     Hepatomegaly     and fatty liver    Hernia, umbilical     reducible    Hyperlipidemia     Hypertension     Sleep apnea      Past Surgical History:   Procedure Laterality Date    BRAIN SURGERY      tumor removal     CARPAL TUNNEL RELEASE Bilateral     CERVICAL BIOPSY  W/ LOOP ELECTRODE EXCISION      [facility] '81     SECTION      x 1    COLONOSCOPY N/A 2016    Procedure: COLONOSCOPY;  Surgeon: Quique Paul MD;  Location: [facility];  Service: Endoscopy;  Laterality: N/A;    CYST REMOVAL Left 2018    HERNIA REPAIR      2016    HYSTERECTOMY  2006    fibroids and endometriosis    REFRACTIVE SURGERY      TOTAL ABDOMINAL HYSTERECTOMY W/ BILATERAL SALPINGOOPHORECTOMY      STAR/BSO secondary to endometriosis    VENTRAL HERNIA REPAIR       Family History   Problem Relation Age of Onset    Heart disease Father     Stroke Father     Hypertension Father     Hypertension Mother     Cancer Maternal Aunt         pancreas    Cancer Maternal Uncle         pancreas    Heart disease Paternal Uncle     Heart  disease Paternal Grandmother     Diabetes Maternal Aunt      Social History     Socioeconomic History    Marital status:      Spouse name: Not on file    Number of children: 1    Years of education: Not on file    Highest education level: Not on file   Occupational History    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Employer: [employer]   Social Needs    Financial resource strain: Not on file    Food insecurity:     Worry: Not on file     Inability: Not on file    Transportation needs:     Medical: Not on file     Non-medical: Not on file   Tobacco Use    Smoking status: Never Smoker    Smokeless tobacco: Never Used   Substance and Sexual Activity    Alcohol use: Yes     Alcohol/week: 0.0 standard drinks     Comment: socially    Drug use: No    Sexual activity: Yes     Partners: Male     Birth control/protection: None, Surgical   Lifestyle    Physical activity:     Days per week: Not on file     Minutes per session: Not on file    Stress: Not on file   Relationships    Social connections:     Talks on phone: Not on file     Gets together: Not on file     Attends Restoration service: Not on file     Active member of club or organization: Not on file     Attends meetings of clubs or organizations: Not on file     Relationship status: Not on file   Other Topics Concern    Not on file   Social History Narrative    Not on file     Medication List with Changes/Refills   Current Medications    ALBUTEROL 90 MCG/ACTUATION INHALER    Inhale 1-2 puffs into the lungs every 4 (four) hours as needed for Wheezing or Shortness of Breath (cough).    ASPIRIN 81 MG CHEW    Take 81 mg by mouth as needed.    ESOMEPRAZOLE (NEXIUM) 40 MG CAPSULE    Take 1 capsule (40 mg total) by mouth before breakfast.    L.ACIDOPHILUS-BIF. ANIMALIS (PROBIOTIC) 5 BILLION CELL CPSP    Take 1 tablet by mouth once daily.    LOSARTAN-HYDROCHLOROTHIAZIDE 100-25 MG (HYZAAR) 100-25 MG PER TABLET    Take 1 tablet by mouth once daily.     METHOCARBAMOL (ROBAXIN) 500 MG TAB    Take 1 tablet (500 mg total) by mouth 3 (three) times daily as needed (muscle spasms).    MULTIVIT,CALC,MINS/IRON/FOLIC (DAILY MULTIPLE FOR WOMEN ORAL)    Take 1 tablet by mouth once daily.    NAPROXEN SODIUM (ALEVE) 220 MG TABLET    Take 220 mg by mouth as needed.    RANITIDINE (ZANTAC) 150 MG TABLET    Take 1 tablet (150 mg total) by mouth 2 (two) times daily.    ROSUVASTATIN (CRESTOR) 40 MG TAB    Take 1 tablet (40 mg total) by mouth every evening.    SENNA-DOCUSATE 8.6-50 MG (PERICOLACE) 8.6-50 MG PER TABLET    Take 1 tablet by mouth as needed for Constipation.     Review of patient's allergies indicates:   Allergen Reactions    Sulfa (sulfonamide antibiotics) Hives           Objective:        General    Nursing note and vitals reviewed.  Constitutional: She is oriented to person, place, and time. She appears well-developed and well-nourished.   HENT:   Head: Normocephalic and atraumatic.   Eyes: EOM are normal.   Cardiovascular: Normal rate and regular rhythm.    Pulmonary/Chest: Effort normal.   Abdominal: Soft.   Neurological: She is alert and oriented to person, place, and time.   Psychiatric: She has a normal mood and affect. Her behavior is normal.             Right Hand/Wrist Exam     Inspection   Scars: Wrist - absent Hand -  absent  Effusion: Wrist - absent Hand -  absent  Bruising: Wrist - absent Hand -  absent  Deformity: Wrist - deformity Hand -  deformity    Range of Motion     Wrist   Extension: normal   Flexion: normal   Pronation: normal   Supination: normal     Tests   Phalens Sign: negative  Tinel's sign (median nerve): negative  Finkelstein's test: negative  Carpal Tunnel Compression Test: negative  Cubital Tunnel Compression Test: positive    Atrophy   Thenar:  negative  Hypothenar:  negative  Intrinsic:  negative  1st Dorsal Interosseous: negative    Other     Neuorologic Exam    Median Distribution: normal  Ulnar Distribution: normal  Radial  Distribution: normal    Comments:  2+ radial pulse      Left Hand/Wrist Exam     Inspection   Scars: Wrist - absent Hand -  absent  Effusion: Wrist - absent Hand -  absent  Bruising: Wrist - absent Hand -  absent  Deformity: Wrist - absent Hand -  absent    Range of Motion     Wrist   Extension: normal   Flexion: normal   Pronation: normal   Supination: normal     Tests   Phalens sign: positive  Tinel's sign (median nerve): positive  Finkelstein's test: negative  Carpal Tunnel Compression Test: positive  Cubital Tunnel Compression Test: negative    Atrophy  Thenar:  Negative  Hypothenar:  negative  Intrinsic: negative  1st Dorsal Interosseous:  negative    Other     Sensory Exam  Median Distribution: normal  Ulnar Distribution: normal  Radial Distribution: normal    Comments:  2+ radial pulse      Right Elbow Exam     Tests   Tinel's sign (cubital tunnel): negative      Left Elbow Exam     Tests   Tinel's sign (cubital tunnel): negative        Muscle Strength   Right Upper Extremity   Wrist extension: 5/5/5   Wrist flexion: 5/5/5   : 5/5/5   Pinch Mechanism: 5/5  Left Upper Extremity  Wrist extension: 5/5/5   Wrist flexion: 5/5/5   :  5/5/5   Pinch Mechanism: 5/5    Vascular Exam       Capillary Refill  Right Hand: normal capillary refill  Left Hand: normal capillary refill              Assessment:       Encounter Diagnosis   Name Primary?    Carpal tunnel syndrome, bilateral Yes          Plan:       Ora was seen today for pain and pain.    Diagnoses and all orders for this visit:    Carpal tunnel syndrome, bilateral  -     methylPREDNISolone acetate injection 40 mg  -     methylPREDNISolone acetate injection 40 mg        Ora Henderson is an established pt here for follow-up on the above.  She is not having any relief with night splints.  She has had some trigger point injections done in the physiatry department.  We briefly discussed risks and benefits of corticosteroid injections in  the bilateral carpal tunnels.  She wishes to proceed.  I will see her back on an as-needed basis.  If this does not improve the numbness and tingling in her hand, I would like her to see Dr. Lyon.  Additionally, she is still having some radiating numbness and tingling in the upper extremities.  If this does not improve between the chiropractor in physiatry, she would benefit from referral to interventional pain management for consideration of cervical injections. She verbalizes understanding and agrees.    Follow up if symptoms worsen or fail to improve.    Left carpal tunnel Injection Report:  After verbal consent was obtained for left carpal tunnel injection, patient ID, site, and side were verified.  The left wrist was sterilly prepped in the standard fashion.  A 25-gauge needle was introduced into the left carpal tunnel without complication and was then injected with 5 mg lidocaine plain and 40 mg depomedrol.  A sterile bandaid was applied.  The patient was informed to apply an ice pack approximately 10min once arriving home and not to do anything strenuous for 24hours. He was instructed to call if there were any problems. The patient was discharged in stable condition.    Right carpal tunnel Injection Report:  After verbal consent was obtained for right carpal tunnel injection, patient ID, site, and side were verified.  The  right wrist was sterilly prepped in the standard fashion.  A 25-gauge needle was introduced into right carpal tunnel without complication and was then injected with 5 mg lidocaine plain and 40 mg depomedrol.  A sterile bandaid was applied.  The patient was informed to apply an ice pack approximately 10min once arriving home and not to do anything strenuous for 24hours. She was instructed to call if there were any problems. The patient was discharged in stable condition.    The patient understands, chooses and consents to this plan and accepts all   the risks which include but are not  limited to the risks mentioned above.     Disclaimer: This note was prepared using a voice recognition system and is likely to have sound alike errors within the text.

## 2019-10-04 ENCOUNTER — OFFICE VISIT (OUTPATIENT)
Dept: INTERNAL MEDICINE | Facility: CLINIC | Age: 59
End: 2019-10-04
Payer: COMMERCIAL

## 2019-10-04 DIAGNOSIS — R68.89 FLU-LIKE SYMPTOMS: Primary | ICD-10-CM

## 2019-10-04 DIAGNOSIS — R09.81 SINUS CONGESTION: ICD-10-CM

## 2019-10-04 DIAGNOSIS — R05.9 COUGH: ICD-10-CM

## 2019-10-04 LAB
INFLUENZA A, MOLECULAR: NEGATIVE
INFLUENZA B, MOLECULAR: NEGATIVE
SPECIMEN SOURCE: NORMAL

## 2019-10-04 PROCEDURE — 99999 PR PBB SHADOW E&M-EST. PATIENT-LVL IV: CPT | Mod: PBBFAC,,, | Performed by: FAMILY MEDICINE

## 2019-10-04 PROCEDURE — 96372 THER/PROPH/DIAG INJ SC/IM: CPT | Mod: S$GLB,,, | Performed by: FAMILY MEDICINE

## 2019-10-04 PROCEDURE — 3075F SYST BP GE 130 - 139MM HG: CPT | Mod: CPTII,S$GLB,, | Performed by: FAMILY MEDICINE

## 2019-10-04 PROCEDURE — 99214 PR OFFICE/OUTPT VISIT, EST, LEVL IV, 30-39 MIN: ICD-10-PCS | Mod: 25,S$GLB,, | Performed by: FAMILY MEDICINE

## 2019-10-04 PROCEDURE — 96372 PR INJECTION,THERAP/PROPH/DIAG2ST, IM OR SUBCUT: ICD-10-PCS | Mod: S$GLB,,, | Performed by: FAMILY MEDICINE

## 2019-10-04 PROCEDURE — 3079F DIAST BP 80-89 MM HG: CPT | Mod: CPTII,S$GLB,, | Performed by: FAMILY MEDICINE

## 2019-10-04 PROCEDURE — 3008F PR BODY MASS INDEX (BMI) DOCUMENTED: ICD-10-PCS | Mod: CPTII,S$GLB,, | Performed by: FAMILY MEDICINE

## 2019-10-04 PROCEDURE — 87502 INFLUENZA DNA AMP PROBE: CPT

## 2019-10-04 PROCEDURE — 3008F BODY MASS INDEX DOCD: CPT | Mod: CPTII,S$GLB,, | Performed by: FAMILY MEDICINE

## 2019-10-04 PROCEDURE — 3079F PR MOST RECENT DIASTOLIC BLOOD PRESSURE 80-89 MM HG: ICD-10-PCS | Mod: CPTII,S$GLB,, | Performed by: FAMILY MEDICINE

## 2019-10-04 PROCEDURE — 99999 PR PBB SHADOW E&M-EST. PATIENT-LVL IV: ICD-10-PCS | Mod: PBBFAC,,, | Performed by: FAMILY MEDICINE

## 2019-10-04 PROCEDURE — 99214 OFFICE O/P EST MOD 30 MIN: CPT | Mod: 25,S$GLB,, | Performed by: FAMILY MEDICINE

## 2019-10-04 PROCEDURE — 3075F PR MOST RECENT SYSTOLIC BLOOD PRESS GE 130-139MM HG: ICD-10-PCS | Mod: CPTII,S$GLB,, | Performed by: FAMILY MEDICINE

## 2019-10-04 RX ORDER — TRIAMCINOLONE ACETONIDE 40 MG/ML
40 INJECTION, SUSPENSION INTRA-ARTICULAR; INTRAMUSCULAR
Status: COMPLETED | OUTPATIENT
Start: 2019-10-04 | End: 2019-10-04

## 2019-10-04 RX ORDER — TRIAMCINOLONE ACETONIDE 40 MG/ML
40 INJECTION, SUSPENSION INTRA-ARTICULAR; INTRAMUSCULAR ONCE
Qty: 1 ML | Refills: 0 | Status: SHIPPED | OUTPATIENT
Start: 2019-10-04 | End: 2019-10-04

## 2019-10-04 RX ORDER — PROMETHAZINE HYDROCHLORIDE AND DEXTROMETHORPHAN HYDROBROMIDE 6.25; 15 MG/5ML; MG/5ML
5 SYRUP ORAL NIGHTLY PRN
Qty: 50 ML | Refills: 0 | Status: SHIPPED | OUTPATIENT
Start: 2019-10-04 | End: 2019-10-14

## 2019-10-04 RX ADMIN — TRIAMCINOLONE ACETONIDE 40 MG: 40 INJECTION, SUSPENSION INTRA-ARTICULAR; INTRAMUSCULAR at 12:10

## 2019-10-04 NOTE — PROGRESS NOTES
Subjective:       Patient ID: Ora Henderson is a 59 y.o. female.    Chief Complaint: Fever; Generalized Body Aches; and Cough    Pt is a 59 year old who is here with muscle aches and sinus congestion with pressure and cough. No fever at time of visit. Cough is worse at night laying down. Reports symptoms are in their 3 day now and getting worse. She just feels drained    Review of Systems   Constitutional: Negative.    HENT: Positive for postnasal drip, rhinorrhea, sinus pressure and sinus pain. Voice change: influen.    Respiratory: Positive for cough.    Cardiovascular: Negative.    Gastrointestinal: Negative.    Genitourinary: Negative.    Neurological: Negative.    Hematological: Negative.        Objective:      Physical Exam   Constitutional: She is oriented to person, place, and time. She appears well-developed and well-nourished.   HENT:   Right Ear: Tympanic membrane is erythematous. A middle ear effusion is present.   Left Ear: Tympanic membrane is erythematous.   Nose: Mucosal edema and rhinorrhea present. Right sinus exhibits maxillary sinus tenderness. Left sinus exhibits maxillary sinus tenderness.   Mouth/Throat: Posterior oropharyngeal erythema present.   Cardiovascular: Normal rate and regular rhythm. Exam reveals no friction rub.   Pulmonary/Chest: Effort normal and breath sounds normal. She has no wheezes.   Abdominal: Soft. Bowel sounds are normal.   Neurological: She is alert and oriented to person, place, and time.   Skin: Skin is warm and dry.       Assessment:       1. Flu-like symptoms    2. Cough    3. Sinus congestion        Plan:       Flu-like symptoms  Comments:  Influenza was negative  Orders:  -     Influenza A & B by Molecular    Cough  Comments:  Phenergan DM for cough    Sinus congestion  Comments:  Treat as sinus congestion with steroid shot but if goes on 2-3 more days augmentin 500 mg bid  Orders:  -     triamcinolone acetonide (KENALOG-40) 40 mg/mL injection;  Inject 1 mL (40 mg total) into the muscle once. for 1 dose  Dispense: 1 mL; Refill: 0  -     triamcinolone acetonide injection 40 mg    Other orders  -     promethazine-dextromethorphan (PROMETHAZINE-DM) 6.25-15 mg/5 mL Syrp; Take 5 mLs by mouth nightly as needed.  Dispense: 50 mL; Refill: 0

## 2019-10-07 ENCOUNTER — PATIENT MESSAGE (OUTPATIENT)
Dept: INTERNAL MEDICINE | Facility: CLINIC | Age: 59
End: 2019-10-07

## 2019-10-07 ENCOUNTER — NURSE TRIAGE (OUTPATIENT)
Dept: ADMINISTRATIVE | Facility: CLINIC | Age: 59
End: 2019-10-07

## 2019-10-07 NOTE — TELEPHONE ENCOUNTER
Reason for Disposition   [1] Using nasal washes and pain medicine > 24 hours AND [2] sinus pain (around cheekbone or eye) persists    Additional Information   Negative: Severe difficulty breathing (e.g., struggling for each breath, speaks in single words)   Negative: Bluish (or gray) lips or face now   Negative: [1] Difficulty breathing AND [2] exposure to flames, smoke, or fumes   Negative: [1] Stridor AND [2] difficulty breathing   Negative: Sounds like a life-threatening emergency to the triager   Negative: Chest pain  (Exception: MILD central chest pain, present only when coughing)   Negative: Difficulty breathing   Negative: Patient sounds very sick or weak to the triager   Negative: [1] Coughed up blood AND [2] > 1 tablespoon (15 ml) (Exception: blood-tinged sputum)   Negative: Fever > 103 F (39.4 C)   Negative: [1] Fever > 101 F (38.3 C) AND [2] age > 60   Negative: [1] Fever > 100.0 F (37.8 C) AND [2] bedridden (e.g., nursing home patient, CVA, chronic illness, recovering from surgery)   Negative: [1] Fever > 100.0 F (37.8 C) AND [2] diabetes mellitus or weak immune system (e.g., HIV positive, cancer chemo, splenectomy, chronic steroids)   Negative: Wheezing is present   Negative: SEVERE coughing spells (e.g., whooping sound after coughing, vomiting after coughing)   Negative: [1] Continuous (nonstop) coughing interferes with work or school AND [2] no improvement using cough treatment per Care Advice   Negative: Coughing up maximo-colored (reddish-brown) sputum   Negative: Fever present > 3 days (72 hours)   Negative: [1] Fever returns after gone for over 24 hours AND [2] symptoms worse or not improved    Protocols used: COUGH - ACUTE PMEYSFLOUC-A-EP    Pt had recent  Flu test and was negative, pt made an appt for in the morning but if concerned she should be on antibiotics. Pt has cough with green mucus and some blood streaks when she blows her nose. Per protocol advised she should  see MD within 24 hours which she has an appt.   Cough medicine she was prescribed is helping with her cough.

## 2019-10-08 ENCOUNTER — OFFICE VISIT (OUTPATIENT)
Dept: INTERNAL MEDICINE | Facility: CLINIC | Age: 59
End: 2019-10-08
Payer: COMMERCIAL

## 2019-10-08 VITALS
DIASTOLIC BLOOD PRESSURE: 100 MMHG | SYSTOLIC BLOOD PRESSURE: 142 MMHG | HEART RATE: 73 BPM | RESPIRATION RATE: 18 BRPM | TEMPERATURE: 98 F | HEIGHT: 63 IN | WEIGHT: 200.38 LBS | BODY MASS INDEX: 35.5 KG/M2

## 2019-10-08 DIAGNOSIS — J40 SINOBRONCHITIS: Primary | ICD-10-CM

## 2019-10-08 DIAGNOSIS — J32.9 SINOBRONCHITIS: Primary | ICD-10-CM

## 2019-10-08 DIAGNOSIS — I10 ESSENTIAL HYPERTENSION: ICD-10-CM

## 2019-10-08 DIAGNOSIS — E66.01 SEVERE OBESITY (BMI 35.0-35.9 WITH COMORBIDITY): ICD-10-CM

## 2019-10-08 PROCEDURE — 99999 PR PBB SHADOW E&M-EST. PATIENT-LVL III: CPT | Mod: PBBFAC,,, | Performed by: FAMILY MEDICINE

## 2019-10-08 PROCEDURE — 3077F SYST BP >= 140 MM HG: CPT | Mod: CPTII,S$GLB,, | Performed by: FAMILY MEDICINE

## 2019-10-08 PROCEDURE — 3008F PR BODY MASS INDEX (BMI) DOCUMENTED: ICD-10-PCS | Mod: CPTII,S$GLB,, | Performed by: FAMILY MEDICINE

## 2019-10-08 PROCEDURE — 3080F DIAST BP >= 90 MM HG: CPT | Mod: CPTII,S$GLB,, | Performed by: FAMILY MEDICINE

## 2019-10-08 PROCEDURE — 99214 OFFICE O/P EST MOD 30 MIN: CPT | Mod: S$GLB,,, | Performed by: FAMILY MEDICINE

## 2019-10-08 PROCEDURE — 3080F PR MOST RECENT DIASTOLIC BLOOD PRESSURE >= 90 MM HG: ICD-10-PCS | Mod: CPTII,S$GLB,, | Performed by: FAMILY MEDICINE

## 2019-10-08 PROCEDURE — 3077F PR MOST RECENT SYSTOLIC BLOOD PRESSURE >= 140 MM HG: ICD-10-PCS | Mod: CPTII,S$GLB,, | Performed by: FAMILY MEDICINE

## 2019-10-08 PROCEDURE — 99214 PR OFFICE/OUTPT VISIT, EST, LEVL IV, 30-39 MIN: ICD-10-PCS | Mod: S$GLB,,, | Performed by: FAMILY MEDICINE

## 2019-10-08 PROCEDURE — 99999 PR PBB SHADOW E&M-EST. PATIENT-LVL III: ICD-10-PCS | Mod: PBBFAC,,, | Performed by: FAMILY MEDICINE

## 2019-10-08 PROCEDURE — 3008F BODY MASS INDEX DOCD: CPT | Mod: CPTII,S$GLB,, | Performed by: FAMILY MEDICINE

## 2019-10-08 RX ORDER — AMLODIPINE BESYLATE 2.5 MG/1
2.5 TABLET ORAL DAILY
Qty: 30 TABLET | Refills: 11 | Status: SHIPPED | OUTPATIENT
Start: 2019-10-08 | End: 2020-10-14 | Stop reason: SDUPTHER

## 2019-10-08 RX ORDER — AMOXICILLIN AND CLAVULANATE POTASSIUM 500; 125 MG/1; MG/1
1 TABLET, FILM COATED ORAL 2 TIMES DAILY
Qty: 20 TABLET | Refills: 0 | Status: SHIPPED | OUTPATIENT
Start: 2019-10-08 | End: 2019-12-23

## 2019-10-08 NOTE — PROGRESS NOTES
Subjective:       Patient ID: Ora Henderson is a 59 y.o. female.    Chief Complaint: Cough; Chest Congestion; and Fever    59-year-old  female patient with Patient Active Problem List:     DJD (degenerative joint disease), cervical-mild     Hyperlipidemia     Hepatomegaly     Family history of cardiovascular disease     GERD (gastroesophageal reflux disease)     NATALIE (obstructive sleep apnea)     Rotator cuff impingement syndrome of right shoulder     DJD of right AC (acromioclavicular) joint     History of benign pituitary tumor     Arthritis     Hx of colonic polyp     Essential hypertension     Osteoarthritis of spine with radiculopathy, lumbar region     Severe obesity (BMI 35.0-35.9 with comorbidity)     Bilateral carpal tunnel syndrome     Cervical myofascial pain syndrome     Abnormal ECG  Reports that she has been having low-grade fever cough and congestion for the past 1 week for which patient had seen Dr. Moses recently and has been checked for flu which was negative.  Patient has been taking cough syrup by prescription but continues to have ongoing symptoms with headache and sinus pressure, was sent Augmentin this morning, patient has not picked up the prescription yet.   Reports that she just took blood pressure medication before coming in, has been running high lately  Reports that she has been taking over-the-counter cough and sinus medication in addition to nasal spray and Zyrtec  Denies any chest pain or palpitations, wheezing nausea vomiting    Review of Systems   Constitutional: Positive for fever. Negative for chills and fatigue.   HENT: Positive for congestion, postnasal drip and sinus pressure.    Eyes: Negative for visual disturbance.   Respiratory: Positive for cough. Negative for shortness of breath and wheezing.    Cardiovascular: Negative for chest pain and leg swelling.   Gastrointestinal: Negative for abdominal pain, nausea and vomiting.   Musculoskeletal:  "Negative for myalgias.   Skin: Negative for rash.   Neurological: Negative for light-headedness and headaches.   Psychiatric/Behavioral: Negative for sleep disturbance.         BP (!) 142/100 (BP Location: Left arm, Patient Position: Sitting, BP Method: Large (Manual))   Pulse 73   Temp 97.8 °F (36.6 °C) (Tympanic)   Resp 18   Ht 5' 3" (1.6 m)   Wt 90.9 kg (200 lb 6.4 oz)   BMI 35.50 kg/m²   Objective:      Physical Exam   Constitutional: She is oriented to person, place, and time. She appears well-developed and well-nourished.   HENT:   Head: Normocephalic and atraumatic.   Right Ear: External ear normal.   Left Ear: External ear normal.   Mouth/Throat: Oropharynx is clear and moist. No oropharyngeal exudate.   Positive for sinus pressure in the right maxillary sinus   Neck: Neck supple.   Cardiovascular: Normal rate, regular rhythm and normal heart sounds.   No murmur heard.  Pulmonary/Chest: Effort normal and breath sounds normal. She has no wheezes.   Abdominal: Soft. Bowel sounds are normal. There is no tenderness.   Musculoskeletal: She exhibits no edema.   Lymphadenopathy:     She has no cervical adenopathy.   Neurological: She is alert and oriented to person, place, and time.   Skin: Skin is warm and dry. No rash noted.   Psychiatric: She has a normal mood and affect.         Assessment/Plan:   1. Sinobronchitis  Patient was advised to take Augmentin as prescribed, continue prescription cough syrup, Zyrtec and Flonase and avoid decongestants  Drink adequate fluids    2. Essential hypertension  - amLODIPine (NORVASC) 2.5 MG tablet; Take 1 tablet (2.5 mg total) by mouth once daily.  Dispense: 30 tablet; Refill: 11  Noted elevated blood pressure, will add amlodipine 2.5 mg in addition to losartan hydrochlorothiazide 100/25 mg  Restrict salt intake and eat low-fat and low-cholesterol diet  Follow up in 2 weeks as NV for BP check    3. Severe obesity (BMI 35.0-35.9 with comorbidity)  Lifestyle modifications " recommended to lose weight with BMI 35

## 2019-10-09 VITALS
RESPIRATION RATE: 14 BRPM | HEART RATE: 100 BPM | TEMPERATURE: 98 F | BODY MASS INDEX: 35.66 KG/M2 | SYSTOLIC BLOOD PRESSURE: 132 MMHG | DIASTOLIC BLOOD PRESSURE: 89 MMHG | HEIGHT: 63 IN | WEIGHT: 201.25 LBS

## 2019-10-09 PROBLEM — R09.81 SINUS CONGESTION: Status: ACTIVE | Noted: 2019-10-09

## 2019-10-09 PROBLEM — R05.9 COUGH: Status: ACTIVE | Noted: 2019-10-09

## 2019-10-09 PROBLEM — R68.89 FLU-LIKE SYMPTOMS: Status: ACTIVE | Noted: 2019-10-09

## 2019-10-10 ENCOUNTER — PATIENT OUTREACH (OUTPATIENT)
Dept: ADMINISTRATIVE | Facility: HOSPITAL | Age: 59
End: 2019-10-10

## 2019-10-14 ENCOUNTER — PATIENT OUTREACH (OUTPATIENT)
Dept: OTHER | Facility: OTHER | Age: 59
End: 2019-10-14

## 2019-10-14 NOTE — PROGRESS NOTES
Digital Medicine: Health  Follow-Up    Patient has been dealing with a number of ailments that could be affecting her BP negatively. Patient was diagnosed with a sinus infection and prescribed antibiotics to help combat sinus infection. Patient also received steroid shots for her shoulder and steroid shots for carpal tunnel. Patient stated that she has been seeing the chiropractor and it has helped to alleviate the neck pain she had been experiencing. Patient is also experiencing less shoulder pain as well. Will f/u in 4 weeks to allow for antibiotics and steroid shots to run its course.    The history is provided by the patient.     Follow Up  Follow-up reason(s): reading review              Diet:       Deferred. DBP is elevated, patient stated that she's had elevated DBP since she was young. Will get more info on diet at next outreach.     Physical Activity:   When asked if exercising, patient responded: yes    Patient participates in the following activities: weights, physical therapy/rehab and     She identified the following barriers to physical activity: pain/injury/recent surgery    Patient is working out six days a week and working out with a . Working on rehabbing shoulder and seeing the chiropractor to help alleviate pain in shoulder and neck.    Medication Adherence:       Patient identified the following reasons for non-compliance: none    Patient recently received steroid shots for shoulder and carpal tunnel. Patient is also on antibiotics while fighting off a sinus infection. Dr. Montes added a BP medication (amlodipine) at last visit.       [facility]        Let patient know I would push next call four weeks to allow time for her to finish antibiotics and get over sins infection and then see where her BP falls. Will plan to talk about diet at next outreach.       There are no preventive care reminders to display for this patient.    Last 5 Patient Entered Readings                                       Current 30 Day Average: 148/95     Recent Readings 10/8/2019 10/8/2019 10/5/2019 10/2/2019 10/2/2019    SBP (mmHg) 145 143 149 155 161    DBP (mmHg) 89 96 96 96 96    Pulse 104 96 86 73 74

## 2019-10-24 ENCOUNTER — PATIENT MESSAGE (OUTPATIENT)
Dept: GASTROENTEROLOGY | Facility: CLINIC | Age: 59
End: 2019-10-24

## 2019-10-29 ENCOUNTER — OFFICE VISIT (OUTPATIENT)
Dept: OPHTHALMOLOGY | Facility: CLINIC | Age: 59
End: 2019-10-29
Payer: COMMERCIAL

## 2019-10-29 ENCOUNTER — OFFICE VISIT (OUTPATIENT)
Dept: PHYSICAL MEDICINE AND REHAB | Facility: CLINIC | Age: 59
End: 2019-10-29
Payer: COMMERCIAL

## 2019-10-29 VITALS
RESPIRATION RATE: 14 BRPM | SYSTOLIC BLOOD PRESSURE: 148 MMHG | WEIGHT: 200 LBS | BODY MASS INDEX: 35.44 KG/M2 | HEART RATE: 84 BPM | HEIGHT: 63 IN | DIASTOLIC BLOOD PRESSURE: 93 MMHG

## 2019-10-29 DIAGNOSIS — H53.19 PHOTOPSIA: Primary | ICD-10-CM

## 2019-10-29 DIAGNOSIS — M75.80 ROTATOR CUFF TENDINITIS, UNSPECIFIED LATERALITY: ICD-10-CM

## 2019-10-29 DIAGNOSIS — M53.82 MUSCULOSKELETAL DISORDER OF THE SUBOCCIPITAL: ICD-10-CM

## 2019-10-29 DIAGNOSIS — M47.26 OSTEOARTHRITIS OF SPINE WITH RADICULOPATHY, LUMBAR REGION: Primary | ICD-10-CM

## 2019-10-29 PROCEDURE — 92014 COMPRE OPH EXAM EST PT 1/>: CPT | Mod: S$GLB,,, | Performed by: OPTOMETRIST

## 2019-10-29 PROCEDURE — 99999 PR PBB SHADOW E&M-EST. PATIENT-LVL III: ICD-10-PCS | Mod: PBBFAC,,, | Performed by: PHYSICAL MEDICINE & REHABILITATION

## 2019-10-29 PROCEDURE — 99999 PR PBB SHADOW E&M-EST. PATIENT-LVL III: CPT | Mod: PBBFAC,,, | Performed by: PHYSICAL MEDICINE & REHABILITATION

## 2019-10-29 PROCEDURE — 99214 PR OFFICE/OUTPT VISIT, EST, LEVL IV, 30-39 MIN: ICD-10-PCS | Mod: 25,S$GLB,, | Performed by: PHYSICAL MEDICINE & REHABILITATION

## 2019-10-29 PROCEDURE — 3080F PR MOST RECENT DIASTOLIC BLOOD PRESSURE >= 90 MM HG: ICD-10-PCS | Mod: CPTII,S$GLB,, | Performed by: PHYSICAL MEDICINE & REHABILITATION

## 2019-10-29 PROCEDURE — 99999 PR PBB SHADOW E&M-EST. PATIENT-LVL I: ICD-10-PCS | Mod: PBBFAC,,, | Performed by: OPTOMETRIST

## 2019-10-29 PROCEDURE — 99999 PR PBB SHADOW E&M-EST. PATIENT-LVL I: CPT | Mod: PBBFAC,,, | Performed by: OPTOMETRIST

## 2019-10-29 PROCEDURE — 20553 NJX 1/MLT TRIGGER POINTS 3/>: CPT | Mod: S$GLB,,, | Performed by: PHYSICAL MEDICINE & REHABILITATION

## 2019-10-29 PROCEDURE — 3077F SYST BP >= 140 MM HG: CPT | Mod: CPTII,S$GLB,, | Performed by: PHYSICAL MEDICINE & REHABILITATION

## 2019-10-29 PROCEDURE — 20553 PR INJECT TRIGGER POINTS, > 3: ICD-10-PCS | Mod: S$GLB,,, | Performed by: PHYSICAL MEDICINE & REHABILITATION

## 2019-10-29 PROCEDURE — 3077F PR MOST RECENT SYSTOLIC BLOOD PRESSURE >= 140 MM HG: ICD-10-PCS | Mod: CPTII,S$GLB,, | Performed by: PHYSICAL MEDICINE & REHABILITATION

## 2019-10-29 PROCEDURE — 3080F DIAST BP >= 90 MM HG: CPT | Mod: CPTII,S$GLB,, | Performed by: PHYSICAL MEDICINE & REHABILITATION

## 2019-10-29 PROCEDURE — 3008F PR BODY MASS INDEX (BMI) DOCUMENTED: ICD-10-PCS | Mod: CPTII,S$GLB,, | Performed by: PHYSICAL MEDICINE & REHABILITATION

## 2019-10-29 PROCEDURE — 99214 OFFICE O/P EST MOD 30 MIN: CPT | Mod: 25,S$GLB,, | Performed by: PHYSICAL MEDICINE & REHABILITATION

## 2019-10-29 PROCEDURE — 92014 PR EYE EXAM, EST PATIENT,COMPREHESV: ICD-10-PCS | Mod: S$GLB,,, | Performed by: OPTOMETRIST

## 2019-10-29 PROCEDURE — 3008F BODY MASS INDEX DOCD: CPT | Mod: CPTII,S$GLB,, | Performed by: PHYSICAL MEDICINE & REHABILITATION

## 2019-10-29 RX ORDER — GABAPENTIN 300 MG/1
300 CAPSULE ORAL NIGHTLY
Qty: 30 CAPSULE | Refills: 1 | Status: SHIPPED | OUTPATIENT
Start: 2019-10-29 | End: 2019-12-23

## 2019-10-29 NOTE — PROGRESS NOTES
PMR CLINIC NOTE    Chief Complaint   Patient presents with    Neck Pain     to right shoulder       HPI: This is a 59 y.o.  female being seen in clinic today for continued achy, throbbing shoulder/arm pain.  She continues to have short term relief with injections, but is now considering having her shoulder surgery.  She will be off of work for a while and may do surgery before the end of the year.     History obtained from patient    Past family, medical, social, and surgical history reviewed in chart    Review of Systems:     General- denies lethargy, weight change, fever, chills  Head/neck- denies swallowing difficulties  ENT- denies hearing changes  Cardiovascular-denies chest pain  Pulmonary- denies shortness of breath  GI- denies constipation or bowel incontinence  - denies bladder incontinence  Skin- denies wounds or rashes  Musculoskeletal- +/-weakness, +pain  Neurologic- +numbness and tingling  Psychiatric- denies depressive or psychotic features, denies anxiety  Lymphatic-denies swelling  Endocrine- denies hypoglycemic symptoms/DM history  All other pertinent systems negative     Physical Examination:  General: Well developed, well nourished female, NAD  HEENT:NCAT EOMI bilaterally   Pulmonary:Normal respirations    Spinal Examination: CERVICAL  Active ROM is within normal limits.  Inspection: No deformity of spinal alignment.  Very tight and tender bands at right trapezius, levator scapula, rhomboids, deltoid     Spinal Examination: LUMBAR or THORACIC  Active ROM is within normal limits.  Inspection: No deformity of spinal alignment.      Bilateral Upper and Lower Extremities:  Pulses are 2+ at radial bilaterally.  Shoulder/Elbow/Wrist/Hand ROM wnl, mild weakness in right cuff, limited full rom. Ttp at right ac joint, tinels + at right elbow  Hip/Knee/Ankle ROM   Bilateral Extremities show normal capillary refill.  No signs of cyanosis, rubor, edema, skin changes, or dysvascular changes of appendages.   Nails appear intact.    Neurological Exam:  Cranial Nerves:  II-XII grossly intact    Manual Muscle Testing: (Motor 5=normal)  5/5 strength bilateral upper extremities except 3+/5 right sab    No focal atrophy is noted of either upper extremity.    Bilateral Reflexes:hypo at bic tric br  Persaud's response is absent bilaterally.  Sensation: tested to light touch  - intact in arms  Gait: Narrow base and good arm swing.    IMPRESSION/PLAN: This is a 57 y.o.  female with right rotator cuff tendonopathy, myofascial pain, cuff weakness, CTS    1. Cont exercises for cuff strengthening, ROM, Ergonomics/posture   2. Cont aleve and muscle relaxant prn, restart gabapentin 300 mg QHS  3. Ice/heat compress 20 min re  4. Trigger pt injections  5. Fu 6 wks  6. Fu with orthopedics to discuss shoulder surgery    Amanda Up M.D.  Physical Medicine and Rehab      PROCEDURE NOTE    Diagnosis: Myofascial pain  Procedure: trigger point injections to bilateral trapezius, rhomboid, levator scapula     Risks and benefits of procedure explained to patient including risks of infection, bleeding, pain, or damage to surrounding tissues. All questions answered. Informed consent obtained prior to proceeding. Areas marked and prepped in sterile fashion. Using a 27g 1.25inch needle, 8 cc of 1% lidocaine was injected evenly into the above mentioned muscles. None to minimal bleeding noted. ER and post injection instructions given.    Amanda Up M.D.

## 2019-10-29 NOTE — PROGRESS NOTES
HPI     The patient was last seen 9/17/19 and told to return today for a PVD   recheck and Dilation. The patient states her eyes are doing fine and she   has not seen any flashes of light since last month.    Last edited by Miya Barr on 10/29/2019  2:20 PM. (History)            Assessment /Plan     For exam results, see Encounter Report.    Photopsia    symptoms have resolved completely per pt  stable today, no tears, holes or RD OD, OS  Signs and symptoms of retinal detachment were reviewed with patient  Return to clinic as soon as possible (same day) if you notice any new floaters, flashes of light, curtain/veil over your vision from any direction, or any change in vision.    Otherwise, RTC 1 yr for dilated eye exam.   Discussed above and answered questions.

## 2019-11-01 ENCOUNTER — PATIENT MESSAGE (OUTPATIENT)
Dept: PHYSICAL MEDICINE AND REHAB | Facility: CLINIC | Age: 59
End: 2019-11-01

## 2019-11-04 ENCOUNTER — PATIENT MESSAGE (OUTPATIENT)
Dept: ORTHOPEDICS | Facility: CLINIC | Age: 59
End: 2019-11-04

## 2019-11-04 ENCOUNTER — TELEPHONE (OUTPATIENT)
Dept: SPORTS MEDICINE | Facility: CLINIC | Age: 59
End: 2019-11-04

## 2019-11-04 DIAGNOSIS — M25.511 RIGHT SHOULDER PAIN, UNSPECIFIED CHRONICITY: Primary | ICD-10-CM

## 2019-11-04 NOTE — TELEPHONE ENCOUNTER
Spoke with patient regarding getting an appt scheduled for a L shoulder surgical consult. Informed her that she needs to see one of our orthopedic surgeons. Patient agreed and she is scheduled for 11/6/19 @ 8:15am. Informed patient that she needed updated xrays. Patient verbalized understanding and was grateful for the call. -DD patient is also getting xrays 11/5/19 @5:00pm per patient request-DD          ----- Message from Vito Stock sent at 11/4/2019  3:39 PM CST -----  Contact: Pt  Please give pt a call at [phone] (home) she is requesting an appt at [facility] with this provider and I did inform her that there are no available appt at the [facility] location so she requested to speak with the nurse

## 2019-11-05 ENCOUNTER — HOSPITAL ENCOUNTER (OUTPATIENT)
Dept: RADIOLOGY | Facility: HOSPITAL | Age: 59
Discharge: HOME | End: 2019-11-05
Payer: COMMERCIAL

## 2019-11-05 DIAGNOSIS — M25.511 RIGHT SHOULDER PAIN, UNSPECIFIED CHRONICITY: ICD-10-CM

## 2019-11-05 PROCEDURE — 73030 XR SHOULDER COMPLETE 2 OR MORE VIEWS RIGHT: ICD-10-PCS | Mod: 26,RT,, | Performed by: RADIOLOGY

## 2019-11-05 PROCEDURE — 73030 X-RAY EXAM OF SHOULDER: CPT | Mod: 26,RT,, | Performed by: RADIOLOGY

## 2019-11-05 PROCEDURE — 73030 X-RAY EXAM OF SHOULDER: CPT | Mod: TC,RT

## 2019-11-06 ENCOUNTER — OFFICE VISIT (OUTPATIENT)
Dept: SPORTS MEDICINE | Facility: CLINIC | Age: 59
End: 2019-11-06
Payer: COMMERCIAL

## 2019-11-06 VITALS
SYSTOLIC BLOOD PRESSURE: 141 MMHG | HEIGHT: 63 IN | WEIGHT: 198 LBS | BODY MASS INDEX: 35.08 KG/M2 | HEART RATE: 78 BPM | DIASTOLIC BLOOD PRESSURE: 91 MMHG

## 2019-11-06 DIAGNOSIS — M75.121 COMPLETE TEAR OF RIGHT ROTATOR CUFF, UNSPECIFIED WHETHER TRAUMATIC: Primary | ICD-10-CM

## 2019-11-06 PROCEDURE — 3008F PR BODY MASS INDEX (BMI) DOCUMENTED: ICD-10-PCS | Mod: CPTII,S$GLB,, | Performed by: ORTHOPAEDIC SURGERY

## 2019-11-06 PROCEDURE — 99214 OFFICE O/P EST MOD 30 MIN: CPT | Mod: S$GLB,,, | Performed by: ORTHOPAEDIC SURGERY

## 2019-11-06 PROCEDURE — 99214 PR OFFICE/OUTPT VISIT, EST, LEVL IV, 30-39 MIN: ICD-10-PCS | Mod: S$GLB,,, | Performed by: ORTHOPAEDIC SURGERY

## 2019-11-06 PROCEDURE — 3080F DIAST BP >= 90 MM HG: CPT | Mod: CPTII,S$GLB,, | Performed by: ORTHOPAEDIC SURGERY

## 2019-11-06 PROCEDURE — 3080F PR MOST RECENT DIASTOLIC BLOOD PRESSURE >= 90 MM HG: ICD-10-PCS | Mod: CPTII,S$GLB,, | Performed by: ORTHOPAEDIC SURGERY

## 2019-11-06 PROCEDURE — 3008F BODY MASS INDEX DOCD: CPT | Mod: CPTII,S$GLB,, | Performed by: ORTHOPAEDIC SURGERY

## 2019-11-06 PROCEDURE — 99999 PR PBB SHADOW E&M-EST. PATIENT-LVL III: CPT | Mod: PBBFAC,,, | Performed by: ORTHOPAEDIC SURGERY

## 2019-11-06 PROCEDURE — 3077F PR MOST RECENT SYSTOLIC BLOOD PRESSURE >= 140 MM HG: ICD-10-PCS | Mod: CPTII,S$GLB,, | Performed by: ORTHOPAEDIC SURGERY

## 2019-11-06 PROCEDURE — 3077F SYST BP >= 140 MM HG: CPT | Mod: CPTII,S$GLB,, | Performed by: ORTHOPAEDIC SURGERY

## 2019-11-06 PROCEDURE — 99999 PR PBB SHADOW E&M-EST. PATIENT-LVL III: ICD-10-PCS | Mod: PBBFAC,,, | Performed by: ORTHOPAEDIC SURGERY

## 2019-11-06 NOTE — PROGRESS NOTES
Subjective:          Chief Complaint: Ora Henderson is a 59 y.o. female who had concerns including Pain of the Right Shoulder.    Ora Henderson, a 59 y.o. RHD female, returns today for evaluation of her RIGHT shoulder. Several year history of pain that has worsened over the last few months, no JUSTIN. Reports numbness, tingling, burning, radiating pain throughout right upper extremity. Pain with ADLs above 90. Sees Dr. Tirado for cervical myofascial pain and had trigger point injections to bilateral trapezius, rhomboid, levator scapula 10/29/19. She reports brief improvement in pain following injections. She has been completing a HEP and also takes gabapentin. History of bilateral CTS, EMG 07/26/19. She does medical billing.     Prior 10/02/17  Ora Henderson is a 57 y.o. female here for right shoulder pain. Worsened in July 2017. Did some PT since then with mild improvement.    Shoulder Pain    The pain is present in the right shoulder. This is a chronic problem. The current episode started more than 1 year ago. There has been no history of extremity trauma. The problem has been gradually worsening. The quality of the pain is described as tingling, burning and numbing. The pain is at a severity of 5/10. Associated symptoms include numbness and tingling. The symptoms are aggravated by activity. She has tried injection treatment, other, cold and heat for the symptoms. The treatment provided mild relief. Physical therapy was effective.      Review of Systems   Constitution: Negative for night sweats.   HENT: Negative for hearing loss.    Eyes: Negative for blurred vision and visual disturbance.   Cardiovascular: Negative for leg swelling.   Respiratory: Negative for shortness of breath.    Endocrine: Negative for polyuria.   Hematologic/Lymphatic: Negative for bleeding problem.   Musculoskeletal: Positive for joint pain and muscle weakness. Negative for back pain,  joint swelling and muscle cramps.   Gastrointestinal: Negative for melena.   Genitourinary: Negative for hematuria.   Neurological: Positive for numbness and tingling. Negative for loss of balance and paresthesias.   Psychiatric/Behavioral: Negative for altered mental status.     Past Medical History:   Diagnosis Date    Arthritis     GERD (gastroesophageal reflux disease)     Hepatomegaly     and fatty liver    Hernia, umbilical     reducible    Hyperlipidemia     Hypertension     Sleep apnea      Past Surgical History:   Procedure Laterality Date    BRAIN SURGERY      tumor removal     CARPAL TUNNEL RELEASE Bilateral     CERVICAL BIOPSY  W/ LOOP ELECTRODE EXCISION      [facility] '81     SECTION      x 1    COLONOSCOPY N/A 2016    Procedure: COLONOSCOPY;  Surgeon: Quique Paul MD;  Location: [facility];  Service: Endoscopy;  Laterality: N/A;    CYST REMOVAL Left 2018    HERNIA REPAIR      2016    HYSTERECTOMY  2006    fibroids and endometriosis    REFRACTIVE SURGERY      TOTAL ABDOMINAL HYSTERECTOMY W/ BILATERAL SALPINGOOPHORECTOMY      STAR/BSO secondary to endometriosis    VENTRAL HERNIA REPAIR       Social History     Socioeconomic History    Marital status:      Spouse name: Not on file    Number of children: 1    Years of education: Not on file    Highest education level: Not on file   Occupational History    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Employer: [employer]   Social Needs    Financial resource strain: Not on file    Food insecurity:     Worry: Not on file     Inability: Not on file    Transportation needs:     Medical: Not on file     Non-medical: Not on file   Tobacco Use    Smoking status: Never Smoker    Smokeless tobacco: Never Used   Substance and Sexual Activity    Alcohol use: Yes     Alcohol/week: 0.0 standard drinks     Comment: socially    Drug use: No    Sexual activity: Yes     Partners: Male     Birth  control/protection: None, Surgical   Lifestyle    Physical activity:     Days per week: Not on file     Minutes per session: Not on file    Stress: Not on file   Relationships    Social connections:     Talks on phone: Not on file     Gets together: Not on file     Attends [employer] service: Not on file     Active member of club or organization: Not on file     Attends meetings of clubs or organizations: Not on file     Relationship status: Not on file   Other Topics Concern    Not on file   Social History Narrative    Not on file     Vitals:    11/06/19 0847   BP: (!) 141/91   Pulse: 78                   Objective:        General: Ora is well-developed, well-nourished, appears stated age, in no acute distress, alert and oriented to time, place and person.     General    Vitals reviewed.  Constitutional: She is oriented to person, place, and time. She appears well-developed and well-nourished. No distress.   HENT:   Head: Atraumatic.   Nose: Nose normal.   Eyes: EOM are normal. Right eye exhibits no discharge. Left eye exhibits no discharge.   Neck: Neck supple.   Cardiovascular: Normal rate and intact distal pulses.    Pulmonary/Chest: Effort normal. No respiratory distress.   Neurological: She is alert and oriented to person, place, and time. Coordination normal.   Psychiatric: She has a normal mood and affect. Her behavior is normal. Judgment and thought content normal.         Right Shoulder Exam     Inspection/Observation   Swelling: absent  Bruising: absent  Scars: absent  Deformity: absent  Scapular Winging: absent  Scapular Dyskinesia: negative  Atrophy: absent    Tenderness   The patient is tender to palpation of the biceps tendon.    Range of Motion   Active abduction: 90   Passive abduction: 100   Extension: 0   Forward Flexion: 160   Forward Elevation: 160Adduction: 40   External Rotation 0 degrees: 50   Internal rotation 0 degrees: T8     Tests & Signs   Drop arm: negative  Foreman test:  positive  Impingement: positive  Lift Off Sign: negative  Active Compression Test (Madison's Sign): positive  Speed's Test: positive    Other   Sensation: normal    Left Shoulder Exam     Inspection/Observation   Swelling: absent  Bruising: absent  Scars: absent  Deformity: absent  Scapular Winging: absent  Scapular Dyskinesia: negative  Atrophy: absent    Tenderness   The patient is experiencing no tenderness.     Range of Motion   Active abduction: 90   Passive abduction: 100   Extension: 0   Forward Flexion: 160   Forward Elevation: 160Adduction: 40   External Rotation 0 degrees: 50   Internal rotation 0 degrees: T8     Tests & Signs   Drop arm: negative  Foreman test: negative  Impingement: negative  Lift Off Sign: negative  Active Compression test (Madison's Sign): negative  Speed's Test: negative  Bear Hug: negative    Other   Sensation: normal       Muscle Strength   Right Upper Extremity   Shoulder Abduction: 5/5   Shoulder Internal Rotation: 4/5   Shoulder External Rotation: 4/5   Supraspinatus: 4/5/5   Subscapularis: 5/5/5   Biceps: 5/5/5   Left Upper Extremity  Shoulder Abduction: 5/5   Shoulder Internal Rotation: 5/5   Shoulder External Rotation: 5/5   Supraspinatus: 5/5/5   Subscapularis: 5/5/5   Biceps: 5/5/5     Reflexes     Left Side  Biceps:  2+  Triceps:  2+    Right Side   Biceps:  2+  Triceps:  2+    Vascular Exam     Right Pulses      Radial:                    2+      Left Pulses      Radial:                    2+      Capillary Refill  Right Hand: normal capillary refill  Left Hand: normal capillary refill    Relevant imaging results reviewed and interpreted by me, discussed with the patient and / or family today X-ray Shoulder 2 or More Views Right  Narrative: EXAMINATION:  XR SHOULDER COMPLETE 2 OR MORE VIEWS RIGHT    CLINICAL HISTORY:  Pain in right shoulder    TECHNIQUE:  Two or three views of the right shoulder were preformed.    COMPARISON:  10/02/2017    FINDINGS:  No acute fracture or  dislocation.  Mild AC joint arthropathy is noted.  Mild-to-moderate degenerative change at the right glenohumeral joint.  Impression: 1.  As above    Electronically signed by: Bowen Montes DO  Date:    11/06/2019  Time:    08:30        Reading Physician Reading Date Result Priority   Parvez Miranda MD 9/23/2017       Narrative     Technique: Standard multiplanar multisequence imaging of the right shoulder was performed.    Findings: There is motion artifact on selected sequences which limits evaluation.    There is a full-thickness tear of the distal supraspinatus tendon with a possible 0.5 cm fluid filled tendon defect demonstrated.  Distal full-thickness or high-grade partial infraspinatus tendon tear also noted with attenuation and poor delineation of the distal tendon fibers.  Fluid and edema extends proximally along the infraspinatus myotendinous junction.  There are chronic tendinosis changes of the subscapularis tendon which is otherwise intact.  Teres minor tendon is unremarkable.  There is contiguous complex fluid within the glenohumeral joint, subacromial subdeltoid bursa, subscapularis recess, and biceps tendon sheath.  Small loose bodies or debris within the aforementioned fluid fluid with concomitant synovitis changes.    There is degenerative osseous and capsular hypertrophy of the acromioclavicular joint.  The acromion is a type II configuration with a concave undersurface.    There is chronic degeneration of the proximal intracapsular course of the biceps tendon and biceps labral anchor.  No discrete labral tear or detachment.  Mild intraosseous cystic or edematous changes regional to the greater tuberosity of the humerus and superior glenoid.      Impression         1.  Full-thickness retracted tear supraspinatus tendon and probable smaller full-thickness or high-grade partial tear distal infraspinatus tendon.    2.  Degenerative arthropathy of the acromioclavicular joint.    3.  Complex joint  and bursal fluid with concomitant synovitis changes.    4.  Chronic degeneration biceps tendon and biceps labral anchor.          Electronically signed by: MARIZOL TAVERA MD  Date: 09/23/17  Time: 16:19               Reading Physician Reading Date Result Priority   Bowen Montes DO 11/6/2019 Routine      Narrative     EXAMINATION:  XR SHOULDER COMPLETE 2 OR MORE VIEWS RIGHT    CLINICAL HISTORY:  Pain in right shoulder    TECHNIQUE:  Two or three views of the right shoulder were preformed.    COMPARISON:  10/02/2017    FINDINGS:  No acute fracture or dislocation.  Mild AC joint arthropathy is noted.  Mild-to-moderate degenerative change at the right glenohumeral joint.      Impression       1.  As above      Electronically signed by: Bowen Montes DO  Date: 11/06/2019  Time: 08:30              Assessment:       Encounter Diagnosis   Name Primary?    Complete tear of right rotator cuff, unspecified whether traumatic Yes          Plan:       New MRI, last over 2 years old.  Evaluate for cuff atrophy.    Follow-up after MRI to discuss right rotator cuff surgery                  Disclaimer: This note was prepared using a voice recognition system and is likely to have sound alike errors within the text.

## 2019-11-13 ENCOUNTER — PATIENT OUTREACH (OUTPATIENT)
Dept: OTHER | Facility: OTHER | Age: 59
End: 2019-11-13

## 2019-11-15 ENCOUNTER — OFFICE VISIT (OUTPATIENT)
Dept: INTERNAL MEDICINE | Facility: CLINIC | Age: 59
End: 2019-11-15
Payer: COMMERCIAL

## 2019-11-15 ENCOUNTER — HOSPITAL ENCOUNTER (OUTPATIENT)
Dept: RADIOLOGY | Facility: HOSPITAL | Age: 59
Discharge: HOME | End: 2019-11-15
Attending: FAMILY MEDICINE
Payer: COMMERCIAL

## 2019-11-15 ENCOUNTER — TELEPHONE (OUTPATIENT)
Dept: RADIOLOGY | Facility: HOSPITAL | Age: 59
End: 2019-11-15

## 2019-11-15 VITALS
BODY MASS INDEX: 36.09 KG/M2 | SYSTOLIC BLOOD PRESSURE: 128 MMHG | TEMPERATURE: 101 F | HEIGHT: 63 IN | DIASTOLIC BLOOD PRESSURE: 81 MMHG | HEART RATE: 123 BPM | WEIGHT: 203.69 LBS

## 2019-11-15 DIAGNOSIS — R05.9 COUGH: ICD-10-CM

## 2019-11-15 DIAGNOSIS — J11.1 FLU SYNDROME: ICD-10-CM

## 2019-11-15 DIAGNOSIS — J11.1 FLU SYNDROME: Primary | ICD-10-CM

## 2019-11-15 PROBLEM — R68.89 FLU-LIKE SYMPTOMS: Status: RESOLVED | Noted: 2019-10-09 | Resolved: 2019-11-15

## 2019-11-15 PROCEDURE — 71046 X-RAY EXAM CHEST 2 VIEWS: CPT | Mod: TC

## 2019-11-15 PROCEDURE — 3079F DIAST BP 80-89 MM HG: CPT | Mod: CPTII,S$GLB,, | Performed by: FAMILY MEDICINE

## 2019-11-15 PROCEDURE — 3008F PR BODY MASS INDEX (BMI) DOCUMENTED: ICD-10-PCS | Mod: CPTII,S$GLB,, | Performed by: FAMILY MEDICINE

## 2019-11-15 PROCEDURE — 3008F BODY MASS INDEX DOCD: CPT | Mod: CPTII,S$GLB,, | Performed by: FAMILY MEDICINE

## 2019-11-15 PROCEDURE — 3079F PR MOST RECENT DIASTOLIC BLOOD PRESSURE 80-89 MM HG: ICD-10-PCS | Mod: CPTII,S$GLB,, | Performed by: FAMILY MEDICINE

## 2019-11-15 PROCEDURE — 96372 PR INJECTION,THERAP/PROPH/DIAG2ST, IM OR SUBCUT: ICD-10-PCS | Mod: S$GLB,,, | Performed by: FAMILY MEDICINE

## 2019-11-15 PROCEDURE — 99999 PR PBB SHADOW E&M-EST. PATIENT-LVL V: ICD-10-PCS | Mod: PBBFAC,,, | Performed by: FAMILY MEDICINE

## 2019-11-15 PROCEDURE — 99214 PR OFFICE/OUTPT VISIT, EST, LEVL IV, 30-39 MIN: ICD-10-PCS | Mod: 25,S$GLB,, | Performed by: FAMILY MEDICINE

## 2019-11-15 PROCEDURE — 3074F SYST BP LT 130 MM HG: CPT | Mod: CPTII,S$GLB,, | Performed by: FAMILY MEDICINE

## 2019-11-15 PROCEDURE — 3074F PR MOST RECENT SYSTOLIC BLOOD PRESSURE < 130 MM HG: ICD-10-PCS | Mod: CPTII,S$GLB,, | Performed by: FAMILY MEDICINE

## 2019-11-15 PROCEDURE — 99999 PR PBB SHADOW E&M-EST. PATIENT-LVL V: CPT | Mod: PBBFAC,,, | Performed by: FAMILY MEDICINE

## 2019-11-15 PROCEDURE — 99214 OFFICE O/P EST MOD 30 MIN: CPT | Mod: 25,S$GLB,, | Performed by: FAMILY MEDICINE

## 2019-11-15 PROCEDURE — 96372 THER/PROPH/DIAG INJ SC/IM: CPT | Mod: S$GLB,,, | Performed by: FAMILY MEDICINE

## 2019-11-15 PROCEDURE — 71046 XR CHEST PA AND LATERAL: ICD-10-PCS | Mod: 26,,, | Performed by: RADIOLOGY

## 2019-11-15 PROCEDURE — 71046 X-RAY EXAM CHEST 2 VIEWS: CPT | Mod: 26,,, | Performed by: RADIOLOGY

## 2019-11-15 RX ORDER — BENZONATATE 200 MG/1
200 CAPSULE ORAL 3 TIMES DAILY PRN
Qty: 30 CAPSULE | Refills: 1 | Status: CANCELLED | OUTPATIENT
Start: 2019-11-15 | End: 2019-11-25

## 2019-11-15 RX ORDER — PREDNISONE 5 MG/1
TABLET ORAL
Qty: 30 TABLET | Refills: 0 | Status: CANCELLED | OUTPATIENT
Start: 2019-11-15

## 2019-11-15 RX ORDER — CODEINE PHOSPHATE AND GUAIFENESIN 10; 100 MG/5ML; MG/5ML
10 SOLUTION ORAL 3 TIMES DAILY PRN
Qty: 236 ML | Refills: 0 | Status: SHIPPED | OUTPATIENT
Start: 2019-11-15 | End: 2019-11-25

## 2019-11-15 RX ORDER — BETAMETHASONE SODIUM PHOSPHATE AND BETAMETHASONE ACETATE 3; 3 MG/ML; MG/ML
6 INJECTION, SUSPENSION INTRA-ARTICULAR; INTRALESIONAL; INTRAMUSCULAR; SOFT TISSUE
Status: COMPLETED | OUTPATIENT
Start: 2019-11-15 | End: 2019-11-15

## 2019-11-15 RX ADMIN — BETAMETHASONE SODIUM PHOSPHATE AND BETAMETHASONE ACETATE 6 MG: 3; 3 INJECTION, SUSPENSION INTRA-ARTICULAR; INTRALESIONAL; INTRAMUSCULAR; SOFT TISSUE at 04:11

## 2019-11-15 NOTE — PROGRESS NOTES
CHIEF COMPLAINT  Cough; Headache; and Generalized Body Aches      HISTORY, ASSESSMENT, and PLAN    1. Flu syndrome    2. Cough      NEW PROBLEM is symptoms of upper/lower respiratory illness. ONSET of symptoms reported as today. QUALITY of symptoms described primarily as productive cough productive clear sputum, fatigue and malaise, generalized muscle aches, fever. ASSOCIATED SYMPTOMS include headache, nasal congestion. No additional ASSOCIATED SYMPTOMS reported. LOCATION of worst symptoms reported as chest. SEVERITY of symptoms described as severe. TIMING of symptoms described as worsening. EXACERBATING FACTORS were not identified. ALLEVIATING FACTORS were not identified. She reports prior intolerance to Tamiflu. We discussed differential diagnosis and risks and benefits of treatment options. I educated her on the apparently viral nature of this acute illness, how the management was largely supportive and symptom focused. We discussed the risks and benefits of treatment options, and she feels that the severity of her symptoms is sufficient to warrant the treatment prescribed. I told her to expect gradual improvement and resolution of symptoms over the next week or two, and I instructed her to let me know if her illness failed to follow this expected course.     Orders Placed This Encounter    X-Ray Chest PA And Lateral    baloxavir marboxil 40 mg Tab    guaifenesin-codeine 100-10 mg/5 ml (TUSSI-ORGANIDIN NR)  mg/5 mL syrup    betamethasone acetate-betamethasone sodium phosphate injection 6 mg       Problem List Items Addressed This Visit        Pulmonary    Cough    Overview     Phenergan DM for cough         Relevant Orders    X-Ray Chest PA And Lateral (Completed)       ID    Flu syndrome - Primary    Relevant Medications    betamethasone acetate-betamethasone sodium phosphate injection 6 mg (Completed)    Other Relevant Orders    X-Ray Chest PA And Lateral (Completed)           Outpatient Medications  Prior to Visit   Medication Sig Dispense Refill    albuterol 90 mcg/actuation inhaler Inhale 1-2 puffs into the lungs every 4 (four) hours as needed for Wheezing or Shortness of Breath (cough). 1 Inhaler 0    amLODIPine (NORVASC) 2.5 MG tablet Take 1 tablet (2.5 mg total) by mouth once daily. 30 tablet 11    aspirin 81 MG Chew Take 81 mg by mouth as needed.      esomeprazole (NEXIUM) 40 MG capsule Take 1 capsule (40 mg total) by mouth before breakfast. 90 capsule 3    gabapentin (NEURONTIN) 300 MG capsule Take 1 capsule (300 mg total) by mouth every evening. 30 capsule 1    L.acidophilus-Bif. animalis (PROBIOTIC) 5 billion cell CpSP Take 1 tablet by mouth once daily. 10 capsule 0    methocarbamol (ROBAXIN) 500 MG Tab Take 1 tablet (500 mg total) by mouth 3 (three) times daily as needed (muscle spasms). 20 tablet 1    MULTIVIT,CALC,MINS/IRON/FOLIC (DAILY MULTIPLE FOR WOMEN ORAL) Take 1 tablet by mouth once daily.      naproxen sodium (ALEVE) 220 MG tablet Take 220 mg by mouth as needed.      ranitidine (ZANTAC) 150 MG tablet Take 1 tablet (150 mg total) by mouth 2 (two) times daily. 60 tablet 0    rosuvastatin (CRESTOR) 40 MG Tab Take 1 tablet (40 mg total) by mouth every evening. (Patient taking differently: Take 40 mg by mouth once daily. ) 90 tablet 3    senna-docusate 8.6-50 mg (PERICOLACE) 8.6-50 mg per tablet Take 1 tablet by mouth as needed for Constipation.      amoxicillin-clavulanate 500-125mg (AUGMENTIN) 500-125 mg Tab Take 1 tablet (500 mg total) by mouth 2 (two) times daily. 20 tablet 0    losartan-hydrochlorothiazide 100-25 mg (HYZAAR) 100-25 mg per tablet Take 1 tablet by mouth once daily. 90 tablet 3     No facility-administered medications prior to visit.         Medications Ordered This Encounter   Medications    baloxavir marboxil 40 mg Tab     Sig: Take 1 tablet by mouth once. for 1 dose     Dispense:  1 tablet     Refill:  0    betamethasone acetate-betamethasone sodium phosphate  "injection 6 mg    guaifenesin-codeine 100-10 mg/5 ml (TUSSI-ORGANIDIN NR)  mg/5 mL syrup     Sig: Take 10 mLs by mouth 3 (three) times daily as needed for Cough (FOR COUGH).     Dispense:  236 mL     Refill:  0     GREATER THAN 7-DAY SUPPLY IS MEDICALLY NECESSARY.       There are no discontinued medications.     Follow up if symptoms worsen or fail to improve.    REVIEW OF SYSTEMS  CONSTITUTIONAL: Fever reported.   PULMONARY: No hemoptysis or difficulty breathing reported.  CARDIOVASCULAR: No edema or orthopnea reported.     PHYSICAL EXAM  Vitals:    11/15/19 1536   BP: 128/81   Pulse: (!) 123   Temp: (!) 100.7 °F (38.2 °C)   TempSrc: Tympanic   Weight: 92.4 kg (203 lb 11.3 oz)   Height: 5' 3" (1.6 m)     CONSTITUTIONAL: Vital signs noted. No apparent distress. Does not appear acutely ill or septic. Appears adequately hydrated.  EYES: Pupils equal and reactive. Extraocular movements intact. Sclerae anicteric. Lids and conjunctiva unremarkable.  ENT: External ENT grossly unremarkable. Ear canals and visualized tympanic membranes are unremarkable. Hearing grossly intact. Nasal mucosa pink. Oropharynx moist. Posterior oropharynx is reasonably symmetric with minimal erythema, but is without lesion, ulceration, or exudate.  NECK: Trachea midline. No significant cervical lymphadenopathy.  PULM: Mild scattered rhonchi with occasional wheeze. Good air movement. Breathing unlabored.  HEART: Auscultation reveals regular rate and rhythm.  ABDOMEN: Soft and nontender. Bowel sounds present.   DERM: Skin warm and moist with normal turgor.   PSYCHIATRIC: Alert and conversant and grossly oriented. Mood is grossly neutral. Affect appropriate. Judgment and insight not grossly compromised.   MUSCULOSKELETAL: Stance and gait are grossly normal.     Documentation entered by me for this encounter may have been done in part using speech-recognition technology. Although I have made an effort to ensure accuracy, "sound like" errors " may exist and should be interpreted in context. -CHRISTIANO Davison MD.

## 2019-11-15 NOTE — PATIENT INSTRUCTIONS
11/15/2019        TO WHOM IT MAY CONCERN:     RE:  Ora HOFFMAN [facility] ,  1960     Oar was treated in my office 11/15/19.    She is estimated to be able to return to work without restrictions in 7-10 days.    Please extend to Ora all due courtesy and consideration and excuse her absence.    Sincerely,     CHRISTIANO Davison MD

## 2019-11-21 ENCOUNTER — PATIENT MESSAGE (OUTPATIENT)
Dept: INTERNAL MEDICINE | Facility: CLINIC | Age: 59
End: 2019-11-21

## 2019-11-25 NOTE — TELEPHONE ENCOUNTER
----- Message from Savanah Carrero sent at 8/7/2018 12:57 PM CDT -----  Contact: pt   Pt stated she's running late want to know if she can come at a later time, she can be reached at [phone] Thanks     4

## 2019-11-26 ENCOUNTER — TELEPHONE (OUTPATIENT)
Dept: ORTHOPEDICS | Facility: CLINIC | Age: 59
End: 2019-11-26

## 2019-11-26 ENCOUNTER — HOSPITAL ENCOUNTER (OUTPATIENT)
Dept: RADIOLOGY | Facility: HOSPITAL | Age: 59
Discharge: HOME | End: 2019-11-26
Attending: ORTHOPAEDIC SURGERY
Payer: COMMERCIAL

## 2019-11-26 DIAGNOSIS — M75.121 COMPLETE TEAR OF RIGHT ROTATOR CUFF, UNSPECIFIED WHETHER TRAUMATIC: Primary | ICD-10-CM

## 2019-11-26 DIAGNOSIS — M75.121 COMPLETE TEAR OF RIGHT ROTATOR CUFF, UNSPECIFIED WHETHER TRAUMATIC: ICD-10-CM

## 2019-11-26 PROCEDURE — 73221 MRI JOINT UPR EXTREM W/O DYE: CPT | Mod: TC,PO,RT

## 2019-11-26 PROCEDURE — 73221 MRI SHOULDER WITHOUT CONTRAST RIGHT: ICD-10-PCS | Mod: 26,RT,, | Performed by: RADIOLOGY

## 2019-11-26 PROCEDURE — 73221 MRI JOINT UPR EXTREM W/O DYE: CPT | Mod: 26,RT,, | Performed by: RADIOLOGY

## 2019-11-26 NOTE — TELEPHONE ENCOUNTER
Discussed surgery dates with pt. Explained that consents/surgical discussion would be done at appointment already scheduled on 12/2/19. She confirmed surgery date of 12/31/19. Pt verbalized understanding and was thankful for the call. AL, LPN.

## 2019-11-26 NOTE — TELEPHONE ENCOUNTER
Attempted to contact pt regarding picking a possible surgery date. Surgery will be discussed at appointment that she has scheduled on 12/2/19, as well as consents signed. Just wanting to see if pt wants to pick a date before he is booked up for the year. No answer. Left voicemail for pt to return call, AL, LPN.

## 2019-12-02 ENCOUNTER — PATIENT MESSAGE (OUTPATIENT)
Dept: PHYSICAL MEDICINE AND REHAB | Facility: CLINIC | Age: 59
End: 2019-12-02

## 2019-12-02 ENCOUNTER — OFFICE VISIT (OUTPATIENT)
Dept: SPORTS MEDICINE | Facility: CLINIC | Age: 59
End: 2019-12-02
Payer: COMMERCIAL

## 2019-12-02 VITALS
BODY MASS INDEX: 35.97 KG/M2 | HEART RATE: 79 BPM | WEIGHT: 203 LBS | DIASTOLIC BLOOD PRESSURE: 87 MMHG | HEIGHT: 63 IN | SYSTOLIC BLOOD PRESSURE: 129 MMHG

## 2019-12-02 DIAGNOSIS — M75.21 TENDONITIS OF UPPER BICEPS TENDON OF RIGHT SHOULDER: ICD-10-CM

## 2019-12-02 DIAGNOSIS — M19.011 ARTHRITIS OF RIGHT ACROMIOCLAVICULAR JOINT: ICD-10-CM

## 2019-12-02 DIAGNOSIS — M75.121 NONTRAUMATIC COMPLETE TEAR OF RIGHT ROTATOR CUFF: Primary | ICD-10-CM

## 2019-12-02 PROCEDURE — 3074F PR MOST RECENT SYSTOLIC BLOOD PRESSURE < 130 MM HG: ICD-10-PCS | Mod: CPTII,S$GLB,, | Performed by: ORTHOPAEDIC SURGERY

## 2019-12-02 PROCEDURE — 3079F DIAST BP 80-89 MM HG: CPT | Mod: CPTII,S$GLB,, | Performed by: ORTHOPAEDIC SURGERY

## 2019-12-02 PROCEDURE — 99214 OFFICE O/P EST MOD 30 MIN: CPT | Mod: S$GLB,,, | Performed by: ORTHOPAEDIC SURGERY

## 2019-12-02 PROCEDURE — 99999 PR PBB SHADOW E&M-EST. PATIENT-LVL III: ICD-10-PCS | Mod: PBBFAC,,, | Performed by: ORTHOPAEDIC SURGERY

## 2019-12-02 PROCEDURE — 3008F BODY MASS INDEX DOCD: CPT | Mod: CPTII,S$GLB,, | Performed by: ORTHOPAEDIC SURGERY

## 2019-12-02 PROCEDURE — 3074F SYST BP LT 130 MM HG: CPT | Mod: CPTII,S$GLB,, | Performed by: ORTHOPAEDIC SURGERY

## 2019-12-02 PROCEDURE — 99999 PR PBB SHADOW E&M-EST. PATIENT-LVL III: CPT | Mod: PBBFAC,,, | Performed by: ORTHOPAEDIC SURGERY

## 2019-12-02 PROCEDURE — 3008F PR BODY MASS INDEX (BMI) DOCUMENTED: ICD-10-PCS | Mod: CPTII,S$GLB,, | Performed by: ORTHOPAEDIC SURGERY

## 2019-12-02 PROCEDURE — 3079F PR MOST RECENT DIASTOLIC BLOOD PRESSURE 80-89 MM HG: ICD-10-PCS | Mod: CPTII,S$GLB,, | Performed by: ORTHOPAEDIC SURGERY

## 2019-12-02 PROCEDURE — 99214 PR OFFICE/OUTPT VISIT, EST, LEVL IV, 30-39 MIN: ICD-10-PCS | Mod: S$GLB,,, | Performed by: ORTHOPAEDIC SURGERY

## 2019-12-02 NOTE — PATIENT INSTRUCTIONS
If you are experiencing pain/discomfort or have questions after 5pm and would like to be connected to the [facility] on call team, please call this number [phone] and specify which Orthopedics/Sports Medicine provider is treating you.       Shoulder Arthroscopy  The shoulder is your bodys most flexible joint. It lets the arm move in almost any direction. But this flexibility has a price -- it makes the joint prone to injury. If you have a shoulder problem, a surgical procedure called arthroscopy can help.     A camera in the arthroscope allows your surgeon to view your shoulder joint on a monitor.   Your orthopaedic evaluation  Your doctor will ask about your symptoms and the history of your shoulder problem. He or she will examine your shoulder and may give you tests, such as an X-ray or MRI. These help your doctor find the cause of your shoulder problem.  Arthroscopy: Looking inside your joint  Arthroscopy is a procedure that allows your doctor to see and work inside your shoulder joint. Your doctor makes small incision in your shoulder and inserts a long, thin, lighted instrument, called an arthroscope. During surgery, the arthroscope sends live video images from inside your joint to a screen that your doctor views. Using these images, your doctor can diagnose and treat your shoulder problem. Because arthroscopy uses much smaller incisions than open surgery, recovery is often shorter and less painful.  Risks and possible complications of shoulder arthroscopy  · Stiffness or ongoing pain in your shoulder  · Bleeding or blood clots  · Infection  · Damage to nerves or blood vessels  You may still need open surgery after having arthroscopy.  Date Last Reviewed: 9/10/2015  © 4269-3680 [employer]. [address] [place] [address], [place] [address]. All rights reserved. This information is not intended as a substitute for professional medical care. Always follow your healthcare  professional's instructions.        Shoulder Arthroscopy: Conditions Treated  You will be given instructions for how to prepare for your surgery and when you should arrive at the hospital or surgery center. Just before surgery, a doctor will talk to you about the anesthesia that will be used to keep you free of pain during surgery. You may be asked by several people to confirm which shoulder is being operated on. This is for your safety. Below are common shoulder problems and how they are treated during arthroscopy.       Impingement  · Repeated overhead movements can inflame your rotator cuff and bursa. A bone spur may also form. This causes pain and problems with certain arm movements. Impingement is also called bursitis or tendinitis.  · During surgery, an inflamed bursa may be removed. Bone may be trimmed. And the coracoacromial ligament may be detached. These steps make more room, relieving pressure and allowing the arm to move more freely.    Torn rotator cuff  · A rotator cuff can tear due to a sudden injury or from overuse. This can cause pain, weakness, and loss of normal shoulder movement.  · During surgery, torn rotator cuff tendons may be trimmed. The tendons are then reattached to the humerus. This is done with stitches, anchors, or surgical tacks.    Stretched capsule  · A stretched capsule will remain loose. A loose capsule cant hold the joint firmly in place. The bones of the joint may feel like they move too much and the shoulder can dislocate.  · During arthroscopy, a stretched capsule is folded over itself and sutured in place. (This is done from inside the capsule.) This tightens the capsule, helping make the shoulder joint more stable.    Torn labrum  · The shoulder is a ball and socket joint.  The labrum normally supports and cushions the shoulder joint. In the case of a torn labrum, the socket that the ball (the upper end of your arm) fits into is not very deep. The shallow socket increases  the  potential for instability.  · The labrum may tear off the rim of the glenoid. This can cause the joint to catch or feel like its slipping out of place. The shoulder may even dislocate.  · A torn labrum is repaired by reattaching it to the glenoid. This is often done with special anchors put into the glenoid bone. Sutures attached to the anchors are tied to hold the labrum in place. The joint then feels more stable.       Arthritis and loose bodies  · Arthritis is damage to joint cartilage with age and use. Injury or disease can also cause it. Wear and tear may also lead to loose bodies (pieces of bone or cartilage) or bone spurs in a joint.  · During surgery, bone spurs are removed. Rough parts of the joint are smoothed. Any loose body is removed from the joint. Bone may also be scraped or shaved to promote new cartilage growth.  Date Last Reviewed: 8/31/2015  © 7008-3985 [employer]. [address] [place] [address], [place] [address]. All rights reserved. This information is not intended as a substitute for professional medical care. Always follow your healthcare professional's instructions.

## 2019-12-02 NOTE — H&P (VIEW-ONLY)
Subjective:          Chief Complaint: Ora Henderson is a 59 y.o. female who had concerns including Pain of the Right Shoulder.    Ora Henderson, a 59 y.o. RHD female, returns today for evaluation of her RIGHT shoulder. Several year history of pain that has worsened over the last few months, no JUSTIN. Reports numbness, tingling, burning, radiating pain throughout right upper extremity. Pain with ADLs above 90. Sees Dr. Tirado for cervical myofascial pain and had trigger point injections to bilateral trapezius, rhomboid, levator scapula 10/29/19. She reports brief improvement in pain following injections. She has been completing a HEP and also takes gabapentin. History of bilateral CTS, EMG 07/26/19. She does medical billing.     Prior 10/02/17  Ora Henderson is a 57 y.o. female here for right shoulder pain. Worsened in July 2017. Did some PT since then with mild improvement.    Shoulder Pain    The pain is present in the right shoulder. This is a chronic problem. The current episode started more than 1 year ago. There has been no history of extremity trauma. The problem has been gradually worsening. The quality of the pain is described as tingling, burning and numbing. The pain is at a severity of 2/10. Associated symptoms include numbness and tingling. Pertinent negatives include no fever. The symptoms are aggravated by activity. She has tried injection treatment, other, cold and heat for the symptoms. The treatment provided mild relief. Physical therapy was effective.      Review of Systems   Constitution: Negative for chills, fever and night sweats.   HENT: Negative for ear discharge and hearing loss.    Eyes: Negative for blurred vision and visual disturbance.   Cardiovascular: Negative for chest pain and leg swelling.   Respiratory: Negative for cough and shortness of breath.    Endocrine: Negative for polyuria.   Hematologic/Lymphatic: Negative for bleeding  problem.   Skin: Negative for rash.   Musculoskeletal: Positive for joint pain and muscle weakness. Negative for back pain, joint swelling and muscle cramps.   Gastrointestinal: Negative for melena, nausea and vomiting.   Genitourinary: Negative for hematuria.   Neurological: Positive for numbness and tingling. Negative for loss of balance and paresthesias.   Psychiatric/Behavioral: Negative for altered mental status.     Past Medical History:   Diagnosis Date    Arthritis     GERD (gastroesophageal reflux disease)     Hepatomegaly     and fatty liver    Hernia, umbilical     reducible    Hyperlipidemia     Hypertension     Sleep apnea      Past Surgical History:   Procedure Laterality Date    BRAIN SURGERY      tumor removal     CARPAL TUNNEL RELEASE Bilateral     CERVICAL BIOPSY  W/ LOOP ELECTRODE EXCISION      [facility] '81     SECTION      x 1    COLONOSCOPY N/A 2016    Procedure: COLONOSCOPY;  Surgeon: Quique Paul MD;  Location: [facility];  Service: Endoscopy;  Laterality: N/A;    CYST REMOVAL Left 2018    HERNIA REPAIR      2016    HYSTERECTOMY  2006    fibroids and endometriosis    REFRACTIVE SURGERY      TOTAL ABDOMINAL HYSTERECTOMY W/ BILATERAL SALPINGOOPHORECTOMY      STAR/BSO secondary to endometriosis    VENTRAL HERNIA REPAIR       Social History     Socioeconomic History    Marital status:      Spouse name: Not on file    Number of children: 1    Years of education: Not on file    Highest education level: Not on file   Occupational History    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Employer: [employer]   Social Needs    Financial resource strain: Not on file    Food insecurity:     Worry: Not on file     Inability: Not on file    Transportation needs:     Medical: Not on file     Non-medical: Not on file   Tobacco Use    Smoking status: Never Smoker    Smokeless tobacco: Never Used   Substance and Sexual Activity    Alcohol use: Yes      Alcohol/week: 0.0 standard drinks     Comment: socially    Drug use: No    Sexual activity: Yes     Partners: Male     Birth control/protection: None, Surgical   Lifestyle    Physical activity:     Days per week: Not on file     Minutes per session: Not on file    Stress: Not on file   Relationships    Social connections:     Talks on phone: Not on file     Gets together: Not on file     Attends Mandaen service: Not on file     Active member of club or organization: Not on file     Attends meetings of clubs or organizations: Not on file     Relationship status: Not on file   Other Topics Concern    Not on file   Social History Narrative    Not on file     Vitals:    12/02/19 0836   BP: 129/87   Pulse: 79                   Objective:        General: Ora is well-developed, well-nourished, appears stated age, in no acute distress, alert and oriented to time, place and person.     General    Vitals reviewed.  Constitutional: She is oriented to person, place, and time. She appears well-developed and well-nourished. No distress.   HENT:   Head: Atraumatic.   Nose: Nose normal.   Eyes: EOM are normal. Right eye exhibits no discharge. Left eye exhibits no discharge.   Neck: Neck supple.   Cardiovascular: Normal rate and intact distal pulses.    Pulmonary/Chest: Effort normal. No respiratory distress.   Neurological: She is alert and oriented to person, place, and time. Coordination normal.   Psychiatric: She has a normal mood and affect. Her behavior is normal. Judgment and thought content normal.         Right Shoulder Exam     Inspection/Observation   Swelling: absent  Bruising: absent  Scars: absent  Deformity: absent  Scapular Winging: absent  Scapular Dyskinesia: negative  Atrophy: absent    Tenderness   The patient is tender to palpation of the biceps tendon.    Range of Motion   Active abduction: 90   Passive abduction: 100   Extension: 0   Forward Flexion: 160   Forward Elevation: 160Adduction: 40    External Rotation 0 degrees: 50   Internal rotation 0 degrees: T8     Tests & Signs   Drop arm: negative  Foreman test: positive  Impingement: positive  Lift Off Sign: negative  Active Compression Test (Mohawk's Sign): positive  Speed's Test: positive    Other   Sensation: normal    Left Shoulder Exam     Inspection/Observation   Swelling: absent  Bruising: absent  Scars: absent  Deformity: absent  Scapular Winging: absent  Scapular Dyskinesia: negative  Atrophy: absent    Tenderness   The patient is experiencing no tenderness.     Range of Motion   Active abduction: 90   Passive abduction: 100   Extension: 0   Forward Flexion: 160   Forward Elevation: 160Adduction: 40   External Rotation 0 degrees: 50   Internal rotation 0 degrees: T8     Tests & Signs   Drop arm: negative  Foreman test: negative  Impingement: negative  Lift Off Sign: negative  Active Compression test (Mohawk's Sign): negative  Speed's Test: negative  Bear Hug: negative    Other   Sensation: normal       Muscle Strength   Right Upper Extremity   Shoulder Abduction: 5/5   Shoulder Internal Rotation: 4/5   Shoulder External Rotation: 4/5   Supraspinatus: 4/5/5   Subscapularis: 5/5/5   Biceps: 5/5/5   Left Upper Extremity  Shoulder Abduction: 5/5   Shoulder Internal Rotation: 5/5   Shoulder External Rotation: 5/5   Supraspinatus: 5/5/5   Subscapularis: 5/5/5   Biceps: 5/5/5     Reflexes     Left Side  Biceps:  2+  Triceps:  2+    Right Side   Biceps:  2+  Triceps:  2+    Vascular Exam     Right Pulses      Radial:                    2+      Left Pulses      Radial:                    2+      Capillary Refill  Right Hand: normal capillary refill  Left Hand: normal capillary refill    Relevant imaging results reviewed and interpreted by me, discussed with the patient and / or family today X-ray Shoulder 2 or More Views Right  Narrative: EXAMINATION:  XR SHOULDER COMPLETE 2 OR MORE VIEWS RIGHT    CLINICAL HISTORY:  Pain in right  shoulder    TECHNIQUE:  Two or three views of the right shoulder were preformed.    COMPARISON:  10/02/2017    FINDINGS:  No acute fracture or dislocation.  Mild AC joint arthropathy is noted.  Mild-to-moderate degenerative change at the right glenohumeral joint.  Impression: 1.  As above    Electronically signed by: Bowen Montes DO  Date:    11/06/2019  Time:    08:30        Reading Physician Reading Date Result Priority   Parvez Miranda MD 9/23/2017       Narrative     Technique: Standard multiplanar multisequence imaging of the right shoulder was performed.    Findings: There is motion artifact on selected sequences which limits evaluation.    There is a full-thickness tear of the distal supraspinatus tendon with a possible 0.5 cm fluid filled tendon defect demonstrated.  Distal full-thickness or high-grade partial infraspinatus tendon tear also noted with attenuation and poor delineation of the distal tendon fibers.  Fluid and edema extends proximally along the infraspinatus myotendinous junction.  There are chronic tendinosis changes of the subscapularis tendon which is otherwise intact.  Teres minor tendon is unremarkable.  There is contiguous complex fluid within the glenohumeral joint, subacromial subdeltoid bursa, subscapularis recess, and biceps tendon sheath.  Small loose bodies or debris within the aforementioned fluid fluid with concomitant synovitis changes.    There is degenerative osseous and capsular hypertrophy of the acromioclavicular joint.  The acromion is a type II configuration with a concave undersurface.    There is chronic degeneration of the proximal intracapsular course of the biceps tendon and biceps labral anchor.  No discrete labral tear or detachment.  Mild intraosseous cystic or edematous changes regional to the greater tuberosity of the humerus and superior glenoid.      Impression         1.  Full-thickness retracted tear supraspinatus tendon and probable smaller full-thickness  or high-grade partial tear distal infraspinatus tendon.    2.  Degenerative arthropathy of the acromioclavicular joint.    3.  Complex joint and bursal fluid with concomitant synovitis changes.    4.  Chronic degeneration biceps tendon and biceps labral anchor.          Electronically signed by: MARIZOL TAVERA MD  Date: 09/23/17  Time: 16:19               Reading Physician Reading Date Result Priority   Bowen Montes DO 11/6/2019 Routine      Narrative     EXAMINATION:  XR SHOULDER COMPLETE 2 OR MORE VIEWS RIGHT    CLINICAL HISTORY:  Pain in right shoulder    TECHNIQUE:  Two or three views of the right shoulder were preformed.    COMPARISON:  10/02/2017    FINDINGS:  No acute fracture or dislocation.  Mild AC joint arthropathy is noted.  Mild-to-moderate degenerative change at the right glenohumeral joint.      Impression       1.  As above      Electronically signed by: Bowen Montes DO  Date: 11/06/2019  Time: 08:30              Assessment:       Encounter Diagnoses   Name Primary?    Nontraumatic complete tear of right rotator cuff Yes    Arthritis of right acromioclavicular joint     Tendonitis of upper biceps tendon of right shoulder           Plan:       We reviewed with Ora today, the pathology and natural history of her diagnosis. We have discussed a variety of treatment options including medications, physical therapy and other alternative treatments. I also explained the indications, risks and benefits of surgery. After discussion, Ora decided to proceed with surgery. The decision was made to go forward with     right   -Shoulder arthroscopic rotator cuff repair   -Shoulder arthroscopic SAD   -Shoulder arthroscopic DCE   -Shoulder arthroscopic extensive debridement   -Shoulder arthroscopic biceps tenodesis (auto)        The details of the surgical procedure were explained, including the location of probable incisions and a description of likely hardware and/or grafts to be used.  The patient  understands the likely convalescence after surgery.  Also, we have thoroughly discussed the risks, benefits and alternatives to surgery, including, but not limited to, the risk of infection, joint stiffness, blood clot (including DVT and/or pulmonary embolus), neurologic and vascular injury.  It was explained that, if tissue has been repaired or reconstructed, there is a chance of failure, which may require further management.      All of the patient's questions were answered and informed consent was obtained. The patient will contact us if they have any questions or concerns in the interim.                      Disclaimer: This note was prepared using a voice recognition system and is likely to have sound alike errors within the text.

## 2019-12-18 NOTE — PROGRESS NOTES
Digital Medicine: Health  Follow-Up    Patient reports that she is doing well. Patient has made decision to have surgery to repair right rotator cuff at the end of the month. Will place patient on hiatus from 12/31/19 to 1/24/20. Will f/u last week of January. Patient has been having continuous pain from shoulder injury which could be contributing to higher BP readings. Patient also stated that she has been lax on her diet since Thanksgiving. This could also be contributing to the higher BP readings. Will f/u in 6-8 weeks.    The history is provided by the patient.     Follow Up  Follow-up reason(s): reading review      Readings are trending up due to lifestyle change.        INTERVENTION(S)  encouragement/support      There are no preventive care reminders to display for this patient.    Last 5 Patient Entered Readings                                      Current 30 Day Average: 127/88     Recent Readings 12/10/2019 11/21/2019 11/14/2019 11/14/2019 11/6/2019    SBP (mmHg) 138 116 124 141 146    DBP (mmHg) 97 79 81 86 94    Pulse 74 91 81 84 84                      Diet Screening   She has the following dietary restrictions: none    Barriers to a Healthy Diet: no barriers to healthy eating    Patient states that she has gotten lax with her diet since Thanksgiving. Reminded patient that moderation and portion control is going to be key during the holidays.     Physical Activity Screening       Deferred.     Medication Adherence Screening   She misses doses: never      Patient identified the following reasons for non-compliance: none    Patient reports no s/s or issues taking BP medications as prescribed.       SDOH

## 2019-12-20 ENCOUNTER — TELEPHONE (OUTPATIENT)
Dept: ORTHOPEDICS | Facility: CLINIC | Age: 59
End: 2019-12-20

## 2019-12-20 NOTE — TELEPHONE ENCOUNTER
Attempted to contact pt. Left voicemail. Asked pt to return call to clinic at [phone] to discuss her surgery 12/31/19.     Dr. Cox would like to move her sx to 12/30/19.     ==  Spoke c pt's . Asked him to please have pt call office to discuss upcoming surgery. Pt's  expressed understanding.

## 2019-12-20 NOTE — TELEPHONE ENCOUNTER
----- Message from Eleanor Santacruz sent at 12/20/2019  2:21 PM CST -----  Contact: pt  Type:  Patient Returning Call    Who Called:pt  Who Left Message for Patient:nurse  Does the patient know what this is regarding?:  Would the patient rather a call back or a response via [facility]? Call back  Best Call Back Number:[phone]    Additional Information:

## 2019-12-20 NOTE — TELEPHONE ENCOUNTER
Hernandez ledesma pt. She can change surgery date from the morning of 12/31/19 to the afternoon of 12/30/19. Informed pt she will be notified by surgery center at the end of the week with her arrival time. Pt expressed understanding & was thankful.     ==  Hernandez Roberts. Pt's surgery has been moved to 12/30/19 @ 1pm.

## 2019-12-23 ENCOUNTER — HOSPITAL ENCOUNTER (OUTPATIENT)
Dept: PREADMISSION TESTING | Facility: HOSPITAL | Age: 59
Discharge: HOME | End: 2019-12-23
Attending: ORTHOPAEDIC SURGERY
Payer: COMMERCIAL

## 2019-12-23 VITALS
TEMPERATURE: 99 F | DIASTOLIC BLOOD PRESSURE: 76 MMHG | SYSTOLIC BLOOD PRESSURE: 152 MMHG | RESPIRATION RATE: 15 BRPM | OXYGEN SATURATION: 100 % | HEART RATE: 80 BPM

## 2019-12-23 DIAGNOSIS — M75.121 NONTRAUMATIC COMPLETE TEAR OF RIGHT ROTATOR CUFF: ICD-10-CM

## 2019-12-23 DIAGNOSIS — K21.9 GASTROESOPHAGEAL REFLUX DISEASE, ESOPHAGITIS PRESENCE NOT SPECIFIED: Chronic | ICD-10-CM

## 2019-12-23 DIAGNOSIS — E78.2 MIXED HYPERLIPIDEMIA: Chronic | ICD-10-CM

## 2019-12-23 DIAGNOSIS — Z01.818 PREOP TESTING: Primary | ICD-10-CM

## 2019-12-23 DIAGNOSIS — M75.41 ROTATOR CUFF IMPINGEMENT SYNDROME OF RIGHT SHOULDER: ICD-10-CM

## 2019-12-23 DIAGNOSIS — I10 ESSENTIAL HYPERTENSION: ICD-10-CM

## 2019-12-23 DIAGNOSIS — G47.33 OSA (OBSTRUCTIVE SLEEP APNEA): Chronic | ICD-10-CM

## 2019-12-23 DIAGNOSIS — E66.01 SEVERE OBESITY (BMI 35.0-35.9 WITH COMORBIDITY): ICD-10-CM

## 2019-12-23 PROBLEM — R09.81 SINUS CONGESTION: Status: RESOLVED | Noted: 2019-10-09 | Resolved: 2019-12-23

## 2019-12-23 PROBLEM — G56.03 BILATERAL CARPAL TUNNEL SYNDROME: Status: RESOLVED | Noted: 2019-07-26 | Resolved: 2019-12-23

## 2019-12-23 PROBLEM — R05.9 COUGH: Status: RESOLVED | Noted: 2019-10-09 | Resolved: 2019-12-23

## 2019-12-23 PROBLEM — J11.1 FLU SYNDROME: Status: RESOLVED | Noted: 2019-11-15 | Resolved: 2019-12-23

## 2019-12-23 LAB
ALBUMIN SERPL BCP-MCNC: 4.1 G/DL (ref 3.5–5.2)
ALP SERPL-CCNC: 60 U/L (ref 55–135)
ALT SERPL W/O P-5'-P-CCNC: 12 U/L (ref 10–44)
ANION GAP SERPL CALC-SCNC: 8 MMOL/L (ref 8–16)
AST SERPL-CCNC: 15 U/L (ref 10–40)
BILIRUB SERPL-MCNC: 0.9 MG/DL (ref 0.1–1)
BUN SERPL-MCNC: 12 MG/DL (ref 6–20)
CALCIUM SERPL-MCNC: 9.5 MG/DL (ref 8.7–10.5)
CHLORIDE SERPL-SCNC: 102 MMOL/L (ref 95–110)
CO2 SERPL-SCNC: 30 MMOL/L (ref 23–29)
CREAT SERPL-MCNC: 0.8 MG/DL (ref 0.5–1.4)
EST. GFR  (AFRICAN AMERICAN): >60 ML/MIN/1.73 M^2
EST. GFR  (NON AFRICAN AMERICAN): >60 ML/MIN/1.73 M^2
GLUCOSE SERPL-MCNC: 91 MG/DL (ref 70–110)
POTASSIUM SERPL-SCNC: 3.5 MMOL/L (ref 3.5–5.1)
PROT SERPL-MCNC: 7.6 G/DL (ref 6–8.4)
SODIUM SERPL-SCNC: 140 MMOL/L (ref 136–145)

## 2019-12-23 PROCEDURE — 93010 ELECTROCARDIOGRAM REPORT: CPT | Mod: ,,, | Performed by: NUCLEAR MEDICINE

## 2019-12-23 PROCEDURE — 80053 COMPREHEN METABOLIC PANEL: CPT

## 2019-12-23 PROCEDURE — 93005 ELECTROCARDIOGRAM TRACING: CPT

## 2019-12-23 PROCEDURE — 93010 EKG 12-LEAD: ICD-10-PCS | Mod: ,,, | Performed by: NUCLEAR MEDICINE

## 2019-12-23 RX ORDER — AMOXICILLIN 500 MG
CAPSULE ORAL DAILY
COMMUNITY

## 2019-12-23 NOTE — ASSESSMENT & PLAN NOTE
The patient will have a right shoulder arthroscopy with rotator cuff repair and SAD on 12/30/19 by Dr. Cox     Known risk factors for perioperative complications:    HTN  HLD  NATALIE  Obesity     Difficulty with intubation is not anticipated.    Cardiac Risk Estimation: low intraoperative cardiac risk     1.) Preoperative workup as follows: ECG, hemoglobin, hematocrit, electrolytes, creatinine, glucose.  2.) Change in medication regimen before surgery: discontinue ASA 5 days before surgery, discontinue antihypertensives (Hyzaar) to avoid hypotension from anesthesia   3.) Prophylaxis for cardiac events with perioperative beta-blockers: not indicated.  4.) Invasive hemodynamic monitoring perioperatively: not indicated.  5.) Deep vein thrombosis prophylaxis postoperatively: regimen to be chosen by surgical team.  6.) Surveillance for postoperative MI with ECG immediately postoperatively and on postoperati ve days 1 and 2 AND troponin levels 24 hours postoperatively and on day 4 or hospital discharge (whichever comes first): not indicated.  7.) Current medications which may produce withdrawal symptoms if withheld perioperatively: none   8.) Other measures: None

## 2019-12-23 NOTE — DISCHARGE INSTRUCTIONS
To confirm, Your doctor has instructed you that surgery is scheduled for 12/30/2019.       Please report to [facility].  Pre admit office will call afternoon prior to surgery with final arrival time    INSTRUCTIONS IMPORTANT!!!   Do not eat, drink, or smoke after 12 midnight-including water. OK to brush teeth, no gum, candy or mints!    ¨ Take only these medicines with a small swallow of water-morning of surgery.  Nexium  Amlodipine    Pre operative instructions:  Please review the Pre-Operative Instruction booklet that you were given.        Bathing Instructions--See page 6 in the Pre-operative booklet.      Prevention of surgical site infections:     -Keep incisions clean and dry.   -Do not soak/submerge incisions in water until completely healed.   -Do not apply lotions, powders, creams, or deodorants to site.   -Always make sure hands are cleaned with antibacterial soap/ alcohol-based   prior to touching the surgical site.  (This includes doctors, nurses, staff, and yourself.)    Signs and symptoms:   -Redness and pain around the area where you had surgery   -Drainage of cloudy fluid from your surgical wound   -Fever over 100.4       I have read or had read and explained to me, and understand the above information.  Additional comments or instructions:  Received a copy of Pre-operative instructions booklet, FAQ surgical site infection sheet, and packets of hibiclens (if indicated).

## 2019-12-23 NOTE — H&P
Preoperative History and Physical                                                             Hospital Medicine      Chief Complaint: Preoperative evaluation     History of Present Illness:      Ora Henderson is a 59 y.o. female HTN, HLD, NATALIE, Obesity, Right rotator cuff tear and shoulder impingement who presents to the office today for a preoperative consultation at the request of Dr. oCx who plans on performing Right shoulder arthroscopy, SAD, and rotator cuff repair on .     Functional Status:      The patient is able to climb a flight of stairs. The patient is able to ambulate over 3 blocks without difficulty. The patient's functional status is affected by the surgical problem. The patient's functional status is not affected by shortness of breath, chest pain, dyspnea on exertion and fatigue.    MET score greater than 4    Past Medical History:      Past Medical History:   Diagnosis Date    Arthritis     Fatty liver     GERD (gastroesophageal reflux disease)     Hernia, umbilical     reducible    Hyperlipidemia     Hypertension     Sleep apnea         Past Surgical History:      Past Surgical History:   Procedure Laterality Date    BRAIN SURGERY  2001    Pituitary tumor removal 2001 x 2     CARPAL TUNNEL RELEASE Bilateral     x 2    CERVICAL BIOPSY  W/ LOOP ELECTRODE EXCISION      [facility] '81     SECTION      x 1    COLONOSCOPY N/A 2016    Procedure: COLONOSCOPY;  Surgeon: Quique Paul MD;  Location: [facility];  Service: Endoscopy;  Laterality: N/A;    GANGLION CYST EXCISION Left 2018    REFRACTIVE SURGERY      TOTAL ABDOMINAL HYSTERECTOMY W/ BILATERAL SALPINGOOPHORECTOMY      STAR/BSO secondary to endometriosis    VENTRAL HERNIA REPAIR          Social History:      Social History     Socioeconomic History    Marital status:      Spouse name: Not on file    Number of children: 1     Years of education: Not on file    Highest education level: Not on file   Occupational History    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Employer: [employer]   Social Needs    Financial resource strain: Not on file    Food insecurity:     Worry: Not on file     Inability: Not on file    Transportation needs:     Medical: Not on file     Non-medical: Not on file   Tobacco Use    Smoking status: Never Smoker    Smokeless tobacco: Never Used   Substance and Sexual Activity    Alcohol use: Yes     Alcohol/week: 0.0 standard drinks     Frequency: Monthly or less     Comment: socially    Drug use: No    Sexual activity: Yes     Partners: Male     Birth control/protection: None, Surgical   Lifestyle    Physical activity:     Days per week: Not on file     Minutes per session: Not on file    Stress: Not on file   Relationships    Social connections:     Talks on phone: Not on file     Gets together: Not on file     Attends Buddhist service: Not on file     Active member of club or organization: Not on file     Attends meetings of clubs or organizations: Not on file     Relationship status: Not on file   Other Topics Concern    Not on file   Social History Narrative    Not on file        Family History:      Family History   Problem Relation Age of Onset    Hypertension Father     Prostate cancer Father     Hypertension Mother     Cancer Maternal Aunt         pancreas    Cancer Maternal Uncle         pancreas    Heart disease Paternal Uncle     Heart disease Paternal Grandmother     Diabetes Maternal Aunt     Heart attack Sister 30    Heart attack Brother 32       Allergies:      Review of patient's allergies indicates:   Allergen Reactions    Sulfa (sulfonamide antibiotics) Hives       Medications:      Current Outpatient Medications   Medication Sig    albuterol 90 mcg/actuation inhaler Inhale 1-2 puffs into the lungs every 4 (four) hours as needed for Wheezing or Shortness of Breath  (cough).    amLODIPine (NORVASC) 2.5 MG tablet Take 1 tablet (2.5 mg total) by mouth once daily. (Patient taking differently: Take 2.5 mg by mouth once daily. Take am of surgery)    aspirin 81 MG Chew Take 81 mg by mouth as needed. Hold 5 days prior to surgery    esomeprazole (NEXIUM) 40 MG capsule Take 1 capsule (40 mg total) by mouth before breakfast.    L.acidophilus-Bif. animalis (PROBIOTIC) 5 billion cell CpSP Take 1 tablet by mouth once daily.    losartan-hydrochlorothiazide 100-25 mg (HYZAAR) 100-25 mg per tablet Take 1 tablet by mouth once daily. (Patient taking differently: Take 1 tablet by mouth once daily. Hold am of surgery)    MULTIVIT,CALC,MINS/IRON/FOLIC (DAILY MULTIPLE FOR WOMEN ORAL) Take 1 tablet by mouth once daily. Hold 5 days prior to surgery    naproxen sodium (ALEVE) 220 MG tablet Take 220 mg by mouth as needed. Hold 5 days prior to surgery    omega-3 fatty acids/fish oil (FISH OIL-OMEGA-3 FATTY ACIDS) 300-1,000 mg capsule Take by mouth once daily. Hold 5 days prior to surgery    rosuvastatin (CRESTOR) 40 MG Tab Take 1 tablet (40 mg total) by mouth every evening. (Patient taking differently: Take 40 mg by mouth once daily. )     No current facility-administered medications for this encounter.        Vitals:      Vitals:    12/23/19 0956   BP: (!) 152/76   Pulse: 80   Resp: 15   Temp: 98.6 °F (37 °C)       Review of Systems:        Constitutional: Negative for fever, chills, weight loss, malaise/fatigue and diaphoresis.   HENT: Negative for hearing loss, ear pain, nosebleeds, congestion, sore throat, neck pain, tinnitus and ear discharge.    Eyes: Negative for blurred vision, double vision, photophobia, pain, discharge and redness.   Respiratory: Negative for cough, hemoptysis, sputum production, shortness of breath, wheezing and stridor.    Cardiovascular: Negative for chest pain, palpitations, orthopnea, claudication, leg swelling and PND.   Gastrointestinal: Negative for heartburn,  nausea, vomiting, abdominal pain, diarrhea, constipation, blood in stool and melena.   Genitourinary: Negative for dysuria, urgency, frequency, hematuria and flank pain.   Musculoskeletal: +Right shoulder pain Negative for myalgias, back pain, joint pain and falls.   Skin: Negative for itching and rash.   Neurological: Negative for dizziness, tingling, tremors, sensory change, speech change, focal weakness, seizures, loss of consciousness, weakness and headaches.   Endo/Heme/Allergies: Negative for environmental allergies and polydipsia. Does not bruise/bleed easily.   Psychiatric/Behavioral: Negative for depression, suicidal ideas, hallucinations, memory loss and substance abuse. The patient is not nervous/anxious and does not have insomnia.    All 14 systems reviewed and negative except as noted above.    Physical Exam:      Constitutional: Appears well-developed, well-nourished and in no acute distress.  Patient is oriented to person, place, and time.   Head: Normocephalic and atraumatic. Mucous membranes moist.  Neck: Neck supple no mass.   Cardiovascular: Normal rate and regular rhythm.  S1 S2 appreciated by ascultation.  Pulmonary/Chest: Effort normal and clear to auscultation bilaterally. No respiratory distress.   Abdomen: Soft. Non-tender and non-distended. Bowel sounds are normal.   Neurological: Patient is alert and oriented to person, place and time. Moves all extremities.  Skin: Warm and dry. No lesions.  Extremities: No clubbing, cyanosis or edema.    Laboratory data:      Reviewed and noted in plan where applicable. Please see chart for full laboratory data.    No results for input(s): CPK, CPKMB, TROPONINI, MB in the last 24 hours. No results for input(s): POCTGLUCOSE in the last 24 hours.     Lab Results   Component Value Date    INR 1.0 12/24/2018    INR 1.0 12/03/2012       Lab Results   Component Value Date    WBC 8.11 03/08/2019    HGB 11.8 (L) 03/08/2019    HCT 35.4 (L) 03/08/2019    MCV 89  2019     2019       No results for input(s): GLU, NA, K, CL, CO2, BUN, CREATININE, CALCIUM, MG in the last 24 hours.    Predictors of intubation difficulty:       Morbid obesity? no   Anatomically abnormal facies? no   Prominent incisors? no   Receding mandible? no   Short, thick neck? no   Neck range of motion: normal   Dentition: partials to upper and lower     Cardiographics:      EC19  Normal sinus rhythm  Low voltage QRS  Cannot rule out Anterior infarct ,age undetermined  Abnormal ECG  When compared with ECG of 08-MAR-2019 19:57,  No significant change was found    Echocardiogram: 3/2/17  CONCLUSIONS     1 - Normal left ventricular systolic function (EF 55-60%).     2 - Normal left ventricular diastolic function.     3 - Normal right ventricular systolic function .     4 - The estimated PA systolic pressure is 28 mmHg.     5 - Trivial mitral regurgitation.     Stress test 3/2/17  free of evidence for myocardial ischemia or injury  Imaging:      Chest x-ray: 3/8/19  Heart size is normal.  Lungs appear clear of active disease.  No infiltrates or effusions.  No suspicious mass. No significant change    Assessment and Plan:      Nontraumatic complete tear of right rotator cuff  The patient will have a right shoulder arthroscopy with rotator cuff repair and SAD on 19 by Dr. Cox     Known risk factors for perioperative complications:    HTN  HLD  NATALIE  Obesity     Difficulty with intubation is not anticipated.    Cardiac Risk Estimation: low intraoperative cardiac risk     1.) Preoperative workup as follows: ECG, hemoglobin, hematocrit, electrolytes, creatinine, glucose.  2.) Change in medication regimen before surgery: discontinue ASA 5 days before surgery, discontinue antihypertensives (Hyzaar) to avoid hypotension from anesthesia   3.) Prophylaxis for cardiac events with perioperative beta-blockers: not indicated.  4.) Invasive hemodynamic monitoring perioperatively: not  indicated.  5.) Deep vein thrombosis prophylaxis postoperatively: regimen to be chosen by surgical team.  6.) Surveillance for postoperative MI with ECG immediately postoperatively and on postoperati ve days 1 and 2 AND troponin levels 24 hours postoperatively and on day 4 or hospital discharge (whichever comes first): not indicated.  7.) Current medications which may produce withdrawal symptoms if withheld perioperatively: none   8.) Other measures: None     Rotator cuff impingement syndrome of right shoulder  See above     NATALIE (obstructive sleep apnea)  Cont CPAP    Essential hypertension  Cont Amlodipine and Losartan/HCTZ  /76 today in PAT- pt did not take am meds yet  Hold Losartan/HCTZ am of surgery     GERD (gastroesophageal reflux disease)  Cont Nexium    Severe obesity (BMI 35.0-35.9 with comorbidity)  Pt counseled on healthy weight, diet, and excercise    Hyperlipidemia  Cont Crestor  Hold Fish oil 5 days preop

## 2019-12-23 NOTE — ASSESSMENT & PLAN NOTE
Cont Amlodipine and Losartan/HCTZ  /76 today in PAT- pt did not take am meds yet  Hold Losartan/HCTZ am of surgery

## 2019-12-26 ENCOUNTER — ANESTHESIA EVENT (OUTPATIENT)
Dept: SURGERY | Facility: HOSPITAL | Age: 59
End: 2019-12-26
Payer: COMMERCIAL

## 2019-12-26 NOTE — ANESTHESIA PREPROCEDURE EVALUATION
12/26/2019  Ora Henderson is a 59 y.o., female.    Anesthesia Evaluation    I have reviewed the Patient Summary Reports.    I have reviewed the Nursing Notes.   I have reviewed the Medications.     Review of Systems  Anesthesia Hx:  History of prior surgery of interest to airway management or planning: Previous anesthesia: General  Denies Personal Hx of Anesthesia complications.   Social:  Non-Smoker, Social Alcohol Use    EENT/Dental:   chronic allergic rhinitis   Cardiovascular:   Hypertension hyperlipidemia ECG has been reviewed.    Pulmonary:   Denies Recent URI.    Hepatic/GI:   GERD, well controlled    Musculoskeletal:   Arthritis   Spine Disorders: cervical and lumbar Degenerative disease        Physical Exam  General:  Obesity    Airway/Jaw/Neck:  Airway Findings: Mouth Opening: Normal Tongue: Large  General Airway Assessment: Adult  Mallampati: II  TM Distance: 4 - 6 cm  Jaw/Neck Findings:  Neck ROM: Extension Decreased, Mild      Dental:  Dental Findings: Periodontal disease, Mild        Mental Status:  Mental Status Findings:  Cooperative, Alert and Oriented         Anesthesia Plan  Type of Anesthesia, risks & benefits discussed:  Anesthesia Type:  general  Patient's Preference:   Intra-op Monitoring Plan: standard ASA monitors  Intra-op Monitoring Plan Comments:   Post Op Pain Control Plan: peripheral nerve block  Post Op Pain Control Plan Comments:   Induction:   IV  Beta Blocker:  Patient is not currently on a Beta-Blocker (No further documentation required).       Informed Consent: Patient understands risks and agrees with Anesthesia plan.  Questions answered. Anesthesia consent signed with patient.  ASA Score: 2     Day of Surgery Review of History & Physical:    H&P update referred to the surgeon.         Ready For Surgery From Anesthesia Perspective.

## 2019-12-30 ENCOUNTER — HOSPITAL ENCOUNTER (OUTPATIENT)
Facility: HOSPITAL | Age: 59
Discharge: HOME | End: 2019-12-30
Attending: ORTHOPAEDIC SURGERY | Admitting: ORTHOPAEDIC SURGERY
Payer: COMMERCIAL

## 2019-12-30 ENCOUNTER — ANESTHESIA (OUTPATIENT)
Dept: SURGERY | Facility: HOSPITAL | Age: 59
End: 2019-12-30
Payer: COMMERCIAL

## 2019-12-30 VITALS
HEART RATE: 97 BPM | OXYGEN SATURATION: 97 % | DIASTOLIC BLOOD PRESSURE: 69 MMHG | BODY MASS INDEX: 35.5 KG/M2 | TEMPERATURE: 98 F | SYSTOLIC BLOOD PRESSURE: 144 MMHG | RESPIRATION RATE: 28 BRPM | WEIGHT: 200.38 LBS | HEIGHT: 63 IN

## 2019-12-30 DIAGNOSIS — M75.121 COMPLETE TEAR OF RIGHT ROTATOR CUFF: ICD-10-CM

## 2019-12-30 DIAGNOSIS — M75.121 NONTRAUMATIC COMPLETE TEAR OF RIGHT ROTATOR CUFF: Primary | ICD-10-CM

## 2019-12-30 PROBLEM — R29.818 SUSPECTED SLEEP APNEA: Status: ACTIVE | Noted: 2017-02-17

## 2019-12-30 PROBLEM — I10 ESSENTIAL HYPERTENSION: Chronic | Status: ACTIVE | Noted: 2018-03-05

## 2019-12-30 PROCEDURE — 29826 SHO ARTHRS SRG DECOMPRESSION: CPT | Mod: RT,,, | Performed by: ORTHOPAEDIC SURGERY

## 2019-12-30 PROCEDURE — 29824 PR SHLDR ARTHROSCOP,SURG,DIS CLAVICULECTOMY: ICD-10-PCS | Mod: 51,RT,, | Performed by: ORTHOPAEDIC SURGERY

## 2019-12-30 PROCEDURE — 63600175 PHARM REV CODE 636 W HCPCS: Performed by: ORTHOPAEDIC SURGERY

## 2019-12-30 PROCEDURE — 29826 PR SHLDR ARTHROSCOP,PART ACROMIOPLAS: ICD-10-PCS | Mod: RT,,, | Performed by: ORTHOPAEDIC SURGERY

## 2019-12-30 PROCEDURE — 63600175 PHARM REV CODE 636 W HCPCS: Performed by: ANESTHESIOLOGY

## 2019-12-30 PROCEDURE — 27201423 OPTIME MED/SURG SUP & DEVICES STERILE SUPPLY: Performed by: ORTHOPAEDIC SURGERY

## 2019-12-30 PROCEDURE — 25000003 PHARM REV CODE 250: Performed by: NURSE ANESTHETIST, CERTIFIED REGISTERED

## 2019-12-30 PROCEDURE — 29827 PR SHLDR ARTHROSCOP,SURG,W/ROTAT CUFF REPR: ICD-10-PCS | Mod: RT,,, | Performed by: ORTHOPAEDIC SURGERY

## 2019-12-30 PROCEDURE — D9220A PRA ANESTHESIA: ICD-10-PCS | Mod: ANES,,, | Performed by: ANESTHESIOLOGY

## 2019-12-30 PROCEDURE — 29827 SHO ARTHRS SRG RT8TR CUF RPR: CPT | Mod: RT,,, | Performed by: ORTHOPAEDIC SURGERY

## 2019-12-30 PROCEDURE — D9220A PRA ANESTHESIA: ICD-10-PCS | Mod: CRNA,,, | Performed by: NURSE ANESTHETIST, CERTIFIED REGISTERED

## 2019-12-30 PROCEDURE — D9220A PRA ANESTHESIA: Mod: CRNA,,, | Performed by: NURSE ANESTHETIST, CERTIFIED REGISTERED

## 2019-12-30 PROCEDURE — 71000033 HC RECOVERY, INTIAL HOUR: Performed by: ORTHOPAEDIC SURGERY

## 2019-12-30 PROCEDURE — 64450 NJX AA&/STRD OTHER PN/BRANCH: CPT | Performed by: ORTHOPAEDIC SURGERY

## 2019-12-30 PROCEDURE — 36000710: Performed by: ORTHOPAEDIC SURGERY

## 2019-12-30 PROCEDURE — 76942 ECHO GUIDE FOR BIOPSY: CPT | Mod: 26,,, | Performed by: ANESTHESIOLOGY

## 2019-12-30 PROCEDURE — 76942 PERIPHERAL BLOCK: ICD-10-PCS | Mod: 26,,, | Performed by: ANESTHESIOLOGY

## 2019-12-30 PROCEDURE — 29823 SHO ARTHRS SRG XTNSV DBRDMT: CPT | Mod: 51,RT,, | Performed by: ORTHOPAEDIC SURGERY

## 2019-12-30 PROCEDURE — 64415 PERIPHERAL BLOCK: ICD-10-PCS | Mod: 59,RT,, | Performed by: ANESTHESIOLOGY

## 2019-12-30 PROCEDURE — 71000015 HC POSTOP RECOV 1ST HR: Performed by: ORTHOPAEDIC SURGERY

## 2019-12-30 PROCEDURE — 29828 SHO ARTHRS SRG BICP TENODSIS: CPT | Mod: 51,52,RT, | Performed by: ORTHOPAEDIC SURGERY

## 2019-12-30 PROCEDURE — 29828 PR ARTHROSCOPY SHOULDER SURGICAL BICEPS TENODESIS: ICD-10-PCS | Mod: 51,52,RT, | Performed by: ORTHOPAEDIC SURGERY

## 2019-12-30 PROCEDURE — 63600175 PHARM REV CODE 636 W HCPCS: Performed by: NURSE ANESTHETIST, CERTIFIED REGISTERED

## 2019-12-30 PROCEDURE — 64415 NJX AA&/STRD BRCH PLXS IMG: CPT | Mod: 59,RT | Performed by: ANESTHESIOLOGY

## 2019-12-30 PROCEDURE — 29824 SHO ARTHRS SRG DSTL CLAVICLC: CPT | Mod: 51,RT,, | Performed by: ORTHOPAEDIC SURGERY

## 2019-12-30 PROCEDURE — 37000008 HC ANESTHESIA 1ST 15 MINUTES: Performed by: ORTHOPAEDIC SURGERY

## 2019-12-30 PROCEDURE — S0020 INJECTION, BUPIVICAINE HYDRO: HCPCS | Performed by: ANESTHESIOLOGY

## 2019-12-30 PROCEDURE — 37000009 HC ANESTHESIA EA ADD 15 MINS: Performed by: ORTHOPAEDIC SURGERY

## 2019-12-30 PROCEDURE — 25000003 PHARM REV CODE 250: Performed by: ANESTHESIOLOGY

## 2019-12-30 PROCEDURE — 36000711: Performed by: ORTHOPAEDIC SURGERY

## 2019-12-30 PROCEDURE — C1713 ANCHOR/SCREW BN/BN,TIS/BN: HCPCS | Performed by: ORTHOPAEDIC SURGERY

## 2019-12-30 PROCEDURE — D9220A PRA ANESTHESIA: Mod: ANES,,, | Performed by: ANESTHESIOLOGY

## 2019-12-30 PROCEDURE — 25000003 PHARM REV CODE 250: Performed by: ORTHOPAEDIC SURGERY

## 2019-12-30 PROCEDURE — 29823 PR SHLDR ARTHROSCOP,EXTEN DEBRIDE: ICD-10-PCS | Mod: 51,RT,, | Performed by: ORTHOPAEDIC SURGERY

## 2019-12-30 DEVICE — ANCHOR SUTURE HEALIX 4.75MM: Type: IMPLANTABLE DEVICE | Site: SHOULDER | Status: FUNCTIONAL

## 2019-12-30 DEVICE — ANCHOR 4.5 HELIX ADV BR 3 SUT: Type: IMPLANTABLE DEVICE | Site: SHOULDER | Status: FUNCTIONAL

## 2019-12-30 RX ORDER — HYDRALAZINE HYDROCHLORIDE 20 MG/ML
INJECTION INTRAMUSCULAR; INTRAVENOUS
Status: COMPLETED
Start: 2019-12-30 | End: 2019-12-30

## 2019-12-30 RX ORDER — CHLORHEXIDINE GLUCONATE ORAL RINSE 1.2 MG/ML
10 SOLUTION DENTAL
Status: DISCONTINUED | OUTPATIENT
Start: 2019-12-30 | End: 2019-12-30 | Stop reason: HOSPADM

## 2019-12-30 RX ORDER — ROCURONIUM BROMIDE 10 MG/ML
INJECTION, SOLUTION INTRAVENOUS
Status: DISCONTINUED | OUTPATIENT
Start: 2019-12-30 | End: 2019-12-30

## 2019-12-30 RX ORDER — CEFAZOLIN SODIUM 2 G/50ML
2 SOLUTION INTRAVENOUS
Status: COMPLETED | OUTPATIENT
Start: 2019-12-30 | End: 2019-12-30

## 2019-12-30 RX ORDER — BUPIVACAINE HYDROCHLORIDE 2.5 MG/ML
INJECTION, SOLUTION INFILTRATION; PERINEURAL
Status: COMPLETED | OUTPATIENT
Start: 2019-12-30 | End: 2019-12-30

## 2019-12-30 RX ORDER — FENTANYL CITRATE 50 UG/ML
25 INJECTION, SOLUTION INTRAMUSCULAR; INTRAVENOUS EVERY 5 MIN PRN
Status: DISCONTINUED | OUTPATIENT
Start: 2019-12-30 | End: 2019-12-30 | Stop reason: HOSPADM

## 2019-12-30 RX ORDER — ALPRAZOLAM 0.5 MG/1
0.5 TABLET ORAL ONCE AS NEEDED
Status: DISCONTINUED | OUTPATIENT
Start: 2019-12-30 | End: 2019-12-30

## 2019-12-30 RX ORDER — SUCCINYLCHOLINE CHLORIDE 20 MG/ML
INJECTION INTRAMUSCULAR; INTRAVENOUS
Status: DISCONTINUED | OUTPATIENT
Start: 2019-12-30 | End: 2019-12-30

## 2019-12-30 RX ORDER — CHLORHEXIDINE GLUCONATE ORAL RINSE 1.2 MG/ML
10 SOLUTION DENTAL 2 TIMES DAILY
Status: DISCONTINUED | OUTPATIENT
Start: 2019-12-30 | End: 2019-12-30 | Stop reason: HOSPADM

## 2019-12-30 RX ORDER — PHENYLEPHRINE HYDROCHLORIDE 10 MG/ML
INJECTION INTRAVENOUS
Status: DISCONTINUED | OUTPATIENT
Start: 2019-12-30 | End: 2019-12-30

## 2019-12-30 RX ORDER — PROPOFOL 10 MG/ML
VIAL (ML) INTRAVENOUS
Status: DISCONTINUED | OUTPATIENT
Start: 2019-12-30 | End: 2019-12-30

## 2019-12-30 RX ORDER — LIDOCAINE HYDROCHLORIDE 10 MG/ML
0.5 INJECTION, SOLUTION EPIDURAL; INFILTRATION; INTRACAUDAL; PERINEURAL ONCE
Status: ACTIVE | OUTPATIENT
Start: 2019-12-30

## 2019-12-30 RX ORDER — EPINEPHRINE 1 MG/ML
INJECTION, SOLUTION INTRACARDIAC; INTRAMUSCULAR; INTRAVENOUS; SUBCUTANEOUS
Status: DISCONTINUED | OUTPATIENT
Start: 2019-12-30 | End: 2019-12-30 | Stop reason: HOSPADM

## 2019-12-30 RX ORDER — SODIUM CHLORIDE, SODIUM LACTATE, POTASSIUM CHLORIDE, CALCIUM CHLORIDE 600; 310; 30; 20 MG/100ML; MG/100ML; MG/100ML; MG/100ML
INJECTION, SOLUTION INTRAVENOUS CONTINUOUS
Status: DISCONTINUED | OUTPATIENT
Start: 2019-12-30 | End: 2019-12-30 | Stop reason: HOSPADM

## 2019-12-30 RX ORDER — ONDANSETRON 2 MG/ML
INJECTION INTRAMUSCULAR; INTRAVENOUS
Status: DISCONTINUED | OUTPATIENT
Start: 2019-12-30 | End: 2019-12-30

## 2019-12-30 RX ORDER — FENTANYL CITRATE 50 UG/ML
INJECTION, SOLUTION INTRAMUSCULAR; INTRAVENOUS
Status: DISCONTINUED | OUTPATIENT
Start: 2019-12-30 | End: 2019-12-30

## 2019-12-30 RX ORDER — HYDRALAZINE HYDROCHLORIDE 20 MG/ML
INJECTION INTRAMUSCULAR; INTRAVENOUS
Status: DISCONTINUED | OUTPATIENT
Start: 2019-12-30 | End: 2019-12-30

## 2019-12-30 RX ORDER — EPINEPHRINE 1 MG/ML
INJECTION, SOLUTION INTRACARDIAC; INTRAMUSCULAR; INTRAVENOUS; SUBCUTANEOUS
Status: DISCONTINUED
Start: 2019-12-30 | End: 2019-12-30 | Stop reason: HOSPADM

## 2019-12-30 RX ORDER — LIDOCAINE HCL/PF 100 MG/5ML
SYRINGE (ML) INTRAVENOUS
Status: DISCONTINUED | OUTPATIENT
Start: 2019-12-30 | End: 2019-12-30

## 2019-12-30 RX ORDER — MIDAZOLAM HYDROCHLORIDE 1 MG/ML
2 INJECTION INTRAMUSCULAR; INTRAVENOUS ONCE
Status: COMPLETED | OUTPATIENT
Start: 2019-12-30 | End: 2019-12-30

## 2019-12-30 RX ORDER — MEPERIDINE HYDROCHLORIDE 25 MG/ML
12.5 INJECTION INTRAMUSCULAR; INTRAVENOUS; SUBCUTANEOUS EVERY 10 MIN PRN
Status: DISCONTINUED | OUTPATIENT
Start: 2019-12-30 | End: 2019-12-30 | Stop reason: HOSPADM

## 2019-12-30 RX ORDER — DEXAMETHASONE SODIUM PHOSPHATE 4 MG/ML
INJECTION, SOLUTION INTRA-ARTICULAR; INTRALESIONAL; INTRAMUSCULAR; INTRAVENOUS; SOFT TISSUE
Status: DISCONTINUED | OUTPATIENT
Start: 2019-12-30 | End: 2019-12-30

## 2019-12-30 RX ORDER — HYDROCODONE BITARTRATE AND ACETAMINOPHEN 5; 325 MG/1; MG/1
1 TABLET ORAL EVERY 4 HOURS PRN
Status: DISCONTINUED | OUTPATIENT
Start: 2019-12-30 | End: 2019-12-30 | Stop reason: HOSPADM

## 2019-12-30 RX ORDER — HYDROCODONE BITARTRATE AND ACETAMINOPHEN 5; 325 MG/1; MG/1
1 TABLET ORAL
Qty: 40 TABLET | Refills: 0 | Status: SHIPPED | OUTPATIENT
Start: 2019-12-30 | End: 2020-02-18

## 2019-12-30 RX ORDER — SODIUM CHLORIDE, SODIUM LACTATE, POTASSIUM CHLORIDE, CALCIUM CHLORIDE 600; 310; 30; 20 MG/100ML; MG/100ML; MG/100ML; MG/100ML
INJECTION, SOLUTION INTRAVENOUS CONTINUOUS
Status: ACTIVE | OUTPATIENT
Start: 2019-12-30 | End: 2019-12-31

## 2019-12-30 RX ADMIN — SUCCINYLCHOLINE CHLORIDE 120 MG: 20 INJECTION, SOLUTION INTRAMUSCULAR; INTRAVENOUS at 01:12

## 2019-12-30 RX ADMIN — CEFAZOLIN SODIUM 2 G: 2 SOLUTION INTRAVENOUS at 01:12

## 2019-12-30 RX ADMIN — DEXAMETHASONE SODIUM PHOSPHATE 8 MG: 4 INJECTION, SOLUTION INTRA-ARTICULAR; INTRALESIONAL; INTRAMUSCULAR; INTRAVENOUS; SOFT TISSUE at 01:12

## 2019-12-30 RX ADMIN — BUPIVACAINE HYDROCHLORIDE 30 ML: 2.5 INJECTION, SOLUTION INFILTRATION; PERINEURAL at 01:12

## 2019-12-30 RX ADMIN — FENTANYL CITRATE 100 MCG: 50 INJECTION, SOLUTION INTRAMUSCULAR; INTRAVENOUS at 01:12

## 2019-12-30 RX ADMIN — HYDRALAZINE HYDROCHLORIDE 3 MG: 20 INJECTION INTRAMUSCULAR; INTRAVENOUS at 04:12

## 2019-12-30 RX ADMIN — ONDANSETRON 4 MG: 2 INJECTION, SOLUTION INTRAMUSCULAR; INTRAVENOUS at 04:12

## 2019-12-30 RX ADMIN — LIDOCAINE HYDROCHLORIDE 40 MG: 20 INJECTION, SOLUTION INTRAVENOUS at 01:12

## 2019-12-30 RX ADMIN — FENTANYL CITRATE 50 MCG: 50 INJECTION, SOLUTION INTRAMUSCULAR; INTRAVENOUS at 04:12

## 2019-12-30 RX ADMIN — HYDROCODONE BITARTRATE AND ACETAMINOPHEN 1 TABLET: 5; 325 TABLET ORAL at 05:12

## 2019-12-30 RX ADMIN — FENTANYL CITRATE 50 MCG: 50 INJECTION, SOLUTION INTRAMUSCULAR; INTRAVENOUS at 01:12

## 2019-12-30 RX ADMIN — HYDRALAZINE HYDROCHLORIDE 4 MG: 20 INJECTION INTRAMUSCULAR; INTRAVENOUS at 05:12

## 2019-12-30 RX ADMIN — FENTANYL CITRATE 25 MCG: 50 INJECTION, SOLUTION INTRAMUSCULAR; INTRAVENOUS at 04:12

## 2019-12-30 RX ADMIN — MIDAZOLAM HYDROCHLORIDE 2 MG: 1 INJECTION, SOLUTION INTRAMUSCULAR; INTRAVENOUS at 01:12

## 2019-12-30 RX ADMIN — ROCURONIUM BROMIDE 26 MG: 10 INJECTION, SOLUTION INTRAVENOUS at 01:12

## 2019-12-30 RX ADMIN — SODIUM CHLORIDE, SODIUM LACTATE, POTASSIUM CHLORIDE, AND CALCIUM CHLORIDE: 600; 310; 30; 20 INJECTION, SOLUTION INTRAVENOUS at 12:12

## 2019-12-30 RX ADMIN — FENTANYL CITRATE 50 MCG: 50 INJECTION, SOLUTION INTRAMUSCULAR; INTRAVENOUS at 05:12

## 2019-12-30 RX ADMIN — PROPOFOL 30 MG: 10 INJECTION, EMULSION INTRAVENOUS at 04:12

## 2019-12-30 RX ADMIN — PHENYLEPHRINE HYDROCHLORIDE 100 MCG: 10 INJECTION INTRAVENOUS at 02:12

## 2019-12-30 RX ADMIN — ROCURONIUM BROMIDE 4 MG: 10 INJECTION, SOLUTION INTRAVENOUS at 01:12

## 2019-12-30 RX ADMIN — PROPOFOL 80 MG: 10 INJECTION, EMULSION INTRAVENOUS at 01:12

## 2019-12-30 RX ADMIN — SODIUM CHLORIDE, SODIUM LACTATE, POTASSIUM CHLORIDE, AND CALCIUM CHLORIDE: 600; 310; 30; 20 INJECTION, SOLUTION INTRAVENOUS at 02:12

## 2019-12-30 RX ADMIN — PHENYLEPHRINE HYDROCHLORIDE 100 MCG: 10 INJECTION INTRAVENOUS at 01:12

## 2019-12-30 NOTE — ANESTHESIA POSTPROCEDURE EVALUATION
Anesthesia Post Evaluation    Patient: Ora Henderson    Procedure(s) Performed: Procedure(s) (LRB):  ARTHROSCOPY, SHOULDER, WITH SUBACROMIAL SPACE DECOMPRESSION (Right)  REPAIR, ROTATOR CUFF (Right)  EXCISION, CLAVICLE, DISTAL (Right)  REPAIR, TENDON, BICEPS (Right)    Final Anesthesia Type: general    Patient location during evaluation: PACU  Patient participation: Yes- Able to Participate  Level of consciousness: awake and alert and oriented  Post-procedure vital signs: reviewed and stable  Pain management: adequate  Airway patency: patent    PONV status at discharge: No PONV  Anesthetic complications: no      Cardiovascular status: hemodynamically stable, blood pressure returned to baseline and hypertensive  Respiratory status: unassisted, spontaneous ventilation and room air  Hydration status: euvolemic  Follow-up not needed.          Vitals Value Taken Time   /70 12/30/2019  5:45 PM   Temp 36.7 °C (98.1 °F) 12/30/2019  5:09 PM   Pulse 99 12/30/2019  5:47 PM   Resp 33 12/30/2019  5:47 PM   SpO2 97 % 12/30/2019  5:47 PM   Vitals shown include unvalidated device data.      No case tracking events are documented in the log.      Pain/Myles Score: Pain Rating Prior to Med Admin: 10 (12/30/2019  5:38 PM)  Myles Score: 10 (12/30/2019  5:30 PM)

## 2019-12-30 NOTE — PROGRESS NOTES
Right interscaline nerve block complete at this time with Dr. Warner and myself at bedside. Patient tolerated well. Cardiac monitoring in place.

## 2019-12-30 NOTE — TRANSFER OF CARE
"Anesthesia Transfer of Care Note    Patient: Ora Henderson    Procedure(s) Performed: Procedure(s) (LRB):  ARTHROSCOPY, SHOULDER, WITH SUBACROMIAL SPACE DECOMPRESSION (Right)  REPAIR, ROTATOR CUFF (Right)  EXCISION, CLAVICLE, DISTAL (Right)  REPAIR, TENDON, BICEPS (Right)    Patient location: PACU    Anesthesia Type: general    Transport from OR: Transported from OR on room air with adequate spontaneous ventilation    Post pain: adequate analgesia    Post assessment: no apparent anesthetic complications and tolerated procedure well    Post vital signs: stable    Level of consciousness: obtunded and responds to stimulation    Nausea/Vomiting: no nausea/vomiting    Complications: none    Transfer of care protocol was followed      Last vitals:   Visit Vitals  BP (!) 157/81 (BP Location: Left arm, Patient Position: Lying)   Pulse 108   Temp 36.7 °C (98.1 °F) (Temporal)   Resp 18   Ht 5' 3" (1.6 m)   Wt 90.9 kg (200 lb 6.4 oz)   SpO2 98%   Breastfeeding? No   BMI 35.50 kg/m²     "

## 2019-12-30 NOTE — OP NOTE
DATE OF SURGERY:  12/30/2019      PREOPERATIVE DIAGNOSES:   - Right shoulder rotator cuff tear  - Right shoulder impingement  - Right shoulder synovitis  -right shoulder AC joint arthritis  -right shoulder biceps tendonitis      POSTOPERATIVE DIAGNOSES:   Same     PROCEDURE:   - Right shoulder arthroscopic rotator cuff repair.    -Right shoulder arthroscopic subacromial decompression.   - Right shoulder arthroscopic synovectomy and extensive debridement   -right shoulder arthroscopic DISTAL CLAVICLE EXCISION  -right shoulder arthroscopic biceps tenodesis     SURGEON: Laureano Cox M.D.      Assistant:  SMA Flaquito     COMPLICATIONS: None.      POSITION: Beach chair.      ANESTHESIA: General endotracheal with preoperative interscalene block     EXAMINATION UNDER ANESTHESIA: Right shoulder forward flexion 180 degrees,   abduction 120 degrees, full internal and external rotation   1+ anterior drawer, 1+ sulcus sign, 1+ posterior drawer.      ARTHROSCOPIC FINDINGS:   -FULL thickness, U-shaped medium size supraspinatus / upper infraspinatus rotator cuff tear  - anterior acromial spur.   -Biceps:  Fraying with tearing of anchor  - Subscapularis:  INTACT   -Labrum:  FRAYING        INDICATIONS FOR PROCEDURE:Ora Henderson is a 59 y.o. female   who has pain in right shoulder. MRI confirms a tear of rotator cuff.  After risks and benefits of surgery were discussed, patient elects to proceed with operative  intervention. All risks and benefits have been discussed including the risks of stiffness for recurrent instability, irreparability of the tear, damage to neurovascular structures, damage to cartilage and the risk of anesthesia including stroke and heart attack. The patient seemed to understand and wished to proceed.      DESCRIPTION OF PROCEDURE: The patient was brought in the room. After undergoing general endotracheal anesthesia and right upper extremity block,  was placed in a well-padded  modified beachchair position. Perioperative antibiotics were given.  The operative upper extremity was prepped and draped in usual sterile fashion including the use of a sterile trimano arm mulligan.      After time-out was performed, the standard posterior portal and anterior superior portal were created. Diagnostic arthroscopy performed. The glenohumeral joint was entered. There was mild chondromalacia noted in the glenoid and humeral head. There was Signficant fraying at the superior labrum. THERE WAS noted to be a leah complex with sublabral foramen and cord like MGHL. The anterior inferior labrum and posterior labrum were intact but had some fraying. The subscapularis was  INTACT.  The remainder of the insertion was intact. Significant synovitis was visualized, this was removed using shaver.     Biceps auto-tenodesis was performed using thermal device.  Part of superior labrum was included to allow the biceps tendon to auto-tenodese into the groove.  Attention was then turned to the rotator cuff.  The rotator cuff undersurface was then visualized and debrided as needed using a shaver.            DEBRIDEMENT: Oscillating shaver, straight and curved biters were used to debride a small flap of tissue off the superior labrum.        Subscapularis tendon upper border was VISUALIZED TO BE INTACT.     ROTATOR CUFF TEAR VISUALIZED from intraarticular.     SUBACROMIAL DECOMPRESSION: The scope was taken out and redirected in the subacromial space. After excellent visualization was achieved, a lateral portal was created. The bursal tissue was cleaned off.        The medium size rotator cuff tear was identified. The area was cleaned off for later repair. The undersurface of the acromion was cleaned off of soft tissues including releasing the CA ligament. A 5-mm wilman was introduced through the posterior portal and decompression was completed using cutting block technique down to a type 1 configuration.      DISTAL CLAVICLE  EXCISION:  The soft tissues were cleaned off of the undersurface of the AC joint with Mitek VAPR device. The arthritic aspect of the distal clavicle was visualized and excellent hemostasis was achieved.  A 5-mm wilman was used to resect between 8-9 mm of bone from the distal aspect of the clavicle.  Careful attention was paid to resect adequate bone from the posterior and superior aspect of the AC joint and not to disrupt the superior aspect of the AC joint capsule.        ROTATOR CUFF REPAIR: The footprint of rotator cuff was cleaned off with Mitek VAPR device and oscillating shaver / wilman. This was judged to be amenable for repair with 1 mitek triple loaded suture anchors.  1- 4.5 mm anchor were placed at the medial aspect of the footprint of the supraspinatus. All 6 suture limbs from each anchor were brought through in a mattress and simple sutures configuration using a suture penetrator and fast pass. These were tied arthroscopically down from lateral to medial.     ONE SUTURE ruptured when tying. It was decided to add a lateral row anchor for additional compression with two tag stitches.    Excellent footprint coverage was achieved after all arthroscopic knots were tied down. Internal and external rotation confirmed excellent footprint compression with no evidence of gap formation.         Portal sites were closed with 3-0 nylon, covered with  Xeroform, 4 x 4 fluffs, ABD pads and tape. The patient was placed in a sling and pillow for protection, was extubated in the room, transferred to recovery room in stable condition accompanied by his physician.     There were no complications in the case. I was present, scrubbed and did perform all critical portions of the case.      Postop plan for the patient is to follow the medium size rotator cuff repair protocol with biceps tenodesis protocol, begin PT within 1-2wk.

## 2019-12-30 NOTE — ANESTHESIA PROCEDURE NOTES
Peripheral Block    Patient location during procedure: pre-op   Block not for primary anesthetic.  Reason for block: at surgeon's request and post-op pain management   Post-op Pain Location: right shoulder  Start time: 12/30/2019 1:04 PM  Timeout: 12/30/2019 1:02 PM   End time: 12/30/2019 1:16 PM    Staffing  Authorizing Provider: Sung Warner MD  Performing Provider: Sung Warner MD    Preanesthetic Checklist  Completed: patient identified, site marked, surgical consent, pre-op evaluation, timeout performed, IV checked, risks and benefits discussed and monitors and equipment checked  Peripheral Block  Patient position: sitting  Prep: ChloraPrep  Patient monitoring: continuous pulse ox, cardiac monitor, heart rate and frequent blood pressure checks  Block type: interscalene  Laterality: right  Injection technique: single shot  Needle  Needle type: Quincke   Needle gauge: 25 G  Needle length: 1.5 in  Needle localization: ultrasound guidance   -ultrasound image captured on disc.  Assessment  Injection assessment: negative aspiration, negative parasthesia and local visualized surrounding nerve  Heart rate change: no  Slow fractionated injection: yes  Additional Notes  With 1:200,000 Epinephrine

## 2019-12-30 NOTE — DISCHARGE SUMMARY
[facility]    Discharge Note    SUMMARY     Admit Date: 12/30/2019    Discharge Date and Time:   12/30/2019 4:40 PM    Pre-op Diagnosis:  Complete tear of right rotator cuff, unspecified whether traumatic [M75.121]    Post-op Diagnosis:  Post-Op Diagnosis Codes:     * Complete tear of right rotator cuff, unspecified whether traumatic [M75.121]    Procedure: Procedure(s) (LRB):  ARTHROSCOPY, SHOULDER, WITH SUBACROMIAL SPACE DECOMPRESSION (Right)  REPAIR, ROTATOR CUFF (Right)  EXCISION, CLAVICLE, DISTAL (Right)  REPAIR, TENDON, BICEPS (Right)    Hospital Course (synopsis of major diagnoses, care, treatment, and services provided during the course of the hospital stay): Patient underwent outpatient shoulder surgery and was transferred to PACU in stable condition.  In PACU, patient received appropriate post-operative care and discharged home with plans for physical therapy and follow-up with the operative surgeon.    Diet: Regular       Final Diagnosis: Post-Op Diagnosis Codes:     * Complete tear of right rotator cuff, unspecified whether traumatic [M75.121]    Disposition: Home or Self Care    Follow Up/Patient Instructions:     Medications:  Reconciled Home Medications:      Medication List      START taking these medications    HYDROcodone-acetaminophen 5-325 mg per tablet  Commonly known as:  NORCO  Take 1 tablet by mouth every 4 to 6 hours as needed for Pain.        CHANGE how you take these medications    amLODIPine 2.5 MG tablet  Commonly known as:  NORVASC  Take 1 tablet (2.5 mg total) by mouth once daily.  What changed:  additional instructions     losartan-hydrochlorothiazide 100-25 mg 100-25 mg per tablet  Commonly known as:  HYZAAR  Take 1 tablet by mouth once daily.  What changed:  additional instructions     rosuvastatin 40 MG Tab  Commonly known as:  CRESTOR  Take 1 tablet (40 mg total) by mouth every evening.  What changed:  when to take this        CONTINUE taking these medications     albuterol 90 mcg/actuation inhaler  Commonly known as:  PROVENTIL/VENTOLIN HFA  Inhale 1-2 puffs into the lungs every 4 (four) hours as needed for Wheezing or Shortness of Breath (cough).     Aleve 220 MG tablet  Generic drug:  naproxen sodium  Take 220 mg by mouth as needed. Hold 5 days prior to surgery     aspirin 81 MG Chew  Take 81 mg by mouth as needed. Hold 5 days prior to surgery     DAILY MULTIPLE FOR WOMEN ORAL  Take 1 tablet by mouth once daily. Hold 5 days prior to surgery     esomeprazole 40 MG capsule  Commonly known as:  NEXIUM  Take 1 capsule (40 mg total) by mouth before breakfast.     fish oil-omega-3 fatty acids 300-1,000 mg capsule  Take by mouth once daily. Hold 5 days prior to surgery     Lacto.acidophilus-Bif.animalis 5 billion cell Cpsp  Commonly known as:  Probiotic  Take 1 tablet by mouth once daily.          Discharge Procedure Orders   Ambulatory Referral to Physical/Occupational Therapy   Referral Priority: Routine Referral Type: Physical Medicine   Referral Reason: Specialty Services Required   Number of Visits Requested: 1     Diet general     Call MD for:  temperature >100.4     Call MD for:  persistent nausea and vomiting     Call MD for:  severe uncontrolled pain     Call MD for:  difficulty breathing, headache or visual disturbances     Call MD for:  redness, tenderness, or signs of infection (pain, swelling, redness, odor or green/yellow discharge around incision site)     Call MD for:  hives     Call MD for:  persistent dizziness or light-headedness     Call MD for:  extreme fatigue     Remove dressing in 72 hours   Order Comments: Place waterproof bandages over incision. Keep dry at all times     Non weight bearing   Order Comments: Keep sling on at all times     Follow-up Information     Laureano Cox MD In 2 weeks.    Specialty:  Orthopedic Surgery  Contact information:  [address]  [place] [address] [phone]

## 2019-12-31 NOTE — CARE UPDATE
Pt discharged to home after voiding in BR with assist.  Pt still c/o right breast pain. Remains drowsy.  & son ready to get her home.

## 2019-12-31 NOTE — CARE UPDATE
Pt came out from OR drowsy.  Dr Warner at bedside.  After a few minutes pt c/o right breast pain. Pt's right breast noted to be slightly taught & swollen, Dr Warner aware.  Hydralazine & Fentanyl given by Dr Warner with good effect.  Pt slept for a while.  Pt woke up still c/o 10/10 right breast pain.  Ice pack applied & Norco given.  Pt fell asleep again.  Pt still c/o 10/10 right breast pain.  Too drowsy for more IV pain meds, sleeping well, VSS.  Dr Warner made aware, ok to D/C pt to home.

## 2020-01-14 ENCOUNTER — OFFICE VISIT (OUTPATIENT)
Dept: SPORTS MEDICINE | Facility: CLINIC | Age: 60
End: 2020-01-14
Payer: COMMERCIAL

## 2020-01-14 VITALS
SYSTOLIC BLOOD PRESSURE: 143 MMHG | HEART RATE: 88 BPM | DIASTOLIC BLOOD PRESSURE: 83 MMHG | HEIGHT: 63 IN | WEIGHT: 200 LBS | BODY MASS INDEX: 35.44 KG/M2

## 2020-01-14 DIAGNOSIS — M75.121 NONTRAUMATIC COMPLETE TEAR OF RIGHT ROTATOR CUFF: ICD-10-CM

## 2020-01-14 DIAGNOSIS — M19.011 ARTHRITIS OF RIGHT ACROMIOCLAVICULAR JOINT: Primary | ICD-10-CM

## 2020-01-14 DIAGNOSIS — M75.21 TENDONITIS OF UPPER BICEPS TENDON OF RIGHT SHOULDER: ICD-10-CM

## 2020-01-14 DIAGNOSIS — Z09 POSTOP CHECK: ICD-10-CM

## 2020-01-14 PROCEDURE — 99024 POSTOP FOLLOW-UP VISIT: CPT | Mod: S$GLB,,, | Performed by: PHYSICIAN ASSISTANT

## 2020-01-14 PROCEDURE — 99999 PR PBB SHADOW E&M-EST. PATIENT-LVL IV: CPT | Mod: PBBFAC,,, | Performed by: PHYSICIAN ASSISTANT

## 2020-01-14 PROCEDURE — 99999 PR PBB SHADOW E&M-EST. PATIENT-LVL IV: ICD-10-PCS | Mod: PBBFAC,,, | Performed by: PHYSICIAN ASSISTANT

## 2020-01-14 PROCEDURE — 99024 PR POST-OP FOLLOW-UP VISIT: ICD-10-PCS | Mod: S$GLB,,, | Performed by: PHYSICIAN ASSISTANT

## 2020-01-14 NOTE — PROGRESS NOTES
Patient ID: Ora Henderson is a 59 y.o. female.    Chief Complaint: Post-op Evaluation of the Right Shoulder      HPI: Ora Henderson  is a 59 y.o. female who c/o Post-op Evaluation of the Right Shoulder   for duration of 2 weeks.  She underwent rotator cuff repair, subacromial decompression, distal clavicle excision, biceps tenodesis, and extensive debridement by Dr. Cox on 2019.  She comes in today for her 1st postoperative evaluation.  She is doing great.  Pain level is 0/10.  She has occasional soreness.  She is off all pain medications.  She comes in with her abduction pillow sling.    Past Medical History:   Diagnosis Date    Arthritis     Fatty liver     GERD (gastroesophageal reflux disease)     Hernia, umbilical     reducible    Hyperlipidemia     Hypertension     Rotator cuff tear     Sleep apnea      Past Surgical History:   Procedure Laterality Date    ARTHROSCOPIC DEBRIDEMENT OF SHOULDER Right 2019    Procedure: DEBRIDEMENT, SHOULDER, ARTHROSCOPIC;  Surgeon: Laureano Cox MD;  [facility]: [facility];  Service: Orthopedics;  Laterality: Right;    ARTHROSCOPY OF SHOULDER WITH DECOMPRESSION OF SUBACROMIAL SPACE Right 2019    Procedure: ARTHROSCOPY, SHOULDER, WITH SUBACROMIAL SPACE DECOMPRESSION;  Surgeon: Laureano Cox MD;  Location: [facility] OR;  Service: Orthopedics;  Laterality: Right;    BICEPS TENDON REPAIR Right 2019    Procedure: REPAIR, TENDON, BICEPS;  Surgeon: Laureano Cox MD;  Location: [facility] OR;  Service: Orthopedics;  Laterality: Right;  arthroscopic Biceps tenodesis    BRAIN SURGERY      Pituitary tumor removal 2001 x 2     CARPAL TUNNEL RELEASE Bilateral     x 2    CERVICAL BIOPSY  W/ LOOP ELECTRODE EXCISION      [facility] '81     SECTION      x 1    COLONOSCOPY N/A 2016    Procedure: COLONOSCOPY;  Surgeon: Quique Paul MD;  Location: [facility];  Service: Endoscopy;  Laterality:  N/A;    DISTAL CLAVICLE EXCISION Right 12/30/2019    Procedure: EXCISION, CLAVICLE, DISTAL;  Surgeon: Laureano Cox MD;  Location: [facility] OR;  Service: Orthopedics;  Laterality: Right;  arthroscopic    GANGLION CYST EXCISION Left 12/31/2018    REFRACTIVE SURGERY      ROTATOR CUFF REPAIR Right 12/30/2019    Procedure: REPAIR, ROTATOR CUFF;  Surgeon: Laureano Cox MD;  Location: [facility] OR;  Service: Orthopedics;  Laterality: Right;  arthroscopic    SYNOVECTOMY OF SHOULDER Right 12/30/2019    Procedure: SYNOVECTOMY, SHOULDER;  Surgeon: Laureano Cox MD;  Location: [facility] OR;  Service: Orthopedics;  Laterality: Right;  arthroscopic    TOTAL ABDOMINAL HYSTERECTOMY W/ BILATERAL SALPINGOOPHORECTOMY  2006    STAR/BSO secondary to endometriosis    VENTRAL HERNIA REPAIR  2016     Family History   Problem Relation Age of Onset    Hypertension Father     Prostate cancer Father     Hypertension Mother     Cancer Maternal Aunt         pancreas    Cancer Maternal Uncle         pancreas    Heart disease Paternal Uncle     Heart disease Paternal Grandmother     Diabetes Maternal Aunt     Heart attack Sister 30    Heart attack Brother 32     Social History     Socioeconomic History    Marital status:      Spouse name: Not on file    Number of children: 1    Years of education: Not on file    Highest education level: Not on file   Occupational History    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Employer: [employer]   Social Needs    Financial resource strain: Not on file    Food insecurity:     Worry: Not on file     Inability: Not on file    Transportation needs:     Medical: Not on file     Non-medical: Not on file   Tobacco Use    Smoking status: Never Smoker    Smokeless tobacco: Never Used   Substance and Sexual Activity    Alcohol use: Yes     Alcohol/week: 0.0 standard drinks     Frequency: Monthly or less     Comment: socially    Drug use: No    Sexual activity: Yes      Partners: Male     Birth control/protection: None, Surgical   Lifestyle    Physical activity:     Days per week: Not on file     Minutes per session: Not on file    Stress: Not on file   Relationships    Social connections:     Talks on phone: Not on file     Gets together: Not on file     Attends [employer] service: Not on file     Active member of club or organization: Not on file     Attends meetings of clubs or organizations: Not on file     Relationship status: Not on file   Other Topics Concern    Not on file   Social History Narrative    Not on file     Medication List with Changes/Refills   Current Medications    ALBUTEROL 90 MCG/ACTUATION INHALER    Inhale 1-2 puffs into the lungs every 4 (four) hours as needed for Wheezing or Shortness of Breath (cough).    AMLODIPINE (NORVASC) 2.5 MG TABLET    Take 1 tablet (2.5 mg total) by mouth once daily.    ASPIRIN 81 MG CHEW    Take 81 mg by mouth as needed. Hold 5 days prior to surgery    ESOMEPRAZOLE (NEXIUM) 40 MG CAPSULE    Take 1 capsule (40 mg total) by mouth before breakfast.    HYDROCODONE-ACETAMINOPHEN (NORCO) 5-325 MG PER TABLET    Take 1 tablet by mouth every 4 to 6 hours as needed for Pain.    L.ACIDOPHILUS-BIF. ANIMALIS (PROBIOTIC) 5 BILLION CELL CPSP    Take 1 tablet by mouth once daily.    LOSARTAN-HYDROCHLOROTHIAZIDE 100-25 MG (HYZAAR) 100-25 MG PER TABLET    Take 1 tablet by mouth once daily.    MULTIVIT,CALC,MINS/IRON/FOLIC (DAILY MULTIPLE FOR WOMEN ORAL)    Take 1 tablet by mouth once daily. Hold 5 days prior to surgery    NAPROXEN SODIUM (ALEVE) 220 MG TABLET    Take 220 mg by mouth as needed. Hold 5 days prior to surgery    OMEGA-3 FATTY ACIDS/FISH OIL (FISH OIL-OMEGA-3 FATTY ACIDS) 300-1,000 MG CAPSULE    Take by mouth once daily. Hold 5 days prior to surgery    ROSUVASTATIN (CRESTOR) 40 MG TAB    Take 1 tablet (40 mg total) by mouth every evening.     Review of patient's allergies indicates:   Allergen Reactions    Sulfa (sulfonamide  antibiotics) Hives           Objective:                Right Shoulder Exam     Comments:  Incision clean/dry/intact  Sutures in place  No erythema/drainage/signs of infection  Compartments soft  Cap refill < 2 sec  2+ pulse  Sensation intact                 Assessment:       Encounter Diagnoses   Name Primary?    Arthritis of right acromioclavicular joint Yes    Tendonitis of upper biceps tendon of right shoulder     Nontraumatic complete tear of right rotator cuff     Postop check           Plan:       Ora was seen today for post-op evaluation.    Diagnoses and all orders for this visit:    Arthritis of right acromioclavicular joint    Tendonitis of upper biceps tendon of right shoulder    Nontraumatic complete tear of right rotator cuff    Postop check        Ora Henderson is an established pt here for routine postop follow-up care for right shoulder rotator cuff repair.  She is set up to start physical therapy this week.  She does not need any pain medication refills as she is off of all pain medication.  I have reminded her not to take any oral NSAIDs.  She will continue use of the sling. She will avoid active range of motion of the shoulder.  She will follow up with Dr. Cox in the office in 4 weeks.  If she has problems before then, she will notify the office.  The incisions were cleaned with ChloraPrep and alcohol.  All sutures (and/or staples) were removed.  Suture strips were applied across the incision and should remain in place until they fall off in approximately 2 weeks. The patient was instructed not to soak the incision in standing water.  Patient may clean the incision with clean running water and antibacterial soap.  Patient should notify the office if any signs or symptoms of infection including fevers, erythema, purulent drainage, increasing pain. She verbalizes understanding and agrees.    Follow up in about 4 weeks (around 2/11/2020) for f/u with Dr. BBB.           The patient understands, chooses and consents to this plan and accepts all   the risks which include but are not limited to the risks mentioned above.     Disclaimer: This note was prepared using a voice recognition system and is likely to have sound alike errors within the text.

## 2020-01-15 ENCOUNTER — CLINICAL SUPPORT (OUTPATIENT)
Dept: REHABILITATION | Facility: HOSPITAL | Age: 60
End: 2020-01-15
Attending: ORTHOPAEDIC SURGERY
Payer: COMMERCIAL

## 2020-01-15 DIAGNOSIS — M25.511 ACUTE PAIN OF RIGHT SHOULDER: ICD-10-CM

## 2020-01-15 DIAGNOSIS — M25.60 DECREASED RANGE OF MOTION: ICD-10-CM

## 2020-01-15 DIAGNOSIS — R53.1 DECREASED STRENGTH: ICD-10-CM

## 2020-01-15 PROCEDURE — 97161 PT EVAL LOW COMPLEX 20 MIN: CPT

## 2020-01-15 PROCEDURE — 97110 THERAPEUTIC EXERCISES: CPT

## 2020-01-15 PROCEDURE — 97140 MANUAL THERAPY 1/> REGIONS: CPT | Mod: 59

## 2020-01-15 NOTE — PLAN OF CARE
[facility]  Physical Therapy Initial Evaluation    Name: Ora Henderson  Clinic Number: [id number]    Therapy Diagnosis:   Encounter Diagnoses   Name Primary?    Acute pain of right shoulder     Decreased range of motion     Decreased strength      Physician: Laureano Cox, *    Physician Orders: PT Eval and Treat   Medical Diagnosis from Referral: Complete tear of right rotator cuff  Evaluation Date: 1/15/2020  Authorization Period Expiration: 12/29/2020  Plan of Care Expiration: 2/14/2020  Visit # / Visits authorized: 1/ 1    Time In: 9:35 am  Time Out: 10:35 am  Total Billable Time: 30 minutes    Precautions: Rotator cuff surgery    Subjective   Date of onset: Dec 30, 2019  History of current condition - Ora reports: She had surgery with Dr Cox in December no specific injury but increasing pain for past 3 years.  Dr Up was giving injections, and tried PT prior to surgery.  Currently having pain over R shoulder joint, no spread.    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  Prior Level of Function: Pain with all shoulder motions  Current Level of Function: Unable to use shoulder, at all in sling.    Imaging, MRI studies, x-rays: prior to surgery    Medical History:   Past Medical History:   Diagnosis Date    Arthritis     Fatty liver     GERD (gastroesophageal reflux disease)     Hernia, umbilical     reducible    Hyperlipidemia     Hypertension     Rotator cuff tear     Sleep apnea        Surgical History:   Ora Henderson  has a past surgical history that includes Carpal tunnel release (Bilateral); Brain surgery (2001); Cervical biopsy w/ loop electrode  excision; Colonoscopy (N/A, 2016); Ventral hernia repair (2016);  section; Total abdominal hysterectomy w/ bilateral salpingoophorectomy (); Refractive surgery; Ganglion cyst excision (Left, 2018); Arthroscopy of shoulder with decompression of subacromial space (Right, 2019); Rotator cuff repair (Right, 2019); Distal clavicle excision (Right, 2019); Biceps tendon repair (Right, 2019); Synovectomy of shoulder (Right, 2019); and Arthroscopic debridement of shoulder (Right, 2019).    Medications:   Ora has a current medication list which includes the following prescription(s): albuterol, amlodipine, aspirin, esomeprazole, hydrocodone-acetaminophen, lacto.acidophilus-bif.animalis, losartan-hydrochlorothiazide 100-25 mg, multivit,calc,mins/iron/folic, naproxen sodium, fish oil-omega-3 fatty acids, and rosuvastatin, and the following Facility-Administered Medications: lidocaine (pf) 10 mg/ml (1%).    Allergies:   Review of patient's allergies indicates:   Allergen Reactions    Sulfa (sulfonamide antibiotics) Hives        Pts goals: R handed, be able to return to painting, be able to fix hair and makeup with R hand.    Objective       CMS Impairment/Limitation/Restriction for FOTO Shoulder Survey    Therapist reviewed FOTO scores for Ora HOFFMAN [facility] on 1/15/2020.   FOTO documents entered into EPIC - see Media section.    Limitation Score: 77%         Posture: Pt noted to present with forward head/rounded shoulder posture.  B scapula elevated and protracted, R scapula greater than L.       Shoulder ROM: All measurements supine    Passive Joint Range Right Left   Flexion 58 PROM NT    ABDuction 42 PROM NT   External Rotation 20 PROM  NT   Internal Rotation 60 PROM NT   Extension NT NT     R elbow ROM: (-15) degrees extension; flexion 135 degrees.    Pain with elbow flexion/extension, and pain with all PROM at end range.    Strength: Defer due to  recent surgery.    Sensation: Intact    Reflexes: Defer    Special Test:  Defer special testing due to recent surgery.    Joint Mobility: Defer    Palpation:   Patient tender with increased tone over B UT, levator scap, lats, teres minor, infraspinatus, subscap, ant chest mm, post cervical mm, AC joint, med/lateral scapular mm's.     TREATMENT   Treatment Time In: 10:00 am  Treatment Time Out: 10:35am  Total Treatment time separate from Evaluation: 30 minutes    Ora received therapeutic exercises to develop  Cervical ROM and posture for 10 minutes including:    Postural education  Scapular squeezes 2'/ea  Supine cervical rotation 3'  PROM L shoulder 8 min    Ora received the following manual therapy techniques: Myofacial release, Soft tissue Mobilization and Manual releases were applied to the: subscap, lats, UT/levator scap, ant chest, biceps, infraspinatus/teres minor for 20 minutes.    Home Exercises and Patient Education Provided    Education provided:   -Education on condition, HEP, and importance of maintaining sling and all rotator cuff precautions.    Written Home Exercises Provided: yes.  Exercises were reviewed and Ora was able to demonstrate them prior to the end of the session.  Ora demonstrated good  understanding of the education provided.     See EMR under Patient Instructions for exercises provided 1/15/2020.    Assessment   Ora is a 59 y.o. female referred to outpatient Physical Therapy with a medical diagnosis of Complete tear of right rotator cuff post operatively, pt presents with signs and symptoms consistent with diagnosis. The patient presents with impairments which include inability to tolerate strength testing, decreased ROM, decreased muscle length, postural abnormalities and decreased overall function.  Due to current rotator cuff post operative protocol pt unable to use R shoulder at all..     Pt prognosis is Good due to personal factors and co-morbidities listed below.    Pt will benefit from skilled outpatient Physical Therapy to address the deficits stated above and in the chart below, provide pt/family education, and to maximize pt's level of independence.     Plan of care discussed with patient: Yes  Pt's spiritual, cultural and educational needs considered and patient is agreeable to the plan of care and goals as stated below:     Anticipated Barriers for therapy: None    Medical Necessity is demonstrated by the following  History  Co-morbidities and personal factors that may impact the plan of care Co-morbidities:   high BMI and HTN    Personal Factors:   no deficits     moderate   Examination  Body Structures and Functions, activity limitations and participation restrictions that may impact the plan of care Body Regions:   upper extremities    Body Systems:    ROM  strength  scar formation    Participation Restrictions:   Per rotator cuff protocol, currently no active use of R shoulder    Activity limitations:   Learning and applying knowledge  no deficits    General Tasks and Commands  no deficits    Communication  no deficits    Mobility  lifting and carrying objects  fine hand use (grasping/picking up)  driving (bike, car, motorcycle)    Self care  washing oneself (bathing, drying, washing hands)  caring for body parts (brushing teeth, shaving, grooming)  toileting  dressing    Domestic Life  shopping  cooking  doing house work (cleaning house, washing dishes, laundry)  assisting others    Interactions/Relationships  no deficits    Life Areas  employment    Community and Social Life  community life  recreation and leisure         moderate   Clinical Presentation stable and uncomplicated low   Decision Making/ Complexity Score: low     Goals:  Short Term Goals: In 5 weeks   1.Pt to be educated on HEP.  2.Patient to increase Shoulder PROM, within protocol restrictions.  3.Patient to have pain less than 5/10 at all times.  4.Patient to improve score on the FOTO by 10%.  5.  Pt to be educated on postural and body mechanics awareness.    Long Term Goals: In 10 weeks  1. Patient to improve score on the FOTO by 30%.  2. Patient to tolerate strength testing with goals made PRN.  3. Patient to have decreased pain to 2/10 at all times.  4. Patient to demo full PROM R shoulder, with goals made for AROM PRN.  5. Patient to perform daily activities including use of R shoulder without increased pain, as released per protocol and per MD.      Plan   Plan of care Certification: 1/15/2020 to 2/14/2020.    Outpatient Physical Therapy 2 times weekly for 10 weeks to include the following interventions: Electrical Stimulation PRN, Manual Therapy, Moist Heat/ Ice, Neuromuscular Re-ed, Patient Education, Self Care, Therapeutic Activites and Therapeutic Exercise.     Shena Pruitt, PT    Thank you for this referral.    These services are reasonable and necessary for the conditions set forth above while under my care.

## 2020-01-16 PROBLEM — M25.511 RIGHT SHOULDER PAIN: Status: ACTIVE | Noted: 2020-01-16

## 2020-01-16 PROBLEM — M25.60 DECREASED RANGE OF MOTION: Status: ACTIVE | Noted: 2020-01-16

## 2020-01-16 PROBLEM — R53.1 DECREASED STRENGTH: Status: ACTIVE | Noted: 2020-01-16

## 2020-01-21 ENCOUNTER — CLINICAL SUPPORT (OUTPATIENT)
Dept: REHABILITATION | Facility: HOSPITAL | Age: 60
End: 2020-01-21
Attending: ORTHOPAEDIC SURGERY
Payer: COMMERCIAL

## 2020-01-21 DIAGNOSIS — R53.1 DECREASED STRENGTH: ICD-10-CM

## 2020-01-21 DIAGNOSIS — M25.60 DECREASED RANGE OF MOTION: ICD-10-CM

## 2020-01-21 DIAGNOSIS — M25.511 ACUTE PAIN OF RIGHT SHOULDER: ICD-10-CM

## 2020-01-21 PROCEDURE — 97110 THERAPEUTIC EXERCISES: CPT

## 2020-01-21 PROCEDURE — 97140 MANUAL THERAPY 1/> REGIONS: CPT

## 2020-01-21 NOTE — PROGRESS NOTES
Physical Therapy Daily Treatment Note     Name: Ora HOFFMAN [facility] Number: [id number]    Therapy Diagnosis:   Encounter Diagnoses   Name Primary?    Acute pain of right shoulder     Decreased range of motion     Decreased strength      Physician: Laureano Cox, *    Visit Date: 1/21/2020    Physician Orders: PT Eval and Treat   Medical Diagnosis from Referral: Complete tear of right rotator cuff  Evaluation Date: 1/15/2020  Authorization Period Expiration: 12/29/2020  Plan of Care Expiration: 2/14/2020  Visit # / Visits authorized: 2/20     Time In: 10:40 am  Time Out: 11:20 am  Total Billable Time:  minutes    Precautions: Standard and rotator cuff repair    Subjective     Pt reports: No pain today or since evaluation.    She was compliant with home exercise program.  Response to previous treatment: No change  Functional change: has been keeping sling on and watching positioning of sling    Pain: 0/10  Location: right shoulder      Objective     Ora received therapeutic exercises to develop ROM and posture for 17 minutes including:    PROM R sh within protocol limits 5 min  Supine cervical rotation 2'  Wrist AROM flex/ext 2'  Wrsit AROM sup/pronation 2'  LT isometric in sitting 2'  scap retraction isometric 2'  Elbow flex/ext 2'    Ora received the following manual therapy techniques: Myofacial release, Soft tissue Mobilization and manual releases were applied to the: UT/levator scapula, subscap, lats, infraspinatus/teres minor, pectral mm, post delt and brachilias, gentle at biceps for 10 minutes    Pt declined modalities.    Home Exercises Provided and Patient Education Provided     Education provided:   - Con't with HEP, con't with sling use    Written Home Exercises Provided: Patient instructed to cont prior HEP.  Exercises were reviewed and Ora was able to demonstrate them prior to the end of the session.  Ora demonstrated good  understanding of the education  provided.     See EMR under Patient Instructions for exercises provided prior visit.    Assessment     Pt at 20 ER PROM, close to 90 PROM flexion, good PROM of elbow.  No pain, limited in progressions per protocol.  Good tolerance to all tx.    Ora is progressing well towards her goals.   Pt prognosis is Good.     Pt will continue to benefit from skilled outpatient physical therapy to address the deficits listed in the problem list box on initial evaluation, provide pt/family education and to maximize pt's level of independence in the home and community environment.     Pt's spiritual, cultural and educational needs considered and pt agreeable to plan of care and goals.     Anticipated barriers to physical therapy: None    Goals:     Short Term Goals: In 5 weeks   1.Pt to be educated on HEP.  2.Patient to increase Shoulder PROM, within protocol restrictions.  3.Patient to increase strength by 1/2 grade.  4.Patient to have pain less than 5/10 at all times.  5.Patient to improve score on the FOTO by 10%.  6. Pt to be educated on postural and body mechanics awareness.     Long Term Goals: In 10 weeks  1. Patient to improve score on the FOTO by 30%.  2. Patient to tolerate strength testing with goals made PRN.  3. Patient to demo full PROM R shoulder, with goals made for AROM PRN.  4. Patient to perform daily activities including use of R shoulder without increased pain, as released per protocol and per MD.       Plan     Monitor response to today's tx and progress with PT POC per protocol.    Shena Pruitt, PT

## 2020-01-28 ENCOUNTER — CLINICAL SUPPORT (OUTPATIENT)
Dept: REHABILITATION | Facility: HOSPITAL | Age: 60
End: 2020-01-28
Payer: COMMERCIAL

## 2020-01-28 DIAGNOSIS — M25.60 DECREASED RANGE OF MOTION: ICD-10-CM

## 2020-01-28 DIAGNOSIS — R53.1 DECREASED STRENGTH: ICD-10-CM

## 2020-01-28 DIAGNOSIS — M25.511 ACUTE PAIN OF RIGHT SHOULDER: ICD-10-CM

## 2020-01-28 PROCEDURE — 97140 MANUAL THERAPY 1/> REGIONS: CPT

## 2020-01-28 PROCEDURE — 97110 THERAPEUTIC EXERCISES: CPT

## 2020-01-28 NOTE — PROGRESS NOTES
Physical Therapy Daily Treatment Note     Name: Ora HOFFMAN [facility] Number: [id number]    Therapy Diagnosis:   Encounter Diagnoses   Name Primary?    Acute pain of right shoulder     Decreased range of motion     Decreased strength      Physician: Laureano Cox, *    Visit Date: 1/28/2020    Physician Orders: PT Eval and Treat   Medical Diagnosis from Referral: Complete tear of right rotator cuff  Evaluation Date: 1/15/2020  Authorization Period Expiration: 12/29/2020  Plan of Care Expiration: 2/14/2020  Visit # / Visits authorized: 3/20     Time In: 9:30 am  Time Out: 10:20 am  Total Billable Time: 29 minutes    Precautions: Standard and rotator cuff repair    Subjective     Pt reports: No pain today or since evaluation.    She was compliant with home exercise program.  Response to previous treatment: No change  Functional change: has been keeping sling on and watching positioning of sling    Pain: 0/10  Location: right shoulder      Objective     Ora received therapeutic exercises to develop ROM and posture for 19 minutes including:    PROM R sh within protocol limits 5 min  Supine cervical rotation 2'  Wrist AROM flex/ext 2'  Wrist AROM sup/pronation 2'  LT isometric in sitting 2'  scap retraction isometric 2'  Elbow flex/ext 2' PROM with L UE  Shoulder rolls BW 2'      Ora received the following manual therapy techniques: Myofacial release, Soft tissue Mobilization and manual releases were applied to the: UT/levator scapula, subscap, lats, infraspinatus/teres minor, pectral mm, post delt and brachilias, gentle at biceps for 10 minutes    Pt declined modalities.    Home Exercises Provided and Patient Education Provided     Education provided:   - Con't with HEP, con't with sling use    Written Home Exercises Provided: Patient instructed to cont prior HEP.  Exercises were reviewed and Ora was able to demonstrate them prior to the end of the session.  Ora demonstrated good   understanding of the education provided.     See EMR under Patient Instructions for exercises provided prior visit.    Assessment     Pt at 20 ER PROM, 90 PROM flexion, good PROM of elbow.  No pain, limited in progressions per protocol.  Good tolerance to all tx.  Sling required adjustment to secure elbow in place.    Ora is progressing well towards her goals.   Pt prognosis is Good.     Pt will continue to benefit from skilled outpatient physical therapy to address the deficits listed in the problem list box on initial evaluation, provide pt/family education and to maximize pt's level of independence in the home and community environment.     Pt's spiritual, cultural and educational needs considered and pt agreeable to plan of care and goals.     Anticipated barriers to physical therapy: None    Goals:     Short Term Goals: In 5 weeks   1.Pt to be educated on HEP.  2.Patient to increase Shoulder PROM, within protocol restrictions.  3.Patient to increase strength by 1/2 grade.  4.Patient to have pain less than 5/10 at all times.  5.Patient to improve score on the FOTO by 10%.  6. Pt to be educated on postural and body mechanics awareness.     Long Term Goals: In 10 weeks  1. Patient to improve score on the FOTO by 30%.  2. Patient to tolerate strength testing with goals made PRN.  3. Patient to demo full PROM R shoulder, with goals made for AROM PRN.  4. Patient to perform daily activities including use of R shoulder without increased pain, as released per protocol and per MD.       Plan     Monitor response to today's tx and progress with PT POC per protocol.    Shena Pruitt, PT

## 2020-02-04 ENCOUNTER — PATIENT OUTREACH (OUTPATIENT)
Dept: OTHER | Facility: OTHER | Age: 60
End: 2020-02-04

## 2020-02-04 ENCOUNTER — CLINICAL SUPPORT (OUTPATIENT)
Dept: REHABILITATION | Facility: HOSPITAL | Age: 60
End: 2020-02-04
Attending: ORTHOPAEDIC SURGERY
Payer: COMMERCIAL

## 2020-02-04 DIAGNOSIS — M25.60 DECREASED RANGE OF MOTION: ICD-10-CM

## 2020-02-04 DIAGNOSIS — R53.1 DECREASED STRENGTH: ICD-10-CM

## 2020-02-04 DIAGNOSIS — M25.511 ACUTE PAIN OF RIGHT SHOULDER: ICD-10-CM

## 2020-02-04 PROCEDURE — 97140 MANUAL THERAPY 1/> REGIONS: CPT

## 2020-02-04 PROCEDURE — 97110 THERAPEUTIC EXERCISES: CPT

## 2020-02-04 NOTE — PROGRESS NOTES
Digital Medicine: Health  Follow-Up    Patient reports that she is doing well. Patient had surgery on her shoulder which has kept her from taking BP readings. Let patient know I would notify Roberto April the reason for not taking readings. Patient reports attending physical therapy for rehabbing shoulder. Will f/u in 4 weeks.     The history is provided by the patient.     Follow Up  Follow-up reason(s): reading review      Readings are missing.   patient had surgery on shoulder which she has been experiencing intermittent pain. .      INTERVENTION(S)  encouragement/support      There are no preventive care reminders to display for this patient.    Last 5 Patient Entered Readings                                      Current 30 Day Average:      Recent Readings 12/10/2019 11/21/2019 11/14/2019 11/14/2019 11/6/2019    SBP (mmHg) 138 116 124 141 146    DBP (mmHg) 97 79 81 86 94    Pulse 74 91 81 84 84                      Diet Screening       Barriers to a Healthy Diet: no barriers to healthy eating    Patient reports not having gained any weight since shoulder surgery.     Physical Activity Screening   When asked if exercising, patient responded: yes    Patient participates in the following activities: yard/housework, walking and physical therapy/rehab    She identified the following barriers to physical activity: pain/injury/recent surgery    Patient reports participating in physical therapy a few times per week. Patient states that it was painful at first but has gotten a lot better. Encouraged patient to keep up the good work.    Medication Adherence Screening   She misses doses: never      Patient identified the following reasons for non-compliance: none    Patient reports no s/s or issues taking BP medication as prescribed.       SDOH

## 2020-02-04 NOTE — PROGRESS NOTES
Physical Therapy Daily Treatment Note     Name: Ora HOFFMAN [facility] Number: [id number]    Therapy Diagnosis:   Encounter Diagnoses   Name Primary?    Acute pain of right shoulder     Decreased range of motion     Decreased strength      Physician: Laureano Cox, *    Visit Date: 2/4/2020    Physician Orders: PT Eval and Treat   Medical Diagnosis from Referral: Complete tear of right rotator cuff  Evaluation Date: 1/15/2020  Authorization Period Expiration: 12/29/2020  Plan of Care Expiration: 2/14/2020  Visit # / Visits authorized: 4/20     Time In: 11:30 am  Time Out: 12:20 pm  Total Billable Time: 30 minutes    Precautions: Standard and rotator cuff repair    Subjective     Pt reports: No pain, she continues to use her sling and did have ABD pillow removed.    She was compliant with home exercise program.  Response to previous treatment: No change  Functional change: has been keeping sling on and watching positioning of sling    Pain: 0/10  Location: right shoulder      Objective     Ora received therapeutic exercises to develop ROM and posture for 23 minutes including:    PROM R sh within protocol limits 5 min  Wrist AROM palm up/palm down 2'/ea 1#   Wrist AROM sup/pronation 2'  LT isometric in sitting 2'  scap retraction isometric 2'  Elbow flex/ext 2' PROM with L UE  Shoulder rolls BW 3'  Gripper green 3'      Ora received the following manual therapy techniques: Myofacial release, Soft tissue Mobilization and manual releases were applied to the: UT/levator scapula, subscap, lats, infraspinatus/teres minor, pectral mm, post delt and brachilias, gentle at biceps for 10 minutes    Pt declined modalities.    Home Exercises Provided and Patient Education Provided     Education provided:   - Con't with HEP, con't with sling use    Written Home Exercises Provided: Patient instructed to cont prior HEP.  Exercises were reviewed and Ora was able to demonstrate them prior to the end  of the session.  Ora demonstrated good  understanding of the education provided.     See EMR under Patient Instructions for exercises provided prior visit.    Assessment     Pt with increased tone over scapular, UT and levator scap mm, but she continues to have good PROM into ER and flexion passively.  She reports compliance with HEP and is also performing Codmans at home as instructed by MD.    Ora is progressing well towards her goals.   Pt prognosis is Good.     Pt will continue to benefit from skilled outpatient physical therapy to address the deficits listed in the problem list box on initial evaluation, provide pt/family education and to maximize pt's level of independence in the home and community environment.     Pt's spiritual, cultural and educational needs considered and pt agreeable to plan of care and goals.     Anticipated barriers to physical therapy: None    Goals:     Short Term Goals: In 5 weeks   1.Pt to be educated on HEP.  2.Patient to increase Shoulder PROM, within protocol restrictions.  3.Patient to increase strength by 1/2 grade.  4.Patient to have pain less than 5/10 at all times.  5.Patient to improve score on the FOTO by 10%.  6. Pt to be educated on postural and body mechanics awareness.     Long Term Goals: In 10 weeks  1. Patient to improve score on the FOTO by 30%.  2. Patient to tolerate strength testing with goals made PRN.  3. Patient to demo full PROM R shoulder, with goals made for AROM PRN.  4. Patient to perform daily activities including use of R shoulder without increased pain, as released per protocol and per MD.       Plan     Monitor response to today's tx and progress with PT POC per protocol.    Shena Pruitt, PT

## 2020-02-06 ENCOUNTER — PATIENT MESSAGE (OUTPATIENT)
Dept: ADMINISTRATIVE | Facility: OTHER | Age: 60
End: 2020-02-06

## 2020-02-07 ENCOUNTER — PATIENT MESSAGE (OUTPATIENT)
Dept: ADMINISTRATIVE | Facility: OTHER | Age: 60
End: 2020-02-07

## 2020-02-07 ENCOUNTER — PATIENT MESSAGE (OUTPATIENT)
Dept: REHABILITATION | Facility: HOSPITAL | Age: 60
End: 2020-02-07

## 2020-02-12 ENCOUNTER — CLINICAL SUPPORT (OUTPATIENT)
Dept: REHABILITATION | Facility: HOSPITAL | Age: 60
End: 2020-02-12
Payer: COMMERCIAL

## 2020-02-12 ENCOUNTER — OFFICE VISIT (OUTPATIENT)
Dept: INTERNAL MEDICINE | Facility: CLINIC | Age: 60
End: 2020-02-12
Payer: COMMERCIAL

## 2020-02-12 ENCOUNTER — OFFICE VISIT (OUTPATIENT)
Dept: ORTHOPEDICS | Facility: CLINIC | Age: 60
End: 2020-02-12
Payer: COMMERCIAL

## 2020-02-12 ENCOUNTER — LAB VISIT (OUTPATIENT)
Dept: LAB | Facility: HOSPITAL | Age: 60
End: 2020-02-12
Payer: COMMERCIAL

## 2020-02-12 VITALS
BODY MASS INDEX: 35.44 KG/M2 | DIASTOLIC BLOOD PRESSURE: 91 MMHG | HEIGHT: 63 IN | SYSTOLIC BLOOD PRESSURE: 146 MMHG | HEART RATE: 85 BPM | WEIGHT: 200 LBS

## 2020-02-12 VITALS
OXYGEN SATURATION: 97 % | DIASTOLIC BLOOD PRESSURE: 84 MMHG | HEIGHT: 63 IN | SYSTOLIC BLOOD PRESSURE: 130 MMHG | HEART RATE: 87 BPM | TEMPERATURE: 97 F | BODY MASS INDEX: 35.78 KG/M2 | WEIGHT: 201.94 LBS

## 2020-02-12 DIAGNOSIS — R53.1 DECREASED STRENGTH: ICD-10-CM

## 2020-02-12 DIAGNOSIS — B37.31 YEAST VAGINITIS: Primary | ICD-10-CM

## 2020-02-12 DIAGNOSIS — M25.511 ACUTE PAIN OF RIGHT SHOULDER: ICD-10-CM

## 2020-02-12 DIAGNOSIS — M25.60 DECREASED RANGE OF MOTION: ICD-10-CM

## 2020-02-12 DIAGNOSIS — B37.31 YEAST VAGINITIS: ICD-10-CM

## 2020-02-12 DIAGNOSIS — M75.121 COMPLETE TEAR OF RIGHT ROTATOR CUFF, UNSPECIFIED WHETHER TRAUMATIC: Primary | ICD-10-CM

## 2020-02-12 LAB
BACTERIA #/AREA URNS HPF: ABNORMAL /HPF
BILIRUB UR QL STRIP: NEGATIVE
CLARITY UR: ABNORMAL
COLOR UR: YELLOW
GLUCOSE UR QL STRIP: NEGATIVE
HGB UR QL STRIP: ABNORMAL
HYALINE CASTS #/AREA URNS LPF: 0 /LPF
KETONES UR QL STRIP: ABNORMAL
LEUKOCYTE ESTERASE UR QL STRIP: NEGATIVE
MICROSCOPIC COMMENT: ABNORMAL
NITRITE UR QL STRIP: NEGATIVE
PH UR STRIP: 6 [PH] (ref 5–8)
PROT UR QL STRIP: ABNORMAL
RBC #/AREA URNS HPF: 7 /HPF (ref 0–4)
SP GR UR STRIP: 1.02 (ref 1–1.03)
SQUAMOUS #/AREA URNS HPF: 2 /HPF
URN SPEC COLLECT METH UR: ABNORMAL
WBC #/AREA URNS HPF: 0 /HPF (ref 0–5)

## 2020-02-12 PROCEDURE — 99999 PR PBB SHADOW E&M-EST. PATIENT-LVL IV: ICD-10-PCS | Mod: PBBFAC,,, | Performed by: PHYSICIAN ASSISTANT

## 2020-02-12 PROCEDURE — 99024 POSTOP FOLLOW-UP VISIT: CPT | Mod: S$GLB,,, | Performed by: ORTHOPAEDIC SURGERY

## 2020-02-12 PROCEDURE — 3075F SYST BP GE 130 - 139MM HG: CPT | Mod: CPTII,S$GLB,, | Performed by: PHYSICIAN ASSISTANT

## 2020-02-12 PROCEDURE — 99214 PR OFFICE/OUTPT VISIT, EST, LEVL IV, 30-39 MIN: ICD-10-PCS | Mod: S$GLB,,, | Performed by: PHYSICIAN ASSISTANT

## 2020-02-12 PROCEDURE — 3008F BODY MASS INDEX DOCD: CPT | Mod: CPTII,S$GLB,, | Performed by: PHYSICIAN ASSISTANT

## 2020-02-12 PROCEDURE — 99999 PR PBB SHADOW E&M-EST. PATIENT-LVL III: CPT | Mod: PBBFAC,,, | Performed by: ORTHOPAEDIC SURGERY

## 2020-02-12 PROCEDURE — 99999 PR PBB SHADOW E&M-EST. PATIENT-LVL III: ICD-10-PCS | Mod: PBBFAC,,, | Performed by: ORTHOPAEDIC SURGERY

## 2020-02-12 PROCEDURE — 99214 OFFICE O/P EST MOD 30 MIN: CPT | Mod: S$GLB,,, | Performed by: PHYSICIAN ASSISTANT

## 2020-02-12 PROCEDURE — 97140 MANUAL THERAPY 1/> REGIONS: CPT

## 2020-02-12 PROCEDURE — 99024 PR POST-OP FOLLOW-UP VISIT: ICD-10-PCS | Mod: S$GLB,,, | Performed by: ORTHOPAEDIC SURGERY

## 2020-02-12 PROCEDURE — 97110 THERAPEUTIC EXERCISES: CPT

## 2020-02-12 PROCEDURE — 97014 ELECTRIC STIMULATION THERAPY: CPT

## 2020-02-12 PROCEDURE — 81000 URINALYSIS NONAUTO W/SCOPE: CPT

## 2020-02-12 PROCEDURE — 99999 PR PBB SHADOW E&M-EST. PATIENT-LVL IV: CPT | Mod: PBBFAC,,, | Performed by: PHYSICIAN ASSISTANT

## 2020-02-12 PROCEDURE — 3008F PR BODY MASS INDEX (BMI) DOCUMENTED: ICD-10-PCS | Mod: CPTII,S$GLB,, | Performed by: PHYSICIAN ASSISTANT

## 2020-02-12 PROCEDURE — 3079F DIAST BP 80-89 MM HG: CPT | Mod: CPTII,S$GLB,, | Performed by: PHYSICIAN ASSISTANT

## 2020-02-12 PROCEDURE — 87086 URINE CULTURE/COLONY COUNT: CPT

## 2020-02-12 PROCEDURE — 3075F PR MOST RECENT SYSTOLIC BLOOD PRESS GE 130-139MM HG: ICD-10-PCS | Mod: CPTII,S$GLB,, | Performed by: PHYSICIAN ASSISTANT

## 2020-02-12 PROCEDURE — 3079F PR MOST RECENT DIASTOLIC BLOOD PRESSURE 80-89 MM HG: ICD-10-PCS | Mod: CPTII,S$GLB,, | Performed by: PHYSICIAN ASSISTANT

## 2020-02-12 RX ORDER — FLUCONAZOLE 150 MG/1
150 TABLET ORAL DAILY
Qty: 3 TABLET | Refills: 0 | Status: SHIPPED | OUTPATIENT
Start: 2020-02-12 | End: 2020-02-15

## 2020-02-12 NOTE — PATIENT INSTRUCTIONS
Preventing Vaginitis     Use mild, unscented soap when you bathe or shower to avoid irritating your vagina.    Vaginitis is irritation or infection of the vagina or vulva (the outside opening of the vagina). Vaginitis can be caused by bacteria, viruses, parasites, or yeast. Chemicals (such as in perfumes or soaps or in spermicides) can sometimes be a cause. Vaginitis can be caused by hormone changes in pregnancy or with menopause. You can help prevent vaginitis. Follow the tips below. And see your healthcare provider if you have any symptoms.  Hygiene  · Avoid chemicals. Do not use vaginal sprays. Do not use scented toilet paper or tampons that are scented. Sprays and scents have chemicals that can irritate your vagina.  · Do not douche unless you are told to by your healthcare provider. Douching is rarely needed. And it upsets the normal balance in the vagina.  · Wash yourself well. Wash the outer vaginal area (vulva) every day with mild, unscented soap. Keep it as dry as possible.  · Wipe correctly. Make sure to wipe from front to back after a bowel movement. This helps keep from spreading bacteria from your anus to your vagina.  · Change your tampon often. During your period, make sure to change your tampon as often as directed on the package. This allows the normal flow of vaginal discharge and blood.  Lifestyle  · Limit your number of sexual partners. The more partners you have, the greater your risk of infection. Using condoms helps reduce your risk.  · Get enough sleep. Sleep helps keep your bodys immune system healthy. This helps you fight infection.  · Lose weight, if needed. Excess weight can reduce air circulation around your vagina. This can increase your risk of infection.  · Exercise regularly. Regular activity helps keep your body healthy.  · Take antibiotics only as directed. Antibiotics can change the normal chemical balance in the vagina.    Clothing  · Dont sit in wet clothes. Yeast thrives  when its warm and damp.  · Dont wear tight pants. And dont wear tights, leggings, or hose without a cotton crotch. These types of clothing trap warmth and moisture.  · Wear cotton underwear. Cotton lets air circulate around the vagina.  Symptoms of vaginitis  · Irritation, swelling, or itching of the genital area  · Vaginal discharge  · Bad vaginal odor  · Pain or burning during urination   Date Last Reviewed: 12/1/2016 © 2000-2017 [employer]. [address] [place] [address]. All rights reserved. This information is not intended as a substitute for professional medical care. Always follow your healthcare professional's instructions.        Candida Vaginal Infection    You have a Candida vaginal infection. This is also known as a yeast infection. It is most often caused by a type of yeast (fungus) called Candida. Candida are normally found in the vagina. But if they increase in number, this can lead to infection and cause symptoms.  Symptoms of a yeast infection can include:  · Clumpy or thin, white discharge, which may look like cottage cheese  · Itching or burning  · Burning with urination  Certain factors can make a yeast infection more likely. These can include:  · Taking certain medicines, such as antibiotics or birth control pills  · Pregnancy  · Diabetes  · Weakened immune system  A yeast infection is most often treated with antifungal medicine. This may be given as a vaginal cream or pills you take by mouth. Treatment may last for about 1 to 7 days. Women with severe or recurrent infections may need longer courses of treatment.  Home care  · If youre prescribed medicine, be sure to use it as directed. Finish all of the medicine, even if your symptoms go away. Note: Dont try to treat yourself using over-the-counter products without talking to your provider first. He or she will let you know if this is a good option for you.  · Ask your provider what steps you can take to help  reduce your risk of having a yeast infection in the future.  Follow-up care  Follow up with your healthcare provider, or as directed.  When to seek medical advice  Call your healthcare provider right away if:  · You have a fever of 100.4ºF (38ºC) or higher, or as directed by your provider.  · Your symptoms worsen, or they dont go away within a few days of starting treatment.  · You have new pain in the lower belly or pelvic region.  · You have side effects that bother you or a reaction to the cream or pills youre prescribed.  · You or any partners you have sex with have new symptoms, such as a rash, joint pain, or sores.  Date Last Reviewed: 7/30/2015 © 2000-2017 [employer]. [address] [place] [address], [place] [address]. All rights reserved. This information is not intended as a substitute for professional medical care. Always follow your healthcare professional's instructions.        Vaginal Infection: Understanding the Vaginal Environment  The vagina is a canal. It connects the uterus (womb) to the outside of the body. It is home to many types of bacteria and other tiny organisms. These different bacteria most often stay balanced in number. This keeps the vagina healthy. If the balance changes, it can cause infection.   A healthy environment  Many types of bacteria are present in a healthy vagina. When balanced, they dont cause problems. Small amounts of yeast may also be present without causing problems. The most common type of bacteria in the vagina is lactobacillus. It helps keep the vagina at a low pH. A low pH keeps bad bacteria from taking over.  Normal vaginal discharge  The vagina makes fluid. It is sent out as discharge. This also keeps the vagina healthy. Normal discharge can be clear, white, or yellowish. Most women find that normal discharge varies in amount and color through the month.  An unhealthy environment  The vaginal environment may get out of balance. This may result in a  vaginal infection. There are a few reasons this can happen. The pH may have changed. The amount of one organism, such as yeast, may increase. Or an outside organism may get into the vagina and throw off the balance:  · Bacterial vaginosis (BV). BV is due to an imbalance in the normal bacteria in the vagina. Lactobacillus bacteria decrease. As a result, the numbers of bad bacteria increase.  · Candidiasis (yeast infection). Yeast is a type of fungus. A yeast infection occurs when yeast cells in the vagina increase. They then attack vaginal tissues. A type of yeast called Candida albicans is often involved.  · Trichomoniasis (trich). Trich is a parasite. It is passed from one person to another during sex. Men with trich often dont have any symptoms. In women, it can take weeks or months before symptoms appear.  Date Last Reviewed: 3/1/2017  © 0994-0277 [employer]. [address] [place] [address]. All rights reserved. This information is not intended as a substitute for professional medical care. Always follow your healthcare professional's instructions.        Preventing Vaginal Infection  These steps can help you stay comfortable during treatment of a vaginal infection. They also help prevent vaginal infections in the future.  Keeping a healthy balance  Factors that change the normal balance in the vagina can lead to a vaginal infection. To help keep the balance normal, try these tips:  · Change out of wet bathing suits and damp exercise clothes as soon as possible. Yeast thrive in a warm, moist environment.  · Avoid wearing tight pants. Choose cotton underwear and pantyhose that have a cotton crotch. Cotton keeps you cooler and drier than synthetics.  · Don't douche unless directed by your health care provider. Douching can destroy friendly bacteria and change the vagina's normal balance.  · Wipe from front to back after using the toilet. This prevents bacteria from spreading from the  anus to the vulva.  · Wash the vulva with mild, unscented soap or with plain water.  · Wash your diaphragm, spermicide applicators, and sex toys with mild soap and water after use. Dry them thoroughly before putting them away.  · Change tampons often (every 2 hours to 4 hours). Leaving a tampon in for too long may disrupt the balance of vaginal bacteria.  · Avoid vaginal sprays, scented toilet paper and soaps, and deodorant tampons or pads, which can cause vaginal irritation  Staying healthy overall  Good overall health can help you resist infection. To be healthier:  · Help protect yourself from STDs by using latex condoms for intercourse. Ask your health care provider for more information about safer sex.  · Eat a variety of healthy foods.  · Exercise regularly.  · Get enough rest and sleep.  · Maintain a healthy weight. If you need to lose weight, ask your health care provider for advice on how to start.  Date Last Reviewed: 5/18/2015  © 7458-0479 [employer]. [address] [place] [address], [place] [address]. All rights reserved. This information is not intended as a substitute for professional medical care. Always follow your healthcare professional's instructions.

## 2020-02-12 NOTE — PROGRESS NOTES
Physical Therapy Daily Treatment Note     Name: Ora HOFFMAN [facility] Number: [id number]    Therapy Diagnosis:   Encounter Diagnoses   Name Primary?    Acute pain of right shoulder     Decreased range of motion     Decreased strength      Physician: Laureano Cox, *    Visit Date: 2/12/2020    Physician Orders: PT Eval and Treat   Medical Diagnosis from Referral: Complete tear of right rotator cuff  Evaluation Date: 1/15/2020  Authorization Period Expiration: 12/29/2020  Plan of Care Expiration: 2/14/2020  Visit # / Visits authorized: 5/20     Time In: 10:30 am  Time Out: 11:20 am  Total Billable Time: 35 minutes    Precautions: Standard and rotator cuff repair    Subjective     Pt reports:SHe just saw the MD and he did take her out of the sling, she reports he reviewed instructions for being out of sling regarding use of her shoulder and gave her HEP of wall walks with end range lift off of wall. She is very sore after her visit with him.    She was compliant with home exercise program.  Response to previous treatment: No change  Functional change: Out of sling today    Pain: NT/10  Location: right shoulder      Objective     Ora received therapeutic exercises to develop ROM and posture for 20 minutes including:    PROM R sh within protocol limits 5 min  Elbow flex/ext 2'   Shoulder rolls BW 3'  Pulleys (flex, ABD, ext) 2'/ea  Prone LT isometric 2'  Prone scap retraction isometric 2'      Ora received the following manual therapy techniques: Myofacial release, Soft tissue Mobilization and manual releases were applied to the: UT/levator scapula, subscap, lats, infraspinatus/teres minor, pectral mm, post delt and brachilias, gentle at biceps for 15 minutes    Ora received the following supervised modalities after being cleared for contradictions: TENS:  Ora received TENS electrical stimulation for pain to the R shoulder. Pt received continuous mode at a rate of 150 pps for 10  minutes, along with cold pack.  Ora tolerated treatment well without any adverse effects.     Function:      Home Exercises Provided and Patient Education Provided     Education provided:   - Con't with HEP, reviewed precautions with no sling    Written Home Exercises Provided: Patient instructed to cont prior HEP.  Exercises were reviewed and Ora was able to demonstrate them prior to the end of the session.  Ora demonstrated good  understanding of the education provided.     See EMR under Patient Instructions for exercises provided prior visit.    Assessment     Pt with increased tone over scapular, UT and levator scap mm, her PROM continues to be excellent.  She was unable to progress with all therex planned today due to soreness post MD visit.  Reports decreased pain post tx session.    Ora is progressing well towards her goals.   Pt prognosis is Good.     Pt will continue to benefit from skilled outpatient physical therapy to address the deficits listed in the problem list box on initial evaluation, provide pt/family education and to maximize pt's level of independence in the home and community environment.     Pt's spiritual, cultural and educational needs considered and pt agreeable to plan of care and goals.     Anticipated barriers to physical therapy: None    Goals:     Short Term Goals: In 5 weeks   1.Pt to be educated on HEP.  2.Patient to increase Shoulder PROM, within protocol restrictions.  3.Patient to increase strength by 1/2 grade.  4.Patient to have pain less than 5/10 at all times.  5.Patient to improve score on the FOTO by 10%.  6. Pt to be educated on postural and body mechanics awareness.     Long Term Goals: In 10 weeks  1. Patient to improve score on the FOTO by 30%.  2. Patient to tolerate strength testing with goals made PRN.  3. Patient to demo full PROM R shoulder, with goals made for AROM PRN.  4. Patient to perform daily activities including use of R shoulder without  increased pain, as released per protocol and per MD.       Plan     Monitor response to today's tx and progress with PT POC per protocol.    Shena Pruitt, PT

## 2020-02-12 NOTE — PROGRESS NOTES
Subjective:          Chief Complaint:    6wk postop R shoulder. Doing well, pain much improved.    DATE OF SURGERY:  12/30/2019      PREOPERATIVE DIAGNOSES:   - Right shoulder rotator cuff tear  - Right shoulder impingement  - Right shoulder synovitis  -right shoulder AC joint arthritis  -right shoulder biceps tendonitis      POSTOPERATIVE DIAGNOSES:   Same     PROCEDURE:   - Right shoulder arthroscopic rotator cuff repair.    -Right shoulder arthroscopic subacromial decompression.   - Right shoulder arthroscopic synovectomy and extensive debridement   -right shoulder arthroscopic DISTAL CLAVICLE EXCISION  -right shoulder arthroscopic biceps tenodesis     SURGEON: Laureano Cox M.D.          Shoulder Pain    The pain is present in the right shoulder. This is a chronic problem. The current episode started more than 1 year ago. There has been no history of extremity trauma. The problem has been rapidly improving. The quality of the pain is described as tingling, burning and numbing. The pain is at a severity of 1/10. Pertinent negatives include no fever, numbness or tingling. The symptoms are aggravated by activity. She has tried injection treatment, other, cold and heat for the symptoms. The treatment provided significant relief. Physical therapy was effective.      Review of Systems   Constitution: Negative for chills, fever and night sweats.   HENT: Negative for ear discharge and hearing loss.    Eyes: Negative for blurred vision and visual disturbance.   Cardiovascular: Negative for chest pain and leg swelling.   Respiratory: Negative for cough and shortness of breath.    Endocrine: Negative for polyuria.   Hematologic/Lymphatic: Negative for bleeding problem.   Skin: Negative for rash.   Musculoskeletal: Positive for joint pain and muscle weakness. Negative for back pain, joint swelling and muscle cramps.   Gastrointestinal: Negative for melena, nausea and vomiting.   Genitourinary: Negative for  hematuria.   Neurological: Negative for loss of balance, numbness, paresthesias and tingling.   Psychiatric/Behavioral: Negative for altered mental status.     Past Medical History:   Diagnosis Date    Arthritis     Fatty liver     GERD (gastroesophageal reflux disease)     Hernia, umbilical     reducible    Hyperlipidemia     Hypertension     Rotator cuff tear     Sleep apnea      Past Surgical History:   Procedure Laterality Date    ARTHROSCOPIC DEBRIDEMENT OF SHOULDER Right 2019    Procedure: DEBRIDEMENT, SHOULDER, ARTHROSCOPIC;  Surgeon: Laureano Cox MD;  Location: [facility] OR;  Service: Orthopedics;  Laterality: Right;    ARTHROSCOPY OF SHOULDER WITH DECOMPRESSION OF SUBACROMIAL SPACE Right 2019    Procedure: ARTHROSCOPY, SHOULDER, WITH SUBACROMIAL SPACE DECOMPRESSION;  Surgeon: Laureano Cox MD;  Location: [facility] OR;  Service: Orthopedics;  Laterality: Right;    BICEPS TENDON REPAIR Right 2019    Procedure: REPAIR, TENDON, BICEPS;  Surgeon: Laureano Cox MD;  Location: [facility];  Service: Orthopedics;  Laterality: Right;  arthroscopic Biceps tenodesis    BRAIN SURGERY      Pituitary tumor removal 2001 x 2     CARPAL TUNNEL RELEASE Bilateral     x 2    CERVICAL BIOPSY  W/ LOOP ELECTRODE EXCISION      [facility] '81     SECTION      x 1    COLONOSCOPY N/A 2016    Procedure: COLONOSCOPY;  Surgeon: Quique aPul MD;  Location[facility];  Service: Endoscopy;  Laterality: N/A;    DISTAL CLAVICLE EXCISION Right 2019    Procedure: EXCISION, CLAVICLE, DISTAL;  Surgeon: Laureano Cox MD;  Location: [facility] OR;  Service: Orthopedics;  Laterality: Right;  arthroscopic    GANGLION CYST EXCISION Left 2018    REFRACTIVE SURGERY      ROTATOR CUFF REPAIR Right 2019    Procedure: REPAIR, ROTATOR CUFF;  Surgeon: Laureano Cox MD;  Location: [facility];  Service: Orthopedics;  Laterality: Right;  arthroscopic    SYNOVECTOMY OF  SHOULDER Right 12/30/2019    Procedure: SYNOVECTOMY, SHOULDER;  Surgeon: Laureano Cox MD;  Location: [facility];  Service: Orthopedics;  Laterality: Right;  arthroscopic    TOTAL ABDOMINAL HYSTERECTOMY W/ BILATERAL SALPINGOOPHORECTOMY  2006    STAR/BSO secondary to endometriosis    VENTRAL HERNIA REPAIR  2016     Social History     Socioeconomic History    Marital status:      Spouse name: Not on file    Number of children: 1    Years of education: Not on file    Highest education level: Not on file   Occupational History    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Employer: [employer]   Social Needs    Financial resource strain: Not on file    Food insecurity:     Worry: Not on file     Inability: Not on file    Transportation needs:     Medical: Not on file     Non-medical: Not on file   Tobacco Use    Smoking status: Never Smoker    Smokeless tobacco: Never Used   Substance and Sexual Activity    Alcohol use: Yes     Alcohol/week: 0.0 standard drinks     Frequency: Monthly or less     Comment: socially    Drug use: No    Sexual activity: Yes     Partners: Male     Birth control/protection: None, Surgical   Lifestyle    Physical activity:     Days per week: Not on file     Minutes per session: Not on file    Stress: Not on file   Relationships    Social connections:     Talks on phone: Not on file     Gets together: Not on file     Attends Che[employer] service: Not on file     Active member of club or organization: Not on file     Attends meetings of clubs or organizations: Not on file     Relationship status: Not on file   Other Topics Concern    Not on file   Social History Narrative    Not on file     Vitals:    02/12/20 0924   BP: (!) 146/91   Pulse: 85                   Objective:        General: Ora is well-developed, well-nourished, appears stated age, in no acute distress, alert and oriented to time, place and person.             Right Shoulder Exam     Other   Sensation:  normal    Comments:  Incision well healed  No erythema/drainage/signs of infection  Compartments soft  No calf tenderness     PROM , ER 50  AROM FF 90, ER 40    Vascular Exam       Capillary Refill  Right Hand: normal capillary refill    Relevant imaging results reviewed and interpreted by me, discussed with the patient and / or family today: none            Assessment:       Encounter Diagnosis   Name Primary?    Complete tear of right rotator cuff, unspecified whether traumatic Yes          Plan:      -Continue PT at this point, focusing on ROM and begin active assisted and active range of motion  -Pain medication as needed for PT; try to wean off for next visit  -Consider starting NSAIDs prn  -Wean out of sling  -Return to clinic in 6-8 weeks for 3 months post op follow up              Disclaimer: This note was prepared using a voice recognition system and is likely to have sound alike errors within the text.

## 2020-02-12 NOTE — PROGRESS NOTES
Subjective:      Patient ID: Ora Henderson is a 59 y.o. female.    Chief Complaint: Vaginitis    Vaginal Itching   The patient's primary symptoms include genital itching and a genital odor. The patient's pertinent negatives include no genital lesions, genital rash, missed menses, pelvic pain, vaginal bleeding or vaginal discharge. The current episode started in the past 7 days. The problem occurs constantly. The patient is experiencing no pain. She is not pregnant. Associated symptoms include constipation and dysuria. Pertinent negatives include no abdominal pain, anorexia, back pain, chills, diarrhea, discolored urine, fever, flank pain, frequency, headaches, hematuria, joint pain, joint swelling, nausea, painful intercourse, rash, sore throat, urgency or vomiting. Nothing aggravates the symptoms.       Review of Systems   Constitutional: Negative for activity change, appetite change, chills, diaphoresis, fatigue, fever and unexpected weight change.   HENT: Negative.  Negative for congestion, hearing loss, postnasal drip, rhinorrhea, sore throat, trouble swallowing and voice change.    Eyes: Negative.  Negative for visual disturbance.   Respiratory: Negative.  Negative for cough, choking, chest tightness and shortness of breath.    Cardiovascular: Negative for chest pain, palpitations and leg swelling.   Gastrointestinal: Positive for constipation. Negative for abdominal distention, abdominal pain, anal bleeding, anorexia, blood in stool, diarrhea, nausea, rectal pain and vomiting.   Endocrine: Negative for cold intolerance, heat intolerance, polydipsia and polyuria.   Genitourinary: Positive for dysuria and genital sores. Negative for decreased urine volume, difficulty urinating, dyspareunia, enuresis, flank pain, frequency, hematuria, menstrual problem, missed menses, pelvic pain, urgency, vaginal bleeding, vaginal discharge and vaginal pain.   Musculoskeletal: Negative for arthralgias, back  "pain, gait problem, joint pain, joint swelling and myalgias.   Skin: Negative for color change, pallor, rash and wound.   Neurological: Negative for dizziness, tremors, weakness, light-headedness, numbness and headaches.   Hematological: Negative for adenopathy.   Psychiatric/Behavioral: Negative for behavioral problems, confusion, self-injury, sleep disturbance and suicidal ideas. The patient is not nervous/anxious.      Objective:   /84 (BP Location: Left arm, Patient Position: Sitting, BP Method: Large (Manual))   Pulse 87   Temp 96.9 °F (36.1 °C) (Tympanic)   Ht 5' 3" (1.6 m)   Wt 91.6 kg (201 lb 15.1 oz)   SpO2 97%   BMI 35.77 kg/m²     Physical Exam   Constitutional: She is oriented to person, place, and time. She appears well-developed and well-nourished. No distress.   HENT:   Head: Normocephalic and atraumatic.   Eyes: Pupils are equal, round, and reactive to light. Conjunctivae and EOM are normal.   Cardiovascular: Normal rate, regular rhythm and normal heart sounds. Exam reveals no gallop and no friction rub.   No murmur heard.  Pulmonary/Chest: Effort normal and breath sounds normal. No respiratory distress. She has no wheezes. She has no rales. She exhibits no tenderness.   Abdominal: Soft. She exhibits no distension. There is no tenderness.   Musculoskeletal: Normal range of motion.   Neurological: She is alert and oriented to person, place, and time.   Skin: Skin is warm and dry. She is not diaphoretic.   Psychiatric: She has a normal mood and affect. Her behavior is normal. Judgment and thought content normal.   Vitals reviewed.      Assessment:     1. Yeast vaginitis      Plan:   Yeast vaginitis  -     fluconazole (DIFLUCAN) 150 MG Tab; Take 1 tablet (150 mg total) by mouth once daily. for 3 days  Dispense: 3 tablet; Refill: 0  -     Urinalysis; Future; Expected date: 02/12/2020  -     Urine culture; Future    Educational handout on over-the-counter medications and at-home conservative " care, pertinent to the patients diagnosis today, was handed to the patient and discussed in detail.    Follow up if symptoms worsen or fail to improve.

## 2020-02-14 ENCOUNTER — PATIENT MESSAGE (OUTPATIENT)
Dept: INTERNAL MEDICINE | Facility: CLINIC | Age: 60
End: 2020-02-14

## 2020-02-14 DIAGNOSIS — B96.89 BACTERIAL VAGINOSIS: Primary | ICD-10-CM

## 2020-02-14 DIAGNOSIS — N76.0 BACTERIAL VAGINOSIS: Primary | ICD-10-CM

## 2020-02-14 LAB
BACTERIA UR CULT: NORMAL
BACTERIA UR CULT: NORMAL

## 2020-02-14 RX ORDER — METRONIDAZOLE 7.5 MG/G
1 GEL VAGINAL 2 TIMES DAILY
Qty: 70 G | Refills: 0 | Status: SHIPPED | OUTPATIENT
Start: 2020-02-14 | End: 2020-02-19

## 2020-02-18 ENCOUNTER — LAB VISIT (OUTPATIENT)
Dept: LAB | Facility: HOSPITAL | Age: 60
End: 2020-02-18
Payer: COMMERCIAL

## 2020-02-18 ENCOUNTER — LAB VISIT (OUTPATIENT)
Dept: LAB | Facility: HOSPITAL | Age: 60
End: 2020-02-18
Attending: PSYCHIATRY & NEUROLOGY
Payer: COMMERCIAL

## 2020-02-18 ENCOUNTER — PATIENT MESSAGE (OUTPATIENT)
Dept: INTERNAL MEDICINE | Facility: CLINIC | Age: 60
End: 2020-02-18

## 2020-02-18 ENCOUNTER — CLINICAL SUPPORT (OUTPATIENT)
Dept: REHABILITATION | Facility: HOSPITAL | Age: 60
End: 2020-02-18
Attending: ORTHOPAEDIC SURGERY
Payer: COMMERCIAL

## 2020-02-18 ENCOUNTER — OFFICE VISIT (OUTPATIENT)
Dept: INTERNAL MEDICINE | Facility: CLINIC | Age: 60
End: 2020-02-18
Payer: COMMERCIAL

## 2020-02-18 VITALS
TEMPERATURE: 98 F | DIASTOLIC BLOOD PRESSURE: 70 MMHG | HEART RATE: 80 BPM | OXYGEN SATURATION: 96 % | HEIGHT: 63 IN | WEIGHT: 200.19 LBS | BODY MASS INDEX: 35.47 KG/M2 | RESPIRATION RATE: 18 BRPM | SYSTOLIC BLOOD PRESSURE: 128 MMHG

## 2020-02-18 DIAGNOSIS — R43.8 BAD TASTE IN MOUTH: ICD-10-CM

## 2020-02-18 DIAGNOSIS — R53.1 DECREASED STRENGTH: ICD-10-CM

## 2020-02-18 DIAGNOSIS — M47.22 OSTEOARTHRITIS OF SPINE WITH RADICULOPATHY, CERVICAL REGION: Chronic | ICD-10-CM

## 2020-02-18 DIAGNOSIS — I10 ESSENTIAL HYPERTENSION: ICD-10-CM

## 2020-02-18 DIAGNOSIS — E66.01 SEVERE OBESITY (BMI 35.0-35.9 WITH COMORBIDITY): ICD-10-CM

## 2020-02-18 DIAGNOSIS — E78.2 MIXED HYPERLIPIDEMIA: Chronic | ICD-10-CM

## 2020-02-18 DIAGNOSIS — K21.9 GASTROESOPHAGEAL REFLUX DISEASE, ESOPHAGITIS PRESENCE NOT SPECIFIED: Chronic | ICD-10-CM

## 2020-02-18 DIAGNOSIS — M25.60 DECREASED RANGE OF MOTION: ICD-10-CM

## 2020-02-18 DIAGNOSIS — M47.26 OSTEOARTHRITIS OF SPINE WITH RADICULOPATHY, LUMBAR REGION: ICD-10-CM

## 2020-02-18 DIAGNOSIS — M25.511 ACUTE PAIN OF RIGHT SHOULDER: ICD-10-CM

## 2020-02-18 DIAGNOSIS — R43.8 BAD TASTE IN MOUTH: Primary | ICD-10-CM

## 2020-02-18 PROBLEM — M19.011 DJD OF RIGHT AC (ACROMIOCLAVICULAR) JOINT: Status: RESOLVED | Noted: 2017-08-02 | Resolved: 2020-02-18

## 2020-02-18 PROBLEM — M75.21 TENDONITIS OF UPPER BICEPS TENDON OF RIGHT SHOULDER: Status: RESOLVED | Noted: 2019-12-02 | Resolved: 2020-02-18

## 2020-02-18 PROBLEM — M75.121 NONTRAUMATIC COMPLETE TEAR OF RIGHT ROTATOR CUFF: Status: RESOLVED | Noted: 2019-12-02 | Resolved: 2020-02-18

## 2020-02-18 PROBLEM — M79.18 CERVICAL MYOFASCIAL PAIN SYNDROME: Status: RESOLVED | Noted: 2019-09-03 | Resolved: 2020-02-18

## 2020-02-18 PROBLEM — M75.41 ROTATOR CUFF IMPINGEMENT SYNDROME OF RIGHT SHOULDER: Status: RESOLVED | Noted: 2017-08-02 | Resolved: 2020-02-18

## 2020-02-18 PROBLEM — M75.121 COMPLETE TEAR OF RIGHT ROTATOR CUFF: Status: RESOLVED | Noted: 2019-12-30 | Resolved: 2020-02-18

## 2020-02-18 LAB
ANION GAP SERPL CALC-SCNC: 7 MMOL/L (ref 8–16)
BASOPHILS # BLD AUTO: 0.05 K/UL (ref 0–0.2)
BASOPHILS NFR BLD: 0.8 % (ref 0–1.9)
BILIRUB UR QL STRIP: NEGATIVE
BUN SERPL-MCNC: 11 MG/DL (ref 6–20)
CALCIUM SERPL-MCNC: 9.7 MG/DL (ref 8.7–10.5)
CHLORIDE SERPL-SCNC: 101 MMOL/L (ref 95–110)
CLARITY UR: CLEAR
CO2 SERPL-SCNC: 31 MMOL/L (ref 23–29)
COLOR UR: YELLOW
CREAT SERPL-MCNC: 0.8 MG/DL (ref 0.5–1.4)
DIFFERENTIAL METHOD: ABNORMAL
EOSINOPHIL # BLD AUTO: 0.3 K/UL (ref 0–0.5)
EOSINOPHIL NFR BLD: 4.1 % (ref 0–8)
ERYTHROCYTE [DISTWIDTH] IN BLOOD BY AUTOMATED COUNT: 13.1 % (ref 11.5–14.5)
EST. GFR  (AFRICAN AMERICAN): >60 ML/MIN/1.73 M^2
EST. GFR  (NON AFRICAN AMERICAN): >60 ML/MIN/1.73 M^2
GLUCOSE SERPL-MCNC: 87 MG/DL (ref 70–110)
GLUCOSE UR QL STRIP: NEGATIVE
HCT VFR BLD AUTO: 39.4 % (ref 37–48.5)
HGB BLD-MCNC: 12.4 G/DL (ref 12–16)
HGB UR QL STRIP: ABNORMAL
IMM GRANULOCYTES # BLD AUTO: 0.02 K/UL (ref 0–0.04)
IMM GRANULOCYTES NFR BLD AUTO: 0.3 % (ref 0–0.5)
KETONES UR QL STRIP: NEGATIVE
LEUKOCYTE ESTERASE UR QL STRIP: NEGATIVE
LYMPHOCYTES # BLD AUTO: 3.1 K/UL (ref 1–4.8)
LYMPHOCYTES NFR BLD: 48.3 % (ref 18–48)
MCH RBC QN AUTO: 28.6 PG (ref 27–31)
MCHC RBC AUTO-ENTMCNC: 31.5 G/DL (ref 32–36)
MCV RBC AUTO: 91 FL (ref 82–98)
MONOCYTES # BLD AUTO: 0.4 K/UL (ref 0.3–1)
MONOCYTES NFR BLD: 6.3 % (ref 4–15)
NEUTROPHILS # BLD AUTO: 2.5 K/UL (ref 1.8–7.7)
NEUTROPHILS NFR BLD: 40.2 % (ref 38–73)
NITRITE UR QL STRIP: NEGATIVE
NRBC BLD-RTO: 0 /100 WBC
PH UR STRIP: 7 [PH] (ref 5–8)
PLATELET # BLD AUTO: 278 K/UL (ref 150–350)
PMV BLD AUTO: 10.4 FL (ref 9.2–12.9)
POTASSIUM SERPL-SCNC: 3.7 MMOL/L (ref 3.5–5.1)
PROT UR QL STRIP: ABNORMAL
RBC # BLD AUTO: 4.34 M/UL (ref 4–5.4)
SODIUM SERPL-SCNC: 139 MMOL/L (ref 136–145)
SP GR UR STRIP: 1.02 (ref 1–1.03)
URN SPEC COLLECT METH UR: ABNORMAL
WBC # BLD AUTO: 6.33 K/UL (ref 3.9–12.7)

## 2020-02-18 PROCEDURE — 99214 OFFICE O/P EST MOD 30 MIN: CPT | Mod: S$GLB,,, | Performed by: FAMILY MEDICINE

## 2020-02-18 PROCEDURE — 97014 ELECTRIC STIMULATION THERAPY: CPT

## 2020-02-18 PROCEDURE — 99214 PR OFFICE/OUTPT VISIT, EST, LEVL IV, 30-39 MIN: ICD-10-PCS | Mod: S$GLB,,, | Performed by: FAMILY MEDICINE

## 2020-02-18 PROCEDURE — 97140 MANUAL THERAPY 1/> REGIONS: CPT

## 2020-02-18 PROCEDURE — 85025 COMPLETE CBC W/AUTO DIFF WBC: CPT

## 2020-02-18 PROCEDURE — 3074F PR MOST RECENT SYSTOLIC BLOOD PRESSURE < 130 MM HG: ICD-10-PCS | Mod: CPTII,S$GLB,, | Performed by: FAMILY MEDICINE

## 2020-02-18 PROCEDURE — 3078F DIAST BP <80 MM HG: CPT | Mod: CPTII,S$GLB,, | Performed by: FAMILY MEDICINE

## 2020-02-18 PROCEDURE — 3008F BODY MASS INDEX DOCD: CPT | Mod: CPTII,S$GLB,, | Performed by: FAMILY MEDICINE

## 2020-02-18 PROCEDURE — 99999 PR PBB SHADOW E&M-EST. PATIENT-LVL III: CPT | Mod: PBBFAC,,, | Performed by: FAMILY MEDICINE

## 2020-02-18 PROCEDURE — 3078F PR MOST RECENT DIASTOLIC BLOOD PRESSURE < 80 MM HG: ICD-10-PCS | Mod: CPTII,S$GLB,, | Performed by: FAMILY MEDICINE

## 2020-02-18 PROCEDURE — 3074F SYST BP LT 130 MM HG: CPT | Mod: CPTII,S$GLB,, | Performed by: FAMILY MEDICINE

## 2020-02-18 PROCEDURE — 81003 URINALYSIS AUTO W/O SCOPE: CPT

## 2020-02-18 PROCEDURE — 97110 THERAPEUTIC EXERCISES: CPT

## 2020-02-18 PROCEDURE — 99999 PR PBB SHADOW E&M-EST. PATIENT-LVL III: ICD-10-PCS | Mod: PBBFAC,,, | Performed by: FAMILY MEDICINE

## 2020-02-18 PROCEDURE — 36415 COLL VENOUS BLD VENIPUNCTURE: CPT

## 2020-02-18 PROCEDURE — 80048 BASIC METABOLIC PNL TOTAL CA: CPT

## 2020-02-18 PROCEDURE — 3008F PR BODY MASS INDEX (BMI) DOCUMENTED: ICD-10-PCS | Mod: CPTII,S$GLB,, | Performed by: FAMILY MEDICINE

## 2020-02-18 NOTE — PROGRESS NOTES
Subjective:       Patient ID: Ora Henderson is a 59 y.o. female.    Chief Complaint: Nausea (Severe Bad Taste) and GI Problem    59-year-old  female patient Patient Active Problem List:     DJD (degenerative joint disease), cervical-mild     Hyperlipidemia     Hepatomegaly     Family history of cardiovascular disease     GERD (gastroesophageal reflux disease)     Suspected sleep apnea     History of benign pituitary tumor     Arthritis     Hx of colonic polyp     Essential hypertension     Osteoarthritis of spine with radiculopathy, lumbar region     Severe obesity (BMI 35.0-35.9 with comorbidity)     Abnormal ECG     Arthritis of right acromioclavicular joint  Reports that she has been having bad taste in the mouth in spite of doing saltwater gargles and Listerine mouthwashes, brushing her teeth regularly.  Reports minimal sinus issues but this taste seems to be different.  Denies any heartburn-like symptoms nausea vomiting, Has been taking Nexium 40 mg regularly.   Patient finishing up physical therapy status post rotator cuff repair on the right side and has been doing well.   Denies any chest pain or difficulty breathing, abdominal discomfort nausea vomiting  Patient recently came in to Urgent Care and had urine checked and was given Metrogel cream, patient denies any burning sensation with the urine      Review of Systems   Constitutional: Negative for chills, fatigue and fever.   HENT: Positive for postnasal drip. Negative for dental problem, mouth sores, sinus pressure, sore throat and trouble swallowing.    Eyes: Negative for visual disturbance.   Respiratory: Negative for shortness of breath.    Cardiovascular: Negative for chest pain and leg swelling.   Gastrointestinal: Negative for abdominal pain, nausea and vomiting.   Genitourinary: Negative for dysuria, frequency and vaginal discharge.   Musculoskeletal: Negative for arthralgias and myalgias.   Skin: Negative for  "rash.   Neurological: Negative for light-headedness and headaches.   Psychiatric/Behavioral: Negative for sleep disturbance.         /70 (BP Location: Left arm, Patient Position: Sitting, BP Method: Large (Manual))   Pulse 80   Temp 97.5 °F (36.4 °C) (Oral)   Resp 18   Ht 5' 3" (1.6 m)   Wt 90.8 kg (200 lb 2.8 oz)   SpO2 96%   BMI 35.46 kg/m²   Objective:      Physical Exam   Constitutional: She is oriented to person, place, and time. She appears well-developed and well-nourished.   HENT:   Head: Normocephalic and atraumatic.   Right Ear: External ear normal.   Left Ear: External ear normal.   Mouth/Throat: Oropharynx is clear and moist. No oropharyngeal exudate.   Neck: Neck supple.   Cardiovascular: Normal rate, regular rhythm and normal heart sounds.   No murmur heard.  Pulmonary/Chest: Effort normal and breath sounds normal. She has no wheezes.   Abdominal: Soft. Bowel sounds are normal. There is no tenderness.   Musculoskeletal: She exhibits no edema.   Neurological: She is alert and oriented to person, place, and time.   Skin: Skin is warm and dry. No rash noted.   Psychiatric: She has a normal mood and affect.         Assessment/Plan:   1. Bad taste in mouth  - CBC auto differential; Future  - Basic metabolic panel; Future  Oral exam looks normal today.  Will check further labs  If any abnormality will treat accordingly  No sinus tenderness noted on exam today  Advised to see dentist/GI if having any ongoing symptoms secondary to underlying acid reflex  Continue Listerine mouthwashes and saltwater gargles recommended    2. Essential hypertension  - Basic metabolic panel; Future  - Urinalysis; Future  Blood pressure is stable today currently on amlodipine 2.5 mg, losartan hydrochlorothiazide 100/25 mg    3. Osteoarthritis of spine with radiculopathy, cervical region  4. Osteoarthritis of spine with radiculopathy, lumbar region    5. Mixed hyperlipidemia  Currently taking Crestor 40 mg daily    6. " Gastroesophageal reflux disease, esophagitis presence not specified  Currently on Nexium 40 mg daily    7. Severe obesity (BMI 35.0-35.9 with comorbidity)  Lifestyle modifications recommended to lose weight with BMI 35    Patient finishing up physical therapy status post right rotator cuff repair, has been doing well

## 2020-02-22 NOTE — PROGRESS NOTES
Physical Therapy Daily Treatment Note     Name: Ora HOFFMAN [facility] Number: [id number]    Therapy Diagnosis:   Encounter Diagnoses   Name Primary?    Acute pain of right shoulder     Decreased range of motion     Decreased strength      Physician: Laureano Cox, *    Visit Date: 2/18/2020    Physician Orders: PT Eval and Treat   Medical Diagnosis from Referral: Complete tear of right rotator cuff  Evaluation Date: 1/15/2020  Authorization Period Expiration: 12/29/2020  Plan of Care Expiration: 2/14/2020  Visit # / Visits authorized: 6/20     Time In: 10:30 am  Time Out: 11:30 am  Total Billable Time: 38 minutes    Precautions: Standard and rotator cuff repair    Subjective     Pt reports:She has been fine without the sling use, we reviewed things she is not supposed to do again today.  She reports she has continued to have some pain since her last MD visit.    She was compliant with home exercise program.  Response to previous treatment: No change  Functional change: Out of sling     Pain: NT/10  Location: right shoulder      Objective     Ora received therapeutic exercises to develop ROM and posture for 28 minutes including:    PROM R sh within protocol limits 5 min  Wrist AROM 2'/2# flex/extension/supination/pronation  Biceps curls 1# 2'  Gripper (blue) 2'  Shoulder rolls BW 2/10  Prone LT isometric 2'  Prone scap retraction isometric 2'  Prone rows 10x  Rows with YTB 2/10  Triceps with RTB 2/10  Submaximal shoulder isometrics 10x/ea 5s hold (NO ABDUCTION)    Ora received the following manual therapy techniques: Myofacial release, Soft tissue Mobilization and manual releases were applied to the: UT/levator scapula, subscap, lats, infraspinatus/teres minor, pectral mm, post delt and brachilias, gentle at biceps for 10 minutes    Ora received the following supervised modalities after being cleared for contradictions: TENS:  Ora received TENS electrical stimulation for pain  to the R shoulder. Pt received continuous mode at a rate of 150 pps for 10 minutes, along with cold pack.  Ora tolerated treatment well without any adverse effects.     Home Exercises Provided and Patient Education Provided     Education provided:   - Con't with HEP, reviewed precautions with no sling    Written Home Exercises Provided: Patient instructed to cont prior HEP.  Exercises were reviewed and Ora was able to demonstrate them prior to the end of the session.  Ora demonstrated good  understanding of the education provided.     See EMR under Patient Instructions for exercises provided prior visit.    Assessment     Pt with increased tone over scapular, UT and levator scap mm, her PROM continues to be excellent.  She tolerated all progressions well but did require significant cues for scapular mobility and stability with submaximal isometrics.    Ora is progressing well towards her goals.   Pt prognosis is Good.     Pt will continue to benefit from skilled outpatient physical therapy to address the deficits listed in the problem list box on initial evaluation, provide pt/family education and to maximize pt's level of independence in the home and community environment.     Pt's spiritual, cultural and educational needs considered and pt agreeable to plan of care and goals.     Anticipated barriers to physical therapy: None    Goals:     Short Term Goals: In 5 weeks   1.Pt to be educated on HEP.  2.Patient to increase Shoulder PROM, within protocol restrictions.  3.Patient to increase strength by 1/2 grade.  4.Patient to have pain less than 5/10 at all times.  5.Patient to improve score on the FOTO by 10%.  6. Pt to be educated on postural and body mechanics awareness.     Long Term Goals: In 10 weeks  1. Patient to improve score on the FOTO by 30%.  2. Patient to tolerate strength testing with goals made PRN.  3. Patient to demo full PROM R shoulder, with goals made for AROM PRN.  4. Patient to  perform daily activities including use of R shoulder without increased pain, as released per protocol and per MD.       Plan     Monitor response to today's tx and progress with PT POC per protocol. Progress note next visit.    Shena Pruitt, PT

## 2020-03-04 ENCOUNTER — OFFICE VISIT (OUTPATIENT)
Dept: INTERNAL MEDICINE | Facility: CLINIC | Age: 60
End: 2020-03-04
Payer: COMMERCIAL

## 2020-03-04 ENCOUNTER — LAB VISIT (OUTPATIENT)
Dept: LAB | Facility: HOSPITAL | Age: 60
End: 2020-03-04
Attending: INTERNAL MEDICINE
Payer: COMMERCIAL

## 2020-03-04 VITALS
HEART RATE: 80 BPM | DIASTOLIC BLOOD PRESSURE: 82 MMHG | HEIGHT: 63 IN | RESPIRATION RATE: 16 BRPM | BODY MASS INDEX: 34.96 KG/M2 | SYSTOLIC BLOOD PRESSURE: 122 MMHG | TEMPERATURE: 99 F | WEIGHT: 197.31 LBS

## 2020-03-04 DIAGNOSIS — R94.31 ABNORMAL ECG: ICD-10-CM

## 2020-03-04 DIAGNOSIS — E66.09 CLASS 2 OBESITY DUE TO EXCESS CALORIES WITHOUT SERIOUS COMORBIDITY WITH BODY MASS INDEX (BMI) OF 35.0 TO 35.9 IN ADULT: ICD-10-CM

## 2020-03-04 DIAGNOSIS — R53.83 FATIGUE, UNSPECIFIED TYPE: ICD-10-CM

## 2020-03-04 DIAGNOSIS — I10 ESSENTIAL HYPERTENSION: ICD-10-CM

## 2020-03-04 DIAGNOSIS — J06.9 VIRAL URI WITH COUGH: Primary | ICD-10-CM

## 2020-03-04 DIAGNOSIS — Z82.49 FAMILY HISTORY OF CARDIOVASCULAR DISEASE: ICD-10-CM

## 2020-03-04 DIAGNOSIS — G47.33 OSA (OBSTRUCTIVE SLEEP APNEA): Chronic | ICD-10-CM

## 2020-03-04 DIAGNOSIS — E78.2 MIXED HYPERLIPIDEMIA: Chronic | ICD-10-CM

## 2020-03-04 LAB
ALBUMIN SERPL BCP-MCNC: 3.9 G/DL (ref 3.5–5.2)
ALP SERPL-CCNC: 77 U/L (ref 55–135)
ALT SERPL W/O P-5'-P-CCNC: 15 U/L (ref 10–44)
ANION GAP SERPL CALC-SCNC: 7 MMOL/L (ref 8–16)
AST SERPL-CCNC: 16 U/L (ref 10–40)
BILIRUB SERPL-MCNC: 0.5 MG/DL (ref 0.1–1)
BUN SERPL-MCNC: 11 MG/DL (ref 6–20)
CALCIUM SERPL-MCNC: 9.8 MG/DL (ref 8.7–10.5)
CHLORIDE SERPL-SCNC: 105 MMOL/L (ref 95–110)
CHOLEST SERPL-MCNC: 156 MG/DL (ref 120–199)
CHOLEST/HDLC SERPL: 3.3 {RATIO} (ref 2–5)
CO2 SERPL-SCNC: 29 MMOL/L (ref 23–29)
CREAT SERPL-MCNC: 0.9 MG/DL (ref 0.5–1.4)
CTP QC/QA: YES
EST. GFR  (AFRICAN AMERICAN): >60 ML/MIN/1.73 M^2
EST. GFR  (NON AFRICAN AMERICAN): >60 ML/MIN/1.73 M^2
GLUCOSE SERPL-MCNC: 94 MG/DL (ref 70–110)
HDLC SERPL-MCNC: 48 MG/DL (ref 40–75)
HDLC SERPL: 30.8 % (ref 20–50)
LDLC SERPL CALC-MCNC: 82.2 MG/DL (ref 63–159)
NONHDLC SERPL-MCNC: 108 MG/DL
POC MOLECULAR INFLUENZA A AGN: NEGATIVE
POC MOLECULAR INFLUENZA B AGN: NEGATIVE
POTASSIUM SERPL-SCNC: 4 MMOL/L (ref 3.5–5.1)
PROT SERPL-MCNC: 7.4 G/DL (ref 6–8.4)
SODIUM SERPL-SCNC: 141 MMOL/L (ref 136–145)
TRIGL SERPL-MCNC: 129 MG/DL (ref 30–150)

## 2020-03-04 PROCEDURE — 99214 OFFICE O/P EST MOD 30 MIN: CPT | Mod: 25,S$GLB,, | Performed by: NURSE PRACTITIONER

## 2020-03-04 PROCEDURE — 99999 PR PBB SHADOW E&M-EST. PATIENT-LVL III: ICD-10-PCS | Mod: PBBFAC,,, | Performed by: NURSE PRACTITIONER

## 2020-03-04 PROCEDURE — 3008F BODY MASS INDEX DOCD: CPT | Mod: CPTII,S$GLB,, | Performed by: NURSE PRACTITIONER

## 2020-03-04 PROCEDURE — 36415 COLL VENOUS BLD VENIPUNCTURE: CPT | Mod: PO

## 2020-03-04 PROCEDURE — 87502 POCT INFLUENZA A/B MOLECULAR: ICD-10-PCS | Mod: QW,S$GLB,, | Performed by: NURSE PRACTITIONER

## 2020-03-04 PROCEDURE — 80061 LIPID PANEL: CPT

## 2020-03-04 PROCEDURE — 99214 PR OFFICE/OUTPT VISIT, EST, LEVL IV, 30-39 MIN: ICD-10-PCS | Mod: 25,S$GLB,, | Performed by: NURSE PRACTITIONER

## 2020-03-04 PROCEDURE — 3079F DIAST BP 80-89 MM HG: CPT | Mod: CPTII,S$GLB,, | Performed by: NURSE PRACTITIONER

## 2020-03-04 PROCEDURE — 87502 INFLUENZA DNA AMP PROBE: CPT | Mod: QW,S$GLB,, | Performed by: NURSE PRACTITIONER

## 2020-03-04 PROCEDURE — 3074F PR MOST RECENT SYSTOLIC BLOOD PRESSURE < 130 MM HG: ICD-10-PCS | Mod: CPTII,S$GLB,, | Performed by: NURSE PRACTITIONER

## 2020-03-04 PROCEDURE — 3008F PR BODY MASS INDEX (BMI) DOCUMENTED: ICD-10-PCS | Mod: CPTII,S$GLB,, | Performed by: NURSE PRACTITIONER

## 2020-03-04 PROCEDURE — 80053 COMPREHEN METABOLIC PANEL: CPT

## 2020-03-04 PROCEDURE — 3074F SYST BP LT 130 MM HG: CPT | Mod: CPTII,S$GLB,, | Performed by: NURSE PRACTITIONER

## 2020-03-04 PROCEDURE — 99999 PR PBB SHADOW E&M-EST. PATIENT-LVL III: CPT | Mod: PBBFAC,,, | Performed by: NURSE PRACTITIONER

## 2020-03-04 PROCEDURE — 3079F PR MOST RECENT DIASTOLIC BLOOD PRESSURE 80-89 MM HG: ICD-10-PCS | Mod: CPTII,S$GLB,, | Performed by: NURSE PRACTITIONER

## 2020-03-04 RX ORDER — PROMETHAZINE HYDROCHLORIDE AND DEXTROMETHORPHAN HYDROBROMIDE 6.25; 15 MG/5ML; MG/5ML
5 SYRUP ORAL NIGHTLY PRN
Qty: 120 ML | Refills: 0 | Status: SHIPPED | OUTPATIENT
Start: 2020-03-04 | End: 2020-04-07

## 2020-03-04 RX ORDER — FLUTICASONE PROPIONATE 50 MCG
2 SPRAY, SUSPENSION (ML) NASAL DAILY
Qty: 16 G | Refills: 0 | Status: SHIPPED | OUTPATIENT
Start: 2020-03-04

## 2020-03-04 RX ORDER — MONTELUKAST SODIUM 10 MG/1
10 TABLET ORAL NIGHTLY
Qty: 30 TABLET | Refills: 2 | Status: SHIPPED | OUTPATIENT
Start: 2020-03-04 | End: 2021-03-04

## 2020-03-04 NOTE — PROGRESS NOTES
"Subjective:       Patient ID: Ora Henderson is a 59 y.o. female.    Chief Complaint: URI    URI    This is a new problem. The current episode started yesterday. The problem has been gradually worsening. There has been no fever. Associated symptoms include congestion, coughing, ear pain, headaches, rhinorrhea, sinus pain, sneezing and a sore throat. Pertinent negatives include no abdominal pain, chest pain, diarrhea, dysuria, joint pain, joint swelling, nausea, neck pain, plugged ear sensation, rash, swollen glands, vomiting or wheezing. She has tried sleep for the symptoms. The treatment provided no relief.       /82 (BP Location: Left arm, Patient Position: Sitting)   Pulse 80   Temp 99 °F (37.2 °C) (Oral)   Resp 16   Ht 5' 3" (1.6 m)   Wt 89.5 kg (197 lb 5 oz)   BMI 34.95 kg/m²     Review of Systems   Constitutional: Positive for activity change, chills and fatigue. Negative for appetite change, diaphoresis, fever and unexpected weight change.   HENT: Positive for congestion, ear pain, postnasal drip, rhinorrhea, sinus pressure, sinus pain, sneezing and sore throat. Negative for dental problem, drooling, ear discharge, hearing loss, mouth sores, nosebleeds, tinnitus, trouble swallowing and voice change.    Eyes: Negative for pain, discharge and redness.   Respiratory: Positive for cough. Negative for choking, chest tightness, shortness of breath and wheezing.    Cardiovascular: Negative for chest pain, palpitations and leg swelling.   Gastrointestinal: Negative for abdominal pain, diarrhea, nausea and vomiting.   Endocrine: Negative for cold intolerance and heat intolerance.   Genitourinary: Negative for dysuria.   Musculoskeletal: Positive for myalgias. Negative for arthralgias, joint pain, joint swelling and neck pain.   Skin: Negative for rash.   Allergic/Immunologic: Positive for environmental allergies. Negative for food allergies and immunocompromised state.   Neurological: " Positive for headaches. Negative for syncope and light-headedness.       Objective:      Physical Exam   Constitutional: She is oriented to person, place, and time. She appears well-developed and well-nourished. No distress.   HENT:   Head: Normocephalic and atraumatic.   Right Ear: Tympanic membrane, external ear and ear canal normal.   Left Ear: Tympanic membrane, external ear and ear canal normal.   Nose: Mucosal edema and rhinorrhea present. Right sinus exhibits no maxillary sinus tenderness and no frontal sinus tenderness. Left sinus exhibits no maxillary sinus tenderness and no frontal sinus tenderness.   Mouth/Throat: Uvula is midline, oropharynx is clear and moist and mucous membranes are normal. No oropharyngeal exudate, posterior oropharyngeal edema or posterior oropharyngeal erythema.   Eyes: Conjunctivae are normal. Right eye exhibits no discharge. Left eye exhibits no discharge.   Neck: Normal range of motion.   Cardiovascular: Normal rate, regular rhythm and normal heart sounds.   No murmur heard.  Pulmonary/Chest: Effort normal and breath sounds normal. No accessory muscle usage. No respiratory distress. She has no decreased breath sounds. She has no wheezes. She has no rhonchi. She has no rales. She exhibits no tenderness.   Abdominal: Soft. She exhibits no distension.   Musculoskeletal: Normal range of motion.   Neurological: She is alert and oriented to person, place, and time.   Skin: Skin is warm and dry. She is not diaphoretic.   Psychiatric: She has a normal mood and affect. Her behavior is normal.   Nursing note and vitals reviewed.      Assessment:       1. Viral URI with cough    2. Fatigue, unspecified type        Plan:       Ora was seen today for uri.    Diagnoses and all orders for this visit:    Viral URI with cough  -     promethazine-dextromethorphan (PROMETHAZINE-DM) 6.25-15 mg/5 mL Syrp; Take 5 mLs by mouth nightly as needed (Cough).  -     fluticasone propionate (FLONASE) 50  mcg/actuation nasal spray; 2 sprays (100 mcg total) by Each Nostril route once daily.  -     montelukast (SINGULAIR) 10 mg tablet; Take 1 tablet (10 mg total) by mouth nightly.    Fatigue, unspecified type  -     POCT Influenza A/B Molecular    flu Negative  Rest  Drink plenty of clear fluids  Nasal saline spray to clear nasal drainage and help with nasal congestion  Zyrtec or Claritin to help dry mucus and post nasal drip  Mucinex or Mucinex DM for cough and chest congestion  Tylenol or Ibuprofen for fever, headache and body aches  Warm salt water gargles for throat comfort  Chloraseptic spray or lozenges for throat comfort  See Primary Care Physician or go to ER if symptoms worsen of fail to improve with treatment.

## 2020-03-07 ENCOUNTER — NURSE TRIAGE (OUTPATIENT)
Dept: ADMINISTRATIVE | Facility: CLINIC | Age: 60
End: 2020-03-07

## 2020-03-07 ENCOUNTER — OFFICE VISIT (OUTPATIENT)
Dept: URGENT CARE | Facility: CLINIC | Age: 60
End: 2020-03-07
Payer: COMMERCIAL

## 2020-03-07 VITALS
SYSTOLIC BLOOD PRESSURE: 158 MMHG | TEMPERATURE: 98 F | OXYGEN SATURATION: 100 % | HEIGHT: 63 IN | RESPIRATION RATE: 16 BRPM | WEIGHT: 197.19 LBS | DIASTOLIC BLOOD PRESSURE: 80 MMHG | HEART RATE: 78 BPM | BODY MASS INDEX: 34.94 KG/M2

## 2020-03-07 DIAGNOSIS — J06.9 URI WITH COUGH AND CONGESTION: Primary | ICD-10-CM

## 2020-03-07 PROCEDURE — 96372 THER/PROPH/DIAG INJ SC/IM: CPT | Mod: S$GLB,,, | Performed by: EMERGENCY MEDICINE

## 2020-03-07 PROCEDURE — 99214 OFFICE O/P EST MOD 30 MIN: CPT | Mod: 25,S$GLB,, | Performed by: PHYSICIAN ASSISTANT

## 2020-03-07 PROCEDURE — 96372 PR INJECTION,THERAP/PROPH/DIAG2ST, IM OR SUBCUT: ICD-10-PCS | Mod: S$GLB,,, | Performed by: EMERGENCY MEDICINE

## 2020-03-07 PROCEDURE — 99214 PR OFFICE/OUTPT VISIT, EST, LEVL IV, 30-39 MIN: ICD-10-PCS | Mod: 25,S$GLB,, | Performed by: PHYSICIAN ASSISTANT

## 2020-03-07 RX ORDER — BENZONATATE 200 MG/1
200 CAPSULE ORAL 3 TIMES DAILY PRN
Qty: 21 CAPSULE | Refills: 0 | Status: SHIPPED | OUTPATIENT
Start: 2020-03-07 | End: 2020-05-06

## 2020-03-07 RX ORDER — BETAMETHASONE SODIUM PHOSPHATE AND BETAMETHASONE ACETATE 3; 3 MG/ML; MG/ML
6 INJECTION, SUSPENSION INTRA-ARTICULAR; INTRALESIONAL; INTRAMUSCULAR; SOFT TISSUE
Status: COMPLETED | OUTPATIENT
Start: 2020-03-07 | End: 2020-03-07

## 2020-03-07 RX ADMIN — BETAMETHASONE SODIUM PHOSPHATE AND BETAMETHASONE ACETATE 6 MG: 3; 3 INJECTION, SUSPENSION INTRA-ARTICULAR; INTRALESIONAL; INTRAMUSCULAR; SOFT TISSUE at 09:03

## 2020-03-07 NOTE — PROGRESS NOTES
"Subjective:       Patient ID: Ora Henderson is a 59 y.o. female.    Vitals:  height is 5' 3" (1.6 m) and weight is 89.4 kg (197 lb 2.5 oz). Her temperature is 98.1 °F (36.7 °C). Her blood pressure is 158/80 (abnormal) and her pulse is 78. Her respiration is 16 and oxygen saturation is 100%.     Chief Complaint: Cough    Pt was seen by her PCP 2/2/2020 testing negative for the flu. She was prescribed Promethazine DM, Flonase and Singulair, but would like a steroid shot. Denies further symptoms.     Cough   This is a new problem. The current episode started in the past 7 days (5 days ). The problem has been gradually worsening. The problem occurs constantly. The cough is productive of sputum. Associated symptoms include ear pain, myalgias, nasal congestion and postnasal drip. Pertinent negatives include no chest pain, chills, eye redness, fever, headaches, hemoptysis, rash, sore throat, shortness of breath or wheezing. The symptoms are aggravated by lying down. She has tried oral steroids (nasal spray ) for the symptoms. The treatment provided mild relief. Her past medical history is significant for bronchitis.       Constitution: Positive for sweating. Negative for chills, fatigue and fever.   HENT: Positive for ear pain, congestion, postnasal drip, sinus pain and sinus pressure. Negative for ear discharge, foreign body in ear, tinnitus, nosebleeds, foreign body in nose, sore throat, trouble swallowing and voice change.    Neck: Negative for neck pain, neck stiffness and painful lymph nodes.   Cardiovascular: Negative for chest pain and palpitations.   Eyes: Negative for eye itching, eye pain and eye redness.   Respiratory: Positive for cough. Negative for chest tightness, sputum production, bloody sputum, COPD, shortness of breath, stridor, wheezing and asthma.    Gastrointestinal: Negative for abdominal pain, nausea, vomiting, constipation and diarrhea.   Genitourinary: Negative for dysuria. "   Musculoskeletal: Positive for muscle ache. Negative for muscle cramps.   Skin: Negative for rash and erythema.   Allergic/Immunologic: Positive for sneezing. Negative for seasonal allergies, asthma and immunocompromised state.   Neurological: Negative for headaches.   Hematologic/Lymphatic: Negative for swollen lymph nodes.   Psychiatric/Behavioral: Negative for confusion.       Objective:      Physical Exam   Constitutional: She is oriented to person, place, and time. Vital signs are normal. She appears well-developed and well-nourished. She is cooperative.  Non-toxic appearance. She does not have a sickly appearance. She does not appear ill. No distress.   HENT:   Head: Normocephalic and atraumatic.   Right Ear: External ear and ear canal normal. No drainage, swelling or tenderness. Tympanic membrane is bulging. Tympanic membrane is not erythematous and not retracted.   Left Ear: External ear and ear canal normal. No drainage, swelling or tenderness. Tympanic membrane is bulging. Tympanic membrane is not erythematous and not retracted.   Nose: Mucosal edema present. No rhinorrhea or purulent discharge. Right sinus exhibits no maxillary sinus tenderness and no frontal sinus tenderness. Left sinus exhibits no maxillary sinus tenderness and no frontal sinus tenderness.   Mouth/Throat: Uvula is midline, oropharynx is clear and moist and mucous membranes are normal. No oral lesions. No trismus in the jaw. No uvula swelling. No oropharyngeal exudate, posterior oropharyngeal edema, posterior oropharyngeal erythema, tonsillar abscesses or cobblestoning.   + serous effusion of right   Eyes: Pupils are equal, round, and reactive to light. Conjunctivae, EOM and lids are normal.   Neck: Trachea normal, normal range of motion, full passive range of motion without pain and phonation normal. Neck supple.   Cardiovascular: Normal rate, regular rhythm, normal heart sounds and intact distal pulses.   Pulmonary/Chest: Effort  normal and breath sounds normal. No stridor. No respiratory distress. She has no decreased breath sounds. She has no wheezes. She has no rhonchi. She has no rales.   Abdominal: Soft. Bowel sounds are normal. She exhibits no distension and no mass. There is no tenderness. There is no rigidity, no rebound and no guarding.   Musculoskeletal: Normal range of motion.   Lymphadenopathy:     She has no cervical adenopathy.   Neurological: She is alert and oriented to person, place, and time.   Skin: Skin is warm, dry, intact, not diaphoretic and no rash. Capillary refill takes less than 2 seconds. erythema  Psychiatric: She has a normal mood and affect. Her behavior is normal. Judgment and thought content normal.   Nursing note and vitals reviewed.        Assessment:       1. URI with cough and congestion        Plan:         URI with cough and congestion  -     betamethasone acetate-betamethasone sodium phosphate injection 6 mg  -     benzonatate (TESSALON) 200 MG capsule; Take 1 capsule (200 mg total) by mouth 3 (three) times daily as needed for Cough.  Dispense: 21 capsule; Refill: 0    - Vitals are reassuring.  - Will encourage rest and oral rehydration.  - Will recommend continued use of OTC medications for symptomatic relief: Promethazine- DM syrup, Guaifenesin, Flonase, Singualir, Ibuprofen/Tylenol, cough drops, Cepacol, immune boosters (Elderberry/Vitamin C).    - Patient requested steroid shot. Discussed with patient the side effects of steroid injections including but not limited to decreased immune system, elevation of blood pressure and blood sugar, as well as water weight weight, anxiousness, bone density decrease with excessive use, facial redness/flushing and dimpling of injection site. Patient accepted the risks and verbalized understanding prior to receipt of steroid injection.       I have discussed the diagnosis, treatment plan and recommendations for follow-up with primary care and patient verbalized  understanding and is agreeable to the plan. ED precautions given. AVS printed and given to patient upon discharge with information regarding this visit. All questions were addressed prior to discharge.    Radha Veronica PA-C

## 2020-03-07 NOTE — TELEPHONE ENCOUNTER
Reason for Disposition   General information question, no triage required and triager able to answer question    Protocols used: INFORMATION ONLY CALL-A-    Asked for address of nearest urgent care center.

## 2020-03-07 NOTE — PATIENT INSTRUCTIONS
Make sure you are getting rest and drinking lots of fluids.    Steroid Injection  You received a steroid shot today - As discussed, this can elevate your blood pressure, elevate your blood sugar, water weight gain, nervous energy, redness to the face and dimpling of the skin where the shot goes in.       You have been prescribed Tessalon perles and Promethazine syrup for cough. Promethazine causes drowsiness.  Do not drink alcohol or operate motor vehicles while taking.      You can take plain Mucinex (Guaifenesin) to break-up the mucous. Humidifiers and propping up at night will also help this.    Continue using Flonase to help with nasal congestion and post nasal drip.     Continue taking Montelukast as prescribed.    You can use cough drops or Cepacol to soothe your sore throat.     You can also take Elderberry and/or Emergen-C (vitamin C) to help boost your immune system.    You can also take Ibuprofen  and Tylenol for body aches/fever control.    Follow-up with primary care.    If for some reason your symptoms worsen or for any new or concerning symptoms, go to the emergency room.      Viral Upper Respiratory Illness (Adult)  You have a viral upper respiratory illness (URI), which is another term for the common cold. This illness is contagious during the first few days. It is spread through the air by coughing and sneezing. It may also be spread by direct contact (touching the sick person and then touching your own eyes, nose, or mouth). Frequent handwashing will decrease risk of spread. Most viral illnesses go away within 7 to 10 days with rest and simple home remedies. Sometimes the illness may last for several weeks. Antibiotics will not kill a virus, and they are generally not prescribed for this condition.    Home care  · If symptoms are severe, rest at home for the first 2 to 3 days. When you resume activity, don't let yourself get too tired.  · Avoid being exposed to cigarette smoke (yours or  others).  · You may use acetaminophen or ibuprofen to control pain and fever, unless another medicine was prescribed. (Note: If you have chronic liver or kidney disease, have ever had a stomach ulcer or gastrointestinal bleeding, or are taking blood-thinning medicines, talk with your healthcare provider before using these medicines.) Aspirin should never be given to anyone under 18 years of age who is ill with a viral infection or fever. It may cause severe liver or brain damage.  · Your appetite may be poor, so a light diet is fine. Avoid dehydration by drinking 6 to 8 glasses of fluids per day (water, soft drinks, juices, tea, or soup). Extra fluids will help loosen secretions in the nose and lungs.  · Over-the-counter cold medicines will not shorten the length of time youre sick, but they may be helpful for the following symptoms: cough, sore throat, and nasal and sinus congestion. (Note: Do not use decongestants if you have high blood pressure.)  Follow-up care  Follow up with your healthcare provider, or as advised.  When to seek medical advice  Call your healthcare provider right away if any of these occur:  · Cough with lots of colored sputum (mucus)  · Severe headache; face, neck, or ear pain  · Difficulty swallowing due to throat pain  · Fever of 100.4°F (38°C)  Call 911, or get immediate medical care  Call emergency services right away if any of these occur:  · Chest pain, shortness of breath, wheezing, or difficulty breathing  · Coughing up blood  · Inability to swallow due to throat pain  Date Last Reviewed: 9/13/2015 © 2000-2017 [employer]. [address] [place] [address], [place] [address]. All rights reserved. This information is not intended as a substitute for professional medical care. Always follow your healthcare professional's instructions.

## 2020-03-09 ENCOUNTER — PATIENT OUTREACH (OUTPATIENT)
Dept: OTHER | Facility: OTHER | Age: 60
End: 2020-03-09

## 2020-03-09 ENCOUNTER — TELEPHONE (OUTPATIENT)
Dept: URGENT CARE | Facility: CLINIC | Age: 60
End: 2020-03-09

## 2020-03-09 NOTE — PROGRESS NOTES
[facility]: Health  Follow-Up    Patient reports that she is feeling alright. Patient had an urgent care visit on 3/7/20 and received a steroid shot for a cold as well as taking some OTC decongestants. Reminded patient that she should charge the cuff before she goes to take a reading. Patient has in office vist in the next week and plans to ask Dr. Montes why she keeps getting sick. Will f/u in 4 weeks after patient has submitted more readings.     The history is provided by the patient.     Follow Up  Follow-up reason(s): reading review      Readings are missing.   patient reminder needed.      INTERVENTION(S)  encouragement/support      There are no preventive care reminders to display for this patient.    Last 5 Patient Entered Readings                                      Current 30 Day Average:      Recent Readings 12/10/2019 11/21/2019 11/14/2019 11/14/2019 11/6/2019    SBP (mmHg) 138 116 124 141 146    DBP (mmHg) 97 79 81 86 94    Pulse 74 91 81 84 84                      Diet Screening       Deferred.    Physical Activity Screening       Deferred.    Medication Adherence Screening   She did not miss a dose this month.    Patient identified the following reasons for non-compliance: None    Patient reports no s/s or issues taking BP medication as prescribed.       SDOH

## 2020-03-11 ENCOUNTER — OFFICE VISIT (OUTPATIENT)
Dept: INTERNAL MEDICINE | Facility: CLINIC | Age: 60
End: 2020-03-11
Payer: COMMERCIAL

## 2020-03-11 VITALS
SYSTOLIC BLOOD PRESSURE: 134 MMHG | HEART RATE: 76 BPM | HEIGHT: 63 IN | OXYGEN SATURATION: 97 % | DIASTOLIC BLOOD PRESSURE: 90 MMHG | BODY MASS INDEX: 35.04 KG/M2 | TEMPERATURE: 98 F | WEIGHT: 197.75 LBS

## 2020-03-11 DIAGNOSIS — J02.9 ACUTE PHARYNGITIS, UNSPECIFIED ETIOLOGY: Primary | ICD-10-CM

## 2020-03-11 PROCEDURE — 3075F PR MOST RECENT SYSTOLIC BLOOD PRESS GE 130-139MM HG: ICD-10-PCS | Mod: CPTII,S$GLB,, | Performed by: PHYSICIAN ASSISTANT

## 2020-03-11 PROCEDURE — 3008F BODY MASS INDEX DOCD: CPT | Mod: CPTII,S$GLB,, | Performed by: PHYSICIAN ASSISTANT

## 2020-03-11 PROCEDURE — 3080F DIAST BP >= 90 MM HG: CPT | Mod: CPTII,S$GLB,, | Performed by: PHYSICIAN ASSISTANT

## 2020-03-11 PROCEDURE — 3080F PR MOST RECENT DIASTOLIC BLOOD PRESSURE >= 90 MM HG: ICD-10-PCS | Mod: CPTII,S$GLB,, | Performed by: PHYSICIAN ASSISTANT

## 2020-03-11 PROCEDURE — 3008F PR BODY MASS INDEX (BMI) DOCUMENTED: ICD-10-PCS | Mod: CPTII,S$GLB,, | Performed by: PHYSICIAN ASSISTANT

## 2020-03-11 PROCEDURE — 99999 PR PBB SHADOW E&M-EST. PATIENT-LVL V: CPT | Mod: PBBFAC,,, | Performed by: PHYSICIAN ASSISTANT

## 2020-03-11 PROCEDURE — 99214 OFFICE O/P EST MOD 30 MIN: CPT | Mod: S$GLB,,, | Performed by: PHYSICIAN ASSISTANT

## 2020-03-11 PROCEDURE — 99999 PR PBB SHADOW E&M-EST. PATIENT-LVL V: ICD-10-PCS | Mod: PBBFAC,,, | Performed by: PHYSICIAN ASSISTANT

## 2020-03-11 PROCEDURE — 3075F SYST BP GE 130 - 139MM HG: CPT | Mod: CPTII,S$GLB,, | Performed by: PHYSICIAN ASSISTANT

## 2020-03-11 PROCEDURE — 99214 PR OFFICE/OUTPT VISIT, EST, LEVL IV, 30-39 MIN: ICD-10-PCS | Mod: S$GLB,,, | Performed by: PHYSICIAN ASSISTANT

## 2020-03-11 RX ORDER — AMOXICILLIN AND CLAVULANATE POTASSIUM 875; 125 MG/1; MG/1
1 TABLET, FILM COATED ORAL 2 TIMES DAILY
Qty: 20 TABLET | Refills: 0 | Status: SHIPPED | OUTPATIENT
Start: 2020-03-11 | End: 2020-03-21

## 2020-03-11 RX ORDER — PREDNISONE 20 MG/1
TABLET ORAL
Qty: 9 TABLET | Refills: 0 | Status: SHIPPED | OUTPATIENT
Start: 2020-03-11 | End: 2020-05-06

## 2020-03-11 NOTE — PATIENT INSTRUCTIONS
When You Have a Sore Throat    A sore throat can be painful. There are many reasons why you may have a sore throat. Your healthcare provider will work with you to find the cause of your sore throat. He or she will also find the best treatment for you.  What causes a sore throat?  Sore throats can be caused or worsened by:  · Cold or flu viruses  · Bacteria  · Irritants such as tobacco smoke or air pollution  · Acid reflux  A healthy throat  The tonsils are on the sides of the throat near the base of the tongue. They collect viruses and bacteria and help fight infection. The throat (pharynx) is the passage for air. Mucus from the nasal cavity also moves down the passage.  An inflamed throat  The tonsils and pharynx can become inflamed due to a cold or flu virus. Postnasal drip (excess mucus draining from the nasal cavity) can irritate the throat. It can also make the throat or tonsils more likely to be infected by bacteria. Severe, untreated tonsillitis in children or adults can cause a pocket of pus (abscess) to form near the tonsil.  Your evaluation  A medical evaluation can help find the cause of your sore throat. It can also help your healthcare provider choose the best treatment for you. The evaluation may include a health history, physical exam, and diagnostic tests.  Health history  Your healthcare provider may ask you the following:  · How long has the sore throat lasted and how have you been treating it?  · Do you have any other symptoms, such as body aches, fever, or cough?  · Does your sore throat recur? If so, how often? How many days of school or work have you missed because of a sore throat?  · Do you have trouble eating or swallowing?  · Have you been told that you snore or have other sleep problems?  · Do you have bad breath?  · Do you cough up bad-tasting mucus?  Physical exam  During the exam, your healthcare provider checks your ears, nose, and throat for problems. He or she also checks for  "swelling in the neck, and may listen to your chest.  Possible tests  Other tests your healthcare provider may perform include:  · A throat swab to check for bacteria such as streptococcus (the bacteria that causes strep throat)  · A blood test to check for mononucleosis (a viral infection)  · A chest X-ray to rule out pneumonia, especially if you have a cough  Treating a sore throat  Treatment depends on many factors. What is the likely cause? Is the problem recent? Does it keep coming back? In many cases, the best thing to do is to treat the symptoms, rest, and let the problem heal itself. Antibiotics may help clear up some bacterial infections. For cases of severe or recurring tonsillitis, the tonsils may need to be removed.  Relieving your symptoms  · Dont smoke, and avoid secondhand smoke.  · For children, try throat sprays or Popsicles. Adults and older children may try lozenges.  · Drink warm liquids to soothe the throat and help thin mucus. Avoid alcohol, spicy foods, and acidic drinks such as orange juice. These can irritate the throat.  · Gargle with warm saltwater (1 teaspoon of salt to 8 ounces of warm water).  · Use a humidifier to keep air moist and relieve throat dryness.  · Try over-the-counter pain relievers such as acetaminophen or ibuprofen. Use as directed, and dont exceed the recommended dose. Dont give aspirin to children.   Are antibiotics needed?  If your sore throat is due to a bacterial infection, antibiotics may speed healing and prevent complications. Although group A streptococcus ("strep throat" or GAS) is the major treatable infection for a sore throat, GAS causes only 5% to 15% of sore throats in adults who seek medical care. Most sore throats are caused by cold or flu viruses. And antibiotics dont treat viral illness. In fact, using antibiotics when theyre not needed may produce bacteria that are harder to kill. Your healthcare provider will prescribe antibiotics only if he or " she thinks they are likely to help.  If antibiotics are prescribed  Take the medicine exactly as directed. Be sure to finish your prescription even if youre feeling better. And be sure to ask your healthcare provider or pharmacist what side effects are common and what to do about them.  Is surgery needed?  In some cases, tonsils need to be removed. This is often done as outpatient (same-day) surgery. Your healthcare provider may advise removing the tonsils in cases of:  · Several severe bouts of tonsillitis in a year. Severe episodes include those that lead to missed days of school or work, or that need to be treated with antibiotics.  · Tonsillitis that causes breathing problems during sleep  · Tonsillitis caused by food particles collecting in pouches in the tonsils (cryptic tonsillitis)  Call your healthcare provider if any of the following occur:  · Symptoms worsen, or new symptoms develop.  · Swollen tonsils make breathing difficult.  · The pain is severe enough to keep you from drinking liquids.  · A skin rash, hives, or wheezing develops. Any of these could signal an allergic reaction to antibiotics.  · Symptoms dont improve within a week.  · Symptoms dont improve within 2 to 3 days of starting antibiotics.   Date Last Reviewed: 10/1/2016  © 5727-3635 [employer]. [address] [place] [address], [place] [address]. All rights reserved. This information is not intended as a substitute for professional medical care. Always follow your healthcare professional's instructions.        Self-Care for Sore Throats    Sore throats happen for many reasons, such as colds, allergies, and infections caused by viruses or bacteria. In any case, your throat becomes red and sore. Your goal for self-care is to reduce your discomfort while giving your throat a chance to heal.  Moisten and soothe your throat  Tips include the following:  · Try a sip of water first thing after waking up.  · Keep your throat moist by  drinking 6 or more glasses of clear liquids every day.  · Run a cool-air humidifier in your room overnight.  · Avoid cigarette smoke.   · Suck on throat lozenges, cough drops, hard candy, ice chips, or frozen fruit-juice bars. Use the sugar-free versions if your diet or medical condition requires them.  Gargle to ease irritation  Gargling every hour or 2 can ease irritation. Try gargling with 1 of these solutions:  · 1/4 teaspoon of salt in 1/2 cup of warm water  · An over-the-counter anesthetic gargle  Use medicine for more relief  Over-the-counter medicine can reduce sore throat symptoms. Ask your pharmacist if you have questions about which medicine to use:  · Ease pain with anesthetic sprays. Aspirin or an aspirin substitute also helps. Remember, never give aspirin to anyone 18 or younger, or if you are already taking blood thinners.   · For sore throats caused by allergies, try antihistamines to block the allergic reaction.  · Remember: unless a sore throat is caused by a bacterial infection, antibiotics wont help you.  Prevent future sore throats  Prevention tips include the following:  · Stop smoking or reduce contact with secondhand smoke. Smoke irritates the tender throat lining.  · Limit contact with pets and with allergy-causing substances, such as pollen and mold.  · When youre around someone with a sore throat or cold, wash your hands often to keep viruses or bacteria from spreading.  · Dont strain your vocal cords.  Call your healthcare provider  Contact your healthcare provider if you have:  · A temperature over 101°F (38.3°C)  · White spots on the throat  · Great difficulty swallowing  · Trouble breathing  · A skin rash  · Recent exposure to someone else with strep bacteria  · Severe hoarseness and swollen glands in the neck or jaw   Date Last Reviewed: 8/1/2016  © 4540-5127 [employer]. [address] [place] [address], [place] [address]. All rights reserved. This information is not intended  as a substitute for professional medical care. Always follow your healthcare professional's instructions.

## 2020-03-11 NOTE — PROGRESS NOTES
Subjective:      Patient ID: Ora Henderson is a 59 y.o. female.    Chief Complaint: Sore Throat    Sore Throat    This is a new problem. Episode onset: 3 days. There has been no fever. Associated symptoms include congestion, coughing, ear pain, a plugged ear sensation and trouble swallowing. Pertinent negatives include no abdominal pain, diarrhea, drooling, ear discharge, headaches, hoarse voice, neck pain, shortness of breath, stridor, swollen glands or vomiting. She has tried gargles for the symptoms. The treatment provided no relief.   Accidentally put pine saw in her mouth on Sunday. Symptoms started after that. She did not swallow any of the pine saw and rinsed her mouth afterwards.   She induced vomiting afterwards.     Review of Systems   Constitutional: Negative for activity change, appetite change, chills, diaphoresis, fatigue, fever and unexpected weight change.   HENT: Positive for congestion, ear pain, rhinorrhea, sinus pressure, sinus pain, sore throat and trouble swallowing. Negative for drooling, ear discharge, hearing loss, hoarse voice, postnasal drip and voice change.    Eyes: Negative.  Negative for visual disturbance.   Respiratory: Positive for cough. Negative for choking, chest tightness, shortness of breath and stridor.    Cardiovascular: Negative for chest pain, palpitations and leg swelling.   Gastrointestinal: Negative for abdominal distention, abdominal pain, blood in stool, constipation, diarrhea, nausea and vomiting.   Endocrine: Negative for cold intolerance, heat intolerance, polydipsia and polyuria.   Genitourinary: Negative.  Negative for difficulty urinating and frequency.   Musculoskeletal: Negative for arthralgias, back pain, gait problem, joint swelling, myalgias and neck pain.   Skin: Negative for color change, pallor, rash and wound.   Neurological: Negative for dizziness, tremors, weakness, light-headedness, numbness and headaches.   Hematological: Negative  "for adenopathy.   Psychiatric/Behavioral: Negative for behavioral problems, confusion, self-injury, sleep disturbance and suicidal ideas. The patient is not nervous/anxious.      Objective:   BP (!) 134/90 (BP Location: Right arm, Patient Position: Sitting, BP Method: Medium (Manual))   Pulse 76   Temp 97.6 °F (36.4 °C) (Tympanic)   Ht 5' 3" (1.6 m)   Wt 89.7 kg (197 lb 12 oz)   SpO2 97%   BMI 35.03 kg/m²     Physical Exam   Constitutional: She is oriented to person, place, and time. She appears well-developed and well-nourished. No distress.   HENT:   Head: Normocephalic and atraumatic.   Right Ear: Hearing, tympanic membrane, external ear and ear canal normal. No tenderness.   Left Ear: Hearing, tympanic membrane, external ear and ear canal normal. No tenderness.   Nose: Mucosal edema and rhinorrhea present. No sinus tenderness. Right sinus exhibits maxillary sinus tenderness and frontal sinus tenderness. Left sinus exhibits maxillary sinus tenderness and frontal sinus tenderness.   Mouth/Throat: Uvula is midline and mucous membranes are normal. She does not have dentures. No oral lesions. No trismus in the jaw. Normal dentition. No dental abscesses, uvula swelling, lacerations or dental caries. Oropharyngeal exudate and posterior oropharyngeal erythema present. No posterior oropharyngeal edema or tonsillar abscesses. Tonsils are 1+ on the right. Tonsils are 1+ on the left. No tonsillar exudate.   Eyes: Pupils are equal, round, and reactive to light. Conjunctivae and EOM are normal. Right eye exhibits no discharge. Left eye exhibits no discharge.   Neck: Normal range of motion. Neck supple.   Cardiovascular: Normal rate, regular rhythm and normal heart sounds. Exam reveals no gallop and no friction rub.   No murmur heard.  Pulmonary/Chest: Effort normal and breath sounds normal. No respiratory distress. She has no wheezes. She has no rales.   Lymphadenopathy:     She has no cervical adenopathy. "   Neurological: She is alert and oriented to person, place, and time.   Skin: Skin is warm. No rash noted. She is not diaphoretic. No erythema. No pallor.   Psychiatric: She has a normal mood and affect. Her behavior is normal. Judgment and thought content normal.   Nursing note and vitals reviewed.      Assessment:     1. Acute pharyngitis, unspecified etiology      Plan:   Acute pharyngitis, unspecified etiology  -     predniSONE (DELTASONE) 20 MG tablet; Take 2 tablets with food for 3 days; then take one tablet with food for 2 days; then take 1/2 tablet with food for 2 days.  Dispense: 9 tablet; Refill: 0  -     (Magic mouthwash) 1:1:1 Benadryl 12.5mg/5ml liq, aluminum & magnesium hydroxide-simehticone (Maalox), lidocaine viscous 2%; Swish and spit 15 mLs every 4 (four) hours as needed (throat pain). for mouth sores  Dispense: 360 mL; Refill: 0  -     amoxicillin-clavulanate 875-125mg (AUGMENTIN) 875-125 mg per tablet; Take 1 tablet by mouth 2 (two) times daily. for 10 days  Dispense: 20 tablet; Refill: 0      Follow up if symptoms worsen or fail to improve.

## 2020-03-17 ENCOUNTER — OFFICE VISIT (OUTPATIENT)
Dept: CARDIOLOGY | Facility: CLINIC | Age: 60
End: 2020-03-17
Payer: COMMERCIAL

## 2020-03-17 ENCOUNTER — OFFICE VISIT (OUTPATIENT)
Dept: INTERNAL MEDICINE | Facility: CLINIC | Age: 60
End: 2020-03-17
Payer: COMMERCIAL

## 2020-03-17 ENCOUNTER — PATIENT MESSAGE (OUTPATIENT)
Dept: INTERNAL MEDICINE | Facility: CLINIC | Age: 60
End: 2020-03-17

## 2020-03-17 ENCOUNTER — OFFICE VISIT (OUTPATIENT)
Dept: GASTROENTEROLOGY | Facility: CLINIC | Age: 60
End: 2020-03-17
Payer: COMMERCIAL

## 2020-03-17 ENCOUNTER — HOSPITAL ENCOUNTER (OUTPATIENT)
Dept: RADIOLOGY | Facility: HOSPITAL | Age: 60
Discharge: HOME | End: 2020-03-17
Attending: PHYSICIAN ASSISTANT
Payer: COMMERCIAL

## 2020-03-17 VITALS
HEART RATE: 100 BPM | HEIGHT: 63 IN | DIASTOLIC BLOOD PRESSURE: 82 MMHG | WEIGHT: 200.81 LBS | BODY MASS INDEX: 35.58 KG/M2 | SYSTOLIC BLOOD PRESSURE: 138 MMHG

## 2020-03-17 VITALS
DIASTOLIC BLOOD PRESSURE: 90 MMHG | OXYGEN SATURATION: 99 % | TEMPERATURE: 98 F | HEART RATE: 95 BPM | HEIGHT: 63 IN | SYSTOLIC BLOOD PRESSURE: 140 MMHG | BODY MASS INDEX: 35.62 KG/M2 | WEIGHT: 201.06 LBS

## 2020-03-17 VITALS
SYSTOLIC BLOOD PRESSURE: 130 MMHG | HEART RATE: 87 BPM | BODY MASS INDEX: 35.55 KG/M2 | WEIGHT: 200.63 LBS | DIASTOLIC BLOOD PRESSURE: 94 MMHG | HEIGHT: 63 IN

## 2020-03-17 DIAGNOSIS — I10 ESSENTIAL HYPERTENSION: Primary | Chronic | ICD-10-CM

## 2020-03-17 DIAGNOSIS — E66.01 SEVERE OBESITY (BMI 35.0-35.9 WITH COMORBIDITY): ICD-10-CM

## 2020-03-17 DIAGNOSIS — M25.561 ACUTE PAIN OF BOTH KNEES: ICD-10-CM

## 2020-03-17 DIAGNOSIS — K21.9 GASTROESOPHAGEAL REFLUX DISEASE, ESOPHAGITIS PRESENCE NOT SPECIFIED: Primary | ICD-10-CM
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
## 2020-03-17 DIAGNOSIS — M25.562 ACUTE PAIN OF BOTH KNEES: ICD-10-CM

## 2020-03-17 DIAGNOSIS — Z82.49 FAMILY HISTORY OF CARDIOVASCULAR DISEASE: ICD-10-CM

## 2020-03-17 DIAGNOSIS — R94.31 ABNORMAL ECG: ICD-10-CM

## 2020-03-17 DIAGNOSIS — E78.2 MIXED HYPERLIPIDEMIA: Chronic | ICD-10-CM

## 2020-03-17 PROCEDURE — 3075F SYST BP GE 130 - 139MM HG: CPT | Mod: CPTII,S$GLB,, | Performed by: NURSE PRACTITIONER

## 2020-03-17 PROCEDURE — 99999 PR PBB SHADOW E&M-EST. PATIENT-LVL V: ICD-10-PCS | Mod: PBBFAC,,, | Performed by: PHYSICIAN ASSISTANT

## 2020-03-17 PROCEDURE — 99213 PR OFFICE/OUTPT VISIT, EST, LEVL III, 20-29 MIN: ICD-10-PCS | Mod: S$GLB,,, | Performed by: NURSE PRACTITIONER

## 2020-03-17 PROCEDURE — 3008F BODY MASS INDEX DOCD: CPT | Mod: CPTII,S$GLB,, | Performed by: PHYSICIAN ASSISTANT

## 2020-03-17 PROCEDURE — 99213 PR OFFICE/OUTPT VISIT, EST, LEVL III, 20-29 MIN: ICD-10-PCS | Mod: S$GLB,,, | Performed by: PHYSICIAN ASSISTANT

## 2020-03-17 PROCEDURE — 3008F PR BODY MASS INDEX (BMI) DOCUMENTED: ICD-10-PCS | Mod: CPTII,S$GLB,, | Performed by: INTERNAL MEDICINE

## 2020-03-17 PROCEDURE — 99999 PR PBB SHADOW E&M-EST. PATIENT-LVL IV: CPT | Mod: PBBFAC,,, | Performed by: NURSE PRACTITIONER

## 2020-03-17 PROCEDURE — 3075F PR MOST RECENT SYSTOLIC BLOOD PRESS GE 130-139MM HG: ICD-10-PCS | Mod: CPTII,S$GLB,, | Performed by: NURSE PRACTITIONER

## 2020-03-17 PROCEDURE — 99213 OFFICE O/P EST LOW 20 MIN: CPT | Mod: S$GLB,,, | Performed by: PHYSICIAN ASSISTANT

## 2020-03-17 PROCEDURE — 3008F BODY MASS INDEX DOCD: CPT | Mod: CPTII,S$GLB,, | Performed by: INTERNAL MEDICINE

## 2020-03-17 PROCEDURE — 3080F PR MOST RECENT DIASTOLIC BLOOD PRESSURE >= 90 MM HG: ICD-10-PCS | Mod: CPTII,S$GLB,, | Performed by: NURSE PRACTITIONER

## 2020-03-17 PROCEDURE — 3008F PR BODY MASS INDEX (BMI) DOCUMENTED: ICD-10-PCS | Mod: CPTII,S$GLB,, | Performed by: NURSE PRACTITIONER

## 2020-03-17 PROCEDURE — 3075F SYST BP GE 130 - 139MM HG: CPT | Mod: CPTII,S$GLB,, | Performed by: INTERNAL MEDICINE

## 2020-03-17 PROCEDURE — 3080F PR MOST RECENT DIASTOLIC BLOOD PRESSURE >= 90 MM HG: ICD-10-PCS | Mod: CPTII,S$GLB,, | Performed by: PHYSICIAN ASSISTANT

## 2020-03-17 PROCEDURE — 3008F BODY MASS INDEX DOCD: CPT | Mod: CPTII,S$GLB,, | Performed by: NURSE PRACTITIONER

## 2020-03-17 PROCEDURE — 99213 OFFICE O/P EST LOW 20 MIN: CPT | Mod: S$GLB,,, | Performed by: NURSE PRACTITIONER

## 2020-03-17 PROCEDURE — 73562 X-RAY EXAM OF KNEE 3: CPT | Mod: 26,,, | Performed by: RADIOLOGY

## 2020-03-17 PROCEDURE — 99999 PR PBB SHADOW E&M-EST. PATIENT-LVL III: CPT | Mod: PBBFAC,,, | Performed by: INTERNAL MEDICINE

## 2020-03-17 PROCEDURE — 99999 PR PBB SHADOW E&M-EST. PATIENT-LVL III: ICD-10-PCS | Mod: PBBFAC,,, | Performed by: INTERNAL MEDICINE

## 2020-03-17 PROCEDURE — 99214 OFFICE O/P EST MOD 30 MIN: CPT | Mod: S$GLB,,, | Performed by: INTERNAL MEDICINE

## 2020-03-17 PROCEDURE — 99999 PR PBB SHADOW E&M-EST. PATIENT-LVL V: CPT | Mod: PBBFAC,,, | Performed by: PHYSICIAN ASSISTANT

## 2020-03-17 PROCEDURE — 73562 XR KNEE ORTHO BILAT: ICD-10-PCS | Mod: 26,,, | Performed by: RADIOLOGY

## 2020-03-17 PROCEDURE — 99214 PR OFFICE/OUTPT VISIT, EST, LEVL IV, 30-39 MIN: ICD-10-PCS | Mod: S$GLB,,, | Performed by: INTERNAL MEDICINE

## 2020-03-17 PROCEDURE — 73562 X-RAY EXAM OF KNEE 3: CPT | Mod: TC,50

## 2020-03-17 PROCEDURE — 3008F PR BODY MASS INDEX (BMI) DOCUMENTED: ICD-10-PCS | Mod: CPTII,S$GLB,, | Performed by: PHYSICIAN ASSISTANT

## 2020-03-17 PROCEDURE — 3080F DIAST BP >= 90 MM HG: CPT | Mod: CPTII,S$GLB,, | Performed by: NURSE PRACTITIONER

## 2020-03-17 PROCEDURE — 3079F DIAST BP 80-89 MM HG: CPT | Mod: CPTII,S$GLB,, | Performed by: INTERNAL MEDICINE

## 2020-03-17 PROCEDURE — 3077F PR MOST RECENT SYSTOLIC BLOOD PRESSURE >= 140 MM HG: ICD-10-PCS | Mod: CPTII,S$GLB,, | Performed by: PHYSICIAN ASSISTANT

## 2020-03-17 PROCEDURE — 3079F PR MOST RECENT DIASTOLIC BLOOD PRESSURE 80-89 MM HG: ICD-10-PCS | Mod: CPTII,S$GLB,, | Performed by: INTERNAL MEDICINE

## 2020-03-17 PROCEDURE — 3080F DIAST BP >= 90 MM HG: CPT | Mod: CPTII,S$GLB,, | Performed by: PHYSICIAN ASSISTANT

## 2020-03-17 PROCEDURE — 99999 PR PBB SHADOW E&M-EST. PATIENT-LVL IV: ICD-10-PCS | Mod: PBBFAC,,, | Performed by: NURSE PRACTITIONER

## 2020-03-17 PROCEDURE — 3077F SYST BP >= 140 MM HG: CPT | Mod: CPTII,S$GLB,, | Performed by: PHYSICIAN ASSISTANT

## 2020-03-17 PROCEDURE — 3075F PR MOST RECENT SYSTOLIC BLOOD PRESS GE 130-139MM HG: ICD-10-PCS | Mod: CPTII,S$GLB,, | Performed by: INTERNAL MEDICINE

## 2020-03-17 NOTE — PROGRESS NOTES
Clinic Follow Up:  [facility] Follow Up Note    Reason for Follow Up:  The encounter diagnosis was Gastroesophageal reflux disease, esophagitis presence not specified.    PCP: Zuly Montes       HPI:  This is a 59 y.o. female here for follow up of the above  Pt states that he has been feeling overall well without any new complaints.   Has been taking PPI daily and feeling well without GERD  Denies any abdominal pain.  No nausea or vomiting.  No change in bowel pattern.  No melena or hematochezia. No weight loss.      Review of Systems   Constitutional: Negative for chills, fever, malaise/fatigue and weight loss.   Respiratory: Negative for cough.    Cardiovascular: Negative for chest pain.   Gastrointestinal:        Per HPI   Musculoskeletal: Negative for myalgias.   Skin: Negative for itching and rash.   Neurological: Negative for headaches.   Psychiatric/Behavioral: The patient is not nervous/anxious.        Medical History:  Past Medical History:   Diagnosis Date    Arthritis     Fatty liver     GERD (gastroesophageal reflux disease)     Hernia, umbilical     reducible    Hyperlipidemia     Hypertension     Rotator cuff tear     Sleep apnea        Surgical History:   Past Surgical History:   Procedure Laterality Date    ARTHROSCOPIC DEBRIDEMENT OF SHOULDER Right 12/30/2019    Procedure: DEBRIDEMENT, SHOULDER, ARTHROSCOPIC;  Surgeon: Laureano Cox MD;  Location: [facility];  Service: Orthopedics;  Laterality: Right;    ARTHROSCOPY OF SHOULDER WITH DECOMPRESSION OF SUBACROMIAL SPACE Right 12/30/2019    Procedure: ARTHROSCOPY, SHOULDER, WITH SUBACROMIAL SPACE DECOMPRESSION;  Surgeon: Laureano Cox MD;  Location: [facility] OR;  Service: Orthopedics;  Laterality: Right;    BICEPS TENDON REPAIR Right 12/30/2019    Procedure: REPAIR, TENDON, BICEPS;  Surgeon: Laureano Cox MD;  Location: [facility] OR;  Service: Orthopedics;  Laterality: Right;  arthroscopic Biceps tenodesis     BRAIN SURGERY  2001    Pituitary tumor removal 2001 x 2     CARPAL TUNNEL RELEASE Bilateral     x 2    CERVICAL BIOPSY  W/ LOOP ELECTRODE EXCISION      [facility] '81     SECTION      x 1    COLONOSCOPY N/A 2016    Procedure: COLONOSCOPY;  Surgeon: Quique Paul MD;  Location: [facility];  Service: Endoscopy;  Laterality: N/A;    DISTAL CLAVICLE EXCISION Right 2019    Procedure: EXCISION, CLAVICLE, DISTAL;  Surgeon: Laureano Cox MD;  Location: [facility];  Service: Orthopedics;  Laterality: Right;  arthroscopic    GANGLION CYST EXCISION Left 2018    REFRACTIVE SURGERY      ROTATOR CUFF REPAIR Right 2019    Procedure: REPAIR, ROTATOR CUFF;  Surgeon: Laureano Cox MD;  Location: [facility] OR;  Service: Orthopedics;  Laterality: Right;  arthroscopic    SYNOVECTOMY OF SHOULDER Right 2019    Procedure: SYNOVECTOMY, SHOULDER;  Surgeon: Laureano Cox MD;  Location: [facility] OR;  Service: Orthopedics;  Laterality: Right;  arthroscopic    TOTAL ABDOMINAL HYSTERECTOMY W/ BILATERAL SALPINGOOPHORECTOMY      STAR/BSO secondary to endometriosis    VENTRAL HERNIA REPAIR         Family History:   Family History   Problem Relation Age of Onset    Hypertension Father     Prostate cancer Father     Hypertension Mother     Cancer Maternal Aunt         pancreas    Cancer Maternal Uncle         pancreas    Heart disease Paternal Uncle     Heart disease Paternal Grandmother     Diabetes Maternal Aunt     Heart attack Sister 30    Heart attack Brother 32       Social History:   Social History     Tobacco Use    Smoking status: Never Smoker    Smokeless tobacco: Never Used   Substance Use Topics    Alcohol use: Yes     Alcohol/week: 0.0 standard drinks     Frequency: Monthly or less     Drinks per session: 1 or 2     Binge frequency: Less than monthly     Comment: socially    Drug use: No       Allergies: Reviewed    Home Medications:  Current Outpatient  Medications on File Prior to Visit   Medication Sig Dispense Refill    (Magic mouthwash) 1:1:1 Benadryl 12.5mg/5ml liq, aluminum & magnesium hydroxide-simehticone (Maalox), lidocaine viscous 2% Swish and spit 15 mLs every 4 (four) hours as needed (throat pain). for mouth sores 360 mL 0    albuterol 90 mcg/actuation inhaler Inhale 1-2 puffs into the lungs every 4 (four) hours as needed for Wheezing or Shortness of Breath (cough). 1 Inhaler 0    amLODIPine (NORVASC) 2.5 MG tablet Take 1 tablet (2.5 mg total) by mouth once daily. (Patient taking differently: Take 2.5 mg by mouth once daily. Take am of surgery) 30 tablet 11    amoxicillin-clavulanate 875-125mg (AUGMENTIN) 875-125 mg per tablet Take 1 tablet by mouth 2 (two) times daily. for 10 days 20 tablet 0    aspirin 81 MG Chew Take 81 mg by mouth as needed. Hold 5 days prior to surgery      benzonatate (TESSALON) 200 MG capsule Take 1 capsule (200 mg total) by mouth 3 (three) times daily as needed for Cough. 21 capsule 0    esomeprazole (NEXIUM) 40 MG capsule Take 1 capsule (40 mg total) by mouth before breakfast. 90 capsule 3    fluticasone propionate (FLONASE) 50 mcg/actuation nasal spray 2 sprays (100 mcg total) by Each Nostril route once daily. 16 g 0    L.acidophilus-Bif. animalis (PROBIOTIC) 5 billion cell CpSP Take 1 tablet by mouth once daily. 10 capsule 0    losartan-hydrochlorothiazide 100-25 mg (HYZAAR) 100-25 mg per tablet Take 1 tablet by mouth once daily. (Patient taking differently: Take 1 tablet by mouth once daily. Hold am of surgery) 90 tablet 3    montelukast (SINGULAIR) 10 mg tablet Take 1 tablet (10 mg total) by mouth nightly. 30 tablet 2    MULTIVIT,CALC,MINS/IRON/FOLIC (DAILY MULTIPLE FOR WOMEN ORAL) Take 1 tablet by mouth once daily. Hold 5 days prior to surgery      naproxen sodium (ALEVE) 220 MG tablet Take 220 mg by mouth as needed. Hold 5 days prior to surgery      omega-3 fatty acids/fish oil (FISH OIL-OMEGA-3 FATTY ACIDS)  "300-1,000 mg capsule Take by mouth once daily. Hold 5 days prior to surgery      predniSONE (DELTASONE) 20 MG tablet Take 2 tablets with food for 3 days; then take one tablet with food for 2 days; then take 1/2 tablet with food for 2 days. 9 tablet 0    promethazine-dextromethorphan (PROMETHAZINE-DM) 6.25-15 mg/5 mL Syrp Take 5 mLs by mouth nightly as needed (Cough). 120 mL 0    rosuvastatin (CRESTOR) 40 MG Tab Take 1 tablet (40 mg total) by mouth every evening. (Patient taking differently: Take 40 mg by mouth once daily. ) 90 tablet 3     Current Facility-Administered Medications on File Prior to Visit   Medication Dose Route Frequency Provider Last Rate Last Dose    lidocaine (PF) 10 mg/ml (1%) injection 5 mg  0.5 mL Subcutaneous Once Sung Warner MD           Physical Exam:  Vital Signs:  BP (!) 130/94   Pulse 87   Ht 5' 3" (1.6 m)   Wt 91 kg (200 lb 9.9 oz)   BMI 35.54 kg/m²   Body mass index is 35.54 kg/m².  Physical Exam   Constitutional: She is oriented to person, place, and time. She appears well-developed.   HENT:   Head: Normocephalic.   Eyes: No scleral icterus.   Neck: Normal range of motion.   Cardiovascular: Normal rate.   Pulmonary/Chest: Effort normal.   Abdominal: She exhibits no distension.   Musculoskeletal: Normal range of motion.   Neurological: She is alert and oriented to person, place, and time.   Skin: Skin is warm and dry.   Psychiatric: She has a normal mood and affect.   Vitals reviewed.      Labs: Pertinent labs reviewed.    Assessment:  1. Gastroesophageal reflux disease, esophagitis presence not specified        Recommendations:  Stable without new complaints  Risks of long term PPI discussed.  Pt is not interested in stopping at this time  Agreed to decrease to Nexium 20mg OTC with pepcid as needed  GERD diet and lifestyle discussed.       Return to Clinic:    As needed based on symptoms.     "

## 2020-03-17 NOTE — PROGRESS NOTES
Subjective:    Patient ID:  Ora Henderson is a 59 y.o. female who presents for evaluation of Hypertension; Hyperlipidemia; and Risk Factor Management For Atherosclerosis          HPI pt presents for f/u.  Her current medical conditions include HTN, hyperlipidemia, NATALIE, obesity. Nonsmoker.   She has family h/o cad. Sisters x 2  of MIs. Brother  of MI. Father  of MI 42.   Past hx pertinent for following:  h/o LHC about 20 years ago, no specific findings.   H/o chronic abnormal ecgs showing nonspecific ST-T abnl.  Stress MPI 3/17 showed no ischemia.  Echo 3/17 showed normal LV function.  ecg  NSR, low precordial R waves, no acute changes.   Now here.  BP controlled on current meds.  Weight unchanged.    Lipids well controlled, on Rosuvastatin.  Compliant with meds.  ecg 19 NSR, nonspecific st-t abnl.   No chest pain sxs.  No CHF sxs.  She is exercising regularly.  Did not tolerate CPAP in past.      Current Outpatient Medications:     (Magic mouthwash) 1:1:1 Benadryl 12.5mg/5ml liq, aluminum & magnesium hydroxide-simehticone (Maalox), lidocaine viscous 2%, Swish and spit 15 mLs every 4 (four) hours as needed (throat pain). for mouth sores, Disp: 360 mL, Rfl: 0    albuterol 90 mcg/actuation inhaler, Inhale 1-2 puffs into the lungs every 4 (four) hours as needed for Wheezing or Shortness of Breath (cough)., Disp: 1 Inhaler, Rfl: 0    amLODIPine (NORVASC) 2.5 MG tablet, Take 1 tablet (2.5 mg total) by mouth once daily. (Patient taking differently: Take 2.5 mg by mouth once daily. Take am of surgery), Disp: 30 tablet, Rfl: 11    amoxicillin-clavulanate 875-125mg (AUGMENTIN) 875-125 mg per tablet, Take 1 tablet by mouth 2 (two) times daily. for 10 days, Disp: 20 tablet, Rfl: 0    aspirin 81 MG Chew, Take 81 mg by mouth as needed. Hold 5 days prior to surgery, Disp: , Rfl:     benzonatate (TESSALON) 200 MG capsule, Take 1 capsule (200 mg total) by mouth 3 (three) times daily as  needed for Cough., Disp: 21 capsule, Rfl: 0    esomeprazole (NEXIUM) 40 MG capsule, Take 1 capsule (40 mg total) by mouth before breakfast., Disp: 90 capsule, Rfl: 3    fluticasone propionate (FLONASE) 50 mcg/actuation nasal spray, 2 sprays (100 mcg total) by Each Nostril route once daily., Disp: 16 g, Rfl: 0    L.acidophilus-Bif. animalis (PROBIOTIC) 5 billion cell CpSP, Take 1 tablet by mouth once daily., Disp: 10 capsule, Rfl: 0    losartan-hydrochlorothiazide 100-25 mg (HYZAAR) 100-25 mg per tablet, Take 1 tablet by mouth once daily. (Patient taking differently: Take 1 tablet by mouth once daily. Hold am of surgery), Disp: 90 tablet, Rfl: 3    montelukast (SINGULAIR) 10 mg tablet, Take 1 tablet (10 mg total) by mouth nightly., Disp: 30 tablet, Rfl: 2    MULTIVIT,CALC,MINS/IRON/FOLIC (DAILY MULTIPLE FOR WOMEN ORAL), Take 1 tablet by mouth once daily. Hold 5 days prior to surgery, Disp: , Rfl:     naproxen sodium (ALEVE) 220 MG tablet, Take 220 mg by mouth as needed. Hold 5 days prior to surgery, Disp: , Rfl:     omega-3 fatty acids/fish oil (FISH OIL-OMEGA-3 FATTY ACIDS) 300-1,000 mg capsule, Take by mouth once daily. Hold 5 days prior to surgery, Disp: , Rfl:     predniSONE (DELTASONE) 20 MG tablet, Take 2 tablets with food for 3 days; then take one tablet with food for 2 days; then take 1/2 tablet with food for 2 days., Disp: 9 tablet, Rfl: 0    promethazine-dextromethorphan (PROMETHAZINE-DM) 6.25-15 mg/5 mL Syrp, Take 5 mLs by mouth nightly as needed (Cough)., Disp: 120 mL, Rfl: 0    rosuvastatin (CRESTOR) 40 MG Tab, Take 1 tablet (40 mg total) by mouth every evening. (Patient taking differently: Take 40 mg by mouth once daily. ), Disp: 90 tablet, Rfl: 3  No current facility-administered medications for this visit.     Facility-Administered Medications Ordered in Other Visits:     lidocaine (PF) 10 mg/ml (1%) injection 5 mg, 0.5 mL, Subcutaneous, Once, Sung Warner MD      Review of Systems  "  Constitution: Negative.   HENT: Negative.    Eyes: Negative.    Cardiovascular: Negative.    Respiratory: Negative.    Endocrine: Negative.    Hematologic/Lymphatic: Negative.    Skin: Negative.    Musculoskeletal: Positive for arthritis and joint pain.   Gastrointestinal: Negative.    Genitourinary: Negative.    Neurological: Negative.    Psychiatric/Behavioral: Negative.    Allergic/Immunologic: Negative.        /82 (BP Location: Left arm, Patient Position: Sitting, BP Method: Large (Manual))   Pulse 100   Ht 5' 3" (1.6 m)   Wt 91.1 kg (200 lb 13.4 oz)   BMI 35.58 kg/m²       Wt Readings from Last 3 Encounters:   03/17/20 91.1 kg (200 lb 13.4 oz)   03/17/20 91 kg (200 lb 9.9 oz)   03/17/20 91.2 kg (201 lb 1 oz)     Temp Readings from Last 3 Encounters:   03/17/20 98.1 °F (36.7 °C) (Tympanic)   03/11/20 97.6 °F (36.4 °C) (Tympanic)   03/07/20 98.1 °F (36.7 °C)     BP Readings from Last 3 Encounters:   03/17/20 138/82   03/17/20 (!) 130/94   03/17/20 (!) 140/90     Pulse Readings from Last 3 Encounters:   03/17/20 100   03/17/20 87   03/17/20 95          Objective:    Physical Exam   Constitutional: She is oriented to person, place, and time. Vital signs are normal. She appears well-developed and well-nourished. She is active and cooperative. She does not have a sickly appearance. She does not appear ill. No distress.   HENT:   Head: Normocephalic.   Neck: Neck supple. Normal carotid pulses, no hepatojugular reflux and no JVD present. Carotid bruit is not present. No thyromegaly present.   Cardiovascular: Normal rate, regular rhythm, S1 normal, S2 normal, normal heart sounds and normal pulses. PMI is not displaced. Exam reveals no gallop and no friction rub.   No murmur heard.  Pulses:       Radial pulses are 2+ on the right side, and 2+ on the left side.   Pulmonary/Chest: Effort normal and breath sounds normal. She has no wheezes. She has no rales.   Abdominal: Soft. Normal appearance, normal aorta and " bowel sounds are normal. She exhibits no pulsatile liver, no abdominal bruit, no ascites and no mass. There is no splenomegaly or hepatomegaly. There is no tenderness.   Musculoskeletal: She exhibits no edema.   Lymphadenopathy:     She has no cervical adenopathy.   Neurological: She is alert and oriented to person, place, and time.   Skin: Skin is warm. She is not diaphoretic.   Psychiatric: She has a normal mood and affect. Her behavior is normal.   Nursing note and vitals reviewed.      I have reviewed all pertinent labs and cardiac studies.      Chemistry        Component Value Date/Time     03/04/2020 0850    K 4.0 03/04/2020 0850     03/04/2020 0850    CO2 29 03/04/2020 0850    BUN 11 03/04/2020 0850    CREATININE 0.9 03/04/2020 0850    GLU 94 03/04/2020 0850        Component Value Date/Time    CALCIUM 9.8 03/04/2020 0850    ALKPHOS 77 03/04/2020 0850    AST 16 03/04/2020 0850    ALT 15 03/04/2020 0850    BILITOT 0.5 03/04/2020 0850    ESTGFRAFRICA >60.0 03/04/2020 0850    EGFRNONAA >60.0 03/04/2020 0850        Lab Results   Component Value Date    WBC 6.33 02/18/2020    HGB 12.4 02/18/2020    HCT 39.4 02/18/2020    MCV 91 02/18/2020     02/18/2020       Lab Results   Component Value Date    HGBA1C 6.0 (H) 02/13/2019     Lab Results   Component Value Date    CHOL 156 03/04/2020    CHOL 154 08/05/2019    CHOL 227 (H) 02/13/2019     Lab Results   Component Value Date    HDL 48 03/04/2020    HDL 49 08/05/2019    HDL 59 02/13/2019     Lab Results   Component Value Date    LDLCALC 82.2 03/04/2020    LDLCALC 82.2 08/05/2019    LDLCALC 126.0 02/13/2019     Lab Results   Component Value Date    TRIG 129 03/04/2020    TRIG 114 08/05/2019    TRIG 210 (H) 02/13/2019     Lab Results   Component Value Date    CHOLHDL 30.8 03/04/2020    CHOLHDL 31.8 08/05/2019    CHOLHDL 26.0 02/13/2019             Assessment:       1. Essential hypertension    2. Severe obesity (BMI 35.0-35.9 with comorbidity)    3.  Mixed hyperlipidemia    4. Family history of cardiovascular disease    5. Abnormal ECG         Plan:             Stable cardiovascular conditions at present time on current medical treatment.  Reviewed all tests and above medical conditions with patient in detail and formulated treatment plan.  Continue optimal medical treatment for cardiovascular conditions.  Cardiac low salt diet discussed.  Daily exercise encouraged, goal 30 +  minutes aerobic exercise as tolerated.  Maintaining healthy weight and weight loss goals (if needed) were discussed in clinic.  No changes in meds today.  F/u in 6 months.

## 2020-03-17 NOTE — PATIENT INSTRUCTIONS
Bruises (Contusions)    A contusion is a bruise. A bruise happens when a blow to your body doesn't break the skin but does break blood vessels beneath the skin. Blood leaking from the broken vessels causes redness and swelling. As it heals, your bruise is likely to turn colors like purple, green, and yellow. This is normal. The bruise should fade in 2 or 3 weeks.  Factors that make you more likely to bruise  Almost everyone bruises now and then. Certain people do bruise more easily than others. You're more prone to bruising as you get older. That's because blood vessels become more fragile with age. You're also more likely to bruise if you have a clotting disorder such as hemophilia or take medications that reduce clotting, including aspirin, coumadin, newer agents.  When to go to the emergency room (ER)  Bruises almost always heal on their own without special treatment. But for some people, a bad bruise can be serious. Seek medical care if you:  · Have a clotting disorder such as hemophilia.  · Have cirrhosis or other serious liver disease.  · Take blood-thinning medications such as warfarin (Coumadin).  What to expect in the ER  A doctor will examine your bruise and ask about any health conditions you have. In some cases, you may have a test to check how well your blood clots. Other treatment will depend on your needs.  Follow-up care  Sometimes a bruise gets worse instead of better. It may become larger and more swollen. This can occur when your body walls off a small pool of blood under the skin (hematoma). In very rare cases, your doctor may need to drain excess blood from the area.  Tip:  Apply an ice pack or bag of frozen peas to a bruise (keep a thin cloth between the cold source and your skin). This can help reduce redness and swelling.   Date Last Reviewed: 12/1/2016  © 9670-9252 [employer]. [address] [place] [address], [place] [address]. All rights reserved. This information is not intended as  a substitute for professional medical care. Always follow your healthcare professional's instructions.

## 2020-03-18 ENCOUNTER — PATIENT MESSAGE (OUTPATIENT)
Dept: ADMINISTRATIVE | Facility: OTHER | Age: 60
End: 2020-03-18

## 2020-03-20 ENCOUNTER — PATIENT MESSAGE (OUTPATIENT)
Dept: INTERNAL MEDICINE | Facility: CLINIC | Age: 60
End: 2020-03-20

## 2020-03-20 ENCOUNTER — TELEPHONE (OUTPATIENT)
Dept: ORTHOPEDICS | Facility: CLINIC | Age: 60
End: 2020-03-20

## 2020-03-20 DIAGNOSIS — M25.561 ACUTE PAIN OF BOTH KNEES: Primary | ICD-10-CM

## 2020-03-20 DIAGNOSIS — M25.562 ACUTE PAIN OF BOTH KNEES: Primary | ICD-10-CM

## 2020-03-24 ENCOUNTER — DOCUMENTATION ONLY (OUTPATIENT)
Dept: REHABILITATION | Facility: HOSPITAL | Age: 60
End: 2020-03-24

## 2020-03-24 ENCOUNTER — TELEPHONE (OUTPATIENT)
Dept: ORTHOPEDICS | Facility: CLINIC | Age: 60
End: 2020-03-24

## 2020-03-24 NOTE — PROGRESS NOTES
Outpatient Therapy Discharge Summary     Name: Ora [facility] Number: [id number]    Therapy Diagnosis:        Encounter Diagnoses   Name Primary?    Acute pain of right shoulder      Decreased range of motion      Decreased strength        Physician: Laureano Cox, *    Physician Orders: PT Eval and Treat   Medical Diagnosis from Referral: Complete tear of right rotator cuff  Evaluation Date: 1/15/2020      Date of Last visit: 2/18/2020  Total Visits Received: 6  Cancelled Visits: 5  No Show Visits: 1    Assessment      Pt did not return to PT after last visit on 2/18/2020, at last visit pt with good PROM and no to little pain.    Goals:   Short Term Goals: In 5 weeks   1.Pt to be educated on HEP.  2.Patient to increase Shoulder PROM, within protocol restrictions.  3.Patient to increase strength by 1/2 grade.  4.Patient to have pain less than 5/10 at all times.  5.Patient to improve score on the FOTO by 10%.  6. Pt to be educated on postural and body mechanics awareness.     Long Term Goals: In 10 weeks  1. Patient to improve score on the FOTO by 30%.  2. Patient to tolerate strength testing with goals made PRN.  3. Patient to demo full PROM R shoulder, with goals made for AROM PRN.  4. Patient to perform daily activities including use of R shoulder without increased pain, as released per protocol and per MD.    Discharge reason: Patient has not attended therapy since 2/18/2020    Plan   This patient is discharged from Physical Therapy

## 2020-03-24 NOTE — TELEPHONE ENCOUNTER
Spoke to patient in regards to ortho appointment. Patient agreed to telehealth visit. Informed patient of instructions regarding telehealth. Patient verbalizign understanding. MS

## 2020-04-07 NOTE — PATIENT INSTRUCTIONS
If you are experiencing pain/discomfort ,or have questions after 5pm and would like to be connected to the [facility] [place] on-call team, please call this number and specify which Sports Medicine provider is treating you: [phone]      -Continue PT/HEP-telemed  -Return to clinic in 6-8 weeks

## 2020-04-07 NOTE — PROGRESS NOTES
Subjective:          Chief Complaint:    04/07/2020-The patient location is: [place], [place]  The chief complaint leading to consultation is: f/u 14wk s/p R shoulder   Visit type: Virtual visit with synchronous audio and video  Total time spent with patient: 25min  Each patient to whom he or she provides medical services by telemedicine is:  (1) informed of the relationship between the physician and patient and the respective role of any other health care provider with respect to management of the patient; and (2) notified that he or she may decline to receive medical services by telemedicine and may withdraw from such care at any time.    Notes:     Ora Henderson, a 59 y.o. Returns today for a follow up of her R shoulder. Patient reports that she has pain only in inclement weather and when she makes certain movements. Condition is however, improving. PT is effective.       6wk postop R shoulder. Doing well, pain much improved.    DATE OF SURGERY:  12/30/2019      PREOPERATIVE DIAGNOSES:   - Right shoulder rotator cuff tear  - Right shoulder impingement  - Right shoulder synovitis  -right shoulder AC joint arthritis  -right shoulder biceps tendonitis      POSTOPERATIVE DIAGNOSES:   Same     PROCEDURE:   - Right shoulder arthroscopic rotator cuff repair.    -Right shoulder arthroscopic subacromial decompression.   - Right shoulder arthroscopic synovectomy and extensive debridement   -right shoulder arthroscopic DISTAL CLAVICLE EXCISION  -right shoulder arthroscopic biceps tenodesis     SURGEON: Laureano Cox M.D.          Shoulder Pain    The pain is present in the right shoulder. This is a chronic problem. The current episode started more than 1 year ago. There has been no history of extremity trauma. The problem has been rapidly improving. The quality of the pain is described as aching. The pain is at a severity of 0/10. Associated symptoms include stiffness. Pertinent negatives include no  fever, numbness or tingling. The symptoms are aggravated by activity and lifting. She has tried injection treatment, other, cold and heat for the symptoms. The treatment provided significant relief. Physical therapy was effective.      Review of Systems   Constitution: Negative for chills, fever and night sweats.   HENT: Negative for ear discharge and hearing loss.    Eyes: Negative for blurred vision and visual disturbance.   Cardiovascular: Negative for chest pain and leg swelling.   Respiratory: Negative for cough and shortness of breath.    Endocrine: Negative for polyuria.   Hematologic/Lymphatic: Negative for bleeding problem.   Skin: Negative for rash.   Musculoskeletal: Positive for joint pain, muscle weakness and stiffness. Negative for back pain, joint swelling and muscle cramps.   Gastrointestinal: Negative for melena, nausea and vomiting.   Genitourinary: Negative for hematuria.   Neurological: Negative for loss of balance, numbness, paresthesias and tingling.   Psychiatric/Behavioral: Negative for altered mental status.     Past Medical History:   Diagnosis Date    Arthritis     Fatty liver     GERD (gastroesophageal reflux disease)     Hernia, umbilical     reducible    Hyperlipidemia     Hypertension     Rotator cuff tear     Sleep apnea      Past Surgical History:   Procedure Laterality Date    ARTHROSCOPIC DEBRIDEMENT OF SHOULDER Right 12/30/2019    Procedure: DEBRIDEMENT, SHOULDER, ARTHROSCOPIC;  Surgeon: Laureano Cox MD;  Location: [facility];  Service: Orthopedics;  Laterality: Right;    ARTHROSCOPY OF SHOULDER WITH DECOMPRESSION OF SUBACROMIAL SPACE Right 12/30/2019    Procedure: ARTHROSCOPY, SHOULDER, WITH SUBACROMIAL SPACE DECOMPRESSION;  Surgeon: Laureano Cox MD;  Location: [facility] OR;  Service: Orthopedics;  Laterality: Right;    BICEPS TENDON REPAIR Right 12/30/2019    Procedure: REPAIR, TENDON, BICEPS;  Surgeon: Laureano Cox MD;  Location: [facility] OR;  Service:  Orthopedics;  Laterality: Right;  arthroscopic Biceps tenodesis    BRAIN SURGERY  2001    Pituitary tumor removal 2001 x 2     CARPAL TUNNEL RELEASE Bilateral     x 2    CERVICAL BIOPSY  W/ LOOP ELECTRODE EXCISION      [facility] '81     SECTION      x 1    COLONOSCOPY N/A 2016    Procedure: COLONOSCOPY;  Surgeon: Quique Paul MD;  Location: [facility] ENDO;  Service: Endoscopy;  Laterality: N/A;    DISTAL CLAVICLE EXCISION Right 2019    Procedure: EXCISION, CLAVICLE, DISTAL;  Surgeon: Laureano Cox MD;  Location: [facility] OR;  Service: Orthopedics;  Laterality: Right;  arthroscopic    GANGLION CYST EXCISION Left 2018    REFRACTIVE SURGERY      ROTATOR CUFF REPAIR Right 2019    Procedure: REPAIR, ROTATOR CUFF;  Surgeon: Laureano Cox MD;  Location: [facility] OR;  Service: Orthopedics;  Laterality: Right;  arthroscopic    SYNOVECTOMY OF SHOULDER Right 2019    Procedure: SYNOVECTOMY, SHOULDER;  Surgeon: Laureano Cox MD;  Location: [facility] OR;  Service: Orthopedics;  Laterality: Right;  arthroscopic    TOTAL ABDOMINAL HYSTERECTOMY W/ BILATERAL SALPINGOOPHORECTOMY  2006    STAR/BSO secondary to endometriosis    VENTRAL HERNIA REPAIR  2016     Social History     Socioeconomic History    Marital status:      Spouse name: Not on file    Number of children: 1    Years of education: Not on file    Highest education level: Not on file   Occupational History    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Employer: [employer]   Social Needs    Financial resource strain: Somewhat hard    Food insecurity:     Worry: Sometimes true     Inability: Sometimes true    Transportation needs:     Medical: No     Non-medical: No   Tobacco Use    Smoking status: Never Smoker    Smokeless tobacco: Never Used   Substance and Sexual Activity    Alcohol use: Yes     Alcohol/week: 0.0 standard drinks     Frequency: Monthly or less     Drinks per session: 1 or 2     Binge  frequency: Less than monthly     Comment: socially    Drug use: No    Sexual activity: Yes     Partners: Male     Birth control/protection: None, Surgical   Lifestyle    Physical activity:     Days per week: 6 days     Minutes per session: 60 min    Stress: Only a little   Relationships    Social connections:     Talks on phone: More than three times a week     Gets together: Once a week     Attends Nondenominational service: Not on file     Active member of club or organization: No     Attends meetings of clubs or organizations: Never     Relationship status:    Other Topics Concern    Not on file   Social History Narrative    Not on file     There were no vitals filed for this visit.                Objective:        General: Ora is well-developed, well-nourished, appears stated age, in no acute distress, alert and oriented to time, place and person.     General    Constitutional: She is oriented to person, place, and time. She appears well-developed and well-nourished. No distress.   HENT:   Head: Normocephalic and atraumatic.   Eyes: EOM are normal. Right eye exhibits no discharge. Left eye exhibits no discharge.   Pulmonary/Chest: Effort normal. No respiratory distress.   Abdominal: She exhibits no distension.   Neurological: She is alert and oriented to person, place, and time.   Psychiatric: She has a normal mood and affect. Her behavior is normal. Thought content normal.         Right Shoulder Exam     Comments:  Limited exam visualized via video on telemedicine conference     Incision well healed  No erythema/drainage/signs of infection    AROM , ER 40   Upper extremity appears warm/well perfused.  Moving shoulder/elbow/hand/wrist.    Unable to perform formal strength testing. At least 3/5 since able to perform against gravity.  Denies any sensory deficits.          Vascular Exam       Capillary Refill  Right Hand: normal capillary refill    Relevant imaging results reviewed and interpreted by  me, discussed with the patient and / or family today: none      Assessment:       Encounter Diagnosis   Name Primary?    Nontraumatic complete tear of right rotator cuff Yes          Plan:     Doing well.  -Continue PT/HEP  -Return to clinic in 6-8 weeks          Disclaimer: This note was prepared using a voice recognition system and is likely to have sound alike errors within the text.

## 2020-04-08 ENCOUNTER — OFFICE VISIT (OUTPATIENT)
Dept: ORTHOPEDICS | Facility: CLINIC | Age: 60
End: 2020-04-08
Payer: COMMERCIAL

## 2020-04-08 DIAGNOSIS — M75.121 NONTRAUMATIC COMPLETE TEAR OF RIGHT ROTATOR CUFF: Primary | ICD-10-CM

## 2020-04-08 PROBLEM — M75.100 ROTATOR CUFF TEAR: Status: ACTIVE | Noted: 2019-12-30

## 2020-04-08 PROCEDURE — 99213 PR OFFICE/OUTPT VISIT, EST, LEVL III, 20-29 MIN: ICD-10-PCS | Mod: 95,,, | Performed by: ORTHOPAEDIC SURGERY

## 2020-04-08 PROCEDURE — 99213 OFFICE O/P EST LOW 20 MIN: CPT | Mod: 95,,, | Performed by: ORTHOPAEDIC SURGERY

## 2020-04-14 ENCOUNTER — PATIENT OUTREACH (OUTPATIENT)
Dept: OTHER | Facility: OTHER | Age: 60
End: 2020-04-14

## 2020-04-14 NOTE — PROGRESS NOTES
Digital Medicine: Health  Follow-Up    Patient reports that she is doing well and her shoulder is feeling better. Patient AVG BP is almost within goal and readings are trending down. Patient and I discussed dehydration and paying attention to HR can be an indication if someone is dehydrated or not. Patient understood. Will f/u in 6 weeks to check in.    The history is provided by the patient. No  was used.     Follow Up  Follow-up reason(s): reading review and routine education      Readings are trending down due to lifestyle change.    Routine Education Topics: eating patterns and physical activity        INTERVENTION(S)  encouragement/support      There are no preventive care reminders to display for this patient.    Last 5 Patient Entered Readings                                      Current 30 Day Average: 129/82     Recent Readings 4/12/2020 4/9/2020 4/9/2020 4/5/2020 3/20/2020    SBP (mmHg) 130 96 127 134 154    DBP (mmHg) 86 53 83 86 88    Pulse 94 102 91 79 82                      Diet Screening   No change to diet.  She has the following dietary restrictions: low sodium diet and GERD    Barriers to a Healthy Diet: no barriers to healthy eating    Patient reports making no major changes to diet. Patient and I discussed proper hydration levels and ways to tell if she is dehydrated or not. Patient understood.    Physical Activity Screening   When asked if exercising, patient responded: yes    Patient participates in the following activities: walking, yard/housework, weights, yoga/stretching, elliptical, biking and body weight exercises    She identified the following barriers to physical activity: no barriers to being active    Patient states that she has a full gym in her house and she rotates the types of exercises she does. Patient states that she has a punching bag, weights and a bench, elliptical an a treadmil as well as an exercise bike. Patient works out everyday. Encouraged  patient to keep up the good work.     Medication Adherence Screening   She did not miss a dose this month.    Patient identified the following reasons for non-compliance: None    Patient reports no s/s or issues taking BP medication as prescribed.       SDOH

## 2020-05-06 ENCOUNTER — OFFICE VISIT (OUTPATIENT)
Dept: INTERNAL MEDICINE | Facility: CLINIC | Age: 60
End: 2020-05-06
Payer: COMMERCIAL

## 2020-05-06 VITALS
HEART RATE: 86 BPM | TEMPERATURE: 98 F | DIASTOLIC BLOOD PRESSURE: 96 MMHG | WEIGHT: 216.25 LBS | OXYGEN SATURATION: 98 % | BODY MASS INDEX: 38.31 KG/M2 | RESPIRATION RATE: 20 BRPM | SYSTOLIC BLOOD PRESSURE: 140 MMHG

## 2020-05-06 DIAGNOSIS — I10 ESSENTIAL HYPERTENSION: Chronic | ICD-10-CM

## 2020-05-06 DIAGNOSIS — B37.2 CUTANEOUS CANDIDIASIS: Primary | ICD-10-CM

## 2020-05-06 DIAGNOSIS — N60.29 SEGMENTAL FIBROADENOSIS OF BREAST, UNSPECIFIED LATERALITY: ICD-10-CM

## 2020-05-06 DIAGNOSIS — E66.01 SEVERE OBESITY (BMI 35.0-35.9 WITH COMORBIDITY): ICD-10-CM

## 2020-05-06 PROBLEM — M19.90 ARTHRITIS: Chronic | Status: RESOLVED | Noted: 2017-11-24 | Resolved: 2020-05-06

## 2020-05-06 PROBLEM — M19.011 ARTHRITIS OF RIGHT ACROMIOCLAVICULAR JOINT: Status: RESOLVED | Noted: 2019-12-02 | Resolved: 2020-05-06

## 2020-05-06 PROBLEM — M75.100 ROTATOR CUFF TEAR: Status: RESOLVED | Noted: 2019-12-30 | Resolved: 2020-05-06

## 2020-05-06 PROCEDURE — 3008F BODY MASS INDEX DOCD: CPT | Mod: CPTII,S$GLB,, | Performed by: FAMILY MEDICINE

## 2020-05-06 PROCEDURE — 99214 PR OFFICE/OUTPT VISIT, EST, LEVL IV, 30-39 MIN: ICD-10-PCS | Mod: S$GLB,,, | Performed by: FAMILY MEDICINE

## 2020-05-06 PROCEDURE — 99214 OFFICE O/P EST MOD 30 MIN: CPT | Mod: S$GLB,,, | Performed by: FAMILY MEDICINE

## 2020-05-06 PROCEDURE — 3077F SYST BP >= 140 MM HG: CPT | Mod: CPTII,S$GLB,, | Performed by: FAMILY MEDICINE

## 2020-05-06 PROCEDURE — 99999 PR PBB SHADOW E&M-EST. PATIENT-LVL III: CPT | Mod: PBBFAC,,, | Performed by: FAMILY MEDICINE

## 2020-05-06 PROCEDURE — 3077F PR MOST RECENT SYSTOLIC BLOOD PRESSURE >= 140 MM HG: ICD-10-PCS | Mod: CPTII,S$GLB,, | Performed by: FAMILY MEDICINE

## 2020-05-06 PROCEDURE — 99999 PR PBB SHADOW E&M-EST. PATIENT-LVL III: ICD-10-PCS | Mod: PBBFAC,,, | Performed by: FAMILY MEDICINE

## 2020-05-06 PROCEDURE — 3080F PR MOST RECENT DIASTOLIC BLOOD PRESSURE >= 90 MM HG: ICD-10-PCS | Mod: CPTII,S$GLB,, | Performed by: FAMILY MEDICINE

## 2020-05-06 PROCEDURE — 3080F DIAST BP >= 90 MM HG: CPT | Mod: CPTII,S$GLB,, | Performed by: FAMILY MEDICINE

## 2020-05-06 PROCEDURE — 3008F PR BODY MASS INDEX (BMI) DOCUMENTED: ICD-10-PCS | Mod: CPTII,S$GLB,, | Performed by: FAMILY MEDICINE

## 2020-05-06 RX ORDER — NYSTATIN 100000 U/G
OINTMENT TOPICAL 2 TIMES DAILY
Qty: 30 G | Refills: 0 | Status: SHIPPED | OUTPATIENT
Start: 2020-05-06 | End: 2020-10-08

## 2020-05-06 NOTE — PROGRESS NOTES
Subjective:       Patient ID: Ora Henderson is a 59 y.o. female.    Chief Complaint: Breast Problem    59-year-old  female patient with Patient Active Problem List:     DJD (degenerative joint disease), cervical-mild     Hyperlipidemia     Hepatomegaly     Family history of cardiovascular disease     GERD (gastroesophageal reflux disease)     Suspected sleep apnea     History of benign pituitary tumor     Hx of colonic polyp     Essential hypertension     Osteoarthritis of spine with radiculopathy, lumbar region     Severe obesity (BMI 35.0-35.9 with comorbidity)     Abnormal ECG  Came in secondary to feeling occasional lumps in the breast and would like to get checked, patient has been doing self breast exams, due for mammogram in August 2020.  Patient also noticed rash underlying both the breasts and would like to get checked, associated with itching and occasional burning sensation.  Has been doing well and reports that she has been healing well with rotator cuff tear to the right shoulder.       Rash   This is a new problem. The current episode started in the past 7 days. The problem is unchanged. The rash is characterized by burning and pain. She was exposed to a new detergent/soap. Associated symptoms include fatigue and joint pain. Pertinent negatives include no anorexia, congestion, cough, diarrhea, eye pain, facial edema, fever, nail changes, rhinorrhea, shortness of breath, sore throat or vomiting. Past treatments include antibiotic cream. The treatment provided mild relief. There is no history of allergies, asthma, eczema or varicella.     Review of Systems   Constitutional: Positive for fatigue. Negative for fever.   HENT: Negative for congestion, rhinorrhea and sore throat.    Eyes: Negative for pain and visual disturbance.   Respiratory: Negative for cough and shortness of breath.    Cardiovascular: Negative for chest pain and leg swelling.   Gastrointestinal: Negative  for abdominal pain, anorexia, diarrhea, nausea and vomiting.   Musculoskeletal: Positive for joint pain. Negative for myalgias.   Skin: Positive for rash. Negative for nail changes.   Neurological: Negative for light-headedness and headaches.   Psychiatric/Behavioral: Negative for sleep disturbance.         BP (!) 140/96 (BP Location: Right arm, Patient Position: Sitting, BP Method: Large (Manual))   Pulse 86   Temp 97.5 °F (36.4 °C) (Tympanic)   Resp 20   Wt 98.1 kg (216 lb 4.3 oz)   SpO2 98%   BMI 38.31 kg/m²   Objective:      Physical Exam   Constitutional: She is oriented to person, place, and time. She appears well-developed and well-nourished.   HENT:   Head: Normocephalic and atraumatic.   Mouth/Throat: Oropharynx is clear and moist.   Cardiovascular: Normal rate, regular rhythm and normal heart sounds.   No murmur heard.  Pulmonary/Chest: Effort normal and breath sounds normal. She has no wheezes.   Abdominal: Soft. Bowel sounds are normal. There is no tenderness.   Musculoskeletal: She exhibits no edema.   Neurological: She is alert and oriented to person, place, and time.   Skin: Skin is warm and dry. Rash noted. There is erythema.   Positive for erythematous maculopapular rash noted underlying bilateral breasts consistent with cutaneous candidiasis    No significant lumps noted to bilateral breast exam.    Psychiatric: She has a normal mood and affect.         Assessment/Plan:   1. Cutaneous candidiasis  - nystatin (MYCOSTATIN) ointment; Apply topically 2 (two) times daily. Apply to rash under both the breasts  Dispense: 30 g; Refill: 0  Nystatin cream prescribed today .  Patient was advised to keep area clean and dry    2. Essential hypertension  Noted elevated blood pressure but reports that patient is with hypertension digital program and blood pressure has been stable, did not take her blood pressure medication today.   Currently on amlodipine 2.5 mg daily and losartan hydrochlorothiazide 100/25  mg daily      3. Severe obesity (BMI 35.0-35.9 with comorbidity)  Lifestyle modifications recommended to lose weight with diet and exercise    4.  lumpy breasts-likely secondary to dense fibrous tissue but no significant lumps noted on exam .  Reviewed previous mammograms which has been stable  Due for mammogram in August 2020  Advised to continue self-breast exams

## 2020-05-22 ENCOUNTER — TELEPHONE (OUTPATIENT)
Dept: ORTHOPEDICS | Facility: CLINIC | Age: 60
End: 2020-05-22

## 2020-05-22 NOTE — TELEPHONE ENCOUNTER
Spoke w/ patient in regards to r/s her 5/6 appt that was cancelled. Patient states she will hold off on making this appt until further notice. Expressed to patient that if she needs anything to let us know. Patient was grateful. -JOSE JUAN

## 2020-06-04 ENCOUNTER — HOSPITAL ENCOUNTER (OUTPATIENT)
Dept: CARDIOLOGY | Facility: HOSPITAL | Age: 60
Discharge: HOME | End: 2020-06-04
Attending: INTERNAL MEDICINE
Payer: COMMERCIAL

## 2020-06-04 VITALS — BODY MASS INDEX: 38.27 KG/M2 | WEIGHT: 216 LBS | HEIGHT: 63 IN

## 2020-06-04 DIAGNOSIS — R94.31 ABNORMAL ECG: ICD-10-CM

## 2020-06-04 DIAGNOSIS — G47.33 OSA (OBSTRUCTIVE SLEEP APNEA): ICD-10-CM

## 2020-06-04 DIAGNOSIS — Z82.49 FAMILY HISTORY OF CARDIOVASCULAR DISEASE: ICD-10-CM

## 2020-06-04 DIAGNOSIS — I10 ESSENTIAL HYPERTENSION: ICD-10-CM

## 2020-06-04 DIAGNOSIS — E66.09 CLASS 2 OBESITY DUE TO EXCESS CALORIES WITHOUT SERIOUS COMORBIDITY WITH BODY MASS INDEX (BMI) OF 35.0 TO 35.9 IN ADULT: ICD-10-CM

## 2020-06-04 DIAGNOSIS — E78.2 MIXED HYPERLIPIDEMIA: ICD-10-CM

## 2020-06-04 PROCEDURE — 93351 STRESS TTE COMPLETE: CPT | Mod: 26,,, | Performed by: INTERNAL MEDICINE

## 2020-06-04 PROCEDURE — 93351 STRESS ECHO (CUPID ONLY): ICD-10-PCS | Mod: 26,,, | Performed by: INTERNAL MEDICINE

## 2020-06-04 PROCEDURE — 93351 STRESS TTE COMPLETE: CPT

## 2020-06-05 LAB
AORTIC ROOT ANNULUS: 3.1 CM
ASCENDING AORTA: 2.39 CM
AV INDEX (PROSTH): 0.78
AV MEAN GRADIENT: 5 MMHG
AV PEAK GRADIENT: 9 MMHG
AV VALVE AREA: 2.82 CM2
AV VELOCITY RATIO: 0.75
BSA FOR ECHO PROCEDURE: 2.09 M2
CV ECHO LV RWT: 0.64 CM
CV STRESS BASE HR: 76 BPM
DIASTOLIC BLOOD PRESSURE: 83 MMHG
DOP CALC AO PEAK VEL: 1.54 M/S
DOP CALC AO VTI: 34.9 CM
DOP CALC LVOT AREA: 3.6 CM2
DOP CALC LVOT DIAMETER: 2.14 CM
DOP CALC LVOT PEAK VEL: 1.16 M/S
DOP CALC LVOT STROKE VOLUME: 98.29 CM3
DOP CALCLVOT PEAK VEL VTI: 27.34 CM
E WAVE DECELERATION TIME: 132.13 MSEC
E/A RATIO: 0.94
E/E' RATIO: 9.16 M/S
ECHO LV POSTERIOR WALL: 1.27 CM (ref 0.6–1.1)
FRACTIONAL SHORTENING: 41 % (ref 28–44)
INTERVENTRICULAR SEPTUM: 1.06 CM (ref 0.6–1.1)
IVRT: 105.61 MSEC
LA MAJOR: 5.28 CM
LA MINOR: 4.96 CM
LA WIDTH: 3.27 CM
LEFT ATRIUM SIZE: 3.71 CM
LEFT ATRIUM VOLUME INDEX: 26.4 ML/M2
LEFT ATRIUM VOLUME: 52.75 CM3
LEFT INTERNAL DIMENSION IN SYSTOLE: 2.34 CM (ref 2.1–4)
LEFT VENTRICLE DIASTOLIC VOLUME INDEX: 34.75 ML/M2
LEFT VENTRICLE DIASTOLIC VOLUME: 69.43 ML
LEFT VENTRICLE MASS INDEX: 79 G/M2
LEFT VENTRICLE SYSTOLIC VOLUME INDEX: 9.5 ML/M2
LEFT VENTRICLE SYSTOLIC VOLUME: 18.89 ML
LEFT VENTRICULAR INTERNAL DIMENSION IN DIASTOLE: 3.99 CM (ref 3.5–6)
LEFT VENTRICULAR MASS: 157.78 G
LV LATERAL E/E' RATIO: 8.7 M/S
LV SEPTAL E/E' RATIO: 9.67 M/S
MV PEAK A VEL: 0.93 M/S
MV PEAK E VEL: 0.87 M/S
OHS CV CPX 1 MINUTE RECOVERY HEART RATE: 90 BPM
OHS CV CPX 85 PERCENT MAX PREDICTED HEART RATE MALE: 130
OHS CV CPX ESTIMATED METS: 7
OHS CV CPX MAX PREDICTED HEART RATE: 153
OHS CV CPX PATIENT IS FEMALE: 1
OHS CV CPX PATIENT IS MALE: 0
OHS CV CPX PEAK DIASTOLIC BLOOD PRESSURE: 106 MMHG
OHS CV CPX PEAK HEAR RATE: 141 BPM
OHS CV CPX PEAK RATE PRESSURE PRODUCT: ABNORMAL
OHS CV CPX PEAK SYSTOLIC BLOOD PRESSURE: 199 MMHG
OHS CV CPX PERCENT MAX PREDICTED HEART RATE ACHIEVED: 92
OHS CV CPX RATE PRESSURE PRODUCT PRESENTING: ABNORMAL
PULM VEIN S/D RATIO: 1.2
PV PEAK D VEL: 0.45 M/S
PV PEAK S VEL: 0.54 M/S
RA MAJOR: 4.25 CM
RA PRESSURE: 3 MMHG
RA WIDTH: 3.73 CM
RIGHT VENTRICULAR END-DIASTOLIC DIMENSION: 3.49 CM
SINUS: 2.71 CM
STJ: 2.27 CM
STRESS ECHO POST EXERCISE DUR MIN: 6 MINUTES
SYSTOLIC BLOOD PRESSURE: 136 MMHG
TDI LATERAL: 0.1 M/S
TDI SEPTAL: 0.09 M/S
TDI: 0.1 M/S

## 2020-06-08 ENCOUNTER — TELEPHONE (OUTPATIENT)
Dept: CARDIOLOGY | Facility: HOSPITAL | Age: 60
End: 2020-06-08

## 2020-06-08 NOTE — TELEPHONE ENCOUNTER
Spoke with patient to advise her that she passed her stress test.  No blockages noted.  Echo shows normal heart strength and function.  F/U appointment scheduled for 9/14/2020.  Denies questions/concerns.

## 2020-06-08 NOTE — TELEPHONE ENCOUNTER
PLEASE CALL PT.  SHE PASSED HER STRESS TEST.  NO BLOCKAGES NOTED.  ECHO SHOWED NORMAL HEART STRENGTH/FUNCTION.  CANCEL UPCOMING APPT WITH DR MIX.  SCHEDULE CLINIC APPT WITH ME IN 3 MONTHS.    DR BAE

## 2020-06-09 ENCOUNTER — OFFICE VISIT (OUTPATIENT)
Dept: INTERNAL MEDICINE | Facility: CLINIC | Age: 60
End: 2020-06-09
Payer: COMMERCIAL

## 2020-06-09 ENCOUNTER — HOSPITAL ENCOUNTER (OUTPATIENT)
Dept: RADIOLOGY | Facility: HOSPITAL | Age: 60
Discharge: HOME | End: 2020-06-09
Attending: FAMILY MEDICINE
Payer: COMMERCIAL

## 2020-06-09 VITALS
DIASTOLIC BLOOD PRESSURE: 92 MMHG | HEART RATE: 78 BPM | WEIGHT: 198.63 LBS | TEMPERATURE: 98 F | RESPIRATION RATE: 16 BRPM | HEIGHT: 63 IN | OXYGEN SATURATION: 99 % | SYSTOLIC BLOOD PRESSURE: 136 MMHG | BODY MASS INDEX: 35.2 KG/M2

## 2020-06-09 DIAGNOSIS — I10 ESSENTIAL HYPERTENSION: Chronic | ICD-10-CM

## 2020-06-09 DIAGNOSIS — E78.2 MIXED HYPERLIPIDEMIA: Chronic | ICD-10-CM

## 2020-06-09 DIAGNOSIS — M25.572 ACUTE LEFT ANKLE PAIN: ICD-10-CM

## 2020-06-09 DIAGNOSIS — M17.0 PRIMARY OSTEOARTHRITIS OF BOTH KNEES: ICD-10-CM

## 2020-06-09 DIAGNOSIS — M25.572 ACUTE LEFT ANKLE PAIN: Primary | ICD-10-CM

## 2020-06-09 DIAGNOSIS — E66.01 SEVERE OBESITY (BMI 35.0-35.9 WITH COMORBIDITY): ICD-10-CM

## 2020-06-09 PROCEDURE — 73610 X-RAY EXAM OF ANKLE: CPT | Mod: TC,LT

## 2020-06-09 PROCEDURE — 99999 PR PBB SHADOW E&M-EST. PATIENT-LVL III: ICD-10-PCS | Mod: PBBFAC,,, | Performed by: FAMILY MEDICINE

## 2020-06-09 PROCEDURE — 99214 OFFICE O/P EST MOD 30 MIN: CPT | Mod: S$GLB,,, | Performed by: FAMILY MEDICINE

## 2020-06-09 PROCEDURE — 3080F PR MOST RECENT DIASTOLIC BLOOD PRESSURE >= 90 MM HG: ICD-10-PCS | Mod: CPTII,S$GLB,, | Performed by: FAMILY MEDICINE

## 2020-06-09 PROCEDURE — 99999 PR PBB SHADOW E&M-EST. PATIENT-LVL III: CPT | Mod: PBBFAC,,, | Performed by: FAMILY MEDICINE

## 2020-06-09 PROCEDURE — 73610 XR ANKLE COMPLETE 3 VIEW LEFT: ICD-10-PCS | Mod: 26,LT,, | Performed by: RADIOLOGY

## 2020-06-09 PROCEDURE — 3008F BODY MASS INDEX DOCD: CPT | Mod: CPTII,S$GLB,, | Performed by: FAMILY MEDICINE

## 2020-06-09 PROCEDURE — 99214 PR OFFICE/OUTPT VISIT, EST, LEVL IV, 30-39 MIN: ICD-10-PCS | Mod: S$GLB,,, | Performed by: FAMILY MEDICINE

## 2020-06-09 PROCEDURE — 3075F SYST BP GE 130 - 139MM HG: CPT | Mod: CPTII,S$GLB,, | Performed by: FAMILY MEDICINE

## 2020-06-09 PROCEDURE — 3080F DIAST BP >= 90 MM HG: CPT | Mod: CPTII,S$GLB,, | Performed by: FAMILY MEDICINE

## 2020-06-09 PROCEDURE — 73610 X-RAY EXAM OF ANKLE: CPT | Mod: 26,LT,, | Performed by: RADIOLOGY

## 2020-06-09 PROCEDURE — 3008F PR BODY MASS INDEX (BMI) DOCUMENTED: ICD-10-PCS | Mod: CPTII,S$GLB,, | Performed by: FAMILY MEDICINE

## 2020-06-09 PROCEDURE — 3075F PR MOST RECENT SYSTOLIC BLOOD PRESS GE 130-139MM HG: ICD-10-PCS | Mod: CPTII,S$GLB,, | Performed by: FAMILY MEDICINE

## 2020-06-09 RX ORDER — ROSUVASTATIN CALCIUM 40 MG/1
40 TABLET, COATED ORAL DAILY
Qty: 90 TABLET | Refills: 0 | Status: SHIPPED | OUTPATIENT
Start: 2020-06-09 | End: 2022-01-11 | Stop reason: SDUPTHER

## 2020-06-09 RX ORDER — MELOXICAM 15 MG/1
15 TABLET ORAL DAILY PRN
Qty: 30 TABLET | Refills: 0 | Status: SHIPPED | OUTPATIENT
Start: 2020-06-09 | End: 2021-01-01 | Stop reason: SDUPTHER

## 2020-06-09 NOTE — PROGRESS NOTES
"Subjective:       Patient ID: Ora Henderson is a 60 y.o. female.    Chief Complaint: Foot Pain    60-year-old  female patient with Patient Active Problem List:     DJD (degenerative joint disease), cervical-mild     Hyperlipidemia     Hepatomegaly     Family history of cardiovascular disease     GERD (gastroesophageal reflux disease)     Suspected sleep apnea     History of benign pituitary tumor     Hx of colonic polyp     Essential hypertension     Osteoarthritis of spine with radiculopathy, lumbar region     Severe obesity (BMI 35.0-35.9 with comorbidity)     Abnormal ECG  Here with complaint of left ankle/foot pain for the past 2 weeks, up to 7 to 8/10, denies any injury or trauma falls lately.  Patient had a fall at [facility]'s couple of months ago for which she had x-rays to bilateral knees showing arthritis.  Has been taking over-the-counter Tylenol/ibuprofen with some relief.  Denies any chest pain or difficulty breathing, but has been having swelling to the left foot.     Review of Systems   Constitutional: Negative for fatigue.   Eyes: Negative for visual disturbance.   Respiratory: Negative for shortness of breath.    Cardiovascular: Negative for chest pain and leg swelling.   Gastrointestinal: Negative for abdominal pain, nausea and vomiting.   Musculoskeletal: Positive for arthralgias and joint swelling. Negative for gait problem and myalgias.   Skin: Negative for rash.   Neurological: Negative for weakness, light-headedness, numbness and headaches.   Psychiatric/Behavioral: Negative for sleep disturbance.         BP (!) 136/92 (BP Location: Right arm, Patient Position: Sitting, BP Method: Large (Manual))   Pulse 78   Temp 97.6 °F (36.4 °C) (Tympanic)   Resp 16   Ht 5' 3" (1.6 m)   Wt 90.1 kg (198 lb 10.2 oz)   SpO2 99%   BMI 35.19 kg/m²   Objective:      Physical Exam   Constitutional: She is oriented to person, place, and time. She appears well-developed and " well-nourished.   HENT:   Head: Normocephalic and atraumatic.   Mouth/Throat: Oropharynx is clear and moist.   Cardiovascular: Normal rate, regular rhythm and normal heart sounds.   No murmur heard.  Pulmonary/Chest: Effort normal and breath sounds normal. She has no wheezes.   Abdominal: Soft. Bowel sounds are normal. There is no tenderness.   Musculoskeletal: She exhibits edema and tenderness.   Positive for swelling to the left lower leg with tenderness noted to the dorsum of the left foot close to the ankle, with restricted range of motion with flexion and abduction    Positive for tenderness to the left knee anteriorly and medially   Neurological: She is alert and oriented to person, place, and time.   Skin: Skin is warm and dry. No rash noted. No erythema.   Psychiatric: She has a normal mood and affect.         Assessment/Plan:   1. Acute left ankle pain  - meloxicam (MOBIC) 15 MG tablet; Take 1 tablet (15 mg total) by mouth daily as needed.  Dispense: 30 tablet; Refill: 0  - X-Ray Ankle Complete 3 View Left; Future  Will get x-ray of the left ankle to look into further etiology  Could be secondary to sprain  Meloxicam prescribed today for symptomatic relief and advised to avoid taking over-the-counter NSAIDs while taking meloxicam  Patient was advised to try ankle brace for symptomatic relief    2. Primary osteoarthritis of both knees  - meloxicam (MOBIC) 15 MG tablet; Take 1 tablet (15 mg total) by mouth daily as needed.  Dispense: 30 tablet; Refill: 0  Reviewed x-rays of bilateral knees showing mild arthritis changes    3. Essential hypertension  Blood pressure elevated today but did not take her medications this morning, reports stable blood pressures otherwise  Currently taking losartan hydrochlorothiazide 100/25 mg and amlodipine 2.5 mg daily    4. Severe obesity (BMI 35.0-35.9 with comorbidity)  Strict lifestyle changes recommended with diet and exercise to lose weight with BMI 35    5. Mixed  hyperlipidemia  - rosuvastatin (CRESTOR) 40 MG Tab; Take 1 tablet (40 mg total) by mouth once daily.  Dispense: 90 tablet; Refill: 0  Refill given on Crestor 40 mg as requested

## 2020-06-11 ENCOUNTER — PATIENT OUTREACH (OUTPATIENT)
Dept: OTHER | Facility: OTHER | Age: 60
End: 2020-06-11

## 2020-06-29 ENCOUNTER — TELEPHONE (OUTPATIENT)
Dept: INTERNAL MEDICINE | Facility: CLINIC | Age: 60
End: 2020-06-29

## 2020-06-29 DIAGNOSIS — Z12.31 ENCOUNTER FOR SCREENING MAMMOGRAM FOR MALIGNANT NEOPLASM OF BREAST: Primary | ICD-10-CM

## 2020-06-29 NOTE — TELEPHONE ENCOUNTER
----- Message from Lisbeth Angeles sent at 6/29/2020  2:47 PM CDT -----  Contact: Pt  Pt is calling to have orders for mammo placed in system and can be reached at [phone]//thanks/dbw

## 2020-06-29 NOTE — TELEPHONE ENCOUNTER
----- Message from Lisbeth Angeles sent at 6/29/2020  2:47 PM CDT -----  Contact: Pt  Pt is calling to have orders for mammo placed in system and can be reached at [phone]//thanks/dbw

## 2020-07-02 ENCOUNTER — PATIENT OUTREACH (OUTPATIENT)
Dept: OTHER | Facility: OTHER | Age: 60
End: 2020-07-02

## 2020-07-02 DIAGNOSIS — I10 ESSENTIAL HYPERTENSION: ICD-10-CM

## 2020-08-10 ENCOUNTER — OFFICE VISIT (OUTPATIENT)
Dept: PODIATRY | Facility: CLINIC | Age: 60
End: 2020-08-10
Payer: COMMERCIAL

## 2020-08-10 ENCOUNTER — HOSPITAL ENCOUNTER (OUTPATIENT)
Dept: RADIOLOGY | Facility: HOSPITAL | Age: 60
Discharge: HOME | End: 2020-08-10
Attending: FAMILY MEDICINE
Payer: COMMERCIAL

## 2020-08-10 VITALS
WEIGHT: 198.63 LBS | BODY MASS INDEX: 35.2 KG/M2 | DIASTOLIC BLOOD PRESSURE: 88 MMHG | HEART RATE: 82 BPM | HEIGHT: 63 IN | SYSTOLIC BLOOD PRESSURE: 137 MMHG

## 2020-08-10 DIAGNOSIS — Z12.31 ENCOUNTER FOR SCREENING MAMMOGRAM FOR MALIGNANT NEOPLASM OF BREAST: ICD-10-CM

## 2020-08-10 DIAGNOSIS — M76.62 ACHILLES TENDINITIS OF LEFT LOWER EXTREMITY: ICD-10-CM

## 2020-08-10 DIAGNOSIS — M72.2 PLANTAR FASCIITIS: Primary | ICD-10-CM

## 2020-08-10 DIAGNOSIS — M24.573 EQUINUS CONTRACTURE OF ANKLE: ICD-10-CM

## 2020-08-10 PROCEDURE — 3075F SYST BP GE 130 - 139MM HG: CPT | Mod: CPTII,S$GLB,, | Performed by: PODIATRIST

## 2020-08-10 PROCEDURE — 99999 PR PBB SHADOW E&M-EST. PATIENT-LVL IV: CPT | Mod: PBBFAC,,, | Performed by: PODIATRIST

## 2020-08-10 PROCEDURE — 77067 SCR MAMMO BI INCL CAD: CPT | Mod: TC

## 2020-08-10 PROCEDURE — 77063 BREAST TOMOSYNTHESIS BI: CPT | Mod: 26,,, | Performed by: RADIOLOGY

## 2020-08-10 PROCEDURE — 99203 OFFICE O/P NEW LOW 30 MIN: CPT | Mod: S$GLB,,, | Performed by: PODIATRIST

## 2020-08-10 PROCEDURE — 77067 SCR MAMMO BI INCL CAD: CPT | Mod: 26,,, | Performed by: RADIOLOGY

## 2020-08-10 PROCEDURE — 77067 MAMMO DIGITAL SCREENING BILAT WITH TOMOSYNTHESIS_CAD: ICD-10-PCS | Mod: 26,,, | Performed by: RADIOLOGY

## 2020-08-10 PROCEDURE — 3008F BODY MASS INDEX DOCD: CPT | Mod: CPTII,S$GLB,, | Performed by: PODIATRIST

## 2020-08-10 PROCEDURE — 3079F PR MOST RECENT DIASTOLIC BLOOD PRESSURE 80-89 MM HG: ICD-10-PCS | Mod: CPTII,S$GLB,, | Performed by: PODIATRIST

## 2020-08-10 PROCEDURE — 3008F PR BODY MASS INDEX (BMI) DOCUMENTED: ICD-10-PCS | Mod: CPTII,S$GLB,, | Performed by: PODIATRIST

## 2020-08-10 PROCEDURE — 99999 PR PBB SHADOW E&M-EST. PATIENT-LVL IV: ICD-10-PCS | Mod: PBBFAC,,, | Performed by: PODIATRIST

## 2020-08-10 PROCEDURE — 77063 MAMMO DIGITAL SCREENING BILAT WITH TOMOSYNTHESIS_CAD: ICD-10-PCS | Mod: 26,,, | Performed by: RADIOLOGY

## 2020-08-10 PROCEDURE — 3079F DIAST BP 80-89 MM HG: CPT | Mod: CPTII,S$GLB,, | Performed by: PODIATRIST

## 2020-08-10 PROCEDURE — 3075F PR MOST RECENT SYSTOLIC BLOOD PRESS GE 130-139MM HG: ICD-10-PCS | Mod: CPTII,S$GLB,, | Performed by: PODIATRIST

## 2020-08-10 PROCEDURE — 99203 PR OFFICE/OUTPT VISIT, NEW, LEVL III, 30-44 MIN: ICD-10-PCS | Mod: S$GLB,,, | Performed by: PODIATRIST

## 2020-08-17 NOTE — PROGRESS NOTES
Subjective:     Patient ID: Ora Henderson is a 60 y.o. female.    Chief Complaint: Foot Pain (L foot arch/achilles pain. c/o no pain. Non diabetic pt. Wears casual wedges. PCP Dr Montes.)    Ora is a 60 y.o. female who presents to the clinic complaining of heel pain in the left foot, especially with the first step in the morning. The pain is described as Aching, Tight and Deep. The onset of the pain was gradual and has worsened over the past several weeks. Ora rates the pain as 4/10 when present. She denies a history of trauma. Prior treatments include none.     Patient Active Problem List   Diagnosis    DJD (degenerative joint disease), cervical-mild    Hyperlipidemia    Hepatomegaly    Family history of cardiovascular disease    GERD (gastroesophageal reflux disease)    Suspected sleep apnea    History of benign pituitary tumor    Hx of colonic polyp    Essential hypertension    Osteoarthritis of spine with radiculopathy, lumbar region    Severe obesity (BMI 35.0-35.9 with comorbidity)    Abnormal ECG       Medication List with Changes/Refills   Current Medications    AMLODIPINE (NORVASC) 2.5 MG TABLET    Take 1 tablet (2.5 mg total) by mouth once daily.    ASPIRIN 81 MG CHEW    Take 81 mg by mouth as needed. Hold 5 days prior to surgery    ESOMEPRAZOLE (NEXIUM) 40 MG CAPSULE    Take 1 capsule (40 mg total) by mouth before breakfast.    FLUTICASONE PROPIONATE (FLONASE) 50 MCG/ACTUATION NASAL SPRAY    2 sprays (100 mcg total) by Each Nostril route once daily.    L.ACIDOPHILUS-BIF. ANIMALIS (PROBIOTIC) 5 BILLION CELL CPSP    Take 1 tablet by mouth once daily.    LOSARTAN-HYDROCHLOROTHIAZIDE 100-25 MG (HYZAAR) 100-25 MG PER TABLET    Take 1 tablet by mouth once daily.    MELOXICAM (MOBIC) 15 MG TABLET    Take 1 tablet (15 mg total) by mouth daily as needed.    MONTELUKAST (SINGULAIR) 10 MG TABLET    Take 1 tablet (10 mg total) by mouth nightly.    MULTIVIT,CALC,MINS/IRON/FOLIC  (DAILY MULTIPLE FOR WOMEN ORAL)    Take 1 tablet by mouth once daily. Hold 5 days prior to surgery    NYSTATIN (MYCOSTATIN) OINTMENT    Apply topically 2 (two) times daily. Apply to rash under both the breasts    OMEGA-3 FATTY ACIDS/FISH OIL (FISH OIL-OMEGA-3 FATTY ACIDS) 300-1,000 MG CAPSULE    Take by mouth once daily. Hold 5 days prior to surgery    ROSUVASTATIN (CRESTOR) 40 MG TAB    Take 1 tablet (40 mg total) by mouth once daily.       Review of patient's allergies indicates:   Allergen Reactions    Sulfa (sulfonamide antibiotics) Hives       Past Surgical History:   Procedure Laterality Date    ARTHROSCOPIC DEBRIDEMENT OF SHOULDER Right 2019    Procedure: DEBRIDEMENT, SHOULDER, ARTHROSCOPIC;  Surgeon: Laureano Cox MD;  Location: [facility] OR;  Service: Orthopedics;  Laterality: Right;    ARTHROSCOPY OF SHOULDER WITH DECOMPRESSION OF SUBACROMIAL SPACE Right 2019    Procedure: ARTHROSCOPY, SHOULDER, WITH SUBACROMIAL SPACE DECOMPRESSION;  Surgeon: Laureano Cox MD;  Location: [facility] OR;  Service: Orthopedics;  Laterality: Right;    BICEPS TENDON REPAIR Right 2019    Procedure: REPAIR, TENDON, BICEPS;  Surgeon: Laureano Cox MD;  Location: [facility] OR;  Service: Orthopedics;  Laterality: Right;  arthroscopic Biceps tenodesis    BRAIN SURGERY      Pituitary tumor removal 2001 x 2     CARPAL TUNNEL RELEASE Bilateral     x 2    CERVICAL BIOPSY  W/ LOOP ELECTRODE EXCISION      [facility] '81     SECTION      x 1    COLONOSCOPY N/A 2016    Procedure: COLONOSCOPY;  Surgeon: Quique Paul MD;  Location[facility] ENDO;  Service: Endoscopy;  Laterality: N/A;    DISTAL CLAVICLE EXCISION Right 2019    Procedure: EXCISION, CLAVICLE, DISTAL;  Surgeon: Laureano Cox MD;  Location: [facility] OR;  Service: Orthopedics;  Laterality: Right;  arthroscopic    GANGLION CYST EXCISION Left 2018    HYSTERECTOMY      REFRACTIVE SURGERY      ROTATOR CUFF REPAIR Right  12/30/2019    Procedure: REPAIR, ROTATOR CUFF;  Surgeon: Laureano Cox MD;  Location: [facility] OR;  Service: Orthopedics;  Laterality: Right;  arthroscopic    SYNOVECTOMY OF SHOULDER Right 12/30/2019    Procedure: SYNOVECTOMY, SHOULDER;  Surgeon: Laureano Cox MD;  Location: [facility] OR;  Service: Orthopedics;  Laterality: Right;  arthroscopic    TOTAL ABDOMINAL HYSTERECTOMY W/ BILATERAL SALPINGOOPHORECTOMY  2006    STAR/BSO secondary to endometriosis    VENTRAL HERNIA REPAIR  2016       Family History   Problem Relation Age of Onset    Hypertension Father     Prostate cancer Father     Hypertension Mother     Cancer Maternal Aunt         pancreas    Cancer Maternal Uncle         pancreas    Heart disease Paternal Uncle     Heart disease Paternal Grandmother     Diabetes Maternal Aunt     Heart attack Sister 30    Heart attack Brother 32       Social History     Socioeconomic History    Marital status:      Spouse name: Not on file    Number of children: 1    Years of education: Not on file    Highest education level: Not on file   Occupational History    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Employer: [employer]   Social Needs    Financial resource strain: Somewhat hard    Food insecurity     Worry: Sometimes true     Inability: Sometimes true    Transportation needs     Medical: No     Non-medical: No   Tobacco Use    Smoking status: Never Smoker    Smokeless tobacco: Never Used   Substance and Sexual Activity    Alcohol use: Yes     Alcohol/week: 0.0 standard drinks     Frequency: Monthly or less     Drinks per session: 1 or 2     Binge frequency: Less than monthly     Comment: socially    Drug use: No    Sexual activity: Yes     Partners: Male     Birth control/protection: None, Surgical   Lifestyle    Physical activity     Days per week: 6 days     Minutes per session: 60 min    Stress: Only a little   Relationships    Social connections     Talks on phone: More  "than three times a week     Gets together: Once a week     Attends Mosque service: Not on file     Active member of club or organization: No     Attends meetings of clubs or organizations: Never     Relationship status:    Other Topics Concern    Not on file   Social History Narrative    Not on file       Vitals:    08/10/20 0904   BP: 137/88   Pulse: 82   Weight: 90.1 kg (198 lb 10.2 oz)   Height: 5' 3" (1.6 m)   PainSc: 0-No pain   PainLoc: Foot           Review of Systems   Constitutional: Negative for chills and fever.   Respiratory: Negative for shortness of breath.    Cardiovascular: Negative for chest pain, palpitations, orthopnea, claudication and leg swelling.   Gastrointestinal: Negative for diarrhea, nausea and vomiting.   Musculoskeletal: Negative for joint pain.   Skin: Negative for rash.   Neurological: Negative for dizziness, tingling, sensory change, focal weakness and weakness.   Psychiatric/Behavioral: Negative.              Objective:   PHYSICAL EXAM: Apperance: Alert and orient in no distress,well developed, and with good attention to grooming and body habits  Patient presents ambulating in wedges.  Lower Extremity Exam  VASCULAR: Dorsalis pedis pulses 2/4 bilateral and Posterior Tibial pulses 2/4 bilateral. Capillary fill time <3 seconds bilateral. No edema observed bilateral. Varicosities absent bilateral. Skin temperature of the lower extremities is warm to warm, proximal to distal. Hair growth WNL bilateral.  DERMATOLOGICAL: No skin rashes, subcutaneous nodules, lesions, or ulcers observed bilateral.   NEUROLOGICAL: Light touch, sharp-dull, proprioception all present and equal bilaterally.    MUSCULOSKELETAL: Muscle strength 5/5 for all foot inverters, everters, plantarflexors, and dorsiflexors bilateral. Ankle joints bilateral shows decreased ROM. bilateral ankle ROM shows greater decrease in dorsiflexion with knee extended. Ankle joint ROM is pain free and without crepitus " bilateral. Pain to palpation left plantar medial tubercle. Plantar medial aspect of left heels shows tenderness to palpation. No pain on medial-lateral compression of the calcaneus.         Assessment:   The following prescriptions/orders were written today per listed diagnosis  Plantar fasciitis - Left Foot    Achilles tendinitis of left lower extremity - Left Foot    Equinus contracture of ankle          Plan:   Plantar fasciitis - Left Foot    Achilles tendinitis of left lower extremity - Left Foot    Equinus contracture of ankle      I counseled the patient on her conditions, regarding findings of my examination, my impressions, and usual treatment plan.   I explained to the patient that etiology and treatment options for heel pain including rest,  ice messages, stretching exercises, strappings/tappings, NSAID's, injections, new shoegear with orthotic inserts, and/or surgical treatment.   Patient agreed to stretching exercises and shoe adjustments.   I gave written and verbal instructions on heel cord stretching and this was demonstrated for the patient. Patient expressed understanding.  Patient instructed on adequate icing techniques. Patient should ice the affected area at least once per day x 10 minutes for 10 days . I advised the  patient that extra icing would also be beneficial to ensure adequate anti inflammatory effect.   The patient and I reviewed the types of shoes she should be wearing, my recommendation includes generally the best time of the day for a shoe fitting is the afternoon, shoes with a wide toe box, very good cushion, and tennis shoes with removable inner soles. The patient and I reviewed my recommendations for over-the-counter orthotic inserts.   Patient to return in 6-8 weeks or sooner if needed.                   Lisandra Garcia DPM  [facility]

## 2020-09-03 DIAGNOSIS — B37.9 YEAST INFECTION: Primary | ICD-10-CM

## 2020-09-03 RX ORDER — FLUCONAZOLE 150 MG/1
150 TABLET ORAL DAILY
Qty: 1 TABLET | Refills: 0 | Status: SHIPPED | OUTPATIENT
Start: 2020-09-03 | End: 2020-09-04

## 2020-09-04 DIAGNOSIS — Z13.79 GENETIC SCREENING: ICD-10-CM

## 2020-09-14 ENCOUNTER — OFFICE VISIT (OUTPATIENT)
Dept: CARDIOLOGY | Facility: CLINIC | Age: 60
End: 2020-09-14
Payer: COMMERCIAL

## 2020-09-14 VITALS
WEIGHT: 197.06 LBS | BODY MASS INDEX: 34.91 KG/M2 | DIASTOLIC BLOOD PRESSURE: 76 MMHG | HEART RATE: 94 BPM | OXYGEN SATURATION: 98 % | SYSTOLIC BLOOD PRESSURE: 138 MMHG

## 2020-09-14 DIAGNOSIS — E78.2 MIXED HYPERLIPIDEMIA: Chronic | ICD-10-CM

## 2020-09-14 DIAGNOSIS — G47.33 OSA (OBSTRUCTIVE SLEEP APNEA): ICD-10-CM

## 2020-09-14 DIAGNOSIS — E66.09 CLASS 1 OBESITY DUE TO EXCESS CALORIES WITHOUT SERIOUS COMORBIDITY WITH BODY MASS INDEX (BMI) OF 34.0 TO 34.9 IN ADULT: ICD-10-CM

## 2020-09-14 DIAGNOSIS — Z82.49 FAMILY HISTORY OF CARDIOVASCULAR DISEASE: ICD-10-CM

## 2020-09-14 DIAGNOSIS — R94.31 ABNORMAL ECG: ICD-10-CM

## 2020-09-14 DIAGNOSIS — I10 ESSENTIAL HYPERTENSION: Primary | Chronic | ICD-10-CM

## 2020-09-14 PROBLEM — E66.01 SEVERE OBESITY (BMI 35.0-35.9 WITH COMORBIDITY): Status: RESOLVED | Noted: 2018-11-12 | Resolved: 2020-09-14

## 2020-09-14 PROBLEM — E66.811 CLASS 1 OBESITY DUE TO EXCESS CALORIES WITHOUT SERIOUS COMORBIDITY WITH BODY MASS INDEX (BMI) OF 34.0 TO 34.9 IN ADULT: Status: ACTIVE | Noted: 2020-09-14

## 2020-09-14 PROCEDURE — 3078F DIAST BP <80 MM HG: CPT | Mod: CPTII,S$GLB,, | Performed by: INTERNAL MEDICINE

## 2020-09-14 PROCEDURE — 3075F SYST BP GE 130 - 139MM HG: CPT | Mod: CPTII,S$GLB,, | Performed by: INTERNAL MEDICINE

## 2020-09-14 PROCEDURE — 3075F PR MOST RECENT SYSTOLIC BLOOD PRESS GE 130-139MM HG: ICD-10-PCS | Mod: CPTII,S$GLB,, | Performed by: INTERNAL MEDICINE

## 2020-09-14 PROCEDURE — 3008F PR BODY MASS INDEX (BMI) DOCUMENTED: ICD-10-PCS | Mod: CPTII,S$GLB,, | Performed by: INTERNAL MEDICINE

## 2020-09-14 PROCEDURE — 99999 PR PBB SHADOW E&M-EST. PATIENT-LVL III: ICD-10-PCS | Mod: PBBFAC,,, | Performed by: INTERNAL MEDICINE

## 2020-09-14 PROCEDURE — 99214 PR OFFICE/OUTPT VISIT, EST, LEVL IV, 30-39 MIN: ICD-10-PCS | Mod: S$GLB,,, | Performed by: INTERNAL MEDICINE

## 2020-09-14 PROCEDURE — 3078F PR MOST RECENT DIASTOLIC BLOOD PRESSURE < 80 MM HG: ICD-10-PCS | Mod: CPTII,S$GLB,, | Performed by: INTERNAL MEDICINE

## 2020-09-14 PROCEDURE — 99999 PR PBB SHADOW E&M-EST. PATIENT-LVL III: CPT | Mod: PBBFAC,,, | Performed by: INTERNAL MEDICINE

## 2020-09-14 PROCEDURE — 99214 OFFICE O/P EST MOD 30 MIN: CPT | Mod: S$GLB,,, | Performed by: INTERNAL MEDICINE

## 2020-09-14 PROCEDURE — 3008F BODY MASS INDEX DOCD: CPT | Mod: CPTII,S$GLB,, | Performed by: INTERNAL MEDICINE

## 2020-09-14 NOTE — PROGRESS NOTES
Subjective:    Patient ID:  Ora Henderson is a 60 y.o. female who presents for evaluation of Hyperlipidemia, Hypertension, and Risk Factor Management For Atherosclerosis        HPI pt presents for f/u.  Her current medical conditions include HTN, hyperlipidemia, NATALIE, obesity. Nonsmoker.   She has family h/o cad. Sisters x 2  of MIs. Brother  of MI. Father  of MI 42.   Past hx pertinent for following:  h/o LHC about 20 years ago, no specific findings.   H/o chronic abnormal ecgs showing nonspecific ST-T abnl.  Stress MPI 3/17 showed no ischemia.  Echo 3/17 showed normal LV function.  ecg  NSR, low precordial R waves, no acute changes.   ecg 19 NSR, nonspecific st-t abnl.   Did not tolerate CPAP in past.  Now here.  Stress echo 2020 no ischemia, normal LV function.  Lipids 3/20 LDL 82, on statin tx.  She is exercising daily.  She states she has lost weight.  HTN controlled.   She states it is higher in clinic.  Enrolled in digital HTN program.   Compliant with meds.       Current Outpatient Medications   Medication Instructions    amLODIPine (NORVASC) 2.5 mg, Oral, Daily    aspirin 81 mg, Oral, As needed (PRN), Hold 5 days prior to surgery    esomeprazole (NEXIUM) 40 mg, Oral, Before breakfast    fluticasone propionate (FLONASE) 100 mcg, Each Nostril, Daily    L.acidophilus-Bif. animalis (PROBIOTIC) 5 billion cell CpSP 1 tablet, Oral, Daily    losartan-hydrochlorothiazide 100-25 mg (HYZAAR) 100-25 mg per tablet 1 tablet, Oral, Daily    meloxicam (MOBIC) 15 mg, Oral, Daily PRN    montelukast (SINGULAIR) 10 mg, Oral, Nightly    MULTIVIT,CALC,MINS/IRON/FOLIC (DAILY MULTIPLE FOR WOMEN ORAL) 1 tablet, Oral, Daily, Hold 5 days prior to surgery    nystatin (MYCOSTATIN) ointment Topical (Top), 2 times daily, Apply to rash under both the breasts    omega-3 fatty acids/fish oil (FISH OIL-OMEGA-3 FATTY ACIDS) 300-1,000 mg capsule Oral, Daily, Hold 5 days prior to surgery      rosuvastatin (CRESTOR) 40 mg, Oral, Daily         Review of Systems   Constitution: Positive for weight loss.   HENT: Negative.    Eyes: Negative.    Cardiovascular: Negative.    Respiratory: Negative.    Endocrine: Negative.    Hematologic/Lymphatic: Negative.    Skin: Negative.    Musculoskeletal: Positive for arthritis.   Gastrointestinal: Negative.    Genitourinary: Negative.    Neurological: Negative.    Psychiatric/Behavioral: Negative.    Allergic/Immunologic: Negative.        /76 (BP Location: Left arm, Patient Position: Sitting, BP Method: Medium (Manual))   Pulse 94   Wt 89.4 kg (197 lb 1.5 oz)   SpO2 98%   BMI 34.91 kg/m²     Wt Readings from Last 3 Encounters:   09/14/20 89.4 kg (197 lb 1.5 oz)   08/10/20 90.1 kg (198 lb 10.2 oz)   06/09/20 90.1 kg (198 lb 10.2 oz)     Temp Readings from Last 3 Encounters:   06/09/20 97.6 °F (36.4 °C) (Tympanic)   05/06/20 97.5 °F (36.4 °C) (Tympanic)   03/17/20 98.1 °F (36.7 °C) (Tympanic)     BP Readings from Last 3 Encounters:   09/14/20 138/76   08/10/20 137/88   06/09/20 (!) 136/92     Pulse Readings from Last 3 Encounters:   09/14/20 94   08/10/20 82   06/09/20 78          Objective:    Physical Exam   Constitutional: She is oriented to person, place, and time. Vital signs are normal. She appears well-developed and well-nourished. She is active and cooperative. She does not have a sickly appearance. She does not appear ill. No distress.   HENT:   Head: Normocephalic.   Neck: Neck supple. Normal carotid pulses, no hepatojugular reflux and no JVD present. Carotid bruit is not present. No thyromegaly present.   Cardiovascular: Normal rate, regular rhythm, S1 normal, S2 normal, normal heart sounds and normal pulses. PMI is not displaced. Exam reveals no gallop and no friction rub.   No murmur heard.  Pulses:       Radial pulses are 2+ on the right side and 2+ on the left side.   Pulmonary/Chest: Effort normal and breath sounds normal. She has no wheezes.  She has no rales.   Abdominal: Soft. Normal appearance, normal aorta and bowel sounds are normal. She exhibits no pulsatile liver, no abdominal bruit, no ascites and no mass. There is no splenomegaly or hepatomegaly. There is no abdominal tenderness.   obese   Musculoskeletal:         General: No edema.   Lymphadenopathy:     She has no cervical adenopathy.   Neurological: She is alert and oriented to person, place, and time.   Skin: Skin is warm. She is not diaphoretic.   Psychiatric: She has a normal mood and affect. Her behavior is normal.   Nursing note and vitals reviewed.      I have reviewed all pertinent labs and cardiac studies.      Chemistry        Component Value Date/Time     03/04/2020 0850    K 4.0 03/04/2020 0850     03/04/2020 0850    CO2 29 03/04/2020 0850    BUN 11 03/04/2020 0850    CREATININE 0.9 03/04/2020 0850    GLU 94 03/04/2020 0850        Component Value Date/Time    CALCIUM 9.8 03/04/2020 0850    ALKPHOS 77 03/04/2020 0850    AST 16 03/04/2020 0850    ALT 15 03/04/2020 0850    BILITOT 0.5 03/04/2020 0850    ESTGFRAFRICA >60.0 03/04/2020 0850    EGFRNONAA >60.0 03/04/2020 0850          Lab Results   Component Value Date    WBC 6.33 02/18/2020    HGB 12.4 02/18/2020    HCT 39.4 02/18/2020    MCV 91 02/18/2020     02/18/2020       Lab Results   Component Value Date    HGBA1C 6.0 (H) 02/13/2019     Lab Results   Component Value Date    CHOL 156 03/04/2020    CHOL 154 08/05/2019    CHOL 227 (H) 02/13/2019     Lab Results   Component Value Date    HDL 48 03/04/2020    HDL 49 08/05/2019    HDL 59 02/13/2019     Lab Results   Component Value Date    LDLCALC 82.2 03/04/2020    LDLCALC 82.2 08/05/2019    LDLCALC 126.0 02/13/2019     Lab Results   Component Value Date    TRIG 129 03/04/2020    TRIG 114 08/05/2019    TRIG 210 (H) 02/13/2019     Lab Results   Component Value Date    CHOLHDL 30.8 03/04/2020    CHOLHDL 31.8 08/05/2019    CHOLHDL 26.0 02/13/2019      Conclusion    · Concentric left ventricular remodeling.  · The patient's exercise capacity was normal.  · The ECG portion of this study is negative for myocardial ischemia.  · Normal left ventricular systolic function. The estimated ejection fraction is 60%.  · Normal LV diastolic function.  · Normal right ventricular systolic function.  · Normal central venous pressure (3 mmHg).  · The stress echo portion of this study is negative for myocardial ischemia.      Study Details    A limited echo was performed using limited 2D, color flow Doppler and limited spectral Doppler. During the study, the apical, parasternal, subcostal and suprasternal views were captured.   Echocardiography Findings    Left Ventricle Normal ejection fraction at 60%.  Normal left ventricular cavity size. Concentric remodeling observed. Normal left ventricular diastolic function.   Right Ventricle Normal systolic function.   Left Atrium The left atrium is normal. Left atrial volume index is 26.4 mL/m2.   Right Atrium There is normal right atrial size.   Aortic Valve The aortic valve appears structurally normal. The valve is trileaflet.   Mitral Valve The mitral valve appears structurally normal. Trace regurgitation.   Tricuspid Valve The tricuspid valve appears structurally normal.   Pulmonic Valve The pulmonic valve was not well visualized.   IVC/SVC Normal central venous pressure (3 mm Hg).   Ascending Aorta The aortic root and ascending aorta are normal in size.   ECG/Stress    ECG The baseline electrocardiogram reveals normal sinus rhythm at a rate of 76 bpm. The baseline ECG has normal ST segments.   Stress Findings The patient  for 6 minutes, corresponding to a functional capacity of 7 METS, achieving a peak heart rate of 141 bpm, which is 92% of the age predicted maximum heart rate. The patient reported no symptoms during stress.   The blood pressure response to stress was hypertensive.  The patient's exercise capacity was normal.    The peak rate pressure product was [id number].   ECG Peak The ECG portion of this study was negative for myocardial ischemia. There was no ST segment deviation noted during stress.   There were no arrhythmias during stress.   Post-Stress Echo    Response to Stress Normal wall motion and ventricular cavity response immediately after stress.   Study Impression Wall motion at peak stress is not consistent with myocardial ischemia.           Assessment:       1. Essential hypertension    2. Mixed hyperlipidemia    3. Abnormal ECG    4. Family history of cardiovascular disease    5. Class 1 obesity due to excess calories without serious comorbidity with body mass index (BMI) of 34.0 to 34.9 in adult    6. NATALIE (obstructive sleep apnea)         Plan:               Stable cardiovascular conditions at present time on current medical treatment.  Reviewed all tests and above medical conditions with patient in detail and formulated treatment plan.  Continue optimal medical treatment for cardiovascular conditions.  Cardiac low salt diet discussed.  Daily exercise encouraged, goal 30 +  minutes aerobic exercise as tolerated.  Maintaining healthy weight and weight loss goals (if needed) were discussed in clinic.  F/u w Pulmonary for NATALIE in future if needed, intolerant to CPAP.  Recheck lipids -- phone review.  F/u in 6 months.

## 2020-09-22 ENCOUNTER — LAB VISIT (OUTPATIENT)
Dept: LAB | Facility: HOSPITAL | Age: 60
End: 2020-09-22
Attending: INTERNAL MEDICINE
Payer: COMMERCIAL

## 2020-09-22 DIAGNOSIS — E78.2 MIXED HYPERLIPIDEMIA: Chronic | ICD-10-CM

## 2020-09-22 LAB
ALBUMIN SERPL BCP-MCNC: 3.9 G/DL (ref 3.5–5.2)
ALP SERPL-CCNC: 74 U/L (ref 55–135)
ALT SERPL W/O P-5'-P-CCNC: 17 U/L (ref 10–44)
ANION GAP SERPL CALC-SCNC: 13 MMOL/L (ref 8–16)
AST SERPL-CCNC: 15 U/L (ref 10–40)
BILIRUB SERPL-MCNC: 0.7 MG/DL (ref 0.1–1)
BUN SERPL-MCNC: 14 MG/DL (ref 6–20)
CALCIUM SERPL-MCNC: 9.4 MG/DL (ref 8.7–10.5)
CHLORIDE SERPL-SCNC: 100 MMOL/L (ref 95–110)
CHOLEST SERPL-MCNC: 144 MG/DL (ref 120–199)
CHOLEST/HDLC SERPL: 3 {RATIO} (ref 2–5)
CO2 SERPL-SCNC: 28 MMOL/L (ref 23–29)
CREAT SERPL-MCNC: 1 MG/DL (ref 0.5–1.4)
EST. GFR  (AFRICAN AMERICAN): >60 ML/MIN/1.73 M^2
EST. GFR  (NON AFRICAN AMERICAN): >60 ML/MIN/1.73 M^2
GLUCOSE SERPL-MCNC: 96 MG/DL (ref 70–110)
HDLC SERPL-MCNC: 48 MG/DL (ref 40–75)
HDLC SERPL: 33.3 % (ref 20–50)
LDLC SERPL CALC-MCNC: 72.6 MG/DL (ref 63–159)
NONHDLC SERPL-MCNC: 96 MG/DL
POTASSIUM SERPL-SCNC: 3.4 MMOL/L (ref 3.5–5.1)
PROT SERPL-MCNC: 7.3 G/DL (ref 6–8.4)
SODIUM SERPL-SCNC: 141 MMOL/L (ref 136–145)
TRIGL SERPL-MCNC: 117 MG/DL (ref 30–150)

## 2020-09-22 PROCEDURE — 36415 COLL VENOUS BLD VENIPUNCTURE: CPT | Mod: PO

## 2020-09-22 PROCEDURE — 80061 LIPID PANEL: CPT

## 2020-09-22 PROCEDURE — 80053 COMPREHEN METABOLIC PANEL: CPT

## 2020-09-23 ENCOUNTER — TELEPHONE (OUTPATIENT)
Dept: CARDIOLOGY | Facility: HOSPITAL | Age: 60
End: 2020-09-23

## 2020-09-23 ENCOUNTER — PATIENT MESSAGE (OUTPATIENT)
Dept: CARDIOLOGY | Facility: CLINIC | Age: 60
End: 2020-09-23

## 2020-09-23 NOTE — TELEPHONE ENCOUNTER
I have attempted without success to contact this patient by phone to discuss lab results. Left voicemail to call back.

## 2020-09-23 NOTE — TELEPHONE ENCOUNTER
Please call pt.  Lipids are great.  Potassium 3.4  (normal is 3.5 - 5.0)  Bump up dietary potassium intake (few bananas weekly).  Fu next appt.    Dr Moralez

## 2020-10-08 ENCOUNTER — OFFICE VISIT (OUTPATIENT)
Dept: OBSTETRICS AND GYNECOLOGY | Facility: CLINIC | Age: 60
End: 2020-10-08
Payer: COMMERCIAL

## 2020-10-08 VITALS
BODY MASS INDEX: 34.96 KG/M2 | DIASTOLIC BLOOD PRESSURE: 86 MMHG | HEIGHT: 63 IN | WEIGHT: 197.31 LBS | SYSTOLIC BLOOD PRESSURE: 140 MMHG

## 2020-10-08 DIAGNOSIS — N76.2 ACUTE VULVITIS: ICD-10-CM

## 2020-10-08 DIAGNOSIS — Z01.419 ENCOUNTER FOR GYNECOLOGICAL EXAMINATION WITHOUT ABNORMAL FINDING: Primary | ICD-10-CM

## 2020-10-08 PROCEDURE — 3008F BODY MASS INDEX DOCD: CPT | Mod: CPTII,S$GLB,, | Performed by: NURSE PRACTITIONER

## 2020-10-08 PROCEDURE — 99386 PREV VISIT NEW AGE 40-64: CPT | Mod: S$GLB,,, | Performed by: NURSE PRACTITIONER

## 2020-10-08 PROCEDURE — 3077F PR MOST RECENT SYSTOLIC BLOOD PRESSURE >= 140 MM HG: ICD-10-PCS | Mod: CPTII,S$GLB,, | Performed by: NURSE PRACTITIONER

## 2020-10-08 PROCEDURE — 99999 PR PBB SHADOW E&M-EST. PATIENT-LVL III: ICD-10-PCS | Mod: PBBFAC,,, | Performed by: NURSE PRACTITIONER

## 2020-10-08 PROCEDURE — 3008F PR BODY MASS INDEX (BMI) DOCUMENTED: ICD-10-PCS | Mod: CPTII,S$GLB,, | Performed by: NURSE PRACTITIONER

## 2020-10-08 PROCEDURE — 99386 PR PREVENTIVE VISIT,NEW,40-64: ICD-10-PCS | Mod: S$GLB,,, | Performed by: NURSE PRACTITIONER

## 2020-10-08 PROCEDURE — 3077F SYST BP >= 140 MM HG: CPT | Mod: CPTII,S$GLB,, | Performed by: NURSE PRACTITIONER

## 2020-10-08 PROCEDURE — 99999 PR PBB SHADOW E&M-EST. PATIENT-LVL III: CPT | Mod: PBBFAC,,, | Performed by: NURSE PRACTITIONER

## 2020-10-08 PROCEDURE — 3079F PR MOST RECENT DIASTOLIC BLOOD PRESSURE 80-89 MM HG: ICD-10-PCS | Mod: CPTII,S$GLB,, | Performed by: NURSE PRACTITIONER

## 2020-10-08 PROCEDURE — 3079F DIAST BP 80-89 MM HG: CPT | Mod: CPTII,S$GLB,, | Performed by: NURSE PRACTITIONER

## 2020-10-08 RX ORDER — NYSTATIN 100000 U/G
OINTMENT TOPICAL 2 TIMES DAILY
Qty: 15 G | Refills: 1 | Status: SHIPPED | OUTPATIENT
Start: 2020-10-08 | End: 2021-04-13

## 2020-10-08 RX ORDER — FLUCONAZOLE 150 MG/1
TABLET ORAL
Qty: 2 TABLET | Refills: 1 | Status: SHIPPED | OUTPATIENT
Start: 2020-10-08 | End: 2020-11-27 | Stop reason: SDUPTHER

## 2020-10-08 NOTE — PROGRESS NOTES
CC: Well woman exam    Ora Henderson is a 60 y.o. female  presents for a well woman exam.  No issues, problems, or complaints.    Having some discomfort in vaginal area, itching.     Past Medical History:   Diagnosis Date    Arthritis     Fatty liver     GERD (gastroesophageal reflux disease)     Hernia, umbilical     reducible    Hyperlipidemia     Hypertension     Rotator cuff tear     Sleep apnea      Past Surgical History:   Procedure Laterality Date    ARTHROSCOPIC DEBRIDEMENT OF SHOULDER Right 2019    Procedure: DEBRIDEMENT, SHOULDER, ARTHROSCOPIC;  Surgeon: Laureano Cox MD;  Location: [facility];  Service: Orthopedics;  Laterality: Right;    ARTHROSCOPY OF SHOULDER WITH DECOMPRESSION OF SUBACROMIAL SPACE Right 2019    Procedure: ARTHROSCOPY, SHOULDER, WITH SUBACROMIAL SPACE DECOMPRESSION;  Surgeon: Laureano Cox MD;  Location: [facility] OR;  Service: Orthopedics;  Laterality: Right;    BICEPS TENDON REPAIR Right 2019    Procedure: REPAIR, TENDON, BICEPS;  Surgeon: Laureano Cox MD;  Location: [facility] OR;  Service: Orthopedics;  Laterality: Right;  arthroscopic Biceps tenodesis    BRAIN SURGERY  2001    Pituitary tumor removal 2001 x 2     CARPAL TUNNEL RELEASE Bilateral     x 2    CERVICAL BIOPSY  W/ LOOP ELECTRODE EXCISION      [facility] '81     SECTION      x 1    COLONOSCOPY N/A 2016    Procedure: COLONOSCOPY;  Surgeon: Quique Paul MD;  Location: [facility];  Service: Endoscopy;  Laterality: N/A;    DISTAL CLAVICLE EXCISION Right 2019    Procedure: EXCISION, CLAVICLE, DISTAL;  Surgeon: Laureano Cox MD;  Location: [facility] OR;  Service: Orthopedics;  Laterality: Right;  arthroscopic    GANGLION CYST EXCISION Left 2018    HYSTERECTOMY      REFRACTIVE SURGERY      ROTATOR CUFF REPAIR Right 2019    Procedure: REPAIR, ROTATOR CUFF;  Surgeon: Laureano Cox MD;  Location: [facility];  Service:  "Orthopedics;  Laterality: Right;  arthroscopic    SYNOVECTOMY OF SHOULDER Right 2019    Procedure: SYNOVECTOMY, SHOULDER;  Surgeon: Laureano Cox MD;  Location: [facility] OR;  Service: Orthopedics;  Laterality: Right;  arthroscopic    TOTAL ABDOMINAL HYSTERECTOMY W/ BILATERAL SALPINGOOPHORECTOMY      STAR/BSO secondary to endometriosis    VENTRAL HERNIA REPAIR  2016     Family History   Problem Relation Age of Onset    Hypertension Father     Prostate cancer Father     Hypertension Mother     Cancer Maternal Aunt         pancreas    Cancer Maternal Uncle         pancreas    Heart disease Paternal Uncle     Heart disease Paternal Grandmother     Diabetes Maternal Aunt     Heart attack Sister 30    Heart attack Brother 32     Social History     Tobacco Use    Smoking status: Never Smoker    Smokeless tobacco: Never Used   Substance Use Topics    Alcohol use: Yes     Alcohol/week: 0.0 standard drinks     Frequency: Monthly or less     Drinks per session: 1 or 2     Binge frequency: Less than monthly     Comment: socially    Drug use: No     OB History        2    Para   1    Term                AB   1    Living   1       SAB   1    TAB        Ectopic        Multiple        Live Births   1                 BP (!) 140/86   Ht 5' 3" (1.6 m)   Wt 89.5 kg (197 lb 5 oz)   BMI 34.95 kg/m²     ROS:  GENERAL: Denies weight gain or weight loss. Feeling well overall.   SKIN: Denies rash or lesions.   HEAD: Denies head injury or headache.   NODES: Denies enlarged lymph nodes.   CHEST: Denies chest pain or shortness of breath.   CARDIOVASCULAR: Denies palpitations or left sided chest pain.   ABDOMEN: No abdominal pain, constipation, diarrhea, nausea, vomiting or rectal bleeding.   URINARY: No frequency, dysuria, hematuria, or burning on urination.  REPRODUCTIVE: See HPI.   BREASTS: The patient performs breast self-examination and denies pain, lumps, or nipple discharge.   HEMATOLOGIC: No " easy bruisability or excessive bleeding.   MUSCULOSKELETAL: Denies joint pain or swelling.   NEUROLOGIC: Denies syncope or weakness.   PSYCHIATRIC: Denies depression, anxiety or mood swings.    PE:   APPEARANCE: Well nourished, well developed, in no acute distress.  AFFECT: WNL, alert and oriented x 3.  SKIN: No acne or hirsutism.  NECK: Neck symmetric without masses or thyromegaly.  NODES: No inguinal, cervical, axillary or femoral lymph node enlargement.  CHEST: Good respiratory effort.   ABDOMEN: Soft. No tenderness or masses. No hepatosplenomegaly. No hernias.  BREASTS: Symmetrical, no skin changes or visible lesions. No palpable masses, nipple discharge bilaterally.  PELVIC: Normal external female genitalia without lesions. Normal hair distribution. Adequate perineal body, normal urethral meatus. Vagina atrophic without lesions or discharge. No significant cystocele or rectocele. Bimanual exam shows uterus and cervix to be surgically absent. Adnexa absent     1. Encounter for gynecological examination without abnormal finding     2. Acute vulvitis  POCT Wet Prep    fluconazole (DIFLUCAN) 150 MG Tab    nystatin (MYCOSTATIN) ointment    and PLAN:    Patient was counseled today on A.C.S. Pap guidelines and recommendations for yearly pelvic exams, mammograms and monthly self breast exams; to see her PCP for other health maintenance.

## 2020-10-08 NOTE — PATIENT INSTRUCTIONS
Breast Health: Breast Self-Awareness  What is breast self-awareness?  Breast self-awareness is knowing how your breasts normally look and feel. Your breasts change as you go through different stages of your life. So its important to learn what is normal for your breasts. Breast self-awareness helps you notice any changes in your breasts right away. Report any changes to your healthcare provider.  Why is breast self-awareness important?  Many experts now say that women should focus on breast self-awareness instead of doing a breast self-examination (BSE). These experts include the American Cancer Society, the U.S. Preventive Services Task Force, and the American Congress of Obstetricians and Gynecologists. Some experts even advise not teaching women to do a BSE. Thats because research hasnt shown a clear benefit to doing BSEs.  Breast self-awareness is different than a BSE. Breast self-awareness isnt about following a certain method and schedule. Its about knowing what's normal for your breasts. That way you can notice even small changes right away. If you see any changes, report them to your healthcare provider.  Changes to look for  Call your healthcare provider if you find any changes in your breasts that concern you. These changes may include:  · A lump  · Nipple discharge other than breast milk, especially a bloody discharge  · Swelling  · A change in size or shape  · Skin irritation, such as redness, thickening, or dimpling of the skin  · Swollen lymph nodes in the armpit  · Nipple problems, such as pain or redness  If you find a lump  Contact your provider if you find lumpiness in one breast, feel something different in the tissue, or feel a definite lump. Sometimes lumpiness may be due to menstrual changes. But there may be reason for concern.  Your provider may want to see you right away if you have:  · Nipple discharge that is bloody  · Skin changes on your breast, such as dimpling or puckering  Its  normal to be upset if you find a lump. But its important to contact your provider right away. Remember that most breast lumps are benign. This means they are not cancer.  Date Last Reviewed: 8/10/2015  © 0159-4415 [employer]. [address] [place] [address]. All rights reserved. This information is not intended as a substitute for professional medical care. Always follow your healthcare professional's instructions.        Clinical Breast Exam    Many health organizations recommend a yearly clinical breast exam. This exam may be done by a gynecologist, family healthcare provider, nurse practitioner, or specially trained nurse. Yearly breast exams help to make sure that breast conditions are found early.  Your healthcare providers role  A healthcare professional knows the tests and follow-up care needed if a problem is found. Your clinical exam is also a great time to ask questions about breast self-exams. You can find out if youre checking your breasts in the best way. Or you may want to ask how pregnancy, breast implants, or breast reduction surgery affect the way you should check your breasts.  Diagnostic tests  If a clinical exam reveals a breast change, you may have other tests to find out more. These tests may include:  · Mammography. A low-dose X-ray of your breast tissue.  · Ultrasound. An imaging test that uses sound waves to create images of your breast.  · Biopsy. A small amount of breast tissue is removed by needle or by a cut (incision). The tissue is then checked under a microscope.  Guidelines for having clinical breast exams  The American College of Obstetricians and Gynecologists recommends that starting at age 29, you should have a clinical breast exam every 1 to 3 years. After age 40, have a clinical breast exam each year. If youre at higher risk for breast cancer, you may need exams more often. Risk factors for breast cancer may include:  · Being over 50 or  postmenopausal  · Having a family history of breast cancer  · Having the BRCA1 or BRCA2 gene mutation or certain other gene mutations  · Having more menstrual periods due to starting menstruation early (before age 12) or having a late menopause (after age 55)  · Having no pregnancies  · Having a first pregnancy after age 30  · Being obese  · Having a history of radiation treatment to your chest area  · Exposure to MARICEL during your mother's pregnancy  · Not being active  · Drinking too much alcohol  · Having dense breast tissue  · Taking hormone therapy after menopause  Other health organizations have different recommendations. Talk to your healthcare provider about what is best for you.   Date Last Reviewed: 8/13/2015 © 2000-2017 [employer]. [address] [place] [address], [place] [address]. All rights reserved. This information is not intended as a substitute for professional medical care. Always follow your healthcare professional's instructions.        Vaginal Infection: Understanding the Vaginal Environment  The vagina is a canal. It connects the uterus (womb) to the outside of the body. It is home to many types of bacteria and other tiny organisms. These different bacteria most often stay balanced in number. This keeps the vagina healthy. If the balance changes, it can cause infection.   A healthy environment  Many types of bacteria are present in a healthy vagina. When balanced, they dont cause problems. Small amounts of yeast may also be present without causing problems. The most common type of bacteria in the vagina is lactobacillus. It helps keep the vagina at a low pH. A low pH keeps bad bacteria from taking over.  Normal vaginal discharge  The vagina makes fluid. It is sent out as discharge. This also keeps the vagina healthy. Normal discharge can be clear, white, or yellowish. Most women find that normal discharge varies in amount and color through the month.  An unhealthy environment  The  vaginal environment may get out of balance. This may result in a vaginal infection. There are a few reasons this can happen. The pH may have changed. The amount of one organism, such as yeast, may increase. Or an outside organism may get into the vagina and throw off the balance:  · Bacterial vaginosis (BV). BV is due to an imbalance in the normal bacteria in the vagina. Lactobacillus bacteria decrease. As a result, the numbers of bad bacteria increase.  · Candidiasis (yeast infection). Yeast is a type of fungus. A yeast infection occurs when yeast cells in the vagina increase. They then attack vaginal tissues. A type of yeast called Candida albicans is often involved.  · Trichomoniasis (trich). Trich is a parasite. It is passed from one person to another during sex. Men with trich often dont have any symptoms. In women, it can take weeks or months before symptoms appear.  Date Last Reviewed: 3/1/2017  © 1528-1571 [employer]. [address] [place] [address]. All rights reserved. This information is not intended as a substitute for professional medical care. Always follow your healthcare professional's instructions.        Vaginal Infection: Yeast (Candidiasis)  Yeast infection occurs when yeast in the vagina increase and attacks the vaginal tissues. Yeast is a type of fungus. These infections are often caused by a type of yeast called Candida albicans. Other species of yeast can also cause infections. Factors that may make infection more likely include recent antibiotic use, douching, or increased sex. Yeast infections are more common in women who have diabetes, or are obese or pregnant, or have a weak immune system.  Symptoms of yeast infection  · Clumpy or thin, white discharge, which may look like cottage cheese  · No odor or minimal odor  · Severe vaginal itching or burning  · Burning with urination  · Swelling, redness of vulva  · Pain during sex  Treating yeast infection  Yeast  infection is treated with a vaginal antifungal cream. In some cases, antifungal pills are prescribed instead. During treatment:  · Finish all of your medicine, even if your symptoms go away.  · Apply the cream before going to bed. Lie flat after applying so that it doesn't drip out.  · Do not douche or use tampons.  · Don't rely on a diaphragm or condoms, since the cream may weaken them.  · Avoid intercourse if advised by your healthcare provider.     Should I treat a yeast infection myself?  Discuss with your healthcare provider whether you should use over-the-counter medicines to treat a yeast infection. Self-treatment may depend on whether:  · You've had a yeast infection in the past.  · You're at risk for STDs.  Call your healthcare provider if symptoms do not go away or come back after treatment.   Date Last Reviewed: 3/1/2017  © 6890-9673 [employer]. [address] [place] [address], [place] [address]. All rights reserved. This information is not intended as a substitute for professional medical care. Always follow your healthcare professional's instructions.

## 2020-10-14 RX ORDER — AMLODIPINE BESYLATE 2.5 MG/1
2.5 TABLET ORAL DAILY
Qty: 30 TABLET | Refills: 11 | Status: SHIPPED | OUTPATIENT
Start: 2020-10-14 | End: 2022-01-11 | Stop reason: SDUPTHER

## 2020-10-14 NOTE — PROGRESS NOTES
Digital Medicine: Clinician Follow-Up    Called Ora Henderson for hypertension Digital Medicine routine follow-up: BP avg 114/78.    Patient reports compliance to BP regimen and states she has not missed a dose in the past 2 weeks. She reports having excess losartan-HCTZ in her possession at home; therefore, requests not renewing her currently  prescription. She will reach out to [facility] or her PCP when she is ready for a new refill. She understands that potassium level was slightly low in last lab completed. She was instructed to eat a banana a day and confirms doing so. Patient would also like to receive the list of potassium-rich foods that she could consume other than bananas.     The history is provided by the patient.      Review of patient's allergies indicates:   -- Sulfa (sulfonamide antibiotics) -- Hives  Follow-up reason(s): routine follow up.     Hypertension    Readings are trending down         Last 5 Patient Entered Readings                                      Current 30 Day Average: 114/78     Recent Readings 10/11/2020 10/11/2020 10/10/2020 2020 2020    SBP (mmHg) 115 110 102 125 101    DBP (mmHg) 82 69 73 84 72    Pulse 91 73 73 86 86                 Depression Screening  Did not address depression screening.    Sleep Apnea Screening    Did not address sleep apnea screening.     Medication Affordability Screening  Did not address medication affordability screening.     Medication Adherence-Medication adherence was assessed.          ASSESSMENT(S)  Patients BP average is 114/78 mmHg, which is at goal. Patient's BP goal is less than or equal to 130/80.    Home BP readings meeting goal 50% of the time. Trending downwards, DBP intermittently controlled. Provided list of potassium-rich foods. No medication changes suggested at this time. Continue to monitor, f/u in 6 months.       Hypertension Plan  Continue current therapy.  Continue current diet/physical activity routine.        Addressed patient questions and patient has my contact information if needed prior to next outreach. Patient verbalizes understanding.      Sent link to [facility] webpages and my contact information via [employer] for future questions.               There are no preventive care reminders to display for this patient.  There are no preventive care reminders to display for this patient.      Hypertension Medications             amLODIPine (NORVASC) 2.5 MG tablet Take 1 tablet (2.5 mg total) by mouth once daily.    losartan-hydrochlorothiazide 100-25 mg (HYZAAR) 100-25 mg per tablet Take 1 tablet by mouth once daily.

## 2020-11-09 ENCOUNTER — TELEPHONE (OUTPATIENT)
Dept: PHYSICAL MEDICINE AND REHAB | Facility: CLINIC | Age: 60
End: 2020-11-09

## 2020-11-09 NOTE — TELEPHONE ENCOUNTER
----- Message from Yanet Romero sent at 11/9/2020 10:13 AM CST -----  Contact: Ora  Type:  Sooner Apoointment Request    Caller is requesting a sooner appointment.  Caller declined first available appointment listed below.  Caller will not accept being placed on the waitlist and is requesting a message be sent to doctor.  Name of Caller:Ora  When is the first available appointment?11/13/20  Symptoms:back pain  Would the patient rather a call back or a response via [facility]? call  Best Call Back Number:[phone]  Additional Information: Pt has to bring her mom & brother for appts on 11/11 & wants to know if she can be squeezed in that day. Anytime between 9 am-12 pm

## 2020-11-20 ENCOUNTER — PATIENT MESSAGE (OUTPATIENT)
Dept: INTERNAL MEDICINE | Facility: CLINIC | Age: 60
End: 2020-11-20

## 2020-11-20 ENCOUNTER — TELEPHONE (OUTPATIENT)
Dept: INTERNAL MEDICINE | Facility: CLINIC | Age: 60
End: 2020-11-20

## 2020-11-20 DIAGNOSIS — I10 ESSENTIAL HYPERTENSION: Primary | ICD-10-CM

## 2020-11-26 ENCOUNTER — PATIENT MESSAGE (OUTPATIENT)
Dept: ADMINISTRATIVE | Facility: OTHER | Age: 60
End: 2020-11-26

## 2020-11-27 ENCOUNTER — LAB VISIT (OUTPATIENT)
Dept: LAB | Facility: HOSPITAL | Age: 60
End: 2020-11-27
Attending: FAMILY MEDICINE
Payer: COMMERCIAL

## 2020-11-27 ENCOUNTER — LAB VISIT (OUTPATIENT)
Dept: LAB | Facility: HOSPITAL | Age: 60
End: 2020-11-27
Payer: COMMERCIAL

## 2020-11-27 ENCOUNTER — OFFICE VISIT (OUTPATIENT)
Dept: INTERNAL MEDICINE | Facility: CLINIC | Age: 60
End: 2020-11-27
Payer: COMMERCIAL

## 2020-11-27 VITALS
HEART RATE: 80 BPM | RESPIRATION RATE: 16 BRPM | HEIGHT: 63 IN | TEMPERATURE: 99 F | SYSTOLIC BLOOD PRESSURE: 129 MMHG | DIASTOLIC BLOOD PRESSURE: 88 MMHG | BODY MASS INDEX: 34.8 KG/M2 | WEIGHT: 196.44 LBS

## 2020-11-27 DIAGNOSIS — R30.0 DYSURIA: Primary | ICD-10-CM

## 2020-11-27 DIAGNOSIS — I10 ESSENTIAL HYPERTENSION: ICD-10-CM

## 2020-11-27 DIAGNOSIS — R30.0 DYSURIA: ICD-10-CM

## 2020-11-27 DIAGNOSIS — N76.2 ACUTE VULVITIS: ICD-10-CM

## 2020-11-27 DIAGNOSIS — I10 ESSENTIAL HYPERTENSION: Chronic | ICD-10-CM

## 2020-11-27 LAB
ABO + RH BLD: NORMAL
BILIRUB UR QL STRIP: NEGATIVE
CLARITY UR: CLEAR
COLOR UR: YELLOW
GLUCOSE UR QL STRIP: NEGATIVE
HGB UR QL STRIP: NEGATIVE
KETONES UR QL STRIP: NEGATIVE
LEUKOCYTE ESTERASE UR QL STRIP: NEGATIVE
NITRITE UR QL STRIP: NEGATIVE
PH UR STRIP: 7 [PH] (ref 5–8)
PROT UR QL STRIP: NEGATIVE
SP GR UR STRIP: 1.02 (ref 1–1.03)
URN SPEC COLLECT METH UR: NORMAL

## 2020-11-27 PROCEDURE — 81003 URINALYSIS AUTO W/O SCOPE: CPT

## 2020-11-27 PROCEDURE — 99214 OFFICE O/P EST MOD 30 MIN: CPT | Mod: S$GLB,,, | Performed by: PHYSICIAN ASSISTANT

## 2020-11-27 PROCEDURE — 3074F SYST BP LT 130 MM HG: CPT | Mod: CPTII,S$GLB,, | Performed by: PHYSICIAN ASSISTANT

## 2020-11-27 PROCEDURE — 87086 URINE CULTURE/COLONY COUNT: CPT

## 2020-11-27 PROCEDURE — 3008F BODY MASS INDEX DOCD: CPT | Mod: CPTII,S$GLB,, | Performed by: PHYSICIAN ASSISTANT

## 2020-11-27 PROCEDURE — 36415 COLL VENOUS BLD VENIPUNCTURE: CPT

## 2020-11-27 PROCEDURE — 3079F PR MOST RECENT DIASTOLIC BLOOD PRESSURE 80-89 MM HG: ICD-10-PCS | Mod: CPTII,S$GLB,, | Performed by: PHYSICIAN ASSISTANT

## 2020-11-27 PROCEDURE — 3008F PR BODY MASS INDEX (BMI) DOCUMENTED: ICD-10-PCS | Mod: CPTII,S$GLB,, | Performed by: PHYSICIAN ASSISTANT

## 2020-11-27 PROCEDURE — 3074F PR MOST RECENT SYSTOLIC BLOOD PRESSURE < 130 MM HG: ICD-10-PCS | Mod: CPTII,S$GLB,, | Performed by: PHYSICIAN ASSISTANT

## 2020-11-27 PROCEDURE — 99999 PR PBB SHADOW E&M-EST. PATIENT-LVL V: ICD-10-PCS | Mod: PBBFAC,,, | Performed by: PHYSICIAN ASSISTANT

## 2020-11-27 PROCEDURE — 86901 BLOOD TYPING SEROLOGIC RH(D): CPT

## 2020-11-27 PROCEDURE — 99999 PR PBB SHADOW E&M-EST. PATIENT-LVL V: CPT | Mod: PBBFAC,,, | Performed by: PHYSICIAN ASSISTANT

## 2020-11-27 PROCEDURE — 99214 PR OFFICE/OUTPT VISIT, EST, LEVL IV, 30-39 MIN: ICD-10-PCS | Mod: S$GLB,,, | Performed by: PHYSICIAN ASSISTANT

## 2020-11-27 PROCEDURE — 3079F DIAST BP 80-89 MM HG: CPT | Mod: CPTII,S$GLB,, | Performed by: PHYSICIAN ASSISTANT

## 2020-11-27 RX ORDER — FLUCONAZOLE 150 MG/1
TABLET ORAL
Qty: 3 TABLET | Refills: 1 | Status: SHIPPED | OUTPATIENT
Start: 2020-11-27 | End: 2021-04-13

## 2020-11-27 RX ORDER — DOXYCYCLINE 100 MG/1
100 CAPSULE ORAL 2 TIMES DAILY
Qty: 14 CAPSULE | Refills: 0 | Status: SHIPPED | OUTPATIENT
Start: 2020-11-27 | End: 2020-12-04

## 2020-11-28 LAB — BACTERIA UR CULT: NORMAL

## 2020-12-03 ENCOUNTER — PATIENT MESSAGE (OUTPATIENT)
Dept: INTERNAL MEDICINE | Facility: CLINIC | Age: 60
End: 2020-12-03

## 2020-12-11 ENCOUNTER — CLINICAL SUPPORT (OUTPATIENT)
Dept: URGENT CARE | Facility: CLINIC | Age: 60
End: 2020-12-11
Payer: COMMERCIAL

## 2020-12-11 DIAGNOSIS — Z11.59 ENCOUNTER FOR SCREENING FOR OTHER VIRAL DISEASES: Primary | ICD-10-CM

## 2020-12-11 LAB
CTP QC/QA: YES
SARS-COV-2 RDRP RESP QL NAA+PROBE: NEGATIVE

## 2020-12-11 PROCEDURE — 99211 OFF/OP EST MAY X REQ PHY/QHP: CPT | Mod: S$GLB,,, | Performed by: NURSE PRACTITIONER

## 2020-12-11 PROCEDURE — U0002 COVID-19 LAB TEST NON-CDC: HCPCS | Mod: QW,S$GLB,, | Performed by: NURSE PRACTITIONER

## 2020-12-11 PROCEDURE — 99211 PR OFFICE/OUTPT VISIT, EST, LEVL I: ICD-10-PCS | Mod: S$GLB,,, | Performed by: NURSE PRACTITIONER

## 2020-12-11 PROCEDURE — U0002: ICD-10-PCS | Mod: QW,S$GLB,, | Performed by: NURSE PRACTITIONER

## 2020-12-11 NOTE — PATIENT INSTRUCTIONS
Your test was NEGATIVE for COVID-19 (coronavirus).      You may leave home and/or return to work when the following conditions are met:  · 24 hours fever free without fever-reducing medications AND  · Improved symptoms  · You have not met the conditions of a close contact     What counts as a close contact?  · You were within 6 feet of someone who has COVID-19 for a total of 15 minutes or more (masked or unmasked).  · You provided care at home to someone who is sick with COVID-19.  · You had direct physical contact with the person (hugged or kissed them).  · You shared eating or drinking utensils.  · They sneezed, coughed, or somehow got respiratory droplets on you.     If you had a close contact:  · If possible, it is recommended that you quarantine for 14 days from the time of contact regardless of your test status.  · If you have no symptoms, quarantine may be stopped early at 10 days, but this carries a small risk of spreading the virus.  · If you have no symptoms and you have a negative COVID test on day 5 or later, quarantine may be stopped after day 7, but this also carries a small risk of spreading the virus.     Additional instructions:  · Social distance per your local guidelines  · Call ahead before visiting your doctor.  · Wear a mask when around others who do not live in your household.  · Cover your coughs and sneezes.  · Wash hands or use hand  often.      If your symptoms worsen or if you have any other concerns, please contact [facility] at [phone].     Sincerely,    Wilder Pickens, RT

## 2020-12-15 ENCOUNTER — OFFICE VISIT (OUTPATIENT)
Dept: PHYSICAL MEDICINE AND REHAB | Facility: CLINIC | Age: 60
End: 2020-12-15
Payer: COMMERCIAL

## 2020-12-15 ENCOUNTER — HOSPITAL ENCOUNTER (OUTPATIENT)
Dept: RADIOLOGY | Facility: HOSPITAL | Age: 60
Discharge: HOME | End: 2020-12-15
Attending: PHYSICAL MEDICINE & REHABILITATION
Payer: COMMERCIAL

## 2020-12-15 VITALS
BODY MASS INDEX: 34.73 KG/M2 | DIASTOLIC BLOOD PRESSURE: 95 MMHG | RESPIRATION RATE: 14 BRPM | SYSTOLIC BLOOD PRESSURE: 163 MMHG | HEART RATE: 75 BPM | WEIGHT: 196 LBS | HEIGHT: 63 IN

## 2020-12-15 DIAGNOSIS — M47.26 OSTEOARTHRITIS OF SPINE WITH RADICULOPATHY, LUMBAR REGION: Primary | ICD-10-CM

## 2020-12-15 DIAGNOSIS — M47.26 OSTEOARTHRITIS OF SPINE WITH RADICULOPATHY, LUMBAR REGION: ICD-10-CM

## 2020-12-15 PROCEDURE — 3077F PR MOST RECENT SYSTOLIC BLOOD PRESSURE >= 140 MM HG: ICD-10-PCS | Mod: CPTII,S$GLB,, | Performed by: PHYSICAL MEDICINE & REHABILITATION

## 2020-12-15 PROCEDURE — 99999 PR PBB SHADOW E&M-EST. PATIENT-LVL V: CPT | Mod: PBBFAC,,, | Performed by: PHYSICAL MEDICINE & REHABILITATION

## 2020-12-15 PROCEDURE — 3080F DIAST BP >= 90 MM HG: CPT | Mod: CPTII,S$GLB,, | Performed by: PHYSICAL MEDICINE & REHABILITATION

## 2020-12-15 PROCEDURE — 72110 XR LUMBAR SPINE 5 VIEW WITH FLEX AND EXT: ICD-10-PCS | Mod: 26,,, | Performed by: RADIOLOGY

## 2020-12-15 PROCEDURE — 3080F PR MOST RECENT DIASTOLIC BLOOD PRESSURE >= 90 MM HG: ICD-10-PCS | Mod: CPTII,S$GLB,, | Performed by: PHYSICAL MEDICINE & REHABILITATION

## 2020-12-15 PROCEDURE — 3077F SYST BP >= 140 MM HG: CPT | Mod: CPTII,S$GLB,, | Performed by: PHYSICAL MEDICINE & REHABILITATION

## 2020-12-15 PROCEDURE — 72110 X-RAY EXAM L-2 SPINE 4/>VWS: CPT | Mod: 26,,, | Performed by: RADIOLOGY

## 2020-12-15 PROCEDURE — 1126F AMNT PAIN NOTED NONE PRSNT: CPT | Mod: S$GLB,,, | Performed by: PHYSICAL MEDICINE & REHABILITATION

## 2020-12-15 PROCEDURE — 72110 X-RAY EXAM L-2 SPINE 4/>VWS: CPT | Mod: TC

## 2020-12-15 PROCEDURE — 3008F BODY MASS INDEX DOCD: CPT | Mod: CPTII,S$GLB,, | Performed by: PHYSICAL MEDICINE & REHABILITATION

## 2020-12-15 PROCEDURE — 3008F PR BODY MASS INDEX (BMI) DOCUMENTED: ICD-10-PCS | Mod: CPTII,S$GLB,, | Performed by: PHYSICAL MEDICINE & REHABILITATION

## 2020-12-15 PROCEDURE — 99214 PR OFFICE/OUTPT VISIT, EST, LEVL IV, 30-39 MIN: ICD-10-PCS | Mod: S$GLB,,, | Performed by: PHYSICAL MEDICINE & REHABILITATION

## 2020-12-15 PROCEDURE — 99999 PR PBB SHADOW E&M-EST. PATIENT-LVL V: ICD-10-PCS | Mod: PBBFAC,,, | Performed by: PHYSICAL MEDICINE & REHABILITATION

## 2020-12-15 PROCEDURE — 99214 OFFICE O/P EST MOD 30 MIN: CPT | Mod: S$GLB,,, | Performed by: PHYSICAL MEDICINE & REHABILITATION

## 2020-12-15 PROCEDURE — 1126F PR PAIN SEVERITY QUANTIFIED, NO PAIN PRESENT: ICD-10-PCS | Mod: S$GLB,,, | Performed by: PHYSICAL MEDICINE & REHABILITATION

## 2020-12-15 RX ORDER — GABAPENTIN 100 MG/1
100-200 CAPSULE ORAL NIGHTLY
Qty: 60 CAPSULE | Refills: 1 | Status: SHIPPED | OUTPATIENT
Start: 2020-12-15 | End: 2022-12-15 | Stop reason: SDUPTHER

## 2020-12-15 RX ORDER — GABAPENTIN 100 MG/1
100 CAPSULE ORAL 3 TIMES DAILY
Qty: 90 CAPSULE | Refills: 11 | Status: SHIPPED | OUTPATIENT
Start: 2020-12-15 | End: 2020-12-15

## 2020-12-15 RX ORDER — KETOROLAC TROMETHAMINE 10 MG/1
10 TABLET, FILM COATED ORAL 2 TIMES DAILY
Qty: 10 TABLET | Refills: 0 | Status: SHIPPED | OUTPATIENT
Start: 2020-12-15 | End: 2020-12-20

## 2020-12-15 NOTE — PATIENT INSTRUCTIONS
Exercises to Strengthen Your Lower Back  Strong lower back and abdominal muscles work together to support your spine. The exercises below will help strengthen the lower back. It is important that you begin exercising slowly and increase levels gradually.  Always begin any exercise program with stretching. If you feel pain while doing any of these exercises, stop and talk to your doctor about a more specific exercise program that better suits your condition.   Low back stretch  The point of stretching is to make you more flexible and increase your range of motion. Stretch only as much as you are able. Stretch slowly. Do not push your stretch to the limit. If at any point you feel pain while stretching, this is your (temporary) limit.  · Lie on your back with your knees bent and both feet on the ground.  · Slowly raise your left knee to your chest as you flatten your lower back against the floor. Hold for 5 seconds.  · Relax and repeat the exercise with your right knee.  · Do 10 of these exercises for each leg.  · Repeat hugging both knees to your chest at the same time.  Building lower back strength  Start your exercise routine with 10 to 30 minutes a day, 1 to 3 times a day.  Initial exercises  Lying on your back:  1. Ankle pumps: Move your foot up and down, towards your head, and then away. Repeat 10 times with each foot.  2. Heel slides: Slowly bend your knee, drawing the heel of your foot towards you. Then slide your heel/foot from you, straightening your knee. Do not lift your foot off the floor (this is not a leg lift).  3. Abdominal contraction: Bend your knees and put your hands on your stomach. Tighten your stomach muscles. Hold for 5 seconds, then relax. Repeat 10 times.  4. Straight leg raise: Bend one leg at the knee and keep the other leg straight. Tighten your stomach muscles. Slowly lift your straight leg 6 to 12 inches off the floor and hold for up to 5 seconds. Repeat 10 times on each  side.  Standin. Wall squats: Stand with your back against the wall. Move your feet about 12 inches away from the wall. Tighten your stomach muscles, and slowly bend your knees until they are at about a 45 degree angle. Do not go down too far. Hold about 5 seconds. Then slowly return to your starting position. Repeat 10 times.  2. Heel raises: Stand facing the wall. Slowly raise the heels of your feet up and down, while keeping your toes on the floor. If you have trouble balancing, you can touch the wall with your hands. Repeat 10 times.  More advanced exercises  When you feel comfortable enough, try these exercises.  1. Kneeling lumbar extension: Begin on your hands and knees. At the same time, raise and straighten your right arm and left leg until they are parallel to the ground. Hold for 2 seconds and come back slowly to a starting position. Repeat with left arm and right leg, alternating 10 times.  2. Prone lumbar extension: Lie face down, arms extended overhead, palms on the floor. At the same time, raise your right arm and left leg as high as comfortably possible. Hold for 10 seconds and slowly return to start. Repeat with left arm and right leg, alternating 10 times. Gradually build up to 20 times. (Advanced: Repeat this exercise raising both arms and both legs a few inches off the floor at the same time. Hold for 5 seconds and release.)  3. Pelvic tilt: Lie on the floor on your back with your knees bent at 90 degrees. Your feet should be flat on the floor. Inhale, exhale, then slowly contract your abdominal muscles bringing your navel toward your spine. Let your pelvis rock back until your lower back is flat on the floor. Hold for 10 seconds while breathing smoothly.  4. Abdominal crunch: Perform a pelvic tilt (above) flattening your lower back against the floor. Holding the tension in your abdominal muscles, take another breath and raise your shoulder blades off the ground (this is not a full sit-up).  Keep your head in line with your body (dont bend your neck forward). Hold for 2 seconds, then slowly lower.  Date Last Reviewed: 6/1/2016 © 2000-2017 [employer]. [address] [place] [address], [place] [address]. All rights reserved. This information is not intended as a substitute for professional medical care. Always follow your healthcare professional's instructions.        Back Care Tips    Caring for your back  These are things you can do to prevent a recurrence of acute back pain and to reduce symptoms from chronic back pain:  · Maintain a healthy weight. If you are overweight, losing weight will help most types of back pain.  · Exercise is an important part of recovery from most types of back pain. The muscles behind and in front of the spine support the back. This means strengthening both the back muscles and the abdominal muscles will provide better support for your spine.   · Swimming and brisk walking are good overall exercises to improve your fitness level.  · Practice safe lifting methods (below).  · Practice good posture when sitting, standing and walking. Avoid prolonged sitting. This puts more stress on the lower back than standing or walking.  · Wear quality shoes with sufficient arch support. Foot and ankle alignment can affect back symptoms. Women should avoid wearing high heels.  · Therapeutic massage can help relax the back muscles without stretching them.  · During the first 24 to 72 hours after an acute injury or flare-up of chronic back pain, apply an ice pack to the painful area for 20 minutes and then remove it for 20 minutes, over a period of 60 to 90 minutes, or several times a day. As a safety precaution, do not use a heating pad at bedtime. Sleeping on a heating pad can lead to skin burns or tissue damage.  · You can alternate ice and heat therapies.  Medications  Talk to your healthcare provider before using medicines, especially if you have other medical problems or are  taking other medicines.  · You may use acetaminophen or ibuprofen to control pain, unless your healthcare provider prescribed other pain medicine. If you have chronic conditions like diabetes, liver or kidney disease, stomach ulcers, or gastrointestinal bleeding, or are taking blood thinners, talk with your healthcare provider before taking any medicines.  · Be careful if you are given prescription pain medicines, narcotics, or medicine for muscle spasm. They can cause drowsiness, affect your coordination, reflexes, and judgment. Do not drive or operate heavy machinery while taking these types of medicines. Take prescription pain medicine only as prescribed by your healthcare provider.  Lumbar stretch  Here is a simple stretching exercise that will help relax muscle spasm and keep your back more limber. If exercise makes your back pain worse, dont do it.  · Lie on your back with your knees bent and both feet on the ground.  · Slowly raise your left knee to your chest as you flatten your lower back against the floor. Hold for 5 seconds.  · Relax and repeat the exercise with your right knee.  · Do 10 of these exercises for each leg.  Safe lifting method  · Dont bend over at the waist to lift an object off the floor.  Instead, bend your knees and hips in a squat.   · Keep your back and head upright  · Hold the object close to your body, directly in front of you.  · Straighten your legs to lift the object.   · Lower the object to the floor in the reverse fashion.  · If you must slide something across the floor, push it.  Posture tips  Sitting  Sit in chairs with straight backs or low-back support. Keep your knees lower than your hips, with your feet flat on the floor.  When driving, sit up straight. Adjust the seat forward so you are not leaning toward the steering wheel.  A small pillow or rolled towel behind your lower back may help if you are driving long distances.   Standing  When standing for long periods, shift  most of your weight to one leg at a time. Alternate legs every few minutes.   Sleeping  The best way to sleep is on your side with your knees bent. Put a low pillow under your head to support your neck in a neutral spine position. Avoid thick pillows that bend your neck to one side. Put a pillow between your legs to further relax your lower back. If you sleep on your back, put pillows under your knees to support your legs in a slightly flexed position. Use a firm mattress. If your mattress sags, replace it, or use a 1/2-inch plywood board under the mattress to add support.  Follow-up care  Follow up with your healthcare provider, or as advised.  If X-rays, a CT scan or an MRI scan were taken, they will be reviewed by a radiologist. You will be notified of any new findings that may affect your care.  Call 911  Seek emergency medical care if any of the following occur:  · Trouble breathing  · Confusion  · Very drowsy  · Fainting or loss of consciousness  · Rapid or very slow heart rate  · Loss of  bowel or bladder control  When to seek medical care  Call your healthcare provider if any of the following occur:  · Pain becomes worse or spreads to your arms or legs  · Weakness or numbness in one or both arms or legs  · Numbness in the groin area  Date Last Reviewed: 6/1/2016  © 9150-9408 [employer]. [address] [place] [address]. All rights reserved. This information is not intended as a substitute for professional medical care. Always follow your healthcare professional's instructions.        Understanding Lumbar Radiculopathy    Lumbar radiculopathy is irritation or inflammation of a nerve root in the low back. It causes symptoms that spread out from the back down one or both legs. To understand this condition, it helps to understand the parts of the spine:  · Vertebrae. These are bones that stack to form the spine. The lumbar spine contains the 5 bottom vertebrae.  · Disks. These are soft  pads of tissue between the vertebrae. They act as shock absorbers for the spine.  · Spinal canal. This is a tunnel formed within the stacked vertebrae. In the lumbar spine, nerves run through this canal.  · Nerves. These branch off and leave the spinal canal, traveling out to parts of the body. As they leave the spinal canal, nerves pass through openings between the vertebrae. The nerve root is the part of the nerve that is closest to the spinal canal.  · Sciatic nerve. This is a large nerve formed from several nerve roots in the low back. This nerve extends down the back of the leg to the foot.  With lumbar radiculopathy, nerve roots in the low back become irritated. This leads to pain and symptoms. The sciatic nerve is commonly involved, so the condition is often called sciatica.  What causes lumbar radiculopathy?  Aging, injury, poor posture, extra body weight, and other issues can lead to problems in the low back. These problems may then irritate nerve roots. They include:  · Damage to a disk in the lumbar spine. The damaged disk may then press on nearby nerve roots.  · Degeneration from wear and tear, and aging. This can lead to narrowing (stenosis) of the openings between the vertebrae. The narrowed openings press on nerve roots as they leave the spinal canal.  · Unstable spine. This is when a vertebra slips forward. It can then press on a nerve root.  Other, less common things can put pressure on nerves in the low back. These include diabetes, infection, or a tumor.  Symptoms of lumbar radiculopathy  These include:  · Pain in the low back  · Pain, numbness, tingling, or weakness that travels into the buttocks, hip, groin, or leg  · Muscle spasms  Treatment for lumbar radiculopathy  In most cases, your healthcare provider will first try treatments that help relieve symptoms. These may include:  · Prescription and over-the-counter pain medicines. These help relieve pain, swelling, and irritation.  · Limits on  positions and activities that increase pain. But lying in bed or avoiding all movement is only recommended for a short period of time.  · Physical therapy, including exercises and stretches. This helps decrease pain and increase movement and function.  · Steroid shots into the lower back. This may help relieve symptoms for a time.  · Weight-loss program. If you are overweight, losing extra pounds may help relieve symptoms.  In some cases, you may need surgery to fix the underlying problem. This depends on the cause, the symptoms, and how long the pain has lasted.  Possible complications  Over time, an irritated and inflamed nerve may become damaged. This may lead to long-lasting (permanent) numbness or weakness in your legs and feet. If symptoms change suddenly or get worse, be sure to let your healthcare provider know.  When to call your healthcare provider  Call your healthcare provider right away if you have any of these:  · New pain or pain that gets worse  · New or increasing weakness, tingling, or numbness in your leg or foot  · Problems controlling your bladder or bowel   Date Last Reviewed: 3/10/2016  © 7347-9699 [employer]. [address] [place] [address], [place] [address]. All rights reserved. This information is not intended as a substitute for professional medical care. Always follow your healthcare professional's instructions.

## 2020-12-15 NOTE — PROGRESS NOTES
[facility] CLINIC NOTE    Chief Complaint   Patient presents with    Back Pain     lower back pain, to the legs       HPI: This is a 60 y.o.  female being seen in clinic today for increased low back achy pain with radiation of pain into her lateral legs to the calf. The left side is slightly worse than the right.  She is still exercising at home, but is unsure of any specific exacerbating factor.  Rest provides minimal relief.  She denies leg weakness    History obtained from patient    Past family, medical, social, and surgical history reviewed in chart    Review of Systems:     General- denies lethargy, weight change, fever, chills  Head/neck- denies swallowing difficulties  ENT- denies hearing changes  Cardiovascular-denies chest pain  Pulmonary- denies shortness of breath  GI- denies constipation or bowel incontinence  - denies bladder incontinence  Skin- denies wounds or rashes  Musculoskeletal- +/-weakness, +pain  Neurologic- +numbness and tingling  Psychiatric- denies depressive or psychotic features, denies anxiety  Lymphatic-denies swelling  Endocrine- denies hypoglycemic symptoms/DM history  All other pertinent systems negative     Physical Examination:  General: Well developed, well nourished female, NAD  HEENT:NCAT EOMI bilaterally   Pulmonary:Normal respirations    Spinal Examination: CERVICAL  Active ROM is within normal limits.  Inspection: No deformity of spinal alignment.    Spinal Examination: LUMBAR or THORACIC  Active ROM is limited at full flex and ext  Inspection: No deformity of spinal alignment.    Palpation: ttp at si joints, GT bursas, gluteus musculature  +facet loading bilaterally-worse on left  slr neg bilaterally    Bilateral Upper and Lower Extremities:  Pulses are 2+ at radial bilaterally.  Shoulder/Elbow/Wrist/Hand ROM   Hip/Knee/Ankle ROM wnl  Bilateral Extremities show normal capillary refill.  No signs of cyanosis, rubor, edema, skin changes, or dysvascular changes of appendages.   Nails appear intact.    Neurological Exam:  Cranial Nerves:  II-XII grossly intact    Manual Muscle Testing: (Motor 5=normal)  5/5 strength bilateral lower extremities except 4+/5 hip add/abd  No focal atrophy is noted of either lower extremity.    Bilateral Reflexes:hypo at patellar  Clonus neg bilaterally  Sensation: tested to light touch  - intact in legs    Gait: Narrow base and good arm swing.    IMPRESSION/PLAN: This is a 57 y.o.  female with lumbar DJD/DDD, radiculitis, spondylolisthesis     1. Rx for PT-Core and lower ext strengthening, light traction, stretch,ROM, modalities  2. Ketorolac x5 days, avoid mobic while taking.  Dec gabapentin to 100-200mg QHS  3. Ice/heat compress 20 min   4. Xray lumbar spine  5. Fu prn    Amanda Up M.D.  Physical Medicine and Rehab      PROCEDURE NOTE    Diagnosis: Myofascial pain  Procedure: trigger point injections to bilateral trapezius, rhomboid, levator scapula     Risks and benefits of procedure explained to patient including risks of infection, bleeding, pain, or damage to surrounding tissues. All questions answered. Informed consent obtained prior to proceeding. Areas marked and prepped in sterile fashion. Using a 27g 1.25inch needle, 8 cc of 1% lidocaine was injected evenly into the above mentioned muscles. None to minimal bleeding noted. ER and post injection instructions given.    Amanda Up M.D.

## 2020-12-16 ENCOUNTER — PATIENT MESSAGE (OUTPATIENT)
Dept: PHYSICAL MEDICINE AND REHAB | Facility: CLINIC | Age: 60
End: 2020-12-16

## 2021-02-17 ENCOUNTER — PATIENT MESSAGE (OUTPATIENT)
Dept: ADMINISTRATIVE | Facility: OTHER | Age: 61
End: 2021-02-17

## 2021-02-23 ENCOUNTER — OFFICE VISIT (OUTPATIENT)
Dept: INTERNAL MEDICINE | Facility: CLINIC | Age: 61
End: 2021-02-23
Payer: COMMERCIAL

## 2021-02-23 ENCOUNTER — HOSPITAL ENCOUNTER (OUTPATIENT)
Dept: RADIOLOGY | Facility: HOSPITAL | Age: 61
Discharge: HOME | End: 2021-02-23
Attending: NURSE PRACTITIONER
Payer: COMMERCIAL

## 2021-02-23 VITALS
RESPIRATION RATE: 18 BRPM | BODY MASS INDEX: 34.8 KG/M2 | HEART RATE: 74 BPM | TEMPERATURE: 98 F | HEIGHT: 63 IN | DIASTOLIC BLOOD PRESSURE: 88 MMHG | SYSTOLIC BLOOD PRESSURE: 138 MMHG | WEIGHT: 196.44 LBS

## 2021-02-23 DIAGNOSIS — R10.31 RLQ ABDOMINAL PAIN: ICD-10-CM

## 2021-02-23 DIAGNOSIS — K59.00 CONSTIPATION, UNSPECIFIED CONSTIPATION TYPE: Primary | ICD-10-CM

## 2021-02-23 LAB
BILIRUB SERPL-MCNC: NEGATIVE MG/DL
BLOOD URINE, POC: NORMAL
CLARITY, POC UA: CLEAR
COLOR, POC UA: NORMAL
GLUCOSE UR QL STRIP: NORMAL
KETONES UR QL STRIP: NEGATIVE
LEUKOCYTE ESTERASE URINE, POC: NEGATIVE
NITRITE, POC UA: NEGATIVE
PH, POC UA: 7
PROTEIN, POC: 30
SPECIFIC GRAVITY, POC UA: 1.01
UROBILINOGEN, POC UA: NORMAL

## 2021-02-23 PROCEDURE — 3079F PR MOST RECENT DIASTOLIC BLOOD PRESSURE 80-89 MM HG: ICD-10-PCS | Mod: CPTII,S$GLB,, | Performed by: NURSE PRACTITIONER

## 2021-02-23 PROCEDURE — 74019 RADEX ABDOMEN 2 VIEWS: CPT | Mod: 26,,, | Performed by: RADIOLOGY

## 2021-02-23 PROCEDURE — 3008F PR BODY MASS INDEX (BMI) DOCUMENTED: ICD-10-PCS | Mod: CPTII,S$GLB,, | Performed by: NURSE PRACTITIONER

## 2021-02-23 PROCEDURE — 1125F AMNT PAIN NOTED PAIN PRSNT: CPT | Mod: S$GLB,,, | Performed by: NURSE PRACTITIONER

## 2021-02-23 PROCEDURE — 3075F PR MOST RECENT SYSTOLIC BLOOD PRESS GE 130-139MM HG: ICD-10-PCS | Mod: CPTII,S$GLB,, | Performed by: NURSE PRACTITIONER

## 2021-02-23 PROCEDURE — 81002 URINALYSIS NONAUTO W/O SCOPE: CPT | Mod: S$GLB,,, | Performed by: NURSE PRACTITIONER

## 2021-02-23 PROCEDURE — 3008F BODY MASS INDEX DOCD: CPT | Mod: CPTII,S$GLB,, | Performed by: NURSE PRACTITIONER

## 2021-02-23 PROCEDURE — 99999 PR PBB SHADOW E&M-EST. PATIENT-LVL IV: CPT | Mod: PBBFAC,,, | Performed by: NURSE PRACTITIONER

## 2021-02-23 PROCEDURE — 74019 XR ABDOMEN FLAT AND ERECT: ICD-10-PCS | Mod: 26,,, | Performed by: RADIOLOGY

## 2021-02-23 PROCEDURE — 99214 OFFICE O/P EST MOD 30 MIN: CPT | Mod: 25,S$GLB,, | Performed by: NURSE PRACTITIONER

## 2021-02-23 PROCEDURE — 3079F DIAST BP 80-89 MM HG: CPT | Mod: CPTII,S$GLB,, | Performed by: NURSE PRACTITIONER

## 2021-02-23 PROCEDURE — 3075F SYST BP GE 130 - 139MM HG: CPT | Mod: CPTII,S$GLB,, | Performed by: NURSE PRACTITIONER

## 2021-02-23 PROCEDURE — 81002 POCT URINE DIPSTICK WITHOUT MICROSCOPE: ICD-10-PCS | Mod: S$GLB,,, | Performed by: NURSE PRACTITIONER

## 2021-02-23 PROCEDURE — 74019 RADEX ABDOMEN 2 VIEWS: CPT | Mod: TC,FY,PO

## 2021-02-23 PROCEDURE — 99214 PR OFFICE/OUTPT VISIT, EST, LEVL IV, 30-39 MIN: ICD-10-PCS | Mod: 25,S$GLB,, | Performed by: NURSE PRACTITIONER

## 2021-02-23 PROCEDURE — 1125F PR PAIN SEVERITY QUANTIFIED, PAIN PRESENT: ICD-10-PCS | Mod: S$GLB,,, | Performed by: NURSE PRACTITIONER

## 2021-02-23 PROCEDURE — 99999 PR PBB SHADOW E&M-EST. PATIENT-LVL IV: ICD-10-PCS | Mod: PBBFAC,,, | Performed by: NURSE PRACTITIONER

## 2021-02-23 RX ORDER — SODIUM, POTASSIUM,MAG SULFATES 17.5-3.13G
1 SOLUTION, RECONSTITUTED, ORAL ORAL DAILY
Qty: 1 KIT | Refills: 0 | Status: SHIPPED | OUTPATIENT
Start: 2021-02-23 | End: 2021-02-25

## 2021-02-23 RX ORDER — DOCUSATE SODIUM 100 MG/1
100 CAPSULE, LIQUID FILLED ORAL 2 TIMES DAILY
Qty: 100 CAPSULE | Refills: 0 | Status: SHIPPED | OUTPATIENT
Start: 2021-02-23

## 2021-04-13 ENCOUNTER — OFFICE VISIT (OUTPATIENT)
Dept: INTERNAL MEDICINE | Facility: CLINIC | Age: 61
End: 2021-04-13
Payer: COMMERCIAL

## 2021-04-13 ENCOUNTER — PATIENT MESSAGE (OUTPATIENT)
Dept: ENDOSCOPY | Facility: HOSPITAL | Age: 61
End: 2021-04-13

## 2021-04-13 ENCOUNTER — LAB VISIT (OUTPATIENT)
Dept: LAB | Facility: HOSPITAL | Age: 61
End: 2021-04-13
Payer: COMMERCIAL

## 2021-04-13 ENCOUNTER — LAB VISIT (OUTPATIENT)
Dept: LAB | Facility: HOSPITAL | Age: 61
End: 2021-04-13
Attending: FAMILY MEDICINE
Payer: COMMERCIAL

## 2021-04-13 VITALS
SYSTOLIC BLOOD PRESSURE: 130 MMHG | WEIGHT: 199.31 LBS | BODY MASS INDEX: 35.3 KG/M2 | OXYGEN SATURATION: 98 % | DIASTOLIC BLOOD PRESSURE: 88 MMHG | HEART RATE: 72 BPM | TEMPERATURE: 98 F

## 2021-04-13 DIAGNOSIS — Z86.19 HISTORY OF HELICOBACTER PYLORI INFECTION: ICD-10-CM

## 2021-04-13 DIAGNOSIS — E66.01 SEVERE OBESITY (BMI 35.0-39.9) WITH COMORBIDITY: ICD-10-CM

## 2021-04-13 DIAGNOSIS — I10 ESSENTIAL HYPERTENSION: Chronic | ICD-10-CM

## 2021-04-13 DIAGNOSIS — G47.33 OSA (OBSTRUCTIVE SLEEP APNEA): ICD-10-CM

## 2021-04-13 DIAGNOSIS — K21.9 GASTROESOPHAGEAL REFLUX DISEASE WITHOUT ESOPHAGITIS: Primary | Chronic | ICD-10-CM

## 2021-04-13 DIAGNOSIS — M47.22 OSTEOARTHRITIS OF SPINE WITH RADICULOPATHY, CERVICAL REGION: Chronic | ICD-10-CM

## 2021-04-13 DIAGNOSIS — Z12.11 COLON CANCER SCREENING: ICD-10-CM

## 2021-04-13 DIAGNOSIS — M47.26 OSTEOARTHRITIS OF SPINE WITH RADICULOPATHY, LUMBAR REGION: ICD-10-CM

## 2021-04-13 DIAGNOSIS — K21.9 GASTROESOPHAGEAL REFLUX DISEASE WITHOUT ESOPHAGITIS: Chronic | ICD-10-CM

## 2021-04-13 PROBLEM — R29.818 SUSPECTED SLEEP APNEA: Status: RESOLVED | Noted: 2017-02-17 | Resolved: 2021-04-13

## 2021-04-13 PROBLEM — E66.09 CLASS 1 OBESITY DUE TO EXCESS CALORIES WITHOUT SERIOUS COMORBIDITY WITH BODY MASS INDEX (BMI) OF 34.0 TO 34.9 IN ADULT: Status: RESOLVED | Noted: 2020-09-14 | Resolved: 2021-04-13

## 2021-04-13 PROBLEM — E66.811 CLASS 1 OBESITY DUE TO EXCESS CALORIES WITHOUT SERIOUS COMORBIDITY WITH BODY MASS INDEX (BMI) OF 34.0 TO 34.9 IN ADULT: Status: RESOLVED | Noted: 2020-09-14 | Resolved: 2021-04-13

## 2021-04-13 LAB
ALBUMIN SERPL BCP-MCNC: 3.7 G/DL (ref 3.5–5.2)
ALP SERPL-CCNC: 79 U/L (ref 55–135)
ALT SERPL W/O P-5'-P-CCNC: 13 U/L (ref 10–44)
ANION GAP SERPL CALC-SCNC: 11 MMOL/L (ref 8–16)
AST SERPL-CCNC: 15 U/L (ref 10–40)
BASOPHILS # BLD AUTO: 0.05 K/UL (ref 0–0.2)
BASOPHILS NFR BLD: 0.8 % (ref 0–1.9)
BILIRUB SERPL-MCNC: 0.6 MG/DL (ref 0.1–1)
BUN SERPL-MCNC: 12 MG/DL (ref 6–20)
CALCIUM SERPL-MCNC: 9.3 MG/DL (ref 8.7–10.5)
CHLORIDE SERPL-SCNC: 102 MMOL/L (ref 95–110)
CO2 SERPL-SCNC: 27 MMOL/L (ref 23–29)
CREAT SERPL-MCNC: 0.8 MG/DL (ref 0.5–1.4)
CREAT SERPL-MCNC: 0.8 MG/DL (ref 0.5–1.4)
DIFFERENTIAL METHOD: ABNORMAL
EOSINOPHIL # BLD AUTO: 0.3 K/UL (ref 0–0.5)
EOSINOPHIL NFR BLD: 5.1 % (ref 0–8)
ERYTHROCYTE [DISTWIDTH] IN BLOOD BY AUTOMATED COUNT: 12.7 % (ref 11.5–14.5)
EST. GFR  (AFRICAN AMERICAN): >60 ML/MIN/1.73 M^2
EST. GFR  (AFRICAN AMERICAN): >60 ML/MIN/1.73 M^2
EST. GFR  (NON AFRICAN AMERICAN): >60 ML/MIN/1.73 M^2
EST. GFR  (NON AFRICAN AMERICAN): >60 ML/MIN/1.73 M^2
ESTIMATED AVG GLUCOSE: 123 MG/DL (ref 68–131)
GLUCOSE SERPL-MCNC: 96 MG/DL (ref 70–110)
HBA1C MFR BLD: 5.9 % (ref 4–5.6)
HCT VFR BLD AUTO: 38 % (ref 37–48.5)
HGB BLD-MCNC: 11.8 G/DL (ref 12–16)
IMM GRANULOCYTES # BLD AUTO: 0.01 K/UL (ref 0–0.04)
IMM GRANULOCYTES NFR BLD AUTO: 0.2 % (ref 0–0.5)
LYMPHOCYTES # BLD AUTO: 2.3 K/UL (ref 1–4.8)
LYMPHOCYTES NFR BLD: 38.6 % (ref 18–48)
MCH RBC QN AUTO: 28.4 PG (ref 27–31)
MCHC RBC AUTO-ENTMCNC: 31.1 G/DL (ref 32–36)
MCV RBC AUTO: 92 FL (ref 82–98)
MONOCYTES # BLD AUTO: 0.5 K/UL (ref 0.3–1)
MONOCYTES NFR BLD: 9 % (ref 4–15)
NEUTROPHILS # BLD AUTO: 2.7 K/UL (ref 1.8–7.7)
NEUTROPHILS NFR BLD: 46.3 % (ref 38–73)
NRBC BLD-RTO: 0 /100 WBC
PLATELET # BLD AUTO: 285 K/UL (ref 150–450)
PMV BLD AUTO: 10.7 FL (ref 9.2–12.9)
POTASSIUM SERPL-SCNC: 3.5 MMOL/L (ref 3.5–5.1)
PROT SERPL-MCNC: 7.2 G/DL (ref 6–8.4)
RBC # BLD AUTO: 4.15 M/UL (ref 4–5.4)
SODIUM SERPL-SCNC: 140 MMOL/L (ref 136–145)
TSH SERPL DL<=0.005 MIU/L-ACNC: 2.18 UIU/ML (ref 0.4–4)
WBC # BLD AUTO: 5.91 K/UL (ref 3.9–12.7)

## 2021-04-13 PROCEDURE — 99214 PR OFFICE/OUTPT VISIT, EST, LEVL IV, 30-39 MIN: ICD-10-PCS | Mod: S$GLB,,, | Performed by: FAMILY MEDICINE

## 2021-04-13 PROCEDURE — 1125F AMNT PAIN NOTED PAIN PRSNT: CPT | Mod: S$GLB,,, | Performed by: FAMILY MEDICINE

## 2021-04-13 PROCEDURE — 83036 HEMOGLOBIN GLYCOSYLATED A1C: CPT | Performed by: FAMILY MEDICINE

## 2021-04-13 PROCEDURE — 99999 PR PBB SHADOW E&M-EST. PATIENT-LVL IV: CPT | Mod: PBBFAC,,, | Performed by: FAMILY MEDICINE

## 2021-04-13 PROCEDURE — 1125F PR PAIN SEVERITY QUANTIFIED, PAIN PRESENT: ICD-10-PCS | Mod: S$GLB,,, | Performed by: FAMILY MEDICINE

## 2021-04-13 PROCEDURE — 99999 PR PBB SHADOW E&M-EST. PATIENT-LVL IV: ICD-10-PCS | Mod: PBBFAC,,, | Performed by: FAMILY MEDICINE

## 2021-04-13 PROCEDURE — 87338 HPYLORI STOOL AG IA: CPT | Performed by: FAMILY MEDICINE

## 2021-04-13 PROCEDURE — 80053 COMPREHEN METABOLIC PANEL: CPT | Performed by: FAMILY MEDICINE

## 2021-04-13 PROCEDURE — 3008F BODY MASS INDEX DOCD: CPT | Mod: CPTII,S$GLB,, | Performed by: FAMILY MEDICINE

## 2021-04-13 PROCEDURE — 84443 ASSAY THYROID STIM HORMONE: CPT | Performed by: FAMILY MEDICINE

## 2021-04-13 PROCEDURE — 36415 COLL VENOUS BLD VENIPUNCTURE: CPT | Performed by: FAMILY MEDICINE

## 2021-04-13 PROCEDURE — 99214 OFFICE O/P EST MOD 30 MIN: CPT | Mod: S$GLB,,, | Performed by: FAMILY MEDICINE

## 2021-04-13 PROCEDURE — 85025 COMPLETE CBC W/AUTO DIFF WBC: CPT | Performed by: FAMILY MEDICINE

## 2021-04-13 PROCEDURE — 86703 HIV-1/HIV-2 1 RESULT ANTBDY: CPT | Performed by: FAMILY MEDICINE

## 2021-04-13 PROCEDURE — 3008F PR BODY MASS INDEX (BMI) DOCUMENTED: ICD-10-PCS | Mod: CPTII,S$GLB,, | Performed by: FAMILY MEDICINE

## 2021-04-13 RX ORDER — POLYETHYLENE GLYCOL 3350, SODIUM SULFATE ANHYDROUS, SODIUM BICARBONATE, SODIUM CHLORIDE, POTASSIUM CHLORIDE 236; 22.74; 6.74; 5.86; 2.97 G/4L; G/4L; G/4L; G/4L; G/4L
4 POWDER, FOR SOLUTION ORAL ONCE
Qty: 4000 ML | Refills: 0 | Status: SHIPPED | OUTPATIENT
Start: 2021-04-13 | End: 2021-04-13

## 2021-04-14 ENCOUNTER — PATIENT MESSAGE (OUTPATIENT)
Dept: ENDOSCOPY | Facility: HOSPITAL | Age: 61
End: 2021-04-14

## 2021-04-14 ENCOUNTER — TELEPHONE (OUTPATIENT)
Dept: ENDOSCOPY | Facility: HOSPITAL | Age: 61
End: 2021-04-14

## 2021-04-14 DIAGNOSIS — Z01.818 PRE-OP TESTING: Primary | ICD-10-CM

## 2021-04-14 LAB — HIV 1+2 AB+HIV1 P24 AG SERPL QL IA: NEGATIVE

## 2021-04-15 ENCOUNTER — TELEPHONE (OUTPATIENT)
Dept: RADIOLOGY | Facility: HOSPITAL | Age: 61
End: 2021-04-15

## 2021-04-16 ENCOUNTER — HOSPITAL ENCOUNTER (OUTPATIENT)
Dept: RADIOLOGY | Facility: HOSPITAL | Age: 61
Discharge: HOME | End: 2021-04-16
Attending: FAMILY MEDICINE
Payer: COMMERCIAL

## 2021-04-16 ENCOUNTER — TELEPHONE (OUTPATIENT)
Dept: PREADMISSION TESTING | Facility: HOSPITAL | Age: 61
End: 2021-04-16

## 2021-04-16 DIAGNOSIS — K21.9 GASTROESOPHAGEAL REFLUX DISEASE WITHOUT ESOPHAGITIS: ICD-10-CM

## 2021-04-16 LAB — H PYLORI AG STL QL IA: NOT DETECTED

## 2021-04-16 PROCEDURE — 74177 CT ABD & PELVIS W/CONTRAST: CPT | Mod: 26,,, | Performed by: RADIOLOGY

## 2021-04-16 PROCEDURE — 74177 CT ABDOMEN PELVIS WITH CONTRAST: ICD-10-PCS | Mod: 26,,, | Performed by: RADIOLOGY

## 2021-04-16 PROCEDURE — 74177 CT ABD & PELVIS W/CONTRAST: CPT | Mod: TC

## 2021-04-16 PROCEDURE — 25500020 PHARM REV CODE 255: Performed by: FAMILY MEDICINE

## 2021-04-16 PROCEDURE — A9698 NON-RAD CONTRAST MATERIALNOC: HCPCS | Performed by: FAMILY MEDICINE

## 2021-04-16 RX ADMIN — IOHEXOL 1000 ML: 12 SOLUTION ORAL at 07:04

## 2021-04-16 RX ADMIN — IOHEXOL 100 ML: 350 INJECTION, SOLUTION INTRAVENOUS at 09:04

## 2021-04-19 RX ORDER — POLYETHYLENE GLYCOL 3350, SODIUM SULFATE ANHYDROUS, SODIUM BICARBONATE, SODIUM CHLORIDE, POTASSIUM CHLORIDE 236; 22.74; 6.74; 5.86; 2.97 G/4L; G/4L; G/4L; G/4L; G/4L
4 POWDER, FOR SOLUTION ORAL ONCE
Qty: 4000 ML | Refills: 0 | Status: SHIPPED | OUTPATIENT
Start: 2021-04-19 | End: 2021-04-19

## 2021-04-20 ENCOUNTER — LAB VISIT (OUTPATIENT)
Dept: OTOLARYNGOLOGY | Facility: CLINIC | Age: 61
End: 2021-04-20
Payer: COMMERCIAL

## 2021-04-20 DIAGNOSIS — Z01.818 PRE-OP TESTING: ICD-10-CM

## 2021-04-20 PROCEDURE — U0005 INFEC AGEN DETEC AMPLI PROBE: HCPCS | Performed by: INTERNAL MEDICINE

## 2021-04-20 PROCEDURE — U0003 INFECTIOUS AGENT DETECTION BY NUCLEIC ACID (DNA OR RNA); SEVERE ACUTE RESPIRATORY SYNDROME CORONAVIRUS 2 (SARS-COV-2) (CORONAVIRUS DISEASE [COVID-19]), AMPLIFIED PROBE TECHNIQUE, MAKING USE OF HIGH THROUGHPUT TECHNOLOGIES AS DESCRIBED BY CMS-2020-01-R: HCPCS | Performed by: INTERNAL MEDICINE

## 2021-04-21 ENCOUNTER — ANESTHESIA EVENT (OUTPATIENT)
Dept: ENDOSCOPY | Facility: HOSPITAL | Age: 61
End: 2021-04-21
Payer: COMMERCIAL

## 2021-04-21 LAB — SARS-COV-2 RNA RESP QL NAA+PROBE: NOT DETECTED

## 2021-04-23 ENCOUNTER — ANESTHESIA (OUTPATIENT)
Dept: ENDOSCOPY | Facility: HOSPITAL | Age: 61
End: 2021-04-23
Payer: COMMERCIAL

## 2021-04-23 ENCOUNTER — HOSPITAL ENCOUNTER (OUTPATIENT)
Facility: HOSPITAL | Age: 61
Discharge: HOME | End: 2021-04-23
Attending: INTERNAL MEDICINE | Admitting: INTERNAL MEDICINE
Payer: COMMERCIAL

## 2021-04-23 VITALS
HEART RATE: 77 BPM | RESPIRATION RATE: 19 BRPM | OXYGEN SATURATION: 98 % | BODY MASS INDEX: 34.76 KG/M2 | WEIGHT: 196.19 LBS | SYSTOLIC BLOOD PRESSURE: 143 MMHG | TEMPERATURE: 98 F | HEIGHT: 63 IN | DIASTOLIC BLOOD PRESSURE: 76 MMHG

## 2021-04-23 DIAGNOSIS — Z86.010 HX OF COLONIC POLYP: Primary | ICD-10-CM

## 2021-04-23 PROCEDURE — D9220A PRA ANESTHESIA: Mod: 33,,, | Performed by: ANESTHESIOLOGY

## 2021-04-23 PROCEDURE — 37000008 HC ANESTHESIA 1ST 15 MINUTES: Performed by: INTERNAL MEDICINE

## 2021-04-23 PROCEDURE — 25000003 PHARM REV CODE 250: Performed by: NURSE ANESTHETIST, CERTIFIED REGISTERED

## 2021-04-23 PROCEDURE — 88305 TISSUE EXAM BY PATHOLOGIST: CPT | Performed by: PATHOLOGY

## 2021-04-23 PROCEDURE — 88305 TISSUE EXAM BY PATHOLOGIST: ICD-10-PCS | Mod: 26,,, | Performed by: PATHOLOGY

## 2021-04-23 PROCEDURE — 37000009 HC ANESTHESIA EA ADD 15 MINS: Performed by: INTERNAL MEDICINE

## 2021-04-23 PROCEDURE — 45380 COLONOSCOPY AND BIOPSY: CPT | Performed by: INTERNAL MEDICINE

## 2021-04-23 PROCEDURE — 27201012 HC FORCEPS, HOT/COLD, DISP: Performed by: INTERNAL MEDICINE

## 2021-04-23 PROCEDURE — 45380 PR COLONOSCOPY,BIOPSY: ICD-10-PCS | Mod: 33,,, | Performed by: INTERNAL MEDICINE

## 2021-04-23 PROCEDURE — D9220A PRA ANESTHESIA: ICD-10-PCS | Mod: 33,,, | Performed by: NURSE ANESTHETIST, CERTIFIED REGISTERED

## 2021-04-23 PROCEDURE — 63600175 PHARM REV CODE 636 W HCPCS: Performed by: INTERNAL MEDICINE

## 2021-04-23 PROCEDURE — 88305 TISSUE EXAM BY PATHOLOGIST: CPT | Mod: 26,,, | Performed by: PATHOLOGY

## 2021-04-23 PROCEDURE — 45380 COLONOSCOPY AND BIOPSY: CPT | Mod: 33,,, | Performed by: INTERNAL MEDICINE

## 2021-04-23 PROCEDURE — D9220A PRA ANESTHESIA: Mod: 33,,, | Performed by: NURSE ANESTHETIST, CERTIFIED REGISTERED

## 2021-04-23 PROCEDURE — 63600175 PHARM REV CODE 636 W HCPCS: Performed by: NURSE ANESTHETIST, CERTIFIED REGISTERED

## 2021-04-23 PROCEDURE — D9220A PRA ANESTHESIA: ICD-10-PCS | Mod: 33,,, | Performed by: ANESTHESIOLOGY

## 2021-04-23 RX ORDER — SODIUM CHLORIDE, SODIUM LACTATE, POTASSIUM CHLORIDE, CALCIUM CHLORIDE 600; 310; 30; 20 MG/100ML; MG/100ML; MG/100ML; MG/100ML
INJECTION, SOLUTION INTRAVENOUS CONTINUOUS
Status: DISCONTINUED | OUTPATIENT
Start: 2021-04-23 | End: 2021-04-23 | Stop reason: HOSPADM

## 2021-04-23 RX ORDER — LIDOCAINE HYDROCHLORIDE 20 MG/ML
INJECTION, SOLUTION EPIDURAL; INFILTRATION; INTRACAUDAL; PERINEURAL
Status: DISCONTINUED | OUTPATIENT
Start: 2021-04-23 | End: 2021-04-23

## 2021-04-23 RX ORDER — PROPOFOL 10 MG/ML
VIAL (ML) INTRAVENOUS
Status: DISCONTINUED | OUTPATIENT
Start: 2021-04-23 | End: 2021-04-23

## 2021-04-23 RX ORDER — SODIUM CHLORIDE 0.9 % (FLUSH) 0.9 %
10 SYRINGE (ML) INJECTION
Status: DISCONTINUED | OUTPATIENT
Start: 2021-04-23 | End: 2021-04-23 | Stop reason: HOSPADM

## 2021-04-23 RX ADMIN — PROPOFOL 50 MG: 10 INJECTION, EMULSION INTRAVENOUS at 09:04

## 2021-04-23 RX ADMIN — LIDOCAINE HYDROCHLORIDE 50 MG: 20 INJECTION, SOLUTION EPIDURAL; INFILTRATION; INTRACAUDAL; PERINEURAL at 09:04

## 2021-04-23 RX ADMIN — GLYCOPYRROLATE 0.2 MG: 0.2 INJECTION, SOLUTION INTRAMUSCULAR; INTRAVITREAL at 09:04

## 2021-04-23 RX ADMIN — SODIUM CHLORIDE, SODIUM LACTATE, POTASSIUM CHLORIDE, AND CALCIUM CHLORIDE: .6; .31; .03; .02 INJECTION, SOLUTION INTRAVENOUS at 08:04

## 2021-04-24 ENCOUNTER — PATIENT MESSAGE (OUTPATIENT)
Dept: ADMINISTRATIVE | Facility: OTHER | Age: 61
End: 2021-04-24

## 2021-04-30 LAB
FINAL PATHOLOGIC DIAGNOSIS: NORMAL
GROSS: NORMAL
Lab: NORMAL

## 2021-05-14 ENCOUNTER — TELEPHONE (OUTPATIENT)
Dept: INTERNAL MEDICINE | Facility: CLINIC | Age: 61
End: 2021-05-14

## 2021-05-14 ENCOUNTER — OFFICE VISIT (OUTPATIENT)
Dept: URGENT CARE | Facility: CLINIC | Age: 61
End: 2021-05-14
Payer: COMMERCIAL

## 2021-05-14 VITALS
DIASTOLIC BLOOD PRESSURE: 84 MMHG | HEART RATE: 79 BPM | SYSTOLIC BLOOD PRESSURE: 138 MMHG | HEIGHT: 63 IN | BODY MASS INDEX: 34.73 KG/M2 | TEMPERATURE: 98 F | WEIGHT: 196 LBS | OXYGEN SATURATION: 98 % | RESPIRATION RATE: 20 BRPM

## 2021-05-14 DIAGNOSIS — U07.1 COVID-19 VIRUS INFECTION: Primary | ICD-10-CM

## 2021-05-14 DIAGNOSIS — U07.1 COVID-19 VIRUS DETECTED: ICD-10-CM

## 2021-05-14 DIAGNOSIS — R05.9 COUGH: ICD-10-CM

## 2021-05-14 DIAGNOSIS — R09.81 SINUS CONGESTION: ICD-10-CM

## 2021-05-14 LAB
CTP QC/QA: YES
SARS-COV-2 RDRP RESP QL NAA+PROBE: POSITIVE

## 2021-05-14 PROCEDURE — U0002: ICD-10-PCS | Mod: QW,S$GLB,, | Performed by: NURSE PRACTITIONER

## 2021-05-14 PROCEDURE — 3008F BODY MASS INDEX DOCD: CPT | Mod: CPTII,S$GLB,, | Performed by: NURSE PRACTITIONER

## 2021-05-14 PROCEDURE — U0002 COVID-19 LAB TEST NON-CDC: HCPCS | Mod: QW,S$GLB,, | Performed by: NURSE PRACTITIONER

## 2021-05-14 PROCEDURE — 99213 OFFICE O/P EST LOW 20 MIN: CPT | Mod: S$GLB,,, | Performed by: NURSE PRACTITIONER

## 2021-05-14 PROCEDURE — 99213 PR OFFICE/OUTPT VISIT, EST, LEVL III, 20-29 MIN: ICD-10-PCS | Mod: S$GLB,,, | Performed by: NURSE PRACTITIONER

## 2021-05-14 PROCEDURE — 1125F PR PAIN SEVERITY QUANTIFIED, PAIN PRESENT: ICD-10-PCS | Mod: S$GLB,,, | Performed by: NURSE PRACTITIONER

## 2021-05-14 PROCEDURE — 3008F PR BODY MASS INDEX (BMI) DOCUMENTED: ICD-10-PCS | Mod: CPTII,S$GLB,, | Performed by: NURSE PRACTITIONER

## 2021-05-14 PROCEDURE — 1125F AMNT PAIN NOTED PAIN PRSNT: CPT | Mod: S$GLB,,, | Performed by: NURSE PRACTITIONER

## 2021-05-21 ENCOUNTER — TELEPHONE (OUTPATIENT)
Dept: INTERNAL MEDICINE | Facility: CLINIC | Age: 61
End: 2021-05-21

## 2021-05-26 ENCOUNTER — TELEPHONE (OUTPATIENT)
Dept: PAIN MEDICINE | Facility: CLINIC | Age: 61
End: 2021-05-26

## 2021-06-04 ENCOUNTER — TELEPHONE (OUTPATIENT)
Dept: PAIN MEDICINE | Facility: CLINIC | Age: 61
End: 2021-06-04

## 2021-06-04 ENCOUNTER — PATIENT MESSAGE (OUTPATIENT)
Dept: PAIN MEDICINE | Facility: CLINIC | Age: 61
End: 2021-06-04

## 2021-08-03 ENCOUNTER — TELEPHONE (OUTPATIENT)
Dept: INTERNAL MEDICINE | Facility: CLINIC | Age: 61
End: 2021-08-03

## 2021-08-03 DIAGNOSIS — Z12.31 ENCOUNTER FOR SCREENING MAMMOGRAM FOR MALIGNANT NEOPLASM OF BREAST: Primary | ICD-10-CM

## 2021-08-04 ENCOUNTER — TELEPHONE (OUTPATIENT)
Dept: PAIN MEDICINE | Facility: CLINIC | Age: 61
End: 2021-08-04

## 2021-08-12 ENCOUNTER — IMMUNIZATION (OUTPATIENT)
Dept: PHARMACY | Facility: CLINIC | Age: 61
End: 2021-08-12
Payer: COMMERCIAL

## 2021-08-12 DIAGNOSIS — Z23 NEED FOR VACCINATION: Primary | ICD-10-CM

## 2021-08-26 ENCOUNTER — HOSPITAL ENCOUNTER (OUTPATIENT)
Dept: RADIOLOGY | Facility: HOSPITAL | Age: 61
Discharge: HOME | End: 2021-08-26
Attending: FAMILY MEDICINE
Payer: COMMERCIAL

## 2021-08-26 VITALS — BODY MASS INDEX: 34.73 KG/M2 | HEIGHT: 63 IN | WEIGHT: 196 LBS

## 2021-08-26 DIAGNOSIS — Z12.31 ENCOUNTER FOR SCREENING MAMMOGRAM FOR MALIGNANT NEOPLASM OF BREAST: ICD-10-CM

## 2021-08-26 PROCEDURE — 77063 BREAST TOMOSYNTHESIS BI: CPT | Mod: 26,,, | Performed by: RADIOLOGY

## 2021-08-26 PROCEDURE — 77067 MAMMO DIGITAL SCREENING BILAT WITH TOMO: ICD-10-PCS | Mod: 26,,, | Performed by: RADIOLOGY

## 2021-08-26 PROCEDURE — 77063 MAMMO DIGITAL SCREENING BILAT WITH TOMO: ICD-10-PCS | Mod: 26,,, | Performed by: RADIOLOGY

## 2021-08-26 PROCEDURE — 77067 SCR MAMMO BI INCL CAD: CPT | Mod: TC

## 2021-08-26 PROCEDURE — 77067 SCR MAMMO BI INCL CAD: CPT | Mod: 26,,, | Performed by: RADIOLOGY

## 2021-09-02 ENCOUNTER — OFFICE VISIT (OUTPATIENT)
Dept: PAIN MEDICINE | Facility: CLINIC | Age: 61
End: 2021-09-02
Payer: COMMERCIAL

## 2021-09-02 VITALS
HEART RATE: 80 BPM | BODY MASS INDEX: 34.21 KG/M2 | SYSTOLIC BLOOD PRESSURE: 155 MMHG | DIASTOLIC BLOOD PRESSURE: 96 MMHG | WEIGHT: 193.13 LBS

## 2021-09-02 DIAGNOSIS — M47.816 LUMBAR SPONDYLOSIS: Primary | ICD-10-CM

## 2021-09-02 DIAGNOSIS — M54.16 LUMBAR RADICULOPATHY: ICD-10-CM

## 2021-09-02 DIAGNOSIS — M54.9 DORSALGIA, UNSPECIFIED: ICD-10-CM

## 2021-09-02 DIAGNOSIS — M70.60 GREATER TROCHANTERIC BURSITIS, UNSPECIFIED LATERALITY: ICD-10-CM

## 2021-09-02 DIAGNOSIS — M53.3 SACROILIAC JOINT PAIN: ICD-10-CM

## 2021-09-02 PROCEDURE — 1160F PR REVIEW ALL MEDS BY PRESCRIBER/CLIN PHARMACIST DOCUMENTED: ICD-10-PCS | Mod: CPTII,S$GLB,, | Performed by: ANESTHESIOLOGY

## 2021-09-02 PROCEDURE — 3077F SYST BP >= 140 MM HG: CPT | Mod: CPTII,S$GLB,, | Performed by: ANESTHESIOLOGY

## 2021-09-02 PROCEDURE — 1159F PR MEDICATION LIST DOCUMENTED IN MEDICAL RECORD: ICD-10-PCS | Mod: CPTII,S$GLB,, | Performed by: ANESTHESIOLOGY

## 2021-09-02 PROCEDURE — 99999 PR PBB SHADOW E&M-EST. PATIENT-LVL II: CPT | Mod: PBBFAC,,, | Performed by: ANESTHESIOLOGY

## 2021-09-02 PROCEDURE — 3044F PR MOST RECENT HEMOGLOBIN A1C LEVEL <7.0%: ICD-10-PCS | Mod: CPTII,S$GLB,, | Performed by: ANESTHESIOLOGY

## 2021-09-02 PROCEDURE — 3008F BODY MASS INDEX DOCD: CPT | Mod: CPTII,S$GLB,, | Performed by: ANESTHESIOLOGY

## 2021-09-02 PROCEDURE — 1160F RVW MEDS BY RX/DR IN RCRD: CPT | Mod: CPTII,S$GLB,, | Performed by: ANESTHESIOLOGY

## 2021-09-02 PROCEDURE — 3008F PR BODY MASS INDEX (BMI) DOCUMENTED: ICD-10-PCS | Mod: CPTII,S$GLB,, | Performed by: ANESTHESIOLOGY

## 2021-09-02 PROCEDURE — 3080F DIAST BP >= 90 MM HG: CPT | Mod: CPTII,S$GLB,, | Performed by: ANESTHESIOLOGY

## 2021-09-02 PROCEDURE — 3044F HG A1C LEVEL LT 7.0%: CPT | Mod: CPTII,S$GLB,, | Performed by: ANESTHESIOLOGY

## 2021-09-02 PROCEDURE — 3080F PR MOST RECENT DIASTOLIC BLOOD PRESSURE >= 90 MM HG: ICD-10-PCS | Mod: CPTII,S$GLB,, | Performed by: ANESTHESIOLOGY

## 2021-09-02 PROCEDURE — 3077F PR MOST RECENT SYSTOLIC BLOOD PRESSURE >= 140 MM HG: ICD-10-PCS | Mod: CPTII,S$GLB,, | Performed by: ANESTHESIOLOGY

## 2021-09-02 PROCEDURE — 99204 PR OFFICE/OUTPT VISIT, NEW, LEVL IV, 45-59 MIN: ICD-10-PCS | Mod: S$GLB,,, | Performed by: ANESTHESIOLOGY

## 2021-09-02 PROCEDURE — 99204 OFFICE O/P NEW MOD 45 MIN: CPT | Mod: S$GLB,,, | Performed by: ANESTHESIOLOGY

## 2021-09-02 PROCEDURE — 99999 PR PBB SHADOW E&M-EST. PATIENT-LVL II: ICD-10-PCS | Mod: PBBFAC,,, | Performed by: ANESTHESIOLOGY

## 2021-09-02 PROCEDURE — 1159F MED LIST DOCD IN RCRD: CPT | Mod: CPTII,S$GLB,, | Performed by: ANESTHESIOLOGY

## 2021-09-15 ENCOUNTER — HOSPITAL ENCOUNTER (OUTPATIENT)
Dept: RADIOLOGY | Facility: HOSPITAL | Age: 61
Discharge: HOME | End: 2021-09-15
Attending: ANESTHESIOLOGY
Payer: COMMERCIAL

## 2021-09-15 DIAGNOSIS — M54.9 DORSALGIA, UNSPECIFIED: ICD-10-CM

## 2021-09-15 PROCEDURE — 72148 MRI LUMBAR SPINE WITHOUT CONTRAST: ICD-10-PCS | Mod: 26,,, | Performed by: RADIOLOGY

## 2021-09-15 PROCEDURE — 72148 MRI LUMBAR SPINE W/O DYE: CPT | Mod: 26,,, | Performed by: RADIOLOGY

## 2021-09-15 PROCEDURE — 72148 MRI LUMBAR SPINE W/O DYE: CPT | Mod: TC

## 2021-09-16 ENCOUNTER — OFFICE VISIT (OUTPATIENT)
Dept: PAIN MEDICINE | Facility: CLINIC | Age: 61
End: 2021-09-16
Payer: COMMERCIAL

## 2021-09-16 VITALS
HEART RATE: 76 BPM | DIASTOLIC BLOOD PRESSURE: 97 MMHG | HEIGHT: 63 IN | BODY MASS INDEX: 34.43 KG/M2 | SYSTOLIC BLOOD PRESSURE: 148 MMHG | WEIGHT: 194.31 LBS

## 2021-09-16 DIAGNOSIS — M54.16 LUMBAR RADICULOPATHY: Primary | ICD-10-CM

## 2021-09-16 DIAGNOSIS — M47.816 LUMBAR SPONDYLOSIS: ICD-10-CM

## 2021-09-16 PROCEDURE — 3077F SYST BP >= 140 MM HG: CPT | Mod: CPTII,S$GLB,, | Performed by: ANESTHESIOLOGY

## 2021-09-16 PROCEDURE — 3008F PR BODY MASS INDEX (BMI) DOCUMENTED: ICD-10-PCS | Mod: CPTII,S$GLB,, | Performed by: ANESTHESIOLOGY

## 2021-09-16 PROCEDURE — 99999 PR PBB SHADOW E&M-EST. PATIENT-LVL III: CPT | Mod: PBBFAC,,, | Performed by: ANESTHESIOLOGY

## 2021-09-16 PROCEDURE — 3080F PR MOST RECENT DIASTOLIC BLOOD PRESSURE >= 90 MM HG: ICD-10-PCS | Mod: CPTII,S$GLB,, | Performed by: ANESTHESIOLOGY

## 2021-09-16 PROCEDURE — 3008F BODY MASS INDEX DOCD: CPT | Mod: CPTII,S$GLB,, | Performed by: ANESTHESIOLOGY

## 2021-09-16 PROCEDURE — 99214 PR OFFICE/OUTPT VISIT, EST, LEVL IV, 30-39 MIN: ICD-10-PCS | Mod: S$GLB,,, | Performed by: ANESTHESIOLOGY

## 2021-09-16 PROCEDURE — 3044F HG A1C LEVEL LT 7.0%: CPT | Mod: CPTII,S$GLB,, | Performed by: ANESTHESIOLOGY

## 2021-09-16 PROCEDURE — 99214 OFFICE O/P EST MOD 30 MIN: CPT | Mod: S$GLB,,, | Performed by: ANESTHESIOLOGY

## 2021-09-16 PROCEDURE — 99999 PR PBB SHADOW E&M-EST. PATIENT-LVL III: ICD-10-PCS | Mod: PBBFAC,,, | Performed by: ANESTHESIOLOGY

## 2021-09-16 PROCEDURE — 3044F PR MOST RECENT HEMOGLOBIN A1C LEVEL <7.0%: ICD-10-PCS | Mod: CPTII,S$GLB,, | Performed by: ANESTHESIOLOGY

## 2021-09-16 PROCEDURE — 3080F DIAST BP >= 90 MM HG: CPT | Mod: CPTII,S$GLB,, | Performed by: ANESTHESIOLOGY

## 2021-09-16 PROCEDURE — 3077F PR MOST RECENT SYSTOLIC BLOOD PRESSURE >= 140 MM HG: ICD-10-PCS | Mod: CPTII,S$GLB,, | Performed by: ANESTHESIOLOGY

## 2021-10-07 ENCOUNTER — HOSPITAL ENCOUNTER (OUTPATIENT)
Facility: HOSPITAL | Age: 61
Discharge: HOME | End: 2021-10-07
Attending: ANESTHESIOLOGY | Admitting: ANESTHESIOLOGY
Payer: COMMERCIAL

## 2021-10-07 VITALS
RESPIRATION RATE: 18 BRPM | WEIGHT: 193.56 LBS | BODY MASS INDEX: 34.3 KG/M2 | SYSTOLIC BLOOD PRESSURE: 138 MMHG | DIASTOLIC BLOOD PRESSURE: 80 MMHG | TEMPERATURE: 98 F | OXYGEN SATURATION: 96 % | HEIGHT: 63 IN | HEART RATE: 64 BPM

## 2021-10-07 DIAGNOSIS — M54.16 LUMBAR RADICULOPATHY: Primary | ICD-10-CM

## 2021-10-07 PROCEDURE — 63600175 PHARM REV CODE 636 W HCPCS: Performed by: ANESTHESIOLOGY

## 2021-10-07 PROCEDURE — 64483 PR EPIDURAL INJ, ANES/STEROID, TRANSFORAMINAL, LUMB/SACR, SNGL LEVL: ICD-10-PCS | Mod: 50,,, | Performed by: ANESTHESIOLOGY

## 2021-10-07 PROCEDURE — 64483 NJX AA&/STRD TFRM EPI L/S 1: CPT | Mod: 50 | Performed by: ANESTHESIOLOGY

## 2021-10-07 PROCEDURE — 25000003 PHARM REV CODE 250: Performed by: ANESTHESIOLOGY

## 2021-10-07 PROCEDURE — 25500020 PHARM REV CODE 255: Performed by: ANESTHESIOLOGY

## 2021-10-07 PROCEDURE — 64483 NJX AA&/STRD TFRM EPI L/S 1: CPT | Mod: 50,,, | Performed by: ANESTHESIOLOGY

## 2021-10-07 RX ORDER — MIDAZOLAM HYDROCHLORIDE 1 MG/ML
INJECTION, SOLUTION INTRAMUSCULAR; INTRAVENOUS
Status: DISCONTINUED | OUTPATIENT
Start: 2021-10-07 | End: 2021-10-07 | Stop reason: HOSPADM

## 2021-10-07 RX ORDER — LIDOCAINE HYDROCHLORIDE 10 MG/ML
INJECTION, SOLUTION EPIDURAL; INFILTRATION; INTRACAUDAL; PERINEURAL
Status: DISCONTINUED | OUTPATIENT
Start: 2021-10-07 | End: 2021-10-07 | Stop reason: HOSPADM

## 2021-10-07 RX ORDER — SODIUM BICARBONATE 1 MEQ/ML
SYRINGE (ML) INTRAVENOUS
Status: DISCONTINUED | OUTPATIENT
Start: 2021-10-07 | End: 2021-10-07 | Stop reason: HOSPADM

## 2021-10-07 RX ORDER — DEXAMETHASONE SODIUM PHOSPHATE 10 MG/ML
INJECTION INTRAMUSCULAR; INTRAVENOUS
Status: DISCONTINUED | OUTPATIENT
Start: 2021-10-07 | End: 2021-10-07 | Stop reason: HOSPADM

## 2021-10-07 RX ORDER — FENTANYL CITRATE 50 UG/ML
INJECTION, SOLUTION INTRAMUSCULAR; INTRAVENOUS
Status: DISCONTINUED | OUTPATIENT
Start: 2021-10-07 | End: 2021-10-07 | Stop reason: HOSPADM

## 2021-11-02 ENCOUNTER — PATIENT OUTREACH (OUTPATIENT)
Dept: ADMINISTRATIVE | Facility: OTHER | Age: 61
End: 2021-11-02
Payer: COMMERCIAL

## 2021-11-04 ENCOUNTER — LAB VISIT (OUTPATIENT)
Dept: LAB | Facility: HOSPITAL | Age: 61
End: 2021-11-04
Payer: COMMERCIAL

## 2021-11-04 ENCOUNTER — OFFICE VISIT (OUTPATIENT)
Dept: PAIN MEDICINE | Facility: CLINIC | Age: 61
End: 2021-11-04
Payer: COMMERCIAL

## 2021-11-04 ENCOUNTER — OFFICE VISIT (OUTPATIENT)
Dept: INTERNAL MEDICINE | Facility: CLINIC | Age: 61
End: 2021-11-04
Payer: COMMERCIAL

## 2021-11-04 VITALS
SYSTOLIC BLOOD PRESSURE: 130 MMHG | TEMPERATURE: 97 F | BODY MASS INDEX: 34.29 KG/M2 | DIASTOLIC BLOOD PRESSURE: 84 MMHG | WEIGHT: 193.56 LBS | HEART RATE: 72 BPM

## 2021-11-04 VITALS
WEIGHT: 193.56 LBS | DIASTOLIC BLOOD PRESSURE: 93 MMHG | HEIGHT: 63 IN | SYSTOLIC BLOOD PRESSURE: 151 MMHG | HEART RATE: 75 BPM | BODY MASS INDEX: 34.3 KG/M2

## 2021-11-04 DIAGNOSIS — R82.998 DARK URINE: Primary | ICD-10-CM

## 2021-11-04 DIAGNOSIS — R30.0 DYSURIA: ICD-10-CM

## 2021-11-04 DIAGNOSIS — M54.16 LUMBAR RADICULOPATHY: Primary | ICD-10-CM

## 2021-11-04 DIAGNOSIS — M47.816 LUMBAR SPONDYLOSIS: ICD-10-CM

## 2021-11-04 DIAGNOSIS — R82.998 DARK URINE: ICD-10-CM

## 2021-11-04 LAB
BILIRUB UR QL STRIP: NEGATIVE
CLARITY UR: CLEAR
COLOR UR: YELLOW
GLUCOSE UR QL STRIP: NEGATIVE
HGB UR QL STRIP: NEGATIVE
KETONES UR QL STRIP: NEGATIVE
LEUKOCYTE ESTERASE UR QL STRIP: NEGATIVE
NITRITE UR QL STRIP: NEGATIVE
PH UR STRIP: 7 [PH] (ref 5–8)
PROT UR QL STRIP: NEGATIVE
SP GR UR STRIP: 1.02 (ref 1–1.03)
URN SPEC COLLECT METH UR: NORMAL

## 2021-11-04 PROCEDURE — 99999 PR PBB SHADOW E&M-EST. PATIENT-LVL III: CPT | Mod: PBBFAC,,, | Performed by: ANESTHESIOLOGY

## 2021-11-04 PROCEDURE — 3008F BODY MASS INDEX DOCD: CPT | Mod: CPTII,S$GLB,, | Performed by: NURSE PRACTITIONER

## 2021-11-04 PROCEDURE — 99214 OFFICE O/P EST MOD 30 MIN: CPT | Mod: S$GLB,,, | Performed by: ANESTHESIOLOGY

## 2021-11-04 PROCEDURE — 3044F HG A1C LEVEL LT 7.0%: CPT | Mod: CPTII,S$GLB,, | Performed by: NURSE PRACTITIONER

## 2021-11-04 PROCEDURE — 3077F PR MOST RECENT SYSTOLIC BLOOD PRESSURE >= 140 MM HG: ICD-10-PCS | Mod: CPTII,S$GLB,, | Performed by: ANESTHESIOLOGY

## 2021-11-04 PROCEDURE — 99213 PR OFFICE/OUTPT VISIT, EST, LEVL III, 20-29 MIN: ICD-10-PCS | Mod: S$GLB,,, | Performed by: NURSE PRACTITIONER

## 2021-11-04 PROCEDURE — 99213 OFFICE O/P EST LOW 20 MIN: CPT | Mod: S$GLB,,, | Performed by: NURSE PRACTITIONER

## 2021-11-04 PROCEDURE — 1159F PR MEDICATION LIST DOCUMENTED IN MEDICAL RECORD: ICD-10-PCS | Mod: CPTII,S$GLB,, | Performed by: NURSE PRACTITIONER

## 2021-11-04 PROCEDURE — 1159F MED LIST DOCD IN RCRD: CPT | Mod: CPTII,S$GLB,, | Performed by: NURSE PRACTITIONER

## 2021-11-04 PROCEDURE — 3008F BODY MASS INDEX DOCD: CPT | Mod: CPTII,S$GLB,, | Performed by: ANESTHESIOLOGY

## 2021-11-04 PROCEDURE — 3044F PR MOST RECENT HEMOGLOBIN A1C LEVEL <7.0%: ICD-10-PCS | Mod: CPTII,S$GLB,, | Performed by: NURSE PRACTITIONER

## 2021-11-04 PROCEDURE — 99999 PR PBB SHADOW E&M-EST. PATIENT-LVL III: ICD-10-PCS | Mod: PBBFAC,,, | Performed by: NURSE PRACTITIONER

## 2021-11-04 PROCEDURE — 99999 PR PBB SHADOW E&M-EST. PATIENT-LVL III: CPT | Mod: PBBFAC,,, | Performed by: NURSE PRACTITIONER

## 2021-11-04 PROCEDURE — 87086 URINE CULTURE/COLONY COUNT: CPT | Performed by: NURSE PRACTITIONER

## 2021-11-04 PROCEDURE — 3044F PR MOST RECENT HEMOGLOBIN A1C LEVEL <7.0%: ICD-10-PCS | Mod: CPTII,S$GLB,, | Performed by: ANESTHESIOLOGY

## 2021-11-04 PROCEDURE — 3080F PR MOST RECENT DIASTOLIC BLOOD PRESSURE >= 90 MM HG: ICD-10-PCS | Mod: CPTII,S$GLB,, | Performed by: ANESTHESIOLOGY

## 2021-11-04 PROCEDURE — 3079F PR MOST RECENT DIASTOLIC BLOOD PRESSURE 80-89 MM HG: ICD-10-PCS | Mod: CPTII,S$GLB,, | Performed by: NURSE PRACTITIONER

## 2021-11-04 PROCEDURE — 81003 URINALYSIS AUTO W/O SCOPE: CPT | Performed by: NURSE PRACTITIONER

## 2021-11-04 PROCEDURE — 3080F DIAST BP >= 90 MM HG: CPT | Mod: CPTII,S$GLB,, | Performed by: ANESTHESIOLOGY

## 2021-11-04 PROCEDURE — 3008F PR BODY MASS INDEX (BMI) DOCUMENTED: ICD-10-PCS | Mod: CPTII,S$GLB,, | Performed by: NURSE PRACTITIONER

## 2021-11-04 PROCEDURE — 3008F PR BODY MASS INDEX (BMI) DOCUMENTED: ICD-10-PCS | Mod: CPTII,S$GLB,, | Performed by: ANESTHESIOLOGY

## 2021-11-04 PROCEDURE — 3075F PR MOST RECENT SYSTOLIC BLOOD PRESS GE 130-139MM HG: ICD-10-PCS | Mod: CPTII,S$GLB,, | Performed by: NURSE PRACTITIONER

## 2021-11-04 PROCEDURE — 99214 PR OFFICE/OUTPT VISIT, EST, LEVL IV, 30-39 MIN: ICD-10-PCS | Mod: S$GLB,,, | Performed by: ANESTHESIOLOGY

## 2021-11-04 PROCEDURE — 3079F DIAST BP 80-89 MM HG: CPT | Mod: CPTII,S$GLB,, | Performed by: NURSE PRACTITIONER

## 2021-11-04 PROCEDURE — 3077F SYST BP >= 140 MM HG: CPT | Mod: CPTII,S$GLB,, | Performed by: ANESTHESIOLOGY

## 2021-11-04 PROCEDURE — 3075F SYST BP GE 130 - 139MM HG: CPT | Mod: CPTII,S$GLB,, | Performed by: NURSE PRACTITIONER

## 2021-11-04 PROCEDURE — 99999 PR PBB SHADOW E&M-EST. PATIENT-LVL III: ICD-10-PCS | Mod: PBBFAC,,, | Performed by: ANESTHESIOLOGY

## 2021-11-04 PROCEDURE — 3044F HG A1C LEVEL LT 7.0%: CPT | Mod: CPTII,S$GLB,, | Performed by: ANESTHESIOLOGY

## 2021-11-05 LAB
BACTERIA UR CULT: NORMAL
BACTERIA UR CULT: NORMAL

## 2021-12-23 ENCOUNTER — PATIENT MESSAGE (OUTPATIENT)
Dept: ADMINISTRATIVE | Facility: OTHER | Age: 61
End: 2021-12-23
Payer: COMMERCIAL

## 2021-12-29 ENCOUNTER — HOSPITAL ENCOUNTER (OUTPATIENT)
Facility: HOSPITAL | Age: 61
Discharge: HOME | End: 2021-12-29
Attending: ANESTHESIOLOGY | Admitting: ANESTHESIOLOGY
Payer: COMMERCIAL

## 2021-12-29 VITALS
SYSTOLIC BLOOD PRESSURE: 137 MMHG | RESPIRATION RATE: 18 BRPM | HEIGHT: 63 IN | DIASTOLIC BLOOD PRESSURE: 79 MMHG | BODY MASS INDEX: 34.38 KG/M2 | OXYGEN SATURATION: 96 % | WEIGHT: 194 LBS | TEMPERATURE: 97 F | HEART RATE: 77 BPM

## 2021-12-29 DIAGNOSIS — M54.16 LUMBAR RADICULOPATHY: Primary | ICD-10-CM

## 2021-12-29 DIAGNOSIS — M54.18 SACRAL RADICULOPATHY: ICD-10-CM

## 2021-12-29 PROCEDURE — 64483 NJX AA&/STRD TFRM EPI L/S 1: CPT | Mod: 50,,, | Performed by: ANESTHESIOLOGY

## 2021-12-29 PROCEDURE — 63600175 PHARM REV CODE 636 W HCPCS: Performed by: ANESTHESIOLOGY

## 2021-12-29 PROCEDURE — 64483 NJX AA&/STRD TFRM EPI L/S 1: CPT | Mod: 50 | Performed by: ANESTHESIOLOGY

## 2021-12-29 PROCEDURE — 64483 PR EPIDURAL INJ, ANES/STEROID, TRANSFORAMINAL, LUMB/SACR, SNGL LEVL: ICD-10-PCS | Mod: 50,,, | Performed by: ANESTHESIOLOGY

## 2021-12-29 PROCEDURE — 25500020 PHARM REV CODE 255: Performed by: ANESTHESIOLOGY

## 2021-12-29 PROCEDURE — 25000003 PHARM REV CODE 250: Performed by: ANESTHESIOLOGY

## 2021-12-29 RX ORDER — LIDOCAINE HYDROCHLORIDE 20 MG/ML
INJECTION, SOLUTION EPIDURAL; INFILTRATION; INTRACAUDAL; PERINEURAL
Status: DISCONTINUED | OUTPATIENT
Start: 2021-12-29 | End: 2021-12-29 | Stop reason: HOSPADM

## 2021-12-29 RX ORDER — FENTANYL CITRATE 50 UG/ML
INJECTION, SOLUTION INTRAMUSCULAR; INTRAVENOUS
Status: DISCONTINUED | OUTPATIENT
Start: 2021-12-29 | End: 2021-12-29 | Stop reason: HOSPADM

## 2021-12-29 RX ORDER — INDOMETHACIN 25 MG/1
CAPSULE ORAL
Status: DISCONTINUED | OUTPATIENT
Start: 2021-12-29 | End: 2021-12-29 | Stop reason: HOSPADM

## 2021-12-29 RX ORDER — DEXAMETHASONE SODIUM PHOSPHATE 10 MG/ML
INJECTION INTRAMUSCULAR; INTRAVENOUS
Status: DISCONTINUED | OUTPATIENT
Start: 2021-12-29 | End: 2021-12-29 | Stop reason: HOSPADM

## 2021-12-29 RX ORDER — MIDAZOLAM HYDROCHLORIDE 1 MG/ML
INJECTION, SOLUTION INTRAMUSCULAR; INTRAVENOUS
Status: DISCONTINUED | OUTPATIENT
Start: 2021-12-29 | End: 2021-12-29 | Stop reason: HOSPADM

## 2022-01-11 ENCOUNTER — OFFICE VISIT (OUTPATIENT)
Dept: INTERNAL MEDICINE | Facility: CLINIC | Age: 62
End: 2022-01-11
Payer: COMMERCIAL

## 2022-01-11 VITALS
TEMPERATURE: 99 F | DIASTOLIC BLOOD PRESSURE: 82 MMHG | SYSTOLIC BLOOD PRESSURE: 136 MMHG | HEART RATE: 71 BPM | OXYGEN SATURATION: 97 % | BODY MASS INDEX: 34.6 KG/M2 | WEIGHT: 195.31 LBS

## 2022-01-11 DIAGNOSIS — I10 ESSENTIAL HYPERTENSION: Chronic | ICD-10-CM

## 2022-01-11 DIAGNOSIS — R73.03 PREDIABETES: ICD-10-CM

## 2022-01-11 DIAGNOSIS — K21.9 GASTROESOPHAGEAL REFLUX DISEASE WITHOUT ESOPHAGITIS: Chronic | ICD-10-CM

## 2022-01-11 DIAGNOSIS — M54.16 CHRONIC LEFT-SIDED LUMBAR RADICULOPATHY: Primary | ICD-10-CM

## 2022-01-11 DIAGNOSIS — E78.2 MIXED HYPERLIPIDEMIA: ICD-10-CM

## 2022-01-11 DIAGNOSIS — M47.26 OSTEOARTHRITIS OF SPINE WITH RADICULOPATHY, LUMBAR REGION: ICD-10-CM

## 2022-01-11 DIAGNOSIS — Z86.010 HX OF COLONIC POLYP: ICD-10-CM

## 2022-01-11 DIAGNOSIS — E66.9 OBESITY (BMI 30.0-34.9): ICD-10-CM

## 2022-01-11 PROBLEM — R94.31 ABNORMAL ECG: Status: RESOLVED | Noted: 2019-09-17 | Resolved: 2022-01-11

## 2022-01-11 PROBLEM — E66.811 OBESITY (BMI 30.0-34.9): Status: ACTIVE | Noted: 2022-01-11

## 2022-01-11 PROBLEM — E66.01 SEVERE OBESITY (BMI 35.0-39.9) WITH COMORBIDITY: Status: RESOLVED | Noted: 2018-11-12 | Resolved: 2022-01-11

## 2022-01-11 PROCEDURE — 1159F MED LIST DOCD IN RCRD: CPT | Mod: CPTII,S$GLB,, | Performed by: FAMILY MEDICINE

## 2022-01-11 PROCEDURE — 1160F RVW MEDS BY RX/DR IN RCRD: CPT | Mod: CPTII,S$GLB,, | Performed by: FAMILY MEDICINE

## 2022-01-11 PROCEDURE — 3075F SYST BP GE 130 - 139MM HG: CPT | Mod: CPTII,S$GLB,, | Performed by: FAMILY MEDICINE

## 2022-01-11 PROCEDURE — 3008F PR BODY MASS INDEX (BMI) DOCUMENTED: ICD-10-PCS | Mod: CPTII,S$GLB,, | Performed by: FAMILY MEDICINE

## 2022-01-11 PROCEDURE — 99999 PR PBB SHADOW E&M-EST. PATIENT-LVL III: CPT | Mod: PBBFAC,,, | Performed by: FAMILY MEDICINE

## 2022-01-11 PROCEDURE — 99999 PR PBB SHADOW E&M-EST. PATIENT-LVL III: ICD-10-PCS | Mod: PBBFAC,,, | Performed by: FAMILY MEDICINE

## 2022-01-11 PROCEDURE — 99214 PR OFFICE/OUTPT VISIT, EST, LEVL IV, 30-39 MIN: ICD-10-PCS | Mod: S$GLB,,, | Performed by: FAMILY MEDICINE

## 2022-01-11 PROCEDURE — 3008F BODY MASS INDEX DOCD: CPT | Mod: CPTII,S$GLB,, | Performed by: FAMILY MEDICINE

## 2022-01-11 PROCEDURE — 1159F PR MEDICATION LIST DOCUMENTED IN MEDICAL RECORD: ICD-10-PCS | Mod: CPTII,S$GLB,, | Performed by: FAMILY MEDICINE

## 2022-01-11 PROCEDURE — 99214 OFFICE O/P EST MOD 30 MIN: CPT | Mod: S$GLB,,, | Performed by: FAMILY MEDICINE

## 2022-01-11 PROCEDURE — 3079F DIAST BP 80-89 MM HG: CPT | Mod: CPTII,S$GLB,, | Performed by: FAMILY MEDICINE

## 2022-01-11 PROCEDURE — 1160F PR REVIEW ALL MEDS BY PRESCRIBER/CLIN PHARMACIST DOCUMENTED: ICD-10-PCS | Mod: CPTII,S$GLB,, | Performed by: FAMILY MEDICINE

## 2022-01-11 PROCEDURE — 3079F PR MOST RECENT DIASTOLIC BLOOD PRESSURE 80-89 MM HG: ICD-10-PCS | Mod: CPTII,S$GLB,, | Performed by: FAMILY MEDICINE

## 2022-01-11 PROCEDURE — 3075F PR MOST RECENT SYSTOLIC BLOOD PRESS GE 130-139MM HG: ICD-10-PCS | Mod: CPTII,S$GLB,, | Performed by: FAMILY MEDICINE

## 2022-01-11 RX ORDER — ROSUVASTATIN CALCIUM 40 MG/1
40 TABLET, COATED ORAL NIGHTLY
Qty: 90 TABLET | Refills: 1 | Status: SHIPPED | OUTPATIENT
Start: 2022-01-11 | End: 2023-01-20 | Stop reason: SDUPTHER

## 2022-01-11 RX ORDER — LOSARTAN POTASSIUM AND HYDROCHLOROTHIAZIDE 25; 100 MG/1; MG/1
1 TABLET ORAL DAILY
Qty: 90 TABLET | Refills: 3 | Status: SHIPPED | OUTPATIENT
Start: 2022-01-11 | End: 2023-01-20 | Stop reason: SDUPTHER

## 2022-01-11 RX ORDER — AMLODIPINE BESYLATE 2.5 MG/1
2.5 TABLET ORAL DAILY
Qty: 90 TABLET | Refills: 3 | Status: SHIPPED | OUTPATIENT
Start: 2022-01-11 | End: 2023-01-20 | Stop reason: SDUPTHER

## 2022-01-11 RX ORDER — METHYLPREDNISOLONE 4 MG/1
TABLET ORAL
Qty: 21 EACH | Refills: 0 | Status: SHIPPED | OUTPATIENT
Start: 2022-01-11 | End: 2022-02-10

## 2022-01-11 NOTE — PROGRESS NOTES
Subjective:       Patient ID: Ora Henderson is a 61 y.o. female.    Chief Complaint: No chief complaint on file.    61-year-old  female patient with Patient Active Problem List:     DJD (degenerative joint disease), cervical-mild     Mixed hyperlipidemia     Hepatomegaly     Family history of cardiovascular disease     GERD (gastroesophageal reflux disease)     History of benign pituitary tumor     Hx of colonic polyp     Essential hypertension     Osteoarthritis of spine with radiculopathy, lumbar region     NATALIE (obstructive sleep apnea)     Lumbar radiculopathy     Obesity (BMI 30.0-34.9)  Reports that she has been having left-sided low back pain up to 7 to 8/10 but denies any radiation of pain to left lower leg, secondary to underlying arthritis to the lower back had steroid injection ARMANDO done by Dr. Charlton on 12/29/2021.   Denies any trouble with bowel movements or urine, fever with chills chest pain or difficulty breathing with palpitations.     Review of Systems   Constitutional: Negative for fatigue.   Eyes: Negative for visual disturbance.   Respiratory: Negative for shortness of breath.    Cardiovascular: Negative for chest pain and leg swelling.   Gastrointestinal: Negative for abdominal pain, constipation, diarrhea, nausea and vomiting.   Genitourinary: Negative for dysuria.   Musculoskeletal: Positive for back pain and myalgias.   Skin: Negative for rash.   Neurological: Negative for weakness, light-headedness, numbness and headaches.   Psychiatric/Behavioral: Negative for sleep disturbance.         /82 (BP Location: Right arm, Patient Position: Sitting, BP Method: Large (Manual))   Pulse 71   Temp 98.5 °F (36.9 °C) (Tympanic)   Wt 88.6 kg (195 lb 5.2 oz)   SpO2 97%   BMI 34.60 kg/m²   Objective:      Physical Exam  Constitutional:       Appearance: She is well-developed and well-nourished.   HENT:      Head: Normocephalic and atraumatic.      Mouth/Throat:       Mouth: Oropharynx is clear and moist.   Cardiovascular:      Rate and Rhythm: Normal rate and regular rhythm.      Heart sounds: Normal heart sounds. No murmur heard.      Pulmonary:      Effort: Pulmonary effort is normal.      Breath sounds: Normal breath sounds. No wheezing.   Abdominal:      General: Bowel sounds are normal.      Palpations: Abdomen is soft.      Tenderness: There is no abdominal tenderness.   Musculoskeletal:         General: Tenderness present. No edema.      Comments: Positive for paraspinal lumbar muscle tenderness on the left side  Negative for straight leg raise test bilaterally   Skin:     General: Skin is warm and dry.      Findings: No rash.   Neurological:      Mental Status: She is alert and oriented to person, place, and time.   Psychiatric:         Mood and Affect: Mood and affect and mood normal.       MRI LUMBAR SPINE WITHOUT CONTRAST     CLINICAL HISTORY:  Back pain or radiculopathy, > 6 wks; Dorsalgia, unspecified     TECHNIQUE:  Multiplanar, multisequence MR images were acquired from the thoracolumbar junction to the sacrum without the administration of contrast.     COMPARISON:  Lumbar spine radiographs December 15, 2020     FINDINGS:  There is grade 1 anterolisthesis of L4 on L5. There is no acute fracture.  The vertebral bodies are normal in height without compression fractures.  There is mild disc height loss and desiccation at the L4-L5 level.  Conus medullaris terminates at the T12-L1 level. Distal spinal cord intensity is normal.  1.3 cm cyst within the left kidney..     T12-L1: No disc bulge.  No significant facet arthropathy.  There is no neural foraminal stenosis.  There is no spinal canal stenosis.     L1-L2: No disc bulge.  No significant facet arthropathy.  There is no neuroforaminal stenosis.  There is no spinal canal stenosis.     L2-L3: No disc bulge.  No significant facet arthropathy.  There is no neuroforaminal stenosis.  There is no spinal canal  stenosis.     L3-L4: Minimal diffuse disc bulge.  Moderate bilateral facet arthropathy.  There is no neuroforaminal stenosis.  There is no spinal canal stenosis.     L4-L5: There is mild uncovering of the disc space secondary to listhesis at this level.  There is a mild diffuse disc bulge.  Severe bilateral facet arthropathy.  Mild bilateral neural foraminal stenosis.  Severe spinal canal stenosis secondary to listhesis, disc bulge, facet arthropathy, and ligamentum flavum hypertrophy at this level.     L5-S1: No disc bulge.  Mild bilateral facet arthropathy.  There is a synovial cyst associated with the left facet which narrows the left lateral recess at this level and exerts mass effect upon the S1 nerve root (series 6, image 35).  There is no neuroforaminal stenosis.  There is no spinal canal stenosis.     Impression:     1.  Inferior lumbar spine degenerative changes, worst at L4-L5, resulting in severe spinal canal and mild neural foraminal stenosis at this level.     2.  Synovial cyst associated with the left L5-S1 articular facet narrows the left lateral recess and exerts mass effect upon the left S1 nerve root.    Assessment/Plan:   1. Chronic left-sided lumbar radiculopathy  - methylPREDNISolone (MEDROL DOSEPACK) 4 mg tablet; use as directed  Dispense: 21 each; Refill: 0  Medrol Dosepak prescribed today for symptomatic relief.  Advised to take over-the-counter Tylenol/ibuprofen as needed for symptomatic relief and warm compresses recommended the    2. Osteoarthritis of spine with radiculopathy, lumbar region  S/p ARMANDO   Reviewed MRI done in September showing degenerative changes worst at L4-L5 and synovial cyst exerting mass effect upon left S1  If any worsening pain patient was advised to discuss further with pain management to see if patient needs a referral to neurosurgeon  Currently taking gabapentin in the evening    3. Essential hypertension  - Comprehensive Metabolic Panel; Future  - Lipid Panel;  Future  - TSH; Future  - Urinalysis, Reflex to Urine Culture Urine, Clean Catch; Future  - Hemoglobin A1C; Future  - losartan-hydrochlorothiazide 100-25 mg (HYZAAR) 100-25 mg per tablet; Take 1 tablet by mouth once daily.  Dispense: 90 tablet; Refill: 3  - amLODIPine (NORVASC) 2.5 MG tablet; Take 1 tablet (2.5 mg total) by mouth once daily.  Dispense: 90 tablet; Refill: 3  Blood pressure is stable currently on amlodipine 2.5 mg, the the losartan hydrochlorothiazide 100/25 mg    4. Mixed hyperlipidemia  - Lipid Panel; Future  - rosuvastatin (CRESTOR) 40 MG Tab; Take 1 tablet (40 mg total) by mouth every evening.  Dispense: 90 tablet; Refill: 1  Currently taking Crestor 40 mg daily    5. Prediabetes  - Hemoglobin A1C; Future  Diet controlled    6. Gastroesophageal reflux disease without esophagitis  Stable on Nexium 40 mg daily    7. Hx of colonic polyp    8. Obesity (BMI 30.0-34.9)  Lifestyle modifications discussed to lose weight with BMI 34

## 2022-01-26 ENCOUNTER — PATIENT OUTREACH (OUTPATIENT)
Dept: ADMINISTRATIVE | Facility: OTHER | Age: 62
End: 2022-01-26
Payer: COMMERCIAL

## 2022-01-26 ENCOUNTER — LAB VISIT (OUTPATIENT)
Dept: LAB | Facility: HOSPITAL | Age: 62
End: 2022-01-26
Payer: COMMERCIAL

## 2022-01-26 ENCOUNTER — LAB VISIT (OUTPATIENT)
Dept: LAB | Facility: HOSPITAL | Age: 62
End: 2022-01-26
Attending: FAMILY MEDICINE
Payer: COMMERCIAL

## 2022-01-26 ENCOUNTER — PATIENT MESSAGE (OUTPATIENT)
Dept: PAIN MEDICINE | Facility: CLINIC | Age: 62
End: 2022-01-26

## 2022-01-26 ENCOUNTER — PATIENT MESSAGE (OUTPATIENT)
Dept: INTERNAL MEDICINE | Facility: CLINIC | Age: 62
End: 2022-01-26
Payer: COMMERCIAL

## 2022-01-26 ENCOUNTER — OFFICE VISIT (OUTPATIENT)
Dept: PAIN MEDICINE | Facility: CLINIC | Age: 62
End: 2022-01-26
Payer: COMMERCIAL

## 2022-01-26 VITALS
WEIGHT: 195.75 LBS | BODY MASS INDEX: 34.68 KG/M2 | HEART RATE: 80 BPM | SYSTOLIC BLOOD PRESSURE: 151 MMHG | HEIGHT: 63 IN | DIASTOLIC BLOOD PRESSURE: 93 MMHG

## 2022-01-26 DIAGNOSIS — I10 ESSENTIAL HYPERTENSION: Chronic | ICD-10-CM

## 2022-01-26 DIAGNOSIS — M47.816 LUMBAR SPONDYLOSIS: ICD-10-CM

## 2022-01-26 DIAGNOSIS — M54.16 LUMBAR RADICULOPATHY: ICD-10-CM

## 2022-01-26 DIAGNOSIS — E78.2 MIXED HYPERLIPIDEMIA: ICD-10-CM

## 2022-01-26 DIAGNOSIS — M54.18 SACRAL RADICULOPATHY: Primary | ICD-10-CM

## 2022-01-26 LAB
BACTERIA #/AREA URNS HPF: NORMAL /HPF
BILIRUB UR QL STRIP: NEGATIVE
CHOLEST SERPL-MCNC: 184 MG/DL (ref 120–199)
CHOLEST/HDLC SERPL: 3.6 {RATIO} (ref 2–5)
CLARITY UR: CLEAR
COLOR UR: YELLOW
GLUCOSE UR QL STRIP: NEGATIVE
HDLC SERPL-MCNC: 51 MG/DL (ref 40–75)
HDLC SERPL: 27.7 % (ref 20–50)
HGB UR QL STRIP: ABNORMAL
KETONES UR QL STRIP: NEGATIVE
LDLC SERPL CALC-MCNC: 101.8 MG/DL (ref 63–159)
LEUKOCYTE ESTERASE UR QL STRIP: NEGATIVE
MICROSCOPIC COMMENT: NORMAL
NITRITE UR QL STRIP: NEGATIVE
NONHDLC SERPL-MCNC: 133 MG/DL
PH UR STRIP: 6 [PH] (ref 5–8)
PROT UR QL STRIP: ABNORMAL
RBC #/AREA URNS HPF: 3 /HPF (ref 0–4)
SP GR UR STRIP: 1.02 (ref 1–1.03)
SQUAMOUS #/AREA URNS HPF: 3 /HPF
TRIGL SERPL-MCNC: 156 MG/DL (ref 30–150)
URN SPEC COLLECT METH UR: ABNORMAL
WBC #/AREA URNS HPF: 1 /HPF (ref 0–5)

## 2022-01-26 PROCEDURE — 36415 COLL VENOUS BLD VENIPUNCTURE: CPT | Performed by: FAMILY MEDICINE

## 2022-01-26 PROCEDURE — 81000 URINALYSIS NONAUTO W/SCOPE: CPT | Performed by: FAMILY MEDICINE

## 2022-01-26 PROCEDURE — 3008F PR BODY MASS INDEX (BMI) DOCUMENTED: ICD-10-PCS | Mod: CPTII,S$GLB,, | Performed by: ANESTHESIOLOGY

## 2022-01-26 PROCEDURE — 80061 LIPID PANEL: CPT | Performed by: FAMILY MEDICINE

## 2022-01-26 PROCEDURE — 3077F PR MOST RECENT SYSTOLIC BLOOD PRESSURE >= 140 MM HG: ICD-10-PCS | Mod: CPTII,S$GLB,, | Performed by: ANESTHESIOLOGY

## 2022-01-26 PROCEDURE — 1160F PR REVIEW ALL MEDS BY PRESCRIBER/CLIN PHARMACIST DOCUMENTED: ICD-10-PCS | Mod: CPTII,S$GLB,, | Performed by: ANESTHESIOLOGY

## 2022-01-26 PROCEDURE — 3077F SYST BP >= 140 MM HG: CPT | Mod: CPTII,S$GLB,, | Performed by: ANESTHESIOLOGY

## 2022-01-26 PROCEDURE — 1159F MED LIST DOCD IN RCRD: CPT | Mod: CPTII,S$GLB,, | Performed by: ANESTHESIOLOGY

## 2022-01-26 PROCEDURE — 99999 PR PBB SHADOW E&M-EST. PATIENT-LVL III: CPT | Mod: PBBFAC,,, | Performed by: ANESTHESIOLOGY

## 2022-01-26 PROCEDURE — 3008F BODY MASS INDEX DOCD: CPT | Mod: CPTII,S$GLB,, | Performed by: ANESTHESIOLOGY

## 2022-01-26 PROCEDURE — 99214 OFFICE O/P EST MOD 30 MIN: CPT | Mod: S$GLB,,, | Performed by: ANESTHESIOLOGY

## 2022-01-26 PROCEDURE — 99214 PR OFFICE/OUTPT VISIT, EST, LEVL IV, 30-39 MIN: ICD-10-PCS | Mod: S$GLB,,, | Performed by: ANESTHESIOLOGY

## 2022-01-26 PROCEDURE — 99999 PR PBB SHADOW E&M-EST. PATIENT-LVL III: ICD-10-PCS | Mod: PBBFAC,,, | Performed by: ANESTHESIOLOGY

## 2022-01-26 PROCEDURE — 1159F PR MEDICATION LIST DOCUMENTED IN MEDICAL RECORD: ICD-10-PCS | Mod: CPTII,S$GLB,, | Performed by: ANESTHESIOLOGY

## 2022-01-26 PROCEDURE — 3080F PR MOST RECENT DIASTOLIC BLOOD PRESSURE >= 90 MM HG: ICD-10-PCS | Mod: CPTII,S$GLB,, | Performed by: ANESTHESIOLOGY

## 2022-01-26 PROCEDURE — 1160F RVW MEDS BY RX/DR IN RCRD: CPT | Mod: CPTII,S$GLB,, | Performed by: ANESTHESIOLOGY

## 2022-01-26 PROCEDURE — 3080F DIAST BP >= 90 MM HG: CPT | Mod: CPTII,S$GLB,, | Performed by: ANESTHESIOLOGY

## 2022-01-26 NOTE — PROGRESS NOTES
Chief Pain Complaint:  Lumbar Back Pain   Buttock Pain       History of Present Illness:   Ora Henderson is a 61 y.o. female  who is presenting with a chief complaint of Lumbar Back Pain. The patient began experiencing this problem insidiously, and the pain has been gradually worsening over the past 10 month(s). The pain is described as throbbing, shooting, burning and electrical and is located in the bilateral lumbar spine. Pain is intermittent and lasts hours. The pain radiates to bilateral lower extremities. The patient rates her pain a 7 out of ten and interferes with activities of daily living a 7 out of ten. Pain is exacerbated by flexion of the lumbar spine, and is improved by rest. Patient reports no prior trauma, no prior spinal surgery     Ora Henderson is a 61 y.o. female  who is presenting with a chief complaint of Low-back Pain and Leg Pain (Bilateral intermittently)  . The patient began experiencing this problem insidiously, and the pain has been gradually worsening over the past 7 month(s). The pain is described as throbbing, cramping, aching and heavy and is located in the bilateral buttocks. Pain is intermittent and lasts hours. The  pain is nonradiating. The patient rates her pain a 7 out of ten and interferes with activities of daily living a 6 out of ten. Pain is exacerbated by getting up from a seated position, and is improved by rest. Patient reports no prior trauma, no prior spinal surgery       - pertinent negatives: No fever, No chills, No weight loss, No bladder dysfunction, No bowel dysfunction, No saddle anesthesia  - pertinent positives: none    - medications, other therapies tried (physical therapy, injections):     >> NSAIDs, Tylenol, gabapentin and medrol dose pack    >> Has previously undergone Physical Therapy    >> Has previously undergone spinal injection/s         bilateral L4-L5 TFESI on 10/7/2021 with 80% pain relief.          bilateral S1  TFESI on 12/29/2022 with 85% pain relief but took 2 weeks to take effect.     Imaging / Labs / Studies (reviewed on 1/26/2022):        Results for orders placed during the hospital encounter of 12/08/14    X-Ray Lumbar Spine Complete 5 View    Narrative  Comparison: 03/21/12    Five views    Findings:    Osteopenia and overlying bowel limits the study.  Multilevel marginal spurring and facet arthropathy.  Uniform loss of this height at the L4 -- 5 level with grade 1 anterolisthesis L4 on L5.  No acute fracture.  No spondylolysis.  Pedicles and SI joints  are intact.  IMPRESSION:      Interval progression degenerative changes with most pronounced findings at the L4 -- 5 level.  See above.      Electronically signed by: TARIQ CORTEZ III, MD  Date:     12/08/14  Time:    09:23        Results for orders placed during the hospital encounter of 12/15/20    X-Ray Lumbar Complete With Flex And Ext    Narrative  EXAMINATION:  XR LUMBAR SPINE 5 VIEW WITH FLEX AND EXT    CLINICAL HISTORY:  lumbar radic;  Other spondylosis with radiculopathy, lumbar region    TECHNIQUE:  Five views of the lumbar spine plus flexion extension views were performed.    COMPARISON:  December 23, 2017    FINDINGS:  Inferior-most lumbar vertebral body designated L5 for purposes of this exam.  Vertebral height maintained.  Mild uniform loss of disc height at the L4-5 level there is grade 1 anterolisthesis L4 on L5.  Remaining disc spaces, vertebral body height and alignment maintained.  Slight increased prominence of anterior subluxation L4 on L5 on the flexion view.  Stable appearance the L4-5 level on neutral and extension lateral views.  Pedicles and SI joints intact.  Numerous calcifications lower pelvis bilaterally.  Prominent degenerative change bilateral hips and to lesser extent bilateral SI joints.    Impression  As above.      Electronically signed by: Tariq Cortez MD  Date:    12/15/2020  Time:    20:33    Results for orders placed  during the hospital encounter of 12/23/17    X-Ray Lumbar Spine AP And Lateral    Narrative  AP and lateral views of the lumbar spine    Findings: The vertebral bodies demonstrate normal height.  There is grade 1 spondylolisthesis of L4 on L5. There is mild disc space narrowing and spondylosis present at the L1-L2 through the L4-L5 levels. Prominent facet arthropathy noted bilaterally at L4-L5 level.  IMPRESSION:  As above      Electronically signed by: MOISES SANTOS D.O.  Date:     12/26/17  Time:    08:27    Results for orders placed during the hospital encounter of 07/05/13    X-Ray Cervical Spine AP And Lateral    Narrative  Findings: Vertebral alignment is normal.  There is mild narrowing of the C5-6 disk spaces and early anterior osteophyte formation.  There are no compression fractures or acute abnormalities are seen.  IMPRESSION:  Mild degenerative change in the cervical spine at C5-6.      Electronically signed by: LURDES SHORT MD  Date:     07/05/13  Time:    09:29      Results for orders placed during the hospital encounter of 07/22/19    X-Ray Cervical Spine Complete 5 view    Narrative  EXAMINATION:  XR CERVICAL SPINE COMPLETE 5 VIEW    CLINICAL HISTORY:  . Cervicalgia    TECHNIQUE:  AP, Lateral, bilateral oblique and open mouth views of the cervical spine were performed.    COMPARISON:  07/05/2013    FINDINGS:  There is some straightening of the normal cervical lordosis with slight retrolisthesis of C5 on C6. Anterior disc osteophyte complex production and possible slight disc height loss noted at C5-6.  No appreciable change from prior.  Posterior elements appear intact without acute fractures or subluxations demonstrated.  Odontoid process appears intact.  Atlantoaxial articulations appear normal.  Prevertebral soft tissues are within normal limits.    Impression  Degenerative changes as above.  No acute findings.      Electronically signed by: George Bojorquez  "MD  Date:    07/22/2019  Time:    16:21      Review of Systems:  CONSTITUTIONAL: patient denies any fever, chills, or weight loss  SKIN: patient denies any rash or itching  RESPIRATORY: patient denies having any shortness of breath  GASTROINTESTINAL: patient denies having any diarrhea, constipation, or bowel incontinence  GENITOURINARY: patient denies having any abnormal bladder function    MUSCULOSKELETAL:  - patient complains of the above noted pain/s (see chief pain complaint)    NEUROLOGICAL:   - pain as above  - strength in Lower extremities is intact, BILATERALLY  - sensation in Lower extremities is intact, BILATERALLY  - patient denies any loss of bowel or bladder control      PSYCHIATRIC: patient denies any change in mood    Other:  All other systems reviewed and are negative      Physical Exam:  BP (!) 151/93   Pulse 80   Ht 5' 3" (1.6 m)   Wt 88.8 kg (195 lb 12.3 oz)   BMI 34.68 kg/m²  (reviewed on 1/26/2022)  General: Alert and oriented, in no apparent distress.  Gait: normal gait.  Skin: No rashes, No discoloration, No obvious lesions  HEENT: Normocephalic, atraumatic. Pupils equal and round.  Cardiovascular: Regular rate and rhythm , no significant peripheral edema present  Respiratory: Without audible wheezing, without use of accessory muscles of respiration.    Musculoskeletal:    Cervical Spine    - Pain on flexion of cervical spine Absent  - Spurling's Test:  Absent    - Pain on extension of cervical spine Absent  - TTP over the cervical facet joints Absent  - Cervical facet loading Absent      Lumbar Spine    - Pain on flexion of lumbar spine Present  - Straight Leg Raise:  Equivocal    - Pain on extension of lumbar spine Absent  - TTP over the lumbar facet joints Absent  - Lumbar facet loading Absent    -Pain on palpation over the SI joint  Present  - GUSTABO: Present  - TTP over bilateral GTB       Neuro:    Strength:  UE R/L: D: 5/5; B: 5/5; T: 5/5; WF: 5/5; WE: 5/5; IO: 5/5;  LE R/L: HF: 5/5, " HE: 5/5, KF: 5/5; KE: 5/5; FE: 5/5; FF: 5/5    Extremity Reflexes: Brisk and symmetric throughout.      Extremity Sensory: Sensation to pinprick and temperature symmetric. Proprioception intact.      Psych:  Mood and affect is appropriate      Assessment:    Ora Henderson is a 61 y.o. year old female who is presenting with     Encounter Diagnoses   Name Primary?    Sacral radiculopathy Yes    Lumbar radiculopathy     Lumbar spondylosis        Plan:    1. Interventional: None for now.      S/p bilateral S1 TFESI on 12/29/2022 with 85% pain relief but took 2 weeks to take effect.       S/p bilateral L4-L5 TFESI on 10/7/2021 with 80% pain relief.     2. Pharmacologic: Continue Mobic 15 mg Po Q day. Continue Gabapentin 100 mg PO QHs.    3. Rehabilitative: Patient already did PT. Patient in chiropractic care now.     4. Diagnostic: Lumbar xray reviewed. Lumbar MRI reviewed.    5. Consult: Consider Neurosurgery consult for severe canal stenosis.     6. Follow up: Post Injection.    20 minutes were spent in this encounter with more than 50% of the time used for counseling and review of the plan.  Imaging / studies reviewed, detailed above.  I discussed in detail the risks, benefits, and alternatives to any and all potential treatment options.  All questions and concerns were fully addressed today in clinic. Medical decision making moderate.    Thank you for the opportunity to assist in the care of this patient.    Best wishes,    Signed:    Damon Charlton MD          Disclaimer:  This note may have been prepared using voice recognition software, it may have not been extensively proofed, as such there could be errors within the text such as sound alike errors.

## 2022-02-09 ENCOUNTER — PATIENT MESSAGE (OUTPATIENT)
Dept: INTERNAL MEDICINE | Facility: CLINIC | Age: 62
End: 2022-02-09
Payer: COMMERCIAL

## 2022-02-10 ENCOUNTER — OFFICE VISIT (OUTPATIENT)
Dept: INTERNAL MEDICINE | Facility: CLINIC | Age: 62
End: 2022-02-10
Payer: COMMERCIAL

## 2022-02-10 ENCOUNTER — HOSPITAL ENCOUNTER (OUTPATIENT)
Dept: RADIOLOGY | Facility: HOSPITAL | Age: 62
Discharge: HOME | End: 2022-02-10
Attending: FAMILY MEDICINE
Payer: COMMERCIAL

## 2022-02-10 VITALS
DIASTOLIC BLOOD PRESSURE: 82 MMHG | BODY MASS INDEX: 34.37 KG/M2 | WEIGHT: 194 LBS | TEMPERATURE: 98 F | SYSTOLIC BLOOD PRESSURE: 138 MMHG

## 2022-02-10 DIAGNOSIS — M54.12 RIGHT CERVICAL RADICULOPATHY: ICD-10-CM

## 2022-02-10 DIAGNOSIS — I10 ESSENTIAL HYPERTENSION: Chronic | ICD-10-CM

## 2022-02-10 DIAGNOSIS — M47.22 OSTEOARTHRITIS OF SPINE WITH RADICULOPATHY, CERVICAL REGION: ICD-10-CM

## 2022-02-10 DIAGNOSIS — M54.12 RIGHT CERVICAL RADICULOPATHY: Primary | ICD-10-CM

## 2022-02-10 DIAGNOSIS — E66.9 OBESITY (BMI 30.0-34.9): ICD-10-CM

## 2022-02-10 PROCEDURE — 72050 XR CERVICAL SPINE COMPLETE 5 VIEW: ICD-10-PCS | Mod: 26,,, | Performed by: RADIOLOGY

## 2022-02-10 PROCEDURE — 72050 X-RAY EXAM NECK SPINE 4/5VWS: CPT | Mod: TC

## 2022-02-10 PROCEDURE — 3008F BODY MASS INDEX DOCD: CPT | Mod: CPTII,S$GLB,, | Performed by: FAMILY MEDICINE

## 2022-02-10 PROCEDURE — 1159F MED LIST DOCD IN RCRD: CPT | Mod: CPTII,S$GLB,, | Performed by: FAMILY MEDICINE

## 2022-02-10 PROCEDURE — 1160F PR REVIEW ALL MEDS BY PRESCRIBER/CLIN PHARMACIST DOCUMENTED: ICD-10-PCS | Mod: CPTII,S$GLB,, | Performed by: FAMILY MEDICINE

## 2022-02-10 PROCEDURE — 1159F PR MEDICATION LIST DOCUMENTED IN MEDICAL RECORD: ICD-10-PCS | Mod: CPTII,S$GLB,, | Performed by: FAMILY MEDICINE

## 2022-02-10 PROCEDURE — 3075F SYST BP GE 130 - 139MM HG: CPT | Mod: CPTII,S$GLB,, | Performed by: FAMILY MEDICINE

## 2022-02-10 PROCEDURE — 72050 X-RAY EXAM NECK SPINE 4/5VWS: CPT | Mod: 26,,, | Performed by: RADIOLOGY

## 2022-02-10 PROCEDURE — 99999 PR PBB SHADOW E&M-EST. PATIENT-LVL IV: ICD-10-PCS | Mod: PBBFAC,,, | Performed by: FAMILY MEDICINE

## 2022-02-10 PROCEDURE — 3079F PR MOST RECENT DIASTOLIC BLOOD PRESSURE 80-89 MM HG: ICD-10-PCS | Mod: CPTII,S$GLB,, | Performed by: FAMILY MEDICINE

## 2022-02-10 PROCEDURE — 3008F PR BODY MASS INDEX (BMI) DOCUMENTED: ICD-10-PCS | Mod: CPTII,S$GLB,, | Performed by: FAMILY MEDICINE

## 2022-02-10 PROCEDURE — 3079F DIAST BP 80-89 MM HG: CPT | Mod: CPTII,S$GLB,, | Performed by: FAMILY MEDICINE

## 2022-02-10 PROCEDURE — 99214 OFFICE O/P EST MOD 30 MIN: CPT | Mod: S$GLB,,, | Performed by: FAMILY MEDICINE

## 2022-02-10 PROCEDURE — 3075F PR MOST RECENT SYSTOLIC BLOOD PRESS GE 130-139MM HG: ICD-10-PCS | Mod: CPTII,S$GLB,, | Performed by: FAMILY MEDICINE

## 2022-02-10 PROCEDURE — 1160F RVW MEDS BY RX/DR IN RCRD: CPT | Mod: CPTII,S$GLB,, | Performed by: FAMILY MEDICINE

## 2022-02-10 PROCEDURE — 99999 PR PBB SHADOW E&M-EST. PATIENT-LVL IV: CPT | Mod: PBBFAC,,, | Performed by: FAMILY MEDICINE

## 2022-02-10 PROCEDURE — 99214 PR OFFICE/OUTPT VISIT, EST, LEVL IV, 30-39 MIN: ICD-10-PCS | Mod: S$GLB,,, | Performed by: FAMILY MEDICINE

## 2022-02-10 RX ORDER — METHYLPREDNISOLONE 4 MG/1
TABLET ORAL
Qty: 21 EACH | Refills: 0 | Status: SHIPPED | OUTPATIENT
Start: 2022-02-10 | End: 2022-03-03

## 2022-02-10 RX ORDER — LIDOCAINE 50 MG/G
PATCH TOPICAL
COMMUNITY
End: 2024-03-05

## 2022-02-10 NOTE — PROGRESS NOTES
Subjective:       Patient ID: Ora Henderson is a 61 y.o. female.    Chief Complaint: Arm Pain    61-year-old  female patient with Patient Active Problem List:     DJD (degenerative joint disease), cervical-mild     Mixed hyperlipidemia     Hepatomegaly     Family history of cardiovascular disease     GERD (gastroesophageal reflux disease)     History of benign pituitary tumor     Hx of colonic polyp     Essential hypertension     Osteoarthritis of spine with radiculopathy, lumbar region     NATALIE (obstructive sleep apnea)     Lumbar radiculopathy     Obesity (BMI 30.0-34.9)  Reports that she has been having burning sensation to the right upper arm radiating from the neck off and on lately for the past few months but has been gradually getting worse, not able to take high doses of gabapentin secondary to being sedated and has been taking 100 mg at bedtime.  Currently using pain patches with some relief.  Denies any recent injury or trauma.  Denies any significant low back pain and has  been doing physical therapy exercises as learned in the past  Denies any chest pain or difficulty breathing with palpitations  Reports recently her mother passed away patient has been going through grief    Review of Systems   Constitutional: Negative for fatigue.   Eyes: Negative for visual disturbance.   Respiratory: Negative for shortness of breath.    Cardiovascular: Negative for chest pain and leg swelling.   Gastrointestinal: Negative for abdominal pain, nausea and vomiting.   Musculoskeletal: Positive for myalgias and neck pain.   Skin: Negative for rash.   Neurological: Positive for numbness. Negative for weakness, light-headedness and headaches.   Psychiatric/Behavioral: Positive for dysphoric mood. Negative for sleep disturbance.         /82 (BP Location: Right arm, Patient Position: Sitting, BP Method: Large (Manual))   Temp 97.9 °F (36.6 °C) (Tympanic)   Wt 88 kg (194 lb 0.1 oz)    BMI 34.37 kg/m²   Objective:      Physical Exam  Constitutional:       Appearance: She is well-developed and well-nourished.   HENT:      Head: Normocephalic and atraumatic.      Mouth/Throat:      Mouth: Oropharynx is clear and moist.   Cardiovascular:      Rate and Rhythm: Normal rate and regular rhythm.      Heart sounds: Normal heart sounds. No murmur heard.      Pulmonary:      Effort: Pulmonary effort is normal.      Breath sounds: Normal breath sounds. No wheezing.   Abdominal:      General: Bowel sounds are normal.      Palpations: Abdomen is soft.      Tenderness: There is no abdominal tenderness.   Musculoskeletal:         General: Tenderness present. No edema.      Comments: Positive for paraspinal cervical muscle tenderness on the right side    No restricted range of motion noted to the right shoulder   Skin:     General: Skin is warm and dry.      Findings: No erythema or rash.   Neurological:      Mental Status: She is alert and oriented to person, place, and time.   Psychiatric:         Mood and Affect: Mood and affect and mood normal.           Assessment/Plan:   1. Right cervical radiculopathy  - X-Ray Cervical Spine Complete 5 view; Future  - methylPREDNISolone (MEDROL DOSEPACK) 4 mg tablet; use as directed  Dispense: 21 each; Refill: 0  2. Osteoarthritis of spine with radiculopathy, cervical region  - X-Ray Cervical Spine Complete 5 view; Future  - methylPREDNISolone (MEDROL DOSEPACK) 4 mg tablet; use as directed  Dispense: 21 each; Refill: 0    Will plan to repeat cervical spine x-ray to see if there is any worsening of arthritis causing radiculopathy symptoms  Medrol Dosepak prescribed today and patient was advised to try continue to take gabapentin 100-200 mg at bedtime.  If any worsening will consider repeating physical therapy or see pain management    3. Essential hypertension  Blood pressure is stable currently on losartan hydrochlorothiazide 100/25 mg daily and amlodipine 2.5 mg  daily    4. Obesity (BMI 30.0-34.9)   Lifestyle modifications discussed    If having any worsening grief or prolonged grief patient to let us know

## 2022-02-25 ENCOUNTER — TELEPHONE (OUTPATIENT)
Dept: PHYSICAL MEDICINE AND REHAB | Facility: CLINIC | Age: 62
End: 2022-02-25
Payer: COMMERCIAL

## 2022-02-25 NOTE — TELEPHONE ENCOUNTER
Reached out to patient about upcoming appointment with Dr. Up.  Patient is having burning and pain in her arm and Dr. Up suggested her f/u with Dr. Charlton since she has upcoming appointment with him.  Patient v/u. PMR appt canceled.  Stefanie Taylor RN

## 2022-03-14 ENCOUNTER — TELEPHONE (OUTPATIENT)
Dept: INTERNAL MEDICINE | Facility: CLINIC | Age: 62
End: 2022-03-14

## 2022-03-14 ENCOUNTER — OFFICE VISIT (OUTPATIENT)
Dept: INTERNAL MEDICINE | Facility: CLINIC | Age: 62
End: 2022-03-14
Payer: COMMERCIAL

## 2022-03-14 VITALS
WEIGHT: 195.13 LBS | SYSTOLIC BLOOD PRESSURE: 140 MMHG | BODY MASS INDEX: 34.57 KG/M2 | HEIGHT: 63 IN | DIASTOLIC BLOOD PRESSURE: 80 MMHG | HEART RATE: 74 BPM | RESPIRATION RATE: 18 BRPM | TEMPERATURE: 98 F

## 2022-03-14 DIAGNOSIS — Z09 FOLLOW UP: Primary | ICD-10-CM

## 2022-03-14 DIAGNOSIS — M47.26 OSTEOARTHRITIS OF SPINE WITH RADICULOPATHY, LUMBAR REGION: ICD-10-CM

## 2022-03-14 PROCEDURE — 3079F DIAST BP 80-89 MM HG: CPT | Mod: CPTII,S$GLB,, | Performed by: NURSE PRACTITIONER

## 2022-03-14 PROCEDURE — 99999 PR PBB SHADOW E&M-EST. PATIENT-LVL IV: CPT | Mod: PBBFAC,,, | Performed by: NURSE PRACTITIONER

## 2022-03-14 PROCEDURE — 1159F PR MEDICATION LIST DOCUMENTED IN MEDICAL RECORD: ICD-10-PCS | Mod: CPTII,S$GLB,, | Performed by: NURSE PRACTITIONER

## 2022-03-14 PROCEDURE — 3008F PR BODY MASS INDEX (BMI) DOCUMENTED: ICD-10-PCS | Mod: CPTII,S$GLB,, | Performed by: NURSE PRACTITIONER

## 2022-03-14 PROCEDURE — 99999 PR PBB SHADOW E&M-EST. PATIENT-LVL IV: ICD-10-PCS | Mod: PBBFAC,,, | Performed by: NURSE PRACTITIONER

## 2022-03-14 PROCEDURE — 99213 PR OFFICE/OUTPT VISIT, EST, LEVL III, 20-29 MIN: ICD-10-PCS | Mod: S$GLB,,, | Performed by: NURSE PRACTITIONER

## 2022-03-14 PROCEDURE — 1159F MED LIST DOCD IN RCRD: CPT | Mod: CPTII,S$GLB,, | Performed by: NURSE PRACTITIONER

## 2022-03-14 PROCEDURE — 3077F PR MOST RECENT SYSTOLIC BLOOD PRESSURE >= 140 MM HG: ICD-10-PCS | Mod: CPTII,S$GLB,, | Performed by: NURSE PRACTITIONER

## 2022-03-14 PROCEDURE — 3079F PR MOST RECENT DIASTOLIC BLOOD PRESSURE 80-89 MM HG: ICD-10-PCS | Mod: CPTII,S$GLB,, | Performed by: NURSE PRACTITIONER

## 2022-03-14 PROCEDURE — 3008F BODY MASS INDEX DOCD: CPT | Mod: CPTII,S$GLB,, | Performed by: NURSE PRACTITIONER

## 2022-03-14 PROCEDURE — 99213 OFFICE O/P EST LOW 20 MIN: CPT | Mod: S$GLB,,, | Performed by: NURSE PRACTITIONER

## 2022-03-14 PROCEDURE — 3077F SYST BP >= 140 MM HG: CPT | Mod: CPTII,S$GLB,, | Performed by: NURSE PRACTITIONER

## 2022-03-14 RX ORDER — METHOCARBAMOL 750 MG/1
TABLET, FILM COATED ORAL
COMMUNITY
Start: 2022-03-13 | End: 2022-08-22

## 2022-03-14 RX ORDER — NAPROXEN 500 MG/1
500 TABLET, DELAYED RELEASE ORAL 2 TIMES DAILY PRN
COMMUNITY
Start: 2022-03-13 | End: 2022-08-26 | Stop reason: ALTCHOICE

## 2022-03-14 NOTE — PROGRESS NOTES
Subjective:       Patient ID: Ora Henderson is a 61 y.o. female.    Chief Complaint: back spasms (Patient in with a complaint of back spasms and was seen at the er at [facility] ([place]) on Friday. Patient was given naproxen 500mg and methocarbamol 750mg. )    HPI    Pt seen in ER over weekend  For L sided back pain with spasms and fever of 100.6  Pt has a hx of chronic back pain- sees pain clinic here- on gabapentin, robaxin, naproxen. States pain better today  Labs, cxr, ct of abd, urine testscovid/flu showed no sign of infection per pt. She was reportedly given 1 IV antibiotic. She reports no further fever.    Past Medical History:   Diagnosis Date    Arthritis     Encounter for blood transfusion     Fatty liver     GERD (gastroesophageal reflux disease)     Hernia, umbilical     reducible    Hyperlipidemia     Hypertension     Rotator cuff tear     Sleep apnea      Past Surgical History:   Procedure Laterality Date    ARTHROSCOPIC DEBRIDEMENT OF SHOULDER Right 2019    Procedure: DEBRIDEMENT, SHOULDER, ARTHROSCOPIC;  Surgeon: Laureano Cox MD;  Location: [facility];  Service: Orthopedics;  Laterality: Right;    ARTHROSCOPY OF SHOULDER WITH DECOMPRESSION OF SUBACROMIAL SPACE Right 2019    Procedure: ARTHROSCOPY, SHOULDER, WITH SUBACROMIAL SPACE DECOMPRESSION;  Surgeon: Laureano Cox MD;  Location: [facility] OR;  Service: Orthopedics;  Laterality: Right;    BICEPS TENDON REPAIR Right 2019    Procedure: REPAIR, TENDON, BICEPS;  Surgeon: Laureano Cox MD;  Location: [facility] OR;  Service: Orthopedics;  Laterality: Right;  arthroscopic Biceps tenodesis    BRAIN SURGERY      Pituitary tumor removal 2001 x 2     CARPAL TUNNEL RELEASE Bilateral     x 2    CERVICAL BIOPSY  W/ LOOP ELECTRODE EXCISION      [facility] '81     SECTION      x 1    COLONOSCOPY N/A 2016    Procedure: COLONOSCOPY;  Surgeon: Quique Paul MD;  Location[facility];  Service:  Endoscopy;  Laterality: N/A;    COLONOSCOPY N/A 4/23/2021    Procedure: COLONOSCOPY;  Surgeon: Mari Tucker MD;  Location: [facility] ENDO;  Service: Endoscopy;  Laterality: N/A;    DISTAL CLAVICLE EXCISION Right 12/30/2019    Procedure: EXCISION, CLAVICLE, DISTAL;  Surgeon: Laureano Cox MD;  Location: [facility] OR;  Service: Orthopedics;  Laterality: Right;  arthroscopic    GANGLION CYST EXCISION Left 12/31/2018    HYSTERECTOMY      REFRACTIVE SURGERY      ROTATOR CUFF REPAIR Right 12/30/2019    Procedure: REPAIR, ROTATOR CUFF;  Surgeon: Laureano Cox MD;  Location: [facility] OR;  Service: Orthopedics;  Laterality: Right;  arthroscopic    SELECTIVE INJECTION OF ANESTHETIC AGENT AROUND LUMBAR SPINAL NERVE ROOT BY TRANSFORAMINAL APPROACH Bilateral 10/7/2021    Procedure: BLOCK, SPINAL NERVE ROOT, LUMBAR, SELECTIVE, TRANSFORAMINAL APPROACH bilateral L4-5 Rn IV sedation;  Surgeon: Damon Charlton MD;  Location: [facility] PAIN MGT;  Service: Pain Management;  Laterality: Bilateral;    SELECTIVE INJECTION OF ANESTHETIC AGENT AROUND LUMBAR SPINAL NERVE ROOT BY TRANSFORAMINAL APPROACH Bilateral 12/29/2021    Procedure: BLOCK, SPINAL NERVE ROOT, LUMBAR, SELECTIVE, TRANSFORAMINAL APPROACH bilateral S1 RN IV sedation;  Surgeon: Damon Charlton MD;  Location: [facility] PAIN [facility]GT;  Service: Pain Management;  Laterality: Bilateral;    SYNOVECTOMY OF SHOULDER Right 12/30/2019    Procedure: SYNOVECTOMY, SHOULDER;  Surgeon: Laureano Cox MD;  Location: [facility] OR;  Service: Orthopedics;  Laterality: Right;  arthroscopic    TOTAL ABDOMINAL HYSTERECTOMY W/ BILATERAL SALPINGOOPHORECTOMY  2006    STAR/BSO secondary to endometriosis    VENTRAL HERNIA REPAIR  2016       Social History     Socioeconomic History    Marital status:     Number of children: 1   Occupational History    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    aspirin 81 MG Chew Take 81 mg by mouth as needed. Hold 5 days prior to surgery    docusate sodium (COLACE) 100 MG capsule Take 1 capsule (100 mg total) by mouth 2 (two) times daily.    EC-NAPROXEN 500 mg EC tablet Take 500 mg by mouth 2 (two) times daily as needed.    esomeprazole (NEXIUM) 40 MG capsule Take 1 capsule (40 mg total) by mouth before breakfast.    fluticasone propionate (FLONASE) 50 mcg/actuation nasal spray 2 sprays (100 mcg total) by Each Nostril route once daily.    gabapentin (NEURONTIN) 100 MG capsule Take 1-2 capsules (100-200 mg total) by mouth every evening.    L.acidophilus-Bif. animalis (PROBIOTIC) 5 billion cell CpSP Take 1 tablet by mouth once daily.    LIDOcaine (LIDODERM) 5 % Place onto the skin.    losartan-hydrochlorothiazide 100-25 mg (HYZAAR) 100-25 mg per tablet Take 1 tablet by mouth once daily.    methocarbamoL (ROBAXIN) 750 MG Tab Take by mouth.    MULTIVIT,CALC,MINS/IRON/FOLIC (DAILY MULTIPLE FOR WOMEN ORAL) Take 1 tablet by mouth once daily. Hold 5 days prior to surgery    omega-3 fatty acids/fish oil (FISH OIL-OMEGA-3 FATTY ACIDS) 300-1,000 mg capsule Take by mouth once daily. Hold 5 days prior to surgery    rosuvastatin (CRESTOR) 40 MG Tab Take 1 tablet (40 mg total) by mouth every evening.     No current facility-administered medications for this visit.     Facility-Administered Medications Ordered in Other Visits   Medication    lidocaine (PF) 10 mg/ml (1%) injection 5 mg           Review of  Systems   Constitutional: Negative for activity change, appetite change, chills, diaphoresis, fatigue, fever and unexpected weight change.   HENT: Negative for congestion, ear pain, postnasal drip, rhinorrhea, sinus pressure, sinus pain, sneezing, sore throat, tinnitus, trouble swallowing and voice change.    Eyes: Negative for photophobia, pain and visual disturbance.   Respiratory: Negative for cough, chest tightness, shortness of breath and wheezing.    Cardiovascular: Negative for chest pain, palpitations and leg swelling.   Gastrointestinal: Negative for abdominal distention, abdominal pain, constipation, diarrhea, nausea and vomiting.   Genitourinary: Negative for decreased urine volume, difficulty urinating, dysuria, flank pain, frequency, hematuria and urgency.   Musculoskeletal: Positive for back pain. Negative for arthralgias, joint swelling, neck pain and neck stiffness.   Allergic/Immunologic: Negative for immunocompromised state.   Neurological: Negative for dizziness, tremors, seizures, syncope, facial asymmetry, speech difficulty, weakness, light-headedness, numbness and headaches.   Hematological: Negative for adenopathy. Does not bruise/bleed easily.   Psychiatric/Behavioral: Negative for confusion and sleep disturbance.       Objective:      Physical Exam  Vitals reviewed.   Cardiovascular:      Rate and Rhythm: Normal rate and regular rhythm.      Pulses: Normal pulses.      Heart sounds: Normal heart sounds.   Pulmonary:      Breath sounds: Normal breath sounds.   Abdominal:      General: Bowel sounds are normal.      Palpations: Abdomen is soft.   Musculoskeletal:        Legs:       Comments: Mildly tender   Skin:     General: Skin is warm and dry.   Neurological:      Mental Status: She is alert and oriented to person, place, and time.   Psychiatric:         Mood and Affect: Mood normal.         Assessment:     Vitals:    03/14/22 1156   BP: (!) 140/80   Pulse: 74   Resp: 18   Temp: 98.4 °F  (36.9 °C)         1. Follow up    2. Osteoarthritis of spine with radiculopathy, lumbar region        Plan:   Follow up    Osteoarthritis of spine with radiculopathy, lumbar region    fever resolved  Continue prescribed meds for back pain.  Keep upcoming appt wit pain clinic

## 2022-03-23 ENCOUNTER — TELEPHONE (OUTPATIENT)
Dept: PAIN MEDICINE | Facility: CLINIC | Age: 62
End: 2022-03-23
Payer: COMMERCIAL

## 2022-03-23 ENCOUNTER — PATIENT OUTREACH (OUTPATIENT)
Dept: ADMINISTRATIVE | Facility: OTHER | Age: 62
End: 2022-03-23
Payer: COMMERCIAL

## 2022-03-24 ENCOUNTER — OFFICE VISIT (OUTPATIENT)
Dept: PAIN MEDICINE | Facility: CLINIC | Age: 62
End: 2022-03-24
Payer: COMMERCIAL

## 2022-03-24 VITALS
HEIGHT: 63 IN | HEART RATE: 93 BPM | WEIGHT: 193.31 LBS | BODY MASS INDEX: 34.25 KG/M2 | SYSTOLIC BLOOD PRESSURE: 130 MMHG | DIASTOLIC BLOOD PRESSURE: 84 MMHG

## 2022-03-24 DIAGNOSIS — M79.18 MYOFASCIAL MUSCLE PAIN: ICD-10-CM

## 2022-03-24 DIAGNOSIS — M54.16 LUMBAR RADICULOPATHY: ICD-10-CM

## 2022-03-24 DIAGNOSIS — M47.812 CERVICAL SPONDYLOSIS: Primary | ICD-10-CM

## 2022-03-24 DIAGNOSIS — M47.816 LUMBAR SPONDYLOSIS: ICD-10-CM

## 2022-03-24 PROCEDURE — 99214 OFFICE O/P EST MOD 30 MIN: CPT | Mod: 25,S$GLB,, | Performed by: ANESTHESIOLOGY

## 2022-03-24 PROCEDURE — 96372 THER/PROPH/DIAG INJ SC/IM: CPT | Mod: S$GLB,,, | Performed by: ANESTHESIOLOGY

## 2022-03-24 PROCEDURE — 3008F PR BODY MASS INDEX (BMI) DOCUMENTED: ICD-10-PCS | Mod: CPTII,S$GLB,, | Performed by: ANESTHESIOLOGY

## 2022-03-24 PROCEDURE — 96372 PR INJECTION,THERAP/PROPH/DIAG2ST, IM OR SUBCUT: ICD-10-PCS | Mod: S$GLB,,, | Performed by: ANESTHESIOLOGY

## 2022-03-24 PROCEDURE — 1159F PR MEDICATION LIST DOCUMENTED IN MEDICAL RECORD: ICD-10-PCS | Mod: CPTII,S$GLB,, | Performed by: ANESTHESIOLOGY

## 2022-03-24 PROCEDURE — 3075F SYST BP GE 130 - 139MM HG: CPT | Mod: CPTII,S$GLB,, | Performed by: ANESTHESIOLOGY

## 2022-03-24 PROCEDURE — 1160F PR REVIEW ALL MEDS BY PRESCRIBER/CLIN PHARMACIST DOCUMENTED: ICD-10-PCS | Mod: CPTII,S$GLB,, | Performed by: ANESTHESIOLOGY

## 2022-03-24 PROCEDURE — 1160F RVW MEDS BY RX/DR IN RCRD: CPT | Mod: CPTII,S$GLB,, | Performed by: ANESTHESIOLOGY

## 2022-03-24 PROCEDURE — 99214 PR OFFICE/OUTPT VISIT, EST, LEVL IV, 30-39 MIN: ICD-10-PCS | Mod: 25,S$GLB,, | Performed by: ANESTHESIOLOGY

## 2022-03-24 PROCEDURE — 99999 PR PBB SHADOW E&M-EST. PATIENT-LVL III: ICD-10-PCS | Mod: PBBFAC,,, | Performed by: ANESTHESIOLOGY

## 2022-03-24 PROCEDURE — 3075F PR MOST RECENT SYSTOLIC BLOOD PRESS GE 130-139MM HG: ICD-10-PCS | Mod: CPTII,S$GLB,, | Performed by: ANESTHESIOLOGY

## 2022-03-24 PROCEDURE — 3008F BODY MASS INDEX DOCD: CPT | Mod: CPTII,S$GLB,, | Performed by: ANESTHESIOLOGY

## 2022-03-24 PROCEDURE — 1159F MED LIST DOCD IN RCRD: CPT | Mod: CPTII,S$GLB,, | Performed by: ANESTHESIOLOGY

## 2022-03-24 PROCEDURE — 3079F DIAST BP 80-89 MM HG: CPT | Mod: CPTII,S$GLB,, | Performed by: ANESTHESIOLOGY

## 2022-03-24 PROCEDURE — 99999 PR PBB SHADOW E&M-EST. PATIENT-LVL III: CPT | Mod: PBBFAC,,, | Performed by: ANESTHESIOLOGY

## 2022-03-24 PROCEDURE — 3079F PR MOST RECENT DIASTOLIC BLOOD PRESSURE 80-89 MM HG: ICD-10-PCS | Mod: CPTII,S$GLB,, | Performed by: ANESTHESIOLOGY

## 2022-03-24 RX ORDER — METHYLPREDNISOLONE ACETATE 40 MG/ML
40 INJECTION, SUSPENSION INTRA-ARTICULAR; INTRALESIONAL; INTRAMUSCULAR; SOFT TISSUE
Status: COMPLETED | OUTPATIENT
Start: 2022-03-24 | End: 2022-03-24

## 2022-03-24 RX ORDER — DIAZEPAM 5 MG/1
5 TABLET ORAL ONCE AS NEEDED
Qty: 1 TABLET | Refills: 0 | Status: SHIPPED | OUTPATIENT
Start: 2022-03-24 | End: 2022-07-19

## 2022-03-24 RX ADMIN — METHYLPREDNISOLONE ACETATE 40 MG: 40 INJECTION, SUSPENSION INTRA-ARTICULAR; INTRALESIONAL; INTRAMUSCULAR; SOFT TISSUE at 10:03

## 2022-03-24 NOTE — PROGRESS NOTES
Chief Pain Complaint:  Lumbar Back Pain   Buttock Pain       History of Present Illness:   Ora Henderson is a 61 y.o. female  who is presenting with a chief complaint of Lumbar Back Pain. The patient began experiencing this problem insidiously, and the pain has been gradually worsening over the past 10 month(s). The pain is described as throbbing, shooting, burning and electrical and is located in the bilateral lumbar spine. Pain is intermittent and lasts hours. The pain radiates to bilateral lower extremities. The patient rates her pain a 7 out of ten and interferes with activities of daily living a 7 out of ten. Pain is exacerbated by flexion of the lumbar spine, and is improved by rest. Patient reports no prior trauma, no prior spinal surgery     Ora Henderson is a 61 y.o. female  who is presenting with a chief complaint of Mid-back Pain (Injection follow-up)  . The patient began experiencing this problem insidiously, and the pain has been gradually worsening over the past 7 month(s). The pain is described as throbbing, cramping, aching and heavy and is located in the bilateral buttocks. Pain is intermittent and lasts hours. The  pain is nonradiating. The patient rates her pain a 7 out of ten and interferes with activities of daily living a 6 out of ten. Pain is exacerbated by getting up from a seated position, and is improved by rest. Patient reports no prior trauma, no prior spinal surgery     Ora Henderson is a 61 y.o. female  who is presenting with a chief complaint of right neck shoulder pain. The patient began experiencing this problem insidiously, and the pain has been gradually worsening over the past 6 month(s). The pain is described as throbbing, cramping, aching and heavy and is located in the right neck . Pain is intermittent and lasts hours. The pain radiates to right arm. The patient rates her pain a 7 out of ten and interferes with activities of  daily living a 7 out of ten. Pain is exacerbated by extension, and is improved by rest. Patient reports no prior trauma, no prior spinal surgery         - pertinent negatives: No fever, No chills, No weight loss, No bladder dysfunction, No bowel dysfunction, No saddle anesthesia  - pertinent positives: none    - medications, other therapies tried (physical therapy, injections):     >> NSAIDs, Tylenol, gabapentin and medrol dose pack    >> Has previously undergone Physical Therapy    >> Has previously undergone spinal injection/s         bilateral L4-L5 TFESI on 10/7/2021 with 80% pain relief.          bilateral S1 TFESI on 12/29/2022 with 85% pain relief but took 2 weeks to take effect.     Imaging / Labs / Studies (reviewed on 3/24/2022):        Results for orders placed during the hospital encounter of 12/08/14    X-Ray Lumbar Spine Complete 5 View    Narrative  Comparison: 03/21/12    Five views    Findings:    Osteopenia and overlying bowel limits the study.  Multilevel marginal spurring and facet arthropathy.  Uniform loss of this height at the L4 -- 5 level with grade 1 anterolisthesis L4 on L5.  No acute fracture.  No spondylolysis.  Pedicles and SI joints  are intact.  IMPRESSION:      Interval progression degenerative changes with most pronounced findings at the L4 -- 5 level.  See above.      Electronically signed by: HENRIK LANDA III, MD  Date:     12/08/14  Time:    09:23        Results for orders placed during the hospital encounter of 12/15/20    X-Ray Lumbar Complete With Flex And Ext    Narrative  EXAMINATION:  XR LUMBAR SPINE 5 VIEW WITH FLEX AND EXT    CLINICAL HISTORY:  lumbar radic;  Other spondylosis with radiculopathy, lumbar region    TECHNIQUE:  Five views of the lumbar spine plus flexion extension views were performed.    COMPARISON:  December 23, 2017    FINDINGS:  Inferior-most lumbar vertebral body designated L5 for purposes of this exam.  Vertebral height maintained.  Mild uniform  loss of disc height at the L4-5 level there is grade 1 anterolisthesis L4 on L5.  Remaining disc spaces, vertebral body height and alignment maintained.  Slight increased prominence of anterior subluxation L4 on L5 on the flexion view.  Stable appearance the L4-5 level on neutral and extension lateral views.  Pedicles and SI joints intact.  Numerous calcifications lower pelvis bilaterally.  Prominent degenerative change bilateral hips and to lesser extent bilateral SI joints.    Impression  As above.      Electronically signed by: Tariq Cortez MD  Date:    12/15/2020  Time:    20:33    Results for orders placed during the hospital encounter of 12/23/17    X-Ray Lumbar Spine AP And Lateral    Narrative  AP and lateral views of the lumbar spine    Findings: The vertebral bodies demonstrate normal height.  There is grade 1 spondylolisthesis of L4 on L5. There is mild disc space narrowing and spondylosis present at the L1-L2 through the L4-L5 levels. Prominent facet arthropathy noted bilaterally at L4-L5 level.  IMPRESSION:  As above      Electronically signed by: MOISES SANTOS D.O.  Date:     12/26/17  Time:    08:27    Results for orders placed during the hospital encounter of 07/05/13    X-Ray Cervical Spine AP And Lateral    Narrative  Findings: Vertebral alignment is normal.  There is mild narrowing of the C5-6 disk spaces and early anterior osteophyte formation.  There are no compression fractures or acute abnormalities are seen.  IMPRESSION:  Mild degenerative change in the cervical spine at C5-6.      Electronically signed by: LURDES SHORT MD  Date:     07/05/13  Time:    09:29      Results for orders placed during the hospital encounter of 07/22/19    X-Ray Cervical Spine Complete 5 view    Narrative  EXAMINATION:  XR CERVICAL SPINE COMPLETE 5 VIEW    CLINICAL HISTORY:  . Cervicalgia    TECHNIQUE:  AP, Lateral, bilateral oblique and open mouth views of the cervical spine were  "performed.    COMPARISON:  07/05/2013    FINDINGS:  There is some straightening of the normal cervical lordosis with slight retrolisthesis of C5 on C6. Anterior disc osteophyte complex production and possible slight disc height loss noted at C5-6.  No appreciable change from prior.  Posterior elements appear intact without acute fractures or subluxations demonstrated.  Odontoid process appears intact.  Atlantoaxial articulations appear normal.  Prevertebral soft tissues are within normal limits.    Impression  Degenerative changes as above.  No acute findings.      Electronically signed by: George Bojorquez MD  Date:    07/22/2019  Time:    16:21      Review of Systems:  CONSTITUTIONAL: patient denies any fever, chills, or weight loss  SKIN: patient denies any rash or itching  RESPIRATORY: patient denies having any shortness of breath  GASTROINTESTINAL: patient denies having any diarrhea, constipation, or bowel incontinence  GENITOURINARY: patient denies having any abnormal bladder function    MUSCULOSKELETAL:  - patient complains of the above noted pain/s (see chief pain complaint)    NEUROLOGICAL:   - pain as above  - strength in Lower extremities is intact, BILATERALLY  - sensation in Lower extremities is intact, BILATERALLY  - patient denies any loss of bowel or bladder control      PSYCHIATRIC: patient denies any change in mood    Other:  All other systems reviewed and are negative      Physical Exam:  /84 (BP Location: Left arm, Patient Position: Sitting, BP Method: Large (Automatic))   Pulse 93   Ht 5' 3" (1.6 m)   Wt 87.7 kg (193 lb 5.5 oz)   BMI 34.25 kg/m²  (reviewed on 3/24/2022)  General: Alert and oriented, in no apparent distress.  Gait: normal gait.  Skin: No rashes, No discoloration, No obvious lesions  HEENT: Normocephalic, atraumatic. Pupils equal and round.  Cardiovascular: Regular rate and rhythm , no significant peripheral edema present  Respiratory: Without audible wheezing, without " use of accessory muscles of respiration.    Musculoskeletal:    Cervical Spine    - Pain on flexion of cervical spine Absent  - Spurling's Test:  Absent    - Pain on extension of cervical spine Absent  - TTP over the cervical facet joints Absent  - Cervical facet loading Absent      Lumbar Spine    - Pain on flexion of lumbar spine Present  - Straight Leg Raise:  Equivocal    - Pain on extension of lumbar spine Absent  - TTP over the lumbar facet joints Absent  - Lumbar facet loading Absent    -Pain on palpation over the SI joint  Present  - GUSTABO: Present  - TTP over bilateral GTB       Neuro:    Strength:  UE R/L: D: 5/5; B: 5/5; T: 5/5; WF: 5/5; WE: 5/5; IO: 5/5;  LE R/L: HF: 5/5, HE: 5/5, KF: 5/5; KE: 5/5; FE: 5/5; FF: 5/5    Extremity Reflexes: Brisk and symmetric throughout.      Extremity Sensory: Sensation to pinprick and temperature symmetric. Proprioception intact.      Psych:  Mood and affect is appropriate      Assessment:    Ora Henderson is a 61 y.o. year old female who is presenting with     Encounter Diagnoses   Name Primary?    Lumbar radiculopathy     Lumbar spondylosis     Myofascial muscle pain     Cervical spondylosis Yes       Plan:    1. Interventional: None for now. Right Trapezius TPI done in clinic.      S/p bilateral S1 TFESI on 12/29/2022 with 85% pain relief but took 2 weeks to take effect.       S/p bilateral L4-L5 TFESI on 10/7/2021 with 80% pain relief.     2. Pharmacologic: Continue Mobic 15 mg Po Q day. Continue Gabapentin 100 mg PO QHs.    3. Rehabilitative: Patient already did PT. Patient in chiropractic care now.     4. Diagnostic: Lumbar xray reviewed. Lumbar MRI reviewed. Upper extremity EMG.     5. Consult: Consider Neurosurgery consult for severe canal stenosis.     6. Follow up: Post EMG.     PROCEDURE NOTE    Diagnosis: Myofascial pain  Procedure: trigger point injections to     Risks and benefits of procedure explained to patient including risks of  infection, bleeding, pain, or damage to surrounding tissues. All questions answered. Informed consent obtained prior to proceeding. Areas marked and prepped in sterile fashion. Using a 27g 1.25inch needle, a 5 cc mixture of depo medrol 1cc (40mg) and 1% lidocaine was injected evenly into the above mentioned muscles. None to minimal bleeding noted. ER and post injection instructions given.    20 minutes were spent in this encounter with more than 50% of the time used for counseling and review of the plan.  Imaging / studies reviewed, detailed above.  I discussed in detail the risks, benefits, and alternatives to any and all potential treatment options.  All questions and concerns were fully addressed today in clinic. Medical decision making moderate.    Thank you for the opportunity to assist in the care of this patient.    Best wishes,    Signed:    Damon Charlton MD          Disclaimer:  This note may have been prepared using voice recognition software, it may have not been extensively proofed, as such there could be errors within the text such as sound alike errors.

## 2022-03-29 ENCOUNTER — PATIENT MESSAGE (OUTPATIENT)
Dept: RESEARCH | Facility: HOSPITAL | Age: 62
End: 2022-03-29
Payer: COMMERCIAL

## 2022-05-03 ENCOUNTER — OFFICE VISIT (OUTPATIENT)
Dept: PHYSICAL MEDICINE AND REHAB | Facility: CLINIC | Age: 62
End: 2022-05-03
Payer: COMMERCIAL

## 2022-05-03 VITALS
HEART RATE: 86 BPM | HEIGHT: 63 IN | WEIGHT: 193 LBS | DIASTOLIC BLOOD PRESSURE: 91 MMHG | SYSTOLIC BLOOD PRESSURE: 149 MMHG | BODY MASS INDEX: 34.2 KG/M2 | RESPIRATION RATE: 13 BRPM

## 2022-05-03 DIAGNOSIS — G56.03 BILATERAL CARPAL TUNNEL SYNDROME: ICD-10-CM

## 2022-05-03 DIAGNOSIS — M54.12 CERVICAL RADICULOPATHY: Primary | ICD-10-CM

## 2022-05-03 PROCEDURE — 95886 PR EMG COMPLETE, W/ NERVE CONDUCTION STUDIES, 5+ MUSCLES: ICD-10-PCS | Mod: S$GLB,,, | Performed by: PHYSICAL MEDICINE & REHABILITATION

## 2022-05-03 PROCEDURE — 1159F MED LIST DOCD IN RCRD: CPT | Mod: CPTII,S$GLB,, | Performed by: PHYSICAL MEDICINE & REHABILITATION

## 2022-05-03 PROCEDURE — 3077F PR MOST RECENT SYSTOLIC BLOOD PRESSURE >= 140 MM HG: ICD-10-PCS | Mod: CPTII,S$GLB,, | Performed by: PHYSICAL MEDICINE & REHABILITATION

## 2022-05-03 PROCEDURE — 3008F BODY MASS INDEX DOCD: CPT | Mod: CPTII,S$GLB,, | Performed by: PHYSICAL MEDICINE & REHABILITATION

## 2022-05-03 PROCEDURE — 99999 PR PBB SHADOW E&M-EST. PATIENT-LVL III: ICD-10-PCS | Mod: PBBFAC,,, | Performed by: PHYSICAL MEDICINE & REHABILITATION

## 2022-05-03 PROCEDURE — 99999 PR PBB SHADOW E&M-EST. PATIENT-LVL III: CPT | Mod: PBBFAC,,, | Performed by: PHYSICAL MEDICINE & REHABILITATION

## 2022-05-03 PROCEDURE — 99214 PR OFFICE/OUTPT VISIT, EST, LEVL IV, 30-39 MIN: ICD-10-PCS | Mod: 25,S$GLB,, | Performed by: PHYSICAL MEDICINE & REHABILITATION

## 2022-05-03 PROCEDURE — 1160F RVW MEDS BY RX/DR IN RCRD: CPT | Mod: CPTII,S$GLB,, | Performed by: PHYSICAL MEDICINE & REHABILITATION

## 2022-05-03 PROCEDURE — 99214 OFFICE O/P EST MOD 30 MIN: CPT | Mod: 25,S$GLB,, | Performed by: PHYSICAL MEDICINE & REHABILITATION

## 2022-05-03 PROCEDURE — 3080F PR MOST RECENT DIASTOLIC BLOOD PRESSURE >= 90 MM HG: ICD-10-PCS | Mod: CPTII,S$GLB,, | Performed by: PHYSICAL MEDICINE & REHABILITATION

## 2022-05-03 PROCEDURE — 3008F PR BODY MASS INDEX (BMI) DOCUMENTED: ICD-10-PCS | Mod: CPTII,S$GLB,, | Performed by: PHYSICAL MEDICINE & REHABILITATION

## 2022-05-03 PROCEDURE — 95886 MUSC TEST DONE W/N TEST COMP: CPT | Mod: S$GLB,,, | Performed by: PHYSICAL MEDICINE & REHABILITATION

## 2022-05-03 PROCEDURE — 95912 PR NERVE CONDUCTION STUDY; 11 -12 STUDIES: ICD-10-PCS | Mod: S$GLB,,, | Performed by: PHYSICAL MEDICINE & REHABILITATION

## 2022-05-03 PROCEDURE — 3077F SYST BP >= 140 MM HG: CPT | Mod: CPTII,S$GLB,, | Performed by: PHYSICAL MEDICINE & REHABILITATION

## 2022-05-03 PROCEDURE — 1159F PR MEDICATION LIST DOCUMENTED IN MEDICAL RECORD: ICD-10-PCS | Mod: CPTII,S$GLB,, | Performed by: PHYSICAL MEDICINE & REHABILITATION

## 2022-05-03 PROCEDURE — 3080F DIAST BP >= 90 MM HG: CPT | Mod: CPTII,S$GLB,, | Performed by: PHYSICAL MEDICINE & REHABILITATION

## 2022-05-03 PROCEDURE — 95912 NRV CNDJ TEST 11-12 STUDIES: CPT | Mod: S$GLB,,, | Performed by: PHYSICAL MEDICINE & REHABILITATION

## 2022-05-03 PROCEDURE — 1160F PR REVIEW ALL MEDS BY PRESCRIBER/CLIN PHARMACIST DOCUMENTED: ICD-10-PCS | Mod: CPTII,S$GLB,, | Performed by: PHYSICAL MEDICINE & REHABILITATION

## 2022-05-03 NOTE — PROGRESS NOTES
[facility]  Department of Physiatry  [facility]   [address] [place] [address]           Full Name: Ora Henderson YOB: 1960  Patient ID: [id number]      Visit Date: 5/3/2022 10:18  Age: 61 Years 11 Months Old  Examining Physician: Amanda Up M.D.  Referring Physician:   History: uex pain    Chief Complaint   Patient presents with    Arm Pain     right       HPI: This is a 61 y.o.  female being seen in clinic today for chronic neck achy pain with numbness/burning into her right upper arm/shoulder to the biceps.  With increased arm usage her symptoms can worsen, but she experiences symptoms at rest as well.  Rest, meds, and position change provide some relief.    History obtained from patient    Past family, medical, social, and surgical history reviewed in chart    Review of Systems:     General- denies lethargy, weight change, fever, chills  Head/neck- denies swallowing difficulties  ENT- denies hearing changes  Cardiovascular-denies chest pain  Pulmonary- denies shortness of breath  GI- denies constipation or bowel incontinence  - denies bladder incontinence  Skin- denies wounds or rashes  Musculoskeletal- +/-weakness, +pain  Neurologic- +numbness and tingling  Psychiatric- denies depressive or psychotic features, denies anxiety  Lymphatic-denies swelling  Endocrine- denies hypoglycemic symptoms/DM history  All other pertinent systems negative     Physical Examination:  General: Well developed, well nourished female, NAD  HEENT:NCAT EOMI bilaterally   Pulmonary:Normal respirations    Spinal Examination: CERVICAL  Active ROM is within normal limits.  Inspection: No deformity of spinal alignment.  Palpation: tight and ttp at trapezius, rhomboids    Spinal Examination: LUMBAR or THORACIC  Active ROM is limited at full flex and ext  Inspection: No deformity of spinal alignment.      Bilateral Upper and Lower Extremities:  Pulses are  2+ at radial bilaterally.  Shoulder/Elbow/Wrist/Hand ROM wnl  Hip/Knee/Ankle ROM wnl  Bilateral Extremities show normal capillary refill.  No signs of cyanosis, rubor, edema, skin changes, or dysvascular changes of appendages.  Nails appear intact.    Neurological Exam:  Cranial Nerves:  II-XII grossly intact    Manual Muscle Testing: (Motor 5=normal)  5/5 strength bilateral upper extremities except 4+/5 right sab   No focal atrophy is noted of either upper extremity.    Bilateral Reflexes:  hoffmans neg bilaterally  Sensation: tested to light touch  - intact in arms except hypersensitive at right deltoid and prox biceps    Gait: Narrow base and good arm swing.      Sensory NCS      Nerve / Sites Rec. Site Onset Lat Peak Lat NP Amp PP Amp Segments Distance Velocity     ms ms µV µV  mm m/s   R Hand Sensories - Median, Ulnar, Radial      Median Digit II Wrist 3.33 4.11 22.3 40.3 Median Digit II - Wrist 140 42      Ulnar Digit V Wrist 2.71 3.59 30.7 44.1 Ulnar Digit V - Wrist 140 52      Radial Forearm Snuff Box 1.88 2.50 10.7 15.7 Radial Forearm - Snuff Box 100 53   L Hand Sensories - Median, Ulnar, Radial      Median Digit II Wrist 3.70 4.58 20.8 37.1 Median Digit II - Wrist 140 38      Ulnar Digit V Wrist 3.23 4.11 17.1 11.9 Ulnar Digit V - Wrist 140 43      Radial Forearm Snuff Box 1.93 2.60 17.4 15.7 Radial Forearm - Snuff Box 100 52       Combined Sensory Index      Nerve / Sites Rec. Site Peak Lat NP Amp PP Amp Segments Peak Diff     ms µV µV  ms   L Median - CSI      Median Thumb 3.96 3.8 11.0 Median - Radial 1.25      Radial Thumb 2.71 10.7 22.3 Median - Ulnar       CSI     CSI 1.25       Motor NCS      Nerve / Sites Muscle Latency Amplitude Amp % Duration Segments Distance Lat Diff Velocity     ms mV % ms  mm ms m/s   R Median - APB      Wrist APB 4.38 8.3 100 6.56 Wrist - APB 80        Elbow APB 8.23 7.2 86.8 7.29 Elbow - Wrist 210 3.85 54   L Median - APB      Wrist APB 4.32 8.0 100 8.44 Wrist - APB 80         Elbow APB 8.70 7.1 89.6 7.97 Elbow - Wrist 220 4.37 50   R Ulnar - ADM      Wrist ADM 3.18 11.4 100 5.78 Wrist - ADM 80        B.Elbow ADM 7.29 11.3 99.8 6.15 B.Elbow - Wrist 230 4.11 56      A.Elbow ADM 9.06 11.1 97.8 6.15 A.Elbow - B.Elbow 100 1.77 56   L Ulnar - ADM      Wrist ADM 3.59 13.1 100 6.09 Wrist - ADM 80        B.Elbow ADM 7.86 11.1 84.6 7.08 B.Elbow - Wrist 220 4.27 52      A.Elbow ADM 10.00 10.8 82.3 6.35 A.Elbow - B.Elbow 110 2.14 52       EMG Summary Table     Spontaneous MUAP Recruitment   Muscle IA Fib PSW Fasc H.F. Amp Dur. PPP Pattern   R. Deltoid 1+ None 1+ None None N N 1+ sl red   R. Biceps brachii N None None None None N N 1+ Reduced   R. First dorsal interosseous N None None None None N N 1+ sl red   R. Triceps brachii N None None None None N N N N                              INTERPRETATION  -Bilateral median motor nerve conduction study showed normal latency, amplitude, and conduction velocity  -Bilateral median sensory nerve conduction study showed prolonged peak latency and amplitude  -Bilateral ulnar motor nerve conduction study showed normal latency, amplitude, and conduction velocity  -Bilateral ulnar sensory nerve conduction study showed prolonged peak latency on the left and normal amplitude  -Bilateral radial sensory nerve conduction study showed normal peak latency and amplitude  -Left combined sensory index was significant  -Needle EMG examination performed to above mentioned muscles *pt did not tolerate full needle testing    IMPRESSION  1. ABNORMAL stud  2. There is electrodiagnostic evidence of an acute on chronic radiculopathy of the right C5 nerve root and a chronic radiculopathy of the C6, C8, T1 nerve roots.  There is additional evidence of persistent mild demyelinating median neuropathy (Carpal tunnel syndrome) across BILATERAL wrists.    PLAN  1. Follow up with referring provider-Dr Charlton  2. Handouts on cervical radic provided. Consider ARMANDO for C5 radic  3. This  study is good for one year. If symptoms worsen or do not improve, please re-consult.    Amanda Up M.D.  Physical Medicine and Rehab

## 2022-05-11 ENCOUNTER — OFFICE VISIT (OUTPATIENT)
Dept: URGENT CARE | Facility: CLINIC | Age: 62
End: 2022-05-11
Payer: COMMERCIAL

## 2022-05-11 VITALS
BODY MASS INDEX: 34.2 KG/M2 | HEART RATE: 91 BPM | TEMPERATURE: 97 F | OXYGEN SATURATION: 100 % | HEIGHT: 63 IN | WEIGHT: 193 LBS | RESPIRATION RATE: 18 BRPM | SYSTOLIC BLOOD PRESSURE: 150 MMHG | DIASTOLIC BLOOD PRESSURE: 84 MMHG

## 2022-05-11 DIAGNOSIS — J06.9 VIRAL URI WITH COUGH: Primary | ICD-10-CM

## 2022-05-11 PROCEDURE — 1159F MED LIST DOCD IN RCRD: CPT | Mod: CPTII,S$GLB,, | Performed by: NURSE PRACTITIONER

## 2022-05-11 PROCEDURE — 3077F PR MOST RECENT SYSTOLIC BLOOD PRESSURE >= 140 MM HG: ICD-10-PCS | Mod: CPTII,S$GLB,, | Performed by: NURSE PRACTITIONER

## 2022-05-11 PROCEDURE — 3079F DIAST BP 80-89 MM HG: CPT | Mod: CPTII,S$GLB,, | Performed by: NURSE PRACTITIONER

## 2022-05-11 PROCEDURE — 3008F PR BODY MASS INDEX (BMI) DOCUMENTED: ICD-10-PCS | Mod: CPTII,S$GLB,, | Performed by: NURSE PRACTITIONER

## 2022-05-11 PROCEDURE — 99213 OFFICE O/P EST LOW 20 MIN: CPT | Mod: S$GLB,,, | Performed by: NURSE PRACTITIONER

## 2022-05-11 PROCEDURE — 99213 PR OFFICE/OUTPT VISIT, EST, LEVL III, 20-29 MIN: ICD-10-PCS | Mod: S$GLB,,, | Performed by: NURSE PRACTITIONER

## 2022-05-11 PROCEDURE — 3008F BODY MASS INDEX DOCD: CPT | Mod: CPTII,S$GLB,, | Performed by: NURSE PRACTITIONER

## 2022-05-11 PROCEDURE — 3079F PR MOST RECENT DIASTOLIC BLOOD PRESSURE 80-89 MM HG: ICD-10-PCS | Mod: CPTII,S$GLB,, | Performed by: NURSE PRACTITIONER

## 2022-05-11 PROCEDURE — 1159F PR MEDICATION LIST DOCUMENTED IN MEDICAL RECORD: ICD-10-PCS | Mod: CPTII,S$GLB,, | Performed by: NURSE PRACTITIONER

## 2022-05-11 PROCEDURE — 3077F SYST BP >= 140 MM HG: CPT | Mod: CPTII,S$GLB,, | Performed by: NURSE PRACTITIONER

## 2022-05-11 RX ORDER — PROMETHAZINE HYDROCHLORIDE AND DEXTROMETHORPHAN HYDROBROMIDE 6.25; 15 MG/5ML; MG/5ML
5 SYRUP ORAL
Qty: 118 ML | Refills: 0 | Status: SHIPPED | OUTPATIENT
Start: 2022-05-11 | End: 2022-05-21

## 2022-05-11 NOTE — PROGRESS NOTES
"Subjective:       Patient ID: Ora Henderson is a 61 y.o. female.    Vitals:  height is 5' 3" (1.6 m) and weight is 87.5 kg (193 lb). Her tympanic temperature is 97.3 °F (36.3 °C). Her blood pressure is 150/84 (abnormal) and her pulse is 91. Her respiration is 18 and oxygen saturation is 100%.     Chief Complaint: Cough    Cough  This is a new problem. The current episode started yesterday. The problem has been unchanged. The problem occurs constantly. The cough is productive of sputum. Associated symptoms include ear pain and a sore throat. Pertinent negatives include no chest pain, chills, ear congestion, fever, headaches, heartburn, hemoptysis, myalgias, nasal congestion, postnasal drip, rash, rhinorrhea, shortness of breath, sweats, weight loss or wheezing. Nothing aggravates the symptoms. Treatments tried: Robtussion DM. The treatment provided no relief. There is no history of asthma, bronchiectasis, bronchitis, COPD, emphysema, environmental allergies or pneumonia.       Constitution: Negative for chills, sweating, fatigue and fever.   HENT: Positive for ear pain and sore throat. Negative for postnasal drip, sinus pressure, trouble swallowing and voice change.    Cardiovascular: Negative for chest pain and sob on exertion.   Respiratory: Positive for cough. Negative for sputum production, bloody sputum, shortness of breath and wheezing.    Gastrointestinal: Negative for heartburn.   Musculoskeletal: Negative for muscle ache.   Skin: Negative for rash.   Allergic/Immunologic: Negative for environmental allergies.   Neurological: Negative for headaches.       Objective:      Physical Exam   Constitutional: She is oriented to person, place, and time. She appears well-developed. She is cooperative.  Non-toxic appearance. She does not appear ill. No distress.   HENT:   Head: Normocephalic and atraumatic.   Ears:   Right Ear: Hearing, tympanic membrane, external ear and ear canal normal.   Left Ear: " Hearing, tympanic membrane, external ear and ear canal normal.   Nose: Nose normal. No mucosal edema, rhinorrhea or nasal deformity. No epistaxis. Right sinus exhibits no maxillary sinus tenderness and no frontal sinus tenderness. Left sinus exhibits no maxillary sinus tenderness and no frontal sinus tenderness.   Mouth/Throat: Uvula is midline, oropharynx is clear and moist and mucous membranes are normal. No trismus in the jaw. Normal dentition. No uvula swelling. No oropharyngeal exudate, posterior oropharyngeal edema or posterior oropharyngeal erythema. Tonsils are 2+ on the right. Tonsils are 2+ on the left. No tonsillar exudate.   Eyes: Conjunctivae, EOM and lids are normal. Pupils are equal, round, and reactive to light. No scleral icterus.   Neck: Trachea normal and phonation normal. Neck supple. No edema present. No erythema present. No neck rigidity present.   Cardiovascular: Normal rate, regular rhythm, normal heart sounds and normal pulses.   Pulmonary/Chest: Effort normal and breath sounds normal. No stridor. No respiratory distress. She has no decreased breath sounds. She has no wheezes. She has no rhonchi. She has no rales.   Musculoskeletal: Normal range of motion.         General: No deformity. Normal range of motion.   Neurological: She is alert and oriented to person, place, and time. She exhibits normal muscle tone. Coordination and gait normal.   Skin: Skin is warm, dry, intact, not diaphoretic and not pale. Capillary refill takes less than 2 seconds.   Psychiatric: Her speech is normal and behavior is normal. Judgment and thought content normal.   Nursing note and vitals reviewed.        Assessment:       1. Viral URI with cough          Plan:         Viral URI with cough  -     promethazine-dextromethorphan (PROMETHAZINE-DM) 6.25-15 mg/5 mL Syrp; Take 5 mLs by mouth every 4 to 6 hours as needed (Take for cough and may cause drowsiness).  Dispense: 118 mL; Refill: 0      Patient Instructions      May take as needed for cough, Mucinex.  You have been diagnosed with a viral syndrome. Viral syndromes are self-limited and usually resolve within 10 days from onset of symptoms. Antibiotics are not effective against viral infections. It is important that you get lots of rest and drink plenty of fluids. Treatment is through symptomatic relief.    Below are suggestions for symptomatic relief:   -Tylenol every 4 hours OR ibuprofen every 6 hours as needed for pain/fever.   -Salt water gargles to soothe throat pain.   -Chloroseptic spray also helps to numb throat pain.   -Nasal saline spray reduces inflammation and dryness.   -Warm face compresses to help with facial sinus pain/pressure.   -Vicks vapor rub at night.   -Flonase OTC or Nasacort OTC for nasal congestion.   -Simple foods like chicken noodle soup.   -Delsym helps with coughing at night   -Zyrtec/Claritin during the day & Benadryl at night may help with allergies.     If you DO NOT have Hypertension or any history of palpitations, it is ok to take over the counter Sudafed or Mucinex D or Allegra-D or Claritin-D or Zyrtec-D.  If you do take one of the above, it is ok to combine that with plain over the counter Mucinex or Allegra or Claritin or Zyrtec. If, for example, you are taking Zyrtec -D, you can combine that with Mucinex, but not Mucinex-D.  If you are taking Mucinex-D, you can combine that with plain Allegra or Claritin or Zyrtec.   If you DO have Hypertension or palpitations, it is safe to take Coricidin HBP for relief of sinus symptoms.    Please return to clinic if symptoms have not subsided 10-14 days from onset, as your viral infection may have developed into a bacterial infection. It is at that time antibiotics would be indicated.     If you were prescribed a narcotic or controlled medication, do not drive or operate heavy equipment or machinery while taking these medications.  You must understand that you've received an Urgent Care treatment  only and that you may be released before all your medical problems are known or treated. You, the patient, will arrange for follow up care as instructed.  Follow up with your PCP or specialty clinic as directed within 2-5 days if not improved or as needed.  You can call [phone] to schedule an appointment with the appropriate provider.  If your condition worsens we recommend that you receive another evaluation at the emergency room immediately or contact your primary medical clinics after hours call service to discuss your concerns.  Please return here or go to the Emergency Department for any concerns or worsening of condition.

## 2022-05-11 NOTE — PATIENT INSTRUCTIONS
May take as needed for cough, Mucinex.  You have been diagnosed with a viral syndrome. Viral syndromes are self-limited and usually resolve within 10 days from onset of symptoms. Antibiotics are not effective against viral infections. It is important that you get lots of rest and drink plenty of fluids. Treatment is through symptomatic relief.    Below are suggestions for symptomatic relief:   -Tylenol every 4 hours OR ibuprofen every 6 hours as needed for pain/fever.   -Salt water gargles to soothe throat pain.   -Chloroseptic spray also helps to numb throat pain.   -Nasal saline spray reduces inflammation and dryness.   -Warm face compresses to help with facial sinus pain/pressure.   -Vicks vapor rub at night.   -Flonase OTC or Nasacort OTC for nasal congestion.   -Simple foods like chicken noodle soup.   -Delsym helps with coughing at night   -Zyrtec/Claritin during the day & Benadryl at night may help with allergies.     If you DO NOT have Hypertension or any history of palpitations, it is ok to take over the counter Sudafed or Mucinex D or Allegra-D or Claritin-D or Zyrtec-D.  If you do take one of the above, it is ok to combine that with plain over the counter Mucinex or Allegra or Claritin or Zyrtec. If, for example, you are taking Zyrtec -D, you can combine that with Mucinex, but not Mucinex-D.  If you are taking Mucinex-D, you can combine that with plain Allegra or Claritin or Zyrtec.   If you DO have Hypertension or palpitations, it is safe to take Coricidin HBP for relief of sinus symptoms.    Please return to clinic if symptoms have not subsided 10-14 days from onset, as your viral infection may have developed into a bacterial infection. It is at that time antibiotics would be indicated.     If you were prescribed a narcotic or controlled medication, do not drive or operate heavy equipment or machinery while taking these medications.  You must understand that you've received an Urgent Care treatment  only and that you may be released before all your medical problems are known or treated. You, the patient, will arrange for follow up care as instructed.  Follow up with your PCP or specialty clinic as directed within 2-5 days if not improved or as needed.  You can call [phone] to schedule an appointment with the appropriate provider.  If your condition worsens we recommend that you receive another evaluation at the emergency room immediately or contact your primary medical clinics after hours call service to discuss your concerns.  Please return here or go to the Emergency Department for any concerns or worsening of condition.

## 2022-06-09 ENCOUNTER — PATIENT MESSAGE (OUTPATIENT)
Dept: PHYSICAL MEDICINE AND REHAB | Facility: CLINIC | Age: 62
End: 2022-06-09
Payer: COMMERCIAL

## 2022-06-13 DIAGNOSIS — M54.12 CERVICAL RADICULOPATHY: Primary | ICD-10-CM

## 2022-06-20 ENCOUNTER — PATIENT MESSAGE (OUTPATIENT)
Dept: PHYSICAL MEDICINE AND REHAB | Facility: CLINIC | Age: 62
End: 2022-06-20
Payer: COMMERCIAL

## 2022-07-01 ENCOUNTER — HOSPITAL ENCOUNTER (OUTPATIENT)
Dept: RADIOLOGY | Facility: HOSPITAL | Age: 62
Discharge: HOME | End: 2022-07-01
Attending: PHYSICIAN ASSISTANT
Payer: COMMERCIAL

## 2022-07-01 DIAGNOSIS — M54.12 CERVICAL RADICULOPATHY: ICD-10-CM

## 2022-07-01 PROCEDURE — 72141 MRI NECK SPINE W/O DYE: CPT | Mod: TC,PN

## 2022-07-11 ENCOUNTER — OFFICE VISIT (OUTPATIENT)
Dept: INTERNAL MEDICINE | Facility: CLINIC | Age: 62
End: 2022-07-11
Payer: COMMERCIAL

## 2022-07-11 ENCOUNTER — TELEPHONE (OUTPATIENT)
Dept: INTERNAL MEDICINE | Facility: CLINIC | Age: 62
End: 2022-07-11
Payer: COMMERCIAL

## 2022-07-11 VITALS
BODY MASS INDEX: 35 KG/M2 | HEIGHT: 63 IN | SYSTOLIC BLOOD PRESSURE: 130 MMHG | WEIGHT: 197.56 LBS | HEART RATE: 88 BPM | DIASTOLIC BLOOD PRESSURE: 84 MMHG | OXYGEN SATURATION: 98 % | RESPIRATION RATE: 18 BRPM

## 2022-07-11 DIAGNOSIS — G56.03 BILATERAL CARPAL TUNNEL SYNDROME: ICD-10-CM

## 2022-07-11 DIAGNOSIS — E66.9 OBESITY (BMI 30.0-34.9): ICD-10-CM

## 2022-07-11 DIAGNOSIS — I10 ESSENTIAL HYPERTENSION: Primary | Chronic | ICD-10-CM

## 2022-07-11 DIAGNOSIS — Z12.39 ENCOUNTER FOR SCREENING FOR MALIGNANT NEOPLASM OF BREAST, UNSPECIFIED SCREENING MODALITY: ICD-10-CM

## 2022-07-11 DIAGNOSIS — G47.33 OSA (OBSTRUCTIVE SLEEP APNEA): ICD-10-CM

## 2022-07-11 DIAGNOSIS — K21.9 GASTROESOPHAGEAL REFLUX DISEASE WITHOUT ESOPHAGITIS: Chronic | ICD-10-CM

## 2022-07-11 DIAGNOSIS — E78.2 MIXED HYPERLIPIDEMIA: ICD-10-CM

## 2022-07-11 PROCEDURE — 99214 OFFICE O/P EST MOD 30 MIN: CPT | Mod: S$GLB,,, | Performed by: NURSE PRACTITIONER

## 2022-07-11 PROCEDURE — 1159F PR MEDICATION LIST DOCUMENTED IN MEDICAL RECORD: ICD-10-PCS | Mod: CPTII,S$GLB,, | Performed by: NURSE PRACTITIONER

## 2022-07-11 PROCEDURE — 99214 PR OFFICE/OUTPT VISIT, EST, LEVL IV, 30-39 MIN: ICD-10-PCS | Mod: S$GLB,,, | Performed by: NURSE PRACTITIONER

## 2022-07-11 PROCEDURE — 1159F MED LIST DOCD IN RCRD: CPT | Mod: CPTII,S$GLB,, | Performed by: NURSE PRACTITIONER

## 2022-07-11 PROCEDURE — 3075F PR MOST RECENT SYSTOLIC BLOOD PRESS GE 130-139MM HG: ICD-10-PCS | Mod: CPTII,S$GLB,, | Performed by: NURSE PRACTITIONER

## 2022-07-11 PROCEDURE — 99999 PR PBB SHADOW E&M-EST. PATIENT-LVL V: CPT | Mod: PBBFAC,,, | Performed by: NURSE PRACTITIONER

## 2022-07-11 PROCEDURE — 3079F PR MOST RECENT DIASTOLIC BLOOD PRESSURE 80-89 MM HG: ICD-10-PCS | Mod: CPTII,S$GLB,, | Performed by: NURSE PRACTITIONER

## 2022-07-11 PROCEDURE — 3075F SYST BP GE 130 - 139MM HG: CPT | Mod: CPTII,S$GLB,, | Performed by: NURSE PRACTITIONER

## 2022-07-11 PROCEDURE — 99999 PR PBB SHADOW E&M-EST. PATIENT-LVL V: ICD-10-PCS | Mod: PBBFAC,,, | Performed by: NURSE PRACTITIONER

## 2022-07-11 PROCEDURE — 3079F DIAST BP 80-89 MM HG: CPT | Mod: CPTII,S$GLB,, | Performed by: NURSE PRACTITIONER

## 2022-07-11 PROCEDURE — 3008F BODY MASS INDEX DOCD: CPT | Mod: CPTII,S$GLB,, | Performed by: NURSE PRACTITIONER

## 2022-07-11 PROCEDURE — 3008F PR BODY MASS INDEX (BMI) DOCUMENTED: ICD-10-PCS | Mod: CPTII,S$GLB,, | Performed by: NURSE PRACTITIONER

## 2022-07-11 NOTE — PROGRESS NOTES
Subjective:       Patient ID: Ora Henderson is a 62 y.o. female.    Chief Complaint: Follow-up    HPI     HLD- on statin  HTN- stable at home-no complaints  sleep apnea- not using cpap couldnt tolerate  gerd-stab  Cervical radiculopathy/ CTS- sees ortho soon        Past Medical History:   Diagnosis Date    Arthritis     Encounter for blood transfusion     Fatty liver     GERD (gastroesophageal reflux disease)     Hernia, umbilical     reducible    Hyperlipidemia     Hypertension     Rotator cuff tear     Sleep apnea      Past Surgical History:   Procedure Laterality Date    ARTHROSCOPIC DEBRIDEMENT OF SHOULDER Right 2019    Procedure: DEBRIDEMENT, SHOULDER, ARTHROSCOPIC;  Surgeon: Laureano Cox MD;  Location: [facility];  Service: Orthopedics;  Laterality: Right;    ARTHROSCOPY OF SHOULDER WITH DECOMPRESSION OF SUBACROMIAL SPACE Right 2019    Procedure: ARTHROSCOPY, SHOULDER, WITH SUBACROMIAL SPACE DECOMPRESSION;  Surgeon: Laureano Cox MD;  Location: [facility];  Service: Orthopedics;  Laterality: Right;    BICEPS TENDON REPAIR Right 2019    Procedure: REPAIR, TENDON, BICEPS;  Surgeon: Laureano Cox MD;  Location: [facility];  Service: Orthopedics;  Laterality: Right;  arthroscopic Biceps tenodesis    BRAIN SURGERY  2001    Pituitary tumor removal 2001 x 2     CARPAL TUNNEL RELEASE Bilateral     x 2    CERVICAL BIOPSY  W/ LOOP ELECTRODE EXCISION      [facility] '81     SECTION      x 1    COLONOSCOPY N/A 2016    Procedure: COLONOSCOPY;  Surgeon: Quique Paul MD;  Location: [facility];  Service: Endoscopy;  Laterality: N/A;    COLONOSCOPY N/A 2021    Procedure: COLONOSCOPY;  Surgeon: Mari Tucker MD;  Location: [facility] END[facility];  Service: Endoscopy;  Laterality: N/A;    DISTAL CLAVICLE EXCISION Right 2019    Procedure: EXCISION, CLAVICLE, DISTAL;  Surgeon: Laureano Cox MD;  Location: [facility] OR;  Service: Orthopedics;   Laterality: Right;  arthroscopic    GANGLION CYST EXCISION Left 12/31/2018    HYSTERECTOMY      REFRACTIVE SURGERY      ROTATOR CUFF REPAIR Right 12/30/2019    Procedure: REPAIR, ROTATOR CUFF;  Surgeon: Laureano Cox MD;  Location: [facility] OR;  Service: Orthopedics;  Laterality: Right;  arthroscopic    SELECTIVE INJECTION OF ANESTHETIC AGENT AROUND LUMBAR SPINAL NERVE ROOT BY TRANSFORAMINAL APPROACH Bilateral 10/7/2021    Procedure: BLOCK, SPINAL NERVE ROOT, LUMBAR, SELECTIVE, TRANSFORAMINAL APPROACH bilateral L4-5 Rn IV sedation;  Surgeon: Damon Charlton MD;  Location: [facility];  Service: Pain Management;  Laterality: Bilateral;    SELECTIVE INJECTION OF ANESTHETIC AGENT AROUND LUMBAR SPINAL NERVE ROOT BY TRANSFORAMINAL APPROACH Bilateral 12/29/2021    Procedure: BLOCK, SPINAL NERVE ROOT, LUMBAR, SELECTIVE, TRANSFORAMINAL APPROACH bilateral S1 RN IV sedation;  Surgeon: Damon Charlton MD;  Location: [facility];  Service: Pain Management;  Laterality: Bilateral;    SYNOVECTOMY OF SHOULDER Right 12/30/2019    Procedure: SYNOVECTOMY, SHOULDER;  Surgeon: Laureano Cox MD;  Location: [facility] OR;  Service: Orthopedics;  Laterality: Right;  arthroscopic    TOTAL ABDOMINAL HYSTERECTOMY W/ BILATERAL SALPINGOOPHORECTOMY  2006    STAR/BSO secondary to endometriosis    VENTRAL HERNIA REPAIR  2016     Social History     Socioeconomic History    Marital status:     Number of children: 1   Occupational History    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Employer: [employer]   Tobacco Use    Smoking status: Never Smoker    Smokeless tobacco: Never Used   Substance and Sexual Activity    Alcohol use: Yes     Alcohol/week: 0.0 standard drinks     Comment: socially    Drug use: No    Sexual activity: Yes     Partners: Male     Birth control/protection: None, Surgical     Social Determinants of Health     Financial Resource Strain: Low Risk     Difficulty of Paying Living Expenses: Not hard at  all   Food Insecurity: No Food Insecurity    Worried About Running Out of Food in the Last Year: Never true    Ran Out of Food in the Last Year: Never true   Transportation Needs: No Transportation Needs    Lack of Transportation (Medical): No    Lack of Transportation (Non-Medical): No   Physical Activity: Sufficiently Active    Days of Exercise per Week: 4 days    Minutes of Exercise per Session: 60 min   Stress: Stress Concern Present    Feeling of Stress : Very much   Social Connections: Unknown    Frequency of Communication with Friends and Family: More than three times a week    Frequency of Social Gatherings with Friends and Family: Once a week    Active Member of Clubs or Organizations: No    Attends Club or Organization Meetings: 1 to 4 times per year    Marital Status:    Housing Stability: Low Risk     Unable to Pay for Housing in the Last Year: No    Number of Places Lived in the Last Year: 1    Unstable Housing in the Last Year: No     Review of patient's allergies indicates:   Allergen Reactions    Sulfa (sulfonamide antibiotics) Hives     Current Outpatient Medications   Medication Sig    amLODIPine (NORVASC) 2.5 MG tablet Take 1 tablet (2.5 mg total) by mouth once daily.    aspirin 81 MG Chew Take 81 mg by mouth as needed. Hold 5 days prior to surgery    docusate sodium (COLACE) 100 MG capsule Take 1 capsule (100 mg total) by mouth 2 (two) times daily.    EC-NAPROXEN 500 mg EC tablet Take 500 mg by mouth 2 (two) times daily as needed.    esomeprazole (NEXIUM) 40 MG capsule Take 1 capsule (40 mg total) by mouth before breakfast.    fluticasone propionate (FLONASE) 50 mcg/actuation nasal spray 2 sprays (100 mcg total) by Each Nostril route once daily.    gabapentin (NEURONTIN) 100 MG capsule Take 1-2 capsules (100-200 mg total) by mouth every evening.    L.acidophilus-Bif. animalis (PROBIOTIC) 5 billion cell CpSP Take 1 tablet by mouth once daily.    LIDOcaine (LIDODERM)  5 % Place onto the skin.    losartan-hydrochlorothiazide 100-25 mg (HYZAAR) 100-25 mg per tablet Take 1 tablet by mouth once daily.    methocarbamoL (ROBAXIN) 750 MG Tab Take by mouth.    MULTIVIT,CALC,MINS/IRON/FOLIC (DAILY MULTIPLE FOR WOMEN ORAL) Take 1 tablet by mouth once daily. Hold 5 days prior to surgery    omega-3 fatty acids/fish oil (FISH OIL-OMEGA-3 FATTY ACIDS) 300-1,000 mg capsule Take by mouth once daily. Hold 5 days prior to surgery    rosuvastatin (CRESTOR) 40 MG Tab Take 1 tablet (40 mg total) by mouth every evening.    diazePAM (VALIUM) 5 MG tablet Take 1 tablet (5 mg total) by mouth once as needed for Anxiety (take 45 min to 1 hr before EMG).     No current facility-administered medications for this visit.     Facility-Administered Medications Ordered in Other Visits   Medication    lidocaine (PF) 10 mg/ml (1%) injection 5 mg           Review of Systems   Constitutional: Negative for activity change, appetite change, chills, diaphoresis, fatigue, fever and unexpected weight change.   HENT: Negative for congestion, ear pain, postnasal drip, rhinorrhea, sinus pressure, sinus pain, sneezing, sore throat, tinnitus, trouble swallowing and voice change.    Eyes: Negative for photophobia, pain and visual disturbance.   Respiratory: Negative for cough, chest tightness, shortness of breath and wheezing.    Cardiovascular: Negative for chest pain, palpitations and leg swelling.   Gastrointestinal: Negative for abdominal distention, abdominal pain, constipation, diarrhea, nausea and vomiting.   Genitourinary: Negative for decreased urine volume, difficulty urinating, dysuria, flank pain, frequency, hematuria and urgency.   Musculoskeletal: Negative for arthralgias, back pain, joint swelling, neck pain and neck stiffness.   Allergic/Immunologic: Negative for immunocompromised state.   Neurological: Negative for dizziness, tremors, seizures, syncope, facial asymmetry, speech difficulty, weakness,  light-headedness, numbness and headaches.   Hematological: Negative for adenopathy. Does not bruise/bleed easily.   Psychiatric/Behavioral: Negative for confusion and sleep disturbance.       Objective:      Physical Exam  HENT:      Head: Normocephalic and atraumatic.      Right Ear: Tympanic membrane normal.      Left Ear: Tympanic membrane normal.   Eyes:      Conjunctiva/sclera: Conjunctivae normal.   Cardiovascular:      Rate and Rhythm: Normal rate and regular rhythm.      Heart sounds: Normal heart sounds.   Pulmonary:      Effort: Pulmonary effort is normal.      Breath sounds: Normal breath sounds.   Abdominal:      General: Bowel sounds are normal.      Palpations: Abdomen is soft.   Musculoskeletal:         General: Normal range of motion.      Cervical back: Normal range of motion and neck supple.   Skin:     General: Skin is warm and dry.   Neurological:      Mental Status: She is alert and oriented to person, place, and time.         Assessment:     Vitals:    07/11/22 1147   BP: 130/84   Pulse: 88   Resp: 18         1. Essential hypertension    2. Mixed hyperlipidemia    3. Obesity (BMI 30.0-34.9)    4. Gastroesophageal reflux disease without esophagitis    5. NATALIE (obstructive sleep apnea)    6. Bilateral carpal tunnel syndrome        Plan:   Essential hypertension  -     TSH; Future; Expected date: 07/11/2022  -     CBC Auto Differential; Future; Expected date: 07/11/2022    Mixed hyperlipidemia  -     Lipid Panel; Future; Expected date: 07/11/2022  -     Comprehensive Metabolic Panel; Future; Expected date: 07/11/2022    Obesity (BMI 30.0-34.9)    Gastroesophageal reflux disease without esophagitis    NATALIE (obstructive sleep apnea)    Bilateral carpal tunnel syndrome  -     Ambulatory referral/consult to Orthopedics; Future; Expected date: 07/18/2022

## 2022-07-17 ENCOUNTER — PATIENT MESSAGE (OUTPATIENT)
Dept: ADMINISTRATIVE | Facility: OTHER | Age: 62
End: 2022-07-17
Payer: COMMERCIAL

## 2022-07-19 ENCOUNTER — OFFICE VISIT (OUTPATIENT)
Dept: ORTHOPEDICS | Facility: CLINIC | Age: 62
End: 2022-07-19
Payer: COMMERCIAL

## 2022-07-19 ENCOUNTER — LAB VISIT (OUTPATIENT)
Dept: LAB | Facility: HOSPITAL | Age: 62
End: 2022-07-19
Attending: NURSE PRACTITIONER
Payer: COMMERCIAL

## 2022-07-19 VITALS — BODY MASS INDEX: 34.91 KG/M2 | HEIGHT: 63 IN | WEIGHT: 197 LBS

## 2022-07-19 DIAGNOSIS — I10 ESSENTIAL HYPERTENSION: Chronic | ICD-10-CM

## 2022-07-19 DIAGNOSIS — E78.2 MIXED HYPERLIPIDEMIA: ICD-10-CM

## 2022-07-19 DIAGNOSIS — G56.03 BILATERAL CARPAL TUNNEL SYNDROME: ICD-10-CM

## 2022-07-19 LAB
ALBUMIN SERPL BCP-MCNC: 3.8 G/DL (ref 3.5–5.2)
ALP SERPL-CCNC: 73 U/L (ref 55–135)
ALT SERPL W/O P-5'-P-CCNC: 15 U/L (ref 10–44)
ANION GAP SERPL CALC-SCNC: 9 MMOL/L (ref 8–16)
AST SERPL-CCNC: 13 U/L (ref 10–40)
BASOPHILS # BLD AUTO: 0.04 K/UL (ref 0–0.2)
BASOPHILS NFR BLD: 0.7 % (ref 0–1.9)
BILIRUB SERPL-MCNC: 0.7 MG/DL (ref 0.1–1)
BUN SERPL-MCNC: 14 MG/DL (ref 8–23)
CALCIUM SERPL-MCNC: 9.5 MG/DL (ref 8.7–10.5)
CHLORIDE SERPL-SCNC: 106 MMOL/L (ref 95–110)
CHOLEST SERPL-MCNC: 198 MG/DL (ref 120–199)
CHOLEST/HDLC SERPL: 4.3 {RATIO} (ref 2–5)
CO2 SERPL-SCNC: 25 MMOL/L (ref 23–29)
CREAT SERPL-MCNC: 0.8 MG/DL (ref 0.5–1.4)
DIFFERENTIAL METHOD: ABNORMAL
EOSINOPHIL # BLD AUTO: 0.2 K/UL (ref 0–0.5)
EOSINOPHIL NFR BLD: 4 % (ref 0–8)
ERYTHROCYTE [DISTWIDTH] IN BLOOD BY AUTOMATED COUNT: 12.9 % (ref 11.5–14.5)
EST. GFR  (AFRICAN AMERICAN): >60 ML/MIN/1.73 M^2
EST. GFR  (NON AFRICAN AMERICAN): >60 ML/MIN/1.73 M^2
GLUCOSE SERPL-MCNC: 105 MG/DL (ref 70–110)
HCT VFR BLD AUTO: 38.7 % (ref 37–48.5)
HDLC SERPL-MCNC: 46 MG/DL (ref 40–75)
HDLC SERPL: 23.2 % (ref 20–50)
HGB BLD-MCNC: 12.2 G/DL (ref 12–16)
IMM GRANULOCYTES # BLD AUTO: 0.01 K/UL (ref 0–0.04)
IMM GRANULOCYTES NFR BLD AUTO: 0.2 % (ref 0–0.5)
LDLC SERPL CALC-MCNC: 124.2 MG/DL (ref 63–159)
LYMPHOCYTES # BLD AUTO: 2.7 K/UL (ref 1–4.8)
LYMPHOCYTES NFR BLD: 44.8 % (ref 18–48)
MCH RBC QN AUTO: 28.8 PG (ref 27–31)
MCHC RBC AUTO-ENTMCNC: 31.5 G/DL (ref 32–36)
MCV RBC AUTO: 91 FL (ref 82–98)
MONOCYTES # BLD AUTO: 0.5 K/UL (ref 0.3–1)
MONOCYTES NFR BLD: 7.5 % (ref 4–15)
NEUTROPHILS # BLD AUTO: 2.6 K/UL (ref 1.8–7.7)
NEUTROPHILS NFR BLD: 42.8 % (ref 38–73)
NONHDLC SERPL-MCNC: 152 MG/DL
NRBC BLD-RTO: 0 /100 WBC
PLATELET # BLD AUTO: 247 K/UL (ref 150–450)
PMV BLD AUTO: 10 FL (ref 9.2–12.9)
POTASSIUM SERPL-SCNC: 3.7 MMOL/L (ref 3.5–5.1)
PROT SERPL-MCNC: 6.7 G/DL (ref 6–8.4)
RBC # BLD AUTO: 4.24 M/UL (ref 4–5.4)
SODIUM SERPL-SCNC: 140 MMOL/L (ref 136–145)
TRIGL SERPL-MCNC: 139 MG/DL (ref 30–150)
TSH SERPL DL<=0.005 MIU/L-ACNC: 2.25 UIU/ML (ref 0.4–4)
WBC # BLD AUTO: 6 K/UL (ref 3.9–12.7)

## 2022-07-19 PROCEDURE — 85025 COMPLETE CBC W/AUTO DIFF WBC: CPT | Performed by: NURSE PRACTITIONER

## 2022-07-19 PROCEDURE — 1160F PR REVIEW ALL MEDS BY PRESCRIBER/CLIN PHARMACIST DOCUMENTED: ICD-10-PCS | Mod: CPTII,S$GLB,, | Performed by: ORTHOPAEDIC SURGERY

## 2022-07-19 PROCEDURE — 3008F PR BODY MASS INDEX (BMI) DOCUMENTED: ICD-10-PCS | Mod: CPTII,S$GLB,, | Performed by: ORTHOPAEDIC SURGERY

## 2022-07-19 PROCEDURE — 99203 OFFICE O/P NEW LOW 30 MIN: CPT | Mod: 25,S$GLB,, | Performed by: ORTHOPAEDIC SURGERY

## 2022-07-19 PROCEDURE — 99999 PR PBB SHADOW E&M-EST. PATIENT-LVL III: ICD-10-PCS | Mod: PBBFAC,,, | Performed by: ORTHOPAEDIC SURGERY

## 2022-07-19 PROCEDURE — 1160F RVW MEDS BY RX/DR IN RCRD: CPT | Mod: CPTII,S$GLB,, | Performed by: ORTHOPAEDIC SURGERY

## 2022-07-19 PROCEDURE — 99203 PR OFFICE/OUTPT VISIT, NEW, LEVL III, 30-44 MIN: ICD-10-PCS | Mod: 25,S$GLB,, | Performed by: ORTHOPAEDIC SURGERY

## 2022-07-19 PROCEDURE — 36415 COLL VENOUS BLD VENIPUNCTURE: CPT | Performed by: NURSE PRACTITIONER

## 2022-07-19 PROCEDURE — 1159F PR MEDICATION LIST DOCUMENTED IN MEDICAL RECORD: ICD-10-PCS | Mod: CPTII,S$GLB,, | Performed by: ORTHOPAEDIC SURGERY

## 2022-07-19 PROCEDURE — 1159F MED LIST DOCD IN RCRD: CPT | Mod: CPTII,S$GLB,, | Performed by: ORTHOPAEDIC SURGERY

## 2022-07-19 PROCEDURE — 20526 CARPAL TUNNEL: ICD-10-PCS | Mod: 50,S$GLB,, | Performed by: ORTHOPAEDIC SURGERY

## 2022-07-19 PROCEDURE — 80061 LIPID PANEL: CPT | Performed by: NURSE PRACTITIONER

## 2022-07-19 PROCEDURE — 3008F BODY MASS INDEX DOCD: CPT | Mod: CPTII,S$GLB,, | Performed by: ORTHOPAEDIC SURGERY

## 2022-07-19 PROCEDURE — 20526 THER INJECTION CARP TUNNEL: CPT | Mod: 50,S$GLB,, | Performed by: ORTHOPAEDIC SURGERY

## 2022-07-19 PROCEDURE — 99999 PR PBB SHADOW E&M-EST. PATIENT-LVL III: CPT | Mod: PBBFAC,,, | Performed by: ORTHOPAEDIC SURGERY

## 2022-07-19 PROCEDURE — 84443 ASSAY THYROID STIM HORMONE: CPT | Performed by: NURSE PRACTITIONER

## 2022-07-19 PROCEDURE — 80053 COMPREHEN METABOLIC PANEL: CPT | Performed by: NURSE PRACTITIONER

## 2022-07-19 RX ORDER — TRIAMCINOLONE ACETONIDE 40 MG/ML
40 INJECTION, SUSPENSION INTRA-ARTICULAR; INTRAMUSCULAR
Status: DISCONTINUED | OUTPATIENT
Start: 2022-07-19 | End: 2022-07-19 | Stop reason: HOSPADM

## 2022-07-19 RX ADMIN — TRIAMCINOLONE ACETONIDE 40 MG: 40 INJECTION, SUSPENSION INTRA-ARTICULAR; INTRAMUSCULAR at 09:07

## 2022-07-19 NOTE — PROCEDURES
Carpal Tunnel    Date/Time: 7/19/2022 9:30 AM  Performed by: Robbin Lyon MD  Authorized by: Robbin Lyon MD     Consent Done?:  Yes (Verbal)  Indications:  Pain  Timeout: prior to procedure the correct patient, procedure, and site was verified    Prep: patient was prepped and draped in usual sterile fashion      Local anesthesia used?: Yes    Local anesthetic:  Lidocaine 2% without epinephrine  Anesthetic total (ml):  2    Location:  Wrist  Site:  R carpal tunnel and L carpal tunnel  Ultrasonic Guidance for Needle Placement?: No    Needle size:  25 G  Approach:  Volar  Medications:  40 mg triamcinolone acetonide 40 mg/mL; 40 mg triamcinolone acetonide 40 mg/mL

## 2022-07-19 NOTE — PROGRESS NOTES
Subjective:     Patient ID: Ora Henderson is a 62 y.o. female.    Chief Complaint: Pain and Numbness of the Right Hand and Pain and Numbness of the Left Hand      HPI:  The patient is a 62-year-old female who had bilateral carpal tunnel release 35 years ago.  She did well until about 3 years ago and has recurrent symptoms in both wrists.  She has not tried injection.  Nerve studies done 2022 showed bilateral carpal tunnel syndrome as well as cervical radiculopathy C6-C8 and T1.  Her neck is not particularly tender    Past Medical History:   Diagnosis Date    Arthritis     Encounter for blood transfusion     Fatty liver     GERD (gastroesophageal reflux disease)     Hernia, umbilical     reducible    Hyperlipidemia     Hypertension     Rotator cuff tear     Sleep apnea      Past Surgical History:   Procedure Laterality Date    ARTHROSCOPIC DEBRIDEMENT OF SHOULDER Right 2019    Procedure: DEBRIDEMENT, SHOULDER, ARTHROSCOPIC;  Surgeon: Laureano Cox MD;  Location: [facility];  Service: Orthopedics;  Laterality: Right;    ARTHROSCOPY OF SHOULDER WITH DECOMPRESSION OF SUBACROMIAL SPACE Right 2019    Procedure: ARTHROSCOPY, SHOULDER, WITH SUBACROMIAL SPACE DECOMPRESSION;  Surgeon: Laureano Cox MD;  Location: [facility] OR;  Service: Orthopedics;  Laterality: Right;    BICEPS TENDON REPAIR Right 2019    Procedure: REPAIR, TENDON, BICEPS;  Surgeon: Laureano Cox MD;  Location: [facility];  Service: Orthopedics;  Laterality: Right;  arthroscopic Biceps tenodesis    BRAIN SURGERY      Pituitary tumor removal 2001 x 2     CARPAL TUNNEL RELEASE Bilateral     x 2    CERVICAL BIOPSY  W/ LOOP ELECTRODE EXCISION      [facility] '81     SECTION      x 1    COLONOSCOPY N/A 2016    Procedure: COLONOSCOPY;  Surgeon: Quique Paul MD;  Location: [facility];  Service: Endoscopy;  Laterality: N/A;    COLONOSCOPY N/A 2021    Procedure: COLONOSCOPY;   Surgeon: Mari Tucker MD;  Location: [facility] ENDO;  Service: Endoscopy;  Laterality: N/A;    DISTAL CLAVICLE EXCISION Right 12/30/2019    Procedure: EXCISION, CLAVICLE, DISTAL;  Surgeon: Laureano Cox MD;  Location: [facility] OR;  Service: Orthopedics;  Laterality: Right;  arthroscopic    GANGLION CYST EXCISION Left 12/31/2018    HYSTERECTOMY      REFRACTIVE SURGERY      ROTATOR CUFF REPAIR Right 12/30/2019    Procedure: REPAIR, ROTATOR CUFF;  Surgeon: Laureano Cox MD;  Location: [facility] OR;  Service: Orthopedics;  Laterality: Right;  arthroscopic    SELECTIVE INJECTION OF ANESTHETIC AGENT AROUND LUMBAR SPINAL NERVE ROOT BY TRANSFORAMINAL APPROACH Bilateral 10/7/2021    Procedure: BLOCK, SPINAL NERVE ROOT, LUMBAR, SELECTIVE, TRANSFORAMINAL APPROACH bilateral L4-5 Rn IV sedation;  Surgeon: Damon Charlton MD;  Location: [facility];  Service: Pain Management;  Laterality: Bilateral;    SELECTIVE INJECTION OF ANESTHETIC AGENT AROUND LUMBAR SPINAL NERVE ROOT BY TRANSFORAMINAL APPROACH Bilateral 12/29/2021    Procedure: BLOCK, SPINAL NERVE ROOT, LUMBAR, SELECTIVE, TRANSFORAMINAL APPROACH bilateral S1 RN IV sedation;  Surgeon: Damon Charlton MD;  Location: [facility];  Service: Pain Management;  Laterality: Bilateral;    SYNOVECTOMY OF SHOULDER Right 12/30/2019    Procedure: SYNOVECTOMY, SHOULDER;  Surgeon: Laureano Cox MD;  Location: [facility] OR;  Service: Orthopedics;  Laterality: Right;  arthroscopic    TOTAL ABDOMINAL HYSTERECTOMY W/ BILATERAL SALPINGOOPHORECTOMY  2006    STAR/BSO secondary to endometriosis    VENTRAL HERNIA REPAIR  2016     Family History   Problem Relation Age of Onset    Hypertension Father     Prostate cancer Father     Hypertension Mother     Cancer Maternal Aunt         pancreas    Cancer Maternal Uncle         pancreas    Heart disease Paternal Uncle     Heart disease Paternal Grandmother     Diabetes Maternal Aunt     Heart attack Sister 30    Heart attack  Brother 32     Social History     Socioeconomic History    Marital status:     Number of children: 1   Occupational History    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Employer: [employer]   Tobacco Use    Smoking status: Never Smoker    Smokeless tobacco: Never Used   Substance and Sexual Activity    Alcohol use: Yes     Alcohol/week: 0.0 standard drinks     Comment: socially    Drug use: No    Sexual activity: Yes     Partners: Male     Birth control/protection: None, Surgical     Social Determinants of Health     Financial Resource Strain: Low Risk     Difficulty of Paying Living Expenses: Not hard at all   Food Insecurity: No Food Insecurity    Worried About Running Out of Food in the Last Year: Never true    Ran Out of Food in the Last Year: Never true   Transportation Needs: No Transportation Needs    Lack of Transportation (Medical): No    Lack of Transportation (Non-Medical): No   Physical Activity: Sufficiently Active    Days of Exercise per Week: 4 days    Minutes of Exercise per Session: 60 min   Stress: Stress Concern Present    Feeling of Stress : Very much   Social Connections: Unknown    Frequency of Communication with Friends and Family: More than three times a week    Frequency of Social Gatherings with Friends and Family: Once a week    Active Member of Clubs or Organizations: No    Attends Club or Organization Meetings: 1 to 4 times per year    Marital Status:    Housing Stability: Low Risk     Unable to Pay for Housing in the Last Year: No    Number of Places Lived in the Last Year: 1    Unstable Housing in the Last Year: No     Medication List with Changes/Refills   Current Medications    AMLODIPINE (NORVASC) 2.5 MG TABLET    Take 1 tablet (2.5 mg total) by mouth once daily.    ASPIRIN 81 MG CHEW    Take 81 mg by mouth as needed. Hold 5 days prior to surgery    DOCUSATE SODIUM (COLACE) 100 MG CAPSULE    Take 1 capsule (100 mg total) by mouth 2 (two) times  daily.    EC-NAPROXEN 500 MG EC TABLET    Take 500 mg by mouth 2 (two) times daily as needed.    ESOMEPRAZOLE (NEXIUM) 40 MG CAPSULE    Take 1 capsule (40 mg total) by mouth before breakfast.    FLUTICASONE PROPIONATE (FLONASE) 50 MCG/ACTUATION NASAL SPRAY    2 sprays (100 mcg total) by Each Nostril route once daily.    GABAPENTIN (NEURONTIN) 100 MG CAPSULE    Take 1-2 capsules (100-200 mg total) by mouth every evening.    L.ACIDOPHILUS-BIF. ANIMALIS (PROBIOTIC) 5 BILLION CELL CPSP    Take 1 tablet by mouth once daily.    LIDOCAINE (LIDODERM) 5 %    Place onto the skin.    LOSARTAN-HYDROCHLOROTHIAZIDE 100-25 MG (HYZAAR) 100-25 MG PER TABLET    Take 1 tablet by mouth once daily.    METHOCARBAMOL (ROBAXIN) 750 MG TAB    Take by mouth.    MULTIVIT,CALC,MINS/IRON/FOLIC (DAILY MULTIPLE FOR WOMEN ORAL)    Take 1 tablet by mouth once daily. Hold 5 days prior to surgery    OMEGA-3 FATTY ACIDS/FISH OIL (FISH OIL-OMEGA-3 FATTY ACIDS) 300-1,000 MG CAPSULE    Take by mouth once daily. Hold 5 days prior to surgery    ROSUVASTATIN (CRESTOR) 40 MG TAB    Take 1 tablet (40 mg total) by mouth every evening.   Discontinued Medications    DIAZEPAM (VALIUM) 5 MG TABLET    Take 1 tablet (5 mg total) by mouth once as needed for Anxiety (take 45 min to 1 hr before EMG).     Review of patient's allergies indicates:   Allergen Reactions    Sulfa (sulfonamide antibiotics) Hives     Review of Systems   Constitutional: Negative for malaise/fatigue.   HENT: Negative for hearing loss.    Eyes: Negative for double vision and visual disturbance.   Cardiovascular: Negative for chest pain.   Respiratory: Positive for sleep disturbances due to breathing. Negative for shortness of breath.    Endocrine: Negative for cold intolerance.   Hematologic/Lymphatic: Does not bruise/bleed easily.   Skin: Negative for poor wound healing and suspicious lesions.   Musculoskeletal: Positive for back pain and neck pain. Negative for gout, joint pain and joint  swelling.   Gastrointestinal: Positive for heartburn. Negative for nausea and vomiting.   Genitourinary: Negative for dysuria.   Neurological: Negative for numbness, paresthesias and sensory change.   Psychiatric/Behavioral: Negative for depression, memory loss and substance abuse. The patient is not nervous/anxious.    Allergic/Immunologic: Negative for persistent infections.       Objective:   Body mass index is 34.9 kg/m².  There were no vitals filed for this visit.             General    Constitutional: She is oriented to person, place, and time. She appears well-developed and well-nourished. No distress.   HENT:   Head: Normocephalic.   Eyes: EOM are normal.   Pulmonary/Chest: Effort normal.   Neurological: She is oriented to person, place, and time.   Psychiatric: She has a normal mood and affect.             Right Hand/Wrist Exam     Inspection   Scars: Wrist - present Hand -  present  Effusion: Wrist - absent Hand -  absent    Pain   Wrist - The patient exhibits pain of the flexor/pronator group.    Tests   Phalens sign: positive  Tinel's sign (median nerve): positive  Carpal Tunnel Compression Test: positive    Atrophy   Thenar:  negative  Intrinsic:  negative    Other     Neuorologic Exam    Median Distribution: abnormal  Ulnar Distribution: normal  Radial Distribution: normal      Left Hand/Wrist Exam     Inspection   Scars: Wrist - present Hand -  present  Effusion: Wrist - absent Hand -  absent    Pain   Wrist - The patient exhibits pain of the flexor/pronator group.    Tests   Phalens sign: positive  Tinel's sign (median nerve): positive  Carpal Tunnel Compression Test: positive    Atrophy  Thenar:  Negative  Intrinsic: negative    Other     Sensory Exam  Median Distribution: abnormal  Ulnar Distribution: normal  Radial Distribution: normal          Vascular Exam       Capillary Refill  Right Hand: normal capillary refill  Left Hand: normal capillary refill            Relevant imaging results  reviewed and interpreted by me, discussed with the patient and / or family today MRI C-spine showed disc protrusions at see 3 4 and C4-5.  She has disc degeneration at C5-6 with some foraminal stenosis on the right  Assessment:     Encounter Diagnosis   Name Primary?    Bilateral carpal tunnel syndrome     Cervical radiculopathy    Plan:     The patient was counseled regarding the diagnosis.  She was injected in both wrist each carpal tunnel with 1 cc of Kenalog and 1 cc of 2% plain lidocaine under sterile technique.  She will return in 3 weeks if not improved.  She will wait at least 3 months between injections.                Disclaimer: This note was prepared using a voice recognition system and is likely to have sound alike errors within the text.

## 2022-07-20 ENCOUNTER — LAB VISIT (OUTPATIENT)
Dept: LAB | Facility: HOSPITAL | Age: 62
End: 2022-07-20
Attending: FAMILY MEDICINE
Payer: COMMERCIAL

## 2022-07-20 ENCOUNTER — OFFICE VISIT (OUTPATIENT)
Dept: INTERNAL MEDICINE | Facility: CLINIC | Age: 62
End: 2022-07-20
Payer: COMMERCIAL

## 2022-07-20 VITALS
HEART RATE: 88 BPM | HEIGHT: 63 IN | DIASTOLIC BLOOD PRESSURE: 76 MMHG | SYSTOLIC BLOOD PRESSURE: 136 MMHG | BODY MASS INDEX: 35.5 KG/M2 | WEIGHT: 200.38 LBS | RESPIRATION RATE: 16 BRPM | OXYGEN SATURATION: 96 %

## 2022-07-20 DIAGNOSIS — Z86.018 HISTORY OF BENIGN PITUITARY TUMOR: Chronic | ICD-10-CM

## 2022-07-20 DIAGNOSIS — Z00.00 ROUTINE GENERAL MEDICAL EXAMINATION AT A HEALTH CARE FACILITY: Primary | ICD-10-CM

## 2022-07-20 DIAGNOSIS — R51.9 SCALP PAIN: ICD-10-CM

## 2022-07-20 DIAGNOSIS — Z86.010 HX OF COLONIC POLYP: ICD-10-CM

## 2022-07-20 DIAGNOSIS — E78.2 MIXED HYPERLIPIDEMIA: ICD-10-CM

## 2022-07-20 DIAGNOSIS — M47.22 OSTEOARTHRITIS OF SPINE WITH RADICULOPATHY, CERVICAL REGION: Chronic | ICD-10-CM

## 2022-07-20 DIAGNOSIS — G47.33 OSA (OBSTRUCTIVE SLEEP APNEA): ICD-10-CM

## 2022-07-20 DIAGNOSIS — K21.9 GASTROESOPHAGEAL REFLUX DISEASE WITHOUT ESOPHAGITIS: Chronic | ICD-10-CM

## 2022-07-20 DIAGNOSIS — I10 ESSENTIAL HYPERTENSION: Chronic | ICD-10-CM

## 2022-07-20 DIAGNOSIS — M47.26 OSTEOARTHRITIS OF SPINE WITH RADICULOPATHY, LUMBAR REGION: ICD-10-CM

## 2022-07-20 DIAGNOSIS — E66.01 SEVERE OBESITY (BMI 35.0-39.9) WITH COMORBIDITY: ICD-10-CM

## 2022-07-20 PROBLEM — E66.9 OBESITY (BMI 30.0-34.9): Status: RESOLVED | Noted: 2022-01-11 | Resolved: 2022-07-20

## 2022-07-20 PROBLEM — E66.811 OBESITY (BMI 30.0-34.9): Status: RESOLVED | Noted: 2022-01-11 | Resolved: 2022-07-20

## 2022-07-20 PROCEDURE — 99396 PREV VISIT EST AGE 40-64: CPT | Mod: S$GLB,,, | Performed by: FAMILY MEDICINE

## 2022-07-20 PROCEDURE — 99396 PR PREVENTIVE VISIT,EST,40-64: ICD-10-PCS | Mod: S$GLB,,, | Performed by: FAMILY MEDICINE

## 2022-07-20 PROCEDURE — 3078F DIAST BP <80 MM HG: CPT | Mod: CPTII,S$GLB,, | Performed by: FAMILY MEDICINE

## 2022-07-20 PROCEDURE — 1159F MED LIST DOCD IN RCRD: CPT | Mod: CPTII,S$GLB,, | Performed by: FAMILY MEDICINE

## 2022-07-20 PROCEDURE — 3008F BODY MASS INDEX DOCD: CPT | Mod: CPTII,S$GLB,, | Performed by: FAMILY MEDICINE

## 2022-07-20 PROCEDURE — 99999 PR PBB SHADOW E&M-EST. PATIENT-LVL V: CPT | Mod: PBBFAC,,, | Performed by: FAMILY MEDICINE

## 2022-07-20 PROCEDURE — 1160F PR REVIEW ALL MEDS BY PRESCRIBER/CLIN PHARMACIST DOCUMENTED: ICD-10-PCS | Mod: CPTII,S$GLB,, | Performed by: FAMILY MEDICINE

## 2022-07-20 PROCEDURE — 3075F PR MOST RECENT SYSTOLIC BLOOD PRESS GE 130-139MM HG: ICD-10-PCS | Mod: CPTII,S$GLB,, | Performed by: FAMILY MEDICINE

## 2022-07-20 PROCEDURE — 85652 RBC SED RATE AUTOMATED: CPT | Performed by: FAMILY MEDICINE

## 2022-07-20 PROCEDURE — 3075F SYST BP GE 130 - 139MM HG: CPT | Mod: CPTII,S$GLB,, | Performed by: FAMILY MEDICINE

## 2022-07-20 PROCEDURE — 99999 PR PBB SHADOW E&M-EST. PATIENT-LVL V: ICD-10-PCS | Mod: PBBFAC,,, | Performed by: FAMILY MEDICINE

## 2022-07-20 PROCEDURE — 1159F PR MEDICATION LIST DOCUMENTED IN MEDICAL RECORD: ICD-10-PCS | Mod: CPTII,S$GLB,, | Performed by: FAMILY MEDICINE

## 2022-07-20 PROCEDURE — 86140 C-REACTIVE PROTEIN: CPT | Performed by: FAMILY MEDICINE

## 2022-07-20 PROCEDURE — 3008F PR BODY MASS INDEX (BMI) DOCUMENTED: ICD-10-PCS | Mod: CPTII,S$GLB,, | Performed by: FAMILY MEDICINE

## 2022-07-20 PROCEDURE — 36415 COLL VENOUS BLD VENIPUNCTURE: CPT | Performed by: FAMILY MEDICINE

## 2022-07-20 PROCEDURE — 1160F RVW MEDS BY RX/DR IN RCRD: CPT | Mod: CPTII,S$GLB,, | Performed by: FAMILY MEDICINE

## 2022-07-20 PROCEDURE — 3078F PR MOST RECENT DIASTOLIC BLOOD PRESSURE < 80 MM HG: ICD-10-PCS | Mod: CPTII,S$GLB,, | Performed by: FAMILY MEDICINE

## 2022-07-20 NOTE — PATIENT INSTRUCTIONS
Thank you for your interest in the [facility] Multi Cancer Early Detection test. We are very excited about this clinical trial, the future of early cancer detection, and the overwhelming interest we have received. We have not yet started enrollment for this trial, but are ramping up to do so within the coming month.     Please email [email or web address] for more information and to be added to the list of interested patients.     [facility] is offering this same test for a fee. Please email [email or web address] if you are interested in purchasing.     Thank you for your interest as [facility] strives to improve the health outcomes of our community.

## 2022-07-20 NOTE — PROGRESS NOTES
"Patient is interested Subjective:       Patient ID: Ora Henderson is a 62 y.o. female.    Chief Complaint: Headache (Top of Head to Neck)    62-year-old  female patient with Patient Active Problem List:     DJD (degenerative joint disease), cervical-mild     Mixed hyperlipidemia     Hepatomegaly     Family history of cardiovascular disease     GERD (gastroesophageal reflux disease)     History of benign pituitary tumor     Hx of colonic polyp     Essential hypertension     Osteoarthritis of spine with radiculopathy, lumbar region     Severe obesity (BMI 35.0-39.9) with comorbidity     NATALIE (obstructive sleep apnea)     Lumbar radiculopathy  Here for routine annual physicals but patient reports that she started having scalp pain to touch for the past couple of days but denies any blurry vision nausea vomiting.  Has been having neck pain and occasional tingling and numbness sensation to the right upper arm, secondary to carpal tunnel recently had injections to bilateral wrists and has been doing well.   Denies any chest pain or difficulty breathing with palpitations      Review of Systems   Constitutional: Negative for fatigue.   Eyes: Negative for visual disturbance.   Respiratory: Negative for shortness of breath.    Cardiovascular: Negative for chest pain and leg swelling.   Gastrointestinal: Negative for abdominal pain, nausea and vomiting.   Musculoskeletal: Positive for arthralgias and myalgias.   Skin: Negative for rash.   Neurological: Positive for numbness and headaches. Negative for dizziness, syncope, weakness and light-headedness.   Psychiatric/Behavioral: Negative for sleep disturbance.         /76 (BP Location: Right arm, Patient Position: Sitting, BP Method: Large (Manual))   Pulse 88   Resp 16   Ht 5' 3" (1.6 m)   Wt 90.9 kg (200 lb 6.4 oz)   LMP  (LMP Unknown)   SpO2 96%   BMI 35.50 kg/m²   Objective:      Physical Exam  Constitutional:       Appearance: " She is well-developed.   HENT:      Head: Normocephalic and atraumatic.   Cardiovascular:      Rate and Rhythm: Normal rate and regular rhythm.      Heart sounds: Normal heart sounds. No murmur heard.  Pulmonary:      Effort: Pulmonary effort is normal.      Breath sounds: Normal breath sounds. No wheezing.   Abdominal:      General: Bowel sounds are normal.      Palpations: Abdomen is soft.      Tenderness: There is no abdominal tenderness.   Musculoskeletal:         General: Tenderness present.      Comments: Positive for superficial tenderness to the scalp bilaterally   Skin:     General: Skin is warm and dry.      Findings: No rash.   Neurological:      Mental Status: She is alert and oriented to person, place, and time.   Psychiatric:         Mood and Affect: Mood normal.         Lab Visit on 07/19/2022   Component Date Value Ref Range Status    Cholesterol 07/19/2022 198  120 - 199 mg/dL Final    Comment: The National Cholesterol Education Program (NCEP) has set the  following guidelines (reference ranges) for Cholesterol:  Optimal.....................<200 mg/dL  Borderline High.............200-239 mg/dL  High........................> or = 240 mg/dL      Triglycerides 07/19/2022 139  30 - 150 mg/dL Final    Comment: The National Cholesterol Education Program (NCEP) has set the  following guidelines (reference values) for triglycerides:  Normal......................<150 mg/dL  Borderline High.............150-199 mg/dL  High........................200-499 mg/dL      HDL 07/19/2022 46  40 - 75 mg/dL Final    Comment: The National Cholesterol Education Program (NCEP) has set the  following guidelines (reference values) for HDL Cholesterol:  Low...............<40 mg/dL  Optimal...........>60 mg/dL      LDL Cholesterol 07/19/2022 124.2  63.0 - 159.0 mg/dL Final    Comment: The National Cholesterol Education Program (NCEP) has set the  following guidelines (reference values) for LDL  Cholesterol:  Optimal.......................<130 mg/dL  Borderline High...............130-159 mg/dL  High..........................160-189 mg/dL  Very High.....................>190 mg/dL      HDL/Cholesterol Ratio 07/19/2022 23.2  20.0 - 50.0 % Final    Total Cholesterol/HDL Ratio 07/19/2022 4.3  2.0 - 5.0 Final    Non-HDL Cholesterol 07/19/2022 152  mg/dL Final    Comment: Risk category and Non-HDL cholesterol goals:  Coronary heart disease (CHD)or equivalent (10-year risk of CHD >20%):  Non-HDL cholesterol goal     <130 mg/dL  Two or more CHD risk factors and 10-year risk of CHD <= 20%:  Non-HDL cholesterol goal     <160 mg/dL  0 to 1 CHD risk factor:  Non-HDL cholesterol goal     <190 mg/dL      Sodium 07/19/2022 140  136 - 145 mmol/L Final    Potassium 07/19/2022 3.7  3.5 - 5.1 mmol/L Final    Chloride 07/19/2022 106  95 - 110 mmol/L Final    CO2 07/19/2022 25  23 - 29 mmol/L Final    Glucose 07/19/2022 105  70 - 110 mg/dL Final    BUN 07/19/2022 14  8 - 23 mg/dL Final    Creatinine 07/19/2022 0.8  0.5 - 1.4 mg/dL Final    Calcium 07/19/2022 9.5  8.7 - 10.5 mg/dL Final    Total Protein 07/19/2022 6.7  6.0 - 8.4 g/dL Final    Albumin 07/19/2022 3.8  3.5 - 5.2 g/dL Final    Total Bilirubin 07/19/2022 0.7  0.1 - 1.0 mg/dL Final    Comment: For infants and newborns, interpretation of results should be based  on gestational age, weight and in agreement with clinical  observations.    Premature Infant recommended reference ranges:  Up to 24 hours.............<8.0 mg/dL  Up to 48 hours............<12.0 mg/dL  3-5 days..................<15.0 mg/dL  6-29 days.................<15.0 mg/dL      Alkaline Phosphatase 07/19/2022 73  55 - 135 U/L Final    AST 07/19/2022 13  10 - 40 U/L Final    ALT 07/19/2022 15  10 - 44 U/L Final    Anion Gap 07/19/2022 9  8 - 16 mmol/L Final    eGFR if African American 07/19/2022 >60  >60 mL/min/1.73 m^2 Final    eGFR if non African American 07/19/2022 >60  >60 mL/min/1.73  m^2 Final    Comment: Calculation used to obtain the estimated glomerular filtration  rate (eGFR) is the CKD-EPI equation.       TSH 07/19/2022 2.249  0.400 - 4.000 uIU/mL Final    WBC 07/19/2022 6.00  3.90 - 12.70 K/uL Final    RBC 07/19/2022 4.24  4.00 - 5.40 M/uL Final    Hemoglobin 07/19/2022 12.2  12.0 - 16.0 g/dL Final    Hematocrit 07/19/2022 38.7  37.0 - 48.5 % Final    MCV 07/19/2022 91  82 - 98 fL Final    MCH 07/19/2022 28.8  27.0 - 31.0 pg Final    MCHC 07/19/2022 31.5 (A) 32.0 - 36.0 g/dL Final    RDW 07/19/2022 12.9  11.5 - 14.5 % Final    Platelets 07/19/2022 247  150 - 450 K/uL Final    MPV 07/19/2022 10.0  9.2 - 12.9 fL Final    Immature Granulocytes 07/19/2022 0.2  0.0 - 0.5 % Final    Gran # (ANC) 07/19/2022 2.6  1.8 - 7.7 K/uL Final    Immature Grans (Abs) 07/19/2022 0.01  0.00 - 0.04 K/uL Final    Comment: Mild elevation in immature granulocytes is non specific and   can be seen in a variety of conditions including stress response,   acute inflammation, trauma and pregnancy. Correlation with other   laboratory and clinical findings is essential.      Lymph # 07/19/2022 2.7  1.0 - 4.8 K/uL Final    Mono # 07/19/2022 0.5  0.3 - 1.0 K/uL Final    Eos # 07/19/2022 0.2  0.0 - 0.5 K/uL Final    Baso # 07/19/2022 0.04  0.00 - 0.20 K/uL Final    nRBC 07/19/2022 0  0 /100 WBC Final    Gran % 07/19/2022 42.8  38.0 - 73.0 % Final    Lymph % 07/19/2022 44.8  18.0 - 48.0 % Final    Mono % 07/19/2022 7.5  4.0 - 15.0 % Final    Eosinophil % 07/19/2022 4.0  0.0 - 8.0 % Final    Basophil % 07/19/2022 0.7  0.0 - 1.9 % Final    Differential Method 07/19/2022 Automated   Final       Assessment/Plan:   1. Routine general medical examination at a [facility] care facility  Vital signs stable today.  Clinical exam stable  Continue lifestyle modifications with low-fat and low-cholesterol diet and exercise 30 minutes daily  Reviewed recent labs which looks stable    2. Essential hypertension  Blood  pressure is stable today currently on amlodipine 2.5 mg daily and losartan hydrochlorothiazide 100/25 mg daily    3. Mixed hyperlipidemia  Currently taking Crestor 40 mg daily    4. Osteoarthritis of spine with radiculopathy, cervical region  - Ambulatory referral/consult to Pain Clinic; Future  Reviewed recent MRI showing cervical spondylosis  Not sure whether scalp pain is coming from underlying cervical spondylosis but will refer to Pain Management for further evaluation  Unable to take gabapentin secondary to sedation and has been taking over-the-counter ibuprofen and Robaxin as needed    5. Osteoarthritis of spine with radiculopathy, lumbar region  Stable    6. History of benign pituitary tumor    7. Gastroesophageal reflux disease without esophagitis  Clinically doing well on Nexium 40 mg daily    8. NATALIE (obstructive sleep apnea)  Stable    9. Hx of colonic polyp    10. Severe obesity (BMI 35.0-39.9) with comorbidity  Lifestyle modifications discussed to lose weight with BMI 35    11. Scalp pain  - Sedimentation rate; Future  - C-reactive protein; Future   Will check further labs to rule out temporal arteritis but if continues to have persistent scalp pain will consider getting CT scan      Patient is interested in getting further cancer screening, information has been given about graile cancer testing

## 2022-07-21 LAB
CRP SERPL-MCNC: 0.5 MG/L (ref 0–8.2)
ERYTHROCYTE [SEDIMENTATION RATE] IN BLOOD BY PHOTOMETRIC METHOD: 6 MM/HR (ref 0–36)

## 2022-08-22 ENCOUNTER — OFFICE VISIT (OUTPATIENT)
Dept: PAIN MEDICINE | Facility: CLINIC | Age: 62
End: 2022-08-22
Payer: COMMERCIAL

## 2022-08-22 VITALS
BODY MASS INDEX: 35.35 KG/M2 | SYSTOLIC BLOOD PRESSURE: 144 MMHG | HEART RATE: 73 BPM | WEIGHT: 199.5 LBS | HEIGHT: 63 IN | DIASTOLIC BLOOD PRESSURE: 88 MMHG

## 2022-08-22 DIAGNOSIS — M79.18 MYOFASCIAL PAIN: ICD-10-CM

## 2022-08-22 DIAGNOSIS — M54.12 CERVICAL RADICULOPATHY: Primary | ICD-10-CM

## 2022-08-22 DIAGNOSIS — M47.22 OSTEOARTHRITIS OF SPINE WITH RADICULOPATHY, CERVICAL REGION: Chronic | ICD-10-CM

## 2022-08-22 DIAGNOSIS — M50.30 DDD (DEGENERATIVE DISC DISEASE), CERVICAL: ICD-10-CM

## 2022-08-22 DIAGNOSIS — M47.812 CERVICAL SPONDYLOSIS: ICD-10-CM

## 2022-08-22 PROCEDURE — 99999 PR PBB SHADOW E&M-EST. PATIENT-LVL IV: CPT | Mod: PBBFAC,,, | Performed by: ANESTHESIOLOGY

## 2022-08-22 PROCEDURE — 1160F PR REVIEW ALL MEDS BY PRESCRIBER/CLIN PHARMACIST DOCUMENTED: ICD-10-PCS | Mod: CPTII,S$GLB,, | Performed by: ANESTHESIOLOGY

## 2022-08-22 PROCEDURE — 3077F SYST BP >= 140 MM HG: CPT | Mod: CPTII,S$GLB,, | Performed by: ANESTHESIOLOGY

## 2022-08-22 PROCEDURE — 1160F RVW MEDS BY RX/DR IN RCRD: CPT | Mod: CPTII,S$GLB,, | Performed by: ANESTHESIOLOGY

## 2022-08-22 PROCEDURE — 3008F BODY MASS INDEX DOCD: CPT | Mod: CPTII,S$GLB,, | Performed by: ANESTHESIOLOGY

## 2022-08-22 PROCEDURE — 1159F MED LIST DOCD IN RCRD: CPT | Mod: CPTII,S$GLB,, | Performed by: ANESTHESIOLOGY

## 2022-08-22 PROCEDURE — 99213 PR OFFICE/OUTPT VISIT, EST, LEVL III, 20-29 MIN: ICD-10-PCS | Mod: S$GLB,,, | Performed by: ANESTHESIOLOGY

## 2022-08-22 PROCEDURE — 99999 PR PBB SHADOW E&M-EST. PATIENT-LVL IV: ICD-10-PCS | Mod: PBBFAC,,, | Performed by: ANESTHESIOLOGY

## 2022-08-22 PROCEDURE — 1159F PR MEDICATION LIST DOCUMENTED IN MEDICAL RECORD: ICD-10-PCS | Mod: CPTII,S$GLB,, | Performed by: ANESTHESIOLOGY

## 2022-08-22 PROCEDURE — 3077F PR MOST RECENT SYSTOLIC BLOOD PRESSURE >= 140 MM HG: ICD-10-PCS | Mod: CPTII,S$GLB,, | Performed by: ANESTHESIOLOGY

## 2022-08-22 PROCEDURE — 99213 OFFICE O/P EST LOW 20 MIN: CPT | Mod: S$GLB,,, | Performed by: ANESTHESIOLOGY

## 2022-08-22 PROCEDURE — 3008F PR BODY MASS INDEX (BMI) DOCUMENTED: ICD-10-PCS | Mod: CPTII,S$GLB,, | Performed by: ANESTHESIOLOGY

## 2022-08-22 PROCEDURE — 3079F DIAST BP 80-89 MM HG: CPT | Mod: CPTII,S$GLB,, | Performed by: ANESTHESIOLOGY

## 2022-08-22 PROCEDURE — 3079F PR MOST RECENT DIASTOLIC BLOOD PRESSURE 80-89 MM HG: ICD-10-PCS | Mod: CPTII,S$GLB,, | Performed by: ANESTHESIOLOGY

## 2022-08-22 RX ORDER — METHOCARBAMOL 750 MG/1
750 TABLET, FILM COATED ORAL 2 TIMES DAILY PRN
Qty: 60 TABLET | Refills: 2 | Status: SHIPPED | OUTPATIENT
Start: 2022-08-22 | End: 2023-02-20 | Stop reason: SDUPTHER

## 2022-08-22 NOTE — PROGRESS NOTES
Interventional Pain Progress Note       Chief Pain Complaint:  Lumbar Back Pain   Buttock Pain     Interval History (08/22/2022):  Patient returns to clinic for evaluation of muscle spasms in right upper extremity pain.  Patient reports 1 month history of bilateral lower-extremity muscle spasm pain primarily in the calf region.  Patient is currently on diuretic and Lipitor.  Neck pain is ongoing and described as a burning aching pain over the right lower neck and right proximal biceps area.  Pain is worse with neck rotation and lifting, better with heat and rest.  Pain is rated a 7/10. Denies any fevers, chills, changes in gait, weakness, or bowel and bladder incontinence      History of Present Illness:   Ora Henderson is a 62 y.o. female  who is presenting with a chief complaint of Lumbar Back Pain. The patient began experiencing this problem insidiously, and the pain has been gradually worsening over the past 10 month(s). The pain is described as throbbing, shooting, burning and electrical and is located in the bilateral lumbar spine. Pain is intermittent and lasts hours. The pain radiates to bilateral lower extremities. The patient rates her pain a 7 out of ten and interferes with activities of daily living a 7 out of ten. Pain is exacerbated by flexion of the lumbar spine, and is improved by rest. Patient reports no prior trauma, no prior spinal surgery     Ora Henderson is a 62 y.o. female  who is presenting with a chief complaint of Neck Pain (Radiating to right arm)  . The patient began experiencing this problem insidiously, and the pain has been gradually worsening over the past 7 month(s). The pain is described as throbbing, cramping, aching and heavy and is located in the bilateral buttocks. Pain is intermittent and lasts hours. The  pain is nonradiating. The patient rates her pain a 7 out of ten and interferes with activities of daily living a 6 out of ten. Pain is  exacerbated by getting up from a seated position, and is improved by rest. Patient reports no prior trauma, no prior spinal surgery     Ora Henderson is a 62 y.o. female  who is presenting with a chief complaint of right neck shoulder pain. The patient began experiencing this problem insidiously, and the pain has been gradually worsening over the past 6 month(s). The pain is described as throbbing, cramping, aching and heavy and is located in the right neck . Pain is intermittent and lasts hours. The pain radiates to right arm. The patient rates her pain a 7 out of ten and interferes with activities of daily living a 7 out of ten. Pain is exacerbated by extension, and is improved by rest. Patient reports no prior trauma, no prior spinal surgery         - pertinent negatives: No fever, No chills, No weight loss, No bladder dysfunction, No bowel dysfunction, No saddle anesthesia  - pertinent positives: none    - medications, other therapies tried (physical therapy, injections):     >> NSAIDs, Tylenol, gabapentin and medrol dose pack    >> Has previously undergone Physical Therapy    >> Has previously undergone spinal injection/s         bilateral L4-L5 TFESI on 10/7/2021 with 80% pain relief.          bilateral S1 TFESI on 12/29/2022 with 85% pain relief but took 2 weeks to take effect.     Imaging / Labs / Studies (reviewed on 8/22/2022):  MRI Cervical Spine Without Contrast  Narrative: EXAMINATION:  MRI CERVICAL SPINE WITHOUT CONTRAST    CLINICAL HISTORY:  Radiculopathy, cervical regionCERVICAL RADICULOPATHY/ DDD;    TECHNIQUE:  Standard multiplanar noncontrast MRI sequences of the cervical spine.    COMPARISON:  None    FINDINGS:  The cervical cord reveals normal signal and morphology.    The cervical vertebra reveal normal alignment with straightening of the normal cervical lordosis.  No intrinsic marrow signal abnormality is seen.    C2-C3: Unremarkable.    C3-C4: Small central disc protrusion  with mild ventral sac impression.    C4-C5: Mild disc degeneration with small central disc protrusion with ventral sac impression and approximation of the ventral margin of the cord.    C5-C6: Mild disc degeneration with mild generalized disc bulge.  Uncovertebral joint spurring with mild right foraminal stenosis.    C6-C7: Minor disc degeneration with minor generalized disc bulge.    C7-T1: Minor disc degeneration with minor generalized disc bulge.  Impression: Straightening of the normal cervical lordosis.    Small central disc protrusions C3-4 and C4-5 disc levels.  Mild C5-6 disc degeneration with mild right foraminal stenosis.    Electronically signed by: Robbin Myers MD  Date:    07/01/2022  Time:    13:03        BUE EMG/NCS 5/3/22  IMPRESSION   1. ABNORMAL stud   2. There is electrodiagnostic evidence of an acute on chronic radiculopathy of the right C5 nerve root and a chronic radiculopathy of the C6, C8, T1 nerve roots. There is additional evidence of persistent mild demyelinating median neuropathy (Carpal tunnel syndrome) across BILATERAL wrists.       Results for orders placed during the hospital encounter of 12/08/14    X-Ray Lumbar Spine Complete 5 View    Narrative  Comparison: 03/21/12    Five views    Findings:    Osteopenia and overlying bowel limits the study.  Multilevel marginal spurring and facet arthropathy.  Uniform loss of this height at the L4 -- 5 level with grade 1 anterolisthesis L4 on L5.  No acute fracture.  No spondylolysis.  Pedicles and SI joints  are intact.  IMPRESSION:      Interval progression degenerative changes with most pronounced findings at the L4 -- 5 level.  See above.      Electronically signed by: HENRIK LANDA III, MD  Date:     12/08/14  Time:    09:23        Results for orders placed during the hospital encounter of 12/15/20    X-Ray Lumbar Complete With Flex And Ext    Narrative  EXAMINATION:  XR LUMBAR SPINE 5 VIEW WITH FLEX AND EXT    CLINICAL  HISTORY:  lumbar radic;  Other spondylosis with radiculopathy, lumbar region    TECHNIQUE:  Five views of the lumbar spine plus flexion extension views were performed.    COMPARISON:  December 23, 2017    FINDINGS:  Inferior-most lumbar vertebral body designated L5 for purposes of this exam.  Vertebral height maintained.  Mild uniform loss of disc height at the L4-5 level there is grade 1 anterolisthesis L4 on L5.  Remaining disc spaces, vertebral body height and alignment maintained.  Slight increased prominence of anterior subluxation L4 on L5 on the flexion view.  Stable appearance the L4-5 level on neutral and extension lateral views.  Pedicles and SI joints intact.  Numerous calcifications lower pelvis bilaterally.  Prominent degenerative change bilateral hips and to lesser extent bilateral SI joints.    Impression  As above.      Electronically signed by: Tariq Cortez MD  Date:    12/15/2020  Time:    20:33    Results for orders placed during the hospital encounter of 12/23/17    X-Ray Lumbar Spine AP And Lateral    Narrative  AP and lateral views of the lumbar spine    Findings: The vertebral bodies demonstrate normal height.  There is grade 1 spondylolisthesis of L4 on L5. There is mild disc space narrowing and spondylosis present at the L1-L2 through the L4-L5 levels. Prominent facet arthropathy noted bilaterally at L4-L5 level.  IMPRESSION:  As above      Electronically signed by: MOISES SANTOS D.O.  Date:     12/26/17  Time:    08:27    Results for orders placed during the hospital encounter of 07/05/13    X-Ray Cervical Spine AP And Lateral    Narrative  Findings: Vertebral alignment is normal.  There is mild narrowing of the C5-6 disk spaces and early anterior osteophyte formation.  There are no compression fractures or acute abnormalities are seen.  IMPRESSION:  Mild degenerative change in the cervical spine at C5-6.      Electronically signed by: LURDES SHORT MD  Date:  "    07/05/13  Time:    09:29      Results for orders placed during the hospital encounter of 07/22/19    X-Ray Cervical Spine Complete 5 view    Narrative  EXAMINATION:  XR CERVICAL SPINE COMPLETE 5 VIEW    CLINICAL HISTORY:  . Cervicalgia    TECHNIQUE:  AP, Lateral, bilateral oblique and open mouth views of the cervical spine were performed.    COMPARISON:  07/05/2013    FINDINGS:  There is some straightening of the normal cervical lordosis with slight retrolisthesis of C5 on C6. Anterior disc osteophyte complex production and possible slight disc height loss noted at C5-6.  No appreciable change from prior.  Posterior elements appear intact without acute fractures or subluxations demonstrated.  Odontoid process appears intact.  Atlantoaxial articulations appear normal.  Prevertebral soft tissues are within normal limits.    Impression  Degenerative changes as above.  No acute findings.      Electronically signed by: George Bojorquez MD  Date:    07/22/2019  Time:    16:21      Review of Systems:  CONSTITUTIONAL: patient denies any fever, chills, or weight loss  SKIN: patient denies any rash or itching  RESPIRATORY: patient denies having any shortness of breath  GASTROINTESTINAL: patient denies having any diarrhea, constipation, or bowel incontinence  GENITOURINARY: patient denies having any abnormal bladder function    MUSCULOSKELETAL:  - patient complains of the above noted pain/s (see chief pain complaint)    NEUROLOGICAL:   - pain as above  - strength in Lower extremities is intact, BILATERALLY  - sensation in Lower extremities is intact, BILATERALLY  - patient denies any loss of bowel or bladder control      PSYCHIATRIC: patient denies any change in mood    Other:  All other systems reviewed and are negative      Physical Exam:  BP (!) 144/88   Pulse 73   Ht 5' 3" (1.6 m)   Wt 90.5 kg (199 lb 8.3 oz)   LMP  (LMP Unknown)   BMI 35.34 kg/m²  (reviewed on 8/22/2022)  General: Alert and oriented, in no " apparent distress.  Gait: normal gait.  Skin: No rashes, No discoloration, No obvious lesions  HEENT: Normocephalic, atraumatic. Pupils equal and round.  Cardiovascular: Regular rate and rhythm , no significant peripheral edema present  Respiratory: Without audible wheezing, without use of accessory muscles of respiration.      Musculoskeletal:    Cervical Exam  Incision: no  Pain with Flexion: yes  Pain with Extension: yes  Paraspinous TTP:  Right cervical paraspinous and trapezius  Facet TTP:  C5-C6  Spurling:  Positive on the right  ROM:  Reduced secondary to pain              Assessment:    Ora Henderson is a 62 y.o. year old female who is presenting with     Encounter Diagnoses   Name Primary?    Cervical radiculopathy Yes    Osteoarthritis of spine with radiculopathy, cervical region     DDD (degenerative disc disease), cervical     Cervical spondylosis     Myofascial pain      Cervical radiculopathy  -     IR ARMANDO Cervical/THoracic w/ Img; Future; Expected date: 08/22/2022  -     Case Request-RAD/Other Procedure Area: C7/T1 IL ARMANDO, Right Paramedian Approach    Osteoarthritis of spine with radiculopathy, cervical region  -     Ambulatory referral/consult to Pain Clinic    DDD (degenerative disc disease), cervical    Cervical spondylosis    Myofascial pain  -     methocarbamoL (ROBAXIN) 750 MG Tab; Take 1 tablet (750 mg total) by mouth 2 (two) times daily as needed (Muscle Spasm).  Dispense: 60 tablet; Refill: 2          Plan:    1. Interventional:   -schedule patient for C7-T1 interlaminar epidural steroid injection     -S/p bilateral S1 TFESI on 12/29/2022 with 85% pain relief but took 2 weeks to take effect.       -S/p bilateral L4-L5 TFESI on 10/7/2021 with 80% pain relief.     2. Pharmacologic:    -Refill Robaxin 750 mg twice a day as needed    3. Rehabilitative:   -continue home therapy exercises    4. Diagnostic:   -MRI cervical spine reviewed and findings discussed with  patient      5. Consult:   -now this time    Return to clinic 2-4 weeks after injection        20 minutes were spent in this encounter with more than 50% of the time used for counseling and review of the plan.  Imaging / studies reviewed, detailed above.  I discussed in detail the risks, benefits, and alternatives to any and all potential treatment options.  All questions and concerns were fully addressed today in clinic. Medical decision making moderate.        Yung Treadwell MD  Interventional Pain Medicine  [facility]

## 2022-08-26 ENCOUNTER — OFFICE VISIT (OUTPATIENT)
Dept: INTERNAL MEDICINE | Facility: CLINIC | Age: 62
End: 2022-08-26
Payer: COMMERCIAL

## 2022-08-26 ENCOUNTER — HOSPITAL ENCOUNTER (OUTPATIENT)
Dept: RADIOLOGY | Facility: HOSPITAL | Age: 62
Discharge: HOME | End: 2022-08-26
Attending: FAMILY MEDICINE
Payer: COMMERCIAL

## 2022-08-26 VITALS
DIASTOLIC BLOOD PRESSURE: 86 MMHG | OXYGEN SATURATION: 99 % | HEIGHT: 63 IN | WEIGHT: 198.44 LBS | HEART RATE: 78 BPM | SYSTOLIC BLOOD PRESSURE: 130 MMHG | BODY MASS INDEX: 35.16 KG/M2 | TEMPERATURE: 98 F

## 2022-08-26 DIAGNOSIS — I10 ESSENTIAL HYPERTENSION: Chronic | ICD-10-CM

## 2022-08-26 DIAGNOSIS — E66.01 SEVERE OBESITY (BMI 35.0-39.9) WITH COMORBIDITY: ICD-10-CM

## 2022-08-26 DIAGNOSIS — M54.6 THORACIC SPINE PAIN: ICD-10-CM

## 2022-08-26 DIAGNOSIS — M54.6 THORACIC SPINE PAIN: Primary | ICD-10-CM

## 2022-08-26 DIAGNOSIS — M47.22 OSTEOARTHRITIS OF SPINE WITH RADICULOPATHY, CERVICAL REGION: Chronic | ICD-10-CM

## 2022-08-26 PROCEDURE — 3075F PR MOST RECENT SYSTOLIC BLOOD PRESS GE 130-139MM HG: ICD-10-PCS | Mod: CPTII,S$GLB,, | Performed by: FAMILY MEDICINE

## 2022-08-26 PROCEDURE — 3008F BODY MASS INDEX DOCD: CPT | Mod: CPTII,S$GLB,, | Performed by: FAMILY MEDICINE

## 2022-08-26 PROCEDURE — 99214 PR OFFICE/OUTPT VISIT, EST, LEVL IV, 30-39 MIN: ICD-10-PCS | Mod: S$GLB,,, | Performed by: FAMILY MEDICINE

## 2022-08-26 PROCEDURE — 99214 OFFICE O/P EST MOD 30 MIN: CPT | Mod: S$GLB,,, | Performed by: FAMILY MEDICINE

## 2022-08-26 PROCEDURE — 3008F PR BODY MASS INDEX (BMI) DOCUMENTED: ICD-10-PCS | Mod: CPTII,S$GLB,, | Performed by: FAMILY MEDICINE

## 2022-08-26 PROCEDURE — 1160F PR REVIEW ALL MEDS BY PRESCRIBER/CLIN PHARMACIST DOCUMENTED: ICD-10-PCS | Mod: CPTII,S$GLB,, | Performed by: FAMILY MEDICINE

## 2022-08-26 PROCEDURE — 99999 PR PBB SHADOW E&M-EST. PATIENT-LVL V: ICD-10-PCS | Mod: PBBFAC,,, | Performed by: FAMILY MEDICINE

## 2022-08-26 PROCEDURE — 99999 PR PBB SHADOW E&M-EST. PATIENT-LVL V: CPT | Mod: PBBFAC,,, | Performed by: FAMILY MEDICINE

## 2022-08-26 PROCEDURE — 72070 X-RAY EXAM THORAC SPINE 2VWS: CPT | Mod: TC

## 2022-08-26 PROCEDURE — 1159F PR MEDICATION LIST DOCUMENTED IN MEDICAL RECORD: ICD-10-PCS | Mod: CPTII,S$GLB,, | Performed by: FAMILY MEDICINE

## 2022-08-26 PROCEDURE — 1159F MED LIST DOCD IN RCRD: CPT | Mod: CPTII,S$GLB,, | Performed by: FAMILY MEDICINE

## 2022-08-26 PROCEDURE — 3079F DIAST BP 80-89 MM HG: CPT | Mod: CPTII,S$GLB,, | Performed by: FAMILY MEDICINE

## 2022-08-26 PROCEDURE — 1160F RVW MEDS BY RX/DR IN RCRD: CPT | Mod: CPTII,S$GLB,, | Performed by: FAMILY MEDICINE

## 2022-08-26 PROCEDURE — 72070 X-RAY EXAM THORAC SPINE 2VWS: CPT | Mod: 26,,, | Performed by: RADIOLOGY

## 2022-08-26 PROCEDURE — 3075F SYST BP GE 130 - 139MM HG: CPT | Mod: CPTII,S$GLB,, | Performed by: FAMILY MEDICINE

## 2022-08-26 PROCEDURE — 72070 XR THORACIC SPINE AP LATERAL: ICD-10-PCS | Mod: 26,,, | Performed by: RADIOLOGY

## 2022-08-26 PROCEDURE — 3079F PR MOST RECENT DIASTOLIC BLOOD PRESSURE 80-89 MM HG: ICD-10-PCS | Mod: CPTII,S$GLB,, | Performed by: FAMILY MEDICINE

## 2022-08-26 RX ORDER — MELOXICAM 15 MG/1
15 TABLET ORAL DAILY PRN
Qty: 30 TABLET | Refills: 0 | Status: SHIPPED | OUTPATIENT
Start: 2022-08-26 | End: 2023-01-20

## 2022-08-29 ENCOUNTER — HOSPITAL ENCOUNTER (OUTPATIENT)
Dept: RADIOLOGY | Facility: HOSPITAL | Age: 62
Discharge: HOME | End: 2022-08-29
Attending: NURSE PRACTITIONER
Payer: COMMERCIAL

## 2022-08-29 DIAGNOSIS — Z12.39 ENCOUNTER FOR SCREENING FOR MALIGNANT NEOPLASM OF BREAST, UNSPECIFIED SCREENING MODALITY: ICD-10-CM

## 2022-08-29 PROCEDURE — 77063 MAMMO DIGITAL SCREENING BILAT WITH TOMO: ICD-10-PCS | Mod: 26,,, | Performed by: RADIOLOGY

## 2022-08-29 PROCEDURE — 77067 SCR MAMMO BI INCL CAD: CPT | Mod: 26,,, | Performed by: RADIOLOGY

## 2022-08-29 PROCEDURE — 77063 BREAST TOMOSYNTHESIS BI: CPT | Mod: 26,,, | Performed by: RADIOLOGY

## 2022-08-29 PROCEDURE — 77067 MAMMO DIGITAL SCREENING BILAT WITH TOMO: ICD-10-PCS | Mod: 26,,, | Performed by: RADIOLOGY

## 2022-08-29 PROCEDURE — 77063 BREAST TOMOSYNTHESIS BI: CPT | Mod: TC,PN

## 2022-08-29 PROCEDURE — 77067 SCR MAMMO BI INCL CAD: CPT | Mod: TC,PN

## 2022-09-07 NOTE — PRE-PROCEDURE INSTRUCTIONS
Spoke with patient regarding procedure scheduled on 9.15    Arrival time 1000    Has patient been sick with fever or on antibiotics within the last 7 days? No    Does the patient have any open wounds, sores or rashes? No    Does the patient have any recent fractures? no    Has patient received a vaccination within the last 7 days? No    Received the COVID vaccination? yes    Has the patient stopped all medications as directed? NA    Does patient have a pacemaker and or defibrillator? no    Does the patient have a ride to and from procedure and someone reliable to remain with patient?  Sung     Is the patient diabetic? no    Does the patient have sleep apnea? Or use O2 at home? NATALIE     Is the patient receiving sedation? yes    Is the patient instructed to remain NPO beginning at midnight the night before their procedure? yes    Procedure location confirmed with patient? Yes    Covid- Denies signs/symptoms. Instructed to notify PAT/MD if any changes.

## 2022-09-15 ENCOUNTER — HOSPITAL ENCOUNTER (OUTPATIENT)
Facility: HOSPITAL | Age: 62
Discharge: HOME | End: 2022-09-15
Attending: ANESTHESIOLOGY | Admitting: ANESTHESIOLOGY
Payer: COMMERCIAL

## 2022-09-15 VITALS
RESPIRATION RATE: 17 BRPM | TEMPERATURE: 98 F | WEIGHT: 200.31 LBS | HEART RATE: 67 BPM | BODY MASS INDEX: 35.49 KG/M2 | HEIGHT: 63 IN | SYSTOLIC BLOOD PRESSURE: 162 MMHG | DIASTOLIC BLOOD PRESSURE: 77 MMHG | OXYGEN SATURATION: 97 %

## 2022-09-15 DIAGNOSIS — M54.12 CERVICAL RADICULOPATHY: Primary | ICD-10-CM

## 2022-09-15 DIAGNOSIS — M54.16 LUMBAR RADICULOPATHY: ICD-10-CM

## 2022-09-15 PROCEDURE — 63600175 PHARM REV CODE 636 W HCPCS: Performed by: ANESTHESIOLOGY

## 2022-09-15 PROCEDURE — 25500020 PHARM REV CODE 255: Performed by: ANESTHESIOLOGY

## 2022-09-15 PROCEDURE — 25000003 PHARM REV CODE 250: Performed by: ANESTHESIOLOGY

## 2022-09-15 PROCEDURE — 62321 PR INJ CERV/THORAC, W/GUIDANCE: ICD-10-PCS | Mod: ,,, | Performed by: ANESTHESIOLOGY

## 2022-09-15 PROCEDURE — 62321 NJX INTERLAMINAR CRV/THRC: CPT | Performed by: ANESTHESIOLOGY

## 2022-09-15 PROCEDURE — 62321 NJX INTERLAMINAR CRV/THRC: CPT | Mod: ,,, | Performed by: ANESTHESIOLOGY

## 2022-09-15 RX ORDER — MIDAZOLAM HYDROCHLORIDE 1 MG/ML
INJECTION, SOLUTION INTRAMUSCULAR; INTRAVENOUS
Status: DISCONTINUED | OUTPATIENT
Start: 2022-09-15 | End: 2022-09-15 | Stop reason: HOSPADM

## 2022-09-15 RX ORDER — ONDANSETRON 2 MG/ML
4 INJECTION INTRAMUSCULAR; INTRAVENOUS ONCE AS NEEDED
Status: DISCONTINUED | OUTPATIENT
Start: 2022-09-15 | End: 2022-09-15 | Stop reason: HOSPADM

## 2022-09-15 RX ORDER — BETAMETHASONE SODIUM PHOSPHATE AND BETAMETHASONE ACETATE 3; 3 MG/ML; MG/ML
INJECTION, SUSPENSION INTRA-ARTICULAR; INTRALESIONAL; INTRAMUSCULAR; SOFT TISSUE
Status: DISCONTINUED | OUTPATIENT
Start: 2022-09-15 | End: 2022-09-15 | Stop reason: HOSPADM

## 2022-09-15 RX ORDER — FENTANYL CITRATE 50 UG/ML
INJECTION, SOLUTION INTRAMUSCULAR; INTRAVENOUS
Status: DISCONTINUED | OUTPATIENT
Start: 2022-09-15 | End: 2022-09-15 | Stop reason: HOSPADM

## 2022-09-15 RX ORDER — LIDOCAINE HYDROCHLORIDE 10 MG/ML
INJECTION, SOLUTION EPIDURAL; INFILTRATION; INTRACAUDAL; PERINEURAL
Status: DISCONTINUED | OUTPATIENT
Start: 2022-09-15 | End: 2022-09-15 | Stop reason: HOSPADM

## 2022-09-15 NOTE — DISCHARGE INSTRUCTIONS

## 2022-09-15 NOTE — OP NOTE
Cervical Interlaminar Epidural Steroid Injection under Fluoroscopic Guidance.     INFORMED CONSENT: The procedure, risks, benefits and options were discussed with patient. There are no contraindications to the procedure. The patient expressed understanding and agreed to proceed. The personnel performing the procedure was discussed.    Date of procedure 09/15/2022    Time-out taken to identify patient and procedure side prior to starting the procedure.                     PROCEDURE: Cervical Interlaminar epidural steroid injection C7/T1 under fluoroscopic guidance.     Pre Procedure diagnosis:    Cervical radiculopathy [M54.12]  1. Lumbar radiculopathy    2. Cervical radiculopathy        Post-Procedure diagnosis:   same      PHYSICIAN: Yung Treadwell MD    ASSISTANTS: None     MEDICATIONS INJECTED: 2ml Betamethasone PF 6mg/ml and 1ml  Lidocaine 1%    LOCAL ANESTHETIC INJECTED: 3ml  Lidocaine 1%     ESTIMATED BLOOD LOSS: none.     COMPLICATIONS: none.     Sedation: Conscious sedation provided by M.D    SEDATION MEDICATIONS: local/IV sedation: Versed 2 mg and fentanyl 100 mcg IV.  Conscious sedation ordered by MD.  Patient reevaluated and sedation administered by MD and monitored by RN.  Total sedation time was less than 10 min.      Total sedation time was <10 min      TECHNIQUE: With the patient laying in a prone position, the area was prepped and draped in the usual sterile fashion using ChloraPrep and a fenestrated drape. Local anesthetic was given using a 27-gauge needle by raising a wheal and going down to the hub of the needle over the C7/T1 interlaminar space.  The interlaminar space was then approached with a 3.5 inch 18-gauge Touhy needle was introduced under fluoroscopic guidance in the AP and Lateral view. Once the Ligamentum flavum was encountered loss of resistance to saline was used to enter the epidural space. With positive loss of resistance and negative CSF or Blood, 2mL contrast dye Omnipaque  (300mg/ml) was injected to confirm placement and there was no vascular runoff. The medication was then injected slowly. The patient tolerated the procedure well.         The patient was monitored for approximately 30 minutes after the procedure.  Patient was given post procedure and discharge instructions to follow at home.  The patient was discharged in a stable condition

## 2022-09-15 NOTE — DISCHARGE SUMMARY
Discharge Note  Short Stay      SUMMARY     Admit Date: 9/15/2022    Attending Physician: Yung Treadwell MD        Discharge Physician: Yung Treadwell MD        Discharge Date: 9/15/2022 11:07 AM    Procedure(s) (LRB):  C7/T1 IL ARMANDO, Right Paramedian Approach (N/A)    Final Diagnosis: Cervical radiculopathy [M54.12]    Disposition: Home or self care    Patient Instructions:   Current Discharge Medication List        CONTINUE these medications which have NOT CHANGED    Details   amLODIPine (NORVASC) 2.5 MG tablet Take 1 tablet (2.5 mg total) by mouth once daily.  Qty: 90 tablet, Refills: 3    Comments: .  Associated Diagnoses: Essential hypertension      losartan-hydrochlorothiazide 100-25 mg (HYZAAR) 100-25 mg per tablet Take 1 tablet by mouth once daily.  Qty: 90 tablet, Refills: 3    Comments: .  Associated Diagnoses: Essential hypertension      aspirin 81 MG Chew Take 81 mg by mouth as needed. Hold 5 days prior to surgery      docusate sodium (COLACE) 100 MG capsule Take 1 capsule (100 mg total) by mouth 2 (two) times daily.  Qty: 100 capsule, Refills: 0    Associated Diagnoses: RLQ abdominal pain; Constipation, unspecified constipation type      esomeprazole (NEXIUM) 40 MG capsule Take 1 capsule (40 mg total) by mouth before breakfast.  Qty: 90 capsule, Refills: 3    Associated Diagnoses: Gastroesophageal reflux disease, esophagitis presence not specified      fluticasone propionate (FLONASE) 50 mcg/actuation nasal spray 2 sprays (100 mcg total) by Each Nostril route once daily.  Qty: 16 g, Refills: 0    Associated Diagnoses: Viral URI with cough      gabapentin (NEURONTIN) 100 MG capsule Take 1-2 capsules (100-200 mg total) by mouth every evening.  Qty: 60 capsule, Refills: 1    Comments: Please disregard previous gabapentin rx  Associated Diagnoses: Osteoarthritis of spine with radiculopathy, lumbar region      L.acidophilus-Bif. animalis (PROBIOTIC) 5 billion cell CpSP Take 1 tablet by mouth once  daily.  Qty: 10 capsule, Refills: 0      LIDOcaine (LIDODERM) 5 % Place onto the skin.      meloxicam (MOBIC) 15 MG tablet Take 1 tablet (15 mg total) by mouth daily as needed for Pain.  Qty: 30 tablet, Refills: 0    Associated Diagnoses: Thoracic spine pain      methocarbamoL (ROBAXIN) 750 MG Tab Take 1 tablet (750 mg total) by mouth 2 (two) times daily as needed (Muscle Spasm).  Qty: 60 tablet, Refills: 2    Associated Diagnoses: Myofascial pain      MULTIVIT,CALC,MINS/IRON/FOLIC (DAILY MULTIPLE FOR WOMEN ORAL) Take 1 tablet by mouth once daily. Hold 5 days prior to surgery      omega-3 fatty acids/fish oil (FISH OIL-OMEGA-3 FATTY ACIDS) 300-1,000 mg capsule Take by mouth once daily. Hold 5 days prior to surgery      rosuvastatin (CRESTOR) 40 MG Tab Take 1 tablet (40 mg total) by mouth every evening.  Qty: 90 tablet, Refills: 1    Associated Diagnoses: Mixed hyperlipidemia                 Discharge Diagnosis: Cervical radiculopathy [M54.12]  Condition on Discharge: Stable with no complications to procedure   Diet on Discharge: Same as before.  Activity: as per instruction sheet.  Discharge to: Home with a responsible adult.  Follow up: 2-4 weeks       Please call the office at [phone] if you experience any weakness or loss of sensation, fever > 101.5, pain uncontrolled with oral medications, persistent nausea/vomiting/or diarrhea, redness or drainage from the incisions, or any other worrisome concerns. If physician on call was not reached or could not communicate with our office for any reason please go to the nearest emergency department

## 2022-09-16 ENCOUNTER — TELEPHONE (OUTPATIENT)
Dept: INTERNAL MEDICINE | Facility: CLINIC | Age: 62
End: 2022-09-16
Payer: COMMERCIAL

## 2022-09-16 NOTE — TELEPHONE ENCOUNTER
----- Message from Tori Sánchez sent at 2022  8:51 AM CDT -----  Contact: Ora  Type:  Needs Medical Advice    Who Called: Ora  Symptoms (please be specific): vaginal itching   How long has patient had these symptoms:  3 days  Pharmacy name and phone #:   [facility] - [place] - [address] [facility]  [address] [facility]  [place] [address]  Phone: [phone] Fax: [phone]  Would the patient rather a call back or a response via [facility]? callback  Best Call Back Number: [phone]  Additional Information: Patient had a prescription and it previously

## 2022-11-01 ENCOUNTER — TELEPHONE (OUTPATIENT)
Dept: URGENT CARE | Facility: CLINIC | Age: 62
End: 2022-11-01

## 2022-11-01 ENCOUNTER — HOSPITAL ENCOUNTER (OUTPATIENT)
Dept: RADIOLOGY | Facility: CLINIC | Age: 62
Discharge: HOME | End: 2022-11-01
Attending: PHYSICIAN ASSISTANT
Payer: COMMERCIAL

## 2022-11-01 ENCOUNTER — OFFICE VISIT (OUTPATIENT)
Dept: URGENT CARE | Facility: CLINIC | Age: 62
End: 2022-11-01
Payer: COMMERCIAL

## 2022-11-01 VITALS
TEMPERATURE: 98 F | BODY MASS INDEX: 35.08 KG/M2 | WEIGHT: 198 LBS | HEART RATE: 95 BPM | DIASTOLIC BLOOD PRESSURE: 99 MMHG | OXYGEN SATURATION: 96 % | RESPIRATION RATE: 12 BRPM | HEIGHT: 63 IN | SYSTOLIC BLOOD PRESSURE: 141 MMHG

## 2022-11-01 DIAGNOSIS — R10.84 GENERALIZED ABDOMINAL PAIN: ICD-10-CM

## 2022-11-01 DIAGNOSIS — R03.0 ELEVATED BLOOD PRESSURE READING: ICD-10-CM

## 2022-11-01 DIAGNOSIS — R53.81 MALAISE AND FATIGUE: Primary | ICD-10-CM

## 2022-11-01 DIAGNOSIS — R53.83 MALAISE AND FATIGUE: Primary | ICD-10-CM

## 2022-11-01 DIAGNOSIS — R11.2 NAUSEA AND VOMITING IN ADULT: ICD-10-CM

## 2022-11-01 LAB
BILIRUB UR QL STRIP: NEGATIVE
CTP QC/QA: YES
GLUCOSE UR QL STRIP: NEGATIVE
KETONES UR QL STRIP: NEGATIVE
LEUKOCYTE ESTERASE UR QL STRIP: NEGATIVE
PH, POC UA: 9
POC BLOOD, URINE: NEGATIVE
POC MOLECULAR INFLUENZA A AGN: NEGATIVE
POC MOLECULAR INFLUENZA B AGN: NEGATIVE
POC NITRATES, URINE: NEGATIVE
PROT UR QL STRIP: POSITIVE
SP GR UR STRIP: 1 (ref 1–1.03)
UROBILINOGEN UR STRIP-ACNC: ABNORMAL (ref 0.1–1.1)

## 2022-11-01 PROCEDURE — 96372 PR INJECTION,THERAP/PROPH/DIAG2ST, IM OR SUBCUT: ICD-10-PCS | Mod: S$GLB,,, | Performed by: PHYSICIAN ASSISTANT

## 2022-11-01 PROCEDURE — 3077F PR MOST RECENT SYSTOLIC BLOOD PRESSURE >= 140 MM HG: ICD-10-PCS | Mod: CPTII,S$GLB,, | Performed by: PHYSICIAN ASSISTANT

## 2022-11-01 PROCEDURE — 3080F DIAST BP >= 90 MM HG: CPT | Mod: CPTII,S$GLB,, | Performed by: PHYSICIAN ASSISTANT

## 2022-11-01 PROCEDURE — 81003 POCT URINALYSIS, DIPSTICK, AUTOMATED, W/O SCOPE: ICD-10-PCS | Mod: QW,S$GLB,, | Performed by: PHYSICIAN ASSISTANT

## 2022-11-01 PROCEDURE — 3077F SYST BP >= 140 MM HG: CPT | Mod: CPTII,S$GLB,, | Performed by: PHYSICIAN ASSISTANT

## 2022-11-01 PROCEDURE — 99214 PR OFFICE/OUTPT VISIT, EST, LEVL IV, 30-39 MIN: ICD-10-PCS | Mod: 25,S$GLB,, | Performed by: PHYSICIAN ASSISTANT

## 2022-11-01 PROCEDURE — 87502 POCT INFLUENZA A/B MOLECULAR: ICD-10-PCS | Mod: QW,S$GLB,, | Performed by: PHYSICIAN ASSISTANT

## 2022-11-01 PROCEDURE — 96372 THER/PROPH/DIAG INJ SC/IM: CPT | Mod: S$GLB,,, | Performed by: PHYSICIAN ASSISTANT

## 2022-11-01 PROCEDURE — 74019 RADEX ABDOMEN 2 VIEWS: CPT | Mod: S$GLB,,, | Performed by: RADIOLOGY

## 2022-11-01 PROCEDURE — 87502 INFLUENZA DNA AMP PROBE: CPT | Mod: QW,S$GLB,, | Performed by: PHYSICIAN ASSISTANT

## 2022-11-01 PROCEDURE — 3080F PR MOST RECENT DIASTOLIC BLOOD PRESSURE >= 90 MM HG: ICD-10-PCS | Mod: CPTII,S$GLB,, | Performed by: PHYSICIAN ASSISTANT

## 2022-11-01 PROCEDURE — 74019 XR ABDOMEN FLAT AND ERECT: ICD-10-PCS | Mod: S$GLB,,, | Performed by: RADIOLOGY

## 2022-11-01 PROCEDURE — 99214 OFFICE O/P EST MOD 30 MIN: CPT | Mod: 25,S$GLB,, | Performed by: PHYSICIAN ASSISTANT

## 2022-11-01 PROCEDURE — 81003 URINALYSIS AUTO W/O SCOPE: CPT | Mod: QW,S$GLB,, | Performed by: PHYSICIAN ASSISTANT

## 2022-11-01 RX ORDER — PROMETHAZINE HYDROCHLORIDE 25 MG/1
25 TABLET ORAL EVERY 6 HOURS PRN
Qty: 10 TABLET | Refills: 0 | Status: SHIPPED | OUTPATIENT
Start: 2022-11-01 | End: 2022-11-04

## 2022-11-01 RX ORDER — PROMETHAZINE HYDROCHLORIDE 25 MG/ML
25 INJECTION, SOLUTION INTRAMUSCULAR; INTRAVENOUS
Status: COMPLETED | OUTPATIENT
Start: 2022-11-01 | End: 2022-11-01

## 2022-11-01 RX ADMIN — PROMETHAZINE HYDROCHLORIDE 25 MG: 25 INJECTION, SOLUTION INTRAMUSCULAR; INTRAVENOUS at 11:11

## 2022-11-01 NOTE — PATIENT INSTRUCTIONS
Will call with xray results     STOMACH UPSET:     Please make sure you are SLOWLY TAKING SMALL SIPS of fluids (Recommend: 1/2 Gatorade/Power-aid/Body Fer/Pedialyte + 1/2 water) and getting plenty of rest.    Eat a bland diet of bananas, rice, applesauce, toast, crackers--advance your diet as you tolerate these foods.    Promethazine as directed    YOU MAY TAKE OVER-THE-COUNTER PEPTO-BISMOL FOR STOMACH CRAMPING/DIARRHEA.  This may darken your stools.     Please do not take anything that would stop diarrhea-- For example Imodium.  You want to rid toxins and be sure to remain hydrated    Please follow-up with your primary care provider if your symptoms continue for longer than 5-7 days.    Go to the ER for any worsening or concerning symptoms.      - You must understand that you have received an Urgent Care treatment only and that you may be released before all of your medical problems are known or treated.   - You, the patient, will arrange for follow up care as instructed with your primary care provider or recommended specialist.   - If your condition worsens or fails to improve we recommend that you receive another evaluation at the ER immediately or contact your PCP to discuss your concerns, or return here.   - Please do not drive or make any important decisions for 24 hours if you have received any pain medications, sedatives or mood altering drugs during your visit.    Disclaimer: This document was drafted with the use of a voice recognition device and is likely to have sound alike errors.

## 2022-11-01 NOTE — PROGRESS NOTES
"Subjective:       Patient ID: Ora Henderson is a 62 y.o. female.    Vitals:  height is 5' 3" (1.6 m) and weight is 89.8 kg (198 lb). Her oral temperature is 98.3 °F (36.8 °C). Her blood pressure is 141/99 (abnormal) and her pulse is 95. Her respiration is 12 and oxygen saturation is 96%.     Chief Complaint: Emesis    Presents with her present complaining of nausea and vomiting that started this morning.  Reports that they ate Mexican for lunch yesterday.  There is associated generalized body aches and headache.    Describes umbilical abdominal pain as pulling.  Reports history of hernia repair.    Emesis   This is a new problem. The current episode started today. The problem occurs 2 to 4 times per day. The problem has been gradually worsening. The emesis has an appearance of stomach contents. There has been no fever. The fever has been present for Less than 1 day. Associated symptoms include abdominal pain, coughing, diarrhea, headaches and myalgias. Pertinent negatives include no arthralgias, chest pain, chills, decreased urine volume, dizziness, fever, sweats, URI or weight loss. Risk factors include suspect food intake. She has tried increased fluids and anti-emetic for the symptoms. The treatment provided no relief.     Constitution: Negative for chills and fever.   Cardiovascular:  Negative for chest pain.   Respiratory:  Positive for cough.    Gastrointestinal:  Positive for abdominal pain, vomiting and diarrhea.   Genitourinary:  Negative for urine decreased.   Musculoskeletal:  Positive for muscle ache. Negative for joint pain.   Neurological:  Positive for headaches. Negative for dizziness.     Objective:      Vitals:    11/01/22 1056   BP: (!) 141/99   Pulse: 95   Resp: 12   Temp: 98.3 °F (36.8 °C)   TempSrc: Oral   SpO2: 96%   Weight: 89.8 kg (198 lb)   Height: 5' 3" (1.6 m)       Physical Exam   Constitutional: She is oriented to person, place, and time. She appears well-developed. " She is cooperative.  Non-toxic appearance. She appears ill. No distress.   HENT:   Head: Normocephalic and atraumatic.   Ears:   Right Ear: Hearing, tympanic membrane, external ear and ear canal normal.   Left Ear: Hearing, tympanic membrane, external ear and ear canal normal.   Nose: Nose normal. No mucosal edema, rhinorrhea or nasal deformity. No epistaxis. Right sinus exhibits no maxillary sinus tenderness and no frontal sinus tenderness. Left sinus exhibits no maxillary sinus tenderness and no frontal sinus tenderness.   Mouth/Throat: Uvula is midline, oropharynx is clear and moist and mucous membranes are normal. No trismus in the jaw. Normal dentition. No uvula swelling. No posterior oropharyngeal erythema.   Eyes: Conjunctivae and lids are normal. Right eye exhibits no discharge. Left eye exhibits no discharge. No scleral icterus.   Neck: Trachea normal and phonation normal. Neck supple.   Cardiovascular: Normal rate, regular rhythm, normal heart sounds and normal pulses.   Pulmonary/Chest: Effort normal and breath sounds normal. No stridor. No respiratory distress. She has no wheezes. She has no rhonchi. She has no rales. She exhibits no tenderness.   Abdominal: Normal appearance and bowel sounds are normal. Soft. There is abdominal tenderness in the periumbilical area. There is guarding.      Comments: Active emesis  Exam limited at this time secondary to patient guarding and actively vomiting.   Musculoskeletal: Normal range of motion.         General: No deformity. Normal range of motion.   Neurological: She is alert, oriented to person, place, and time and at baseline. She has normal strength. She exhibits normal muscle tone. Coordination normal.   Skin: Skin is warm, dry, intact, not diaphoretic, not pale and no rash. Capillary refill takes less than 2 seconds.   Psychiatric: Her speech is normal and behavior is normal. Judgment and thought content normal.   Nursing note and vitals reviewed.       Assessment:       1. Malaise and fatigue    2. Nausea and vomiting in adult    3. Generalized abdominal pain    4. Elevated blood pressure reading        Results for orders placed or performed in visit on 11/01/22   POCT Urinalysis, Dipstick, Automated, W/O Scope   Result Value Ref Range    POC Blood, Urine Negative Negative    POC Bilirubin, Urine Negative Negative    POC Urobilinogen, Urine norm 0.1 - 1.1    POC Ketones, Urine Negative Negative    POC Protein, Urine Positive (A) Negative    POC Nitrates, Urine Negative Negative    POC Glucose, Urine Negative Negative    pH, UA 9.0     POC Specific Gravity, Urine 1.005 1.003 - 1.029    POC Leukocytes, Urine Negative Negative   POCT Influenza A/B Molecular   Result Value Ref Range    POC Molecular Influenza A Ag Negative Negative, Not Reported    POC Molecular Influenza B Ag Negative Negative, Not Reported     Acceptable Yes          Plan:         Malaise and fatigue  -     POCT Urinalysis, Dipstick, Automated, W/O Scope  -     POCT Influenza A/B Molecular    Nausea and vomiting in adult  -     promethazine injection 25 mg  -     promethazine (PHENERGAN) 25 MG tablet; Take 1 tablet (25 mg total) by mouth every 6 (six) hours as needed for Nausea.  Dispense: 10 tablet; Refill: 0    Generalized abdominal pain  -     X-Ray Abdomen Flat And Erect; Future; Expected date: 11/01/2022    Elevated blood pressure reading         Medical Decision Making:   Clinical Tests:   Radiological Study: Ordered  Urgent Care Management:  Patient reports that she felt better after Phenergan injection.  Additional MDM:   Differential Diagnosis:   Symptom: Abdominal pain. <> The follow diagnoses were considered and will be evaluated: Gastritis, Gastroenteritis, Irritable Bowel Syndrome, Pancreatitis and Umbilical Hernia - Incarcerated. The follow diagnoses were considered, but were felt to be of low probability: Urinary Tract Infection.      Abdominal Pain: Medication(s)  given for nausea: Phenergan. Response to nausea medication: decreased. Disposition: Discharged. The patient's condition was felt to be guarded. Xray pending      Patient Instructions   Will call with xray results     STOMACH UPSET:     Please make sure you are SLOWLY TAKING SMALL SIPS of fluids (Recommend: 1/2 Gatorade/Power-aid/Body Fer/Pedialyte + 1/2 water) and getting plenty of rest.    Eat a bland diet of bananas, rice, applesauce, toast, crackers--advance your diet as you tolerate these foods.    Promethazine as directed    YOU MAY TAKE OVER-THE-COUNTER PEPTO-BISMOL FOR STOMACH CRAMPING/DIARRHEA.  This may darken your stools.     Please do not take anything that would stop diarrhea-- For example Imodium.  You want to rid toxins and be sure to remain hydrated    Please follow-up with your primary care provider if your symptoms continue for longer than 5-7 days.    Go to the ER for any worsening or concerning symptoms.      - You must understand that you have received an Urgent Care treatment only and that you may be released before all of your medical problems are known or treated.   - You, the patient, will arrange for follow up care as instructed with your primary care provider or recommended specialist.   - If your condition worsens or fails to improve we recommend that you receive another evaluation at the ER immediately or contact your PCP to discuss your concerns, or return here.   - Please do not drive or make any important decisions for 24 hours if you have received any pain medications, sedatives or mood altering drugs during your visit.    Disclaimer: This document was drafted with the use of a voice recognition device and is likely to have sound alike errors.

## 2022-11-01 NOTE — TELEPHONE ENCOUNTER
Attempted to call patient and notify of results below.  No answer, voicemail left to return call to urgent care.    X-Ray Abdomen Flat And Erect    Result Date: 11/1/2022  EXAMINATION: Three views of the abdomen CLINICAL HISTORY: Generalized abdominal pain COMPARISON: 02/23/2021 FINDINGS: Moderate fecal material in the colon.  Phleboliths are in the pelvis.  No free air or dilated bowel loops.     Nonobstructive bowel gas pattern with moderate fecal material in the colon Electronically signed by: Ludin López MD Date:    11/01/2022 Time:    12:03

## 2022-11-04 ENCOUNTER — TELEPHONE (OUTPATIENT)
Dept: URGENT CARE | Facility: CLINIC | Age: 62
End: 2022-11-04
Payer: COMMERCIAL

## 2022-11-04 NOTE — TELEPHONE ENCOUNTER
Spoke with patient regarding abdominal xray. Discussed findings of stool in the colon. No concerning abnormalities seen on xray. Patient states her symptoms are slowly improving although she still has residual nausea. Discussed with patient that she can return to clinic or follow up with PCP if no improvement or worsening. She verbalized understanding

## 2022-11-09 ENCOUNTER — TELEPHONE (OUTPATIENT)
Dept: ORTHOPEDICS | Facility: CLINIC | Age: 62
End: 2022-11-09
Payer: COMMERCIAL

## 2022-11-09 DIAGNOSIS — M25.511 RIGHT SHOULDER PAIN, UNSPECIFIED CHRONICITY: Primary | ICD-10-CM

## 2022-11-09 NOTE — TELEPHONE ENCOUNTER
Spoke with pt to discuss upcoming appt with Dr. Busby on 11/18 at 8am for R shoulder pain. Informed pt to arrive 30 min early for xrays as there were no recent xrays in his chart. Pt requested to be added to the waitlist.

## 2022-11-14 ENCOUNTER — HOSPITAL ENCOUNTER (OUTPATIENT)
Dept: RADIOLOGY | Facility: HOSPITAL | Age: 62
Discharge: HOME | End: 2022-11-14
Attending: STUDENT IN AN ORGANIZED HEALTH CARE EDUCATION/TRAINING PROGRAM
Payer: COMMERCIAL

## 2022-11-14 DIAGNOSIS — M25.511 RIGHT SHOULDER PAIN, UNSPECIFIED CHRONICITY: ICD-10-CM

## 2022-11-14 PROCEDURE — 73030 XR SHOULDER COMPLETE 2 OR MORE VIEWS RIGHT: ICD-10-PCS | Mod: 26,RT,, | Performed by: RADIOLOGY

## 2022-11-14 PROCEDURE — 73030 X-RAY EXAM OF SHOULDER: CPT | Mod: TC,RT

## 2022-11-14 PROCEDURE — 73030 X-RAY EXAM OF SHOULDER: CPT | Mod: 26,RT,, | Performed by: RADIOLOGY

## 2022-11-16 ENCOUNTER — OFFICE VISIT (OUTPATIENT)
Dept: ORTHOPEDICS | Facility: CLINIC | Age: 62
End: 2022-11-16
Payer: COMMERCIAL

## 2022-11-16 DIAGNOSIS — Z98.890 HISTORY OF ROTATOR CUFF SURGERY: ICD-10-CM

## 2022-11-16 DIAGNOSIS — M67.911 TENDINOPATHY OF RIGHT ROTATOR CUFF: ICD-10-CM

## 2022-11-16 DIAGNOSIS — G89.29 CHRONIC RIGHT SHOULDER PAIN: ICD-10-CM

## 2022-11-16 DIAGNOSIS — M75.41 SUBACROMIAL IMPINGEMENT OF RIGHT SHOULDER: Primary | ICD-10-CM

## 2022-11-16 DIAGNOSIS — M25.511 CHRONIC RIGHT SHOULDER PAIN: ICD-10-CM

## 2022-11-16 DIAGNOSIS — M19.011 ARTHRITIS OF RIGHT GLENOHUMERAL JOINT: ICD-10-CM

## 2022-11-16 DIAGNOSIS — M54.12 CERVICAL RADICULOPATHY: ICD-10-CM

## 2022-11-16 PROCEDURE — 1160F RVW MEDS BY RX/DR IN RCRD: CPT | Mod: CPTII,S$GLB,, | Performed by: STUDENT IN AN ORGANIZED HEALTH CARE EDUCATION/TRAINING PROGRAM

## 2022-11-16 PROCEDURE — 1159F PR MEDICATION LIST DOCUMENTED IN MEDICAL RECORD: ICD-10-PCS | Mod: CPTII,S$GLB,, | Performed by: STUDENT IN AN ORGANIZED HEALTH CARE EDUCATION/TRAINING PROGRAM

## 2022-11-16 PROCEDURE — 1160F PR REVIEW ALL MEDS BY PRESCRIBER/CLIN PHARMACIST DOCUMENTED: ICD-10-PCS | Mod: CPTII,S$GLB,, | Performed by: STUDENT IN AN ORGANIZED HEALTH CARE EDUCATION/TRAINING PROGRAM

## 2022-11-16 PROCEDURE — 20610 DRAIN/INJ JOINT/BURSA W/O US: CPT | Mod: RT,S$GLB,, | Performed by: STUDENT IN AN ORGANIZED HEALTH CARE EDUCATION/TRAINING PROGRAM

## 2022-11-16 PROCEDURE — 99214 OFFICE O/P EST MOD 30 MIN: CPT | Mod: 25,S$GLB,, | Performed by: STUDENT IN AN ORGANIZED HEALTH CARE EDUCATION/TRAINING PROGRAM

## 2022-11-16 PROCEDURE — 99999 PR PBB SHADOW E&M-EST. PATIENT-LVL IV: ICD-10-PCS | Mod: PBBFAC,,, | Performed by: STUDENT IN AN ORGANIZED HEALTH CARE EDUCATION/TRAINING PROGRAM

## 2022-11-16 PROCEDURE — 20610 LARGE JOINT ASPIRATION/INJECTION: R SUBACROMIAL BURSA: ICD-10-PCS | Mod: RT,S$GLB,, | Performed by: STUDENT IN AN ORGANIZED HEALTH CARE EDUCATION/TRAINING PROGRAM

## 2022-11-16 PROCEDURE — 1159F MED LIST DOCD IN RCRD: CPT | Mod: CPTII,S$GLB,, | Performed by: STUDENT IN AN ORGANIZED HEALTH CARE EDUCATION/TRAINING PROGRAM

## 2022-11-16 PROCEDURE — 99999 PR PBB SHADOW E&M-EST. PATIENT-LVL IV: CPT | Mod: PBBFAC,,, | Performed by: STUDENT IN AN ORGANIZED HEALTH CARE EDUCATION/TRAINING PROGRAM

## 2022-11-16 PROCEDURE — 99214 PR OFFICE/OUTPT VISIT, EST, LEVL IV, 30-39 MIN: ICD-10-PCS | Mod: 25,S$GLB,, | Performed by: STUDENT IN AN ORGANIZED HEALTH CARE EDUCATION/TRAINING PROGRAM

## 2022-11-16 RX ORDER — BETAMETHASONE SODIUM PHOSPHATE AND BETAMETHASONE ACETATE 3; 3 MG/ML; MG/ML
6 INJECTION, SUSPENSION INTRA-ARTICULAR; INTRALESIONAL; INTRAMUSCULAR; SOFT TISSUE
Status: DISCONTINUED | OUTPATIENT
Start: 2022-11-16 | End: 2022-11-16 | Stop reason: HOSPADM

## 2022-11-16 RX ORDER — DICLOFENAC SODIUM 50 MG/1
50 TABLET, DELAYED RELEASE ORAL 2 TIMES DAILY
Qty: 60 TABLET | Refills: 2 | Status: SHIPPED | OUTPATIENT
Start: 2022-11-16 | End: 2023-01-20

## 2022-11-16 RX ADMIN — BETAMETHASONE SODIUM PHOSPHATE AND BETAMETHASONE ACETATE 6 MG: 3; 3 INJECTION, SUSPENSION INTRA-ARTICULAR; INTRALESIONAL; INTRAMUSCULAR; SOFT TISSUE at 01:11

## 2022-11-16 NOTE — PROCEDURES
Large Joint Aspiration/Injection: R subacromial bursa    Date/Time: 11/16/2022 1:30 PM  Performed by: Mono Busby MD  Authorized by: Mono Busby MD     Consent Done?:  Yes (Verbal)  Indications:  Pain and diagnostic evaluation  Site marked: the procedure site was marked    Timeout: prior to procedure the correct patient, procedure, and site was verified    Prep: patient was prepped and draped in usual sterile fashion      Local anesthesia used?: Yes    Anesthesia:  Local infiltration  Local anesthetic:  Bupivacaine 0.5% without epinephrine, lidocaine 1% without epinephrine and topical anesthetic  Anesthetic total (ml):  4      Details:  Needle Size:  22 G  Ultrasonic Guidance for needle placement?: No    Approach:  Posterior  Location:  Shoulder  Site:  R subacromial bursa  Medications:  6 mg betamethasone acetate-betamethasone sodium phosphate 6 mg/mL  Patient tolerance:  Patient tolerated the procedure well with no immediate complications     We discussed the proper protocols after the injection such as no submerging pools, baths tubs, or hot tubs for 48 hr.  Showering is okay today.  We also discussed that blood sugars can be elevated after an injection and asked patient to properly checked her sugars over the next few days and contact their PCP if there are any concerns.  We discussed red flags such as fevers, chills, red, warm, tender joint at the area of injection to please seek medical care immediately.

## 2022-11-16 NOTE — PROGRESS NOTES
Patient ID: Ora Henderson  YOB: 1960  MRN: [id number]    Chief Complaint: Pain of the Right Shoulder      Referred By: Self      Occupation:      History of Present Illness: Ora Henderson is a right-hand dominant 62 y.o. female who presents today with 10/10 pain c/o right shoulder pain. She states that the pain began on Oct 29. She has a history of cervical spine pain and had rotator cuff surgery in 2019 on her right shoulder. She describes the pain as a constant aching, sharp pain in her shoulder with burning pain on her upper right arm. She states that the pain increases when moving her neck to the side. She has tried tylenol and gabapentin without relief. She states that TENS unit, heating pad, and lidocaine patches offer temporary relief.     She is history of cervical radiculopathy, with right-sided cervical epidural steroid injection a couple of months ago.  She is history of right-sided carpal tunnel syndrome, follows with hand surgery, and has undergone CSI in the past.  She has history of right rotator cuff tear in 2019, for which she had surgery with good recovery thereafter.      The patient is active in none.        Past Medical History:   Past Medical History:   Diagnosis Date    Arthritis     Encounter for blood transfusion     Fatty liver     GERD (gastroesophageal reflux disease)     Hernia, umbilical     reducible    Hyperlipidemia     Hypertension     Rotator cuff tear     Sleep apnea      Past Surgical History:   Procedure Laterality Date    ARTHROSCOPIC DEBRIDEMENT OF SHOULDER Right 12/30/2019    Procedure: DEBRIDEMENT, SHOULDER, ARTHROSCOPIC;  Surgeon: Laureano Cox MD;  Location: [facility];  Service: Orthopedics;  Laterality: Right;    ARTHROSCOPY OF SHOULDER WITH DECOMPRESSION OF SUBACROMIAL SPACE Right 12/30/2019    Procedure: ARTHROSCOPY, SHOULDER, WITH SUBACROMIAL SPACE DECOMPRESSION;  Surgeon: Laureano Cox MD;   Location: [facility] OR;  Service: Orthopedics;  Laterality: Right;    BICEPS TENDON REPAIR Right 2019    Procedure: REPAIR, TENDON, BICEPS;  Surgeon: Laureano Cox MD;  Location: [facility] OR;  Service: Orthopedics;  Laterality: Right;  arthroscopic Biceps tenodesis    BRAIN SURGERY      Pituitary tumor removal 2001 x 2     CARPAL TUNNEL RELEASE Bilateral     x 2    CERVICAL BIOPSY  W/ LOOP ELECTRODE EXCISION      [facility] '81     SECTION      x 1    COLONOSCOPY N/A 2016    Procedure: COLONOSCOPY;  Surgeon: Quique Paul MD;  Location: [facility] [place] [facility];  Service: Endoscopy;  Laterality: N/A;    COLONOSCOPY N/A 2021    Procedure: COLONOSCOPY;  Surgeon: Mari Tucker MD;  Location: [facility] ENDO;  Service: Endoscopy;  Laterality: N/A;    DISTAL CLAVICLE EXCISION Right 2019    Procedure: EXCISION, CLAVICLE, DISTAL;  Surgeon: Laureano Cox MD;  Location: [facility] OR;  Service: Orthopedics;  Laterality: Right;  arthroscopic    EPIDURAL STEROID INJECTION INTO CERVICAL SPINE N/A 9/15/2022    Procedure: C7/T1 IL ARMANDO, Right Paramedian Approach;  Surgeon: Yung Treadwell MD;  Location: [facility] PAIN [facility]GT;  Service: Pain Management;  Laterality: N/A;    GANGLION CYST EXCISION Left 2018    HYSTERECTOMY      REFRACTIVE SURGERY      ROTATOR CUFF REPAIR Right 2019    Procedure: REPAIR, ROTATOR CUFF;  Surgeon: Laureano Cox MD;  Location: [facility] OR;  Service: Orthopedics;  Laterality: Right;  arthroscopic    SELECTIVE INJECTION OF ANESTHETIC AGENT AROUND LUMBAR SPINAL NERVE ROOT BY TRANSFORAMINAL APPROACH Bilateral 10/07/2021    Procedure: BLOCK, SPINAL NERVE ROOT, LUMBAR, SELECTIVE, TRANSFORAMINAL APPROACH bilateral L4-5 Rn IV sedation;  Surgeon: Damon Charlton MD;  Location: [facility] PAIN [facility]GT;  Service: Pain Management;  Laterality: Bilateral;    SELECTIVE INJECTION OF ANESTHETIC AGENT AROUND LUMBAR SPINAL NERVE ROOT BY TRANSFORAMINAL APPROACH Bilateral 2021    Procedure: BLOCK,  SPINAL NERVE ROOT, LUMBAR, SELECTIVE, TRANSFORAMINAL APPROACH bilateral S1 RN IV sedation;  Surgeon: Damon Charlton MD;  Location: [facility] PAIN [facility]GT;  Service: Pain Management;  Laterality: Bilateral;    SYNOVECTOMY OF SHOULDER Right 12/30/2019    Procedure: SYNOVECTOMY, SHOULDER;  Surgeon: Laureano Cox MD;  Location: [facility] OR;  Service: Orthopedics;  Laterality: Right;  arthroscopic    TOTAL ABDOMINAL HYSTERECTOMY W/ BILATERAL SALPINGOOPHORECTOMY  2006    STAR/BSO secondary to endometriosis    VENTRAL HERNIA REPAIR  2016     Family History   Problem Relation Age of Onset    Hypertension Father     Prostate cancer Father     Hypertension Mother     Cancer Maternal Aunt         pancreas    Cancer Maternal Uncle         pancreas    Heart disease Paternal Uncle     Heart disease Paternal Grandmother     Diabetes Maternal Aunt     Heart attack Sister 30    Heart attack Brother 32     Social History     Socioeconomic History    Marital status:     Number of children: 1   Occupational History    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Employer: [employer]   Tobacco Use    Smoking status: Never    Smokeless tobacco: Never   Substance and Sexual Activity    Alcohol use: Yes     Alcohol/week: 0.0 standard drinks     Comment: socially    Drug use: No    Sexual activity: Yes     Partners: Male     Birth control/protection: None, Surgical     Social Determinants of Health     Financial Resource Strain: Low Risk     Difficulty of Paying Living Expenses: Not hard at all   Food Insecurity: No Food Insecurity    Worried About Running Out of Food in the Last Year: Never true    Ran Out of Food in the Last Year: Never true   Transportation Needs: No Transportation Needs    Lack of Transportation (Medical): No    Lack of Transportation (Non-Medical): No   Physical Activity: Sufficiently Active    Days of Exercise per Week: 4 days    Minutes of Exercise per Session: 60 min   Stress: Stress Concern Present    Feeling of  Stress : Very much   Social Connections: Unknown    Frequency of Communication with Friends and Family: More than three times a week    Frequency of Social Gatherings with Friends and Family: Once a week    Active Member of Clubs or Organizations: No    Attends Club or Organization Meetings: 1 to 4 times per year    Marital Status:    Housing Stability: Low Risk     Unable to Pay for Housing in the Last Year: No    Number of Places Lived in the Last Year: 1    Unstable Housing in the Last Year: No     Medication List with Changes/Refills   New Medications    DICLOFENAC (VOLTAREN) 50 MG EC TABLET    Take 1 tablet (50 mg total) by mouth 2 (two) times daily.   Current Medications    AMLODIPINE (NORVASC) 2.5 MG TABLET    Take 1 tablet (2.5 mg total) by mouth once daily.    ASPIRIN 81 MG CHEW    Take 81 mg by mouth as needed. Hold 5 days prior to surgery    DOCUSATE SODIUM (COLACE) 100 MG CAPSULE    Take 1 capsule (100 mg total) by mouth 2 (two) times daily.    ESOMEPRAZOLE (NEXIUM) 40 MG CAPSULE    Take 1 capsule (40 mg total) by mouth before breakfast.    FLUTICASONE PROPIONATE (FLONASE) 50 MCG/ACTUATION NASAL SPRAY    2 sprays (100 mcg total) by Each Nostril route once daily.    GABAPENTIN (NEURONTIN) 100 MG CAPSULE    Take 1-2 capsules (100-200 mg total) by mouth every evening.    L.ACIDOPHILUS-BIF. ANIMALIS (PROBIOTIC) 5 BILLION CELL CPSP    Take 1 tablet by mouth once daily.    LIDOCAINE (LIDODERM) 5 %    Place onto the skin.    LOSARTAN-HYDROCHLOROTHIAZIDE 100-25 MG (HYZAAR) 100-25 MG PER TABLET    Take 1 tablet by mouth once daily.    MELOXICAM (MOBIC) 15 MG TABLET    Take 1 tablet (15 mg total) by mouth daily as needed for Pain.    METHOCARBAMOL (ROBAXIN) 750 MG TAB    Take 1 tablet (750 mg total) by mouth 2 (two) times daily as needed (Muscle Spasm).    MULTIVIT,CALC,MINS/IRON/FOLIC (DAILY MULTIPLE FOR WOMEN ORAL)    Take 1 tablet by mouth once daily. Hold 5 days prior to surgery    OMEGA-3 FATTY  ACIDS/FISH OIL (FISH OIL-OMEGA-3 FATTY ACIDS) 300-1,000 MG CAPSULE    Take by mouth once daily. Hold 5 days prior to surgery    ROSUVASTATIN (CRESTOR) 40 MG TAB    Take 1 tablet (40 mg total) by mouth every evening.     Review of patient's allergies indicates:   Allergen Reactions    Sulfa (sulfonamide antibiotics) Hives       Physical Exam:   There is no height or weight on file to calculate BMI.    Physical Exam  Vitals reviewed.   Constitutional:       General: She is not in acute distress.     Appearance: Normal appearance.   HENT:      Head: Normocephalic and atraumatic.   Eyes:      Extraocular Movements: Extraocular movements intact.      Conjunctiva/sclera: Conjunctivae normal.   Pulmonary:      Effort: Pulmonary effort is normal. No respiratory distress.   Musculoskeletal:      Right shoulder: No swelling or deformity.   Skin:     General: Skin is warm and dry.   Neurological:      General: No focal deficit present.      Mental Status: She is alert and oriented to person, place, and time.   Psychiatric:         Mood and Affect: Mood normal.         Detailed MSK exam:   General    Vitals reviewed.  Constitutional: She is oriented to person, place, and time. No distress.   HENT:   Head: Normocephalic and atraumatic.   Eyes: Conjunctivae are normal.   Pulmonary/Chest: Effort normal. No respiratory distress.   Abdominal: Normal appearance.   Neurological: She is alert and oriented to person, place, and time.   Psychiatric: Mood normal.         Right Shoulder Exam     Inspection/Observation   Swelling: absent  Bruising: absent  Deformity: absent    Tenderness   The patient is tender to palpation of the acromion, biceps tendon and supraspinatus.    Crepitus   The patient has crepitus of the greater tuberosity and supraspinatus.    Range of Motion   Active abduction:  abnormal   Passive abduction:  normal   Forward Flexion:  normal   External Rotation 0 degrees:  normal   External Rotation 90 degrees:  normal  Internal rotation 0 degrees:  normal   Internal rotation 90 degrees:  normal     Tests & Signs   Foreman test: positive  Impingement: positive  Rotator Cuff Painful Arc/Range: moderate  Lift Off Sign: negative  Belly Press: negative  Active Compression Test ([place]'s Sign): negative    Other   Sensation: normal    Muscle Strength   Right Upper Extremity   Shoulder Abduction: 5/5   Shoulder Internal Rotation: 5/5   Shoulder External Rotation: 5/5   Supraspinatus: 5/5   Subscapularis: 5/5   Biceps: 5/5        Imaging:  X-ray Shoulder 2 or More Views Right  Narrative: EXAMINATION:  XR SHOULDER COMPLETE 2 OR MORE VIEWS RIGHT    INDICATION:  Pain in right shoulder    COMPARISON:  Right shoulder radiographs from 11/05/2019    FINDINGS:  Internal rotation, external rotation, scapular Y and axillary views of the right shoulder are obtained.  No fracture.  Alignment is within normal limits.  There are mild degenerative changes at the acromioclavicular joint with small inferiorly directed marginal osteophytes.  There are mild degenerative changes at the glenohumeral articulation.  Partially visualized lung fields are clear.  Impression: 1. Mild degenerative changes at the acromioclavicular joint and glenohumeral articulation.    Electronically signed by: Spencer Olivo MD  Date:    11/14/2022  Time:    11:14      Relevant imaging results were reviewed and interpreted by me and per my read: Moderate degenerative changes of the glenohumeral joint.  Status post distal clavicle resection with associated degenerative changes.  There is cystic changes of the proximal humeral head, as well as about the insertion of the rotator cuff on the greater tuberosity.  Humeral head is mildly high riding in relation to the glenoid, suggestive of chronic rotator cuff pathology.  No calcific tendinopathy.  No fractures or other acute abnormalities.      This was discussed with the patient and / or family today.       Patient  Instructions   Assessment:  Ora Henderson is a 62 y.o. female with a chief complaint of Pain of the Right Shoulder    Encounter Diagnoses   Name Primary?    Subacromial impingement of right shoulder Yes    Tendinopathy of right rotator cuff     Chronic right shoulder pain     History of rotator cuff surgery     Cervical radiculopathy       Plan:  XR reviewed today, moderate degenerative changes of the glenohumeral joint, as well as findings suggestive of chronic rotator cuff injury  Labs reviewed - no diabetes, no renal dysfunction, no hepatic dysfunction  She has a history of rotator cuff repair in 2019 for a complete supraspinatus tear, with arthroscopic debridement, distal clavicle resection and biceps tenodesis  The patient's history, clinical exam, and imaging findings are consistent with subacromial impingement and rotator cuff tendinopathy.  We have reviewed the natural history of this disorder and discussed treatment options.  No indication for MRI at this time, low suspicion for large rotator cuff tear.  Pain could also be contributed to by her cervical radiculopathy and carpal tunnel syndrome  Subacromial CSI performed today 2/2/1 [place]  We discussed the proper protocols after the injection such as no submerging pools, baths tubs, or hot tubs for 48 hr.  Showering is okay today.  We also discussed that blood sugars can be elevated after an injection and asked patient to properly check their sugars over the next few days and contact their PCP if there are any concerns.  We discussed red flags such as fevers, chills, red, warm, tender joint at the area of injection to please seek medical care immediately.     Rx  for diclofenac 50mg twice daily for inflammation  PT 2-3x/wk at [facility] for 6-8 weeks    Follow-up: 8 weeks or sooner if there are any problems between now and then.    Thank you for choosing [facility] and Dr. Mono Busby for your orthopedic  & sports medicine care. It is our goal to provide you with exceptional care that will help keep you healthy, active, and get you back in the game.    Please do not hesitate to reach out to us via email, phone, or MyChart with any questions, concerns, or feedback.    If you are experiencing pain/discomfort ,or have questions after 5pm and would like to be connected to the [facility] [place] on-call team, please call this number and specify which Sports Medicine provider is treating you: [phone]          A copy of today's visit note has been sent to the referring provider.       Mono Busby MD  Primary Care Sports Medicine        Disclaimer: This note was prepared using a voice recognition system and is likely to have sound alike errors within the text.

## 2022-11-16 NOTE — PATIENT INSTRUCTIONS
Assessment:  Ora Henderson is a 62 y.o. female with a chief complaint of Pain of the Right Shoulder    Encounter Diagnoses   Name Primary?    Subacromial impingement of right shoulder Yes    Tendinopathy of right rotator cuff     Chronic right shoulder pain     History of rotator cuff surgery     Cervical radiculopathy       Plan:  XR reviewed today, moderate degenerative changes of the glenohumeral joint, as well as findings suggestive of chronic rotator cuff injury  Labs reviewed - no diabetes, no renal dysfunction, no hepatic dysfunction  She has a history of rotator cuff repair in 2019 for a complete supraspinatus tear, with arthroscopic debridement, distal clavicle resection and biceps tenodesis  The patient's history, clinical exam, and imaging findings are consistent with subacromial impingement and rotator cuff tendinopathy.  We have reviewed the natural history of this disorder and discussed treatment options.  No indication for MRI at this time, low suspicion for large rotator cuff tear.  Pain could also be contributed to by her cervical radiculopathy and carpal tunnel syndrome  Subacromial CSI performed today 2/2/1 [place]  We discussed the proper protocols after the injection such as no submerging pools, baths tubs, or hot tubs for 48 hr.  Showering is okay today.  We also discussed that blood sugars can be elevated after an injection and asked patient to properly check their sugars over the next few days and contact their PCP if there are any concerns.  We discussed red flags such as fevers, chills, red, warm, tender joint at the area of injection to please seek medical care immediately.     Rx  for diclofenac 50mg twice daily for inflammation  PT 2-3x/wk at [facility] for 6-8 weeks    Follow-up: 8 weeks or sooner if there are any problems between now and then.    Thank you for choosing [facility] and Dr. Mono Busby for your orthopedic & sports  medicine care. It is our goal to provide you with exceptional care that will help keep you healthy, active, and get you back in the game.    Please do not hesitate to reach out to us via email, phone, or MyChart with any questions, concerns, or feedback.    If you are experiencing pain/discomfort ,or have questions after 5pm and would like to be connected to the [facility] [place] on-call team, please call this number and specify which Sports Medicine provider is treating you: [phone]

## 2022-11-18 ENCOUNTER — CLINICAL SUPPORT (OUTPATIENT)
Dept: REHABILITATION | Facility: HOSPITAL | Age: 62
End: 2022-11-18
Payer: COMMERCIAL

## 2022-11-18 DIAGNOSIS — M75.41 SUBACROMIAL IMPINGEMENT OF RIGHT SHOULDER: ICD-10-CM

## 2022-11-18 DIAGNOSIS — M67.911 TENDINOPATHY OF RIGHT ROTATOR CUFF: ICD-10-CM

## 2022-11-18 DIAGNOSIS — M53.82 DECREASED ROM OF INTERVERTEBRAL DISCS OF CERVICAL SPINE: ICD-10-CM

## 2022-11-18 DIAGNOSIS — R29.898 UPPER EXTREMITY WEAKNESS: ICD-10-CM

## 2022-11-18 PROCEDURE — 97110 THERAPEUTIC EXERCISES: CPT | Mod: PN

## 2022-11-18 PROCEDURE — 97161 PT EVAL LOW COMPLEX 20 MIN: CPT | Mod: PN

## 2022-11-18 NOTE — PLAN OF CARE
[facility]  Physical Therapy Initial Evaluation    Date: 11/18/2022   Name: Ora [facility]OFF[facility] [employer]  Clinic Number: [id number]    Therapy Diagnosis:   Encounter Diagnoses   Name Primary?    Subacromial impingement of right shoulder     Tendinopathy of right rotator cuff     Decreased ROM of intervertebral discs of cervical spine     Upper extremity weakness      Physician: Mono Busby MD    Physician Orders: PT Eval and Treat   Medical Diagnosis from Referral:   M75.41 (ICD-10-CM) - Subacromial impingement of right shoulder   M67.911 (ICD-10-CM) - Tendinopathy of right rotator cuff     Evaluation Date: 11/18/2022  Authorization Period Expiration: 12/31/2022  Plan of Care Expiration: 2/18/2023  Visit # / Visits authorized: 1/ 1    PN DUE: 12/18/2022    Time In: 11:13 AM  Time Out: 12:00 PM  Total Appointment Time (timed & untimed codes): 47 minutes    Precautions: Standard    Subjective   Date of onset: Patient reports a history of right RTC surgery 3 years ago. Her pain started gardually in Oct. and really started bother her at the end of Oct.   History of current condition - Ora reports: she know she has arthritis, and feel popping in her shoulder. Lately her shoulder and neck (upper trap region) started really aching and not going away. This Wednesday MD gave her a steroid shot to help with the pain. She was very active before RTC and would like to get back to more physical activity. Patient reports her pain is off and on and had a gradual onset of symptoms. She reports carpal tunnel problems as well. When she turns her head to the left it hurts on her right side (upper trap region). She was having to sleep with a heating pad until the shot Wednesday.      Medical History:   Past Medical History:   Diagnosis Date    Arthritis     Encounter for blood transfusion     Fatty liver     GERD (gastroesophageal reflux disease)     Hernia, umbilical     reducible     Hyperlipidemia     Hypertension     Rotator cuff tear     Sleep apnea        Surgical History:   Ora Henderson  has a past surgical history that includes Carpal tunnel release (Bilateral); Brain surgery (); Cervical biopsy w/ loop electrode excision; Colonoscopy (N/A, 2016); Ventral hernia repair ();  section; Total abdominal hysterectomy w/ bilateral salpingoophorectomy (); Refractive surgery; Ganglion cyst excision (Left, 2018); Arthroscopy of shoulder with decompression of subacromial space (Right, 2019); Rotator cuff repair (Right, 2019); Distal clavicle excision (Right, 2019); Biceps tendon repair (Right, 2019); Synovectomy of shoulder (Right, 2019); Arthroscopic debridement of shoulder (Right, 2019); Hysterectomy; Colonoscopy (N/A, 2021); Selective injection of anesthetic agent around lumbar spinal nerve root by transforaminal approach (Bilateral, 10/07/2021); Selective injection of anesthetic agent around lumbar spinal nerve root by transforaminal approach (Bilateral, 2021); and Epidural steroid injection into cervical spine (N/A, 9/15/2022).    Medications:   Ora has a current medication list which includes the following prescription(s): amlodipine, aspirin, diclofenac, docusate sodium, esomeprazole, fluticasone propionate, gabapentin, l. acidophilus/bifid. animalis, lidocaine, losartan-hydrochlorothiazide 100-25 mg, meloxicam, methocarbamol, multivit,calc,mins/iron/folic, fish oil-omega-3 fatty acids, and rosuvastatin, and the following Facility-Administered Medications: lidocaine (pf) 10 mg/ml (1%).    Allergies:   Review of patient's allergies indicates:   Allergen Reactions    Sulfa (sulfonamide antibiotics) Hives        Imaging, x-ray: EXAMINATION:  XR SHOULDER COMPLETE 2 OR MORE VIEWS RIGHT     INDICATION:  Pain in right shoulder     COMPARISON:  Right shoulder radiographs from 2019    "  FINDINGS:  Internal rotation, external rotation, scapular Y and axillary views of the right shoulder are obtained.  No fracture.  Alignment is within normal limits.  There are mild degenerative changes at the acromioclavicular joint with small inferiorly directed marginal osteophytes.  There are mild degenerative changes at the glenohumeral articulation.  Partially visualized lung fields are clear.     Impression:     1. Mild degenerative changes at the acromioclavicular joint and glenohumeral articulation.        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   Prior Level of Function: independent with all functional mobility and ADLs  Current Level of Function: modified independent with all functional mobility and ADLs secondary to being limited by shoulder pain    Pain:  Current 5/10, worst 10/10, best 3/10   Location: right shoulder and neck    Description: "all of them"  Aggravating Factors: "everything"  Easing Factors: heating pad and TENS unit    Patients goals: get rid of pain and stop taking pain medicine     Objective     Observation: pt pleasant and appropriate throughout evaluation; A&O x 3       Posture: slightly slouched, rounded shoulder and forward head posture      Active Range of Motion: burning with right shoulder active range of motion    Shoulder Right   Flexion Full    Abduction Full    ER at 0 Full    ER at 90 Full    IR Full      Upper Extremity Strength   (L) UE (R) UE   Shoulder flexion: 4+/5 3+/5   Shoulder Abduction: 4+/5 3+/5   Shoulder ER 4-/5 3+/5   Shoulder IR 4-/5 3+/5   Lower Trap 4+/5 3+/5   Middle Trap 4+/5 3+/5   Rhomboids 4+/5 3+/5     Cervical Range of Motion:    Degrees Pain   Flexion (50) 55      Extension (75) 20    " "  Right Rotation (75) 34      Left Rotation (75) 64 Painful      Right Side Bending (45) 20    Left Side Bending (45) 27       Special Tests:  Cervical compression: negative   Cervical Spurling: burning on the right with bilateral test     AC Joint Right   AC Joint Compression Test Negative    Empty Can Test Positive    Drop Arm test Negative    Subscaputlaris Lift Off Negative    Scapular retraction test Positive        Palpation: tender to palpation of right upper trap and levator     Sensation: tingling and burning from right shoulder to elbow       Scapular Control/Dyskinesis:    Normal / Subtle / Obvious    Left  Subtle    Right  Obvious              TREATMENT   Treatment Time In: 11:50  Treatment Time Out: 12:00  Total Treatment time (time-based codes) separate from Evaluation: 10 minutes    Ora received therapeutic exercises to develop strength, ROM, flexibility, and posture for 10 minutes including:  Upper trap and levator stretch 2 x 15"  Posterior shoulder rolls 5" x 10  Scap retractions x 10   Cerv retractions 3" x 10       Home Exercises and Patient Education Provided    Education provided:   - - PT POC  - HEP  - PT prognosis and diagnosis  - all questions and concerns answered       Written Home Exercises Provided: yes.  Exercises were reviewed and Ora was able to demonstrate them prior to the end of the session.  Ora demonstrated good  understanding of the education provided.     See EMR under Patient Instructions for exercises provided 11/18/2022.    Assessment   Ora is a 62 y.o. female referred to outpatient Physical Therapy with a medical diagnosis of   M75.41 (ICD-10-CM) - Subacromial impingement of right shoulder   M67.911 (ICD-10-CM) - Tendinopathy of right rotator cuff   The patient presents with impairments which include decreased ROM, decreased strength, decreased muscle length, postural abnormalities, and decreased overall function.  These impairments are limiting patient's " ability to perform functional and desired task without pain or modifications. Pt prognosis is Good due to personal factors and co-morbidities listed below. Pt will benefit from skilled outpatient Physical Therapy to address the deficits stated above and in the chart below, provide pt/family education, and to maximize pt's level of independence.     Patient prognosis is Good.   Patientt will benefit from skilled outpatient Physical Therapy to address the deficits stated above and in the chart below, provide patient /family education, and to maximize patientt's level of independence.     Plan of care discussed with patient: Yes  Patient's spiritual, cultural and educational needs considered and patient is agreeable to the plan of care and goals as stated below:     Anticipated Barriers for therapy: pain and decreased exercise tolerance     Medical Necessity is demonstrated by the following  History  Co-morbidities and personal factors that may impact the plan of care Co-morbidities:   See above     Personal Factors:   age     moderate   Examination  Body Structures and Functions, activity limitations and participation restrictions that may impact the plan of care Body Regions:   neck  upper extremities    Body Systems:    ROM  strength    Participation Restrictions:   Pain and decreased exercise tolerance     Activity limitations:   Learning and applying knowledge  no deficits    General Tasks and Commands  no deficits    Communication  no deficits    Mobility  lifting and carrying objects  fine hand use (grasping/picking up)    Self care  no deficits    Domestic Life  shopping  cooking  doing house work (cleaning house, washing dishes, laundry)    Interactions/Relationships  no deficits    Life Areas  employment    Community and Social Life  community life  recreation and leisure         moderate   Clinical Presentation stable and uncomplicated low   Decision Making/ Complexity Score: low     Short Term Goals (4  Weeks):   1. Pt will be compliant with HEP to assist PT treatment in restoring pain free motion of the R shoulder.   2. Pt will improve impaired shoulder MMTs 1/2 grade B to improve strength for functional tasks.  3.. Pt will report pain at worst in R shoulder of 7/10 or less for increased functional exercise tolerance.      Long Term Goals (8 Weeks):   1. Pt will improve FOTO score to  30% to demonstrate return to prior level of function.   2. Pt will improve impaired shoulder MMTs 1 grade B to improve strength for household duties.  3. Pt will perform prior level of household duties, work related task, desired/leisure activities without pain and maximal function to improve functional QOL      Plan   Plan of care Certification: 11/18/2022 to 2/18/2023.    Outpatient Physical Therapy 2 times weekly for 10 weeks to include the following interventions: Cervical/Lumbar Traction, Electrical Stimulation IFC, ect., Manual Therapy, Moist Heat/ Ice, Neuromuscular Re-ed, Orthotic Management and Training, Patient Education, Self Care, Therapeutic Activities, Therapeutic Exercise, and Ultrasound. And any other therapies and modalities as clinically indicated and appropriate, including but not limited to FDN and telehealth. Pt may be seen by PTA as a part of pt's care team.       Tiffany Seay, PT, DPT  11/18/2022

## 2022-11-28 ENCOUNTER — OFFICE VISIT (OUTPATIENT)
Dept: SPORTS MEDICINE | Facility: CLINIC | Age: 62
End: 2022-11-28
Payer: COMMERCIAL

## 2022-11-28 DIAGNOSIS — M54.12 CERVICAL RADICULOPATHY: ICD-10-CM

## 2022-11-28 DIAGNOSIS — M67.911 TENDINOPATHY OF ROTATOR CUFF, RIGHT: ICD-10-CM

## 2022-11-28 DIAGNOSIS — M25.511 CHRONIC RIGHT SHOULDER PAIN: ICD-10-CM

## 2022-11-28 DIAGNOSIS — G89.29 CHRONIC RIGHT SHOULDER PAIN: ICD-10-CM

## 2022-11-28 DIAGNOSIS — M75.41 SUBACROMIAL IMPINGEMENT OF RIGHT SHOULDER: ICD-10-CM

## 2022-11-28 DIAGNOSIS — M25.511 TRIGGER POINT OF SHOULDER REGION, RIGHT: Primary | ICD-10-CM

## 2022-11-28 DIAGNOSIS — M19.011 OSTEOARTHRITIS OF RIGHT GLENOHUMERAL JOINT: ICD-10-CM

## 2022-11-28 DIAGNOSIS — Z98.890 HISTORY OF ROTATOR CUFF SURGERY: ICD-10-CM

## 2022-11-28 PROCEDURE — 99213 OFFICE O/P EST LOW 20 MIN: CPT | Mod: 25,S$GLB,, | Performed by: STUDENT IN AN ORGANIZED HEALTH CARE EDUCATION/TRAINING PROGRAM

## 2022-11-28 PROCEDURE — 1159F MED LIST DOCD IN RCRD: CPT | Mod: CPTII,S$GLB,, | Performed by: STUDENT IN AN ORGANIZED HEALTH CARE EDUCATION/TRAINING PROGRAM

## 2022-11-28 PROCEDURE — 99999 PR PBB SHADOW E&M-EST. PATIENT-LVL III: CPT | Mod: PBBFAC,,, | Performed by: STUDENT IN AN ORGANIZED HEALTH CARE EDUCATION/TRAINING PROGRAM

## 2022-11-28 PROCEDURE — 1159F PR MEDICATION LIST DOCUMENTED IN MEDICAL RECORD: ICD-10-PCS | Mod: CPTII,S$GLB,, | Performed by: STUDENT IN AN ORGANIZED HEALTH CARE EDUCATION/TRAINING PROGRAM

## 2022-11-28 PROCEDURE — 1160F RVW MEDS BY RX/DR IN RCRD: CPT | Mod: CPTII,S$GLB,, | Performed by: STUDENT IN AN ORGANIZED HEALTH CARE EDUCATION/TRAINING PROGRAM

## 2022-11-28 PROCEDURE — 20553 NJX 1/MLT TRIGGER POINTS 3/>: CPT | Mod: RT,S$GLB,, | Performed by: STUDENT IN AN ORGANIZED HEALTH CARE EDUCATION/TRAINING PROGRAM

## 2022-11-28 PROCEDURE — 1160F PR REVIEW ALL MEDS BY PRESCRIBER/CLIN PHARMACIST DOCUMENTED: ICD-10-PCS | Mod: CPTII,S$GLB,, | Performed by: STUDENT IN AN ORGANIZED HEALTH CARE EDUCATION/TRAINING PROGRAM

## 2022-11-28 PROCEDURE — 99213 PR OFFICE/OUTPT VISIT, EST, LEVL III, 20-29 MIN: ICD-10-PCS | Mod: 25,S$GLB,, | Performed by: STUDENT IN AN ORGANIZED HEALTH CARE EDUCATION/TRAINING PROGRAM

## 2022-11-28 PROCEDURE — 20553 PR INJECT TRIGGER POINTS, > 3: ICD-10-PCS | Mod: RT,S$GLB,, | Performed by: STUDENT IN AN ORGANIZED HEALTH CARE EDUCATION/TRAINING PROGRAM

## 2022-11-28 PROCEDURE — 99999 PR PBB SHADOW E&M-EST. PATIENT-LVL III: ICD-10-PCS | Mod: PBBFAC,,, | Performed by: STUDENT IN AN ORGANIZED HEALTH CARE EDUCATION/TRAINING PROGRAM

## 2022-11-28 NOTE — PROGRESS NOTES
Patient ID: Ora Henderson  YOB: 1960  MRN: [id number]    Chief Complaint: Follow-up (R shoulder CSI 11/16/22)    Referred By: Self    History of Present Illness: Ora Henderson is a right-hand dominant 62 y.o. female who presents today with 10/10 pain c/o right shoulder pain.     She states that the pain began on Oct 29. She has a history of cervical spine pain and had rotator cuff surgery in 2019 on her right shoulder with full recovery. Self treated with OTC tylenol, gabapentin without relief. TENS unit, heating pad, and lidocaine patches offer temporary relief.     Initially seen in the office on 11/16/22.  Diagnosed with right shoulder arthritis, subacromial impingement and rotator cuff tendinopathy.  Treated with subacromial injection, diclofenac, and referred to PT at [facility].    She had good relief from her injection for about a week - until around 11/24/22.  She has only attended PT once since her last visit.  She has performed her home exercise program consistently.  The pain today is different than last time, it has moved into her trapezius which is very tight and sore.  She has had this problem before and was treated by Dr. Up with trigger point injections with good relief.    She is history of cervical radiculopathy, with right-sided cervical epidural steroid injection a couple of months ago.  She is history of right-sided carpal tunnel syndrome, follows with hand surgery, and has undergone CSI in the past.        Past Medical History:   Past Medical History:   Diagnosis Date    Arthritis     Encounter for blood transfusion     Fatty liver     GERD (gastroesophageal reflux disease)     Hernia, umbilical     reducible    Hyperlipidemia     Hypertension     Rotator cuff tear     Sleep apnea      Past Surgical History:   Procedure Laterality Date    ARTHROSCOPIC DEBRIDEMENT OF SHOULDER Right 12/30/2019    Procedure: DEBRIDEMENT,  SHOULDER, ARTHROSCOPIC;  Surgeon: Laureano Cox MD;  Location: [facility] OR;  Service: Orthopedics;  Laterality: Right;    ARTHROSCOPY OF SHOULDER WITH DECOMPRESSION OF SUBACROMIAL SPACE Right 2019    Procedure: ARTHROSCOPY, SHOULDER, WITH SUBACROMIAL SPACE DECOMPRESSION;  Surgeon: Laureano Cox MD;  Location: [facility] OR;  Service: Orthopedics;  Laterality: Right;    BICEPS TENDON REPAIR Right 2019    Procedure: REPAIR, TENDON, BICEPS;  Surgeon: Laureano Cox MD;  Location: [facility] OR;  Service: Orthopedics;  Laterality: Right;  arthroscopic Biceps tenodesis    BRAIN SURGERY  2001    Pituitary tumor removal 2001 x 2     CARPAL TUNNEL RELEASE Bilateral     x 2    CERVICAL BIOPSY  W/ LOOP ELECTRODE EXCISION      [facility] '81     SECTION      x 1    COLONOSCOPY N/A 2016    Procedure: COLONOSCOPY;  Surgeon: Quique Paul MD;  Location: [facility] ENDO;  Service: Endoscopy;  Laterality: N/A;    COLONOSCOPY N/A 2021    Procedure: COLONOSCOPY;  Surgeon: Mari Tucker MD;  Location: [facility] ENDO;  Service: Endoscopy;  Laterality: N/A;    DISTAL CLAVICLE EXCISION Right 2019    Procedure: EXCISION, CLAVICLE, DISTAL;  Surgeon: Laureano Cox MD;  Location: [facility] OR;  Service: Orthopedics;  Laterality: Right;  arthroscopic    EPIDURAL STEROID INJECTION INTO CERVICAL SPINE N/A 9/15/2022    Procedure: C7/T1 IL ARMANDO, Right Paramedian Approach;  Surgeon: Yung Treadwell MD;  Location: [facility] PAIN MGT;  Service: Pain Management;  Laterality: N/A;    GANGLION CYST EXCISION Left 2018    HYSTERECTOMY      REFRACTIVE SURGERY      ROTATOR CUFF REPAIR Right 2019    Procedure: REPAIR, ROTATOR CUFF;  Surgeon: Laureano Cox MD;  Location: [facility] OR;  Service: Orthopedics;  Laterality: Right;  arthroscopic    SELECTIVE INJECTION OF ANESTHETIC AGENT AROUND LUMBAR SPINAL NERVE ROOT BY TRANSFORAMINAL APPROACH Bilateral 10/07/2021    Procedure: BLOCK, SPINAL NERVE ROOT,  LUMBAR, SELECTIVE, TRANSFORAMINAL APPROACH bilateral L4-5 Rn IV sedation;  Surgeon: Damon Charlton MD;  Location: [facility] PAIN MGT;  Service: Pain Management;  Laterality: Bilateral;    SELECTIVE INJECTION OF ANESTHETIC AGENT AROUND LUMBAR SPINAL NERVE ROOT BY TRANSFORAMINAL APPROACH Bilateral 12/29/2021    Procedure: BLOCK, SPINAL NERVE ROOT, LUMBAR, SELECTIVE, TRANSFORAMINAL APPROACH bilateral S1 RN IV sedation;  Surgeon: Damon Charlton MD;  Location: [facility] PAIN MGT;  Service: Pain Management;  Laterality: Bilateral;    SYNOVECTOMY OF SHOULDER Right 12/30/2019    Procedure: SYNOVECTOMY, SHOULDER;  Surgeon: Laureano Cox MD;  Location: [facility] OR;  Service: Orthopedics;  Laterality: Right;  arthroscopic    TOTAL ABDOMINAL HYSTERECTOMY W/ BILATERAL SALPINGOOPHORECTOMY  2006    STAR/BSO secondary to endometriosis    VENTRAL HERNIA REPAIR  2016     Family History   Problem Relation Age of Onset    Hypertension Father     Prostate cancer Father     Hypertension Mother     Cancer Maternal Aunt         pancreas    Cancer Maternal Uncle         pancreas    Heart disease Paternal Uncle     Heart disease Paternal Grandmother     Diabetes Maternal Aunt     Heart attack Sister 30    Heart attack Brother 32     Social History     Socioeconomic History    Marital status:     Number of children: 1   Occupational History    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Employer: [employer]   Tobacco Use    Smoking status: Never    Smokeless tobacco: Never   Substance and Sexual Activity    Alcohol use: Yes     Alcohol/week: 0.0 standard drinks     Comment: socially    Drug use: No    Sexual activity: Yes     Partners: Male     Birth control/protection: None, Surgical     Social Determinants of Health     Financial Resource Strain: Low Risk     Difficulty of Paying Living Expenses: Not hard at all   Food Insecurity: No Food Insecurity    Worried About Running Out of Food in the Last Year: Never true    Ran Out of Food in the Last  Year: Never true   Transportation Needs: No Transportation Needs    Lack of Transportation (Medical): No    Lack of Transportation (Non-Medical): No   Physical Activity: Sufficiently Active    Days of Exercise per Week: 4 days    Minutes of Exercise per Session: 60 min   Stress: Stress Concern Present    Feeling of Stress : Very much   Social Connections: Unknown    Frequency of Communication with Friends and Family: More than three times a week    Frequency of Social Gatherings with Friends and Family: Once a week    Active Member of Clubs or Organizations: No    Attends Club or Organization Meetings: 1 to 4 times per year    Marital Status:    Housing Stability: Low Risk     Unable to Pay for Housing in the Last Year: No    Number of Places Lived in the Last Year: 1    Unstable Housing in the Last Year: No     Medication List with Changes/Refills   Current Medications    AMLODIPINE (NORVASC) 2.5 MG TABLET    Take 1 tablet (2.5 mg total) by mouth once daily.    ASPIRIN 81 MG CHEW    Take 81 mg by mouth as needed. Hold 5 days prior to surgery    DICLOFENAC (VOLTAREN) 50 MG EC TABLET    Take 1 tablet (50 mg total) by mouth 2 (two) times daily.    DOCUSATE SODIUM (COLACE) 100 MG CAPSULE    Take 1 capsule (100 mg total) by mouth 2 (two) times daily.    ESOMEPRAZOLE (NEXIUM) 40 MG CAPSULE    Take 1 capsule (40 mg total) by mouth before breakfast.    FLUTICASONE PROPIONATE (FLONASE) 50 MCG/ACTUATION NASAL SPRAY    2 sprays (100 mcg total) by Each Nostril route once daily.    GABAPENTIN (NEURONTIN) 100 MG CAPSULE    Take 1-2 capsules (100-200 mg total) by mouth every evening.    L.ACIDOPHILUS-BIF. ANIMALIS (PROBIOTIC) 5 BILLION CELL CPSP    Take 1 tablet by mouth once daily.    LIDOCAINE (LIDODERM) 5 %    Place onto the skin.    LOSARTAN-HYDROCHLOROTHIAZIDE 100-25 MG (HYZAAR) 100-25 MG PER TABLET    Take 1 tablet by mouth once daily.    MELOXICAM (MOBIC) 15 MG TABLET    Take 1 tablet (15 mg total) by mouth daily as  needed for Pain.    METHOCARBAMOL (ROBAXIN) 750 MG TAB    Take 1 tablet (750 mg total) by mouth 2 (two) times daily as needed (Muscle Spasm).    MULTIVIT,CALC,MINS/IRON/FOLIC (DAILY MULTIPLE FOR WOMEN ORAL)    Take 1 tablet by mouth once daily. Hold 5 days prior to surgery    OMEGA-3 FATTY ACIDS/FISH OIL (FISH OIL-OMEGA-3 FATTY ACIDS) 300-1,000 MG CAPSULE    Take by mouth once daily. Hold 5 days prior to surgery    ROSUVASTATIN (CRESTOR) 40 MG TAB    Take 1 tablet (40 mg total) by mouth every evening.     Review of patient's allergies indicates:   Allergen Reactions    Sulfa (sulfonamide antibiotics) Hives       Physical Exam:   There is no height or weight on file to calculate BMI.    Physical Exam  Vitals reviewed.   Constitutional:       General: She is not in acute distress.     Appearance: Normal appearance.   HENT:      Head: Normocephalic and atraumatic.   Eyes:      Extraocular Movements: Extraocular movements intact.      Conjunctiva/sclera: Conjunctivae normal.   Pulmonary:      Effort: Pulmonary effort is normal. No respiratory distress.   Musculoskeletal:      Right shoulder: No swelling or deformity.   Skin:     General: Skin is warm and dry.   Neurological:      General: No focal deficit present.      Mental Status: She is alert and oriented to person, place, and time.   Psychiatric:         Mood and Affect: Mood normal.       Detailed MSK exam:     Right Shoulder:  Inspection: No effusion, erythema, or ecchymosis   Palpation: Tenderness to palpation trapezius  Range of motion: ROM WNL bilaterally  Strength:  5/5 Abduction    5/5 External Rotation in Adduction    5/5 Internal Rotation   Special Tests: Minimal Foreman-Issac    Negative Neer's    Negative Speed's   N/V Exam:  Radial: Normal motor (EPL/thumbs up)              Normal sensory (dorsal hand)   Median: Normal motor (FPL/A-OK)      Normal sensory (thumb)   Ulnar:  Normal motor (Interossei/scissors-spread)     Normal sensory (5th  finger)   LABC: Normal sensory (lateral forearm)   MABC: Normal sensory (medial forearm)   MC: Normal motor (elbow flexion)   Axillary: Normal motor/sensory (deltoid)  Normal radial and ulnar pulses, warm and well perfused with capillary refill < 2 sec        Imaging:  No new imaging today      Patient Instructions   Assessment:  Ora Henderson is a 62 y.o. female with a chief complaint of Follow-up (R shoulder CSI 11/16/22)      Encounter Diagnoses   Name Primary?    Trigger point of shoulder region, right Yes    Subacromial impingement of right shoulder     Tendinopathy of rotator cuff, right     Osteoarthritis of right glenohumeral joint     Chronic right shoulder pain     History of rotator cuff surgery     Cervical radiculopathy         Plan:  Patient with painful trapezius trigger point today.  We have reviewed the natural history of this disorder and discussed treatment options.   Pain from rotator cuff is improved after subacromial CSI at last visit  Trigger point injections performed today x3, right trapezius  Anticipate that this trigger point should improve with PT    Follow-up: as previously scheduled or sooner if there are any problems between now and then.    Thank you for choosing [facility] and Dr. Mono Busby for your orthopedic & sports medicine care. It is our goal to provide you with exceptional care that will help keep you healthy, active, and get you back in the game.    Please do not hesitate to reach out to us via email, phone, or MyChart with any questions, concerns, or feedback.    If you are experiencing pain/discomfort ,or have questions after 5pm and would like to be connected to the [facility] on-call team, please call this number and specify which Sports Medicine provider is treating you: [phone]          A copy of today's visit note has been sent to the referring provider.       Mono Busby,  MD  Primary Care Sports Medicine        Disclaimer: This note was prepared using a voice recognition system and is likely to have sound alike errors within the text.

## 2022-11-28 NOTE — PATIENT INSTRUCTIONS
Assessment:  Ora Henderson is a 62 y.o. female with a chief complaint of Follow-up (R shoulder CSI 11/16/22)      Encounter Diagnoses   Name Primary?    Trigger point of shoulder region, right Yes    Subacromial impingement of right shoulder     Tendinopathy of rotator cuff, right     Osteoarthritis of right glenohumeral joint     Chronic right shoulder pain     History of rotator cuff surgery     Cervical radiculopathy         Plan:  Patient with painful trapezius trigger point today.  We have reviewed the natural history of this disorder and discussed treatment options.   Pain from rotator cuff is improved after subacromial CSI at last visit  Trigger point injections performed today x3, right trapezius  Anticipate that this trigger point should improve with PT    Follow-up: as previously scheduled or sooner if there are any problems between now and then.    Thank you for choosing [facility] and Dr. Mono Busby for your orthopedic & sports medicine care. It is our goal to provide you with exceptional care that will help keep you healthy, active, and get you back in the game.    Please do not hesitate to reach out to us via email, phone, or MyChart with any questions, concerns, or feedback.    If you are experiencing pain/discomfort ,or have questions after 5pm and would like to be connected to the [facility] on-call team, please call this number and specify which Sports Medicine provider is treating you: [phone]

## 2022-11-28 NOTE — PROCEDURES
Trigger Point Injection  Performed by: Mono Busby MD  Authorized by: Mono Busby MD     Trapezus:  Right    Consent Done?:  Yes (Verbal)    Pre-Procedure:   Indications:  Pain and pain relief  Site marked: the procedure site was marked     Timeout: prior to procedure the correct patient, procedure, and site was verified      Local anesthesia used?: Yes    Local anesthetic:  Topical anesthetic and lidocaine 1% without epinephrine  Anesthetic total (ml):  5    Trigger Point Sites: 3    We discussed the proper protocols after the injection such as no submerging pools, baths tubs, or hot tubs for 24 hr.  Showering is okay today.  We discussed red flags such as fevers, chills, red, warm, tender joint at the area of injection to please seek medical care immediately.

## 2022-11-29 ENCOUNTER — TELEPHONE (OUTPATIENT)
Dept: INTERNAL MEDICINE | Facility: CLINIC | Age: 62
End: 2022-11-29
Payer: COMMERCIAL

## 2022-11-29 NOTE — TELEPHONE ENCOUNTER
----- Message from Lilia Boyd sent at 11/29/2022  8:13 AM CST -----  Patient is calling in regards to would like a antibotic called in she feels a cold coming on..Please call her back at [phone].  Thanks

## 2022-11-29 NOTE — TELEPHONE ENCOUNTER
S/W pt and informed her she would need to come in to the clinic to be seen, in order to have something prescribed for cold/flu like symptoms. Pt stated she would check MyChart or give us a call back

## 2022-11-30 ENCOUNTER — PATIENT MESSAGE (OUTPATIENT)
Dept: INTERNAL MEDICINE | Facility: CLINIC | Age: 62
End: 2022-11-30

## 2022-11-30 ENCOUNTER — OFFICE VISIT (OUTPATIENT)
Dept: INTERNAL MEDICINE | Facility: CLINIC | Age: 62
End: 2022-11-30
Payer: COMMERCIAL

## 2022-11-30 VITALS
HEART RATE: 104 BPM | DIASTOLIC BLOOD PRESSURE: 80 MMHG | HEIGHT: 63 IN | WEIGHT: 198.44 LBS | TEMPERATURE: 99 F | BODY MASS INDEX: 35.16 KG/M2 | SYSTOLIC BLOOD PRESSURE: 136 MMHG | OXYGEN SATURATION: 95 %

## 2022-11-30 DIAGNOSIS — J06.9 VIRAL URI: Primary | ICD-10-CM

## 2022-11-30 DIAGNOSIS — J10.1 INFLUENZA B: ICD-10-CM

## 2022-11-30 LAB
CTP QC/QA: YES
CTP QC/QA: YES
POC MOLECULAR INFLUENZA A AGN: NEGATIVE
POC MOLECULAR INFLUENZA B AGN: POSITIVE
SARS-COV-2 RDRP RESP QL NAA+PROBE: NEGATIVE

## 2022-11-30 PROCEDURE — 99999 PR PBB SHADOW E&M-EST. PATIENT-LVL V: CPT | Mod: PBBFAC,,, | Performed by: PHYSICIAN ASSISTANT

## 2022-11-30 PROCEDURE — 99999 PR PBB SHADOW E&M-EST. PATIENT-LVL V: ICD-10-PCS | Mod: PBBFAC,,, | Performed by: PHYSICIAN ASSISTANT

## 2022-11-30 PROCEDURE — 87635 SARS-COV-2 COVID-19 AMP PRB: CPT | Mod: QW,S$GLB,, | Performed by: PHYSICIAN ASSISTANT

## 2022-11-30 PROCEDURE — 3079F DIAST BP 80-89 MM HG: CPT | Mod: CPTII,S$GLB,, | Performed by: PHYSICIAN ASSISTANT

## 2022-11-30 PROCEDURE — 1159F PR MEDICATION LIST DOCUMENTED IN MEDICAL RECORD: ICD-10-PCS | Mod: CPTII,S$GLB,, | Performed by: PHYSICIAN ASSISTANT

## 2022-11-30 PROCEDURE — 3079F PR MOST RECENT DIASTOLIC BLOOD PRESSURE 80-89 MM HG: ICD-10-PCS | Mod: CPTII,S$GLB,, | Performed by: PHYSICIAN ASSISTANT

## 2022-11-30 PROCEDURE — 1160F PR REVIEW ALL MEDS BY PRESCRIBER/CLIN PHARMACIST DOCUMENTED: ICD-10-PCS | Mod: CPTII,S$GLB,, | Performed by: PHYSICIAN ASSISTANT

## 2022-11-30 PROCEDURE — 1160F RVW MEDS BY RX/DR IN RCRD: CPT | Mod: CPTII,S$GLB,, | Performed by: PHYSICIAN ASSISTANT

## 2022-11-30 PROCEDURE — 3075F SYST BP GE 130 - 139MM HG: CPT | Mod: CPTII,S$GLB,, | Performed by: PHYSICIAN ASSISTANT

## 2022-11-30 PROCEDURE — 3075F PR MOST RECENT SYSTOLIC BLOOD PRESS GE 130-139MM HG: ICD-10-PCS | Mod: CPTII,S$GLB,, | Performed by: PHYSICIAN ASSISTANT

## 2022-11-30 PROCEDURE — 99214 OFFICE O/P EST MOD 30 MIN: CPT | Mod: S$GLB,,, | Performed by: PHYSICIAN ASSISTANT

## 2022-11-30 PROCEDURE — 3008F PR BODY MASS INDEX (BMI) DOCUMENTED: ICD-10-PCS | Mod: CPTII,S$GLB,, | Performed by: PHYSICIAN ASSISTANT

## 2022-11-30 PROCEDURE — 87635: ICD-10-PCS | Mod: QW,S$GLB,, | Performed by: PHYSICIAN ASSISTANT

## 2022-11-30 PROCEDURE — 87502 INFLUENZA DNA AMP PROBE: CPT | Mod: QW,S$GLB,, | Performed by: PHYSICIAN ASSISTANT

## 2022-11-30 PROCEDURE — 87502 POCT INFLUENZA A/B MOLECULAR: ICD-10-PCS | Mod: QW,S$GLB,, | Performed by: PHYSICIAN ASSISTANT

## 2022-11-30 PROCEDURE — 1159F MED LIST DOCD IN RCRD: CPT | Mod: CPTII,S$GLB,, | Performed by: PHYSICIAN ASSISTANT

## 2022-11-30 PROCEDURE — 3008F BODY MASS INDEX DOCD: CPT | Mod: CPTII,S$GLB,, | Performed by: PHYSICIAN ASSISTANT

## 2022-11-30 PROCEDURE — 99214 PR OFFICE/OUTPT VISIT, EST, LEVL IV, 30-39 MIN: ICD-10-PCS | Mod: S$GLB,,, | Performed by: PHYSICIAN ASSISTANT

## 2022-11-30 RX ORDER — METHYLPREDNISOLONE ACETATE 80 MG/ML
80 INJECTION, SUSPENSION INTRA-ARTICULAR; INTRALESIONAL; INTRAMUSCULAR; SOFT TISSUE
Status: DISCONTINUED | OUTPATIENT
Start: 2022-11-30 | End: 2022-11-30

## 2022-11-30 RX ORDER — PROMETHAZINE HYDROCHLORIDE AND DEXTROMETHORPHAN HYDROBROMIDE 6.25; 15 MG/5ML; MG/5ML
5 SYRUP ORAL EVERY 4 HOURS PRN
Qty: 118 ML | Refills: 0 | Status: SHIPPED | OUTPATIENT
Start: 2022-11-30 | End: 2022-12-10

## 2022-11-30 NOTE — PROGRESS NOTES
Subjective:      Patient ID: Ora Henderson is a 62 y.o. female.    Chief Complaint: Sore Throat    Sore Throat   This is a new problem. Episode onset: 3 days. The problem has been unchanged. There has been no fever. Associated symptoms include congestion, coughing, ear pain, headaches and a plugged ear sensation. Pertinent negatives include no abdominal pain, diarrhea, drooling, ear discharge, hoarse voice, neck pain, shortness of breath, stridor, swollen glands, trouble swallowing or vomiting. Treatments tried: otc meds. The treatment provided no relief.   Patient Active Problem List   Diagnosis    DJD (degenerative joint disease), cervical-mild    Mixed hyperlipidemia    Hepatomegaly    Family history of cardiovascular disease    GERD (gastroesophageal reflux disease)    History of benign pituitary tumor    Hx of colonic polyp    Essential hypertension    Osteoarthritis of spine with radiculopathy, lumbar region    Severe obesity (BMI 35.0-39.9) with comorbidity    NATALIE (obstructive sleep apnea)    Lumbar radiculopathy    Decreased ROM of intervertebral discs of cervical spine    Upper extremity weakness         Current Outpatient Medications:     amLODIPine (NORVASC) 2.5 MG tablet, Take 1 tablet (2.5 mg total) by mouth once daily., Disp: 90 tablet, Rfl: 3    aspirin 81 MG Chew, Take 81 mg by mouth as needed. Hold 5 days prior to surgery, Disp: , Rfl:     diclofenac (VOLTAREN) 50 MG EC tablet, Take 1 tablet (50 mg total) by mouth 2 (two) times daily., Disp: 60 tablet, Rfl: 2    docusate sodium (COLACE) 100 MG capsule, Take 1 capsule (100 mg total) by mouth 2 (two) times daily., Disp: 100 capsule, Rfl: 0    esomeprazole (NEXIUM) 40 MG capsule, Take 1 capsule (40 mg total) by mouth before breakfast., Disp: 90 capsule, Rfl: 3    fluticasone propionate (FLONASE) 50 mcg/actuation nasal spray, 2 sprays (100 mcg total) by Each Nostril route once daily., Disp: 16 g, Rfl: 0    gabapentin (NEURONTIN) 100  MG capsule, Take 1-2 capsules (100-200 mg total) by mouth every evening., Disp: 60 capsule, Rfl: 1    L.acidophilus-Bif. animalis (PROBIOTIC) 5 billion cell CpSP, Take 1 tablet by mouth once daily., Disp: 10 capsule, Rfl: 0    LIDOcaine (LIDODERM) 5 %, Place onto the skin., Disp: , Rfl:     losartan-hydrochlorothiazide 100-25 mg (HYZAAR) 100-25 mg per tablet, Take 1 tablet by mouth once daily., Disp: 90 tablet, Rfl: 3    meloxicam (MOBIC) 15 MG tablet, Take 1 tablet (15 mg total) by mouth daily as needed for Pain., Disp: 30 tablet, Rfl: 0    methocarbamoL (ROBAXIN) 750 MG Tab, Take 1 tablet (750 mg total) by mouth 2 (two) times daily as needed (Muscle Spasm)., Disp: 60 tablet, Rfl: 2    MULTIVIT,CALC,MINS/IRON/FOLIC (DAILY MULTIPLE FOR WOMEN ORAL), Take 1 tablet by mouth once daily. Hold 5 days prior to surgery, Disp: , Rfl:     omega-3 fatty acids/fish oil (FISH OIL-OMEGA-3 FATTY ACIDS) 300-1,000 mg capsule, Take by mouth once daily. Hold 5 days prior to surgery, Disp: , Rfl:     rosuvastatin (CRESTOR) 40 MG Tab, Take 1 tablet (40 mg total) by mouth every evening., Disp: 90 tablet, Rfl: 1    promethazine-dextromethorphan (PROMETHAZINE-DM) 6.25-15 mg/5 mL Syrp, Take 5 mLs by mouth every 4 (four) hours as needed (cough)., Disp: 118 mL, Rfl: 0  No current facility-administered medications for this visit.    Facility-Administered Medications Ordered in Other Visits:     lidocaine (PF) 10 mg/ml (1%) injection 5 mg, 0.5 mL, Subcutaneous, Once, Sung Warner MD    Review of Systems   Constitutional:  Positive for chills, diaphoresis and fatigue. Negative for activity change, appetite change, fever and unexpected weight change.   HENT:  Positive for congestion, ear pain, postnasal drip, rhinorrhea, sinus pressure, sinus pain and sore throat. Negative for drooling, ear discharge, hearing loss, hoarse voice, trouble swallowing and voice change.    Eyes: Negative.  Negative for visual disturbance.   Respiratory:  Positive  "for cough. Negative for choking, chest tightness, shortness of breath and stridor.    Cardiovascular:  Negative for chest pain, palpitations and leg swelling.   Gastrointestinal:  Negative for abdominal distention, abdominal pain, blood in stool, constipation, diarrhea, nausea and vomiting.   Endocrine: Negative for cold intolerance, heat intolerance, polydipsia and polyuria.   Genitourinary: Negative.  Negative for difficulty urinating and frequency.   Musculoskeletal:  Positive for arthralgias and myalgias. Negative for back pain, gait problem, joint swelling and neck pain.   Skin:  Negative for color change, pallor, rash and wound.   Neurological:  Positive for headaches. Negative for dizziness, tremors, weakness, light-headedness and numbness.   Hematological:  Negative for adenopathy.   Psychiatric/Behavioral:  Negative for behavioral problems, confusion, self-injury, sleep disturbance and suicidal ideas. The patient is not nervous/anxious.    Objective:   /80 (BP Location: Left arm, Patient Position: Sitting, BP Method: Large (Manual))   Pulse 104   Temp 98.5 °F (36.9 °C) (Oral)   Ht 5' 3" (1.6 m)   Wt 90 kg (198 lb 6.6 oz)   LMP  (LMP Unknown)   SpO2 95%   BMI 35.15 kg/m²     Physical Exam  Vitals reviewed.   Constitutional:       General: She is not in acute distress.     Appearance: Normal appearance. She is well-developed. She is not ill-appearing, toxic-appearing or diaphoretic.   HENT:      Head: Normocephalic and atraumatic.      Right Ear: External ear normal.      Left Ear: External ear normal.      Nose: Nose normal.   Eyes:      Conjunctiva/sclera: Conjunctivae normal.      Pupils: Pupils are equal, round, and reactive to light.   Cardiovascular:      Rate and Rhythm: Normal rate and regular rhythm.      Heart sounds: Normal heart sounds. No murmur heard.    No friction rub. No gallop.   Pulmonary:      Effort: Pulmonary effort is normal. No respiratory distress.      Breath sounds: " Normal breath sounds. No wheezing or rales.   Chest:      Chest wall: No tenderness.   Abdominal:      General: There is no distension.      Palpations: Abdomen is soft.      Tenderness: There is no abdominal tenderness.   Musculoskeletal:         General: Normal range of motion.      Cervical back: Normal range of motion and neck supple.   Lymphadenopathy:      Cervical: No cervical adenopathy.   Skin:     General: Skin is warm and dry.      Capillary Refill: Capillary refill takes less than 2 seconds.      Findings: No rash.   Neurological:      Mental Status: She is alert and oriented to person, place, and time.      Motor: No weakness.      Coordination: Coordination normal.      Gait: Gait normal.   Psychiatric:         Mood and Affect: Mood normal.         Behavior: Behavior normal.         Thought Content: Thought content normal.         Judgment: Judgment normal.     Office Visit on 11/30/2022   Component Date Value Ref Range Status    POC Molecular Influenza A Ag 11/30/2022 Negative  Negative, Not Reported Final    POC Molecular Influenza B Ag 11/30/2022 Positive (A)  Negative, Not Reported Final     Acceptable 11/30/2022 Yes   Final         Assessment:     1. Viral URI    2. Influenza B      Plan:   Viral URI  -     POCT Influenza A/B Molecular  -     POCT COVID-19 Rapid Screening  -     Discontinue: methylPREDNISolone acetate injection 80 mg  -     promethazine-dextromethorphan (PROMETHAZINE-DM) 6.25-15 mg/5 mL Syrp; Take 5 mLs by mouth every 4 (four) hours as needed (cough).  Dispense: 118 mL; Refill: 0    Influenza B  -     promethazine-dextromethorphan (PROMETHAZINE-DM) 6.25-15 mg/5 mL Syrp; Take 5 mLs by mouth every 4 (four) hours as needed (cough).  Dispense: 118 mL; Refill: 0  -otc symptomatic relief meds    Follow up if symptoms worsen or fail to improve.

## 2022-12-02 ENCOUNTER — TELEPHONE (OUTPATIENT)
Dept: INTERNAL MEDICINE | Facility: CLINIC | Age: 62
End: 2022-12-02
Payer: COMMERCIAL

## 2022-12-02 NOTE — TELEPHONE ENCOUNTER
----- Message from Ailyn Flores sent at 12/2/2022  8:50 AM CST -----  Pt would like a call back regarding the appt she had on 11/30/2022. She would like to know if there a prescription that can be call in. Please call the pt back to advise at .[phone]. Thx E.

## 2022-12-02 NOTE — TELEPHONE ENCOUNTER
----- Message from Ailyn Flores sent at 12/2/2022  8:50 AM CST -----  Pt would like a call back regarding the appt she had on 11/30/2022. She would like to know if there a prescription that can be call in. Please call the pt back to advise at .[phone]. Thx E.

## 2022-12-08 NOTE — PROGRESS NOTES
"[facility]   Physical Therapy Treatment Note     Name: Ora Patten [facility]  Clinic Number: [id number]    Therapy Diagnosis:   Encounter Diagnoses   Name Primary?    Decreased ROM of intervertebral discs of cervical spine Yes    Upper extremity weakness      Physician: Mono Busby MD    Visit Date: 12/9/2022    Physician Orders: PT Eval and Treat   Medical Diagnosis from Referral:   M75.41 (ICD-10-CM) - Subacromial impingement of right shoulder   M67.911 (ICD-10-CM) - Tendinopathy of right rotator cuff      Evaluation Date: 11/18/2022  Authorization Period Expiration: 12/31/2022  Plan of Care Expiration: 2/18/2023  Visit # / Visits authorized: 1/ 1     PN DUE: 12/18/2022  PTA Visit #: 0/5     Time In: 8:25 AM  Time Out: 9:00 AM  Total Billable Time: 35 minutes    SUBJECTIVE   Pt reports: continued burning pain in her right shoulder/arm.She reports that she was able to complete her HEP at home. She states that she has had decreased neck pain since her injection. She also reports decreased pain with going to see the chiropractor, who she sees once a week.     She was compliant with home exercise program.  Response to previous treatment: first follow-up after initial evaluation   Functional change: no change    Pain: 3/10  Location: right shoulder and neck      OBJECTIVE     Objective Measures updated at progress report unless specified.     Treatment     Ora received the treatments listed below:  (exercises performed today are bolded)    therapeutic exercises to develop strength, endurance, ROM, flexibility, posture, and core stabilization for 27 minutes including:  (B) Upper trap stretch 2 x 30"  (B) Levator scap stretch 2 x 30"  Posterior shoulder roll x 30  Scapular retractions RTB 3 x 10  shoulder isometrics (flex, ER, IR, abd) 5" x 10  bilateral shoulder external rotation RTB 2 x 10    manual therapy techniques: Joint mobilizations, Manual traction, Myofacial " release, Soft tissue Mobilization, and Friction Massage were applied to the: right upper extremity/neck for 8 minutes, including:  STM to right levator scap     neuromuscular re-education activities to improve: Balance, Coordination, Kinesthetic, Sense, Proprioception, and Posture for 0 minutes. The following activities were included:    therapeutic activities to improve functional performance for 0  minutes, including:    Patient Education and Home Exercises     Home Exercises Provided and Patient Education Provided     Education provided:   - Continue to perform HEP at home  - Take frequent breaks when cleaning home     Written Home Exercises Provided: Patient instructed to cont prior HEP. Exercises were reviewed and Ora was able to demonstrate them prior to the end of the session.  Ora demonstrated good  understanding of the education provided. See EMR under Patient Instructions for exercises provided during therapy sessions    ASSESSMENT   Pt tolerated therapy session well today with no adverse effects. She presents with decreased pain in her cervical spine, neck region and shoulder. She states that she has had decreased overall pain since her injection she received in her shoulder. She continues to have burning sensation that radiates into her forearm on her left shoulder. Therapy session focused on shoulder range of motion and gentle strengthening with good tolerance. Ended session with soft tissue mobilization to right levator scap with some tenderness noted. Continue to progress shoulder strengthening and add in scapular stabilization at next session.     Ora Is progressing well towards her goals.   Pt prognosis is Good.     Pt will continue to benefit from skilled outpatient physical therapy to address the deficits listed in the problem list box on initial evaluation, provide pt/family education and to maximize pt's level of independence in the home and community environment.     Pt's spiritual,  cultural and educational needs considered and pt agreeable to plan of care and goals.     Anticipated barriers to physical therapy: pain    Goals:   Short Term Goals (4 Weeks):   1. Pt will be compliant with HEP to assist PT treatment in restoring pain free motion of the R shoulder.   2. Pt will improve impaired shoulder MMTs 1/2 grade B to improve strength for functional tasks.  3.. Pt will report pain at worst in R shoulder of 7/10 or less for increased functional exercise tolerance.      Long Term Goals (8 Weeks):   1. Pt will improve FOTO score to  30% to demonstrate return to prior level of function.   2. Pt will improve impaired shoulder MMTs 1 grade B to improve strength for household duties.  3. Pt will perform prior level of household duties, work related task, desired/leisure activities without pain and maximal function to improve functional QOL    PLAN   Short Term Goals (4 Weeks):   1. Pt will be compliant with HEP to assist PT treatment in restoring pain free motion of the R shoulder.   2. Pt will improve impaired shoulder MMTs 1/2 grade B to improve strength for functional tasks.  3.. Pt will report pain at worst in R shoulder of 7/10 or less for increased functional exercise tolerance.      Long Term Goals (8 Weeks):   1. Pt will improve FOTO score to  30% to demonstrate return to prior level of function.   2. Pt will improve impaired shoulder MMTs 1 grade B to improve strength for household duties.  3. Pt will perform prior level of household duties, work related task, desired/leisure activities without pain and maximal function to improve functional QOL    Miya Dubois, PT, DPT

## 2022-12-09 ENCOUNTER — CLINICAL SUPPORT (OUTPATIENT)
Dept: REHABILITATION | Facility: HOSPITAL | Age: 62
End: 2022-12-09
Payer: COMMERCIAL

## 2022-12-09 DIAGNOSIS — R29.898 UPPER EXTREMITY WEAKNESS: ICD-10-CM

## 2022-12-09 DIAGNOSIS — M53.82 DECREASED ROM OF INTERVERTEBRAL DISCS OF CERVICAL SPINE: Primary | ICD-10-CM

## 2022-12-09 PROCEDURE — 97110 THERAPEUTIC EXERCISES: CPT | Mod: PN

## 2022-12-27 ENCOUNTER — TELEPHONE (OUTPATIENT)
Dept: REHABILITATION | Facility: HOSPITAL | Age: 62
End: 2022-12-27
Payer: COMMERCIAL

## 2022-12-27 NOTE — TELEPHONE ENCOUNTER
Name: Ora Henderson  Clinic Number: [id number]      Call Date: 12/27/2022    Reason for call: Cancel and No Shows    Message Left:  Ora Henderson was called and a message was left informing patient of removal from schedule due to missed appointments. Call back number provided at [phone], with recommendations to call as soon as possible.     Plan: Remove from schedule. Patient must follow up with physician to restart therapy.

## 2022-12-28 ENCOUNTER — DOCUMENTATION ONLY (OUTPATIENT)
Dept: REHABILITATION | Facility: HOSPITAL | Age: 62
End: 2022-12-28
Payer: COMMERCIAL

## 2022-12-28 NOTE — PROGRESS NOTES
[facility]   Discharge Note    Name: Ora Patten [facility]  Clinic Number: [id number]    Therapy Diagnosis:        Encounter Diagnoses   Name Primary?    Decreased ROM of intervertebral discs of cervical spine Yes    Upper extremity weakness        Physician: Mono Busby MD     Visit Date: 12/9/2022     Physician Orders: PT Eval and Treat   Medical Diagnosis from Referral:   M75.41 (ICD-10-CM) - Subacromial impingement of right shoulder   M67.911 (ICD-10-CM) - Tendinopathy of right rotator cuff      Evaluation Date: 11/18/2022        Date of Last visit: 12/9/2022  Total Visits Received: 2    ASSESSMENT      Discharge reason: Patient has not attended therapy since 12/9/2022      Goals: Short Term Goals (4 Weeks):   1. Pt will be compliant with HEP to assist PT treatment in restoring pain free motion of the R shoulder.   2. Pt will improve impaired shoulder MMTs 1/2 grade B to improve strength for functional tasks.  3.. Pt will report pain at worst in R shoulder of 7/10 or less for increased functional exercise tolerance.      Long Term Goals (8 Weeks):   1. Pt will improve FOTO score to  30% to demonstrate return to prior level of function.   2. Pt will improve impaired shoulder MMTs 1 grade B to improve strength for household duties.  3. Pt will perform prior level of household duties, work related task, desired/leisure activities without pain and maximal function to improve functional QOL    No goals met    PLAN   This patient is discharged from Physical Therapy      Tiffany Seay, PT

## 2023-01-11 ENCOUNTER — OFFICE VISIT (OUTPATIENT)
Dept: ORTHOPEDICS | Facility: CLINIC | Age: 63
End: 2023-01-11
Payer: COMMERCIAL

## 2023-01-11 ENCOUNTER — PATIENT MESSAGE (OUTPATIENT)
Dept: ORTHOPEDICS | Facility: CLINIC | Age: 63
End: 2023-01-11

## 2023-01-11 DIAGNOSIS — G89.29 CHRONIC RIGHT-SIDED LOW BACK PAIN WITH RIGHT-SIDED SCIATICA: ICD-10-CM

## 2023-01-11 DIAGNOSIS — M75.41 SUBACROMIAL IMPINGEMENT OF RIGHT SHOULDER: ICD-10-CM

## 2023-01-11 DIAGNOSIS — G89.29 CHRONIC RIGHT SHOULDER PAIN: ICD-10-CM

## 2023-01-11 DIAGNOSIS — M25.511 CHRONIC RIGHT SHOULDER PAIN: ICD-10-CM

## 2023-01-11 DIAGNOSIS — G56.03 BILATERAL CARPAL TUNNEL SYNDROME: Primary | ICD-10-CM

## 2023-01-11 DIAGNOSIS — M54.41 CHRONIC RIGHT-SIDED LOW BACK PAIN WITH RIGHT-SIDED SCIATICA: ICD-10-CM

## 2023-01-11 PROCEDURE — 1159F PR MEDICATION LIST DOCUMENTED IN MEDICAL RECORD: ICD-10-PCS | Mod: CPTII,S$GLB,, | Performed by: STUDENT IN AN ORGANIZED HEALTH CARE EDUCATION/TRAINING PROGRAM

## 2023-01-11 PROCEDURE — 99999 PR PBB SHADOW E&M-EST. PATIENT-LVL II: ICD-10-PCS | Mod: PBBFAC,,, | Performed by: STUDENT IN AN ORGANIZED HEALTH CARE EDUCATION/TRAINING PROGRAM

## 2023-01-11 PROCEDURE — 20550 PR INJECT TENDON SHEATH/LIGAMENT: ICD-10-PCS | Mod: 50,S$GLB,, | Performed by: STUDENT IN AN ORGANIZED HEALTH CARE EDUCATION/TRAINING PROGRAM

## 2023-01-11 PROCEDURE — 99214 OFFICE O/P EST MOD 30 MIN: CPT | Mod: 25,S$GLB,, | Performed by: STUDENT IN AN ORGANIZED HEALTH CARE EDUCATION/TRAINING PROGRAM

## 2023-01-11 PROCEDURE — 76942 ECHO GUIDE FOR BIOPSY: CPT | Mod: 26,S$GLB,, | Performed by: STUDENT IN AN ORGANIZED HEALTH CARE EDUCATION/TRAINING PROGRAM

## 2023-01-11 PROCEDURE — 99214 PR OFFICE/OUTPT VISIT, EST, LEVL IV, 30-39 MIN: ICD-10-PCS | Mod: 25,S$GLB,, | Performed by: STUDENT IN AN ORGANIZED HEALTH CARE EDUCATION/TRAINING PROGRAM

## 2023-01-11 PROCEDURE — 1159F MED LIST DOCD IN RCRD: CPT | Mod: CPTII,S$GLB,, | Performed by: STUDENT IN AN ORGANIZED HEALTH CARE EDUCATION/TRAINING PROGRAM

## 2023-01-11 PROCEDURE — 99999 PR PBB SHADOW E&M-EST. PATIENT-LVL II: CPT | Mod: PBBFAC,,, | Performed by: STUDENT IN AN ORGANIZED HEALTH CARE EDUCATION/TRAINING PROGRAM

## 2023-01-11 PROCEDURE — 1160F RVW MEDS BY RX/DR IN RCRD: CPT | Mod: CPTII,S$GLB,, | Performed by: STUDENT IN AN ORGANIZED HEALTH CARE EDUCATION/TRAINING PROGRAM

## 2023-01-11 PROCEDURE — 1160F PR REVIEW ALL MEDS BY PRESCRIBER/CLIN PHARMACIST DOCUMENTED: ICD-10-PCS | Mod: CPTII,S$GLB,, | Performed by: STUDENT IN AN ORGANIZED HEALTH CARE EDUCATION/TRAINING PROGRAM

## 2023-01-11 PROCEDURE — 20550 NJX 1 TENDON SHEATH/LIGAMENT: CPT | Mod: 50,S$GLB,, | Performed by: STUDENT IN AN ORGANIZED HEALTH CARE EDUCATION/TRAINING PROGRAM

## 2023-01-11 PROCEDURE — 76942 CARPAL TUNNEL: ICD-10-PCS | Mod: 26,S$GLB,, | Performed by: STUDENT IN AN ORGANIZED HEALTH CARE EDUCATION/TRAINING PROGRAM

## 2023-01-11 RX ORDER — BETAMETHASONE SODIUM PHOSPHATE AND BETAMETHASONE ACETATE 3; 3 MG/ML; MG/ML
12 INJECTION, SUSPENSION INTRA-ARTICULAR; INTRALESIONAL; INTRAMUSCULAR; SOFT TISSUE
Status: DISCONTINUED | OUTPATIENT
Start: 2023-01-11 | End: 2023-01-11 | Stop reason: HOSPADM

## 2023-01-11 RX ADMIN — BETAMETHASONE SODIUM PHOSPHATE AND BETAMETHASONE ACETATE 12 MG: 3; 3 INJECTION, SUSPENSION INTRA-ARTICULAR; INTRALESIONAL; INTRAMUSCULAR; SOFT TISSUE at 10:01

## 2023-01-11 NOTE — PROGRESS NOTES
Patient ID: Ora Henderson  YOB: 1960  MRN: [id number]    Chief Complaint: Pain of the Left Hip    Referred By: Self    History of Present Illness: Ora Henderson is a right-hand dominant 62 y.o. female who presents today with 10/10 pain c/o right shoulder pain.     She states that the pain began on Oct 29. She has a history of cervical spine pain and had rotator cuff surgery in 2019 on her right shoulder with full recovery. Self treated with OTC tylenol, gabapentin without relief. TENS unit, heating pad, and lidocaine patches offer temporary relief.     Initially seen in the office on 11/16/22.  Diagnosed with right shoulder arthritis, subacromial impingement and rotator cuff tendinopathy.  Treated with subacromial injection, diclofenac, and referred to PT at [facility].    She had good relief from her injection for about a week - until around 11/24/22.  She has only attended PT once since her last visit.  She has performed her home exercise program consistently.  The pain today is different than last time, it has moved into her trapezius which is very tight and sore.  She has had this problem before and was treated by Dr. Up with trigger point injections with good relief.  This was treated with a trigger point injection on 11/28/22.  She reports excellent relief of her pain - 90%.    She has noticed that her left shoulder has begun to hurt as well.    Since her last visit, she has begun to have significant left hip pain since late December 2022.  She has history of sciatica.  The pain is worst after sleeping and hurts from her buttock down the back of her legs.    She has also been treated in the past by Dr. Lyon for bilateral carpal tunnel syndrome.  She received cortisone injections in July 2022.  These were effective but are beginning to wear off.      Past Medical History:   Past Medical History:   Diagnosis Date    Arthritis     Encounter  for blood transfusion     Fatty liver     GERD (gastroesophageal reflux disease)     Hernia, umbilical     reducible    Hyperlipidemia     Hypertension     Rotator cuff tear     Sleep apnea      Past Surgical History:   Procedure Laterality Date    ARTHROSCOPIC DEBRIDEMENT OF SHOULDER Right 2019    Procedure: DEBRIDEMENT, SHOULDER, ARTHROSCOPIC;  Surgeon: Laureano Cox MD;  Location: [facility] OR;  Service: Orthopedics;  Laterality: Right;    ARTHROSCOPY OF SHOULDER WITH DECOMPRESSION OF SUBACROMIAL SPACE Right 2019    Procedure: ARTHROSCOPY, SHOULDER, WITH SUBACROMIAL SPACE DECOMPRESSION;  Surgeon: Laureano Cox MD;  Location: [facility] OR;  Service: Orthopedics;  Laterality: Right;    BICEPS TENDON REPAIR Right 2019    Procedure: REPAIR, TENDON, BICEPS;  Surgeon: Laureano Cox MD;  Location: [facility] OR;  Service: Orthopedics;  Laterality: Right;  arthroscopic Biceps tenodesis    BRAIN SURGERY      Pituitary tumor removal 2001 x 2     CARPAL TUNNEL RELEASE Bilateral     x 2    CERVICAL BIOPSY  W/ LOOP ELECTRODE EXCISION      [facility] '81     SECTION      x 1    COLONOSCOPY N/A 2016    Procedure: COLONOSCOPY;  Surgeon: Quique Paul MD;  Location: [facility] ENDO;  Service: Endoscopy;  Laterality: N/A;    COLONOSCOPY N/A 2021    Procedure: COLONOSCOPY;  Surgeon: Mari Tucker MD;  Location: [facility] ENDO;  Service: Endoscopy;  Laterality: N/A;    DISTAL CLAVICLE EXCISION Right 2019    Procedure: EXCISION, CLAVICLE, DISTAL;  Surgeon: Laureano Cox MD;  Location: [facility] OR;  Service: Orthopedics;  Laterality: Right;  arthroscopic    EPIDURAL STEROID INJECTION INTO CERVICAL SPINE N/A 9/15/2022    Procedure: C7/T1 IL ARMANDO, Right Paramedian Approach;  Surgeon: Yung Treadwell MD;  Location: [facility] PAIN MGT;  Service: Pain Management;  Laterality: N/A;    GANGLION CYST EXCISION Left 2018    HYSTERECTOMY      REFRACTIVE SURGERY      ROTATOR CUFF REPAIR  Right 12/30/2019    Procedure: REPAIR, ROTATOR CUFF;  Surgeon: Laureano Cox MD;  Location: [facility] OR;  Service: Orthopedics;  Laterality: Right;  arthroscopic    SELECTIVE INJECTION OF ANESTHETIC AGENT AROUND LUMBAR SPINAL NERVE ROOT BY TRANSFORAMINAL APPROACH Bilateral 10/07/2021    Procedure: BLOCK, SPINAL NERVE ROOT, LUMBAR, SELECTIVE, TRANSFORAMINAL APPROACH bilateral L4-5 Rn IV sedation;  Surgeon: Damon Charlton MD;  Location: [facility] PAIN MGT;  Service: Pain Management;  Laterality: Bilateral;    SELECTIVE INJECTION OF ANESTHETIC AGENT AROUND LUMBAR SPINAL NERVE ROOT BY TRANSFORAMINAL APPROACH Bilateral 12/29/2021    Procedure: BLOCK, SPINAL NERVE ROOT, LUMBAR, SELECTIVE, TRANSFORAMINAL APPROACH bilateral S1 RN IV sedation;  Surgeon: Damon Charlton MD;  Location: [facility] PAIN MGT;  Service: Pain Management;  Laterality: Bilateral;    SYNOVECTOMY OF SHOULDER Right 12/30/2019    Procedure: SYNOVECTOMY, SHOULDER;  Surgeon: Laureano Cox MD;  Location: [facility] OR;  Service: Orthopedics;  Laterality: Right;  arthroscopic    TOTAL ABDOMINAL HYSTERECTOMY W/ BILATERAL SALPINGOOPHORECTOMY  2006    STAR/BSO secondary to endometriosis    VENTRAL HERNIA REPAIR  2016     Family History   Problem Relation Age of Onset    Hypertension Father     Prostate cancer Father     Hypertension Mother     Cancer Maternal Aunt         pancreas    Cancer Maternal Uncle         pancreas    Heart disease Paternal Uncle     Heart disease Paternal Grandmother     Diabetes Maternal Aunt     Heart attack Sister 30    Heart attack Brother 32     Social History     Socioeconomic History    Marital status:     Number of children: 1   Occupational History    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Employer: [employer]   Tobacco Use    Smoking status: Never    Smokeless tobacco: Never   Substance and Sexual Activity    Alcohol use: Yes     Alcohol/week: 0.0 standard drinks     Comment: socially    Drug use: No    Sexual activity: Yes      Partners: Male     Birth control/protection: None, Surgical     Social Determinants of Health     Financial Resource Strain: Low Risk     Difficulty of Paying Living Expenses: Not hard at all   Food Insecurity: No Food Insecurity    Worried About Running Out of Food in the Last Year: Never true    Ran Out of Food in the Last Year: Never true   Transportation Needs: No Transportation Needs    Lack of Transportation (Medical): No    Lack of Transportation (Non-Medical): No   Physical Activity: Sufficiently Active    Days of Exercise per Week: 4 days    Minutes of Exercise per Session: 60 min   Stress: Stress Concern Present    Feeling of Stress : Very much   Social Connections: Unknown    Frequency of Communication with Friends and Family: More than three times a week    Frequency of Social Gatherings with Friends and Family: Once a week    Active Member of Clubs or Organizations: No    Attends Club or Organization Meetings: 1 to 4 times per year    Marital Status:    Housing Stability: Low Risk     Unable to Pay for Housing in the Last Year: No    Number of Places Lived in the Last Year: 1    Unstable Housing in the Last Year: No     Medication List with Changes/Refills   Current Medications    AMLODIPINE (NORVASC) 2.5 MG TABLET    Take 1 tablet (2.5 mg total) by mouth once daily.    ASPIRIN 81 MG CHEW    Take 81 mg by mouth as needed. Hold 5 days prior to surgery    DICLOFENAC (VOLTAREN) 50 MG EC TABLET    Take 1 tablet (50 mg total) by mouth 2 (two) times daily.    DOCUSATE SODIUM (COLACE) 100 MG CAPSULE    Take 1 capsule (100 mg total) by mouth 2 (two) times daily.    ESOMEPRAZOLE (NEXIUM) 40 MG CAPSULE    Take 1 capsule (40 mg total) by mouth before breakfast.    FLUTICASONE PROPIONATE (FLONASE) 50 MCG/ACTUATION NASAL SPRAY    2 sprays (100 mcg total) by Each Nostril route once daily.    GABAPENTIN (NEURONTIN) 100 MG CAPSULE    Take 1-2 capsules (100-200 mg total) by mouth every evening.     L.ACIDOPHILUS-BIF. ANIMALIS (PROBIOTIC) 5 BILLION CELL CPSP    Take 1 tablet by mouth once daily.    LIDOCAINE (LIDODERM) 5 %    Place onto the skin.    LOSARTAN-HYDROCHLOROTHIAZIDE 100-25 MG (HYZAAR) 100-25 MG PER TABLET    Take 1 tablet by mouth once daily.    MELOXICAM (MOBIC) 15 MG TABLET    Take 1 tablet (15 mg total) by mouth daily as needed for Pain.    METHOCARBAMOL (ROBAXIN) 750 MG TAB    Take 1 tablet (750 mg total) by mouth 2 (two) times daily as needed (Muscle Spasm).    MULTIVIT,CALC,MINS/IRON/FOLIC (DAILY MULTIPLE FOR WOMEN ORAL)    Take 1 tablet by mouth once daily. Hold 5 days prior to surgery    OMEGA-3 FATTY ACIDS/FISH OIL (FISH OIL-OMEGA-3 FATTY ACIDS) 300-1,000 MG CAPSULE    Take by mouth once daily. Hold 5 days prior to surgery    ROSUVASTATIN (CRESTOR) 40 MG TAB    Take 1 tablet (40 mg total) by mouth every evening.     Review of patient's allergies indicates:   Allergen Reactions    Sulfa (sulfonamide antibiotics) Hives       Physical Exam:   There is no height or weight on file to calculate BMI.    Physical Exam  Vitals reviewed.   Constitutional:       General: She is not in acute distress.     Appearance: Normal appearance.   HENT:      Head: Normocephalic and atraumatic.   Eyes:      Extraocular Movements: Extraocular movements intact.      Conjunctiva/sclera: Conjunctivae normal.   Pulmonary:      Effort: Pulmonary effort is normal. No respiratory distress.   Musculoskeletal:      Right shoulder: No swelling or deformity.   Skin:     General: Skin is warm and dry.   Neurological:      General: No focal deficit present.      Mental Status: She is alert and oriented to person, place, and time.   Psychiatric:         Mood and Affect: Mood normal.       Detailed MSK exam:     Right Shoulder:  Full motion and strength, mild palpation tenderness trapezius.    Left Hip:   Normal motion and strength  Positive SLR reproduces radicular pain  N/V Rad Exam:  L1 Normal sensory (iliac  crest)     L2-3 Normal sensory (anterior, medial thigh)   Normal motor (hip add)     L4 Normal sensory (lateral thigh, anterior knee, medial leg) Normal motor (knee ext)  L5 Normal sensory (lateral leg, dorsal foot)   Normal motor (toe DF, hip ext)  S1 Normal sensory (posterior leg)    Normal motor (PF, EV)  S2 Normal sensory (plantar foot)     Normal motor (toe PF)   Normal pedal pulses, warm and well perfused with capillary refill < 2 sec   Bladder and bowel function normal    B Wrists:  Inspection:    No effusion, erythema, or ecchymosis       No muscle wasting  Palpation Tenderness: None  Range of motion:   Full hand/wrist ROM equal bilaterally   Strength:    Normal  strength  Tests:   + Tinel's at wrist     + Phalen's     + Reverse Phalen's  N/V Exam:  Radial: Normal motor (EPL/thumbs up)              Normal sensory (dorsal hand)   Median: Normal motor (FPL/A-OK)      Normal sensory (thumb)   Ulnar:  Normal motor (Interossei/scissors-spread)     Normal sensory (5th finger)   LABC: Normal sensory (lateral forearm)   MABC: Normal sensory (medial forearm)   MC: Normal motor (elbow flexion)   Axillary: Normal motor/sensory (deltoid)  Normal radial and ulnar pulses, warm and well perfused with capillary refill < 2 sec        Imaging:  X-ray Shoulder 2 or More Views Right  Narrative: EXAMINATION:  XR SHOULDER COMPLETE 2 OR MORE VIEWS RIGHT    INDICATION:  Pain in right shoulder    COMPARISON:  Right shoulder radiographs from 11/05/2019    FINDINGS:  Internal rotation, external rotation, scapular Y and axillary views of the right shoulder are obtained.  No fracture.  Alignment is within normal limits.  There are mild degenerative changes at the acromioclavicular joint with small inferiorly directed marginal osteophytes.  There are mild degenerative changes at the glenohumeral articulation.  Partially visualized lung fields are clear.  Impression: 1. Mild degenerative changes at the acromioclavicular joint and  glenohumeral articulation.    Electronically signed by: Spencer Olivo MD  Date:    11/14/2022  Time:    11:14         Patient Instructions   Assessment:  Ora Henderson is a 62 y.o. female with a chief complaint of Pain of the Left Hip      Encounter Diagnoses   Name Primary?    Bilateral carpal tunnel syndrome Yes    Subacromial impingement of right shoulder     Chronic right shoulder pain     Chronic right-sided low back pain with right-sided sciatica         Plan:  B/l shoulder pain, improving after R SA CSI, and with HEP  B CTS injections today - last injections approx 6 months ago  Has h/o LBP & sciatica/radiculopathy, s/p sacral TF ARMANDO in 2021  She will schedule follow up with Pain Mgmt for LBP/sciatica  HEP core strengthening provided today    Follow-up: As Needed or sooner if there are any problems between now and then.    At least 15 minutes were spent developing, teaching, and performing a home exercise program.  A written summary was provided and all questions were answered.  This service was performed by King Bermudez, Sports Medicine Assistant under direction of Mono Busby MD.  CPT 38076-BB.     Thank you for choosing [facility] and Dr. Mono Busby for your orthopedic & sports medicine care. It is our goal to provide you with exceptional care that will help keep you healthy, active, and get you back in the game.    Please do not hesitate to reach out to us via email, phone, or MyChart with any questions, concerns, or feedback.    If you are experiencing pain/discomfort ,or have questions after 5pm and would like to be connected to the [facility] on-call team, please call this number and specify which Sports Medicine provider is treating you: [phone]                              If you have any difficulties reading this information, you may visit the online version using the following link: Spine Conditioning  Progam  ([email or web address])         A copy of today's visit note has been sent to the referring provider.       Mono Busby MD  Primary Care Sports Medicine        Disclaimer: This note was prepared using a voice recognition system and is likely to have sound alike errors within the text.

## 2023-01-11 NOTE — PROCEDURES
Carpal Tunnel    Date/Time: 1/11/2023 10:30 AM  Performed by: Mono Busby MD  Authorized by: Mono Busby MD     Consent Done?:  Yes (Verbal)  Indications:  Pain  Site marked: the procedure site was marked    Timeout: prior to procedure the correct patient, procedure, and site was verified    Prep: patient was prepped and draped in usual sterile fashion      Local anesthesia used?: Yes    Local anesthetic:  Lidocaine 1% without epinephrine and topical anesthetic  Anesthetic total (ml):  2    Location:  Wrist (bilateral carpal tunnel injection)  Ultrasonic Guidance for Needle Placement?: Yes    Needle size:  25 G  Approach:  Anterolateral  Medications:  12 mg betamethasone acetate-betamethasone sodium phosphate 6 mg/mL  Patient tolerance:  Patient tolerated the procedure well with no immediate complications     Additional Comments: Ultrasound guidance was used for needle localization. Images were saved and stored for documentation. The appropriate structures were visualized. Dynamic visualization of the needle was continuous throughout the procedures and maintained good position.     We discussed the proper protocols after the injection such as no submerging pools, baths tubs, or hot tubs for 48 hr.  Showering is okay today.  We also discussed that blood sugars can be elevated after an injection and asked patient to properly checked her sugars over the next few days and contact their PCP if there are any concerns.  We discussed red flags such as fevers, chills, red, warm, tender joint at the area of injection to please seek medical care immediately.

## 2023-01-11 NOTE — PATIENT INSTRUCTIONS
Assessment:  Ora Henderson is a 62 y.o. female with a chief complaint of Pain of the Left Hip      Encounter Diagnoses   Name Primary?    Bilateral carpal tunnel syndrome Yes    Subacromial impingement of right shoulder     Chronic right shoulder pain     Chronic right-sided low back pain with right-sided sciatica         Plan:  B/l shoulder pain, improving after R SA CSI, and with HEP  B CTS injections today - last injections approx 6 months ago  Has h/o LBP & sciatica/radiculopathy, s/p sacral TF ARMANDO in 2021  She will schedule follow up with Pain Mgmt for LBP/sciatica  HEP core strengthening provided today    Follow-up: As Needed or sooner if there are any problems between now and then.    At least 15 minutes were spent developing, teaching, and performing a home exercise program.  A written summary was provided and all questions were answered.  This service was performed by King Bermudez, Sports Medicine Assistant under direction of Mono Busby MD.  CPT 19410-NQ.     Thank you for choosing [facility] and Dr. Mono Busby for your orthopedic & sports medicine care. It is our goal to provide you with exceptional care that will help keep you healthy, active, and get you back in the game.    Please do not hesitate to reach out to us via email, phone, or MyChart with any questions, concerns, or feedback.    If you are experiencing pain/discomfort ,or have questions after 5pm and would like to be connected to the [facility] on-call team, please call this number and specify which Sports Medicine provider is treating you: [phone]                              If you have any difficulties reading this information, you may visit the online version using the following link: Spine Conditioning Progam  ([email or web address])

## 2023-01-12 DIAGNOSIS — M25.559 HIP PAIN: Primary | ICD-10-CM

## 2023-01-13 ENCOUNTER — HOSPITAL ENCOUNTER (OUTPATIENT)
Dept: RADIOLOGY | Facility: HOSPITAL | Age: 63
Discharge: HOME | End: 2023-01-13
Attending: STUDENT IN AN ORGANIZED HEALTH CARE EDUCATION/TRAINING PROGRAM
Payer: COMMERCIAL

## 2023-01-13 ENCOUNTER — OFFICE VISIT (OUTPATIENT)
Dept: ORTHOPEDICS | Facility: CLINIC | Age: 63
End: 2023-01-13
Payer: COMMERCIAL

## 2023-01-13 ENCOUNTER — PATIENT MESSAGE (OUTPATIENT)
Dept: PRIMARY CARE CLINIC | Facility: CLINIC | Age: 63
End: 2023-01-13
Payer: COMMERCIAL

## 2023-01-13 VITALS — BODY MASS INDEX: 36.63 KG/M2 | WEIGHT: 206.81 LBS

## 2023-01-13 DIAGNOSIS — M54.41 CHRONIC RIGHT-SIDED LOW BACK PAIN WITH RIGHT-SIDED SCIATICA: Primary | ICD-10-CM

## 2023-01-13 DIAGNOSIS — M53.3 CHRONIC RIGHT SI JOINT PAIN: ICD-10-CM

## 2023-01-13 DIAGNOSIS — M25.551 GREATER TROCHANTERIC PAIN SYNDROME OF RIGHT LOWER EXTREMITY: ICD-10-CM

## 2023-01-13 DIAGNOSIS — G89.29 CHRONIC RIGHT-SIDED LOW BACK PAIN WITH RIGHT-SIDED SCIATICA: Primary | ICD-10-CM

## 2023-01-13 DIAGNOSIS — G89.29 CHRONIC RIGHT SI JOINT PAIN: ICD-10-CM

## 2023-01-13 DIAGNOSIS — M25.559 HIP PAIN: ICD-10-CM

## 2023-01-13 PROCEDURE — 1159F PR MEDICATION LIST DOCUMENTED IN MEDICAL RECORD: ICD-10-PCS | Mod: CPTII,S$GLB,, | Performed by: STUDENT IN AN ORGANIZED HEALTH CARE EDUCATION/TRAINING PROGRAM

## 2023-01-13 PROCEDURE — 99999 PR PBB SHADOW E&M-EST. PATIENT-LVL III: ICD-10-PCS | Mod: PBBFAC,,, | Performed by: STUDENT IN AN ORGANIZED HEALTH CARE EDUCATION/TRAINING PROGRAM

## 2023-01-13 PROCEDURE — 99213 OFFICE O/P EST LOW 20 MIN: CPT | Mod: S$GLB,,, | Performed by: STUDENT IN AN ORGANIZED HEALTH CARE EDUCATION/TRAINING PROGRAM

## 2023-01-13 PROCEDURE — 73521 X-RAY EXAM HIPS BI 2 VIEWS: CPT | Mod: 26,,, | Performed by: RADIOLOGY

## 2023-01-13 PROCEDURE — 73521 XR HIPS BILATERAL 2 VIEW INCL AP PELVIS: ICD-10-PCS | Mod: 26,,, | Performed by: RADIOLOGY

## 2023-01-13 PROCEDURE — 3008F PR BODY MASS INDEX (BMI) DOCUMENTED: ICD-10-PCS | Mod: CPTII,S$GLB,, | Performed by: STUDENT IN AN ORGANIZED HEALTH CARE EDUCATION/TRAINING PROGRAM

## 2023-01-13 PROCEDURE — 99999 PR PBB SHADOW E&M-EST. PATIENT-LVL III: CPT | Mod: PBBFAC,,, | Performed by: STUDENT IN AN ORGANIZED HEALTH CARE EDUCATION/TRAINING PROGRAM

## 2023-01-13 PROCEDURE — 3008F BODY MASS INDEX DOCD: CPT | Mod: CPTII,S$GLB,, | Performed by: STUDENT IN AN ORGANIZED HEALTH CARE EDUCATION/TRAINING PROGRAM

## 2023-01-13 PROCEDURE — 73521 X-RAY EXAM HIPS BI 2 VIEWS: CPT | Mod: TC,FY,PO

## 2023-01-13 PROCEDURE — 99213 PR OFFICE/OUTPT VISIT, EST, LEVL III, 20-29 MIN: ICD-10-PCS | Mod: S$GLB,,, | Performed by: STUDENT IN AN ORGANIZED HEALTH CARE EDUCATION/TRAINING PROGRAM

## 2023-01-13 PROCEDURE — 1159F MED LIST DOCD IN RCRD: CPT | Mod: CPTII,S$GLB,, | Performed by: STUDENT IN AN ORGANIZED HEALTH CARE EDUCATION/TRAINING PROGRAM

## 2023-01-13 NOTE — PROGRESS NOTES
Patient ID: Ora Henderson  YOB: 1960  MRN: [id number]    Chief Complaint: Pain of the Right Hip      Referred By: Self      Occupation: Medical Billing      History of Present Illness: Ora Henderson is a 62 y.o. female who presents today with 6/10 pain c/o right hip pain. Patient is established with our office for bilateral carpal tunnel syndrome and right shoulder impingement.    Today, she presents with right hip pain. She states the pain began about 2 weeks ago at the beginning of January. She describes the pain as intermittent sharp pain. She states that aleve and light exercise gives her relief. She states her pain is worse in the morning when waking up.       The patient is active in none.      ***    Past Medical History:   Past Medical History:   Diagnosis Date    Arthritis     Encounter for blood transfusion     Fatty liver     GERD (gastroesophageal reflux disease)     Hernia, umbilical     reducible    Hyperlipidemia     Hypertension     Rotator cuff tear     Sleep apnea      Past Surgical History:   Procedure Laterality Date    ARTHROSCOPIC DEBRIDEMENT OF SHOULDER Right 12/30/2019    Procedure: DEBRIDEMENT, SHOULDER, ARTHROSCOPIC;  Surgeon: Laureano Cox MD;  Location: [facility] OR;  Service: Orthopedics;  Laterality: Right;    ARTHROSCOPY OF SHOULDER WITH DECOMPRESSION OF SUBACROMIAL SPACE Right 12/30/2019    Procedure: ARTHROSCOPY, SHOULDER, WITH SUBACROMIAL SPACE DECOMPRESSION;  Surgeon: Laureano Cox MD;  Location: [facility] OR;  Service: Orthopedics;  Laterality: Right;    BICEPS TENDON REPAIR Right 12/30/2019    Procedure: REPAIR, TENDON, BICEPS;  Surgeon: Laureano Cox MD;  Location: [facility] OR;  Service: Orthopedics;  Laterality: Right;  arthroscopic Biceps tenodesis    BRAIN SURGERY  2001    Pituitary tumor removal 2001 x 2     CARPAL TUNNEL RELEASE Bilateral     x 2    CERVICAL BIOPSY  W/ LOOP ELECTRODE EXCISION      CKC '81      SECTION      x 1    COLONOSCOPY N/A 2016    Procedure: COLONOSCOPY;  Surgeon: Quique Paul MD;  Location: [facility] ENDO;  Service: Endoscopy;  Laterality: N/A;    COLONOSCOPY N/A 2021    Procedure: COLONOSCOPY;  Surgeon: Mari Tucker MD;  Location: [facility] ENDO;  Service: Endoscopy;  Laterality: N/A;    DISTAL CLAVICLE EXCISION Right 2019    Procedure: EXCISION, CLAVICLE, DISTAL;  Surgeon: Laureano Cox MD;  Location: [facility] OR;  Service: Orthopedics;  Laterality: Right;  arthroscopic    EPIDURAL STEROID INJECTION INTO CERVICAL SPINE N/A 9/15/2022    Procedure: C7/T1 IL ARMANDO, Right Paramedian Approach;  Surgeon: Yung Treadwell MD;  Location: [facility] PAIN MGT;  Service: Pain Management;  Laterality: N/A;    GANGLION CYST EXCISION Left 2018    HYSTERECTOMY      REFRACTIVE SURGERY      ROTATOR CUFF REPAIR Right 2019    Procedure: REPAIR, ROTATOR CUFF;  Surgeon: Laureano Cox MD;  Location: [facility] OR;  Service: Orthopedics;  Laterality: Right;  arthroscopic    SELECTIVE INJECTION OF ANESTHETIC AGENT AROUND LUMBAR SPINAL NERVE ROOT BY TRANSFORAMINAL APPROACH Bilateral 10/07/2021    Procedure: BLOCK, SPINAL NERVE ROOT, LUMBAR, SELECTIVE, TRANSFORAMINAL APPROACH bilateral L4-5 Rn IV sedation;  Surgeon: Damon Charlton MD;  Location: [facility] PAIN MGT;  Service: Pain Management;  Laterality: Bilateral;    SELECTIVE INJECTION OF ANESTHETIC AGENT AROUND LUMBAR SPINAL NERVE ROOT BY TRANSFORAMINAL APPROACH Bilateral 2021    Procedure: BLOCK, SPINAL NERVE ROOT, LUMBAR, SELECTIVE, TRANSFORAMINAL APPROACH bilateral S1 RN IV sedation;  Surgeon: Damon Charlton MD;  Location: [facility] PAIN MGT;  Service: Pain Management;  Laterality: Bilateral;    SYNOVECTOMY OF SHOULDER Right 2019    Procedure: SYNOVECTOMY, SHOULDER;  Surgeon: Laureano Cox MD;  Location: [facility] OR;  Service: Orthopedics;  Laterality: Right;  arthroscopic    TOTAL ABDOMINAL HYSTERECTOMY W/ BILATERAL  SALPINGOOPHORECTOMY  2006    STAR/BSO secondary to endometriosis    VENTRAL HERNIA REPAIR  2016     Family History   Problem Relation Age of Onset    Hypertension Father     Prostate cancer Father     Hypertension Mother     Cancer Maternal Aunt         pancreas    Cancer Maternal Uncle         pancreas    Heart disease Paternal Uncle     Heart disease Paternal Grandmother     Diabetes Maternal Aunt     Heart attack Sister 30    Heart attack Brother 32     Social History     Socioeconomic History    Marital status:     Number of children: 1   Occupational History    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Employer: [employer]   Tobacco Use    Smoking status: Never    Smokeless tobacco: Never   Substance and Sexual Activity    Alcohol use: Yes     Alcohol/week: 0.0 standard drinks     Comment: socially    Drug use: No    Sexual activity: Yes     Partners: Male     Birth control/protection: None, Surgical     Social Determinants of Health     Financial Resource Strain: Low Risk     Difficulty of Paying Living Expenses: Not hard at all   Food Insecurity: No Food Insecurity    Worried About Running Out of Food in the Last Year: Never true    Ran Out of Food in the Last Year: Never true   Transportation Needs: No Transportation Needs    Lack of Transportation (Medical): No    Lack of Transportation (Non-Medical): No   Physical Activity: Sufficiently Active    Days of Exercise per Week: 4 days    Minutes of Exercise per Session: 60 min   Stress: Stress Concern Present    Feeling of Stress : Very much   Social Connections: Unknown    Frequency of Communication with Friends and Family: More than three times a week    Frequency of Social Gatherings with Friends and Family: Once a week    Active Member of Clubs or Organizations: No    Attends Club or Organization Meetings: 1 to 4 times per year    Marital Status:    Housing Stability: Low Risk     Unable to Pay for Housing in the Last Year: No    Number of  Places Lived in the Last Year: 1    Unstable Housing in the Last Year: No     Medication List with Changes/Refills   Current Medications    AMLODIPINE (NORVASC) 2.5 MG TABLET    Take 1 tablet (2.5 mg total) by mouth once daily.    ASPIRIN 81 MG CHEW    Take 81 mg by mouth as needed. Hold 5 days prior to surgery    DICLOFENAC (VOLTAREN) 50 MG EC TABLET    Take 1 tablet (50 mg total) by mouth 2 (two) times daily.    DOCUSATE SODIUM (COLACE) 100 MG CAPSULE    Take 1 capsule (100 mg total) by mouth 2 (two) times daily.    ESOMEPRAZOLE (NEXIUM) 40 MG CAPSULE    Take 1 capsule (40 mg total) by mouth before breakfast.    FLUTICASONE PROPIONATE (FLONASE) 50 MCG/ACTUATION NASAL SPRAY    2 sprays (100 mcg total) by Each Nostril route once daily.    GABAPENTIN (NEURONTIN) 100 MG CAPSULE    Take 1-2 capsules (100-200 mg total) by mouth every evening.    L.ACIDOPHILUS-BIF. ANIMALIS (PROBIOTIC) 5 BILLION CELL CPSP    Take 1 tablet by mouth once daily.    LIDOCAINE (LIDODERM) 5 %    Place onto the skin.    LOSARTAN-HYDROCHLOROTHIAZIDE 100-25 MG (HYZAAR) 100-25 MG PER TABLET    Take 1 tablet by mouth once daily.    MELOXICAM (MOBIC) 15 MG TABLET    Take 1 tablet (15 mg total) by mouth daily as needed for Pain.    METHOCARBAMOL (ROBAXIN) 750 MG TAB    Take 1 tablet (750 mg total) by mouth 2 (two) times daily as needed (Muscle Spasm).    MULTIVIT,CALC,MINS/IRON/FOLIC (DAILY MULTIPLE FOR WOMEN ORAL)    Take 1 tablet by mouth once daily. Hold 5 days prior to surgery    OMEGA-3 FATTY ACIDS/FISH OIL (FISH OIL-OMEGA-3 FATTY ACIDS) 300-1,000 MG CAPSULE    Take by mouth once daily. Hold 5 days prior to surgery    ROSUVASTATIN (CRESTOR) 40 MG TAB    Take 1 tablet (40 mg total) by mouth every evening.     Review of patient's allergies indicates:   Allergen Reactions    Sulfa (sulfonamide antibiotics) Hives       Physical Exam:   Body mass index is 36.63 kg/m².    Physical Exam      Detailed MSK exam:   Ortho/SPM Exam       Imaging:  X-ray  Shoulder 2 or More Views Right  Narrative: EXAMINATION:  XR SHOULDER COMPLETE 2 OR MORE VIEWS RIGHT    INDICATION:  Pain in right shoulder    COMPARISON:  Right shoulder radiographs from 11/05/2019    FINDINGS:  Internal rotation, external rotation, scapular Y and axillary views of the right shoulder are obtained.  No fracture.  Alignment is within normal limits.  There are mild degenerative changes at the acromioclavicular joint with small inferiorly directed marginal osteophytes.  There are mild degenerative changes at the glenohumeral articulation.  Partially visualized lung fields are clear.  Impression: 1. Mild degenerative changes at the acromioclavicular joint and glenohumeral articulation.    Electronically signed by: Spencer Olivo MD  Date:    11/14/2022  Time:    11:14    ***    Relevant imaging results were reviewed and interpreted by me and per my read: ***    This was discussed with the patient and / or family today.       There are no Patient Instructions on file for this visit.        A copy of today's visit note has been sent to the referring provider.       Mono Busby MD  Primary Care Sports Medicine        Disclaimer: This note was prepared using a voice recognition system and is likely to have sound alike errors within the text.     I spent a total of *** minutes on the day of the visit.This includes face to face time and non-face to face time preparing to see the patient (eg, review of tests), obtaining and/or reviewing separately obtained history, documenting clinical information in the electronic or other health record, independently interpreting results and communicating results to the patient/family/caregiver, or care coordinator.

## 2023-01-13 NOTE — PATIENT INSTRUCTIONS
Assessment:  Ora Henderson is a 62 y.o. female with a chief complaint of Pain of the Right Hip      Encounter Diagnoses   Name Primary?    Chronic right-sided low back pain with right-sided sciatica Yes    Chronic right SI joint pain     Greater trochanteric pain syndrome of right lower extremity         Plan:  XR reviewed today with mild-to-moderate degenerative changes of the right hip  History and clinical exam, however, are consistent more with chronic low back pain with sciatica, right-sided SI joint pain, and greater trochanteric pain syndrome  Patient follows with pain management, Dr Treadwell, and has undergone SI joint injections in the past  Would recommend to follow up with Pain Mgmt regarding these issues  We did discuss option of GTB injection today, however given patient habitus, and size of required needle, accuracy of this injection under ultrasound guidance may be limited, and patient prefers to discuss options with Dr Treadwell for possibly fluoroscopic guidance as well as possible sedation options, as per prior SI joint injections    Follow-up: As needed or sooner if there are any problems between now and then.    Thank you for choosing [facility] and Dr. Mono Busby for your orthopedic & sports medicine care. It is our goal to provide you with exceptional care that will help keep you healthy, active, and get you back in the game.    Please do not hesitate to reach out to us via email, phone, or MyChart with any questions, concerns, or feedback.    If you are experiencing pain/discomfort ,or have questions after 5pm and would like to be connected to the [facility] on-call team, please call this number and specify which Sports Medicine provider is treating you: [phone]

## 2023-01-13 NOTE — PROGRESS NOTES
Patient ID: Ora Henderson  YOB: 1960  MRN: [id number]    Chief Complaint: Pain of the Right Hip      Referred By: Self      Occupation: Medical Billing      History of Present Illness: Ora Henderson is a 62 y.o. female who presents today with 6/10 pain c/o right hip pain. Patient is established with our office for bilateral carpal tunnel syndrome and right shoulder impingement.    Today, she presents with right hip pain. She states the pain began about 2 weeks ago at the beginning of January. She describes the pain as intermittent sharp pain. She states that aleve and light exercise gives her relief. She states her pain is worse in the morning when waking up. Pain is mainly in the posterior gluteal and SI region in the morning and radiates down into her leg.  She states that she had an appointment with Dr. Treadwell for this condition but her  thought she should follow up with Dr. Busby to discuss this issue.       The patient is active in none.          Past Medical History:   Past Medical History:   Diagnosis Date    Arthritis     Encounter for blood transfusion     Fatty liver     GERD (gastroesophageal reflux disease)     Hernia, umbilical     reducible    Hyperlipidemia     Hypertension     Rotator cuff tear     Sleep apnea      Past Surgical History:   Procedure Laterality Date    ARTHROSCOPIC DEBRIDEMENT OF SHOULDER Right 12/30/2019    Procedure: DEBRIDEMENT, SHOULDER, ARTHROSCOPIC;  Surgeon: Laureano Cox MD;  Location: [facility] OR;  Service: Orthopedics;  Laterality: Right;    ARTHROSCOPY OF SHOULDER WITH DECOMPRESSION OF SUBACROMIAL SPACE Right 12/30/2019    Procedure: ARTHROSCOPY, SHOULDER, WITH SUBACROMIAL SPACE DECOMPRESSION;  Surgeon: Laureano Cox MD;  Location: [facility] OR;  Service: Orthopedics;  Laterality: Right;    BICEPS TENDON REPAIR Right 12/30/2019    Procedure: REPAIR, TENDON, BICEPS;  Surgeon: Laureano Cox  MD;  Location: [facility] OR;  Service: Orthopedics;  Laterality: Right;  arthroscopic Biceps tenodesis    BRAIN SURGERY      Pituitary tumor removal 2001 x 2     CARPAL TUNNEL RELEASE Bilateral     x 2    CERVICAL BIOPSY  W/ LOOP ELECTRODE EXCISION      CKC '81     SECTION      x 1    COLONOSCOPY N/A 2016    Procedure: COLONOSCOPY;  Surgeon: Quique Paul MD;  Location: [facility] ENDO;  Service: Endoscopy;  Laterality: N/A;    COLONOSCOPY N/A 2021    Procedure: COLONOSCOPY;  Surgeon: Mari Tucker MD;  Location: [facility] ENDO;  Service: Endoscopy;  Laterality: N/A;    DISTAL CLAVICLE EXCISION Right 2019    Procedure: EXCISION, CLAVICLE, DISTAL;  Surgeon: Laureano Cox MD;  Location: [facility] OR;  Service: Orthopedics;  Laterality: Right;  arthroscopic    EPIDURAL STEROID INJECTION INTO CERVICAL SPINE N/A 9/15/2022    Procedure: C7/T1 IL ARMANDO, Right Paramedian Approach;  Surgeon: Yung Treadwell MD;  Location: [facility] PAIN MGT;  Service: Pain Management;  Laterality: N/A;    GANGLION CYST EXCISION Left 2018    HYSTERECTOMY      REFRACTIVE SURGERY      ROTATOR CUFF REPAIR Right 2019    Procedure: REPAIR, ROTATOR CUFF;  Surgeon: Laureano Cox MD;  Location: [facility] OR;  Service: Orthopedics;  Laterality: Right;  arthroscopic    SELECTIVE INJECTION OF ANESTHETIC AGENT AROUND LUMBAR SPINAL NERVE ROOT BY TRANSFORAMINAL APPROACH Bilateral 10/07/2021    Procedure: BLOCK, SPINAL NERVE ROOT, LUMBAR, SELECTIVE, TRANSFORAMINAL APPROACH bilateral L4-5 Rn IV sedation;  Surgeon: Damon Charlton MD;  Location: [facility] PAIN MGT;  Service: Pain Management;  Laterality: Bilateral;    SELECTIVE INJECTION OF ANESTHETIC AGENT AROUND LUMBAR SPINAL NERVE ROOT BY TRANSFORAMINAL APPROACH Bilateral 2021    Procedure: BLOCK, SPINAL NERVE ROOT, LUMBAR, SELECTIVE, TRANSFORAMINAL APPROACH bilateral S1 RN IV sedation;  Surgeon: Damon Charlton MD;  Location: [facility] PAIN MGT;  Service: Pain Management;   Laterality: Bilateral;    SYNOVECTOMY OF SHOULDER Right 12/30/2019    Procedure: SYNOVECTOMY, SHOULDER;  Surgeon: Laureano Cox MD;  Location: [facility];  Service: Orthopedics;  Laterality: Right;  arthroscopic    TOTAL ABDOMINAL HYSTERECTOMY W/ BILATERAL SALPINGOOPHORECTOMY  2006    STAR/BSO secondary to endometriosis    VENTRAL HERNIA REPAIR  2016     Family History   Problem Relation Age of Onset    Hypertension Father     Prostate cancer Father     Hypertension Mother     Cancer Maternal Aunt         pancreas    Cancer Maternal Uncle         pancreas    Heart disease Paternal Uncle     Heart disease Paternal Grandmother     Diabetes Maternal Aunt     Heart attack Sister 30    Heart attack Brother 32     Social History     Socioeconomic History    Marital status:     Number of children: 1   Occupational History    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Employer: [employer]   Tobacco Use    Smoking status: Never    Smokeless tobacco: Never   Substance and Sexual Activity    Alcohol use: Yes     Alcohol/week: 0.0 standard drinks     Comment: socially    Drug use: No    Sexual activity: Yes     Partners: Male     Birth control/protection: None, Surgical     Social Determinants of Health     Financial Resource Strain: Low Risk     Difficulty of Paying Living Expenses: Not hard at all   Food Insecurity: No Food Insecurity    Worried About Running Out of Food in the Last Year: Never true    Ran Out of Food in the Last Year: Never true   Transportation Needs: No Transportation Needs    Lack of Transportation (Medical): No    Lack of Transportation (Non-Medical): No   Physical Activity: Sufficiently Active    Days of Exercise per Week: 5 days    Minutes of Exercise per Session: 30 min   Stress: Stress Concern Present    Feeling of Stress : To some extent   Social Connections: Unknown    Frequency of Communication with Friends and Family: More than three times a week    Frequency of Social Gatherings with  Friends and Family: More than three times a week    Active Member of Clubs or Organizations: No    Attends Club or Organization Meetings: Never    Marital Status:    Housing Stability: Low Risk     Unable to Pay for Housing in the Last Year: No    Number of Places Lived in the Last Year: 1    Unstable Housing in the Last Year: No     Medication List with Changes/Refills   Current Medications    AMLODIPINE (NORVASC) 2.5 MG TABLET    Take 1 tablet (2.5 mg total) by mouth once daily.    ASPIRIN 81 MG CHEW    Take 81 mg by mouth as needed. Hold 5 days prior to surgery    DICLOFENAC (VOLTAREN) 50 MG EC TABLET    Take 1 tablet (50 mg total) by mouth 2 (two) times daily.    DOCUSATE SODIUM (COLACE) 100 MG CAPSULE    Take 1 capsule (100 mg total) by mouth 2 (two) times daily.    ESOMEPRAZOLE (NEXIUM) 40 MG CAPSULE    Take 1 capsule (40 mg total) by mouth before breakfast.    FLUTICASONE PROPIONATE (FLONASE) 50 MCG/ACTUATION NASAL SPRAY    2 sprays (100 mcg total) by Each Nostril route once daily.    GABAPENTIN (NEURONTIN) 100 MG CAPSULE    Take 1-2 capsules (100-200 mg total) by mouth every evening.    L.ACIDOPHILUS-BIF. ANIMALIS (PROBIOTIC) 5 BILLION CELL CPSP    Take 1 tablet by mouth once daily.    LIDOCAINE (LIDODERM) 5 %    Place onto the skin.    LOSARTAN-HYDROCHLOROTHIAZIDE 100-25 MG (HYZAAR) 100-25 MG PER TABLET    Take 1 tablet by mouth once daily.    MELOXICAM (MOBIC) 15 MG TABLET    Take 1 tablet (15 mg total) by mouth daily as needed for Pain.    METHOCARBAMOL (ROBAXIN) 750 MG TAB    Take 1 tablet (750 mg total) by mouth 2 (two) times daily as needed (Muscle Spasm).    MULTIVIT,CALC,MINS/IRON/FOLIC (DAILY MULTIPLE FOR WOMEN ORAL)    Take 1 tablet by mouth once daily. Hold 5 days prior to surgery    OMEGA-3 FATTY ACIDS/FISH OIL (FISH OIL-OMEGA-3 FATTY ACIDS) 300-1,000 MG CAPSULE    Take by mouth once daily. Hold 5 days prior to surgery    ROSUVASTATIN (CRESTOR) 40 MG TAB    Take 1 tablet (40 mg total) by  mouth every evening.     Review of patient's allergies indicates:   Allergen Reactions    Sulfa (sulfonamide antibiotics) Hives       Physical Exam:   Body mass index is 36.63 kg/m².    Physical Exam      Detailed MSK exam:   Right Hip:    Inspection: Normal     Palpation tenderness: Greater trochanter and SI joint    Range of motion: Normal deg Flexion       Normal deg IR       mild deg ER    Strength:  5/5 Flexion    5/5 Abduction    5/5 Adduction    Impingement Tests: Positive GUSTABO     Negative FADIR    Other Tests:  Negative Log Roll  Negative Circumduction  Negative Piriformis     Negative Emmanuel's          N/V Exam:  Tibial:    Normal sensory (plantar foot)  Normal motor (FHL)    Sup Peroneal:   Normal sensory (dorsal foot)  Normal motor (Peroneals)            Deep Peroneal:   Normal sensory (1st web space)  Normal motor (EHL)    Sural:   Normal sensory (lateral foot)   Saphenous:   Normal sensory (medial lower leg)   Normal pedal pulses, warm and well perfused with capillary refill < 2 sec         Left Hip:    Inspection: Normal    Palpation tenderness: Greater trochanter    Range of motion: Normal deg Flexion       Normal deg IR       mild deg ER    Strength:  5/5 Flexion    5/5 Abduction    5/5 Adduction    Impingement Tests: Negative GUSTABO     Negative FADIR    Other Tests:  Negative Log Roll  Negative Circumduction  Negative Piriformis     Negative Emmanuel's          N/V Exam:  Tibial:    Normal sensory (plantar foot)  Normal motor (FHL)    Sup Peroneal:   Normal sensory (dorsal foot)  Normal motor (Peroneals)            Deep Peroneal:   Normal sensory (1st web space)  Normal motor (EHL)    Sural:   Normal sensory (lateral foot)   Saphenous:   Normal sensory (medial lower leg)   Normal pedal pulses, warm and well perfused with capillary refill < 2 sec               Imaging:  XR R hip & pelvis - 01/13/2023    Relevant imaging results were reviewed and interpreted by me and per my read:  Mild degenerative  changes of the right hip with subchondral sclerosis and superior acetabulum osteophyte formation, however well-preserved joint space.  Mild degenerative changes/cortical irregularities around the greater trochanter.  Normal alignment.  No fractures or other acute abnormalities.    This was discussed with the patient and / or family today.       Patient Instructions   Assessment:  Ora Henderson is a 62 y.o. female with a chief complaint of Pain of the Right Hip      Encounter Diagnoses   Name Primary?    Chronic right-sided low back pain with right-sided sciatica Yes    Chronic right SI joint pain     Greater trochanteric pain syndrome of right lower extremity         Plan:  XR reviewed today with mild-to-moderate degenerative changes of the right hip  History and clinical exam, however, are consistent more with chronic low back pain with sciatica, right-sided SI joint pain, and greater trochanteric pain syndrome  Patient follows with pain management, Dr Treadwell, and has undergone SI joint injections in the past  Would recommend to follow up with Pain Mgmt regarding these issues  We did discuss option of GTB injection today, however given patient habitus, and size of required needle, accuracy of this injection under ultrasound guidance may be limited, and patient prefers to discuss options with Dr Treadwell for possibly fluoroscopic guidance as well as possible sedation options, as per prior SI joint injections    Follow-up: As needed or sooner if there are any problems between now and then.    Thank you for choosing [facility] and Dr. Mono Busby for your orthopedic & sports medicine care. It is our goal to provide you with exceptional care that will help keep you healthy, active, and get you back in the game.    Please do not hesitate to reach out to us via email, phone, or MyChart with any questions, concerns, or feedback.    If you are experiencing pain/discomfort  ,or have questions after 5pm and would like to be connected to the [facility] on-call team, please call this number and specify which Sports Medicine provider is treating you: [phone]           A copy of today's visit note has been sent to the referring provider.       Mono Busby MD  Primary Care Sports Medicine        Disclaimer: This note was prepared using a voice recognition system and is likely to have sound alike errors within the text.

## 2023-01-19 ENCOUNTER — TELEPHONE (OUTPATIENT)
Dept: INTERNAL MEDICINE | Facility: CLINIC | Age: 63
End: 2023-01-19
Payer: COMMERCIAL

## 2023-01-19 NOTE — TELEPHONE ENCOUNTER
----- Message from Rebeca Ocampo sent at 1/19/2023 11:32 AM CST -----  Contact: Ora  Patient is calling to speak with the nurse regarding appt. Reports wanting to know if the patient needs to do blood work before appt. Please give patient a call back at [phone].

## 2023-01-20 ENCOUNTER — OFFICE VISIT (OUTPATIENT)
Dept: INTERNAL MEDICINE | Facility: CLINIC | Age: 63
End: 2023-01-20
Payer: COMMERCIAL

## 2023-01-20 VITALS
BODY MASS INDEX: 35.86 KG/M2 | HEIGHT: 63 IN | TEMPERATURE: 97 F | HEART RATE: 83 BPM | RESPIRATION RATE: 20 BRPM | OXYGEN SATURATION: 100 % | DIASTOLIC BLOOD PRESSURE: 88 MMHG | SYSTOLIC BLOOD PRESSURE: 138 MMHG | WEIGHT: 202.38 LBS

## 2023-01-20 DIAGNOSIS — Z86.010 HX OF COLONIC POLYP: ICD-10-CM

## 2023-01-20 DIAGNOSIS — M47.22 OSTEOARTHRITIS OF SPINE WITH RADICULOPATHY, CERVICAL REGION: Chronic | ICD-10-CM

## 2023-01-20 DIAGNOSIS — K21.9 GASTROESOPHAGEAL REFLUX DISEASE WITHOUT ESOPHAGITIS: Chronic | ICD-10-CM

## 2023-01-20 DIAGNOSIS — E78.2 MIXED HYPERLIPIDEMIA: ICD-10-CM

## 2023-01-20 DIAGNOSIS — E66.01 SEVERE OBESITY (BMI 35.0-39.9) WITH COMORBIDITY: ICD-10-CM

## 2023-01-20 DIAGNOSIS — M47.26 OSTEOARTHRITIS OF SPINE WITH RADICULOPATHY, LUMBAR REGION: ICD-10-CM

## 2023-01-20 DIAGNOSIS — I10 ESSENTIAL HYPERTENSION: Primary | Chronic | ICD-10-CM

## 2023-01-20 DIAGNOSIS — G47.33 OSA (OBSTRUCTIVE SLEEP APNEA): ICD-10-CM

## 2023-01-20 DIAGNOSIS — Z86.018 HISTORY OF BENIGN PITUITARY TUMOR: Chronic | ICD-10-CM

## 2023-01-20 DIAGNOSIS — R73.03 PREDIABETES: ICD-10-CM

## 2023-01-20 PROBLEM — M53.82 DECREASED ROM OF INTERVERTEBRAL DISCS OF CERVICAL SPINE: Status: RESOLVED | Noted: 2022-11-18 | Resolved: 2023-01-20

## 2023-01-20 PROBLEM — M54.16 LUMBAR RADICULOPATHY: Status: RESOLVED | Noted: 2021-10-07 | Resolved: 2023-01-20

## 2023-01-20 PROBLEM — R29.898 UPPER EXTREMITY WEAKNESS: Status: RESOLVED | Noted: 2022-11-18 | Resolved: 2023-01-20

## 2023-01-20 PROCEDURE — 99214 OFFICE O/P EST MOD 30 MIN: CPT | Mod: S$GLB,,, | Performed by: FAMILY MEDICINE

## 2023-01-20 PROCEDURE — 99214 PR OFFICE/OUTPT VISIT, EST, LEVL IV, 30-39 MIN: ICD-10-PCS | Mod: S$GLB,,, | Performed by: FAMILY MEDICINE

## 2023-01-20 PROCEDURE — 3079F PR MOST RECENT DIASTOLIC BLOOD PRESSURE 80-89 MM HG: ICD-10-PCS | Mod: CPTII,S$GLB,, | Performed by: FAMILY MEDICINE

## 2023-01-20 PROCEDURE — 80053 COMPREHEN METABOLIC PANEL: CPT | Performed by: FAMILY MEDICINE

## 2023-01-20 PROCEDURE — 1159F PR MEDICATION LIST DOCUMENTED IN MEDICAL RECORD: ICD-10-PCS | Mod: CPTII,S$GLB,, | Performed by: FAMILY MEDICINE

## 2023-01-20 PROCEDURE — 1160F PR REVIEW ALL MEDS BY PRESCRIBER/CLIN PHARMACIST DOCUMENTED: ICD-10-PCS | Mod: CPTII,S$GLB,, | Performed by: FAMILY MEDICINE

## 2023-01-20 PROCEDURE — 3008F PR BODY MASS INDEX (BMI) DOCUMENTED: ICD-10-PCS | Mod: CPTII,S$GLB,, | Performed by: FAMILY MEDICINE

## 2023-01-20 PROCEDURE — 3075F SYST BP GE 130 - 139MM HG: CPT | Mod: CPTII,S$GLB,, | Performed by: FAMILY MEDICINE

## 2023-01-20 PROCEDURE — 83036 HEMOGLOBIN GLYCOSYLATED A1C: CPT | Performed by: FAMILY MEDICINE

## 2023-01-20 PROCEDURE — 1160F RVW MEDS BY RX/DR IN RCRD: CPT | Mod: CPTII,S$GLB,, | Performed by: FAMILY MEDICINE

## 2023-01-20 PROCEDURE — 84443 ASSAY THYROID STIM HORMONE: CPT | Performed by: FAMILY MEDICINE

## 2023-01-20 PROCEDURE — 99999 PR PBB SHADOW E&M-EST. PATIENT-LVL IV: ICD-10-PCS | Mod: PBBFAC,,, | Performed by: FAMILY MEDICINE

## 2023-01-20 PROCEDURE — 3079F DIAST BP 80-89 MM HG: CPT | Mod: CPTII,S$GLB,, | Performed by: FAMILY MEDICINE

## 2023-01-20 PROCEDURE — 1159F MED LIST DOCD IN RCRD: CPT | Mod: CPTII,S$GLB,, | Performed by: FAMILY MEDICINE

## 2023-01-20 PROCEDURE — 3008F BODY MASS INDEX DOCD: CPT | Mod: CPTII,S$GLB,, | Performed by: FAMILY MEDICINE

## 2023-01-20 PROCEDURE — 99999 PR PBB SHADOW E&M-EST. PATIENT-LVL IV: CPT | Mod: PBBFAC,,, | Performed by: FAMILY MEDICINE

## 2023-01-20 PROCEDURE — 3075F PR MOST RECENT SYSTOLIC BLOOD PRESS GE 130-139MM HG: ICD-10-PCS | Mod: CPTII,S$GLB,, | Performed by: FAMILY MEDICINE

## 2023-01-20 RX ORDER — ROSUVASTATIN CALCIUM 40 MG/1
40 TABLET, COATED ORAL NIGHTLY
Qty: 90 TABLET | Refills: 1 | Status: SHIPPED | OUTPATIENT
Start: 2023-01-20 | End: 2024-03-05

## 2023-01-20 RX ORDER — AMLODIPINE BESYLATE 2.5 MG/1
2.5 TABLET ORAL DAILY
Qty: 90 TABLET | Refills: 1 | Status: SHIPPED | OUTPATIENT
Start: 2023-01-20 | End: 2023-11-09 | Stop reason: DRUGHIGH

## 2023-01-20 RX ORDER — LOSARTAN POTASSIUM AND HYDROCHLOROTHIAZIDE 25; 100 MG/1; MG/1
1 TABLET ORAL DAILY
Qty: 90 TABLET | Refills: 1 | Status: SHIPPED | OUTPATIENT
Start: 2023-01-20 | End: 2023-11-09 | Stop reason: SINTOL

## 2023-01-20 NOTE — PROGRESS NOTES
"Subjective:       Patient ID: Ora Henderson is a 62 y.o. female.    Chief Complaint: Follow-up    62-year-old  female patient with Patient Active Problem List:     DJD (degenerative joint disease), cervical-mild     Mixed hyperlipidemia     Hepatomegaly     Family history of cardiovascular disease     GERD (gastroesophageal reflux disease)     History of benign pituitary tumor     Hx of colonic polyp     Essential hypertension     Osteoarthritis of spine with radiculopathy, lumbar region     Severe obesity (BMI 35.0-39.9) with comorbidity     NATALIE (obstructive sleep apnea)   here for follow-up on chronic medical conditions and reports that she has been taking her medications regularly  Denies any chest pain or difficulty breathing with palpitations  Reports that she was stressed out today before coming into the clinic after having an argument, but has been monitoring her blood pressures at home which has been stable  Requesting refills on her medications today  Patient has been followed by Dr. Treadwell, pain management for neck and back pain, staying physically active with exercise daily        Review of Systems   Constitutional:  Negative for fatigue.   Eyes:  Negative for visual disturbance.   Respiratory:  Negative for shortness of breath.    Cardiovascular:  Negative for chest pain and leg swelling.   Gastrointestinal:  Negative for abdominal pain, nausea and vomiting.   Musculoskeletal:  Positive for arthralgias, back pain, myalgias and neck pain.   Skin:  Negative for rash.   Neurological:  Positive for numbness. Negative for weakness, light-headedness and headaches.   Psychiatric/Behavioral:  Negative for sleep disturbance.        /88 (BP Location: Left arm, Patient Position: Sitting, BP Method: Medium (Manual))   Pulse 83   Temp 97.2 °F (36.2 °C) (Tympanic)   Resp 20   Ht 5' 3" (1.6 m)   Wt 91.8 kg (202 lb 6.1 oz)   LMP  (LMP Unknown)   SpO2 100%   BMI 35.85 " kg/m²   Objective:      Physical Exam  Constitutional:       Appearance: She is well-developed.   HENT:      Head: Normocephalic and atraumatic.   Cardiovascular:      Rate and Rhythm: Normal rate and regular rhythm.      Heart sounds: Normal heart sounds. No murmur heard.  Pulmonary:      Effort: Pulmonary effort is normal.      Breath sounds: Normal breath sounds. No wheezing.   Abdominal:      General: Bowel sounds are normal.      Palpations: Abdomen is soft.      Tenderness: There is no abdominal tenderness.   Musculoskeletal:         General: Tenderness present.      Comments: Positive for paraspinal lumbar muscle tenderness on the right side   Skin:     General: Skin is warm and dry.      Findings: No rash.   Neurological:      Mental Status: She is alert and oriented to person, place, and time.   Psychiatric:         Mood and Affect: Mood normal.         Assessment/Plan:   1. Essential hypertension  -     Comprehensive Metabolic Panel; Future; Expected date: 01/20/2023  -     TSH; Future; Expected date: 01/20/2023  -     losartan-hydrochlorothiazide 100-25 mg (HYZAAR) 100-25 mg per tablet; Take 1 tablet by mouth once daily.  Dispense: 90 tablet; Refill: 1  -     amLODIPine (NORVASC) 2.5 MG tablet; Take 1 tablet (2.5 mg total) by mouth once daily.  Dispense: 90 tablet; Refill: 1  Blood pressure is stable currently on losartan hydrochlorothiazide 100/25 mg and amlodipine 2.5 mg daily  Patient was encouraged to start monitoring blood pressure trends to hypertension digital program  Restrict salt intake and avoid stress    2. Mixed hyperlipidemia  -     rosuvastatin (CRESTOR) 40 MG Tab; Take 1 tablet (40 mg total) by mouth every evening.  Dispense: 90 tablet; Refill: 1  Currently taking Crestor 40 mg daily    3. Gastroesophageal reflux disease without esophagitis  Stable on over-the-counter Nexium as needed    4. Osteoarthritis of spine with radiculopathy, cervical region  5. Osteoarthritis of spine with  radiculopathy, lumbar region  Overview:  Dr Treadwell s/p ARMANDO  currently taking gabapentin as needed and methocarbamol  Graded exercise regimen recommended      6. NATALIE (obstructive sleep apnea)  Overview:  Not on CPAP  Stable      7. Hx of colonic polyp    8. History of benign pituitary tumor  Overview:  She had pituitary Tumor s/p resection in 2001. She is asymptomatic but still concern of recurrence. She had no MRI follow up for long time. It was explained that there is remote chance of recurrence but can happens. Recent MRI of the brain did not show any changes.    Stable and asymptomatic      9. Severe obesity (BMI 35.0-39.9) with comorbidity  10. Prediabetes  -     Hemoglobin A1C; Future; Expected date: 01/20/2023     Lifestyle modifications recommended to lose weight with BMI 35

## 2023-01-21 LAB
ALBUMIN SERPL BCP-MCNC: 4.1 G/DL (ref 3.5–5.2)
ALP SERPL-CCNC: 69 U/L (ref 55–135)
ALT SERPL W/O P-5'-P-CCNC: 19 U/L (ref 10–44)
ANION GAP SERPL CALC-SCNC: 13 MMOL/L (ref 8–16)
AST SERPL-CCNC: 19 U/L (ref 10–40)
BILIRUB SERPL-MCNC: 0.9 MG/DL (ref 0.1–1)
BUN SERPL-MCNC: 10 MG/DL (ref 8–23)
CALCIUM SERPL-MCNC: 9.4 MG/DL (ref 8.7–10.5)
CHLORIDE SERPL-SCNC: 101 MMOL/L (ref 95–110)
CO2 SERPL-SCNC: 24 MMOL/L (ref 23–29)
CREAT SERPL-MCNC: 0.8 MG/DL (ref 0.5–1.4)
EST. GFR  (NO RACE VARIABLE): >60 ML/MIN/1.73 M^2
ESTIMATED AVG GLUCOSE: 120 MG/DL (ref 68–131)
GLUCOSE SERPL-MCNC: 76 MG/DL (ref 70–110)
HBA1C MFR BLD: 5.8 % (ref 4–5.6)
POTASSIUM SERPL-SCNC: 3.9 MMOL/L (ref 3.5–5.1)
PROT SERPL-MCNC: 7.3 G/DL (ref 6–8.4)
SODIUM SERPL-SCNC: 138 MMOL/L (ref 136–145)
TSH SERPL DL<=0.005 MIU/L-ACNC: 2.14 UIU/ML (ref 0.4–4)

## 2023-01-22 ENCOUNTER — PATIENT MESSAGE (OUTPATIENT)
Dept: INTERNAL MEDICINE | Facility: CLINIC | Age: 63
End: 2023-01-22
Payer: COMMERCIAL

## 2023-01-31 ENCOUNTER — OFFICE VISIT (OUTPATIENT)
Dept: PAIN MEDICINE | Facility: CLINIC | Age: 63
End: 2023-01-31
Payer: COMMERCIAL

## 2023-01-31 VITALS
DIASTOLIC BLOOD PRESSURE: 84 MMHG | WEIGHT: 201.25 LBS | BODY MASS INDEX: 35.66 KG/M2 | HEART RATE: 82 BPM | HEIGHT: 63 IN | SYSTOLIC BLOOD PRESSURE: 128 MMHG

## 2023-01-31 DIAGNOSIS — M46.1 SACROILIITIS: Primary | ICD-10-CM

## 2023-01-31 DIAGNOSIS — M47.816 LUMBAR SPONDYLOSIS: ICD-10-CM

## 2023-01-31 DIAGNOSIS — M51.36 DDD (DEGENERATIVE DISC DISEASE), LUMBAR: ICD-10-CM

## 2023-01-31 DIAGNOSIS — M70.61 GREATER TROCHANTERIC BURSITIS OF RIGHT HIP: ICD-10-CM

## 2023-01-31 PROCEDURE — 99999 PR PBB SHADOW E&M-EST. PATIENT-LVL IV: ICD-10-PCS | Mod: PBBFAC,,, | Performed by: ANESTHESIOLOGY

## 2023-01-31 PROCEDURE — 3079F PR MOST RECENT DIASTOLIC BLOOD PRESSURE 80-89 MM HG: ICD-10-PCS | Mod: CPTII,S$GLB,, | Performed by: ANESTHESIOLOGY

## 2023-01-31 PROCEDURE — 3074F SYST BP LT 130 MM HG: CPT | Mod: CPTII,S$GLB,, | Performed by: ANESTHESIOLOGY

## 2023-01-31 PROCEDURE — 3079F DIAST BP 80-89 MM HG: CPT | Mod: CPTII,S$GLB,, | Performed by: ANESTHESIOLOGY

## 2023-01-31 PROCEDURE — 99214 OFFICE O/P EST MOD 30 MIN: CPT | Mod: S$GLB,,, | Performed by: ANESTHESIOLOGY

## 2023-01-31 PROCEDURE — 1159F MED LIST DOCD IN RCRD: CPT | Mod: CPTII,S$GLB,, | Performed by: ANESTHESIOLOGY

## 2023-01-31 PROCEDURE — 3008F PR BODY MASS INDEX (BMI) DOCUMENTED: ICD-10-PCS | Mod: CPTII,S$GLB,, | Performed by: ANESTHESIOLOGY

## 2023-01-31 PROCEDURE — 3074F PR MOST RECENT SYSTOLIC BLOOD PRESSURE < 130 MM HG: ICD-10-PCS | Mod: CPTII,S$GLB,, | Performed by: ANESTHESIOLOGY

## 2023-01-31 PROCEDURE — 3044F HG A1C LEVEL LT 7.0%: CPT | Mod: CPTII,S$GLB,, | Performed by: ANESTHESIOLOGY

## 2023-01-31 PROCEDURE — 1159F PR MEDICATION LIST DOCUMENTED IN MEDICAL RECORD: ICD-10-PCS | Mod: CPTII,S$GLB,, | Performed by: ANESTHESIOLOGY

## 2023-01-31 PROCEDURE — 99999 PR PBB SHADOW E&M-EST. PATIENT-LVL IV: CPT | Mod: PBBFAC,,, | Performed by: ANESTHESIOLOGY

## 2023-01-31 PROCEDURE — 99214 PR OFFICE/OUTPT VISIT, EST, LEVL IV, 30-39 MIN: ICD-10-PCS | Mod: S$GLB,,, | Performed by: ANESTHESIOLOGY

## 2023-01-31 PROCEDURE — 3008F BODY MASS INDEX DOCD: CPT | Mod: CPTII,S$GLB,, | Performed by: ANESTHESIOLOGY

## 2023-01-31 PROCEDURE — 3044F PR MOST RECENT HEMOGLOBIN A1C LEVEL <7.0%: ICD-10-PCS | Mod: CPTII,S$GLB,, | Performed by: ANESTHESIOLOGY

## 2023-01-31 NOTE — PROGRESS NOTES
Interventional Pain Progress Note       Chief Pain Complaint:  Right Hip Pain    Interval History (01/31/2023):  Patient returns to clinic for evaluation of right hip pain.  Patient reports 100% relief in neck pain after a C7-T1 interlaminar epidural steroid injection on 09/15/2022.  Patient reports 4 month history of right hip pain.  Pain is described as an aching throbbing pain over the greater trochanter area that radiates to the right buttocks and down the lateral aspect of her right thigh to just above the knee.  Pain is worse in the morning and with prolonged walking, better at night and with stretching.  Pain is rated at 2/10, but increases to an 8/10 with exacerbating factors. Denies any fevers, chills, changes in gait, weakness, or bowel and bladder incontinence        Interval History (8/22/22):  Patient returns to clinic for  evaluation of muscle spasms in right upper extremity pain.  Patient reports 1 month history of bilateral lower-extremity muscle spasm pain primarily in the calf region.  Patient is currently on diuretic and Lipitor.  Neck pain is ongoing and described as a burning aching pain over the right lower neck and right proximal biceps area.  Pain is worse with neck rotation and lifting, better with heat and rest.  Pain is rated a 7/10. Denies any fevers, chills, changes in gait, weakness, or bowel and bladder incontinence      History of Present Illness:   Ora Blancoas is a 62 y.o. female  who is presenting with a chief complaint of Lumbar Back Pain. The patient began experiencing this problem insidiously, and the pain has been gradually worsening over the past 10 month(s). The pain is described as throbbing, shooting, burning and electrical and is located in the bilateral lumbar spine . Pain is intermittent and lasts hours. The pain radiates to bilateral lower extremities. The patient rates her pain a 7 out of ten and interferes with activities of daily living a 7 out of  ten. Pain is exacerbated by flexion of the lumbar spine, and is improved by rest. Patient reports no prior trauma, no prior spinal surgery     Ora Henderson is a 62 y.o. female  who is presenting with a chief complaint of Hip Pain  . The patient began experiencing this problem insidiously, and the pain has been gradually worsening over the past 7 month(s). The pain is described as throbbing, cramping, aching and heavy and is located in the bilateral buttocks . Pain is intermittent and lasts hours. The  pain is nonradiating. The patient rates her pain a 7 out of ten and interferes with activities of daily living a 6 out of ten. Pain is exacerbated by getting up from a seated position, and is improved by rest. Patient reports no prior trauma, no prior spinal surgery     Ora Henderson is a 62 y.o. female  who is presenting with a chief complaint of right neck shoulder pain. The patient began experiencing this problem insidiously, and the pain has been gradually worsening over the past 6 month(s). The pain is described as throbbing, cramping, aching and heavy and is located in the right neck  . Pain is intermittent and lasts hours. The pain radiates to right arm. The patient rates her pain a 7 out of ten and interferes with activities of daily living a 7 out of ten. Pain is exacerbated by extension, and is improved by rest. Patient reports no prior trauma, no prior spinal surgery         - pertinent negatives: No fever, No chills, No weight loss, No bladder dysfunction, No bowel dysfunction, No saddle anesthesia  - pertinent positives: none    - medications, other therapies tried (physical therapy, injections):     >> NSAIDs, Tylenol, gabapentin and medrol dose pack    >> Has previously undergone Physical Therapy    >> Has previously undergone spinal injection/s         bilateral L4-L5 TFESI on 10/7/2021 with 80% pain relief.          bilateral S1 TFESI on 12/29/2022 with 85% pain  relief but took 2 weeks to take effect.     -09/15/2022: C7-T1 interlaminar epidural steroid injection, 100% relief    Imaging / Labs / Studies (reviewed on 1/31/2023):  X-Ray Hips Bilateral 2 View Inc AP Pelvis  Narrative: EXAMINATION:  XR HIPS BILATERAL 2 VIEW INCL AP PELVIS    CLINICAL HISTORY:  Pain in unspecified hip    TECHNIQUE:  AP view of the pelvis and frogleg lateral views of both hips were performed.    COMPARISON:  None.    FINDINGS:  There is no radiographic evidence of acute osseous, articular, or soft tissue abnormality.  Bilateral hip joint spaces are preserved.Multilevel degenerative findings are seen in the visualized lower lumbar spine.  Impression: As above.  No acute findings.    Electronically signed by: George Bojorquez MD  Date:    01/13/2023  Time:    13:15        [facility] EMG/NCS 5/3/22  IMPRESSION   1. ABNORMAL stud   2. There is electrodiagnostic evidence of an acute on chronic radiculopathy of the right C5 nerve root and a chronic radiculopathy of the C6, C8, T1 nerve roots. There is additional evidence of persistent mild demyelinating median neuropathy (Carpal tunnel syndrome) across BILATERAL wrists.       Results for orders placed during the hospital encounter of 12/08/14    X-Ray Lumbar Spine Complete 5 View    Narrative  Comparison: 03/21/12    Five views    Findings:    Osteopenia and overlying bowel limits the study.  Multilevel marginal spurring and facet arthropathy.  Uniform loss of this height at the L4 -- 5 level with grade 1 anterolisthesis L4 on L5.  No acute fracture.  No spondylolysis.  Pedicles and SI joints  are intact.  IMPRESSION:      Interval progression degenerative changes with most pronounced findings at the L4 -- 5 level.  See above.      Electronically signed by: HENRIK LANDA III, MD  Date:     12/08/14  Time:    09:23        Results for orders placed during the hospital encounter of 12/15/20    X-Ray Lumbar Complete With Flex And  Ext    Narrative  EXAMINATION:  XR LUMBAR SPINE 5 VIEW WITH FLEX AND EXT    CLINICAL HISTORY:  lumbar radic;  Other spondylosis with radiculopathy, lumbar region    TECHNIQUE:  Five views of the lumbar spine plus flexion extension views were performed.    COMPARISON:  December 23, 2017    FINDINGS:  Inferior-most lumbar vertebral body designated L5 for purposes of this exam.  Vertebral height maintained.  Mild uniform loss of disc height at the L4-5 level there is grade 1 anterolisthesis L4 on L5.  Remaining disc spaces, vertebral body height and alignment maintained.  Slight increased prominence of anterior subluxation L4 on L5 on the flexion view.  Stable appearance the L4-5 level on neutral and extension lateral views.  Pedicles and SI joints intact.  Numerous calcifications lower pelvis bilaterally.  Prominent degenerative change bilateral hips and to lesser extent bilateral SI joints.    Impression  As above.      Electronically signed by: Tariq Cortez MD  Date:    12/15/2020  Time:    20:33    Results for orders placed during the hospital encounter of 12/23/17    X-Ray Lumbar Spine AP And Lateral    Narrative  AP and lateral views of the lumbar spine    Findings: The vertebral bodies demonstrate normal height.  There is grade 1 spondylolisthesis of L4 on L5. There is mild disc space narrowing and spondylosis present at the L1-L2 through the L4-L5 levels. Prominent facet arthropathy noted bilaterally at L4-L5 level.  IMPRESSION:  As above      Electronically signed by: MOISES SANTOS D.O.  Date:     12/26/17  Time:    08:27    Results for orders placed during the hospital encounter of 07/05/13    X-Ray Cervical Spine AP And Lateral    Narrative  Findings: Vertebral alignment is normal.  There is mild narrowing of the C5-6 disk spaces and early anterior osteophyte formation.  There are no compression fractures or acute abnormalities are seen.  IMPRESSION:  Mild degenerative change in the cervical spine at  "C5-6.      Electronically signed by: LURDES SHORT MD  Date:     07/05/13  Time:    09:29      Results for orders placed during the hospital encounter of 07/22/19    X-Ray Cervical Spine Complete 5 view    Narrative  EXAMINATION:  XR CERVICAL SPINE COMPLETE 5 VIEW    CLINICAL HISTORY:  . Cervicalgia    TECHNIQUE:  AP, Lateral, bilateral oblique and open mouth views of the cervical spine were performed.    COMPARISON:  07/05/2013    FINDINGS:  There is some straightening of the normal cervical lordosis with slight retrolisthesis of C5 on C6. Anterior disc osteophyte complex production and possible slight disc height loss noted at C5-6.  No appreciable change from prior.  Posterior elements appear intact without acute fractures or subluxations demonstrated.  Odontoid process appears intact.  Atlantoaxial articulations appear normal.  Prevertebral soft tissues are within normal limits.    Impression  Degenerative changes as above.  No acute findings.      Electronically signed by: George Bojorquez MD  Date:    07/22/2019  Time:    16:21      Review of Systems:  CONSTITUTIONAL: patient denies any fever, chills, or weight loss  SKIN: patient denies any rash or itching  RESPIRATORY: patient denies having any shortness of breath  GASTROINTESTINAL: patient denies having any diarrhea, constipation, or bowel incontinence  GENITOURINARY: patient denies having any abnormal bladder function    MUSCULOSKELETAL:  - patient complains of the above noted pain/s (see chief pain complaint)    NEUROLOGICAL:   - pain as above  - strength in Lower extremities is intact, BILATERALLY  - sensation in Lower extremities is intact, BILATERALLY  - patient denies any loss of bowel or bladder control      PSYCHIATRIC: patient denies any change in mood    Other:  All other systems reviewed and are negative      Physical Exam:  /84   Pulse 82   Ht 5' 3" (1.6 m)   Wt 91.3 kg (201 lb 4.5 oz)   LMP  (LMP Unknown)   BMI 35.66 kg/m²  " (reviewed on 1/31/2023)  Physical Exam  Constitutional:       Appearance: Normal appearance.   HENT:      Head: Normocephalic and atraumatic.   Eyes:      Extraocular Movements: Extraocular movements intact.      Pupils: Pupils are equal, round, and reactive to light.   Pulmonary:      Effort: Pulmonary effort is normal.   Abdominal:      General: Abdomen is flat.   Musculoskeletal:      Cervical back: Normal range of motion and neck supple.      Right hip: Tenderness (TTP over GTB) present. No deformity. Decreased range of motion. Normal strength.      Left hip: Normal.   Skin:     General: Skin is warm and dry.      Capillary Refill: Capillary refill takes less than 2 seconds.   Neurological:      General: No focal deficit present.      Mental Status: She is alert and oriented to person, place, and time.      Cranial Nerves: No cranial nerve deficit.      Sensory: No sensory deficit.      Motor: No weakness or abnormal muscle tone.      Gait: Gait normal.      Deep Tendon Reflexes: Babinski sign absent on the right side. Babinski sign absent on the left side.      Reflex Scores:       Patellar reflexes are 2+ on the right side and 2+ on the left side.       Achilles reflexes are 2+ on the right side and 2+ on the left side.  Psychiatric:         Mood and Affect: Mood normal.         Behavior: Behavior normal.         Musculoskeletal:    Lumbar Exam  Incision: no  Pain with Flexion: no  Pain with Extension: no  ROM: FROM  Paraspinous TTP:  Right lumbar paraspinous  Facet TTP:  Negative bilaterally  Facet Loading:  Negative bilaterally  SLR:  Negative bilaterally  SIJ TTP:  Positive on the right  GUSTABO:  Positive on the right with positive compression distraction          Assessment:    Ora Henderson is a 62 y.o. year old female who is presenting with     Encounter Diagnoses   Name Primary?    Sacroiliitis Yes    Greater trochanteric bursitis of right hip      Sacroiliitis  -     IR SI Joint  Injection w/Imaging; Future; Expected date: 01/31/2023  -     IR Aspiration Injection Large Joint W FL; Future; Expected date: 01/31/2023  -     Case Request-RAD/Other Procedure Area: right GT bursa + right SIJ injection    Greater trochanteric bursitis of right hip  -     IR SI Joint Injection w/Imaging; Future; Expected date: 01/31/2023  -     IR Aspiration Injection Large Joint W FL; Future; Expected date: 01/31/2023  -     Case Request-RAD/Other Procedure Area: right GT bursa + right SIJ injection          Plan:    1. Interventional:    Schedule patient for right greater trochanter bursa and right sacroiliac joint injection for greater trochanter bursitis and sacroiliitis.  Can continue aspirin   Can consider lumbar epidural steroid injection if pain continues for possible overlapping lumbar radiculopathy  -09/15/2022: C7-T1 interlaminar epidural steroid injection, 100% relief     -S/p bilateral S1 TFESI on 12/29/2022 with 85% pain relief but took 2 weeks to take effect.       -S/p bilateral L4-L5 TFESI on 10/7/2021 with 80% pain relief.     2. Pharmacologic:    Continue Robaxin 750 mg twice a day as needed    3. Rehabilitative:   -continue home therapy exercises    4. Diagnostic:   -MRI cervical spine reviewed and findings discussed with patient      5. Consult:   -continue follow-up with orthopedics for carpal tunnel syndrome     Return to clinic 4 weeks after injection        Yung rTeadwell MD  Interventional Pain Medicine  [facility]

## 2023-01-31 NOTE — H&P (VIEW-ONLY)
Interventional Pain Progress Note       Chief Pain Complaint:  Right Hip Pain    Interval History (01/31/2023):  Patient returns to clinic for evaluation of right hip pain.  Patient reports 100% relief in neck pain after a C7-T1 interlaminar epidural steroid injection on 09/15/2022.  Patient reports 4 month history of right hip pain.  Pain is described as an aching throbbing pain over the greater trochanter area that radiates to the right buttocks and down the lateral aspect of her right thigh to just above the knee.  Pain is worse in the morning and with prolonged walking, better at night and with stretching.  Pain is rated at 2/10, but increases to an 8/10 with exacerbating factors. Denies any fevers, chills, changes in gait, weakness, or bowel and bladder incontinence        Interval History (8/22/22):  Patient returns to clinic for  evaluation of muscle spasms in right upper extremity pain.  Patient reports 1 month history of bilateral lower-extremity muscle spasm pain primarily in the calf region.  Patient is currently on diuretic and Lipitor.  Neck pain is ongoing and described as a burning aching pain over the right lower neck and right proximal biceps area.  Pain is worse with neck rotation and lifting, better with heat and rest.  Pain is rated a 7/10. Denies any fevers, chills, changes in gait, weakness, or bowel and bladder incontinence      History of Present Illness:   Ora Blancoas is a 62 y.o. female  who is presenting with a chief complaint of Lumbar Back Pain. The patient began experiencing this problem insidiously, and the pain has been gradually worsening over the past 10 month(s). The pain is described as throbbing, shooting, burning and electrical and is located in the bilateral lumbar spine . Pain is intermittent and lasts hours. The pain radiates to bilateral lower extremities. The patient rates her pain a 7 out of ten and interferes with activities of daily living a 7 out of  ten. Pain is exacerbated by flexion of the lumbar spine, and is improved by rest. Patient reports no prior trauma, no prior spinal surgery     Ora Henderson is a 62 y.o. female  who is presenting with a chief complaint of Hip Pain  . The patient began experiencing this problem insidiously, and the pain has been gradually worsening over the past 7 month(s). The pain is described as throbbing, cramping, aching and heavy and is located in the bilateral buttocks . Pain is intermittent and lasts hours. The  pain is nonradiating. The patient rates her pain a 7 out of ten and interferes with activities of daily living a 6 out of ten. Pain is exacerbated by getting up from a seated position, and is improved by rest. Patient reports no prior trauma, no prior spinal surgery     Ora Henderson is a 62 y.o. female  who is presenting with a chief complaint of right neck shoulder pain. The patient began experiencing this problem insidiously, and the pain has been gradually worsening over the past 6 month(s). The pain is described as throbbing, cramping, aching and heavy and is located in the right neck  . Pain is intermittent and lasts hours. The pain radiates to right arm. The patient rates her pain a 7 out of ten and interferes with activities of daily living a 7 out of ten. Pain is exacerbated by extension, and is improved by rest. Patient reports no prior trauma, no prior spinal surgery         - pertinent negatives: No fever, No chills, No weight loss, No bladder dysfunction, No bowel dysfunction, No saddle anesthesia  - pertinent positives: none    - medications, other therapies tried (physical therapy, injections):     >> NSAIDs, Tylenol, gabapentin and medrol dose pack    >> Has previously undergone Physical Therapy    >> Has previously undergone spinal injection/s         bilateral L4-L5 TFESI on 10/7/2021 with 80% pain relief.          bilateral S1 TFESI on 12/29/2022 with 85% pain  relief but took 2 weeks to take effect.     -09/15/2022: C7-T1 interlaminar epidural steroid injection, 100% relief    Imaging / Labs / Studies (reviewed on 1/31/2023):  X-Ray Hips Bilateral 2 View Inc AP Pelvis  Narrative: EXAMINATION:  XR HIPS BILATERAL 2 VIEW INCL AP PELVIS    CLINICAL HISTORY:  Pain in unspecified hip    TECHNIQUE:  AP view of the pelvis and frogleg lateral views of both hips were performed.    COMPARISON:  None.    FINDINGS:  There is no radiographic evidence of acute osseous, articular, or soft tissue abnormality.  Bilateral hip joint spaces are preserved.Multilevel degenerative findings are seen in the visualized lower lumbar spine.  Impression: As above.  No acute findings.    Electronically signed by: George Bojorquez MD  Date:    01/13/2023  Time:    13:15        [facility] EMG/NCS 5/3/22  IMPRESSION   1. ABNORMAL stud   2. There is electrodiagnostic evidence of an acute on chronic radiculopathy of the right C5 nerve root and a chronic radiculopathy of the C6, C8, T1 nerve roots. There is additional evidence of persistent mild demyelinating median neuropathy (Carpal tunnel syndrome) across BILATERAL wrists.       Results for orders placed during the hospital encounter of 12/08/14    X-Ray Lumbar Spine Complete 5 View    Narrative  Comparison: 03/21/12    Five views    Findings:    Osteopenia and overlying bowel limits the study.  Multilevel marginal spurring and facet arthropathy.  Uniform loss of this height at the L4 -- 5 level with grade 1 anterolisthesis L4 on L5.  No acute fracture.  No spondylolysis.  Pedicles and SI joints  are intact.  IMPRESSION:      Interval progression degenerative changes with most pronounced findings at the L4 -- 5 level.  See above.      Electronically signed by: HENRIK LANDA III, MD  Date:     12/08/14  Time:    09:23        Results for orders placed during the hospital encounter of 12/15/20    X-Ray Lumbar Complete With Flex And  Ext    Narrative  EXAMINATION:  XR LUMBAR SPINE 5 VIEW WITH FLEX AND EXT    CLINICAL HISTORY:  lumbar radic;  Other spondylosis with radiculopathy, lumbar region    TECHNIQUE:  Five views of the lumbar spine plus flexion extension views were performed.    COMPARISON:  December 23, 2017    FINDINGS:  Inferior-most lumbar vertebral body designated L5 for purposes of this exam.  Vertebral height maintained.  Mild uniform loss of disc height at the L4-5 level there is grade 1 anterolisthesis L4 on L5.  Remaining disc spaces, vertebral body height and alignment maintained.  Slight increased prominence of anterior subluxation L4 on L5 on the flexion view.  Stable appearance the L4-5 level on neutral and extension lateral views.  Pedicles and SI joints intact.  Numerous calcifications lower pelvis bilaterally.  Prominent degenerative change bilateral hips and to lesser extent bilateral SI joints.    Impression  As above.      Electronically signed by: Tariq Cortez MD  Date:    12/15/2020  Time:    20:33    Results for orders placed during the hospital encounter of 12/23/17    X-Ray Lumbar Spine AP And Lateral    Narrative  AP and lateral views of the lumbar spine    Findings: The vertebral bodies demonstrate normal height.  There is grade 1 spondylolisthesis of L4 on L5. There is mild disc space narrowing and spondylosis present at the L1-L2 through the L4-L5 levels. Prominent facet arthropathy noted bilaterally at L4-L5 level.  IMPRESSION:  As above      Electronically signed by: MOISES SANTOS D.O.  Date:     12/26/17  Time:    08:27    Results for orders placed during the hospital encounter of 07/05/13    X-Ray Cervical Spine AP And Lateral    Narrative  Findings: Vertebral alignment is normal.  There is mild narrowing of the C5-6 disk spaces and early anterior osteophyte formation.  There are no compression fractures or acute abnormalities are seen.  IMPRESSION:  Mild degenerative change in the cervical spine at  "C5-6.      Electronically signed by: LURDES SHORT MD  Date:     07/05/13  Time:    09:29      Results for orders placed during the hospital encounter of 07/22/19    X-Ray Cervical Spine Complete 5 view    Narrative  EXAMINATION:  XR CERVICAL SPINE COMPLETE 5 VIEW    CLINICAL HISTORY:  . Cervicalgia    TECHNIQUE:  AP, Lateral, bilateral oblique and open mouth views of the cervical spine were performed.    COMPARISON:  07/05/2013    FINDINGS:  There is some straightening of the normal cervical lordosis with slight retrolisthesis of C5 on C6. Anterior disc osteophyte complex production and possible slight disc height loss noted at C5-6.  No appreciable change from prior.  Posterior elements appear intact without acute fractures or subluxations demonstrated.  Odontoid process appears intact.  Atlantoaxial articulations appear normal.  Prevertebral soft tissues are within normal limits.    Impression  Degenerative changes as above.  No acute findings.      Electronically signed by: George Bojroquez MD  Date:    07/22/2019  Time:    16:21      Review of Systems:  CONSTITUTIONAL: patient denies any fever, chills, or weight loss  SKIN: patient denies any rash or itching  RESPIRATORY: patient denies having any shortness of breath  GASTROINTESTINAL: patient denies having any diarrhea, constipation, or bowel incontinence  GENITOURINARY: patient denies having any abnormal bladder function    MUSCULOSKELETAL:  - patient complains of the above noted pain/s (see chief pain complaint)    NEUROLOGICAL:   - pain as above  - strength in Lower extremities is intact, BILATERALLY  - sensation in Lower extremities is intact, BILATERALLY  - patient denies any loss of bowel or bladder control      PSYCHIATRIC: patient denies any change in mood    Other:  All other systems reviewed and are negative      Physical Exam:  /84   Pulse 82   Ht 5' 3" (1.6 m)   Wt 91.3 kg (201 lb 4.5 oz)   LMP  (LMP Unknown)   BMI 35.66 kg/m²  " (reviewed on 1/31/2023)  Physical Exam  Constitutional:       Appearance: Normal appearance.   HENT:      Head: Normocephalic and atraumatic.   Eyes:      Extraocular Movements: Extraocular movements intact.      Pupils: Pupils are equal, round, and reactive to light.   Pulmonary:      Effort: Pulmonary effort is normal.   Abdominal:      General: Abdomen is flat.   Musculoskeletal:      Cervical back: Normal range of motion and neck supple.      Right hip: Tenderness (TTP over GTB) present. No deformity. Decreased range of motion. Normal strength.      Left hip: Normal.   Skin:     General: Skin is warm and dry.      Capillary Refill: Capillary refill takes less than 2 seconds.   Neurological:      General: No focal deficit present.      Mental Status: She is alert and oriented to person, place, and time.      Cranial Nerves: No cranial nerve deficit.      Sensory: No sensory deficit.      Motor: No weakness or abnormal muscle tone.      Gait: Gait normal.      Deep Tendon Reflexes: Babinski sign absent on the right side. Babinski sign absent on the left side.      Reflex Scores:       Patellar reflexes are 2+ on the right side and 2+ on the left side.       Achilles reflexes are 2+ on the right side and 2+ on the left side.  Psychiatric:         Mood and Affect: Mood normal.         Behavior: Behavior normal.         Musculoskeletal:    Lumbar Exam  Incision: no  Pain with Flexion: no  Pain with Extension: no  ROM: FROM  Paraspinous TTP:  Right lumbar paraspinous  Facet TTP:  Negative bilaterally  Facet Loading:  Negative bilaterally  SLR:  Negative bilaterally  SIJ TTP:  Positive on the right  GUSTABO:  Positive on the right with positive compression distraction          Assessment:    Ora Henderson is a 62 y.o. year old female who is presenting with     Encounter Diagnoses   Name Primary?    Sacroiliitis Yes    Greater trochanteric bursitis of right hip      Sacroiliitis  -     IR SI Joint  Injection w/Imaging; Future; Expected date: 01/31/2023  -     IR Aspiration Injection Large Joint W FL; Future; Expected date: 01/31/2023  -     Case Request-RAD/Other Procedure Area: right GT bursa + right SIJ injection    Greater trochanteric bursitis of right hip  -     IR SI Joint Injection w/Imaging; Future; Expected date: 01/31/2023  -     IR Aspiration Injection Large Joint W FL; Future; Expected date: 01/31/2023  -     Case Request-RAD/Other Procedure Area: right GT bursa + right SIJ injection          Plan:    1. Interventional:    Schedule patient for right greater trochanter bursa and right sacroiliac joint injection for greater trochanter bursitis and sacroiliitis.  Can continue aspirin   Can consider lumbar epidural steroid injection if pain continues for possible overlapping lumbar radiculopathy  -09/15/2022: C7-T1 interlaminar epidural steroid injection, 100% relief     -S/p bilateral S1 TFESI on 12/29/2022 with 85% pain relief but took 2 weeks to take effect.       -S/p bilateral L4-L5 TFESI on 10/7/2021 with 80% pain relief.     2. Pharmacologic:    Continue Robaxin 750 mg twice a day as needed    3. Rehabilitative:   -continue home therapy exercises    4. Diagnostic:   -MRI cervical spine reviewed and findings discussed with patient      5. Consult:   -continue follow-up with orthopedics for carpal tunnel syndrome     Return to clinic 4 weeks after injection        Yung Treadwell MD  Interventional Pain Medicine  [facility]

## 2023-02-09 NOTE — PRE-PROCEDURE INSTRUCTIONS
Spoke with patient regarding procedure scheduled on 2.16    Arrival time 1000    Has patient been sick with fever or on antibiotics within the last 7 days? No    Does the patient have any open wounds, sores or rashes? No    Does the patient have any recent fractures? no    Has patient received a vaccination within the last 7 days? No    Received the COVID vaccination? yes    Has the patient stopped all medications as directed? NA    Does patient have a pacemaker and or defibrillator? no    Does the patient have a ride to and from procedure and someone reliable to remain with patient?     Is the patient diabetic? no    Does the patient have sleep apnea? Or use O2 at home? NATALIE    Is the patient receiving sedation? yes    Is the patient instructed to remain NPO beginning at midnight the night before their procedure? yes    Procedure location confirmed with patient? Yes    Covid- Denies signs/symptoms. Instructed to notify PAT/MD if any changes.

## 2023-02-16 ENCOUNTER — HOSPITAL ENCOUNTER (OUTPATIENT)
Facility: HOSPITAL | Age: 63
Discharge: HOME | End: 2023-02-16
Attending: ANESTHESIOLOGY | Admitting: ANESTHESIOLOGY
Payer: COMMERCIAL

## 2023-02-16 VITALS
TEMPERATURE: 97 F | SYSTOLIC BLOOD PRESSURE: 152 MMHG | HEART RATE: 66 BPM | BODY MASS INDEX: 35.86 KG/M2 | HEIGHT: 63 IN | RESPIRATION RATE: 18 BRPM | WEIGHT: 202.38 LBS | OXYGEN SATURATION: 94 % | DIASTOLIC BLOOD PRESSURE: 66 MMHG

## 2023-02-16 DIAGNOSIS — M46.1 SACROILIITIS: Primary | ICD-10-CM

## 2023-02-16 PROCEDURE — 63600175 PHARM REV CODE 636 W HCPCS: Performed by: ANESTHESIOLOGY

## 2023-02-16 PROCEDURE — 27096 INJECT SACROILIAC JOINT: CPT | Mod: RT,,, | Performed by: ANESTHESIOLOGY

## 2023-02-16 PROCEDURE — 20610 DRAIN/INJ JOINT/BURSA W/O US: CPT | Mod: 59,RT,, | Performed by: ANESTHESIOLOGY

## 2023-02-16 PROCEDURE — 27096 INJECT SACROILIAC JOINT: CPT | Performed by: ANESTHESIOLOGY

## 2023-02-16 PROCEDURE — 20610 DRAIN/INJ JOINT/BURSA W/O US: CPT | Performed by: ANESTHESIOLOGY

## 2023-02-16 PROCEDURE — 27096 PR INJECTION,SACROILIAC JOINT: ICD-10-PCS | Mod: RT,,, | Performed by: ANESTHESIOLOGY

## 2023-02-16 PROCEDURE — 25000003 PHARM REV CODE 250: Performed by: ANESTHESIOLOGY

## 2023-02-16 PROCEDURE — 20610 PR DRAIN/INJECT LARGE JOINT/BURSA: ICD-10-PCS | Mod: 59,RT,, | Performed by: ANESTHESIOLOGY

## 2023-02-16 PROCEDURE — 25500020 PHARM REV CODE 255: Performed by: ANESTHESIOLOGY

## 2023-02-16 RX ORDER — LIDOCAINE HYDROCHLORIDE 10 MG/ML
INJECTION, SOLUTION EPIDURAL; INFILTRATION; INTRACAUDAL; PERINEURAL
Status: DISCONTINUED | OUTPATIENT
Start: 2023-02-16 | End: 2023-02-16 | Stop reason: HOSPADM

## 2023-02-16 RX ORDER — FENTANYL CITRATE 50 UG/ML
INJECTION, SOLUTION INTRAMUSCULAR; INTRAVENOUS
Status: DISCONTINUED | OUTPATIENT
Start: 2023-02-16 | End: 2023-02-16 | Stop reason: HOSPADM

## 2023-02-16 RX ORDER — MIDAZOLAM HYDROCHLORIDE 1 MG/ML
INJECTION, SOLUTION INTRAMUSCULAR; INTRAVENOUS
Status: DISCONTINUED | OUTPATIENT
Start: 2023-02-16 | End: 2023-02-16 | Stop reason: HOSPADM

## 2023-02-16 RX ORDER — METHYLPREDNISOLONE ACETATE 40 MG/ML
INJECTION, SUSPENSION INTRA-ARTICULAR; INTRALESIONAL; INTRAMUSCULAR; SOFT TISSUE
Status: DISCONTINUED | OUTPATIENT
Start: 2023-02-16 | End: 2023-02-16 | Stop reason: HOSPADM

## 2023-02-16 NOTE — DISCHARGE INSTRUCTIONS

## 2023-02-16 NOTE — DISCHARGE SUMMARY
Discharge Note  Short Stay      SUMMARY     Admit Date: 2/16/2023    Attending Physician: Yung Treadwell MD        Discharge Physician: Yung Treadwell MD        Discharge Date: 2/16/2023 11:13 AM    Procedure(s) (LRB):  right GT bursa + right SIJ injection (Right)    Final Diagnosis: Sacroiliitis [M46.1]  Greater trochanteric bursitis of right hip [M70.61]    Disposition: Home or self care    Patient Instructions:   Current Discharge Medication List        CONTINUE these medications which have NOT CHANGED    Details   amLODIPine (NORVASC) 2.5 MG tablet Take 1 tablet (2.5 mg total) by mouth once daily.  Qty: 90 tablet, Refills: 1    Comments: .  Associated Diagnoses: Essential hypertension      aspirin 81 MG Chew Take 81 mg by mouth as needed. Hold 5 days prior to surgery      fluticasone propionate (FLONASE) 50 mcg/actuation nasal spray 2 sprays (100 mcg total) by Each Nostril route once daily.  Qty: 16 g, Refills: 0    Associated Diagnoses: Viral URI with cough      gabapentin (NEURONTIN) 100 MG capsule Take 1-2 capsules (100-200 mg total) by mouth every evening.  Qty: 60 capsule, Refills: 1    Comments: Please disregard previous gabapentin rx  Associated Diagnoses: Osteoarthritis of spine with radiculopathy, lumbar region      L.acidophilus-Bif. animalis (PROBIOTIC) 5 billion cell CpSP Take 1 tablet by mouth once daily.  Qty: 10 capsule, Refills: 0      LIDOcaine (LIDODERM) 5 % Place onto the skin.      losartan-hydrochlorothiazide 100-25 mg (HYZAAR) 100-25 mg per tablet Take 1 tablet by mouth once daily.  Qty: 90 tablet, Refills: 1    Comments: .  Associated Diagnoses: Essential hypertension      methocarbamoL (ROBAXIN) 750 MG Tab Take 1 tablet (750 mg total) by mouth 2 (two) times daily as needed (Muscle Spasm).  Qty: 60 tablet, Refills: 2    Associated Diagnoses: Myofascial pain      MULTIVIT,CALC,MINS/IRON/FOLIC (DAILY MULTIPLE FOR WOMEN ORAL) Take 1 tablet by mouth once daily. Hold 5 days prior to  surgery      omega-3 fatty acids/fish oil (FISH OIL-OMEGA-3 FATTY ACIDS) 300-1,000 mg capsule Take by mouth once daily. Hold 5 days prior to surgery      rosuvastatin (CRESTOR) 40 MG Tab Take 1 tablet (40 mg total) by mouth every evening.  Qty: 90 tablet, Refills: 1    Associated Diagnoses: Mixed hyperlipidemia      docusate sodium (COLACE) 100 MG capsule Take 1 capsule (100 mg total) by mouth 2 (two) times daily.  Qty: 100 capsule, Refills: 0    Associated Diagnoses: RLQ abdominal pain; Constipation, unspecified constipation type                 Discharge Diagnosis: Sacroiliitis [M46.1]  Greater trochanteric bursitis of right hip [M70.61]  Condition on Discharge: Stable with no complications to procedure   Diet on Discharge: Same as before.  Activity: as per instruction sheet.  Discharge to: Home with a responsible adult.  Follow up: 2-4 weeks       Please call the office at [phone] if you experience any weakness or loss of sensation, fever > 101.5, pain uncontrolled with oral medications, persistent nausea/vomiting/or diarrhea, redness or drainage from the incisions, or any other worrisome concerns. If physician on call was not reached or could not communicate with our office for any reason please go to the nearest emergency department

## 2023-02-16 NOTE — OP NOTE
Sacroiliac Joint and Greater Trochanteric Bursa Injection under Fluoroscopy    INFORMED CONSENT: The procedure, risks, benefits and options were discussed with patient. There are no contraindications to the procedure. The patient expressed understanding and agreed to proceed. The personnel performing the procedure was discussed.    Date of procedure 02/16/2023    Time-out taken to identify patient and procedure side prior to starting the procedure.                                                                  PROCEDURE:    1) right sacroiliac joint injection under fluoroscopy.  2) right greater trochanteric bursa injection under fluoroscopy    Pre Procedure diagnosis:    Sacroiliitis [M46.1]  Greater trochanteric bursitis of right hip [M70.61]  No diagnosis found.    Post-Procedure diagnosis:   same    PHYSICIAN: Yung Treadwell MD    ASSISTANTS: None    MEDICATIONS INJECTED: 1ML Depo-Medrol 40mg and 3ml of Lidocaine PF 1%     LOCAL ANESTHETIC USED: 3ml Lidocaine 1%      Sedation: Conscious sedation provided by MYARA    SEDATION MEDICATIONS: local/IV sedation: Versed 2 mg and fentanyl 50 mcg IV.  Conscious sedation ordered by MD.  Patient reevaluated and sedation administered by MD and monitored by RN.  Total sedation time was less than 10 min.    Total sedation time was <10 min    ESTIMATED BLOOD LOSS:  None.    COMPLICATIONS:  None.    TECHNIQUE:   Laying in the prone position, the patient was prepped and draped in the usual sterile fashion using ChloraPrep and fenestrated drape.  The area was determined under fluoroscopy.  Local Xylocaine was injected by raising a wheel and going down to the periosteum using a 27-gauge hypodermic needle.  The 3.5 inch 22-gauge spinal needle was introduced into the right sacroiliac joint.  Negative pressure applied to confirm no intravascular placement.  Omnipaque was injected to confirm placement and to confirm that there was no vascular runoff.  The medication was then  injected slowly.        Right Greater trochanteric bursa injection:  The area overlying the right greater trochanteric bursa was identified using fluoroscopy, and the area overlying the skin was prepped and draped in usual sterile fashion. Local lidocaine was injected by raising a wheel and going down to the periosteum using a 27-gauge hypodermic needle. A 5 inch 22-gauge spinal needle was introduce into the left greater trochanteric bursa Negative pressure applied to confirm no intravascular placement. Omnipaque was injected to confirm placement and to confirm that there was no vascular runoff. 4ml Lidocaine PF 1%e + 1mL of 40mg/mL depomedrol was then injected slowly.  Displacement of the contrast after injection of the medication confirmed that the medication went into the area of the greater trochanteric bursa           The patient tolerated the procedure well.      The patient was monitored for approximately 30 minutes after the procedure.  Patient was given post procedure and discharge instructions to follow at home.  The patient was discharged in a stable condition

## 2023-02-20 ENCOUNTER — OFFICE VISIT (OUTPATIENT)
Dept: URGENT CARE | Facility: CLINIC | Age: 63
End: 2023-02-20
Payer: COMMERCIAL

## 2023-02-20 VITALS
OXYGEN SATURATION: 100 % | DIASTOLIC BLOOD PRESSURE: 81 MMHG | WEIGHT: 200 LBS | SYSTOLIC BLOOD PRESSURE: 182 MMHG | HEIGHT: 63 IN | BODY MASS INDEX: 35.44 KG/M2 | TEMPERATURE: 99 F | RESPIRATION RATE: 12 BRPM | HEART RATE: 71 BPM

## 2023-02-20 DIAGNOSIS — H92.02 OTALGIA, LEFT: ICD-10-CM

## 2023-02-20 DIAGNOSIS — M79.18 MYOFASCIAL PAIN: ICD-10-CM

## 2023-02-20 DIAGNOSIS — H60.502 ACUTE OTITIS EXTERNA OF LEFT EAR, UNSPECIFIED TYPE: Primary | ICD-10-CM

## 2023-02-20 PROCEDURE — 99213 PR OFFICE/OUTPT VISIT, EST, LEVL III, 20-29 MIN: ICD-10-PCS | Mod: S$GLB,,, | Performed by: NURSE PRACTITIONER

## 2023-02-20 PROCEDURE — 3008F PR BODY MASS INDEX (BMI) DOCUMENTED: ICD-10-PCS | Mod: CPTII,S$GLB,, | Performed by: NURSE PRACTITIONER

## 2023-02-20 PROCEDURE — 3077F PR MOST RECENT SYSTOLIC BLOOD PRESSURE >= 140 MM HG: ICD-10-PCS | Mod: CPTII,S$GLB,, | Performed by: NURSE PRACTITIONER

## 2023-02-20 PROCEDURE — 3077F SYST BP >= 140 MM HG: CPT | Mod: CPTII,S$GLB,, | Performed by: NURSE PRACTITIONER

## 2023-02-20 PROCEDURE — 1160F PR REVIEW ALL MEDS BY PRESCRIBER/CLIN PHARMACIST DOCUMENTED: ICD-10-PCS | Mod: CPTII,S$GLB,, | Performed by: NURSE PRACTITIONER

## 2023-02-20 PROCEDURE — 3008F BODY MASS INDEX DOCD: CPT | Mod: CPTII,S$GLB,, | Performed by: NURSE PRACTITIONER

## 2023-02-20 PROCEDURE — 3044F HG A1C LEVEL LT 7.0%: CPT | Mod: CPTII,S$GLB,, | Performed by: NURSE PRACTITIONER

## 2023-02-20 PROCEDURE — 1160F RVW MEDS BY RX/DR IN RCRD: CPT | Mod: CPTII,S$GLB,, | Performed by: NURSE PRACTITIONER

## 2023-02-20 PROCEDURE — 3044F PR MOST RECENT HEMOGLOBIN A1C LEVEL <7.0%: ICD-10-PCS | Mod: CPTII,S$GLB,, | Performed by: NURSE PRACTITIONER

## 2023-02-20 PROCEDURE — 1159F MED LIST DOCD IN RCRD: CPT | Mod: CPTII,S$GLB,, | Performed by: NURSE PRACTITIONER

## 2023-02-20 PROCEDURE — 1159F PR MEDICATION LIST DOCUMENTED IN MEDICAL RECORD: ICD-10-PCS | Mod: CPTII,S$GLB,, | Performed by: NURSE PRACTITIONER

## 2023-02-20 PROCEDURE — 3079F PR MOST RECENT DIASTOLIC BLOOD PRESSURE 80-89 MM HG: ICD-10-PCS | Mod: CPTII,S$GLB,, | Performed by: NURSE PRACTITIONER

## 2023-02-20 PROCEDURE — 3079F DIAST BP 80-89 MM HG: CPT | Mod: CPTII,S$GLB,, | Performed by: NURSE PRACTITIONER

## 2023-02-20 PROCEDURE — 99213 OFFICE O/P EST LOW 20 MIN: CPT | Mod: S$GLB,,, | Performed by: NURSE PRACTITIONER

## 2023-02-20 RX ORDER — CIPROFLOXACIN AND DEXAMETHASONE 3; 1 MG/ML; MG/ML
4 SUSPENSION/ DROPS AURICULAR (OTIC) 2 TIMES DAILY
Qty: 7.5 ML | Refills: 0 | Status: SHIPPED | OUTPATIENT
Start: 2023-02-20 | End: 2023-02-27

## 2023-02-20 RX ORDER — METHOCARBAMOL 750 MG/1
750 TABLET, FILM COATED ORAL 2 TIMES DAILY PRN
Qty: 60 TABLET | Refills: 2 | Status: SHIPPED | OUTPATIENT
Start: 2023-02-20 | End: 2023-11-09

## 2023-02-20 NOTE — PROGRESS NOTES
"Subjective:       Patient ID: Ora Henderson is a 62 y.o. female.    Vitals:  height is 5' 3" (1.6 m) and weight is 90.7 kg (200 lb). Her oral temperature is 99.1 °F (37.3 °C). Her blood pressure is 182/81 (abnormal) and her pulse is 71. Her respiration is 12 and oxygen saturation is 100%.     Chief Complaint: Otalgia    Recently received right hip injections on Thursday  Also complain of hip and neck pain    Otalgia   There is pain in the left ear. This is a new problem. The current episode started yesterday. The problem occurs constantly. The problem has been gradually worsening. There has been no fever. The pain is at a severity of 8/10. The pain is moderate. Associated symptoms include coughing and ear discharge. Pertinent negatives include no abdominal pain, diarrhea, headaches, hearing loss, neck pain, rash, rhinorrhea, sore throat or vomiting. She has tried NSAIDs (Aleve) for the symptoms. The treatment provided no relief. There is no history of a chronic ear infection, hearing loss or a tympanostomy tube.     HENT:  Positive for ear pain and ear discharge. Negative for hearing loss and sore throat.    Neck: Negative for neck pain.   Respiratory:  Positive for cough.    Gastrointestinal:  Negative for abdominal pain, vomiting and diarrhea.   Skin:  Negative for rash.   Neurological:  Negative for headaches.     Objective:      Physical Exam   Constitutional: She is oriented to person, place, and time. She appears well-developed. She is cooperative.  Non-toxic appearance. She does not appear ill. No distress.   HENT:   Head: Normocephalic and atraumatic.   Ears:   Right Ear: Hearing, tympanic membrane, external ear and ear canal normal. Tympanic membrane is not erythematous. No middle ear effusion.   Left Ear: Hearing, tympanic membrane and external ear normal. There is swelling and tenderness.  No middle ear effusion.   Nose: Nose normal. No mucosal edema, rhinorrhea or nasal deformity. No " epistaxis.   Mouth/Throat: Uvula is midline, oropharynx is clear and moist and mucous membranes are normal. No trismus in the jaw. Normal dentition. No uvula swelling. Cobblestoning present. No oropharyngeal exudate, posterior oropharyngeal edema or posterior oropharyngeal erythema.   Left ear canal is erythematous. Left tragus pain with palpation.      Comments: Left ear canal is erythematous. Left tragus pain with palpation.  Eyes: Conjunctivae and lids are normal. No scleral icterus.   Neck: Trachea normal and phonation normal. Neck supple. No edema present. No erythema present. No neck rigidity present.   Cardiovascular: Normal rate, regular rhythm, normal heart sounds and normal pulses.   Pulmonary/Chest: Effort normal and breath sounds normal. No respiratory distress. She has no decreased breath sounds. She has no wheezes. She has no rhonchi.   Abdominal: Normal appearance.   Musculoskeletal: Normal range of motion.         General: No deformity. Normal range of motion.   Neurological: She is alert and oriented to person, place, and time. She exhibits normal muscle tone. Coordination normal.   Skin: Skin is warm, dry, intact, not diaphoretic and not pale.   Psychiatric: Her speech is normal and behavior is normal. Judgment and thought content normal.   Nursing note and vitals reviewed.      Assessment:       1. Acute otitis externa of left ear, unspecified type    2. Otalgia, left          Plan:         Acute otitis externa of left ear, unspecified type  -     ciprofloxacin-dexAMETHasone 0.3-0.1% (CIPRODEX) 0.3-0.1 % DrpS; Place 4 drops into the left ear 2 (two) times daily. for 7 days  Dispense: 7.5 mL; Refill: 0    Otalgia, left                   Instill antibiotic drops as prescribed.  Avoid getting water into ear.  Take tylenol/ibuprofen for fever and pain.  Follow up with PCP or ENT if symptoms persist or worsen.

## 2023-02-20 NOTE — TELEPHONE ENCOUNTER
----- Message from Zayda Bhakta sent at 2/20/2023  9:50 AM CST -----  Contact: Ora Payan had an injection with Dr. Treadwell on Thursday of last week, 2/16/23. She stated it helped some but her pain is back and really severe today. Please advise and call her back at [phone].      Please send any medications to;   [facility] [place] - [address] [facility]  [address] [place] [address]  Phone: [phone] Fax: [phone]

## 2023-02-20 NOTE — PATIENT INSTRUCTIONS
Instill antibiotic drops as prescribed.  Avoid getting water into ear.  Take tylenol/ibuprofen for fever and pain.  Follow up with PCP or ENT if symptoms persist or worsen.

## 2023-02-22 ENCOUNTER — TELEPHONE (OUTPATIENT)
Dept: URGENT CARE | Facility: CLINIC | Age: 63
End: 2023-02-22
Payer: COMMERCIAL

## 2023-02-22 NOTE — TELEPHONE ENCOUNTER
Courtesy call made to follow up with patient's prognosis. States she is feeling much better and is still using her abx ear drop. Advised she continue them as prescribed. She verbalized understanding.

## 2023-02-23 ENCOUNTER — TELEPHONE (OUTPATIENT)
Dept: PAIN MEDICINE | Facility: CLINIC | Age: 63
End: 2023-02-23
Payer: COMMERCIAL

## 2023-02-23 ENCOUNTER — TELEPHONE (OUTPATIENT)
Dept: SPORTS MEDICINE | Facility: CLINIC | Age: 63
End: 2023-02-23
Payer: COMMERCIAL

## 2023-02-23 NOTE — TELEPHONE ENCOUNTER
Returned pt call, pt states that her hip when out last night, she heard a loud pop. States that she went to the emergency room and had x- ray no broken bones shown. States that the ER physician stated that she needed to see an orthopedic physician. Orthopedics told her to reach back out to Dr. Treadwell. Informed pt that I will pass this information to Dr. Treadwell. Pt states that she is unable to bear weight on her right hip and is using her mother's cane to move around.

## 2023-02-23 NOTE — TELEPHONE ENCOUNTER
Spoke with pt to get more info regarding R hip pain. Explained to pt that Dr. Busby suggested to follow up with Dr. Treadwell as she is established with him for R hip and back pain. Pt stated she went to ER following a painful pop in her hip. While I was consulting with Dr. Busby to schedule, pt ended the call. Pt is currently scheduled with Adelfo Mercedes.    ----- Message from Riana Balbuena sent at 2/23/2023 10:50 AM CST -----  Contact: Ora  Pt is calling in regards to her going into ER on 02/22/2023 due to her right hip hurting.Please call back at [phone]          Thanks  ANTONI

## 2023-02-25 ENCOUNTER — OFFICE VISIT (OUTPATIENT)
Dept: URGENT CARE | Facility: CLINIC | Age: 63
End: 2023-02-25
Payer: COMMERCIAL

## 2023-02-25 VITALS
HEART RATE: 83 BPM | TEMPERATURE: 99 F | DIASTOLIC BLOOD PRESSURE: 74 MMHG | BODY MASS INDEX: 35.44 KG/M2 | HEIGHT: 63 IN | WEIGHT: 200 LBS | RESPIRATION RATE: 18 BRPM | OXYGEN SATURATION: 100 % | SYSTOLIC BLOOD PRESSURE: 155 MMHG

## 2023-02-25 DIAGNOSIS — J06.9 UPPER RESPIRATORY TRACT INFECTION, UNSPECIFIED TYPE: Primary | ICD-10-CM

## 2023-02-25 DIAGNOSIS — J02.9 SORE THROAT: ICD-10-CM

## 2023-02-25 DIAGNOSIS — R05.9 COUGH, UNSPECIFIED TYPE: ICD-10-CM

## 2023-02-25 LAB
CTP QC/QA: YES
MOLECULAR STREP A: NEGATIVE

## 2023-02-25 PROCEDURE — 3078F DIAST BP <80 MM HG: CPT | Mod: CPTII,S$GLB,, | Performed by: NURSE PRACTITIONER

## 2023-02-25 PROCEDURE — 99214 PR OFFICE/OUTPT VISIT, EST, LEVL IV, 30-39 MIN: ICD-10-PCS | Mod: S$GLB,,, | Performed by: NURSE PRACTITIONER

## 2023-02-25 PROCEDURE — 3077F SYST BP >= 140 MM HG: CPT | Mod: CPTII,S$GLB,, | Performed by: NURSE PRACTITIONER

## 2023-02-25 PROCEDURE — 1160F RVW MEDS BY RX/DR IN RCRD: CPT | Mod: CPTII,S$GLB,, | Performed by: NURSE PRACTITIONER

## 2023-02-25 PROCEDURE — 1159F MED LIST DOCD IN RCRD: CPT | Mod: CPTII,S$GLB,, | Performed by: NURSE PRACTITIONER

## 2023-02-25 PROCEDURE — 87651 POCT STREP A MOLECULAR: ICD-10-PCS | Mod: QW,S$GLB,, | Performed by: NURSE PRACTITIONER

## 2023-02-25 PROCEDURE — 99214 OFFICE O/P EST MOD 30 MIN: CPT | Mod: S$GLB,,, | Performed by: NURSE PRACTITIONER

## 2023-02-25 PROCEDURE — 3008F BODY MASS INDEX DOCD: CPT | Mod: CPTII,S$GLB,, | Performed by: NURSE PRACTITIONER

## 2023-02-25 PROCEDURE — 3044F HG A1C LEVEL LT 7.0%: CPT | Mod: CPTII,S$GLB,, | Performed by: NURSE PRACTITIONER

## 2023-02-25 PROCEDURE — 1159F PR MEDICATION LIST DOCUMENTED IN MEDICAL RECORD: ICD-10-PCS | Mod: CPTII,S$GLB,, | Performed by: NURSE PRACTITIONER

## 2023-02-25 PROCEDURE — 3078F PR MOST RECENT DIASTOLIC BLOOD PRESSURE < 80 MM HG: ICD-10-PCS | Mod: CPTII,S$GLB,, | Performed by: NURSE PRACTITIONER

## 2023-02-25 PROCEDURE — 3008F PR BODY MASS INDEX (BMI) DOCUMENTED: ICD-10-PCS | Mod: CPTII,S$GLB,, | Performed by: NURSE PRACTITIONER

## 2023-02-25 PROCEDURE — 1160F PR REVIEW ALL MEDS BY PRESCRIBER/CLIN PHARMACIST DOCUMENTED: ICD-10-PCS | Mod: CPTII,S$GLB,, | Performed by: NURSE PRACTITIONER

## 2023-02-25 PROCEDURE — 87651 STREP A DNA AMP PROBE: CPT | Mod: QW,S$GLB,, | Performed by: NURSE PRACTITIONER

## 2023-02-25 PROCEDURE — 3044F PR MOST RECENT HEMOGLOBIN A1C LEVEL <7.0%: ICD-10-PCS | Mod: CPTII,S$GLB,, | Performed by: NURSE PRACTITIONER

## 2023-02-25 PROCEDURE — 3077F PR MOST RECENT SYSTOLIC BLOOD PRESSURE >= 140 MM HG: ICD-10-PCS | Mod: CPTII,S$GLB,, | Performed by: NURSE PRACTITIONER

## 2023-02-25 RX ORDER — PROMETHAZINE HYDROCHLORIDE AND DEXTROMETHORPHAN HYDROBROMIDE 6.25; 15 MG/5ML; MG/5ML
5 SYRUP ORAL EVERY 8 HOURS PRN
Qty: 118 ML | Refills: 0 | Status: SHIPPED | OUTPATIENT
Start: 2023-02-25 | End: 2023-03-07

## 2023-02-25 NOTE — PROGRESS NOTES
"Subjective:       Patient ID: Ora Henderson is a 62 y.o. female.    Vitals:  height is 5' 3" (1.6 m) and weight is 90.7 kg (200 lb). Her oral temperature is 98.5 °F (36.9 °C). Her blood pressure is 155/74 (abnormal) and her pulse is 83. Her respiration is 18 and oxygen saturation is 100%.     Chief Complaint: Sore Throat        Patient is a 62-year-old female who presents to urgent care for evaluation of sore throat.  Associated symptoms include cough.  She denies associated symptoms of fever chills or body aches.  Patient was recently seen in urgent care and diagnosed with acute otitis externa.  She reports otitis externa symptoms have improved.     Sore Throat   This is a new problem. The current episode started yesterday. The problem has been gradually worsening. Neither side of throat is experiencing more pain than the other. There has been no fever. The pain is at a severity of 8/10. The pain is severe. Associated symptoms include congestion, coughing, ear pain, a hoarse voice and trouble swallowing. Pertinent negatives include no abdominal pain, diarrhea, drooling, ear discharge, headaches, plugged ear sensation, neck pain, shortness of breath, stridor, swollen glands or vomiting. She has had no exposure to strep or mono. She has tried gargles (salt water gargles) for the symptoms. Improvement on treatment: temporary relief.     HENT:  Positive for ear pain, congestion, sore throat and trouble swallowing. Negative for ear discharge and drooling.    Neck: Negative for neck pain.   Respiratory:  Positive for cough. Negative for shortness of breath and stridor.    Gastrointestinal:  Negative for abdominal pain, vomiting and diarrhea.   Neurological:  Negative for headaches.     Objective:      Physical Exam   Constitutional: She is oriented to person, place, and time. She appears well-developed. She is cooperative.  Non-toxic appearance. She does not appear ill. No distress.   HENT:   Head: " Normocephalic and atraumatic.   Ears:   Right Ear: Hearing, tympanic membrane, external ear and ear canal normal. Tympanic membrane is not erythematous. No middle ear effusion.   Left Ear: Hearing, tympanic membrane, external ear and ear canal normal. Tympanic membrane is not erythematous.  No middle ear effusion.   Nose: Nose normal. No mucosal edema, rhinorrhea or nasal deformity. No epistaxis. Right sinus exhibits no maxillary sinus tenderness and no frontal sinus tenderness. Left sinus exhibits no maxillary sinus tenderness and no frontal sinus tenderness.   Mouth/Throat: Uvula is midline, oropharynx is clear and moist and mucous membranes are normal. No trismus in the jaw. Normal dentition. No uvula swelling. Cobblestoning present. No oropharyngeal exudate, posterior oropharyngeal edema, posterior oropharyngeal erythema or tonsillar abscesses. Tonsils are 1+ on the right. Tonsils are 1+ on the left.   Eyes: Conjunctivae and lids are normal. No scleral icterus.   Neck: Trachea normal and phonation normal. Neck supple. No edema present. No erythema present. No neck rigidity present.   Cardiovascular: Normal rate, regular rhythm, normal heart sounds and normal pulses.   Pulmonary/Chest: Effort normal and breath sounds normal. No respiratory distress. She has no decreased breath sounds. She has no rhonchi.   Abdominal: Normal appearance.   Musculoskeletal: Normal range of motion.         General: No deformity. Normal range of motion.   Neurological: She is alert and oriented to person, place, and time. She exhibits normal muscle tone. Coordination normal.   Skin: Skin is warm, dry, intact, not diaphoretic and not pale.   Psychiatric: Her speech is normal and behavior is normal. Judgment and thought content normal.   Nursing note and vitals reviewed.      Assessment:       1. Upper respiratory tract infection, unspecified type    2. Sore throat    3. Cough, unspecified type          Plan:         Upper respiratory  tract infection, unspecified type    Sore throat  -     POCT Strep A, Molecular    Cough, unspecified type  -     promethazine-dextromethorphan (PROMETHAZINE-DM) 6.25-15 mg/5 mL Syrp; Take 5 mLs by mouth every 8 (eight) hours as needed (cough).  Dispense: 118 mL; Refill: 0                 Results for orders placed or performed in visit on 02/25/23   POCT Strep A, Molecular   Result Value Ref Range    Molecular Strep A, POC Negative Negative     Acceptable Yes      *Note: Due to a large number of results and/or encounters for the requested time period, some results have not been displayed. A complete set of results can be found in Results Review.          Patient presents to urgent care for evaluation of sore throat.  Associated symptoms include cough.  Strep test negative today in clinic. Sore throat probable related to cough. Will give cough suppressant and monitor results.       PLEASE READ YOUR DISCHARGE INSTRUCTIONS ENTIRELY AS IT CONTAINS IMPORTANT INFORMATION.      Please drink plenty of fluids.    Please get plenty of rest.    Please return here or go to the Emergency Department for any concerns or worsening of condition.    Please take an over the counter antihistamine medication (allegra/Claritin/Zyrtec) of your choice as directed.    Try an over the counter decongestant like Mucinex D or Sudafed.    Pseudoephedrine, you buy this behind the pharmacy counter    If you do have Hypertension or palpitations, it is safe to take Coricidin HBP for relief of sinus symptoms.    If not allergic, please take over the counter Tylenol (Acetaminophen) and/or Motrin (Ibuprofen) as directed for control of pain and/or fever.  Please follow up with your primary care doctor or specialist as needed.    Sore throat recommendations: Warm fluids, warm salt water gargles, throat lozenges, tea, honey, soup, rest, hydration.    Use over the counter flonase: one spray each nostril twice daily OR two sprays each nostril  once daily.     Sinus rinses DO NOT USE TAP WATER, if you must, water must be a rolling boil for 1 minute, let it cool, then use.  May use distilled water, or over the counter nasal saline rinses.  Vics vapor rub in shower to help open nasal passages.  May use nasal gel to keep passages moisturized.  May use Nasal saline sprays during the day for added relief of congestion.   For those who go to the gym, please do not use the sauna or steam room now to clear sinuses.    If you  smoke, please stop smoking.      Please return or see your primary care doctor if you develop new or worsening symptoms.     Please arrange follow up with your primary medical clinic as soon as possible. You must understand that you've received an Urgent Care treatment only and that you may be released before all of your medical problems are known or treated. You, the patient, will arrange for follow up as instructed. If your symptoms worsen or fail to improve you should go to the Emergency Room.

## 2023-02-27 ENCOUNTER — TELEPHONE (OUTPATIENT)
Dept: PAIN MEDICINE | Facility: CLINIC | Age: 63
End: 2023-02-27
Payer: COMMERCIAL

## 2023-02-27 ENCOUNTER — TELEPHONE (OUTPATIENT)
Dept: INTERNAL MEDICINE | Facility: CLINIC | Age: 63
End: 2023-02-27
Payer: COMMERCIAL

## 2023-02-27 DIAGNOSIS — M46.1 SACROILIITIS: Primary | ICD-10-CM

## 2023-02-27 RX ORDER — METHYLPREDNISOLONE 4 MG/1
TABLET ORAL
Qty: 21 EACH | Refills: 0 | Status: SHIPPED | OUTPATIENT
Start: 2023-02-27 | End: 2023-02-27

## 2023-02-27 RX ORDER — METHYLPREDNISOLONE 4 MG/1
TABLET ORAL
Qty: 21 EACH | Refills: 0 | Status: SHIPPED | OUTPATIENT
Start: 2023-02-27 | End: 2023-04-12

## 2023-02-27 NOTE — TELEPHONE ENCOUNTER
Pt went to [facility] ER for x-rays and we have requested.   Pt states she feel she might have pulled something but stated when she looks in the mirror her hips seem off.   Pt requesting approval to return to chiropractor and for a medrol dose pack.

## 2023-02-27 NOTE — TELEPHONE ENCOUNTER
----- Message from Anushka Major sent at 2/27/2023  8:11 AM CST -----  Contact: self [phone]  Patient called in stating she feel and hurt her hip and wants to know what can be done to help with the pain. Painful to walk and is using crutches.Please call back [phone]

## 2023-02-28 NOTE — TELEPHONE ENCOUNTER
----- Message from Zuly Montes MD sent at 2/27/2023  9:28 PM CST -----  Regarding: RE: Dexa Orders  DEXA scan not recommended until age 65     Dr Montes  ----- Message -----  From: Gianna Ahmadi MA  Sent: 2/27/2023   5:24 PM CST  To: Zuly Montes MD  Subject: Dexa Orders                                      Pt requesting order placed for a Dexa Scan. Please Advise.  ----- Message -----  From: Anushka Major  Sent: 2/27/2023   8:15 AM CST  To: Simon Caruso Staff    Patient called in requesting a call back to discuss having a bone density test done. Please call back [phone]

## 2023-02-28 NOTE — TELEPHONE ENCOUNTER
----- Message from Gianna Ahmadi MA sent at 2/27/2023  5:24 PM CST -----  Regarding: Dexa Orders  Pt requesting order placed for a Dexa Scan. Please Advise.  ----- Message -----  From: Anushka Major  Sent: 2/27/2023   8:15 AM CST  To: Simon Caruso Staff    Patient called in requesting a call back to discuss having a bone density test done. Please call back [phone]

## 2023-02-28 NOTE — TELEPHONE ENCOUNTER
S/w pt and informed that provider stated that she did not need a DEXA until age 65. Pt voiced understanding./Kmw

## 2023-03-06 NOTE — TELEPHONE ENCOUNTER
Attempted to contact pt to follow up with her in regards to her Hip pain. Pt did not answer. LVM to call back at earliest convenience.

## 2023-03-17 ENCOUNTER — OFFICE VISIT (OUTPATIENT)
Dept: PAIN MEDICINE | Facility: CLINIC | Age: 63
End: 2023-03-17
Payer: COMMERCIAL

## 2023-03-17 VITALS
BODY MASS INDEX: 35.93 KG/M2 | RESPIRATION RATE: 16 BRPM | SYSTOLIC BLOOD PRESSURE: 157 MMHG | HEIGHT: 63 IN | WEIGHT: 202.81 LBS | HEART RATE: 84 BPM | DIASTOLIC BLOOD PRESSURE: 93 MMHG

## 2023-03-17 DIAGNOSIS — M46.1 SACROILIITIS: Primary | ICD-10-CM

## 2023-03-17 PROCEDURE — 99214 OFFICE O/P EST MOD 30 MIN: CPT | Mod: S$GLB,,, | Performed by: PHYSICIAN ASSISTANT

## 2023-03-17 PROCEDURE — 1160F PR REVIEW ALL MEDS BY PRESCRIBER/CLIN PHARMACIST DOCUMENTED: ICD-10-PCS | Mod: CPTII,S$GLB,, | Performed by: PHYSICIAN ASSISTANT

## 2023-03-17 PROCEDURE — 3077F SYST BP >= 140 MM HG: CPT | Mod: CPTII,S$GLB,, | Performed by: PHYSICIAN ASSISTANT

## 2023-03-17 PROCEDURE — 3008F BODY MASS INDEX DOCD: CPT | Mod: CPTII,S$GLB,, | Performed by: PHYSICIAN ASSISTANT

## 2023-03-17 PROCEDURE — 3008F PR BODY MASS INDEX (BMI) DOCUMENTED: ICD-10-PCS | Mod: CPTII,S$GLB,, | Performed by: PHYSICIAN ASSISTANT

## 2023-03-17 PROCEDURE — 3044F HG A1C LEVEL LT 7.0%: CPT | Mod: CPTII,S$GLB,, | Performed by: PHYSICIAN ASSISTANT

## 2023-03-17 PROCEDURE — 3077F PR MOST RECENT SYSTOLIC BLOOD PRESSURE >= 140 MM HG: ICD-10-PCS | Mod: CPTII,S$GLB,, | Performed by: PHYSICIAN ASSISTANT

## 2023-03-17 PROCEDURE — 99214 PR OFFICE/OUTPT VISIT, EST, LEVL IV, 30-39 MIN: ICD-10-PCS | Mod: S$GLB,,, | Performed by: PHYSICIAN ASSISTANT

## 2023-03-17 PROCEDURE — 99999 PR PBB SHADOW E&M-EST. PATIENT-LVL IV: CPT | Mod: PBBFAC,,, | Performed by: PHYSICIAN ASSISTANT

## 2023-03-17 PROCEDURE — 1160F RVW MEDS BY RX/DR IN RCRD: CPT | Mod: CPTII,S$GLB,, | Performed by: PHYSICIAN ASSISTANT

## 2023-03-17 PROCEDURE — 3044F PR MOST RECENT HEMOGLOBIN A1C LEVEL <7.0%: ICD-10-PCS | Mod: CPTII,S$GLB,, | Performed by: PHYSICIAN ASSISTANT

## 2023-03-17 PROCEDURE — 1159F MED LIST DOCD IN RCRD: CPT | Mod: CPTII,S$GLB,, | Performed by: PHYSICIAN ASSISTANT

## 2023-03-17 PROCEDURE — 1159F PR MEDICATION LIST DOCUMENTED IN MEDICAL RECORD: ICD-10-PCS | Mod: CPTII,S$GLB,, | Performed by: PHYSICIAN ASSISTANT

## 2023-03-17 PROCEDURE — 3080F DIAST BP >= 90 MM HG: CPT | Mod: CPTII,S$GLB,, | Performed by: PHYSICIAN ASSISTANT

## 2023-03-17 PROCEDURE — 99999 PR PBB SHADOW E&M-EST. PATIENT-LVL IV: ICD-10-PCS | Mod: PBBFAC,,, | Performed by: PHYSICIAN ASSISTANT

## 2023-03-17 PROCEDURE — 3080F PR MOST RECENT DIASTOLIC BLOOD PRESSURE >= 90 MM HG: ICD-10-PCS | Mod: CPTII,S$GLB,, | Performed by: PHYSICIAN ASSISTANT

## 2023-03-17 NOTE — PROGRESS NOTES
"Interventional Pain Progress Note       Chief Pain Complaint:  Right Hip Pain    Interval History (3/20/2023): Ora Henderson presents today for follow-up visit.  she underwent right SIJ + GT bursa injection on 2/16/23.  The patient reports that she is/was better following the procedure.  she reports she initially had 20% pain relief and 1 week after the injection she was in her kitchen when she heard and felt a loud pop in her right hip and then shortly after she was unable to stand completely upright.  She reports she had to utilize crutches because she was unable to bear weight on the right leg.  She went to [facility] where she had x-rays that ruled out any fracture and was diagnosed with a pulled muscle.  She reached out to Dr. Treadwell on and was prescribed a Medrol Dosepak.  She reports the 2nd day of the Medrol Dosepak she began to feel better..  The changes lasted 4 weeks so far.  The changes have continued through this visit.  Patient reports pain as "0/10 today.      Interval History (01/31/2023):  Patient returns to clinic for evaluation of right hip pain.  Patient reports 100% relief in neck pain after a C7-T1 interlaminar epidural steroid injection on 09/15/2022.  Patient reports 4 month history of right hip pain.  Pain is described as an aching throbbing pain over the greater trochanter area that radiates to the right buttocks and down the lateral aspect of her right thigh to just above the knee.  Pain is worse in the morning and with prolonged walking, better at night and with stretching.  Pain is rated at 2/10, but increases to an 8/10 with exacerbating factors. Denies any fevers, chills, changes in gait, weakness, or bowel and bladder incontinence        Interval History (8/22/22):  Patient returns to clinic for  evaluation of muscle spasms in right upper extremity pain.  Patient reports 1 month history of bilateral lower-extremity muscle spasm pain primarily in the calf " region.  Patient is currently on diuretic and Lipitor.  Neck pain is ongoing and described as a burning aching pain over the right lower neck and right proximal biceps area.  Pain is worse with neck rotation and lifting, better with heat and rest.  Pain is rated a 7/10. Denies any fevers, chills, changes in gait, weakness, or bowel and bladder incontinence      History of Present Illness:   Ora Henderson is a 62 y.o. female  who is presenting with a chief complaint of Lumbar Back Pain. The patient began experiencing this problem insidiously, and the pain has been gradually worsening over the past 10 month(s). The pain is described as throbbing, shooting, burning and electrical and is located in the bilateral lumbar spine . Pain is intermittent and lasts hours. The pain radiates to bilateral lower extremities. The patient rates her pain a 7 out of ten and interferes with activities of daily living a 7 out of ten. Pain is exacerbated by flexion of the lumbar spine, and is improved by rest. Patient reports no prior trauma, no prior spinal surgery     Ora Henderson is a 62 y.o. female  who is presenting with a chief complaint of Hip Pain (Patient received 10-20% relief from injection.  Patient next in pain today.)  . The patient began experiencing this problem insidiously, and the pain has been gradually worsening over the past 7 month(s). The pain is described as throbbing, cramping, aching and heavy and is located in the bilateral buttocks . Pain is intermittent and lasts hours. The  pain is nonradiating. The patient rates her pain a 7 out of ten and interferes with activities of daily living a 6 out of ten. Pain is exacerbated by getting up from a seated position, and is improved by rest. Patient reports no prior trauma, no prior spinal surgery     Ora Henderson is a 62 y.o. female  who is presenting with a chief complaint of right neck shoulder pain. The patient  began experiencing this problem insidiously, and the pain has been gradually worsening over the past 6 month(s). The pain is described as throbbing, cramping, aching and heavy and is located in the right neck  . Pain is intermittent and lasts hours. The pain radiates to right arm. The patient rates her pain a 7 out of ten and interferes with activities of daily living a 7 out of ten. Pain is exacerbated by extension, and is improved by rest. Patient reports no prior trauma, no prior spinal surgery         - pertinent negatives: No fever, No chills, No weight loss, No bladder dysfunction, No bowel dysfunction, No saddle anesthesia  - pertinent positives: none    - medications, other therapies tried (physical therapy, injections):     >> NSAIDs, Tylenol, gabapentin and medrol dose pack    >> Has previously undergone Physical Therapy    >> Has previously undergone spinal injection/s         bilateral L4-L5 TFESI on 10/7/2021 with 80% pain relief.          bilateral S1 TFESI on 12/29/2022 with 85% pain relief but took 2 weeks to take effect.   -09/15/2022: C7-T1 interlaminar epidural steroid injection, 100% relief  -right SIJ + GT bursa injection on 2/16/23 with 20 % relief at 1st with full resolution of pain after Medrol Dosepak    Imaging / Labs / Studies (reviewed on 3/20/2023):  FL Fluoro for Pain Management  See OP Notes for results.     IMPRESSION: See OP Notes for results.     This procedure was auto-finalized by: Virtual Radiologist        ASHLYN EMG/NCS 5/3/22  IMPRESSION   1. ABNORMAL stud   2. There is electrodiagnostic evidence of an acute on chronic radiculopathy of the right C5 nerve root and a chronic radiculopathy of the C6, C8, T1 nerve roots. There is additional evidence of persistent mild demyelinating median neuropathy (Carpal tunnel syndrome) across BILATERAL wrists.       Results for orders placed during the hospital encounter of 12/08/14    X-Ray Lumbar Spine Complete 5 View    Narrative  Comparison:  03/21/12    Five views    Findings:    Osteopenia and overlying bowel limits the study.  Multilevel marginal spurring and facet arthropathy.  Uniform loss of this height at the L4 -- 5 level with grade 1 anterolisthesis L4 on L5.  No acute fracture.  No spondylolysis.  Pedicles and SI joints  are intact.  IMPRESSION:      Interval progression degenerative changes with most pronounced findings at the L4 -- 5 level.  See above.      Electronically signed by: TARIQ CORTEZ III, MD  Date:     12/08/14  Time:    09:23        Results for orders placed during the hospital encounter of 12/15/20    X-Ray Lumbar Complete With Flex And Ext    Narrative  EXAMINATION:  XR LUMBAR SPINE 5 VIEW WITH FLEX AND EXT    CLINICAL HISTORY:  lumbar radic;  Other spondylosis with radiculopathy, lumbar region    TECHNIQUE:  Five views of the lumbar spine plus flexion extension views were performed.    COMPARISON:  December 23, 2017    FINDINGS:  Inferior-most lumbar vertebral body designated L5 for purposes of this exam.  Vertebral height maintained.  Mild uniform loss of disc height at the L4-5 level there is grade 1 anterolisthesis L4 on L5.  Remaining disc spaces, vertebral body height and alignment maintained.  Slight increased prominence of anterior subluxation L4 on L5 on the flexion view.  Stable appearance the L4-5 level on neutral and extension lateral views.  Pedicles and SI joints intact.  Numerous calcifications lower pelvis bilaterally.  Prominent degenerative change bilateral hips and to lesser extent bilateral SI joints.    Impression  As above.      Electronically signed by: Tariq Cortez MD  Date:    12/15/2020  Time:    20:33    Results for orders placed during the hospital encounter of 12/23/17    X-Ray Lumbar Spine AP And Lateral    Narrative  AP and lateral views of the lumbar spine    Findings: The vertebral bodies demonstrate normal height.  There is grade 1 spondylolisthesis of L4 on L5. There is mild disc space  narrowing and spondylosis present at the L1-L2 through the L4-L5 levels. Prominent facet arthropathy noted bilaterally at L4-L5 level.  IMPRESSION:  As above      Electronically signed by: MOISES SANTOS D.O.  Date:     12/26/17  Time:    08:27    Results for orders placed during the hospital encounter of 07/05/13    X-Ray Cervical Spine AP And Lateral    Narrative  Findings: Vertebral alignment is normal.  There is mild narrowing of the C5-6 disk spaces and early anterior osteophyte formation.  There are no compression fractures or acute abnormalities are seen.  IMPRESSION:  Mild degenerative change in the cervical spine at C5-6.      Electronically signed by: LURDES SHORT MD  Date:     07/05/13  Time:    09:29      Results for orders placed during the hospital encounter of 07/22/19    X-Ray Cervical Spine Complete 5 view    Narrative  EXAMINATION:  XR CERVICAL SPINE COMPLETE 5 VIEW    CLINICAL HISTORY:  . Cervicalgia    TECHNIQUE:  AP, Lateral, bilateral oblique and open mouth views of the cervical spine were performed.    COMPARISON:  07/05/2013    FINDINGS:  There is some straightening of the normal cervical lordosis with slight retrolisthesis of C5 on C6. Anterior disc osteophyte complex production and possible slight disc height loss noted at C5-6.  No appreciable change from prior.  Posterior elements appear intact without acute fractures or subluxations demonstrated.  Odontoid process appears intact.  Atlantoaxial articulations appear normal.  Prevertebral soft tissues are within normal limits.    Impression  Degenerative changes as above.  No acute findings.      Electronically signed by: George Bojorquez MD  Date:    07/22/2019  Time:    16:21      Review of Systems:  CONSTITUTIONAL: patient denies any fever, chills, or weight loss  SKIN: patient denies any rash or itching  RESPIRATORY: patient denies having any shortness of breath  GASTROINTESTINAL: patient denies having any diarrhea, constipation, or  "bowel incontinence  GENITOURINARY: patient denies having any abnormal bladder function    MUSCULOSKELETAL:  - patient complains of the above noted pain/s (see chief pain complaint)    NEUROLOGICAL:   - pain as above  - strength in Lower extremities is intact, BILATERALLY  - sensation in Lower extremities is intact, BILATERALLY  - patient denies any loss of bowel or bladder control      PSYCHIATRIC: patient denies any change in mood    Other:  All other systems reviewed and are negative      Physical Exam:  BP (!) 157/93   Pulse 84   Resp 16   Ht 5' 3" (1.6 m)   Wt 92 kg (202 lb 13.2 oz)   LMP  (LMP Unknown)   BMI 35.93 kg/m²  (reviewed on 3/20/2023)  Physical Exam  Constitutional:       Appearance: Normal appearance.   HENT:      Head: Normocephalic and atraumatic.   Eyes:      Extraocular Movements: Extraocular movements intact.      Pupils: Pupils are equal, round, and reactive to light.   Pulmonary:      Effort: Pulmonary effort is normal.   Abdominal:      General: Abdomen is flat.   Musculoskeletal:      Cervical back: Normal range of motion and neck supple.      Right hip: Tenderness (TTP over GTB) present. No deformity. Decreased range of motion. Normal strength.      Left hip: Normal.   Skin:     General: Skin is warm and dry.      Capillary Refill: Capillary refill takes less than 2 seconds.   Neurological:      General: No focal deficit present.      Mental Status: She is alert and oriented to person, place, and time.      Cranial Nerves: No cranial nerve deficit.      Sensory: No sensory deficit.      Motor: No weakness or abnormal muscle tone.      Gait: Gait normal.      Deep Tendon Reflexes: Babinski sign absent on the right side. Babinski sign absent on the left side.      Reflex Scores:       Patellar reflexes are 2+ on the right side and 2+ on the left side.       Achilles reflexes are 2+ on the right side and 2+ on the left side.  Psychiatric:         Mood and Affect: Mood normal.         " Behavior: Behavior normal.         Musculoskeletal:    Lumbar Exam  Incision: no  Pain with Flexion: no  Pain with Extension: no  ROM: FROM  Paraspinous TTP:  Right lumbar paraspinous  Facet TTP:  Negative bilaterally  Facet Loading:  Negative bilaterally  SLR:  Negative bilaterally  SIJ TTP:  Positive on the right but improved  GUSTABO:  Positive on the right with positive compression distraction          Assessment:    Ora Henderson is a 62 y.o. year old female who is presenting with     Encounter Diagnosis   Name Primary?    Sacroiliitis Yes       Sacroiliitis            Plan:    1. Interventional:  None at this time, patient can consider repeat injection in the future if pain should exacerbate   S/p right SIJ + GT bursa injection on 2/16/23 with 20 % relief at 1st with full resolution of pain after Medrol Dosepak   Can consider lumbar epidural steroid injection if pain continues for possible overlapping lumbar radiculopathy  -09/15/2022: C7-T1 interlaminar epidural steroid injection, 100% relief     -S/p bilateral S1 TFESI on 12/29/2022 with 85% pain relief but took 2 weeks to take effect.       -S/p bilateral L4-L5 TFESI on 10/7/2021 with 80% pain relief.     2. Pharmacologic:    Continue Robaxin 750 mg twice a day as needed    3. Rehabilitative:   -continue home therapy exercises    4. Diagnostic:   -MRI cervical spine reviewed and findings discussed with patient      5. Consult:   -continue follow-up with orthopedics for carpal tunnel syndrome     Return to clinic pmarni Dacosta PA-C  Interventional Pain Medicine  [facility]

## 2023-04-12 ENCOUNTER — HOSPITAL ENCOUNTER (OUTPATIENT)
Dept: RADIOLOGY | Facility: HOSPITAL | Age: 63
Discharge: HOME | End: 2023-04-12
Attending: FAMILY MEDICINE
Payer: COMMERCIAL

## 2023-04-12 ENCOUNTER — OFFICE VISIT (OUTPATIENT)
Dept: INTERNAL MEDICINE | Facility: CLINIC | Age: 63
End: 2023-04-12
Payer: COMMERCIAL

## 2023-04-12 VITALS
OXYGEN SATURATION: 98 % | HEIGHT: 63 IN | SYSTOLIC BLOOD PRESSURE: 138 MMHG | BODY MASS INDEX: 36.25 KG/M2 | RESPIRATION RATE: 16 BRPM | DIASTOLIC BLOOD PRESSURE: 86 MMHG | WEIGHT: 204.56 LBS | HEART RATE: 89 BPM

## 2023-04-12 DIAGNOSIS — E66.01 SEVERE OBESITY (BMI 35.0-39.9) WITH COMORBIDITY: ICD-10-CM

## 2023-04-12 DIAGNOSIS — R05.3 CHRONIC COUGH: Primary | ICD-10-CM

## 2023-04-12 DIAGNOSIS — K21.9 GASTROESOPHAGEAL REFLUX DISEASE WITHOUT ESOPHAGITIS: Chronic | ICD-10-CM

## 2023-04-12 DIAGNOSIS — I10 ESSENTIAL HYPERTENSION: Chronic | ICD-10-CM

## 2023-04-12 DIAGNOSIS — R05.3 CHRONIC COUGH: ICD-10-CM

## 2023-04-12 PROCEDURE — 3008F PR BODY MASS INDEX (BMI) DOCUMENTED: ICD-10-PCS | Mod: CPTII,S$GLB,, | Performed by: FAMILY MEDICINE

## 2023-04-12 PROCEDURE — 3044F PR MOST RECENT HEMOGLOBIN A1C LEVEL <7.0%: ICD-10-PCS | Mod: CPTII,S$GLB,, | Performed by: FAMILY MEDICINE

## 2023-04-12 PROCEDURE — 1159F MED LIST DOCD IN RCRD: CPT | Mod: CPTII,S$GLB,, | Performed by: FAMILY MEDICINE

## 2023-04-12 PROCEDURE — 99214 PR OFFICE/OUTPT VISIT, EST, LEVL IV, 30-39 MIN: ICD-10-PCS | Mod: S$GLB,,, | Performed by: FAMILY MEDICINE

## 2023-04-12 PROCEDURE — 3079F PR MOST RECENT DIASTOLIC BLOOD PRESSURE 80-89 MM HG: ICD-10-PCS | Mod: CPTII,S$GLB,, | Performed by: FAMILY MEDICINE

## 2023-04-12 PROCEDURE — 71046 X-RAY EXAM CHEST 2 VIEWS: CPT | Mod: 26,,, | Performed by: RADIOLOGY

## 2023-04-12 PROCEDURE — 3075F SYST BP GE 130 - 139MM HG: CPT | Mod: CPTII,S$GLB,, | Performed by: FAMILY MEDICINE

## 2023-04-12 PROCEDURE — 99214 OFFICE O/P EST MOD 30 MIN: CPT | Mod: S$GLB,,, | Performed by: FAMILY MEDICINE

## 2023-04-12 PROCEDURE — 99999 PR PBB SHADOW E&M-EST. PATIENT-LVL IV: ICD-10-PCS | Mod: PBBFAC,,, | Performed by: FAMILY MEDICINE

## 2023-04-12 PROCEDURE — 1160F PR REVIEW ALL MEDS BY PRESCRIBER/CLIN PHARMACIST DOCUMENTED: ICD-10-PCS | Mod: CPTII,S$GLB,, | Performed by: FAMILY MEDICINE

## 2023-04-12 PROCEDURE — 71046 XR CHEST PA AND LATERAL: ICD-10-PCS | Mod: 26,,, | Performed by: RADIOLOGY

## 2023-04-12 PROCEDURE — 1159F PR MEDICATION LIST DOCUMENTED IN MEDICAL RECORD: ICD-10-PCS | Mod: CPTII,S$GLB,, | Performed by: FAMILY MEDICINE

## 2023-04-12 PROCEDURE — 3008F BODY MASS INDEX DOCD: CPT | Mod: CPTII,S$GLB,, | Performed by: FAMILY MEDICINE

## 2023-04-12 PROCEDURE — 3079F DIAST BP 80-89 MM HG: CPT | Mod: CPTII,S$GLB,, | Performed by: FAMILY MEDICINE

## 2023-04-12 PROCEDURE — 3044F HG A1C LEVEL LT 7.0%: CPT | Mod: CPTII,S$GLB,, | Performed by: FAMILY MEDICINE

## 2023-04-12 PROCEDURE — 1160F RVW MEDS BY RX/DR IN RCRD: CPT | Mod: CPTII,S$GLB,, | Performed by: FAMILY MEDICINE

## 2023-04-12 PROCEDURE — 3075F PR MOST RECENT SYSTOLIC BLOOD PRESS GE 130-139MM HG: ICD-10-PCS | Mod: CPTII,S$GLB,, | Performed by: FAMILY MEDICINE

## 2023-04-12 PROCEDURE — 71046 X-RAY EXAM CHEST 2 VIEWS: CPT | Mod: TC

## 2023-04-12 PROCEDURE — 99999 PR PBB SHADOW E&M-EST. PATIENT-LVL IV: CPT | Mod: PBBFAC,,, | Performed by: FAMILY MEDICINE

## 2023-04-12 NOTE — PROGRESS NOTES
"Subjective:       Patient ID: Ora Henderson is a 62 y.o. female.    Chief Complaint: Cough    62-year-old  female patient with Patient Active Problem List:     DJD (degenerative joint disease), cervical-mild     Mixed hyperlipidemia     Hepatomegaly     Family history of cardiovascular disease     GERD (gastroesophageal reflux disease)     History of benign pituitary tumor     Hx of colonic polyp     Essential hypertension     Osteoarthritis of spine with radiculopathy, lumbar region     Severe obesity (BMI 35.0-39.9) with comorbidity     NATALIE (obstructive sleep apnea)  Reports that patient has been having chronic cough all during day and night in spite of taking over-the-counter medication and has been taking over-the-counter Nexium as prescription Nexium has not been covered by her insurance and omeprazole has not been helpful in the past.  Denies any postnasal drip fever with chills, shortness of breath or wheezing or worsening acid reflux symptoms or chest tightness    Review of Systems   Constitutional:  Negative for appetite change, fatigue and unexpected weight change.   HENT:  Negative for congestion and postnasal drip.    Eyes:  Negative for visual disturbance.   Respiratory:  Positive for cough. Negative for shortness of breath and wheezing.    Cardiovascular:  Negative for chest pain and leg swelling.   Gastrointestinal:  Negative for abdominal pain, nausea and vomiting.   Musculoskeletal:  Negative for myalgias.   Skin:  Negative for rash.   Neurological:  Negative for light-headedness and headaches.   Psychiatric/Behavioral:  Negative for sleep disturbance.        /86 (BP Location: Right arm, Patient Position: Sitting, BP Method: Large (Manual))   Pulse 89   Resp 16   Ht 5' 3" (1.6 m)   Wt 92.8 kg (204 lb 9.4 oz)   LMP  (LMP Unknown)   SpO2 98%   BMI 36.24 kg/m²   Objective:      Physical Exam  Constitutional:       Appearance: She is well-developed.   HENT: "      Head: Normocephalic and atraumatic.   Cardiovascular:      Rate and Rhythm: Normal rate and regular rhythm.      Heart sounds: Normal heart sounds. No murmur heard.  Pulmonary:      Effort: Pulmonary effort is normal. No respiratory distress.      Breath sounds: Normal breath sounds. No wheezing.   Abdominal:      General: Bowel sounds are normal.      Palpations: Abdomen is soft.      Tenderness: There is no abdominal tenderness.   Skin:     General: Skin is warm and dry.      Findings: No rash.   Neurological:      Mental Status: She is alert and oriented to person, place, and time.   Psychiatric:         Mood and Affect: Mood normal.         Assessment/Plan:   1. Chronic cough  -     CBC Auto Differential; Future; Expected date: 04/12/2023  -     X-Ray Chest PA And Lateral; Future; Expected date: 04/12/2023  Patient was advised to try increasing Nexium over-the-counter from 20-40 mg daily and will get further labs and chest x-ray  Patient has tried over-the-counter cough suppressants including prescription once with no relief if symptoms continue to persist patient was advised to see pulmonology    2. Gastroesophageal reflux disease without esophagitis  Currently taking over-the-counter Nexium  Omeprazole has not been helpful in the past    3. Essential hypertension  Blood pressure is stable currently on amlodipine 2.5 mg and losartan hydrochlorothiazide 100/25 mg daily    4. Severe obesity (BMI 35.0-39.9) with comorbidity  Lifestyle modifications discussed to lose weight with BMI 36

## 2023-04-25 ENCOUNTER — OFFICE VISIT (OUTPATIENT)
Dept: OBSTETRICS AND GYNECOLOGY | Facility: CLINIC | Age: 63
End: 2023-04-25
Payer: COMMERCIAL

## 2023-04-25 VITALS
DIASTOLIC BLOOD PRESSURE: 92 MMHG | SYSTOLIC BLOOD PRESSURE: 142 MMHG | HEIGHT: 63 IN | WEIGHT: 202.63 LBS | BODY MASS INDEX: 35.9 KG/M2

## 2023-04-25 DIAGNOSIS — B37.31 VULVOVAGINAL CANDIDIASIS: ICD-10-CM

## 2023-04-25 DIAGNOSIS — N89.8 VAGINAL ITCHING: Primary | ICD-10-CM

## 2023-04-25 PROCEDURE — 3044F HG A1C LEVEL LT 7.0%: CPT | Mod: CPTII,S$GLB,, | Performed by: NURSE PRACTITIONER

## 2023-04-25 PROCEDURE — 99999 PR PBB SHADOW E&M-EST. PATIENT-LVL III: ICD-10-PCS | Mod: PBBFAC,,, | Performed by: NURSE PRACTITIONER

## 2023-04-25 PROCEDURE — 3080F PR MOST RECENT DIASTOLIC BLOOD PRESSURE >= 90 MM HG: ICD-10-PCS | Mod: CPTII,S$GLB,, | Performed by: NURSE PRACTITIONER

## 2023-04-25 PROCEDURE — 3077F SYST BP >= 140 MM HG: CPT | Mod: CPTII,S$GLB,, | Performed by: NURSE PRACTITIONER

## 2023-04-25 PROCEDURE — 3008F PR BODY MASS INDEX (BMI) DOCUMENTED: ICD-10-PCS | Mod: CPTII,S$GLB,, | Performed by: NURSE PRACTITIONER

## 2023-04-25 PROCEDURE — 3044F PR MOST RECENT HEMOGLOBIN A1C LEVEL <7.0%: ICD-10-PCS | Mod: CPTII,S$GLB,, | Performed by: NURSE PRACTITIONER

## 2023-04-25 PROCEDURE — 1159F MED LIST DOCD IN RCRD: CPT | Mod: CPTII,S$GLB,, | Performed by: NURSE PRACTITIONER

## 2023-04-25 PROCEDURE — 1159F PR MEDICATION LIST DOCUMENTED IN MEDICAL RECORD: ICD-10-PCS | Mod: CPTII,S$GLB,, | Performed by: NURSE PRACTITIONER

## 2023-04-25 PROCEDURE — 3008F BODY MASS INDEX DOCD: CPT | Mod: CPTII,S$GLB,, | Performed by: NURSE PRACTITIONER

## 2023-04-25 PROCEDURE — 99213 OFFICE O/P EST LOW 20 MIN: CPT | Mod: S$GLB,,, | Performed by: NURSE PRACTITIONER

## 2023-04-25 PROCEDURE — 3080F DIAST BP >= 90 MM HG: CPT | Mod: CPTII,S$GLB,, | Performed by: NURSE PRACTITIONER

## 2023-04-25 PROCEDURE — 1160F PR REVIEW ALL MEDS BY PRESCRIBER/CLIN PHARMACIST DOCUMENTED: ICD-10-PCS | Mod: CPTII,S$GLB,, | Performed by: NURSE PRACTITIONER

## 2023-04-25 PROCEDURE — 3077F PR MOST RECENT SYSTOLIC BLOOD PRESSURE >= 140 MM HG: ICD-10-PCS | Mod: CPTII,S$GLB,, | Performed by: NURSE PRACTITIONER

## 2023-04-25 PROCEDURE — 81514 NFCT DS BV&VAGINITIS DNA ALG: CPT | Performed by: NURSE PRACTITIONER

## 2023-04-25 PROCEDURE — 1160F RVW MEDS BY RX/DR IN RCRD: CPT | Mod: CPTII,S$GLB,, | Performed by: NURSE PRACTITIONER

## 2023-04-25 PROCEDURE — 99999 PR PBB SHADOW E&M-EST. PATIENT-LVL III: CPT | Mod: PBBFAC,,, | Performed by: NURSE PRACTITIONER

## 2023-04-25 PROCEDURE — 99213 PR OFFICE/OUTPT VISIT, EST, LEVL III, 20-29 MIN: ICD-10-PCS | Mod: S$GLB,,, | Performed by: NURSE PRACTITIONER

## 2023-04-25 RX ORDER — NYSTATIN 100000 U/G
OINTMENT TOPICAL 2 TIMES DAILY
Qty: 30 G | Refills: 1 | Status: SHIPPED | OUTPATIENT
Start: 2023-04-25 | End: 2023-11-09

## 2023-04-25 RX ORDER — TRIAMCINOLONE ACETONIDE 1 MG/G
OINTMENT TOPICAL 2 TIMES DAILY
Qty: 80 G | Refills: 0 | Status: SHIPPED | OUTPATIENT
Start: 2023-04-25

## 2023-04-25 RX ORDER — FLUCONAZOLE 150 MG/1
TABLET ORAL
Qty: 2 TABLET | Refills: 2 | Status: SHIPPED | OUTPATIENT
Start: 2023-04-25 | End: 2023-11-09

## 2023-04-25 NOTE — PROGRESS NOTES
Subjective:       Patient ID: Ora Henderson is a 62 y.o. female.    Chief Complaint:  Vaginal Itching    No LMP recorded (lmp unknown). Patient has had a hysterectomy.  History of Present Illness  To clinic with complaints of vaginal itching for 1 month.  Patient reports associated vaginal odor.  Denies any significant change in vaginal discharge.  Patient reports using Monistat 7 day treatment over-the-counter with minimal improvement in symptoms.  Completed 2 weeks ago.    OB History    Para Term  AB Living   3 2 1   1 1   SAB IAB Ectopic Multiple Live Births   1       1      # Outcome Date GA Lbr Dell/2nd Weight Sex Delivery Anes PTL Lv   3 Term            2 SAB            1 Para      CS-LTranv   STEPH       Review of Systems  Review of Systems   Constitutional:  Negative for appetite change, fatigue and fever.   Gastrointestinal:  Negative for abdominal pain, bloating, constipation, diarrhea, nausea and vomiting.   Genitourinary:  Positive for vaginal discharge and vaginal odor. Negative for bladder incontinence, dysmenorrhea, dyspareunia, dysuria, flank pain, frequency, genital sores, menorrhagia, menstrual problem, pelvic pain, urgency, vaginal bleeding, vaginal pain, postcoital bleeding and vaginal dryness.        Vaginal itching   All other systems reviewed and are negative.         Objective:      Physical Exam:   Constitutional: She is oriented to person, place, and time. She appears well-developed and well-nourished.    HENT:   Head: Normocephalic and atraumatic.   Nose: Nose normal.    Eyes: Pupils are equal, round, and reactive to light. Conjunctivae and EOM are normal.     Cardiovascular:  Normal rate and regular rhythm.             Pulmonary/Chest: Effort normal.        Abdominal: Soft. She exhibits no distension. There is no abdominal tenderness. Hernia confirmed negative in the right inguinal area and confirmed negative in the left inguinal area.     Genitourinary:     Inguinal canal and rectum normal.            Pelvic exam was performed with patient supine.   The external female genitalia was normal.   Genitalia hair distrobution normal .   Labial bartholins normal.There is no rash, tenderness, lesion or injury on the right labia. There is no rash, tenderness, lesion or injury on the left labia. There is vaginal discharge (white, thick) in the vagina. No erythema, rectocele or cystocele in the vagina. Vaginal atrophy noted. Cervix is absent.Uterus is absent.           Musculoskeletal: Normal range of motion and moves all extremeties.      Lymphadenopathy: No inguinal adenopathy noted on the right or left side.    Neurological: She is alert and oriented to person, place, and time.    Skin: Skin is warm and dry. She is not diaphoretic.    Psychiatric: She has a normal mood and affect. Her behavior is normal. Judgment and thought content normal.          Assessment:     1. Vaginal itching    2. Vulvovaginal candidiasis              Plan:   Ora was seen today for vaginal itching.    Diagnoses and all orders for this visit:    Vaginal itching  -     Vaginosis Screen by DNA Probe    Vulvovaginal candidiasis  -     fluconazole (DIFLUCAN) 150 MG Tab; Take 1 tablet today and then take 1 tablet in 72 hours.  -     nystatin (MYCOSTATIN) ointment; Apply topically 2 (two) times daily.  -     triamcinolone acetonide 0.1% (KENALOG) 0.1 % ointment; Apply topically 2 (two) times daily.      Will treat empirically for yeast.  Mixed nystatin and Kenalog cream and apply to external genitalia.  Will follow vaginosis screen and treat additionally if indicated.      Patient was counseled today on vaginitis prevention including :   a. avoiding feminine products such as deoderant soaps, body wash, bubble bath, douches, scented toilet paper, deoderant tampons or pads, feminine wipes, chronic pad use, etc.   b. avoiding other vulvovaginal irritants such as long hot baths, humidity, tight, synthetic  clothing, chlorine and sitting around in wet bathing suits   c. wearing cotton underwear, avoiding thong underwear and no underwear to bed   d. taking showers instead of baths and use a hair dryer on cool setting afterwards to dry   e. wearing cotton to exercise and shower immediately after exercise and change clothes   f. using polyurethane condoms without spermicide if sexually active and symptoms are triggered by intercourse

## 2023-04-27 LAB
BACTERIAL VAGINOSIS DNA: NEGATIVE
CANDIDA GLABRATA DNA: NEGATIVE
CANDIDA KRUSEI DNA: NEGATIVE
CANDIDA RRNA VAG QL PROBE: NEGATIVE
T VAGINALIS RRNA GENITAL QL PROBE: NEGATIVE

## 2023-09-05 ENCOUNTER — TELEPHONE (OUTPATIENT)
Dept: INTERNAL MEDICINE | Facility: CLINIC | Age: 63
End: 2023-09-05
Payer: COMMERCIAL

## 2023-09-05 ENCOUNTER — TELEPHONE (OUTPATIENT)
Dept: SPORTS MEDICINE | Facility: CLINIC | Age: 63
End: 2023-09-05
Payer: COMMERCIAL

## 2023-09-05 DIAGNOSIS — Z12.31 ENCOUNTER FOR SCREENING MAMMOGRAM FOR MALIGNANT NEOPLASM OF BREAST: Primary | ICD-10-CM

## 2023-09-05 DIAGNOSIS — M79.672 LEFT FOOT PAIN: Primary | ICD-10-CM

## 2023-09-05 DIAGNOSIS — Z29.9 PREVENTIVE MEASURE: Primary | ICD-10-CM

## 2023-09-05 NOTE — TELEPHONE ENCOUNTER
----- Message from Gianna Ahmadi MA sent at 9/5/2023  2:31 PM CDT -----  Regarding: FW: Link Orders    ----- Message -----  From: China Kwok  Sent: 9/5/2023   1:30 PM CDT  To: [facility] Car[facility] Staff  Subject: Link Orders                                      Pt is scheduled for blood work tomorrow but no orders are linked. I wanted to see if orders could be linked to the appt.

## 2023-09-05 NOTE — TELEPHONE ENCOUNTER
----- Message from Lulú Rhodes sent at 9/5/2023  8:35 AM CDT -----  Patient is requesting an order for a mammo be put in. Call back at [phone]

## 2023-09-06 ENCOUNTER — HOSPITAL ENCOUNTER (OUTPATIENT)
Dept: RADIOLOGY | Facility: HOSPITAL | Age: 63
Discharge: HOME | End: 2023-09-06
Attending: STUDENT IN AN ORGANIZED HEALTH CARE EDUCATION/TRAINING PROGRAM
Payer: COMMERCIAL

## 2023-09-06 ENCOUNTER — OFFICE VISIT (OUTPATIENT)
Dept: SPORTS MEDICINE | Facility: CLINIC | Age: 63
End: 2023-09-06
Payer: COMMERCIAL

## 2023-09-06 ENCOUNTER — HOSPITAL ENCOUNTER (OUTPATIENT)
Dept: RADIOLOGY | Facility: HOSPITAL | Age: 63
Discharge: HOME | End: 2023-09-06
Attending: FAMILY MEDICINE
Payer: COMMERCIAL

## 2023-09-06 DIAGNOSIS — S92.502A CLOSED FRACTURE OF PHALANX OF LEFT FIFTH TOE, INITIAL ENCOUNTER: ICD-10-CM

## 2023-09-06 DIAGNOSIS — M79.672 LEFT FOOT PAIN: ICD-10-CM

## 2023-09-06 DIAGNOSIS — M79.672 LEFT FOOT PAIN: Primary | ICD-10-CM

## 2023-09-06 DIAGNOSIS — Z12.31 ENCOUNTER FOR SCREENING MAMMOGRAM FOR MALIGNANT NEOPLASM OF BREAST: ICD-10-CM

## 2023-09-06 DIAGNOSIS — S92.515A CLOSED NONDISPLACED FRACTURE OF PROXIMAL PHALANX OF LESSER TOE OF LEFT FOOT, INITIAL ENCOUNTER: Primary | ICD-10-CM

## 2023-09-06 DIAGNOSIS — E78.2 MIXED HYPERLIPIDEMIA: Primary | ICD-10-CM

## 2023-09-06 PROCEDURE — 73630 X-RAY EXAM OF FOOT: CPT | Mod: 26,LT,, | Performed by: RADIOLOGY

## 2023-09-06 PROCEDURE — 1160F PR REVIEW ALL MEDS BY PRESCRIBER/CLIN PHARMACIST DOCUMENTED: ICD-10-PCS | Mod: CPTII,S$GLB,, | Performed by: STUDENT IN AN ORGANIZED HEALTH CARE EDUCATION/TRAINING PROGRAM

## 2023-09-06 PROCEDURE — 99999 PR PBB SHADOW E&M-EST. PATIENT-LVL III: ICD-10-PCS | Mod: PBBFAC,,, | Performed by: STUDENT IN AN ORGANIZED HEALTH CARE EDUCATION/TRAINING PROGRAM

## 2023-09-06 PROCEDURE — 99214 PR OFFICE/OUTPT VISIT, EST, LEVL IV, 30-39 MIN: ICD-10-PCS | Mod: 57,S$GLB,, | Performed by: STUDENT IN AN ORGANIZED HEALTH CARE EDUCATION/TRAINING PROGRAM

## 2023-09-06 PROCEDURE — 77063 BREAST TOMOSYNTHESIS BI: CPT | Mod: 26,,, | Performed by: RADIOLOGY

## 2023-09-06 PROCEDURE — 28510 PR CLOSED RX TOE FX: ICD-10-PCS | Mod: LT,S$GLB,, | Performed by: STUDENT IN AN ORGANIZED HEALTH CARE EDUCATION/TRAINING PROGRAM

## 2023-09-06 PROCEDURE — 28510 TREATMENT OF TOE FRACTURE: CPT | Mod: LT,S$GLB,, | Performed by: STUDENT IN AN ORGANIZED HEALTH CARE EDUCATION/TRAINING PROGRAM

## 2023-09-06 PROCEDURE — 73630 X-RAY EXAM OF FOOT: CPT | Mod: TC,FY,PO,LT

## 2023-09-06 PROCEDURE — 99999 PR PBB SHADOW E&M-EST. PATIENT-LVL III: CPT | Mod: PBBFAC,,, | Performed by: STUDENT IN AN ORGANIZED HEALTH CARE EDUCATION/TRAINING PROGRAM

## 2023-09-06 PROCEDURE — 77067 MAMMO DIGITAL SCREENING BILAT WITH TOMO: ICD-10-PCS | Mod: 26,,, | Performed by: RADIOLOGY

## 2023-09-06 PROCEDURE — 77067 SCR MAMMO BI INCL CAD: CPT | Mod: TC,PO

## 2023-09-06 PROCEDURE — 73630 XR FOOT COMPLETE 3 VIEW LEFT: ICD-10-PCS | Mod: 26,LT,, | Performed by: RADIOLOGY

## 2023-09-06 PROCEDURE — 3044F HG A1C LEVEL LT 7.0%: CPT | Mod: CPTII,S$GLB,, | Performed by: STUDENT IN AN ORGANIZED HEALTH CARE EDUCATION/TRAINING PROGRAM

## 2023-09-06 PROCEDURE — 1160F RVW MEDS BY RX/DR IN RCRD: CPT | Mod: CPTII,S$GLB,, | Performed by: STUDENT IN AN ORGANIZED HEALTH CARE EDUCATION/TRAINING PROGRAM

## 2023-09-06 PROCEDURE — 1159F PR MEDICATION LIST DOCUMENTED IN MEDICAL RECORD: ICD-10-PCS | Mod: CPTII,S$GLB,, | Performed by: STUDENT IN AN ORGANIZED HEALTH CARE EDUCATION/TRAINING PROGRAM

## 2023-09-06 PROCEDURE — 99214 OFFICE O/P EST MOD 30 MIN: CPT | Mod: 57,S$GLB,, | Performed by: STUDENT IN AN ORGANIZED HEALTH CARE EDUCATION/TRAINING PROGRAM

## 2023-09-06 PROCEDURE — 77063 MAMMO DIGITAL SCREENING BILAT WITH TOMO: ICD-10-PCS | Mod: 26,,, | Performed by: RADIOLOGY

## 2023-09-06 PROCEDURE — 77067 SCR MAMMO BI INCL CAD: CPT | Mod: 26,,, | Performed by: RADIOLOGY

## 2023-09-06 PROCEDURE — 1159F MED LIST DOCD IN RCRD: CPT | Mod: CPTII,S$GLB,, | Performed by: STUDENT IN AN ORGANIZED HEALTH CARE EDUCATION/TRAINING PROGRAM

## 2023-09-06 PROCEDURE — 3044F PR MOST RECENT HEMOGLOBIN A1C LEVEL <7.0%: ICD-10-PCS | Mod: CPTII,S$GLB,, | Performed by: STUDENT IN AN ORGANIZED HEALTH CARE EDUCATION/TRAINING PROGRAM

## 2023-09-06 RX ORDER — EZETIMIBE 10 MG/1
10 TABLET ORAL DAILY
Qty: 90 TABLET | Refills: 3 | Status: SHIPPED | OUTPATIENT
Start: 2023-09-06 | End: 2024-09-05

## 2023-09-06 NOTE — PATIENT INSTRUCTIONS
Assessment:  Ora Henderson is a 63 y.o. female with a chief complaint of Injury, Pain, Swelling, and Numbness of the Left Foot    Encounter Diagnosis   Name Primary?    Closed nondisplaced fracture of proximal phalanx of lesser toe of left foot, initial encounter Yes      Plan:  XR reviewed - proximal phalanx 5th toe fracture, mild displacement but no angulation  Conservative treatment recommended, as no significant displacement or angulation  Recommend a firm-soled post-op shoe - we don't have her size so she will come back on Friday and we will have the right size for her then  Buddy tape 4th and 5th toes  Okay to weight bear and ambulate as tolerated.    Recommend relative rest, elevating the foot at or above the level of the heart, when able, and icing 2 to 3 times a day (10-15 minutes per session, using a barrier between the ice and the skin to prevent frostbite).    Continue oral diclofenac 50 mg, twice daily with meals, and can take with 1 or 2 extra-strength Tylenol for synergistic pain relieving effect    Follow-up: 4 weeks with repeat XR L foot or sooner if there are any problems between now and then.    Thank you for choosing [facility] and Dr. Mono Busby for your orthopedic & sports medicine care. It is our goal to provide you with exceptional care that will help keep you healthy, active, and get you back in the game.    Please do not hesitate to reach out to us via email, phone, or MyChart with any questions, concerns, or feedback.    If you are experiencing pain/discomfort ,or have questions after 5pm and would like to be connected to the [facility] on-call team, please call this number and specify which Sports Medicine provider is treating you: [phone]

## 2023-09-06 NOTE — PROGRESS NOTES
Patient ID: Ora Henderson  YOB: 1960  MRN: [id number]    Chief Complaint: Injury, Pain, Swelling, and Numbness of the Left Foot    Referred By: prior patient    Occupation: Medical Billing      History of Present Illness: Ora Henderson is a 63 y.o. female who presents today with Injury, Pain, Swelling, and Numbness of the Left Foot    She injured her left 5th toe when she struck it against a pole on her patio on 23.  She has had pain and swelling in the 5th toe and lateral foot.  She is able to walk OK but has pain with loading the lateral foot.  She has used ice and epsom soaks but no medications currently.  No history of prior injuries to the foot.    Past Medical History:   Past Medical History:   Diagnosis Date    Arthritis     Encounter for blood transfusion     Fatty liver     GERD (gastroesophageal reflux disease)     Hernia, umbilical     reducible    Hyperlipidemia     Hypertension     Rotator cuff tear     Sleep apnea      Past Surgical History:   Procedure Laterality Date    ARTHROSCOPIC DEBRIDEMENT OF SHOULDER Right 2019    Procedure: DEBRIDEMENT, SHOULDER, ARTHROSCOPIC;  Surgeon: Laureano Cox MD;  Location: [facility];  Service: Orthopedics;  Laterality: Right;    ARTHROSCOPY OF SHOULDER WITH DECOMPRESSION OF SUBACROMIAL SPACE Right 2019    Procedure: ARTHROSCOPY, SHOULDER, WITH SUBACROMIAL SPACE DECOMPRESSION;  Surgeon: Laureano Cox MD;  Location: [facility] OR;  Service: Orthopedics;  Laterality: Right;    BICEPS TENDON REPAIR Right 2019    Procedure: REPAIR, TENDON, BICEPS;  Surgeon: Laureano Cox MD;  Location: [facility] OR;  Service: Orthopedics;  Laterality: Right;  arthroscopic Biceps tenodesis    BRAIN SURGERY      Pituitary tumor removal 2001 x 2     CARPAL TUNNEL RELEASE Bilateral     x 2    CERVICAL BIOPSY  W/ LOOP ELECTRODE EXCISION      CKC '81     SECTION      x 1    COLONOSCOPY N/A  01/08/2016    Procedure: COLONOSCOPY;  Surgeon: Quique Paul MD;  Location: [facility];  Service: Endoscopy;  Laterality: N/A;    COLONOSCOPY N/A 04/23/2021    Procedure: COLONOSCOPY;  Surgeon: Mari Tucker MD;  Location: [facility];  Service: Endoscopy;  Laterality: N/A;    DISTAL CLAVICLE EXCISION Right 12/30/2019    Procedure: EXCISION, CLAVICLE, DISTAL;  Surgeon: Laureano Cox MD;  Location: [facility] OR;  Service: Orthopedics;  Laterality: Right;  arthroscopic    EPIDURAL STEROID INJECTION INTO CERVICAL SPINE N/A 09/15/2022    Procedure: C7/T1 IL ARMANDO, Right Paramedian Approach;  Surgeon: Yung Treadwell MD;  Location: [facility];  Service: Pain Management;  Laterality: N/A;    GANGLION CYST EXCISION Left 12/31/2018    HERNIA REPAIR      HYSTERECTOMY      INJECTION OF JOINT Right 2/16/2023    Procedure: right GT bursa + right SIJ injection;  Surgeon: Yung Treadwell MD;  Location: [facility];  Service: Pain Management;  Laterality: Right;    REFRACTIVE SURGERY      ROTATOR CUFF REPAIR Right 12/30/2019    Procedure: REPAIR, ROTATOR CUFF;  Surgeon: Laureano Cox MD;  Location: [facility] OR;  Service: Orthopedics;  Laterality: Right;  arthroscopic    SELECTIVE INJECTION OF ANESTHETIC AGENT AROUND LUMBAR SPINAL NERVE ROOT BY TRANSFORAMINAL APPROACH Bilateral 10/07/2021    Procedure: BLOCK, SPINAL NERVE ROOT, LUMBAR, SELECTIVE, TRANSFORAMINAL APPROACH bilateral L4-5 Rn IV sedation;  Surgeon: Damon Charlton MD;  Location: [facility];  Service: Pain Management;  Laterality: Bilateral;    SELECTIVE INJECTION OF ANESTHETIC AGENT AROUND LUMBAR SPINAL NERVE ROOT BY TRANSFORAMINAL APPROACH Bilateral 12/29/2021    Procedure: BLOCK, SPINAL NERVE ROOT, LUMBAR, SELECTIVE, TRANSFORAMINAL APPROACH bilateral S1 RN IV sedation;  Surgeon: Damon Charlton MD;  Location: [facility];  Service: Pain Management;  Laterality: Bilateral;    SYNOVECTOMY OF SHOULDER Right 12/30/2019    Procedure: SYNOVECTOMY,  SHOULDER;  Surgeon: Laureano Cox MD;  Location: [facility] OR;  Service: Orthopedics;  Laterality: Right;  arthroscopic    TOTAL ABDOMINAL HYSTERECTOMY W/ BILATERAL SALPINGOOPHORECTOMY  2006    STAR/BSO secondary to endometriosis    VENTRAL HERNIA REPAIR  2016     Family History   Problem Relation Age of Onset    Hypertension Father     Prostate cancer Father     Heart disease Father     Hypertension Mother     Arthritis Mother     Cancer Maternal Aunt         pancreas    Cancer Maternal Uncle         pancreas    Heart disease Paternal Uncle     Heart disease Paternal Grandmother     Diabetes Maternal Aunt     Heart attack Sister 30    Asthma Sister     Heart attack Brother 32     Social History     Socioeconomic History    Marital status:     Number of children: 1   Occupational History    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Employer: [employer]   Tobacco Use    Smoking status: Never    Smokeless tobacco: Never   Substance and Sexual Activity    Alcohol use: Yes     Alcohol/week: 1.0 standard drink of alcohol     Types: 1 Glasses of wine per week     Comment: socially    Drug use: No    Sexual activity: Yes     Partners: Male     Birth control/protection: None     Social Determinants of Health     Financial Resource Strain: Low Risk  (1/13/2023)    Overall Financial Resource Strain (CARDIA)     Difficulty of Paying Living Expenses: Not hard at all   Food Insecurity: No Food Insecurity (1/13/2023)    Hunger Vital Sign     Worried About Running Out of Food in the Last Year: Never true     Ran Out of Food in the Last Year: Never true   Transportation Needs: No Transportation Needs (1/13/2023)    PRAPARE - Transportation     Lack of Transportation (Medical): No     Lack of Transportation (Non-Medical): No   Physical Activity: Sufficiently Active (1/13/2023)    Exercise Vital Sign     Days of Exercise per Week: 5 days     Minutes of Exercise per Session: 30 min   Stress: Stress Concern Present  (1/13/2023)    [employer] - Occupational Stress Questionnaire     Feeling of Stress : To some extent   Social Connections: Unknown (1/13/2023)    Social Connection and Isolation Panel [NHANES]     Frequency of Communication with Friends and Family: More than three times a week     Frequency of Social Gatherings with Friends and Family: More than three times a week     Active Member of Clubs or Organizations: No     Attends Club or Organization Meetings: Never     Marital Status:    Housing Stability: Unknown (1/13/2023)    Housing Stability Vital Sign     Unable to Pay for Housing in the Last Year: No     Unstable Housing in the Last Year: No     Medication List with Changes/Refills   Current Medications    AMLODIPINE (NORVASC) 2.5 MG TABLET    Take 1 tablet (2.5 mg total) by mouth once daily.    ASPIRIN 81 MG CHEW    Take 81 mg by mouth as needed. Hold 5 days prior to surgery    DOCUSATE SODIUM (COLACE) 100 MG CAPSULE    Take 1 capsule (100 mg total) by mouth 2 (two) times daily.    FLUCONAZOLE (DIFLUCAN) 150 MG TAB    Take 1 tablet today and then take 1 tablet in 72 hours.    FLUTICASONE PROPIONATE (FLONASE) 50 MCG/ACTUATION NASAL SPRAY    2 sprays (100 mcg total) by Each Nostril route once daily.    GABAPENTIN (NEURONTIN) 100 MG CAPSULE    Take 1-2 capsules (100-200 mg total) by mouth every evening.    L.ACIDOPHILUS-BIF. ANIMALIS (PROBIOTIC) 5 BILLION CELL CPSP    Take 1 tablet by mouth once daily.    LIDOCAINE (LIDODERM) 5 %    Place onto the skin.    LOSARTAN-HYDROCHLOROTHIAZIDE 100-25 MG (HYZAAR) 100-25 MG PER TABLET    Take 1 tablet by mouth once daily.    METHOCARBAMOL (ROBAXIN) 750 MG TAB    Take 1 tablet (750 mg total) by mouth 2 (two) times daily as needed (Muscle Spasm).    MULTIVIT,CALC,MINS/IRON/FOLIC (DAILY MULTIPLE FOR WOMEN ORAL)    Take 1 tablet by mouth once daily. Hold 5 days prior to surgery    NYSTATIN (MYCOSTATIN) OINTMENT    Apply topically 2 (two) times  daily.    OMEGA-3 FATTY ACIDS/FISH OIL (FISH OIL-OMEGA-3 FATTY ACIDS) 300-1,000 MG CAPSULE    Take by mouth once daily. Hold 5 days prior to surgery    ROSUVASTATIN (CRESTOR) 40 MG TAB    Take 1 tablet (40 mg total) by mouth every evening.    TRIAMCINOLONE ACETONIDE 0.1% (KENALOG) 0.1 % OINTMENT    Apply topically 2 (two) times daily.     Review of patient's allergies indicates:   Allergen Reactions    Sulfa (sulfonamide antibiotics) Hives       Physical Exam:   There is no height or weight on file to calculate BMI.    Physical Exam  Detailed MSK exam:     Left Foot:  Inspection: Swelling 5th toe  Palpation: Tender to palpation at 5th proximal phalanx    Mild tenderness proximally through 5th ray  Range of motion: Foot/ankle ROM WNL  Strength:  Pain limited 5th toe flex/ext 4/5    Otherwise normal  N/V Exam:  Tibial:    Normal sensory (plantar foot)  Normal motor (FHL)    Sup Peroneal:   Normal sensory (dorsal foot)  Normal motor (Peroneals)            Deep Peroneal:   Normal sensory (1st web space)  Normal motor (EHL)    Sural:   Normal sensory (lateral foot)   Saphenous:   Normal sensory (medial lower leg)    Normal pedal pulses, warm and well perfused with capillary refill < 2 sec        Imaging:  X-Ray Chest PA And Lateral  Narrative: EXAMINATION:  XR CHEST PA AND LATERAL    CLINICAL HISTORY:  Chronic cough    TECHNIQUE:  PA and lateral views of the chest were performed.    COMPARISON:  11/15/2019    FINDINGS:  The cardiac and mediastinal silhouettes appear within normal limits.   The lungs are clear bilaterally.  No acute osseous findings demonstrated.  Impression: No acute findings.    Electronically signed by: George Bojorquez MD  Date:    04/12/2023  Time:    14:33    Relevant imaging results were reviewed and interpreted by me and per my read:  Transverse fracture of the proximal phalanx left 5th digit, nondisplaced, without any significant angulation    This was discussed with the patient and / or family  today.     Patient Instructions   Assessment:  Ora Henderson is a 63 y.o. female with a chief complaint of Injury, Pain, Swelling, and Numbness of the Left Foot    Encounter Diagnosis   Name Primary?    Closed nondisplaced fracture of proximal phalanx of lesser toe of left foot, initial encounter Yes      Plan:  XR reviewed - proximal phalanx 5th toe fracture, mild displacement but no angulation  Conservative treatment recommended, as no significant displacement or angulation  Recommend a firm-soled post-op shoe - we don't have her size so she will come back on Friday and we will have the right size for her then  Buddy tape 4th and 5th toes  Okay to weight bear and ambulate as tolerated.    Recommend relative rest, elevating the foot at or above the level of the heart, when able, and icing 2 to 3 times a day (10-15 minutes per session, using a barrier between the ice and the skin to prevent frostbite).    Continue oral diclofenac 50 mg, twice daily with meals, and can take with 1 or 2 extra-strength Tylenol for synergistic pain relieving effect    Follow-up: 4 weeks with repeat XR L foot or sooner if there are any problems between now and then.    Thank you for choosing [facility] and Dr. Mono Busby for your orthopedic & sports medicine care. It is our goal to provide you with exceptional care that will help keep you healthy, active, and get you back in the game.    Please do not hesitate to reach out to us via email, phone, or MyChart with any questions, concerns, or feedback.    If you are experiencing pain/discomfort ,or have questions after 5pm and would like to be connected to the [facility] on-call team, please call this number and specify which Sports Medicine provider is treating you: [phone]      A copy of today's visit note has been sent to the referring provider.           Mono Busby MD  Primary Care Sports  Medicine    Disclaimer: This note was prepared using a voice recognition system and is likely to have sound alike errors within the text.

## 2023-09-07 ENCOUNTER — PATIENT MESSAGE (OUTPATIENT)
Dept: INTERNAL MEDICINE | Facility: CLINIC | Age: 63
End: 2023-09-07
Payer: COMMERCIAL

## 2023-09-08 ENCOUNTER — DOCUMENTATION ONLY (OUTPATIENT)
Dept: SPORTS MEDICINE | Facility: CLINIC | Age: 63
End: 2023-09-08
Payer: COMMERCIAL

## 2023-09-08 NOTE — PROGRESS NOTES
Ms. Henderson was seen today to get her fitted for an appropriately sized post-op shoe to replace the smaller version she was given on 9/6/23.

## 2023-09-20 ENCOUNTER — PATIENT MESSAGE (OUTPATIENT)
Dept: INTERNAL MEDICINE | Facility: CLINIC | Age: 63
End: 2023-09-20
Payer: COMMERCIAL

## 2023-09-20 DIAGNOSIS — M47.26 OSTEOARTHRITIS OF SPINE WITH RADICULOPATHY, LUMBAR REGION: Primary | ICD-10-CM

## 2023-09-20 RX ORDER — DICLOFENAC SODIUM 75 MG/1
75 TABLET, DELAYED RELEASE ORAL 2 TIMES DAILY PRN
Qty: 60 TABLET | Refills: 0 | Status: SHIPPED | OUTPATIENT
Start: 2023-09-20 | End: 2023-09-20 | Stop reason: SDUPTHER

## 2023-09-20 RX ORDER — DICLOFENAC SODIUM 75 MG/1
75 TABLET, DELAYED RELEASE ORAL 2 TIMES DAILY PRN
Qty: 60 TABLET | Refills: 0 | Status: SHIPPED | OUTPATIENT
Start: 2023-09-20 | End: 2023-10-12

## 2023-10-04 ENCOUNTER — HOSPITAL ENCOUNTER (OUTPATIENT)
Dept: RADIOLOGY | Facility: HOSPITAL | Age: 63
Discharge: HOME | End: 2023-10-04
Attending: STUDENT IN AN ORGANIZED HEALTH CARE EDUCATION/TRAINING PROGRAM
Payer: COMMERCIAL

## 2023-10-04 ENCOUNTER — OFFICE VISIT (OUTPATIENT)
Dept: SPORTS MEDICINE | Facility: CLINIC | Age: 63
End: 2023-10-04
Payer: COMMERCIAL

## 2023-10-04 DIAGNOSIS — S92.515D CLOSED NONDISPLACED FRACTURE OF PROXIMAL PHALANX OF LESSER TOE OF LEFT FOOT WITH ROUTINE HEALING, SUBSEQUENT ENCOUNTER: Primary | ICD-10-CM

## 2023-10-04 DIAGNOSIS — M79.672 LEFT FOOT PAIN: ICD-10-CM

## 2023-10-04 PROCEDURE — 73630 X-RAY EXAM OF FOOT: CPT | Mod: TC,FY,PO,LT

## 2023-10-04 PROCEDURE — 1160F PR REVIEW ALL MEDS BY PRESCRIBER/CLIN PHARMACIST DOCUMENTED: ICD-10-PCS | Mod: CPTII,S$GLB,, | Performed by: STUDENT IN AN ORGANIZED HEALTH CARE EDUCATION/TRAINING PROGRAM

## 2023-10-04 PROCEDURE — 1160F RVW MEDS BY RX/DR IN RCRD: CPT | Mod: CPTII,S$GLB,, | Performed by: STUDENT IN AN ORGANIZED HEALTH CARE EDUCATION/TRAINING PROGRAM

## 2023-10-04 PROCEDURE — 1159F MED LIST DOCD IN RCRD: CPT | Mod: CPTII,S$GLB,, | Performed by: STUDENT IN AN ORGANIZED HEALTH CARE EDUCATION/TRAINING PROGRAM

## 2023-10-04 PROCEDURE — 3044F HG A1C LEVEL LT 7.0%: CPT | Mod: CPTII,S$GLB,, | Performed by: STUDENT IN AN ORGANIZED HEALTH CARE EDUCATION/TRAINING PROGRAM

## 2023-10-04 PROCEDURE — 73630 XR FOOT COMPLETE 3 VIEW LEFT: ICD-10-PCS | Mod: 26,LT,, | Performed by: RADIOLOGY

## 2023-10-04 PROCEDURE — 99999 PR PBB SHADOW E&M-EST. PATIENT-LVL III: ICD-10-PCS | Mod: PBBFAC,,, | Performed by: STUDENT IN AN ORGANIZED HEALTH CARE EDUCATION/TRAINING PROGRAM

## 2023-10-04 PROCEDURE — 99999 PR PBB SHADOW E&M-EST. PATIENT-LVL III: CPT | Mod: PBBFAC,,, | Performed by: STUDENT IN AN ORGANIZED HEALTH CARE EDUCATION/TRAINING PROGRAM

## 2023-10-04 PROCEDURE — 3044F PR MOST RECENT HEMOGLOBIN A1C LEVEL <7.0%: ICD-10-PCS | Mod: CPTII,S$GLB,, | Performed by: STUDENT IN AN ORGANIZED HEALTH CARE EDUCATION/TRAINING PROGRAM

## 2023-10-04 PROCEDURE — 73630 X-RAY EXAM OF FOOT: CPT | Mod: 26,LT,, | Performed by: RADIOLOGY

## 2023-10-04 PROCEDURE — 99024 POSTOP FOLLOW-UP VISIT: CPT | Mod: S$GLB,,, | Performed by: STUDENT IN AN ORGANIZED HEALTH CARE EDUCATION/TRAINING PROGRAM

## 2023-10-04 PROCEDURE — 99024 PR POST-OP FOLLOW-UP VISIT: ICD-10-PCS | Mod: S$GLB,,, | Performed by: STUDENT IN AN ORGANIZED HEALTH CARE EDUCATION/TRAINING PROGRAM

## 2023-10-04 PROCEDURE — 1159F PR MEDICATION LIST DOCUMENTED IN MEDICAL RECORD: ICD-10-PCS | Mod: CPTII,S$GLB,, | Performed by: STUDENT IN AN ORGANIZED HEALTH CARE EDUCATION/TRAINING PROGRAM

## 2023-10-04 NOTE — PROGRESS NOTES
Patient ID: Ora Henderson  YOB: 1960  MRN: [id number]    Chief Complaint: Injury and Pain of the Left Foot    Referred By: prior patient    Occupation: Medical Billing      History of Present Illness: Ora Henderson is a 63 y.o. female who presents today with Injury and Pain of the Left Foot    She was initially evaluated in our office on 9/6/23 after stubbing her 5th toe on 8/25/23.   She was diagnosed with a nondisplaced 5th proximal phalanx shaft fracture and treated with post-op shoe and dean taping.  She reports improvement in her symptoms. She has discontinued her post op shoe and the dean taping. She reports that she has no pain but describes a burning sensation in her toe.    HPI 9/6/23:  She injured her left 5th toe when she struck it against a pole on her patio on 8/25/23.  She has had pain and swelling in the 5th toe and lateral foot.  She is able to walk OK but has pain with loading the lateral foot.  She has used ice and epsom soaks but no medications currently.  No history of prior injuries to the foot.    Past Medical History:   Past Medical History:   Diagnosis Date    Arthritis     Encounter for blood transfusion     Fatty liver     GERD (gastroesophageal reflux disease)     Hernia, umbilical     reducible    Hyperlipidemia     Hypertension     Rotator cuff tear     Sleep apnea      Past Surgical History:   Procedure Laterality Date    ARTHROSCOPIC DEBRIDEMENT OF SHOULDER Right 12/30/2019    Procedure: DEBRIDEMENT, SHOULDER, ARTHROSCOPIC;  Surgeon: Laureano Cox MD;  Location: [facility] OR;  Service: Orthopedics;  Laterality: Right;    ARTHROSCOPY OF SHOULDER WITH DECOMPRESSION OF SUBACROMIAL SPACE Right 12/30/2019    Procedure: ARTHROSCOPY, SHOULDER, WITH SUBACROMIAL SPACE DECOMPRESSION;  Surgeon: Laureano Cox MD;  Location: [facility];  Service: Orthopedics;  Laterality: Right;    BICEPS TENDON REPAIR Right 12/30/2019    Procedure:  REPAIR, TENDON, BICEPS;  Surgeon: Laureano Cox MD;  Location: [facility] OR;  Service: Orthopedics;  Laterality: Right;  arthroscopic Biceps tenodesis    BRAIN SURGERY      Pituitary tumor removal 2001 x 2     CARPAL TUNNEL RELEASE Bilateral     x 2    CERVICAL BIOPSY  W/ LOOP ELECTRODE EXCISION      CK[facility] '81     SECTION      x 1    COLONOSCOPY N/A 2016    Procedure: COLONOSCOPY;  Surgeon: Quique Paul MD;  Location: [facility] ENDO;  Service: Endoscopy;  Laterality: N/A;    COLONOSCOPY N/A 2021    Procedure: COLONOSCOPY;  Surgeon: Mari Tucker MD;  Location: [facility] ENDO;  Service: Endoscopy;  Laterality: N/A;    DISTAL CLAVICLE EXCISION Right 2019    Procedure: EXCISION, CLAVICLE, DISTAL;  Surgeon: Laureano Cox MD;  Location: [facility] OR;  Service: Orthopedics;  Laterality: Right;  arthroscopic    EPIDURAL STEROID INJECTION INTO CERVICAL SPINE N/A 09/15/2022    Procedure: C7/T1 IL ARMANDO, Right Paramedian Approach;  Surgeon: Yung Treadwell MD;  Location: [facility] PAIN MGT;  Service: Pain Management;  Laterality: N/A;    GANGLION CYST EXCISION Left 2018    HERNIA REPAIR      HYSTERECTOMY      INJECTION OF JOINT Right 2023    Procedure: right GT bursa + right SIJ injection;  Surgeon: Yung Treadwell MD;  Location: [facility] PAIN MGT;  Service: Pain Management;  Laterality: Right;    REFRACTIVE SURGERY      ROTATOR CUFF REPAIR Right 2019    Procedure: REPAIR, ROTATOR CUFF;  Surgeon: Laureano Cox MD;  Location: [facility] OR;  Service: Orthopedics;  Laterality: Right;  arthroscopic    SELECTIVE INJECTION OF ANESTHETIC AGENT AROUND LUMBAR SPINAL NERVE ROOT BY TRANSFORAMINAL APPROACH Bilateral 10/07/2021    Procedure: BLOCK, SPINAL NERVE ROOT, LUMBAR, SELECTIVE, TRANSFORAMINAL APPROACH bilateral L4-5 Rn IV sedation;  Surgeon: Damon Charlton MD;  Location: [facility] PAIN MGT;  Service: Pain Management;  Laterality: Bilateral;    SELECTIVE INJECTION OF ANESTHETIC AGENT  AROUND LUMBAR SPINAL NERVE ROOT BY TRANSFORAMINAL APPROACH Bilateral 12/29/2021    Procedure: BLOCK, SPINAL NERVE ROOT, LUMBAR, SELECTIVE, TRANSFORAMINAL APPROACH bilateral S1 RN IV sedation;  Surgeon: Damon Charlton MD;  Location: [facility] PAIN MGT;  Service: Pain Management;  Laterality: Bilateral;    SYNOVECTOMY OF SHOULDER Right 12/30/2019    Procedure: SYNOVECTOMY, SHOULDER;  Surgeon: Laureano Cox MD;  Location: [facility] OR;  Service: Orthopedics;  Laterality: Right;  arthroscopic    TOTAL ABDOMINAL HYSTERECTOMY W/ BILATERAL SALPINGOOPHORECTOMY  2006    STAR/BSO secondary to endometriosis    VENTRAL HERNIA REPAIR  2016     Family History   Problem Relation Age of Onset    Hypertension Father     Prostate cancer Father     Heart disease Father     Hypertension Mother     Arthritis Mother     Cancer Maternal Aunt         pancreas    Cancer Maternal Uncle         pancreas    Heart disease Paternal Uncle     Heart disease Paternal Grandmother     Diabetes Maternal Aunt     Heart attack Sister 30    Asthma Sister     Heart attack Brother 32     Social History     Socioeconomic History    Marital status:     Number of children: 1   Occupational History    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Employer: [employer]   Tobacco Use    Smoking status: Never    Smokeless tobacco: Never   Substance and Sexual Activity    Alcohol use: Yes     Alcohol/week: 1.0 standard drink of alcohol     Types: 1 Glasses of wine per week     Comment: socially    Drug use: No    Sexual activity: Yes     Partners: Male     Birth control/protection: None     Social Determinants of Health     Financial Resource Strain: Low Risk  (1/13/2023)    Overall Financial Resource Strain (CARDIA)     Difficulty of Paying Living Expenses: Not hard at all   Food Insecurity: No Food Insecurity (1/13/2023)    Hunger Vital Sign     Worried About Running Out of Food in the Last Year: Never true     Ran Out of Food in the Last Year: Never true    Transportation Needs: No Transportation Needs (1/13/2023)    PRAPARE - Transportation     Lack of Transportation (Medical): No     Lack of Transportation (Non-Medical): No   Physical Activity: Sufficiently Active (1/13/2023)    Exercise Vital Sign     Days of Exercise per Week: 5 days     Minutes of Exercise per Session: 30 min   Stress: Stress Concern Present (1/13/2023)    [employer] [place] [employer] - Occupational Stress Questionnaire     Feeling of Stress : To some extent   Social Connections: Unknown (1/13/2023)    Social Connection and Isolation Panel [NHANES]     Frequency of Communication with Friends and Family: More than three times a week     Frequency of Social Gatherings with Friends and Family: More than three times a week     Active Member of Clubs or Organizations: No     Attends Club or Organization Meetings: Never     Marital Status:    Housing Stability: Unknown (1/13/2023)    Housing Stability Vital Sign     Unable to Pay for Housing in the Last Year: No     Unstable Housing in the Last Year: No     Medication List with Changes/Refills   Current Medications    AMLODIPINE (NORVASC) 2.5 MG TABLET    Take 1 tablet (2.5 mg total) by mouth once daily.    ASPIRIN 81 MG CHEW    Take 81 mg by mouth as needed. Hold 5 days prior to surgery    DICLOFENAC (VOLTAREN) 75 MG EC TABLET    Take 1 tablet (75 mg total) by mouth 2 (two) times daily as needed (pain).    DOCUSATE SODIUM (COLACE) 100 MG CAPSULE    Take 1 capsule (100 mg total) by mouth 2 (two) times daily.    EZETIMIBE (ZETIA) 10 MG TABLET    Take 1 tablet (10 mg total) by mouth once daily.    FLUCONAZOLE (DIFLUCAN) 150 MG TAB    Take 1 tablet today and then take 1 tablet in 72 hours.    FLUTICASONE PROPIONATE (FLONASE) 50 MCG/ACTUATION NASAL SPRAY    2 sprays (100 mcg total) by Each Nostril route once daily.    GABAPENTIN (NEURONTIN) 100 MG CAPSULE    Take 1-2 capsules (100-200 mg total) by mouth every evening.     L.ACIDOPHILUS-BIF. ANIMALIS (PROBIOTIC) 5 BILLION CELL CPSP    Take 1 tablet by mouth once daily.    LIDOCAINE (LIDODERM) 5 %    Place onto the skin.    LOSARTAN-HYDROCHLOROTHIAZIDE 100-25 MG (HYZAAR) 100-25 MG PER TABLET    Take 1 tablet by mouth once daily.    METHOCARBAMOL (ROBAXIN) 750 MG TAB    Take 1 tablet (750 mg total) by mouth 2 (two) times daily as needed (Muscle Spasm).    MULTIVIT,CALC,MINS/IRON/FOLIC (DAILY MULTIPLE FOR WOMEN ORAL)    Take 1 tablet by mouth once daily. Hold 5 days prior to surgery    NYSTATIN (MYCOSTATIN) OINTMENT    Apply topically 2 (two) times daily.    OMEGA-3 FATTY ACIDS/FISH OIL (FISH OIL-OMEGA-3 FATTY ACIDS) 300-1,000 MG CAPSULE    Take by mouth once daily. Hold 5 days prior to surgery    ROSUVASTATIN (CRESTOR) 40 MG TAB    Take 1 tablet (40 mg total) by mouth every evening.    TRIAMCINOLONE ACETONIDE 0.1% (KENALOG) 0.1 % OINTMENT    Apply topically 2 (two) times daily.     Review of patient's allergies indicates:   Allergen Reactions    Sulfa (sulfonamide antibiotics) Hives       Physical Exam:   There is no height or weight on file to calculate BMI.    Physical Exam  Detailed MSK exam:     Left Foot:  Inspection: Swelling improved  Palpation: Mildly tender to palpation at 5th proximal phalanx  Range of motion: Foot/ankle ROM WNL  Strength:  5th toe flex/ext 4/5    Otherwise normal  N/V Exam:  Tibial:    Normal sensory (plantar foot)  Normal motor (FHL)    Sup Peroneal:   Normal sensory (dorsal foot)  Normal motor (Peroneals)            Deep Peroneal:   Normal sensory (1st web space)  Normal motor (EHL)    Sural:   Normal sensory (lateral foot)   Saphenous:   Normal sensory (medial lower leg)    Normal pedal pulses, warm and well perfused with capillary refill < 2 sec        Imaging:  X-Ray Foot Complete Left  Narrative: EXAM: XR FOOT COMPLETE 3 VIEW LEFT    TECHNIQUE: 3 views of the left foot    CLINICAL HISTORY: Left foot pain.    FINDINGS: Left fifth digit proximal phalanx  midshaft fracture with callus formation.  Fracture plane remains evident.  Achilles insertional and plantar calcaneal enthesopathy/spurring.  Mild osteoarthritic change first MTP joint.   Joint alignment is anatomic.  Impression: Incompletely healed left fifth digit proximal phalanx fracture.    Finalized on: 10/4/2023 8:20 AM By:  Lester Anaya MD  [facility]# [id number]      2023-10-04 08:23:00.073    [facility]    Relevant imaging results were reviewed and interpreted by me and per my read:  Left 5th digit proximal phalanx fracture, with stable alignment similar to prior, with interval callus formation.    This was discussed with the patient and / or family today.     Patient Instructions   Assessment:  Ora Henderson is a 63 y.o. female with a chief complaint of Injury and Pain of the Left Foot    Encounter Diagnosis   Name Primary?    Closed nondisplaced fracture of proximal phalanx of lesser toe of left foot with routine healing, subsequent encounter Yes      Plan:  XR reviewed - interval healing evident with callous formation and stable alignment  Minimal tenderness on exam today  May resume activities as tolerated although we have cautioned her to gradually resume over the next 6 weeks.  Patient has already transitioned back to normal footwear, which is okay at this time.  Limit weight-bearing activities to walking at this time, avoid jogging, running, elliptical.  Stationary biking is okay.  After 2-3 weeks, if still feeling well, then can transition back to elliptical.  Would avoid jogging/running for another 4-6 weeks.    Follow-up: As needed or sooner if there are any problems between now and then.    Thank you for choosing [facility] and Dr. Mono Busby for your orthopedic & sports medicine care. It is our goal to provide you with exceptional care that will help keep you healthy, active, and get you back in the game.    Please do not hesitate to reach out to us via email,  phone, or MyChart with any questions, concerns, or feedback.    If you are experiencing pain/discomfort ,or have questions after 5pm and would like to be connected to the [facility] on-call team, please call this number and specify which Sports Medicine provider is treating you: [phone]      A copy of today's visit note has been sent to the referring provider.           Mono Busby MD  Primary Care Sports Medicine    Disclaimer: This note was prepared using a voice recognition system and is likely to have sound alike errors within the text.

## 2023-10-04 NOTE — PATIENT INSTRUCTIONS
Assessment:  Ora Henderson is a 63 y.o. female with a chief complaint of Injury and Pain of the Left Foot    Encounter Diagnosis   Name Primary?    Closed nondisplaced fracture of proximal phalanx of lesser toe of left foot with routine healing, subsequent encounter Yes      Plan:  XR reviewed - interval healing evident with callous formation and stable alignment  Minimal tenderness on exam today  May resume activities as tolerated although we have cautioned her to gradually resume over the next 6 weeks.  Patient has already transitioned back to normal footwear, which is okay at this time.  Limit weight-bearing activities to walking at this time, avoid jogging, running, elliptical.  Stationary biking is okay.  After 2-3 weeks, if still feeling well, then can transition back to elliptical.  Would avoid jogging/running for another 4-6 weeks.    Follow-up: As needed or sooner if there are any problems between now and then.    Thank you for choosing [facility] and Dr. Mono Busby for your orthopedic & sports medicine care. It is our goal to provide you with exceptional care that will help keep you healthy, active, and get you back in the game.    Please do not hesitate to reach out to us via email, phone, or MyChart with any questions, concerns, or feedback.    If you are experiencing pain/discomfort ,or have questions after 5pm and would like to be connected to the [facility] on-call team, please call this number and specify which Sports Medicine provider is treating you: [phone]

## 2023-10-17 ENCOUNTER — OFFICE VISIT (OUTPATIENT)
Dept: INTERNAL MEDICINE | Facility: CLINIC | Age: 63
End: 2023-10-17
Payer: COMMERCIAL

## 2023-10-17 VITALS
HEIGHT: 63 IN | DIASTOLIC BLOOD PRESSURE: 86 MMHG | BODY MASS INDEX: 35.62 KG/M2 | SYSTOLIC BLOOD PRESSURE: 132 MMHG | WEIGHT: 201.06 LBS

## 2023-10-17 DIAGNOSIS — R11.0 NAUSEA: Primary | ICD-10-CM

## 2023-10-17 DIAGNOSIS — K21.9 GASTROESOPHAGEAL REFLUX DISEASE WITHOUT ESOPHAGITIS: ICD-10-CM

## 2023-10-17 PROCEDURE — 3079F PR MOST RECENT DIASTOLIC BLOOD PRESSURE 80-89 MM HG: ICD-10-PCS | Mod: CPTII,S$GLB,, | Performed by: NURSE PRACTITIONER

## 2023-10-17 PROCEDURE — 3008F BODY MASS INDEX DOCD: CPT | Mod: CPTII,S$GLB,, | Performed by: NURSE PRACTITIONER

## 2023-10-17 PROCEDURE — 3075F SYST BP GE 130 - 139MM HG: CPT | Mod: CPTII,S$GLB,, | Performed by: NURSE PRACTITIONER

## 2023-10-17 PROCEDURE — 99999 PR PBB SHADOW E&M-EST. PATIENT-LVL III: ICD-10-PCS | Mod: PBBFAC,,, | Performed by: NURSE PRACTITIONER

## 2023-10-17 PROCEDURE — 1159F MED LIST DOCD IN RCRD: CPT | Mod: CPTII,S$GLB,, | Performed by: NURSE PRACTITIONER

## 2023-10-17 PROCEDURE — 3079F DIAST BP 80-89 MM HG: CPT | Mod: CPTII,S$GLB,, | Performed by: NURSE PRACTITIONER

## 2023-10-17 PROCEDURE — 99999 PR PBB SHADOW E&M-EST. PATIENT-LVL III: CPT | Mod: PBBFAC,,, | Performed by: NURSE PRACTITIONER

## 2023-10-17 PROCEDURE — 99214 OFFICE O/P EST MOD 30 MIN: CPT | Mod: S$GLB,,, | Performed by: NURSE PRACTITIONER

## 2023-10-17 PROCEDURE — 3044F HG A1C LEVEL LT 7.0%: CPT | Mod: CPTII,S$GLB,, | Performed by: NURSE PRACTITIONER

## 2023-10-17 PROCEDURE — 3075F PR MOST RECENT SYSTOLIC BLOOD PRESS GE 130-139MM HG: ICD-10-PCS | Mod: CPTII,S$GLB,, | Performed by: NURSE PRACTITIONER

## 2023-10-17 PROCEDURE — 3044F PR MOST RECENT HEMOGLOBIN A1C LEVEL <7.0%: ICD-10-PCS | Mod: CPTII,S$GLB,, | Performed by: NURSE PRACTITIONER

## 2023-10-17 PROCEDURE — 99214 PR OFFICE/OUTPT VISIT, EST, LEVL IV, 30-39 MIN: ICD-10-PCS | Mod: S$GLB,,, | Performed by: NURSE PRACTITIONER

## 2023-10-17 PROCEDURE — 1159F PR MEDICATION LIST DOCUMENTED IN MEDICAL RECORD: ICD-10-PCS | Mod: CPTII,S$GLB,, | Performed by: NURSE PRACTITIONER

## 2023-10-17 PROCEDURE — 3008F PR BODY MASS INDEX (BMI) DOCUMENTED: ICD-10-PCS | Mod: CPTII,S$GLB,, | Performed by: NURSE PRACTITIONER

## 2023-10-17 RX ORDER — LIDOCAINE HYDROCHLORIDE 20 MG/ML
15 SOLUTION OROPHARYNGEAL
Status: ACTIVE | OUTPATIENT
Start: 2023-10-17

## 2023-10-17 RX ORDER — MAG HYDROX/ALUMINUM HYD/SIMETH 200-200-20
30 SUSPENSION, ORAL (FINAL DOSE FORM) ORAL
Status: ACTIVE | OUTPATIENT
Start: 2023-10-17

## 2023-10-17 RX ORDER — DICYCLOMINE HYDROCHLORIDE 10 MG/1
10 CAPSULE ORAL 3 TIMES DAILY
Qty: 30 CAPSULE | Refills: 0 | Status: SHIPPED | OUTPATIENT
Start: 2023-10-17 | End: 2023-11-09

## 2023-10-17 RX ORDER — SUCRALFATE 1 G/1
1 TABLET ORAL 2 TIMES DAILY
Qty: 10 TABLET | Refills: 0 | Status: SHIPPED | OUTPATIENT
Start: 2023-10-17 | End: 2023-10-22

## 2023-10-17 NOTE — PROGRESS NOTES
Subjective:       Patient ID: Ora Henderson is a 63 y.o. female.    Chief Complaint: Abdominal Pain (Started Thursday and worsened last night  (stabbing pain)/Nausea no vomiting or diarrhea )    HPI    Reports above   Taking omeprazole this am  Normal BMs  Excessive bloating/belching  Alkaseltzer not working   Epigastric tenderness     Past Medical History:   Diagnosis Date    Arthritis     Encounter for blood transfusion     Fatty liver     GERD (gastroesophageal reflux disease)     Hernia, umbilical     reducible    Hyperlipidemia     Hypertension     Rotator cuff tear     Sleep apnea      Past Surgical History:   Procedure Laterality Date    ARTHROSCOPIC DEBRIDEMENT OF SHOULDER Right 2019    Procedure: DEBRIDEMENT, SHOULDER, ARTHROSCOPIC;  Surgeon: Laureano Cox MD;  Location: [facility];  Service: Orthopedics;  Laterality: Right;    ARTHROSCOPY OF SHOULDER WITH DECOMPRESSION OF SUBACROMIAL SPACE Right 2019    Procedure: ARTHROSCOPY, SHOULDER, WITH SUBACROMIAL SPACE DECOMPRESSION;  Surgeon: Laureano Cox MD;  Location: [facility];  Service: Orthopedics;  Laterality: Right;    BICEPS TENDON REPAIR Right 2019    Procedure: REPAIR, TENDON, BICEPS;  Surgeon: Laureano Cox MD;  Location: [facility];  Service: Orthopedics;  Laterality: Right;  arthroscopic Biceps tenodesis    BRAIN SURGERY  2001    Pituitary tumor removal 2001 x 2     CARPAL TUNNEL RELEASE Bilateral     x 2    CERVICAL BIOPSY  W/ LOOP ELECTRODE EXCISION      [facility] '81     SECTION      x 1    COLONOSCOPY N/A 2016    Procedure: COLONOSCOPY;  Surgeon: Quique Paul MD;  Location: [facility];  Service: Endoscopy;  Laterality: N/A;    COLONOSCOPY N/A 2021    Procedure: COLONOSCOPY;  Surgeon: Mari Tucker MD;  Location: [facility];  Service: Endoscopy;  Laterality: N/A;    DISTAL CLAVICLE EXCISION Right 2019    Procedure: EXCISION, CLAVICLE, DISTAL;  Surgeon: Laureano CONTRERAS  MD Kenny;  [facility];  Service: Orthopedics;  Laterality: Right;  arthroscopic    EPIDURAL STEROID INJECTION INTO CERVICAL SPINE N/A 09/15/2022    Procedure: C7/T1 IL ARMANDO, Right Paramedian Approach;  Surgeon: Yung Treadwell MD;  Location: [facility];  Service: Pain Management;  Laterality: N/A;    GANGLION CYST EXCISION Left 12/31/2018    HERNIA REPAIR      HYSTERECTOMY      INJECTION OF JOINT Right 2/16/2023    Procedure: right GT bursa + right SIJ injection;  Surgeon: Yung Treadwell MD;  Location: [facility];  Service: Pain Management;  Laterality: Right;    REFRACTIVE SURGERY      ROTATOR CUFF REPAIR Right 12/30/2019    Procedure: REPAIR, ROTATOR CUFF;  Surgeon: Laureano Cox MD;  Location: [facility];  Service: Orthopedics;  Laterality: Right;  arthroscopic    SELECTIVE INJECTION OF ANESTHETIC AGENT AROUND LUMBAR SPINAL NERVE ROOT BY TRANSFORAMINAL APPROACH Bilateral 10/07/2021    Procedure: BLOCK, SPINAL NERVE ROOT, LUMBAR, SELECTIVE, TRANSFORAMINAL APPROACH bilateral L4-5 Rn IV sedation;  Surgeon: Damon Charlton MD;  Location: [facility];  Service: Pain Management;  Laterality: Bilateral;    SELECTIVE INJECTION OF ANESTHETIC AGENT AROUND LUMBAR SPINAL NERVE ROOT BY TRANSFORAMINAL APPROACH Bilateral 12/29/2021    Procedure: BLOCK, SPINAL NERVE ROOT, LUMBAR, SELECTIVE, TRANSFORAMINAL APPROACH bilateral S1 RN IV sedation;  Surgeon: Damon Charlton MD;  Location: [facility];  Service: Pain Management;  Laterality: Bilateral;    SYNOVECTOMY OF SHOULDER Right 12/30/2019    Procedure: SYNOVECTOMY, SHOULDER;  Surgeon: Laureano Cox MD;  Location: [facility];  Service: Orthopedics;  Laterality: Right;  arthroscopic    TOTAL ABDOMINAL HYSTERECTOMY W/ BILATERAL SALPINGOOPHORECTOMY  2006    STAR/BSO secondary to endometriosis    VENTRAL HERNIA REPAIR  2016     Social History     Socioeconomic History    Marital status:     Number of children: 1   Occupational History    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Employer: [employer]   Tobacco Use    Smoking status: Never    Smokeless tobacco: Never   Substance and Sexual Activity    Alcohol use: Yes     Alcohol/week: 1.0 standard drink of alcohol     Types: 1 Glasses of wine per week     Comment: socially    Drug use: No    Sexual activity: Yes     Partners: Male     Birth control/protection: None     Social Determinants of Health     Financial Resource Strain: Low Risk  (1/13/2023)    Overall Financial Resource Strain (CARDIA)     Difficulty of Paying Living Expenses: Not hard at all   Food Insecurity: No Food Insecurity (1/13/2023)    Hunger Vital Sign     Worried About Running Out of Food in the Last Year: Never true     Ran Out of Food in the Last Year: Never true   Transportation Needs: No Transportation Needs (1/13/2023)    PRAPARE - Transportation     Lack of Transportation (Medical): No     Lack of Transportation (Non-Medical): No   Physical Activity: Sufficiently Active (1/13/2023)    Exercise Vital Sign     Days of Exercise per Week: 5 days     Minutes of Exercise per Session: 30 min   Stress: Stress Concern Present (1/13/2023)    [employer] - Occupational Stress Questionnaire     Feeling of Stress : To some extent   Social Connections: Unknown (1/13/2023)    Social Connection and Isolation Panel [NHANES]     Frequency of Communication with Friends and Family: More than three times a week     Frequency of Social Gatherings with Friends and Family: More than three times a week     Active Member of Clubs or Organizations: No     Attends Club or Organization Meetings: Never     Marital Status:    Housing Stability: Unknown (1/13/2023)    Housing Stability Vital Sign     Unable to Pay for Housing in the Last Year: No     Unstable Housing in the Last Year: No     Review of patient's allergies indicates:   Allergen Reactions    Sulfa (sulfonamide antibiotics) Hives     Current Outpatient Medications    Medication Sig    amLODIPine (NORVASC) 2.5 MG tablet Take 1 tablet (2.5 mg total) by mouth once daily.    aspirin 81 MG Chew Take 81 mg by mouth as needed. Hold 5 days prior to surgery    diclofenac (VOLTAREN) 75 MG EC tablet Take 1 tablet by mouth twice daily as needed for pain    docusate sodium (COLACE) 100 MG capsule Take 1 capsule (100 mg total) by mouth 2 (two) times daily.    ezetimibe (ZETIA) 10 mg tablet Take 1 tablet (10 mg total) by mouth once daily.    fluconazole (DIFLUCAN) 150 MG Tab Take 1 tablet today and then take 1 tablet in 72 hours.    fluticasone propionate (FLONASE) 50 mcg/actuation nasal spray 2 sprays (100 mcg total) by Each Nostril route once daily.    gabapentin (NEURONTIN) 100 MG capsule Take 1-2 capsules (100-200 mg total) by mouth every evening.    L.acidophilus-Bif. animalis (PROBIOTIC) 5 billion cell CpSP Take 1 tablet by mouth once daily.    LIDOcaine (LIDODERM) 5 % Place onto the skin.    losartan-hydrochlorothiazide 100-25 mg (HYZAAR) 100-25 mg per tablet Take 1 tablet by mouth once daily.    methocarbamoL (ROBAXIN) 750 MG Tab Take 1 tablet (750 mg total) by mouth 2 (two) times daily as needed (Muscle Spasm).    MULTIVIT,CALC,MINS/IRON/FOLIC (DAILY MULTIPLE FOR WOMEN ORAL) Take 1 tablet by mouth once daily. Hold 5 days prior to surgery    nystatin (MYCOSTATIN) ointment Apply topically 2 (two) times daily.    omega-3 fatty acids/fish oil (FISH OIL-OMEGA-3 FATTY ACIDS) 300-1,000 mg capsule Take by mouth once daily. Hold 5 days prior to surgery    rosuvastatin (CRESTOR) 40 MG Tab Take 1 tablet (40 mg total) by mouth every evening.    triamcinolone acetonide 0.1% (KENALOG) 0.1 % ointment Apply topically 2 (two) times daily.    dicyclomine (BENTYL) 10 MG capsule Take 1 capsule (10 mg total) by mouth 3 (three) times daily.    sucralfate (CARAFATE) 1 gram tablet Take 1 tablet (1 g total) by mouth 2 (two) times daily. for 5 days     Current Facility-Administered Medications   Medication     aluminum-magnesium hydroxide-simethicone 200-200-20 mg/5 mL suspension 30 mL    LIDOcaine HCl 2% oral solution 15 mL     Facility-Administered Medications Ordered in Other Visits   Medication    lidocaine (PF) 10 mg/ml (1%) injection 5 mg           Review of Systems   Constitutional:  Negative for activity change, appetite change, chills, diaphoresis, fatigue, fever and unexpected weight change.   HENT:  Negative for congestion, ear pain, postnasal drip, rhinorrhea, sinus pressure, sinus pain, sneezing, sore throat, tinnitus, trouble swallowing and voice change.    Eyes:  Negative for photophobia, pain and visual disturbance.   Respiratory:  Negative for cough, chest tightness, shortness of breath and wheezing.    Cardiovascular:  Negative for chest pain, palpitations and leg swelling.   Gastrointestinal:  Negative for abdominal distention, abdominal pain, constipation, diarrhea, nausea and vomiting.        SEE HPI   Genitourinary:  Negative for decreased urine volume, difficulty urinating, dysuria, flank pain, frequency, hematuria and urgency.   Musculoskeletal:  Negative for arthralgias, back pain, joint swelling, neck pain and neck stiffness.   Allergic/Immunologic: Negative for immunocompromised state.   Neurological:  Negative for dizziness, tremors, seizures, syncope, facial asymmetry, speech difficulty, weakness, light-headedness, numbness and headaches.   Hematological:  Negative for adenopathy. Does not bruise/bleed easily.   Psychiatric/Behavioral:  Negative for confusion and sleep disturbance.        Objective:      Physical Exam  Vitals reviewed.   Abdominal:      Tenderness: There is abdominal tenderness in the epigastric area.   Neurological:      Mental Status: She is alert.         Assessment:     Vitals:    10/17/23 0825   BP: 132/86         1. Nausea    2. Gastroesophageal reflux disease without esophagitis        Plan:   Nausea    Gastroesophageal reflux disease without esophagitis    Other  orders  -     sucralfate (CARAFATE) 1 gram tablet; Take 1 tablet (1 g total) by mouth 2 (two) times daily. for 5 days  Dispense: 10 tablet; Refill: 0  -     dicyclomine (BENTYL) 10 MG capsule; Take 1 capsule (10 mg total) by mouth 3 (three) times daily.  Dispense: 30 capsule; Refill: 0  -     aluminum-magnesium hydroxide-simethicone 200-200-20 mg/5 mL suspension 30 mL  -     LIDOcaine HCl 2% oral solution 15 mL

## 2023-10-17 NOTE — LETTER
October 17, 2023      The [address]  [address] [facility]  [place] [address]  Phone: [phone]  Fax: [phone]       Patient: Ora Henderson   YOB: 1960  Date of Visit: 10/17/2023    To Whom It May Concern:    Kassandra Henderson  was at [facility] on 10/17/2023. The patient may return to work/school on 10/18/23 with no restrictions. If you have any questions or concerns, or if I can be of further assistance, please do not hesitate to contact me.    Sincerely,    StanNP

## 2023-11-07 ENCOUNTER — OFFICE VISIT (OUTPATIENT)
Dept: URGENT CARE | Facility: CLINIC | Age: 63
End: 2023-11-07
Payer: COMMERCIAL

## 2023-11-07 VITALS
DIASTOLIC BLOOD PRESSURE: 86 MMHG | SYSTOLIC BLOOD PRESSURE: 144 MMHG | BODY MASS INDEX: 35.44 KG/M2 | WEIGHT: 200 LBS | RESPIRATION RATE: 16 BRPM | OXYGEN SATURATION: 99 % | HEART RATE: 79 BPM | HEIGHT: 63 IN | TEMPERATURE: 99 F
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
## 2023-11-07 DIAGNOSIS — K12.0 APHTHOUS ULCER OF MOUTH: Primary | ICD-10-CM

## 2023-11-07 PROCEDURE — 99214 OFFICE O/P EST MOD 30 MIN: CPT | Mod: S$GLB,,, | Performed by: PHYSICIAN ASSISTANT

## 2023-11-07 PROCEDURE — 99214 PR OFFICE/OUTPT VISIT, EST, LEVL IV, 30-39 MIN: ICD-10-PCS | Mod: S$GLB,,, | Performed by: PHYSICIAN ASSISTANT

## 2023-11-07 RX ORDER — CHLORHEXIDINE GLUCONATE ORAL RINSE 1.2 MG/ML
15 SOLUTION DENTAL 2 TIMES DAILY
Qty: 420 ML | Refills: 0 | Status: SHIPPED | OUTPATIENT
Start: 2023-11-07 | End: 2023-11-21

## 2023-11-07 RX ORDER — MAG HYDROX/ALUMINUM HYD/SIMETH 200-200-20
15 SUSPENSION, ORAL (FINAL DOSE FORM) ORAL
Status: COMPLETED | OUTPATIENT
Start: 2023-11-07 | End: 2023-11-07

## 2023-11-07 RX ORDER — LIDOCAINE HYDROCHLORIDE 20 MG/ML
5 SOLUTION OROPHARYNGEAL ONCE
Status: COMPLETED | OUTPATIENT
Start: 2023-11-07 | End: 2023-11-07

## 2023-11-07 RX ORDER — HYDROCORTISONE 25 MG/G
OINTMENT TOPICAL 3 TIMES DAILY
Qty: 20 G | Refills: 0 | Status: SHIPPED | OUTPATIENT
Start: 2023-11-07 | End: 2024-03-05

## 2023-11-07 RX ADMIN — LIDOCAINE HYDROCHLORIDE 5 ML: 20 SOLUTION OROPHARYNGEAL at 09:11

## 2023-11-07 RX ADMIN — Medication 15 ML: at 09:11

## 2023-11-07 NOTE — PATIENT INSTRUCTIONS
ORAL ULCERS CARE:      Peridex as directed.   Oral hygiene - It is important to maintain good dental hygiene while at the same time avoiding trauma. A soft toothbrush, waxed tape-style dental floss, and a soft-tipped gum stimulator to gently remove plaque are generally well tolerated. A non-alcohol-containing mouthwash is often less irritating, but still effective, in decreasing microbial overgrowth. Toothpaste containing sodium lauryl sulfate (SLS) may exacerbate your problem. A trial of using SLS-free toothpaste could be considered. Less aggressive, more frequent professional dental cleaning is advised.    ?Avoidance of exacerbating factors - Where possible, reduce traumatic factors inside the mouth (eg, sharp/rough dental restorations, braces). Avoid habits that cause trauma (eg, biting cheeks or lips) and foods that seem to exacerbate the process.    ?Pain control - Topical anesthetics and coating agents can provide temporary relief of discomfort if used prior to eating and performing dental hygiene:      RASH:  --hydrocortisone cream  --do not scratch  --keep skin moisturized  --recommend oatmeal baths  --avoid allergens  --follow-up with PCP this week or consider seeing dermatology if this continues    - Take over-the-counter Claritin, Zyrtec, Allegra, OR Xyzal as directed for itching/rash/allergic reaction.    - You can take Benadryl over-the-counter as directed as well for itching/rash/allergic reaction. (Benadryl CAUSES DROWSINESS!-DO NOT DRIVE OR OPERATE HEAVY MACHINERY AFTER TAKING HIS MEDICATION)    - You should go to the ER for any new or worsening symtoms, including but not limited to: difficulty breathing, inability to swallow/talk, CP, or syncope.      ELEVATED BLOOD PRESSURE READING:    - Your blood pressure was noted to be elevated in clinic today.-- please keep an eye on blood pressures.  - Record blood pressures and follow up with primary care doctor if blood pressures continue to be elevated.  -  Here are a few generalized recommended lifestyle modifications proven to lower BPs--DASH diet, exercise, weight loss, reducing stress.  *Of note: above recommendations are generalized conservative lifestyle modifications that include but are not limited to above list.   Please do not attempt any of the above recommendations if they do not pertain to you or should cause overall detriment to your health.   Please call the clinic or your PCP with any questions you may have in regards to BP and lifestyle modifications.        If you have been discharged from the clinic prior to your point of care test results being completed, please make sure to check your MyChart account.  If there is a change in treatment, we will communicate with you through here.  If your test is positive, and medications are ordered, these will be sent to your preferred pharmacy.   If your test is negative, no further steps needed. If you do not hear from us or have questions, please call the clinic.      - You must understand that you have received an Urgent Care treatment only and that you may be released before all of your medical problems are known or treated.   - You, the patient, will arrange for follow up care as instructed with your primary care provider or recommended specialist.   - If your condition worsens or fails to improve we recommend that you receive another evaluation at the ER immediately or contact your PCP to discuss your concerns, or return here.   - Please do not drive or make any important decisions for 24 hours if you have received any pain medications, sedatives or mood altering drugs during your visit.    Disclaimer: This document was drafted with the use of a voice recognition device and is likely to have sound alike errors.

## 2023-11-07 NOTE — PROGRESS NOTES
"Subjective:      Patient ID: Ora Henderson is a 63 y.o. female.    Vitals:  height is 5' 3" (1.6 m) and weight is 90.7 kg (200 lb). Her oral temperature is 98.5 °F (36.9 °C). Her blood pressure is 144/86 (abnormal) and her pulse is 79. Her respiration is 16 and oxygen saturation is 99%.     Chief Complaint: Rash    Patient presents with a rash on both her legs and arms and swollen lips. Her tongue had a white coating this morning and looked like it was possible swollen. Her lips began with burning about 1 week ago and the swelling began Friday. The rash on her legs and arms began last night. The rash consists of small red bumps that are located around the knees and on her hands. She has recent;y traveled and used their peppermint soap. She has used peroxide on her lips and taken benadryl for the itchy rash.    Rash  This is a new problem. The current episode started yesterday. The problem has been gradually worsening since onset. The affected locations include the left hand, left lower leg, left upper leg, right hand, right upper leg and right lower leg. The rash is characterized by blistering, redness and itchiness. She was exposed to a new detergent/soap. Associated symptoms include facial edema. Pertinent negatives include no anorexia, congestion, cough, diarrhea, eye pain, fatigue, fever, joint pain, nail changes, rhinorrhea, shortness of breath, sore throat or vomiting. Treatments tried: Peroxide. There is no history of allergies, asthma, eczema or varicella.       Constitution: Negative for fatigue and fever.   HENT:  Negative for congestion and sore throat.    Eyes:  Negative for eye pain.   Respiratory:  Negative for cough and shortness of breath.    Gastrointestinal:  Negative for vomiting and diarrhea.   Skin:  Positive for rash. Negative for erythema.      Objective:     Vitals:    11/07/23 0814   BP: (!) 144/86  Comment: BP meds not taken this morning.   BP Location: Right arm   Patient " "Position: Sitting   BP Method: X-Large (Automatic)   Pulse: 79   Resp: 16   Temp: 98.5 °F (36.9 °C)   TempSrc: Oral   SpO2: 99%   Weight: 90.7 kg (200 lb)   Height: 5' 3" (1.6 m)       Physical Exam   Constitutional: She is oriented to person, place, and time. She appears well-developed.   HENT:   Head: Normocephalic and atraumatic. Head is without abrasion, without contusion and without laceration.   Ears:   Right Ear: External ear normal.   Left Ear: External ear normal.   Nose: Nose normal.   Mouth/Throat: Uvula is midline, oropharynx is clear and moist and mucous membranes are normal. Oral lesions present. No uvula swelling.   Lip ulcers       Comments: Lip ulcers   Eyes: Conjunctivae, EOM and lids are normal. Pupils are equal, round, and reactive to light.   Neck: Trachea normal and phonation normal. Neck supple.   Cardiovascular: Normal rate, regular rhythm and normal heart sounds.   Pulmonary/Chest: Effort normal and breath sounds normal. No stridor. No respiratory distress.   Musculoskeletal: Normal range of motion.         General: Normal range of motion.   Neurological: She is alert and oriented to person, place, and time.   Skin: Skin is warm, dry, intact and rash (patrizia bug bites). Capillary refill takes less than 2 seconds. No abrasion, No burn, No bruising, No erythema and No ecchymosis   Psychiatric: Her speech is normal and behavior is normal. Judgment and thought content normal.   Nursing note and vitals reviewed.                Assessment:     1. Aphthous ulcer of mouth    2. Bedbug bite, initial encounter        Plan:       Aphthous ulcer of mouth  -     LIDOcaine HCl 2% oral solution 5 mL  -     aluminum-magnesium hydroxide-simethicone 200-200-20 mg/5 mL suspension 15 mL  -     chlorhexidine (PERIDEX) 0.12 % solution; Use as directed 15 mLs in the mouth or throat 2 (two) times daily. for 14 days  Dispense: 420 mL; Refill: 0    Bedbug bite, initial encounter  -     hydrocortisone 2.5 % ointment; " Apply topically 3 (three) times daily. for 7 days  Dispense: 20 g; Refill: 0          Medical Decision Making:   Initial Assessment:   Vital signs stable   Patient reports that she slept in a hotel this weekend which is where bug bug bites most likely occurred   Has a history of acid reflux; exam most consistent with canker sores  Urgent Care Management:    I have discussed the diagnosis, treatment plan and recommendations for follow-up with primary care, and patient verbalized understanding and is agreeable to the plan.   AVS printed and given to patient upon discharge with information regarding this visit. All questions were addressed prior to discharge.           Patient Instructions   ORAL ULCERS CARE:      Peridex as directed.   Oral hygiene - It is important to maintain good dental hygiene while at the same time avoiding trauma. A soft toothbrush, waxed tape-style dental floss, and a soft-tipped gum stimulator to gently remove plaque are generally well tolerated. A non-alcohol-containing mouthwash is often less irritating, but still effective, in decreasing microbial overgrowth. Toothpaste containing sodium lauryl sulfate (SLS) may exacerbate your problem. A trial of using SLS-free toothpaste could be considered. Less aggressive, more frequent professional dental cleaning is advised.    ?Avoidance of exacerbating factors - Where possible, reduce traumatic factors inside the mouth (eg, sharp/rough dental restorations, braces). Avoid habits that cause trauma (eg, biting cheeks or lips) and foods that seem to exacerbate the process.    ?Pain control - Topical anesthetics and coating agents can provide temporary relief of discomfort if used prior to eating and performing dental hygiene:      RASH:  --hydrocortisone cream  --do not scratch  --keep skin moisturized  --recommend oatmeal baths  --avoid allergens  --follow-up with PCP this week or consider seeing dermatology if this continues    - Take over-the-counter  Claritin, Zyrtec, Allegra, OR Xyzal as directed for itching/rash/allergic reaction.    - You can take Benadryl over-the-counter as directed as well for itching/rash/allergic reaction. (Benadryl CAUSES DROWSINESS!-DO NOT DRIVE OR OPERATE HEAVY MACHINERY AFTER TAKING HIS MEDICATION)    - You should go to the ER for any new or worsening symtoms, including but not limited to: difficulty breathing, inability to swallow/talk, CP, or syncope.      ELEVATED BLOOD PRESSURE READING:    - Your blood pressure was noted to be elevated in clinic today.-- please keep an eye on blood pressures.  - Record blood pressures and follow up with primary care doctor if blood pressures continue to be elevated.  - Here are a few generalized recommended lifestyle modifications proven to lower BPs--DASH diet, exercise, weight loss, reducing stress.  *Of note: above recommendations are generalized conservative lifestyle modifications that include but are not limited to above list.   Please do not attempt any of the above recommendations if they do not pertain to you or should cause overall detriment to your health.   Please call the clinic or your PCP with any questions you may have in regards to BP and lifestyle modifications.        If you have been discharged from the clinic prior to your point of care test results being completed, please make sure to check your [facility] account.  If there is a change in treatment, we will communicate with you through here.  If your test is positive, and medications are ordered, these will be sent to your preferred pharmacy.   If your test is negative, no further steps needed. If you do not hear from us or have questions, please call the clinic.      - You must understand that you have received an Urgent Care treatment only and that you may be released before all of your medical problems are known or treated.   - You, the patient, will arrange for follow up care as instructed with your primary care provider  or recommended specialist.   - If your condition worsens or fails to improve we recommend that you receive another evaluation at the ER immediately or contact your PCP to discuss your concerns, or return here.   - Please do not drive or make any important decisions for 24 hours if you have received any pain medications, sedatives or mood altering drugs during your visit.    Disclaimer: This document was drafted with the use of a voice recognition device and is likely to have sound alike errors.

## 2023-11-09 ENCOUNTER — OFFICE VISIT (OUTPATIENT)
Dept: INTERNAL MEDICINE | Facility: CLINIC | Age: 63
End: 2023-11-09
Payer: COMMERCIAL

## 2023-11-09 VITALS
HEIGHT: 63 IN | WEIGHT: 203.25 LBS | SYSTOLIC BLOOD PRESSURE: 150 MMHG | RESPIRATION RATE: 18 BRPM | BODY MASS INDEX: 36.01 KG/M2 | DIASTOLIC BLOOD PRESSURE: 90 MMHG | TEMPERATURE: 97 F

## 2023-11-09 DIAGNOSIS — M47.26 OSTEOARTHRITIS OF SPINE WITH RADICULOPATHY, LUMBAR REGION: ICD-10-CM

## 2023-11-09 DIAGNOSIS — Z86.018 HISTORY OF BENIGN PITUITARY TUMOR: Chronic | ICD-10-CM

## 2023-11-09 DIAGNOSIS — Z86.010 HX OF COLONIC POLYP: ICD-10-CM

## 2023-11-09 DIAGNOSIS — E78.2 MIXED HYPERLIPIDEMIA: ICD-10-CM

## 2023-11-09 DIAGNOSIS — K21.9 GASTROESOPHAGEAL REFLUX DISEASE WITHOUT ESOPHAGITIS: Chronic | ICD-10-CM

## 2023-11-09 DIAGNOSIS — E66.01 SEVERE OBESITY (BMI 35.0-39.9) WITH COMORBIDITY: ICD-10-CM

## 2023-11-09 DIAGNOSIS — R22.0 LIP SWELLING: ICD-10-CM

## 2023-11-09 DIAGNOSIS — I10 ESSENTIAL HYPERTENSION: Chronic | ICD-10-CM

## 2023-11-09 DIAGNOSIS — Z78.0 ASYMPTOMATIC MENOPAUSAL STATE: ICD-10-CM

## 2023-11-09 DIAGNOSIS — Z00.00 ROUTINE GENERAL MEDICAL EXAMINATION AT A HEALTH CARE FACILITY: Primary | ICD-10-CM

## 2023-11-09 DIAGNOSIS — G47.33 OSA (OBSTRUCTIVE SLEEP APNEA): ICD-10-CM

## 2023-11-09 DIAGNOSIS — M47.22 OSTEOARTHRITIS OF SPINE WITH RADICULOPATHY, CERVICAL REGION: Chronic | ICD-10-CM

## 2023-11-09 PROCEDURE — 1160F PR REVIEW ALL MEDS BY PRESCRIBER/CLIN PHARMACIST DOCUMENTED: ICD-10-PCS | Mod: CPTII,S$GLB,, | Performed by: FAMILY MEDICINE

## 2023-11-09 PROCEDURE — 99999 PR PBB SHADOW E&M-EST. PATIENT-LVL V: ICD-10-PCS | Mod: PBBFAC,,, | Performed by: FAMILY MEDICINE

## 2023-11-09 PROCEDURE — 3008F BODY MASS INDEX DOCD: CPT | Mod: CPTII,S$GLB,, | Performed by: FAMILY MEDICINE

## 2023-11-09 PROCEDURE — 3044F HG A1C LEVEL LT 7.0%: CPT | Mod: CPTII,S$GLB,, | Performed by: FAMILY MEDICINE

## 2023-11-09 PROCEDURE — 99999 PR PBB SHADOW E&M-EST. PATIENT-LVL V: CPT | Mod: PBBFAC,,, | Performed by: FAMILY MEDICINE

## 2023-11-09 PROCEDURE — 3044F PR MOST RECENT HEMOGLOBIN A1C LEVEL <7.0%: ICD-10-PCS | Mod: CPTII,S$GLB,, | Performed by: FAMILY MEDICINE

## 2023-11-09 PROCEDURE — 3080F DIAST BP >= 90 MM HG: CPT | Mod: CPTII,S$GLB,, | Performed by: FAMILY MEDICINE

## 2023-11-09 PROCEDURE — 99396 PREV VISIT EST AGE 40-64: CPT | Mod: S$GLB,,, | Performed by: FAMILY MEDICINE

## 2023-11-09 PROCEDURE — 3080F PR MOST RECENT DIASTOLIC BLOOD PRESSURE >= 90 MM HG: ICD-10-PCS | Mod: CPTII,S$GLB,, | Performed by: FAMILY MEDICINE

## 2023-11-09 PROCEDURE — 1160F RVW MEDS BY RX/DR IN RCRD: CPT | Mod: CPTII,S$GLB,, | Performed by: FAMILY MEDICINE

## 2023-11-09 PROCEDURE — 1159F MED LIST DOCD IN RCRD: CPT | Mod: CPTII,S$GLB,, | Performed by: FAMILY MEDICINE

## 2023-11-09 PROCEDURE — 3008F PR BODY MASS INDEX (BMI) DOCUMENTED: ICD-10-PCS | Mod: CPTII,S$GLB,, | Performed by: FAMILY MEDICINE

## 2023-11-09 PROCEDURE — 3077F PR MOST RECENT SYSTOLIC BLOOD PRESSURE >= 140 MM HG: ICD-10-PCS | Mod: CPTII,S$GLB,, | Performed by: FAMILY MEDICINE

## 2023-11-09 PROCEDURE — 1159F PR MEDICATION LIST DOCUMENTED IN MEDICAL RECORD: ICD-10-PCS | Mod: CPTII,S$GLB,, | Performed by: FAMILY MEDICINE

## 2023-11-09 PROCEDURE — 99213 PR OFFICE/OUTPT VISIT, EST, LEVL III, 20-29 MIN: ICD-10-PCS | Mod: 25,S$GLB,, | Performed by: FAMILY MEDICINE

## 2023-11-09 PROCEDURE — 99213 OFFICE O/P EST LOW 20 MIN: CPT | Mod: 25,S$GLB,, | Performed by: FAMILY MEDICINE

## 2023-11-09 PROCEDURE — 3077F SYST BP >= 140 MM HG: CPT | Mod: CPTII,S$GLB,, | Performed by: FAMILY MEDICINE

## 2023-11-09 PROCEDURE — 99396 PR PREVENTIVE VISIT,EST,40-64: ICD-10-PCS | Mod: S$GLB,,, | Performed by: FAMILY MEDICINE

## 2023-11-09 RX ORDER — AMLODIPINE BESYLATE 10 MG/1
10 TABLET ORAL DAILY
Qty: 30 TABLET | Refills: 11 | Status: SHIPPED | OUTPATIENT
Start: 2023-11-09 | End: 2024-11-08

## 2023-11-09 NOTE — PROGRESS NOTES
Subjective:       Patient ID: Ora Henderson is a 63 y.o. female.    Chief Complaint: swollen lips    63-year-old  female patient accompanied by her  with Patient Active Problem List:     DJD (degenerative joint disease), cervical-mild     Mixed hyperlipidemia     Hepatomegaly     Family history of cardiovascular disease     GERD (gastroesophageal reflux disease)     History of benign pituitary tumor     Hx of colonic polyp     Essential hypertension     Osteoarthritis of spine with radiculopathy, lumbar region     Severe obesity (BMI 35.0-39.9) with comorbidity     NATALIE (obstructive sleep apnea)  Here for routine annual physicals.  Patient reports that she had rash to the legs as well as lip swelling to the lower lip for more than a week for which patient went to urgent care, has been using steroid cream for the rash which has been better with the lip swelling continues to persist with aphthous ulcer.   Patient has been applying Vaseline but denies any trouble with breathing  Blood pressure has been elevated in spite of taking her medications regularly  Denies any chest pain or difficulty breathing or headache with vision disturbances  Patient recently had a fracture to the toe while walking accidentally hit and had a fracture recently, has been doing well    Rash  The affected locations include the lips. The rash is characterized by burning. It is unknown if there was an exposure to a precipitant. Associated symptoms include facial edema. Pertinent negatives include no anorexia, congestion, cough, diarrhea, eye pain, fatigue, fever, joint pain, nail changes, rhinorrhea, shortness of breath, sore throat or vomiting. Past treatments include moisturizer. The treatment provided no relief. There is no history of allergies, asthma, eczema or varicella.     Review of Systems   Constitutional:  Negative for fatigue and fever.   HENT:  Positive for facial swelling. Negative for  "congestion, rhinorrhea, sore throat, trouble swallowing and voice change.    Eyes:  Negative for pain and visual disturbance.   Respiratory:  Negative for cough and shortness of breath.    Cardiovascular:  Negative for chest pain and leg swelling.   Gastrointestinal:  Negative for abdominal pain, anorexia, diarrhea, nausea and vomiting.   Musculoskeletal:  Negative for joint pain and myalgias.   Skin:  Negative for nail changes and rash.   Neurological:  Negative for weakness, light-headedness, numbness and headaches.   Psychiatric/Behavioral:  Negative for sleep disturbance.          BP (!) 150/90 (BP Location: Right arm, Patient Position: Sitting, BP Method: Large (Manual))   Temp 96.9 °F (36.1 °C) (Tympanic)   Resp 18   Ht 5' 3" (1.6 m)   Wt 92.2 kg (203 lb 4.2 oz)   LMP  (LMP Unknown)   BMI 36.01 kg/m²   Objective:      Physical Exam  Constitutional:       Appearance: She is well-developed.   HENT:      Head: Normocephalic and atraumatic.      Mouth/Throat:      Comments: Noted lower lip swelling  Cardiovascular:      Rate and Rhythm: Normal rate and regular rhythm.      Heart sounds: Normal heart sounds. No murmur heard.  Pulmonary:      Effort: Pulmonary effort is normal.      Breath sounds: Normal breath sounds. No wheezing.   Abdominal:      General: Bowel sounds are normal.      Palpations: Abdomen is soft.      Tenderness: There is no abdominal tenderness.   Skin:     General: Skin is warm and dry.      Findings: No rash.   Neurological:      Mental Status: She is alert and oriented to person, place, and time.   Psychiatric:         Mood and Affect: Mood normal.           Assessment/Plan:   1. Routine general medical examination at a health care facility  Vital signs stable but noted elevated blood pressure today.  Clinical exam stable  Continue lifestyle modifications with low-fat and low-cholesterol diet and exercise 30 minutes daily  Refuses further vaccinations      2. Essential hypertension  -   "   amLODIPine (NORVASC) 10 MG tablet; Take 1 tablet (10 mg total) by mouth once daily.  Dispense: 30 tablet; Refill: 11  Patient was advised to discontinue losartan hydrochlorothiazide secondary to lip swelling, and will increase amlodipine from 2.5-10 mg  Patient was encouraged to monitor blood pressure trends and if remains elevated beyond 140/90 let us know  Return to the clinic in 2 weeks as a blood pressure check    3. Mixed hyperlipidemia   Reviewed recent labs showing elevated cholesterol levels, for which patient is currently on Crestor 40 mg and Zetia 10 mg    4. NATALIE (obstructive sleep apnea)  Overview:  Not on CPAP  Stable    5. Gastroesophageal reflux disease without esophagitis  Clinically doing well with diet changes    6. Osteoarthritis of spine with radiculopathy, cervical region  7. Osteoarthritis of spine with radiculopathy, lumbar region  Overview:  Dr Tyson s/p ARMANDO  Graded exercise regimen recommended    8. History of benign pituitary tumor  Overview:  She had pituitary Tumor s/p resection in 2001. She is asymptomatic but still concern of recurrence. She had no MRI follow up for long time. It was explained that there is remote chance of recurrence but can happens. Recent MRI of the brain did not show any changes.      9. Hx of colonic polyp    10. Severe obesity (BMI 35.0-39.9) with comorbidity  Lifestyle modifications recommended to lose weight with BMI 36    11. Asymptomatic menopausal state  -     DXA Bone Density Axial Skeleton 1 or more sites; Future; Expected date: 11/09/2023     Secondary to recent fracture Will get bone density scan for follow-up

## 2023-11-10 ENCOUNTER — TELEPHONE (OUTPATIENT)
Dept: URGENT CARE | Facility: CLINIC | Age: 63
End: 2023-11-10
Payer: COMMERCIAL

## 2023-11-14 ENCOUNTER — APPOINTMENT (OUTPATIENT)
Dept: RADIOLOGY | Facility: HOSPITAL | Age: 63
End: 2023-11-14
Attending: FAMILY MEDICINE
Payer: COMMERCIAL

## 2023-11-14 DIAGNOSIS — Z78.0 ASYMPTOMATIC MENOPAUSAL STATE: ICD-10-CM

## 2023-11-14 PROCEDURE — 77080 DXA BONE DENSITY AXIAL: CPT | Mod: 26,,, | Performed by: RADIOLOGY

## 2023-11-14 PROCEDURE — 77080 DXA BONE DENSITY AXIAL SKELETON 1 OR MORE SITES: ICD-10-PCS | Mod: 26,,, | Performed by: RADIOLOGY

## 2023-11-14 PROCEDURE — 77080 DXA BONE DENSITY AXIAL: CPT | Mod: TC

## 2023-11-25 ENCOUNTER — OFFICE VISIT (OUTPATIENT)
Dept: URGENT CARE | Facility: CLINIC | Age: 63
End: 2023-11-25
Payer: COMMERCIAL

## 2023-11-25 VITALS
BODY MASS INDEX: 35.97 KG/M2 | HEART RATE: 82 BPM | DIASTOLIC BLOOD PRESSURE: 81 MMHG | TEMPERATURE: 98 F | HEIGHT: 63 IN | OXYGEN SATURATION: 96 % | RESPIRATION RATE: 16 BRPM | WEIGHT: 203 LBS | SYSTOLIC BLOOD PRESSURE: 126 MMHG

## 2023-11-25 DIAGNOSIS — K12.0 ULCER APHTHOUS ORAL: Primary | ICD-10-CM

## 2023-11-25 DIAGNOSIS — R22.0 LIP SWELLING: ICD-10-CM

## 2023-11-25 DIAGNOSIS — Z87.19 HISTORY OF GASTROESOPHAGEAL REFLUX (GERD): ICD-10-CM

## 2023-11-25 PROCEDURE — 99214 PR OFFICE/OUTPT VISIT, EST, LEVL IV, 30-39 MIN: ICD-10-PCS | Mod: S$GLB,,, | Performed by: PHYSICIAN ASSISTANT

## 2023-11-25 PROCEDURE — 99214 OFFICE O/P EST MOD 30 MIN: CPT | Mod: S$GLB,,, | Performed by: PHYSICIAN ASSISTANT

## 2023-11-25 RX ORDER — MAG HYDROX/ALUMINUM HYD/SIMETH 200-200-20
30 SUSPENSION, ORAL (FINAL DOSE FORM) ORAL
Status: COMPLETED | OUTPATIENT
Start: 2023-11-25 | End: 2023-11-25

## 2023-11-25 RX ORDER — PANTOPRAZOLE SODIUM 40 MG/1
40 TABLET, DELAYED RELEASE ORAL DAILY
Qty: 30 TABLET | Refills: 0 | Status: SHIPPED | OUTPATIENT
Start: 2023-11-26 | End: 2023-12-04 | Stop reason: SDUPTHER

## 2023-11-25 RX ORDER — DEXAMETHASONE 0.5 MG/5ML
1 ELIXIR ORAL EVERY 12 HOURS
Qty: 200 ML | Refills: 0 | Status: SHIPPED | OUTPATIENT
Start: 2023-11-25 | End: 2023-12-05

## 2023-11-25 RX ORDER — LIDOCAINE HYDROCHLORIDE 20 MG/ML
2 SOLUTION OROPHARYNGEAL ONCE
Status: COMPLETED | OUTPATIENT
Start: 2023-11-25 | End: 2023-11-25

## 2023-11-25 RX ADMIN — LIDOCAINE HYDROCHLORIDE 2 ML: 20 SOLUTION OROPHARYNGEAL at 12:11

## 2023-11-25 RX ADMIN — Medication 30 ML: at 12:11

## 2023-11-25 NOTE — PATIENT INSTRUCTIONS
ORAL ULCERS CARE:      Oral hygiene - It is important to maintain good dental hygiene while at the same time avoiding trauma. A soft toothbrush, waxed tape-style dental floss, and a soft-tipped gum stimulator to gently remove plaque are generally well tolerated. A non-alcohol-containing mouthwash is often less irritating, but still effective, in decreasing microbial overgrowth. Toothpaste containing sodium lauryl sulfate (SLS) may exacerbate your problem. A trial of using SLS-free toothpaste could be considered. Less aggressive, more frequent professional dental cleaning is advised.    ?Avoidance of exacerbating factors - Where possible, reduce traumatic factors inside the mouth (eg, sharp/rough dental restorations, braces). Avoid habits that cause trauma (eg, biting cheeks or lips) and foods that seem to exacerbate the process.    *Dexamethasone elixir 0.5 mg/5 cc: 5 mL swish and spit three to four times daily. It is important to keep the medication in the mouth for five minutes prior to spitting it out. Do not rinse afterward and avoid eating or drinking for 30 minutes.    If you have been discharged from the clinic prior to your point of care test results being completed, please make sure to check your [facility] account.  If there is a change in treatment, we will communicate with you through here.  If your test is positive, and medications are ordered, these will be sent to your preferred pharmacy.   If your test is negative, no further steps needed. If you do not hear from us or have questions, please call the clinic.    GERD/ACID REFLUX/HEARTBURN:    - Protonix as directed.  - AVOID fatty/greasy/fried/spicy foods.  AVOID Alcohol.  AVOID laying down for 2 hours after eating.   - If your symptoms continue, make sure to follow up with a gastroenterologist.    GI AMBULATORY REFERRAL ORDERED:   Someone will call with an appointment this week.  Please answer all unknown calls.  If you are not contacted within 7 days,  Please call clinic  for status of appointment. ([phone])    - You must understand that you have received an Urgent Care treatment only and that you may be released before all of your medical problems are known or treated.   - You, the patient, will arrange for follow up care as instructed with your primary care provider or recommended specialist.   - If your condition worsens or fails to improve we recommend that you receive another evaluation at the ER immediately or contact your PCP to discuss your concerns, or return here.   - Please do not drive or make any important decisions for 24 hours if you have received any pain medications, sedatives or mood altering drugs during your visit.    Disclaimer: This document was drafted with the use of a voice recognition device and is likely to have sound alike errors.

## 2023-11-25 NOTE — PROGRESS NOTES
"Subjective:      Patient ID: Ora Henderson is a 63 y.o. female.    Vitals:  height is 5' 3" (1.6 m) and weight is 92.1 kg (203 lb). Her oral temperature is 98.3 °F (36.8 °C). Her blood pressure is 126/81 and her pulse is 82. Her respiration is 16 and oxygen saturation is 96%.     Chief Complaint: Facial Swelling    Patient presents with swelling, burning, and itching in the lips that began on 11/3. Her blood pressure medicine was changed by her PCP on 11/9. Her PCP suspected the swelling was from her previous Rx. She is still having the same symptoms. She ahs been putting Vaseline and neosporin. She is having a hard time sleeping because of her symptoms.     Edema  This is a new problem. The current episode started 1 to 4 weeks ago. The problem occurs constantly. The problem has been unchanged. Pertinent negatives include no abdominal pain, anorexia, arthralgias, change in bowel habit, chest pain, chills, congestion, coughing, diaphoresis, fatigue, fever, headaches, joint swelling, myalgias, nausea, neck pain, numbness, rash, sore throat, swollen glands, urinary symptoms, vertigo, visual change, vomiting or weakness.       Constitution: Negative for chills, sweating, fatigue and fever.   HENT:  Negative for congestion and sore throat.    Neck: Negative for neck pain.   Cardiovascular:  Negative for chest pain.   Respiratory:  Negative for cough.    Gastrointestinal:  Negative for abdominal pain, nausea and vomiting.   Musculoskeletal:  Negative for joint pain, joint swelling and muscle ache.   Skin:  Positive for lesion. Negative for rash and erythema.   Neurological:  Negative for history of vertigo, headaches and numbness.      Objective:     Vitals:    11/25/23 1154   BP: 126/81   BP Location: Left arm   Patient Position: Sitting   BP Method: X-Large (Automatic)   Pulse: 82   Resp: 16   Temp: 98.3 °F (36.8 °C)   TempSrc: Oral   SpO2: 96%   Weight: 92.1 kg (203 lb)   Height: 5' 3" (1.6 m) "       Physical Exam   Constitutional: She is oriented to person, place, and time. She appears well-developed.   HENT:   Head: Normocephalic and atraumatic. Head is without abrasion, without contusion and without laceration.   Ears:   Right Ear: External ear normal.   Left Ear: External ear normal.   Nose: Nose normal.   Mouth/Throat: Uvula is midline, oropharynx is clear and moist and mucous membranes are normal. Oral lesions present. No uvula swelling.   Lip ulcers       Comments: Lip ulcers   Eyes: Conjunctivae, EOM and lids are normal. Pupils are equal, round, and reactive to light.   Neck: Trachea normal and phonation normal. Neck supple.   Cardiovascular: Normal rate, regular rhythm and normal heart sounds.   Pulmonary/Chest: Effort normal and breath sounds normal. No stridor. No respiratory distress.   Musculoskeletal: Normal range of motion.         General: Normal range of motion.   Neurological: She is alert and oriented to person, place, and time.   Skin: Skin is warm, dry, intact and no rash. Capillary refill takes less than 2 seconds. No abrasion, No burn, No bruising, No erythema and No ecchymosis   Psychiatric: Her speech is normal and behavior is normal. Judgment and thought content normal.   Nursing note and vitals reviewed.      Assessment:     1. Ulcer aphthous oral    2. Lip swelling    3. History of gastroesophageal reflux (GERD)        Plan:       Ulcer aphthous oral  -     dexAMETHasone (DECADRON) 0.5 mg/5 mL Elix; Take 10 mLs (1 mg total) by mouth every 12 (twelve) hours. for 10 days  Dispense: 200 mL; Refill: 0  -     Ambulatory referral/consult to Gastroenterology    Lip swelling    History of gastroesophageal reflux (GERD)  -     aluminum-magnesium hydroxide-simethicone 200-200-20 mg/5 mL suspension 30 mL  -     pantoprazole (PROTONIX) 40 MG tablet; Take 1 tablet (40 mg total) by mouth once daily.  Dispense: 30 tablet; Refill: 0  -     LIDOcaine HCl 2% oral solution 2 mL  -     Ambulatory  referral/consult to Gastroenterology             Patient Instructions   ORAL ULCERS CARE:      Oral hygiene - It is important to maintain good dental hygiene while at the same time avoiding trauma. A soft toothbrush, waxed tape-style dental floss, and a soft-tipped gum stimulator to gently remove plaque are generally well tolerated. A non-alcohol-containing mouthwash is often less irritating, but still effective, in decreasing microbial overgrowth. Toothpaste containing sodium lauryl sulfate (SLS) may exacerbate your problem. A trial of using SLS-free toothpaste could be considered. Less aggressive, more frequent professional dental cleaning is advised.    ?Avoidance of exacerbating factors - Where possible, reduce traumatic factors inside the mouth (eg, sharp/rough dental restorations, braces). Avoid habits that cause trauma (eg, biting cheeks or lips) and foods that seem to exacerbate the process.    *Dexamethasone elixir 0.5 mg/5 cc: 5 mL swish and spit three to four times daily. It is important to keep the medication in the mouth for five minutes prior to spitting it out. Do not rinse afterward and avoid eating or drinking for 30 minutes.    If you have been discharged from the clinic prior to your point of care test results being completed, please make sure to check your [facility] account.  If there is a change in treatment, we will communicate with you through here.  If your test is positive, and medications are ordered, these will be sent to your preferred pharmacy.   If your test is negative, no further steps needed. If you do not hear from us or have questions, please call the clinic.    GERD/ACID REFLUX/HEARTBURN:    - Protonix as directed.  - AVOID fatty/greasy/fried/spicy foods.  AVOID Alcohol.  AVOID laying down for 2 hours after eating.   - If your symptoms continue, make sure to follow up with a gastroenterologist.    GI AMBULATORY REFERRAL ORDERED:   Someone will call with an appointment this week.   Please answer all unknown calls.  If you are not contacted within 7 days, Please call clinic  for status of appointment. ([phone])    - You must understand that you have received an Urgent Care treatment only and that you may be released before all of your medical problems are known or treated.   - You, the patient, will arrange for follow up care as instructed with your primary care provider or recommended specialist.   - If your condition worsens or fails to improve we recommend that you receive another evaluation at the ER immediately or contact your PCP to discuss your concerns, or return here.   - Please do not drive or make any important decisions for 24 hours if you have received any pain medications, sedatives or mood altering drugs during your visit.    Disclaimer: This document was drafted with the use of a voice recognition device and is likely to have sound alike errors.

## 2023-12-04 ENCOUNTER — OFFICE VISIT (OUTPATIENT)
Dept: GASTROENTEROLOGY | Facility: CLINIC | Age: 63
End: 2023-12-04
Payer: COMMERCIAL

## 2023-12-04 VITALS
WEIGHT: 203.69 LBS | HEIGHT: 63 IN | DIASTOLIC BLOOD PRESSURE: 90 MMHG | SYSTOLIC BLOOD PRESSURE: 138 MMHG | BODY MASS INDEX: 36.09 KG/M2 | HEART RATE: 84 BPM

## 2023-12-04 DIAGNOSIS — K21.9 GASTROESOPHAGEAL REFLUX DISEASE, UNSPECIFIED WHETHER ESOPHAGITIS PRESENT: Primary | ICD-10-CM

## 2023-12-04 DIAGNOSIS — T78.3XXA ANGIOEDEMA, INITIAL ENCOUNTER: ICD-10-CM

## 2023-12-04 PROCEDURE — 99999 PR PBB SHADOW E&M-EST. PATIENT-LVL IV: ICD-10-PCS | Mod: PBBFAC,,, | Performed by: NURSE PRACTITIONER

## 2023-12-04 PROCEDURE — 3080F DIAST BP >= 90 MM HG: CPT | Mod: CPTII,S$GLB,, | Performed by: NURSE PRACTITIONER

## 2023-12-04 PROCEDURE — 3008F PR BODY MASS INDEX (BMI) DOCUMENTED: ICD-10-PCS | Mod: CPTII,S$GLB,, | Performed by: NURSE PRACTITIONER

## 2023-12-04 PROCEDURE — 3044F HG A1C LEVEL LT 7.0%: CPT | Mod: CPTII,S$GLB,, | Performed by: NURSE PRACTITIONER

## 2023-12-04 PROCEDURE — 3075F SYST BP GE 130 - 139MM HG: CPT | Mod: CPTII,S$GLB,, | Performed by: NURSE PRACTITIONER

## 2023-12-04 PROCEDURE — 3080F PR MOST RECENT DIASTOLIC BLOOD PRESSURE >= 90 MM HG: ICD-10-PCS | Mod: CPTII,S$GLB,, | Performed by: NURSE PRACTITIONER

## 2023-12-04 PROCEDURE — 1159F PR MEDICATION LIST DOCUMENTED IN MEDICAL RECORD: ICD-10-PCS | Mod: CPTII,S$GLB,, | Performed by: NURSE PRACTITIONER

## 2023-12-04 PROCEDURE — 99999 PR PBB SHADOW E&M-EST. PATIENT-LVL IV: CPT | Mod: PBBFAC,,, | Performed by: NURSE PRACTITIONER

## 2023-12-04 PROCEDURE — 3075F PR MOST RECENT SYSTOLIC BLOOD PRESS GE 130-139MM HG: ICD-10-PCS | Mod: CPTII,S$GLB,, | Performed by: NURSE PRACTITIONER

## 2023-12-04 PROCEDURE — 1160F PR REVIEW ALL MEDS BY PRESCRIBER/CLIN PHARMACIST DOCUMENTED: ICD-10-PCS | Mod: CPTII,S$GLB,, | Performed by: NURSE PRACTITIONER

## 2023-12-04 PROCEDURE — 1160F RVW MEDS BY RX/DR IN RCRD: CPT | Mod: CPTII,S$GLB,, | Performed by: NURSE PRACTITIONER

## 2023-12-04 PROCEDURE — 99204 PR OFFICE/OUTPT VISIT, NEW, LEVL IV, 45-59 MIN: ICD-10-PCS | Mod: S$GLB,,, | Performed by: NURSE PRACTITIONER

## 2023-12-04 PROCEDURE — 99204 OFFICE O/P NEW MOD 45 MIN: CPT | Mod: S$GLB,,, | Performed by: NURSE PRACTITIONER

## 2023-12-04 PROCEDURE — 3008F BODY MASS INDEX DOCD: CPT | Mod: CPTII,S$GLB,, | Performed by: NURSE PRACTITIONER

## 2023-12-04 PROCEDURE — 1159F MED LIST DOCD IN RCRD: CPT | Mod: CPTII,S$GLB,, | Performed by: NURSE PRACTITIONER

## 2023-12-04 PROCEDURE — 3044F PR MOST RECENT HEMOGLOBIN A1C LEVEL <7.0%: ICD-10-PCS | Mod: CPTII,S$GLB,, | Performed by: NURSE PRACTITIONER

## 2023-12-04 RX ORDER — PANTOPRAZOLE SODIUM 40 MG/1
40 TABLET, DELAYED RELEASE ORAL DAILY
Qty: 90 TABLET | Refills: 3 | Status: SHIPPED | OUTPATIENT
Start: 2023-12-04 | End: 2024-12-03

## 2023-12-04 NOTE — PROGRESS NOTES
Clinic Consult:  [facility] Consultation Note    Reason for Consult:  The primary encounter diagnosis was Gastroesophageal reflux disease, unspecified whether esophagitis present. A diagnosis of Angioedema, initial encounter was also pertinent to this visit.    PCP: Zuly Montes   [address] / [place] [id number]    HPI:  This is a 63 y.o. female here for evaluation of GERD.   She previously was seen over 3 years ago for GERD. Was on Nexium at that time with improvements in symptoms.     She started with lip swelling and sores along with mouth pain and sores-- this was secondary angioedema-- Losartan. This was stopped by PCP and has been doing better.     Her Nexium was changed to pantoprazole for concern that it was acid reflux that was causing her symptoms. She is doing well on pantoprazole.     Review of Systems   Constitutional:  Negative for fever and weight loss.   HENT:  Negative for sore throat.         Lip swelling    Respiratory:  Negative for cough, shortness of breath and wheezing.    Cardiovascular:  Negative for chest pain and palpitations.   Gastrointestinal:  Positive for heartburn. Negative for abdominal pain, blood in stool, constipation, diarrhea, melena, nausea and vomiting.   Genitourinary:  Negative for dysuria and frequency.   Skin:  Positive for rash. Negative for itching.   Neurological:  Negative for dizziness, speech change, seizures, loss of consciousness and headaches.       Medical History:  has a past medical history of Arthritis, Encounter for blood transfusion, Fatty liver, GERD (gastroesophageal reflux disease), Hernia, umbilical, Hyperlipidemia, Hypertension, Rotator cuff tear, and Sleep apnea.    Surgical History:  has a past surgical history that includes Carpal tunnel release (Bilateral); Brain surgery (); Cervical biopsy w/ loop electrode excision; Colonoscopy (N/A, 2016); Ventral hernia repair (2016);  section; Total abdominal  hysterectomy w/ bilateral salpingoophorectomy (2006); Refractive surgery; Ganglion cyst excision (Left, 12/31/2018); Arthroscopy of shoulder with decompression of subacromial space (Right, 12/30/2019); Rotator cuff repair (Right, 12/30/2019); Distal clavicle excision (Right, 12/30/2019); Biceps tendon repair (Right, 12/30/2019); Synovectomy of shoulder (Right, 12/30/2019); Arthroscopic debridement of shoulder (Right, 12/30/2019); Hysterectomy; Colonoscopy (N/A, 04/23/2021); Selective injection of anesthetic agent around lumbar spinal nerve root by transforaminal approach (Bilateral, 10/07/2021); Selective injection of anesthetic agent around lumbar spinal nerve root by transforaminal approach (Bilateral, 12/29/2021); Epidural steroid injection into cervical spine (N/A, 09/15/2022); Hernia repair; and Injection of joint (Right, 2/16/2023).    Family History: family history includes Arthritis in her mother; Asthma in her sister; Cancer in her maternal aunt and maternal uncle; Diabetes in her maternal aunt; Heart attack (age of onset: 30) in her sister; Heart attack (age of onset: 32) in her brother; Heart disease in her father, paternal grandmother, and paternal uncle; Hypertension in her father and mother; Prostate cancer in her father..     Social History:  reports that she has never smoked. She has never used smokeless tobacco. She reports current alcohol use of about 1.0 standard drink of alcohol per week. She reports that she does not use drugs.    Allergies: Reviewed    Home Medications:   Current Outpatient Medications on File Prior to Visit   Medication Sig Dispense Refill    amLODIPine (NORVASC) 10 MG tablet Take 1 tablet (10 mg total) by mouth once daily. 30 tablet 11    aspirin 81 MG Chew Take 81 mg by mouth as needed. Hold 5 days prior to surgery      dexAMETHasone (DECADRON) 0.5 mg/5 mL Elix Take 10 mLs (1 mg total) by mouth every 12 (twelve) hours. for 10 days 200 mL 0    diclofenac (VOLTAREN) 75 MG EC  "tablet Take 1 tablet by mouth twice daily as needed for pain 60 tablet 0    docusate sodium (COLACE) 100 MG capsule Take 1 capsule (100 mg total) by mouth 2 (two) times daily. 100 capsule 0    ezetimibe (ZETIA) 10 mg tablet Take 1 tablet (10 mg total) by mouth once daily. 90 tablet 3    fluticasone propionate (FLONASE) 50 mcg/actuation nasal spray 2 sprays (100 mcg total) by Each Nostril route once daily. 16 g 0    L.acidophilus-Bif. animalis (PROBIOTIC) 5 billion cell CpSP Take 1 tablet by mouth once daily. 10 capsule 0    LIDOcaine (LIDODERM) 5 % Place onto the skin.      MULTIVIT,CALC,MINS/IRON/FOLIC (DAILY MULTIPLE FOR WOMEN ORAL) Take 1 tablet by mouth once daily. Hold 5 days prior to surgery      omega-3 fatty acids/fish oil (FISH OIL-OMEGA-3 FATTY ACIDS) 300-1,000 mg capsule Take by mouth once daily. Hold 5 days prior to surgery      rosuvastatin (CRESTOR) 40 MG Tab Take 1 tablet (40 mg total) by mouth every evening. 90 tablet 1    triamcinolone acetonide 0.1% (KENALOG) 0.1 % ointment Apply topically 2 (two) times daily. 80 g 0    [DISCONTINUED] pantoprazole (PROTONIX) 40 MG tablet Take 1 tablet (40 mg total) by mouth once daily. 30 tablet 0    hydrocortisone 2.5 % ointment Apply topically 3 (three) times daily. for 7 days 20 g 0     Current Facility-Administered Medications on File Prior to Visit   Medication Dose Route Frequency Provider Last Rate Last Admin    aluminum-magnesium hydroxide-simethicone 200-200-20 mg/5 mL suspension 30 mL  30 mL Oral 1 time in Clinic/HOD Arlene Oglesby NP        lidocaine (PF) 10 mg/ml (1%) injection 5 mg  0.5 mL Subcutaneous Once Sung Warner MD        LIDOcaine HCl 2% oral solution 15 mL  15 mL Oral 1 time in Clinic/HOD Arlene Oglesby, BASILIO           Physical Exam:  BP (!) 138/90 (BP Location: Left arm, Patient Position: Sitting, BP Method: Large (Manual))   Pulse 84   Ht 5' 3" (1.6 m)   Wt 92.4 kg (203 lb 11.3 oz)   LMP  (LMP Unknown)   BMI 36.08 " kg/m²   Body mass index is 36.08 kg/m².  Physical Exam  Constitutional:       General: She is not in acute distress.  Cardiovascular:      Rate and Rhythm: Normal rate and regular rhythm.      Heart sounds: Normal heart sounds. No murmur heard.  Pulmonary:      Effort: Pulmonary effort is normal. No respiratory distress.      Breath sounds: Normal breath sounds.   Neurological:      Mental Status: She is alert.   Psychiatric:         Mood and Affect: Mood normal.         Behavior: Behavior normal.         Labs: Pertinent labs reviewed.  CRC Screening: up to date     Assessment:  1. Gastroesophageal reflux disease, unspecified whether esophagitis present    2. Angioedema, initial encounter    GERD controlled on PPI. Angioedema 2/2 ARB. Was discontinued.     Recommendations:   - continue pantoprazole  - refilled.     Gastroesophageal reflux disease, unspecified whether esophagitis present  -     pantoprazole (PROTONIX) 40 MG tablet; Take 1 tablet (40 mg total) by mouth once daily.  Dispense: 90 tablet; Refill: 3    Angioedema, initial encounter      Follow up if symptoms worsen or fail to improve.    Thank you so much for allowing me to participate in the care of RAY Dong

## 2023-12-09 DIAGNOSIS — M47.26 OSTEOARTHRITIS OF SPINE WITH RADICULOPATHY, LUMBAR REGION: ICD-10-CM

## 2023-12-09 NOTE — TELEPHONE ENCOUNTER
No care due was identified.  [facility] Embedded Care Due Messages. Reference number: [id number].   12/09/2023 9:06:58 AM CST

## 2023-12-11 RX ORDER — DICLOFENAC SODIUM 75 MG/1
75 TABLET, DELAYED RELEASE ORAL 2 TIMES DAILY PRN
Qty: 60 TABLET | Refills: 1 | Status: SHIPPED | OUTPATIENT
Start: 2023-12-11 | End: 2024-02-05

## 2024-01-23 ENCOUNTER — OFFICE VISIT (OUTPATIENT)
Dept: INTERNAL MEDICINE | Facility: CLINIC | Age: 64
End: 2024-01-23
Payer: COMMERCIAL

## 2024-01-23 ENCOUNTER — HOSPITAL ENCOUNTER (OUTPATIENT)
Dept: RADIOLOGY | Facility: HOSPITAL | Age: 64
Discharge: HOME | End: 2024-01-23
Attending: NURSE PRACTITIONER
Payer: COMMERCIAL

## 2024-01-23 VITALS
SYSTOLIC BLOOD PRESSURE: 132 MMHG | DIASTOLIC BLOOD PRESSURE: 80 MMHG | TEMPERATURE: 98 F | WEIGHT: 205.69 LBS | BODY MASS INDEX: 36.44 KG/M2

## 2024-01-23 DIAGNOSIS — I10 ESSENTIAL HYPERTENSION: ICD-10-CM

## 2024-01-23 DIAGNOSIS — M25.551 PAIN IN RIGHT HIP: ICD-10-CM

## 2024-01-23 DIAGNOSIS — E78.2 MIXED HYPERLIPIDEMIA: ICD-10-CM

## 2024-01-23 DIAGNOSIS — M25.551 PAIN IN RIGHT HIP: Primary | ICD-10-CM

## 2024-01-23 PROCEDURE — 73502 X-RAY EXAM HIP UNI 2-3 VIEWS: CPT | Mod: TC,FY,PO,RT

## 2024-01-23 PROCEDURE — 73502 X-RAY EXAM HIP UNI 2-3 VIEWS: CPT | Mod: 26,RT,, | Performed by: RADIOLOGY

## 2024-01-23 PROCEDURE — 1159F MED LIST DOCD IN RCRD: CPT | Mod: CPTII,S$GLB,, | Performed by: NURSE PRACTITIONER

## 2024-01-23 PROCEDURE — 3079F DIAST BP 80-89 MM HG: CPT | Mod: CPTII,S$GLB,, | Performed by: NURSE PRACTITIONER

## 2024-01-23 PROCEDURE — 99999 PR PBB SHADOW E&M-EST. PATIENT-LVL IV: CPT | Mod: PBBFAC,,, | Performed by: NURSE PRACTITIONER

## 2024-01-23 PROCEDURE — 1160F RVW MEDS BY RX/DR IN RCRD: CPT | Mod: CPTII,S$GLB,, | Performed by: NURSE PRACTITIONER

## 2024-01-23 PROCEDURE — 3075F SYST BP GE 130 - 139MM HG: CPT | Mod: CPTII,S$GLB,, | Performed by: NURSE PRACTITIONER

## 2024-01-23 PROCEDURE — 3008F BODY MASS INDEX DOCD: CPT | Mod: CPTII,S$GLB,, | Performed by: NURSE PRACTITIONER

## 2024-01-23 PROCEDURE — 99214 OFFICE O/P EST MOD 30 MIN: CPT | Mod: S$GLB,,, | Performed by: NURSE PRACTITIONER

## 2024-01-23 RX ORDER — METHOCARBAMOL 500 MG/1
500 TABLET, FILM COATED ORAL NIGHTLY
Qty: 10 TABLET | Refills: 0 | Status: SHIPPED | OUTPATIENT
Start: 2024-01-23 | End: 2024-02-02

## 2024-01-23 NOTE — PROGRESS NOTES
Subjective:       Patient ID: Ora Henderson is a 63 y.o. female.    Chief Complaint: Hip Pain (Started next week )    Pt presents to clinic today for right hip pain  Started about 1 week ago  Denies any injury or falls  Hurts to lay on the hip  Has some chronic lower back pain but feels this is different   No bladder or bowel issues  Does radiate down the leg some        /80   Temp 98.2 °F (36.8 °C)   Wt 93.3 kg (205 lb 11 oz)   LMP  (LMP Unknown)   BMI 36.44 kg/m²     Review of Systems   Constitutional:  Negative for activity change, appetite change, chills, diaphoresis, fatigue, fever and unexpected weight change.   HENT: Negative.     Respiratory:  Negative for cough and shortness of breath.    Cardiovascular:  Negative for chest pain, palpitations and leg swelling.   Gastrointestinal: Negative.    Genitourinary: Negative.    Musculoskeletal:  Positive for arthralgias and myalgias.   Skin:  Negative for color change, pallor, rash and wound.   Allergic/Immunologic: Negative for immunocompromised state.   Neurological: Negative.  Negative for dizziness and facial asymmetry.   Hematological:  Negative for adenopathy. Does not bruise/bleed easily.   Psychiatric/Behavioral:  Negative for agitation, behavioral problems and confusion.        Objective:      Physical Exam  Vitals and nursing note reviewed.   Constitutional:       General: She is not in acute distress.     Appearance: Normal appearance. She is well-developed. She is not diaphoretic.   HENT:      Head: Normocephalic and atraumatic.   Cardiovascular:      Rate and Rhythm: Normal rate and regular rhythm.      Heart sounds: Normal heart sounds. No murmur heard.  Pulmonary:      Effort: Pulmonary effort is normal. No respiratory distress.      Breath sounds: Normal breath sounds.   Musculoskeletal:      Thoracic back: Decreased range of motion.      Lumbar back: Decreased range of motion. Negative right straight leg raise test and  negative left straight leg raise test.      Right hip: Tenderness present. Decreased range of motion. Decreased strength.   Skin:     General: Skin is warm and dry.      Findings: No rash.   Neurological:      Mental Status: She is alert.   Psychiatric:         Mood and Affect: Mood normal.         Behavior: Behavior normal. Behavior is cooperative.         Thought Content: Thought content normal.         Judgment: Judgment normal.         Assessment:       1. Pain in right hip    2. Essential hypertension    3. Mixed hyperlipidemia    4. BMI 36.0-36.9,adult        Plan:       Ora was seen today for hip pain.    Diagnoses and all orders for this visit:    Pain in right hip  -     X-Ray Hip 2 or 3 views Right (with Pelvis when performed); Future  -     methocarbamoL (ROBAXIN) 500 MG Tab; Take 1 tablet (500 mg total) by mouth every evening. for 10 days    Essential hypertension     - Chronic, stable on current meds. Continue   Mixed hyperlipidemia     - Chronic, stable on current meds. Continue   BMI 36.0-36.9,adult      Patient Instructions   Add tylenol 500 mg at lunch time  Continue prescribed diclofenac twice daily as previously prescribed  Add robaxin at bedtime  Will xray hip today  Follow up for worsening or no improvement in symptoms and PRN.

## 2024-01-23 NOTE — PATIENT INSTRUCTIONS
Add tylenol 500 mg at lunch time  Continue prescribed diclofenac twice daily as previously prescribed  Add robaxin at bedtime

## 2024-01-26 ENCOUNTER — OFFICE VISIT (OUTPATIENT)
Dept: URGENT CARE | Facility: CLINIC | Age: 64
End: 2024-01-26
Payer: COMMERCIAL

## 2024-01-26 VITALS
WEIGHT: 204.5 LBS | SYSTOLIC BLOOD PRESSURE: 138 MMHG | OXYGEN SATURATION: 97 % | RESPIRATION RATE: 18 BRPM | TEMPERATURE: 99 F | DIASTOLIC BLOOD PRESSURE: 76 MMHG | HEART RATE: 88 BPM | HEIGHT: 63 IN | BODY MASS INDEX: 36.23 KG/M2

## 2024-01-26 DIAGNOSIS — R05.9 COUGH, UNSPECIFIED TYPE: ICD-10-CM

## 2024-01-26 DIAGNOSIS — U07.1 COVID-19 VIRUS DETECTED: ICD-10-CM

## 2024-01-26 DIAGNOSIS — R50.9 FEVER, UNSPECIFIED FEVER CAUSE: ICD-10-CM

## 2024-01-26 DIAGNOSIS — U07.1 COVID: Primary | ICD-10-CM

## 2024-01-26 LAB
CTP QC/QA: YES
CTP QC/QA: YES
POC MOLECULAR INFLUENZA A AGN: NEGATIVE
POC MOLECULAR INFLUENZA B AGN: NEGATIVE
SARS-COV-2 AG RESP QL IA.RAPID: POSITIVE

## 2024-01-26 PROCEDURE — 87502 INFLUENZA DNA AMP PROBE: CPT | Mod: QW,S$GLB,, | Performed by: PHYSICIAN ASSISTANT

## 2024-01-26 PROCEDURE — 99214 OFFICE O/P EST MOD 30 MIN: CPT | Mod: S$GLB,,, | Performed by: PHYSICIAN ASSISTANT

## 2024-01-26 PROCEDURE — 87811 SARS-COV-2 COVID19 W/OPTIC: CPT | Mod: QW,S$GLB,, | Performed by: PHYSICIAN ASSISTANT

## 2024-01-26 RX ORDER — BROMPHENIRAMINE MALEATE, PSEUDOEPHEDRINE HYDROCHLORIDE, AND DEXTROMETHORPHAN HYDROBROMIDE 2; 30; 10 MG/5ML; MG/5ML; MG/5ML
10 SYRUP ORAL EVERY 6 HOURS PRN
Qty: 118 ML | Refills: 0 | Status: SHIPPED | OUTPATIENT
Start: 2024-01-26 | End: 2024-02-08

## 2024-01-26 RX ORDER — PROMETHAZINE HYDROCHLORIDE AND DEXTROMETHORPHAN HYDROBROMIDE 6.25; 15 MG/5ML; MG/5ML
10 SYRUP ORAL NIGHTLY PRN
Qty: 118 ML | Refills: 0 | Status: SHIPPED | OUTPATIENT
Start: 2024-01-26 | End: 2024-02-08

## 2024-01-26 NOTE — PROGRESS NOTES
"Subjective:      Patient ID: Ora Henderson is a 63 y.o. female.    Vitals:  height is 5' 3" (1.6 m) and weight is 92.8 kg (204 lb 7.6 oz). Her oral temperature is 99.3 °F (37.4 °C). Her blood pressure is 138/76 and her pulse is 88. Her respiration is 18 and oxygen saturation is 97%.     Chief Complaint: Cough    C/o body aches, cough, fever (101), and b/l ear pressure x 2 days.  She is taken OTC medications with no significant relief.  She denies shortness a breath, nausea, vomiting, diarrhea, abdominal pain, and/or any other symptoms associated with this complaint.    Cough  This is a new problem. The current episode started yesterday. The problem has been unchanged. The problem occurs constantly. The cough is Non-productive. Associated symptoms include ear pain, a fever, headaches and nasal congestion. Pertinent negatives include no chest pain, chills, ear congestion, heartburn, hemoptysis, myalgias, postnasal drip, rash, rhinorrhea, sore throat, shortness of breath, sweats, weight loss or wheezing. Nothing aggravates the symptoms. She has tried OTC cough suppressant for the symptoms. The treatment provided no relief. There is no history of asthma, bronchiectasis, bronchitis, COPD, emphysema, environmental allergies or pneumonia.       Constitution: Positive for fever. Negative for chills.   HENT:  Positive for ear pain. Negative for postnasal drip and sore throat.    Cardiovascular:  Negative for chest pain.   Respiratory:  Positive for cough. Negative for bloody sputum, shortness of breath and wheezing.    Gastrointestinal:  Negative for heartburn.   Musculoskeletal:  Negative for muscle ache.   Skin:  Negative for rash.   Allergic/Immunologic: Negative for environmental allergies.   Neurological:  Positive for headaches.      Objective:     Physical Exam   Constitutional: She is oriented to person, place, and time. She appears well-developed.   HENT:   Head: Normocephalic and atraumatic. "   Ears:   Right Ear: External ear normal.   Left Ear: External ear normal.   Nose: Nose normal.   Mouth/Throat: Oropharynx is clear and moist.   Eyes: Conjunctivae, EOM and lids are normal.   Neck: Trachea normal and phonation normal. Neck supple.   Cardiovascular: Normal rate, regular rhythm and normal heart sounds.   Pulmonary/Chest: Effort normal and breath sounds normal. No respiratory distress. She has no wheezes. She has no rhonchi.   Musculoskeletal: Normal range of motion.         General: Normal range of motion.   Neurological: She is alert and oriented to person, place, and time.   Skin: Skin is warm, dry and intact.   Psychiatric: Her speech is normal and behavior is normal. Judgment and thought content normal.   Nursing note and vitals reviewed.      Assessment:     1. COVID    2. Fever, unspecified fever cause    3. Cough, unspecified type      Results for orders placed or performed in visit on 01/26/24   POCT Influenza A/B MOLECULAR   Result Value Ref Range    POC Molecular Influenza A Ag Negative Negative, Not Reported    POC Molecular Influenza B Ag Negative Negative, Not Reported     Acceptable Yes    SARS Coronavirus 2 Antigen, POCT Manual Read   Result Value Ref Range    SARS Coronavirus 2 Antigen Positive (A) Negative     Acceptable Yes      *Note: Due to a large number of results and/or encounters for the requested time period, some results have not been displayed. A complete set of results can be found in Results Review.       Plan:   VSS. Patient non-toxic appearing. Discussed medication being prescribed.  Advised patient to follow up with PCP as needed.  Patient verbalized understanding, agrees with the plan, and is comfortable with discharge.      COVID  -     nirmatrelvir-ritonavir 300 mg (150 mg x 2)-100 mg copackaged tablets (EUA); Take 3 tablets by mouth 2 (two) times daily for 5 days. Each dose contains 2 nirmatrelvir (pink tablets) and 1 ritonavir (white  tablet). Take all 3 tablets together  Dispense: 30 tablet; Refill: 0    Fever, unspecified fever cause  -     POCT Influenza A/B MOLECULAR  -     SARS Coronavirus 2 Antigen, POCT Manual Read    Cough, unspecified type  -     brompheniramine-pseudoeph-DM (BROMFED DM) 2-30-10 mg/5 mL Syrp; Take 10 mLs by mouth every 6 (six) hours as needed.  Dispense: 118 mL; Refill: 0  -     promethazine-dextromethorphan (PROMETHAZINE-DM) 6.25-15 mg/5 mL Syrp; Take 10 mLs by mouth nightly as needed.  Dispense: 118 mL; Refill: 0      Medical Decision Making:   Clinical Tests:   Lab Tests: Ordered and Reviewed       <> Summary of Lab: COVID positive  Flu negative

## 2024-01-26 NOTE — PATIENT INSTRUCTIONS
You have tested positive for COVID-19 today.      Please note that patients who test positive for COVID-19 are required by the CDC to undergo isolation for 5 days after their symptoms first began.   - If you tested positive and do NOT have symptoms, you must isolate for 5 days starting on the day of the positive test.   - If you tested positive and have symptoms, you must isolate for 5 days starting on the day of the first symptoms,  not the day of the positive test.    This is the most important part, both the CDC and the [facility] emphasize that you do not test out of isolation.  In fact, we do not retest if you were positive in the last 90 days.    After 5 days, if your symptoms have improved and you have not had fever on day 5, you can return to the community on day 6- NO TESTING REQUIRED!     After your 5 days of isolation are completed, the CDC recommends strict mask use for the first 5 days that you come out of isolation.     During quarantine:   Separate yourself from other people and animals in your home.  Call ahead before visiting your doctor.  Wear a facemask.  Cover your coughs and sneezes.  Wash your hands often with soap and water; hand  can be used, too.  Avoid sharing personal household items.  Wipe down surfaces used daily.  Monitor your symptoms. Seek prompt medical attention if your illness is worsening (e.g., difficulty breathing).   Before seeking care, call your healthcare provider.  If you have a medical emergency and need to call 911, notify the dispatch personnel that you have, or are being evaluated for COVID-19. If possible, put on a facemask before emergency medical services arrive.         CDC Testing and Quarantine Guidelines for household members, intimate partners, and caregivers in a home setting of a known-positive COVID-19 person:    ·     A 'close exposure' is defined as anyone who has had an exposure (masked or unmasked) to a known COVID -19 positive person within 6 feet of  someone for a cumulative total of 15 minutes or more over a 24-hour period.    ·     Vaccinated: patients who have been boosted or completed the primary series of Pfizer or Moderna vaccine within the last 6 months or completed the primary series of J&J vaccine within the last 2 months and/or had a positive test within 90 days   - do NOT need to quarantine after contact with someone who had COVID-19 unless they have symptoms.   - should get tested 3-5 days after their exposure (if they have not had a positive test within the last 90 days), even if they don't have symptoms and wear a mask indoors in public for 10 days following exposure or until their test result is negative on day 5.  - If you develop symptoms test and quarantine.    ·     Unvaccinated, or are more than six months out from their second mRNA dose (or more than 2 months after the J&J vaccine) and not yet boosted,  and/or NOT had a positive test within 90 days and meet 'close exposure'  - you are required by CDC guidelines to quarantine for at least 5 days from time of exposure followed by 5 days of strict masking. It is recommended, but not required to test after 5 days, unless you develop symptoms, in which case you should test at that time.  - If you do decide to test at 5 days and are asymptomatic, the risk is that if you test without symptoms (on Day 5 for example) and you are positive, your 5 day isolation begins on that day, and you turned your 5 day quarantine into 10 days.  - If your exposure does not meet the above definition, you can return to your normal daily activities to include social distancing, wearing a mask and frequent handwashing.       Close contacts should also follow these recommendations:  Make sure that you understand and can help the patient follow their provider's instructions for medication(s) and care. You should help the patient with basic needs in the home and provide support for getting groceries, prescriptions, and  other personal needs.  Monitor the patient's symptoms. If the patient is getting sicker, call his or her healthcare provider and tell them that the patient has laboratory-confirmed COVID-19. If the patient has a medical emergency and you need to call 911, notify the dispatch personnel that the patient has, or is being evaluated for COVID-19.  Household members should stay in another room or be  from the patient. Household members should use a separate bedroom and bathroom, if available.  Prohibit visitors.  Household members should care for any pets in the home.  Make sure that shared spaces in the home have good air flow, such as by an air conditioner or an opened window, weather permitting.  Perform hand hygiene frequently. Wash your hands often with soap and water for at least 20 seconds or use an alcohol-based hand  (that contains > 60% alcohol) covering all surfaces of your hands and rubbing them together until they feel dry. Soap and water should be used preferentially.  Avoid touching your eyes, nose, and mouth.  The patient should wear a facemask. If the patient is not able to wear a facemask (for example, because it causes trouble breathing), caregivers should wear a mask when they are in the same room as the patient.  Wear a disposable facemask and gloves when you touch or have contact with the patient's blood, stool, or body fluids, such as saliva, sputum, nasal mucus, vomit, urine.  Throw out disposable facemasks and gloves after using them. Do not reuse.  When removing personal protective equipment, first remove and dispose of gloves. Then, immediately clean your hands with soap and water or alcohol-based hand . Next, remove and dispose of facemask, and immediately clean your hands again with soap and water or alcohol-based hand .  You should not share dishes, drinking glasses, cups, eating utensils, towels, bedding, or other items with the patient. After the patient  uses these items, you should wash them thoroughly (see below Wash laundry thoroughly).  Clean all high-touch surfaces, such as counters, tabletops, doorknobs, bathroom fixtures, toilets, phones, keyboards, tablets, and bedside tables, every day. Also, clean any surfaces that may have blood, stool, or body fluids on them.  Use a household cleaning spray or wipe, according to the label instructions. Labels contain instructions for safe and effective use of the cleaning product including precautions you should take when applying the product, such as wearing gloves and making sure you have good ventilation during use of the product.  Wash laundry thoroughly.  Immediately remove and wash clothes or bedding that have blood, stool, or body fluids on them.  Wear disposable gloves while handling soiled items and keep soiled items away from your body. Clean your hands (with soap and water or an alcohol-based hand ) immediately after removing your gloves.  Read and follow directions on labels of laundry or clothing items and detergent. In general, using a normal laundry detergent according to washing machine instructions and dry thoroughly using the warmest temperatures recommended on the clothing label.  Place all used disposable gloves, facemasks, and other contaminated items in a lined container before disposing of them with other household waste. Clean your hands (with soap and water or an alcohol-based hand ) immediately after handling these items. Soap and water should be used preferentially if hands are visibly dirty.  Discuss any additional questions with your state or local health department or healthcare provider. Check available hours when contacting your local health department.     You must understand that you've received an Urgent Care treatment only and that you may be released before all your medical problems are known or treated. You, the patient, will arrange for follow up care as  instructed. Follow up with your PCP or specialty clinic as directed within 2-5 days if not improved or as needed.  You can call [phone] to schedule an appointment with the appropriate provider.  If your condition worsens we recommend that you receive another evaluation at the emergency room immediately or contact your primary medical clinics after hours call service to discuss your concerns.  Please return here or go to the Emergency Department for any concerns or worsening of condition.

## 2024-02-05 DIAGNOSIS — M47.26 OSTEOARTHRITIS OF SPINE WITH RADICULOPATHY, LUMBAR REGION: ICD-10-CM

## 2024-02-05 RX ORDER — DICLOFENAC SODIUM 75 MG/1
75 TABLET, DELAYED RELEASE ORAL 2 TIMES DAILY PRN
Qty: 60 TABLET | Refills: 0 | Status: SHIPPED | OUTPATIENT
Start: 2024-02-05

## 2024-02-05 NOTE — TELEPHONE ENCOUNTER
No care due was identified.  [facility] Embedded Care Due Messages. Reference number: [id number].   2/05/2024 6:44:56 AM CST

## 2024-02-08 ENCOUNTER — OFFICE VISIT (OUTPATIENT)
Dept: INTERNAL MEDICINE | Facility: CLINIC | Age: 64
End: 2024-02-08
Payer: COMMERCIAL

## 2024-02-08 ENCOUNTER — HOSPITAL ENCOUNTER (OUTPATIENT)
Dept: RADIOLOGY | Facility: HOSPITAL | Age: 64
Discharge: HOME | End: 2024-02-08
Attending: FAMILY MEDICINE
Payer: COMMERCIAL

## 2024-02-08 VITALS
HEIGHT: 63 IN | RESPIRATION RATE: 18 BRPM | BODY MASS INDEX: 36.01 KG/M2 | DIASTOLIC BLOOD PRESSURE: 80 MMHG | SYSTOLIC BLOOD PRESSURE: 130 MMHG | WEIGHT: 203.25 LBS

## 2024-02-08 DIAGNOSIS — R82.90 BAD ODOR OF URINE: ICD-10-CM

## 2024-02-08 DIAGNOSIS — R25.2 MUSCLE CRAMPS: ICD-10-CM

## 2024-02-08 DIAGNOSIS — M25.551 CHRONIC RIGHT HIP PAIN: ICD-10-CM

## 2024-02-08 DIAGNOSIS — M47.26 OSTEOARTHRITIS OF SPINE WITH RADICULOPATHY, LUMBAR REGION: ICD-10-CM

## 2024-02-08 DIAGNOSIS — E66.01 SEVERE OBESITY (BMI 35.0-39.9) WITH COMORBIDITY: ICD-10-CM

## 2024-02-08 DIAGNOSIS — G89.29 CHRONIC RIGHT HIP PAIN: ICD-10-CM

## 2024-02-08 DIAGNOSIS — M46.1 SACROILIITIS: ICD-10-CM

## 2024-02-08 DIAGNOSIS — M25.551 CHRONIC RIGHT HIP PAIN: Primary | ICD-10-CM

## 2024-02-08 DIAGNOSIS — E78.2 MIXED HYPERLIPIDEMIA: ICD-10-CM

## 2024-02-08 DIAGNOSIS — I10 ESSENTIAL HYPERTENSION: Chronic | ICD-10-CM

## 2024-02-08 DIAGNOSIS — G89.29 CHRONIC RIGHT HIP PAIN: Primary | ICD-10-CM

## 2024-02-08 PROCEDURE — 1160F RVW MEDS BY RX/DR IN RCRD: CPT | Mod: CPTII,S$GLB,, | Performed by: FAMILY MEDICINE

## 2024-02-08 PROCEDURE — 3079F DIAST BP 80-89 MM HG: CPT | Mod: CPTII,S$GLB,, | Performed by: FAMILY MEDICINE

## 2024-02-08 PROCEDURE — 72110 X-RAY EXAM L-2 SPINE 4/>VWS: CPT | Mod: 26,,, | Performed by: RADIOLOGY

## 2024-02-08 PROCEDURE — 3008F BODY MASS INDEX DOCD: CPT | Mod: CPTII,S$GLB,, | Performed by: FAMILY MEDICINE

## 2024-02-08 PROCEDURE — 99214 OFFICE O/P EST MOD 30 MIN: CPT | Mod: S$GLB,,, | Performed by: FAMILY MEDICINE

## 2024-02-08 PROCEDURE — 99999 PR PBB SHADOW E&M-EST. PATIENT-LVL V: CPT | Mod: PBBFAC,,, | Performed by: FAMILY MEDICINE

## 2024-02-08 PROCEDURE — 3075F SYST BP GE 130 - 139MM HG: CPT | Mod: CPTII,S$GLB,, | Performed by: FAMILY MEDICINE

## 2024-02-08 PROCEDURE — 72110 X-RAY EXAM L-2 SPINE 4/>VWS: CPT | Mod: TC

## 2024-02-08 PROCEDURE — 1159F MED LIST DOCD IN RCRD: CPT | Mod: CPTII,S$GLB,, | Performed by: FAMILY MEDICINE

## 2024-02-08 NOTE — PROGRESS NOTES
Subjective:       Patient ID: Ora Henderson is a 63 y.o. female.    Chief Complaint: Hip Pain (Right)    63-year-old  female patient with Patient Active Problem List:     DJD (degenerative joint disease), cervical-mild     Mixed hyperlipidemia     Hepatomegaly     Family history of cardiovascular disease     GERD (gastroesophageal reflux disease)     History of benign pituitary tumor     Hx of colonic polyp     Essential hypertension     Osteoarthritis of spine with radiculopathy, lumbar region     Severe obesity (BMI 35.0-39.9) with comorbidity     NATALIE (obstructive sleep apnea)     Sacroiliitis  Here with complaint of chronic right hip pain, up to 7/10, denies any significant low back pain and had x-rays done showing no acute findings to the hip, patient was given muscle relaxant methocarbamol but took it only for a day, patient does have diclofenac for pain relief  Reports burning sensation to the right hip but denies any tingling or numbness to the lower extremity  Has been having muscle cramps lately and has been trying to drink adequate fluids  Patient has been having bad odor with urination and would like to get checked for yeast infection, has been taking azo        Review of Systems   Constitutional:  Negative for activity change, fatigue and unexpected weight change.   HENT:  Negative for hearing loss, rhinorrhea and trouble swallowing.    Eyes:  Negative for discharge and visual disturbance.   Respiratory:  Negative for chest tightness, shortness of breath and wheezing.    Cardiovascular:  Negative for chest pain, palpitations and leg swelling.   Gastrointestinal:  Negative for abdominal pain, blood in stool, constipation, diarrhea, nausea and vomiting.   Endocrine: Negative for polydipsia and polyuria.   Genitourinary:  Negative for difficulty urinating, dysuria, flank pain, frequency, hematuria, menstrual problem, urgency and vaginal discharge.        Reports bad odor to  "the urine   Musculoskeletal:  Positive for arthralgias and myalgias. Negative for back pain, joint swelling and neck pain.   Skin:  Negative for rash.   Neurological:  Positive for headaches. Negative for weakness, light-headedness and numbness.   Psychiatric/Behavioral:  Negative for confusion, dysphoric mood and sleep disturbance.          /80 (BP Location: Left arm, Patient Position: Sitting, BP Method: Large (Manual))   Resp 18   Ht 5' 3" (1.6 m)   Wt 92.2 kg (203 lb 4.2 oz)   LMP  (LMP Unknown)   BMI 36.01 kg/m²   Objective:      Physical Exam  Musculoskeletal:         General: Tenderness present.      Comments: Positive for tenderness to the right hip lately and with restricted range of motion with extension  No significant tenderness noted to the lumbar spine on palpation           Assessment/Plan:   1. Chronic right hip pain  -     Ambulatory referral/consult to Pain Clinic; Future; Expected date: 02/15/2024  -     X-Ray Lumbar Spine 5 View; Future; Expected date: 02/08/2024  Reviewed x-ray of the right hip showing no acute findings  Likely radiation from the lower back  Patient to take methocarbamol and diclofenac as per home supply and discuss further with pain management  Patient not interested in physical therapy currently going to chiropractor    2. Osteoarthritis of spine with radiculopathy, lumbar region  6. Sacroiliitis  Overview:  Dr Tyson s/p ARMANDO    Orders:  -     Ambulatory referral/consult to Pain Clinic; Future; Expected date: 02/15/2024  -     X-Ray Lumbar Spine 5 View; Future; Expected date: 02/08/2024  -     Vitamin B12; Future; Expected date: 02/08/2024  Will check further labs but discuss further with pain management    3. Essential hypertension  Blood pressure is stable currently on amlodipine 10 mg    4. Muscle cramps  -     CBC Auto Differential; Future; Expected date: 02/08/2024  -     Basic Metabolic Panel; Future; Expected date: 02/08/2024  -     Magnesium; Future; " Expected date: 02/08/2024  Encouraged to drink adequate fluids and will check further labs    5. Mixed hyperlipidemia   currently taking Zetia 10 mg and Crestor 40 mg daily    7. Severe obesity (BMI 35.0-39.9) with comorbidity  Lifestyle modifications recommended to lose weight with BMI 36    8. Bad odor of urine  -     Urinalysis, Reflex to Urine Culture Urine, Clean Catch; Future; Expected date: 02/08/2024  Will get urinalysis to rule out any infection, reports bad odor

## 2024-02-09 ENCOUNTER — PATIENT MESSAGE (OUTPATIENT)
Dept: INTERNAL MEDICINE | Facility: CLINIC | Age: 64
End: 2024-02-09
Payer: COMMERCIAL

## 2024-02-21 ENCOUNTER — OFFICE VISIT (OUTPATIENT)
Dept: INTERNAL MEDICINE | Facility: CLINIC | Age: 64
End: 2024-02-21
Payer: COMMERCIAL

## 2024-02-21 VITALS
DIASTOLIC BLOOD PRESSURE: 82 MMHG | BODY MASS INDEX: 35.69 KG/M2 | TEMPERATURE: 98 F | WEIGHT: 201.5 LBS | SYSTOLIC BLOOD PRESSURE: 124 MMHG

## 2024-02-21 DIAGNOSIS — Z12.83 SKIN CANCER SCREENING: ICD-10-CM

## 2024-02-21 DIAGNOSIS — D18.01 CHERRY HEMANGIOMA: Primary | ICD-10-CM

## 2024-02-21 PROCEDURE — 3079F DIAST BP 80-89 MM HG: CPT | Mod: CPTII,S$GLB,, | Performed by: NURSE PRACTITIONER

## 2024-02-21 PROCEDURE — 99999 PR PBB SHADOW E&M-EST. PATIENT-LVL IV: CPT | Mod: PBBFAC,,, | Performed by: NURSE PRACTITIONER

## 2024-02-21 PROCEDURE — 1160F RVW MEDS BY RX/DR IN RCRD: CPT | Mod: CPTII,S$GLB,, | Performed by: NURSE PRACTITIONER

## 2024-02-21 PROCEDURE — 99213 OFFICE O/P EST LOW 20 MIN: CPT | Mod: S$GLB,,, | Performed by: NURSE PRACTITIONER

## 2024-02-21 PROCEDURE — 1159F MED LIST DOCD IN RCRD: CPT | Mod: CPTII,S$GLB,, | Performed by: NURSE PRACTITIONER

## 2024-02-21 PROCEDURE — 3074F SYST BP LT 130 MM HG: CPT | Mod: CPTII,S$GLB,, | Performed by: NURSE PRACTITIONER

## 2024-02-21 PROCEDURE — 3008F BODY MASS INDEX DOCD: CPT | Mod: CPTII,S$GLB,, | Performed by: NURSE PRACTITIONER

## 2024-02-21 NOTE — PROGRESS NOTES
Subjective:       Patient ID: Ora Henderson is a 63 y.o. female.    Chief Complaint: spot on breast  (Noticed a week ago )    Pt presents to clinic today for 2 concerning spots on her left breast  She reports she first noticed these little red dots on the left breast about 1 week ago  Never noticed them before   Concerned it may be something serious   No tenderness or drainage         /82   Temp 98.3 °F (36.8 °C) (Tympanic)   Wt 91.4 kg (201 lb 8 oz)   LMP  (LMP Unknown)   BMI 35.69 kg/m²     Review of Systems   Constitutional:  Negative for activity change, appetite change, chills, diaphoresis, fatigue, fever and unexpected weight change.   HENT: Negative.     Respiratory:  Negative for cough and shortness of breath.    Cardiovascular:  Negative for chest pain, palpitations and leg swelling.   Gastrointestinal: Negative.    Genitourinary: Negative.    Musculoskeletal: Negative.    Skin:  Positive for color change. Negative for pallor, rash and wound.   Allergic/Immunologic: Negative for immunocompromised state.   Neurological: Negative.  Negative for dizziness and facial asymmetry.   Hematological:  Negative for adenopathy. Does not bruise/bleed easily.   Psychiatric/Behavioral:  Negative for agitation, behavioral problems and confusion.        Objective:      Physical Exam  Vitals and nursing note reviewed.   Constitutional:       General: She is not in acute distress.     Appearance: Normal appearance. She is well-developed. She is not diaphoretic.   HENT:      Head: Normocephalic and atraumatic.   Cardiovascular:      Rate and Rhythm: Normal rate and regular rhythm.      Heart sounds: Normal heart sounds. No murmur heard.  Pulmonary:      Effort: Pulmonary effort is normal. No respiratory distress.      Breath sounds: Normal breath sounds.   Chest:      Chest wall: No swelling or tenderness.          Comments: 2 small Cherry hemangiomas noted to left breast  Musculoskeletal:          General: Normal range of motion.   Skin:     General: Skin is warm and dry.      Findings: No rash.   Neurological:      Mental Status: She is alert.   Psychiatric:         Mood and Affect: Mood normal.         Behavior: Behavior normal. Behavior is cooperative.         Thought Content: Thought content normal.         Judgment: Judgment normal.         Assessment:       1. Cherry hemangioma    2. Skin cancer screening        Plan:       Ora was seen today for spot on breast .    Diagnoses and all orders for this visit:    Cherry hemangioma    Skin cancer screening  -     Ambulatory referral/consult to Dermatology; Future      Reassurance provided for the 2 lesions of concern  Will refer to derm for skin check as pt has never had one prior   Follow up for worsening or no improvement in symptoms and PRN.

## 2024-03-05 ENCOUNTER — OFFICE VISIT (OUTPATIENT)
Dept: PAIN MEDICINE | Facility: CLINIC | Age: 64
End: 2024-03-05
Payer: COMMERCIAL

## 2024-03-05 VITALS
HEART RATE: 99 BPM | WEIGHT: 201.94 LBS | DIASTOLIC BLOOD PRESSURE: 83 MMHG | SYSTOLIC BLOOD PRESSURE: 131 MMHG | HEIGHT: 63 IN | BODY MASS INDEX: 35.78 KG/M2

## 2024-03-05 DIAGNOSIS — M25.551 CHRONIC RIGHT HIP PAIN: ICD-10-CM

## 2024-03-05 DIAGNOSIS — G89.29 CHRONIC RIGHT HIP PAIN: ICD-10-CM

## 2024-03-05 DIAGNOSIS — M54.16 LUMBAR RADICULOPATHY: ICD-10-CM

## 2024-03-05 DIAGNOSIS — M70.61 GREATER TROCHANTERIC BURSITIS OF RIGHT HIP: Primary | ICD-10-CM

## 2024-03-05 DIAGNOSIS — M47.26 OSTEOARTHRITIS OF SPINE WITH RADICULOPATHY, LUMBAR REGION: ICD-10-CM

## 2024-03-05 PROCEDURE — 3075F SYST BP GE 130 - 139MM HG: CPT | Mod: CPTII,S$GLB,, | Performed by: ANESTHESIOLOGY

## 2024-03-05 PROCEDURE — 1159F MED LIST DOCD IN RCRD: CPT | Mod: CPTII,S$GLB,, | Performed by: ANESTHESIOLOGY

## 2024-03-05 PROCEDURE — 3079F DIAST BP 80-89 MM HG: CPT | Mod: CPTII,S$GLB,, | Performed by: ANESTHESIOLOGY

## 2024-03-05 PROCEDURE — 99999 PR PBB SHADOW E&M-EST. PATIENT-LVL IV: CPT | Mod: PBBFAC,,, | Performed by: ANESTHESIOLOGY

## 2024-03-05 PROCEDURE — 3008F BODY MASS INDEX DOCD: CPT | Mod: CPTII,S$GLB,, | Performed by: ANESTHESIOLOGY

## 2024-03-05 PROCEDURE — 99214 OFFICE O/P EST MOD 30 MIN: CPT | Mod: S$GLB,,, | Performed by: ANESTHESIOLOGY

## 2024-03-05 RX ORDER — DICLOFENAC SODIUM 10 MG/G
2 GEL TOPICAL 3 TIMES DAILY PRN
Qty: 50 G | Refills: 0 | Status: SHIPPED | OUTPATIENT
Start: 2024-03-05

## 2024-03-05 NOTE — H&P (VIEW-ONLY)
"Interventional Pain Progress Note       Chief Pain Complaint:  Right Hip Pain    Interval History (03/05/2024):  Patient returns to clinic for return of previous right hip pain.  Patient reports 2 month history of worsening right lateral hip pain.  Patient denies any specific inciting traumas.  Pain is described as a sharp pain lateral aspect that radiates down her right lower extremity along the lateral aspect typically to the knee, but occasionally to the foot.  Patient reports that pain is typically worse right when she wakes up in the morning and she has been lying on the right side, better during the day.  Pain is currently rated a 5/10, but can increase to a 10/10 with exacerbating activities.  Patient denies any significant left-sided pain.  Patient denies any numbness or tingling associated with this pain. Denies any fevers, chills, changes in gait, saddle anesthesia, or bowel and bladder incontinence          Interval History (03/17/2023): Ora Henderson presents today for follow-up visit.  she underwent right SIJ + GT bursa injection on 2/16/23.  The patient reports that she is/was better following the procedure.  she reports she initially had 20% pain relief and 1 week after the injection she was in her kitchen when she heard and felt a loud pop in her right hip and then shortly after she was unable to stand completely upright.  She reports she had to utilize crutches because she was unable to bear weight on the right leg.  She went to [facility] where she had x-rays that ruled out any fracture and was diagnosed with a pulled muscle.  She reached out to Dr. Treadwell on and was prescribed a Medrol Dosepak.  She reports the 2nd day of the Medrol Dosepak she began to feel better..  The changes lasted 4 weeks so far.  The changes have continued through this visit.  Patient reports pain as "0/10 today.      Interval History (01/31/2023):  Patient returns to clinic for evaluation of " right hip pain.  Patient reports 100% relief in neck pain after a C7-T1 interlaminar epidural steroid injection on 09/15/2022.  Patient reports 4 month history of right hip pain.  Pain is described as an aching throbbing pain over the greater trochanter area that radiates to the right buttocks and down the lateral aspect of her right thigh to just above the knee.  Pain is worse in the morning and with prolonged walking, better at night and with stretching.  Pain is rated at 2/10, but increases to an 8/10 with exacerbating factors. Denies any fevers, chills, changes in gait, weakness, or bowel and bladder incontinence        Interval History (8/22/22):  Patient returns to clinic for  evaluation of muscle spasms in right upper extremity pain.  Patient reports 1 month history of bilateral lower-extremity muscle spasm pain primarily in the calf region.  Patient is currently on diuretic and Lipitor.  Neck pain is ongoing and described as a burning aching pain over the right lower neck and right proximal biceps area.  Pain is worse with neck rotation and lifting, better with heat and rest.  Pain is rated a 7/10. Denies any fevers, chills, changes in gait, weakness, or bowel and bladder incontinence      History of Present Illness:   Ora Henderson is a 63 y.o. female  who is presenting with a chief complaint of Lumbar Back Pain. The patient began experiencing this problem insidiously, and the pain has been gradually worsening over the past 10 month(s). The pain is described as throbbing, shooting, burning and electrical and is located in the bilateral lumbar spine . Pain is intermittent and lasts hours. The pain radiates to bilateral lower extremities. The patient rates her pain a 7 out of ten and interferes with activities of daily living a 7 out of ten. Pain is exacerbated by flexion of the lumbar spine, and is improved by rest. Patient reports no prior trauma, no prior spinal surgery     Ora HOFFMAN  Earline Henderson is a 63 y.o. female  who is presenting with a chief complaint of Hip Pain and Leg Pain  . The patient began experiencing this problem insidiously, and the pain has been gradually worsening over the past 7 month(s). The pain is described as throbbing, cramping, aching and heavy and is located in the bilateral buttocks . Pain is intermittent and lasts hours. The  pain is nonradiating. The patient rates her pain a 7 out of ten and interferes with activities of daily living a 6 out of ten. Pain is exacerbated by getting up from a seated position, and is improved by rest. Patient reports no prior trauma, no prior spinal surgery     Ora Henderson is a 63 y.o. female  who is presenting with a chief complaint of right neck shoulder pain. The patient began experiencing this problem insidiously, and the pain has been gradually worsening over the past 6 month(s). The pain is described as throbbing, cramping, aching and heavy and is located in the right neck  . Pain is intermittent and lasts hours. The pain radiates to right arm. The patient rates her pain a 7 out of ten and interferes with activities of daily living a 7 out of ten. Pain is exacerbated by extension, and is improved by rest. Patient reports no prior trauma, no prior spinal surgery         - pertinent negatives: No fever, No chills, No weight loss, No bladder dysfunction, No bowel dysfunction, No saddle anesthesia  - pertinent positives: none    - medications, other therapies tried (physical therapy, injections):     >> NSAIDs, Tylenol, gabapentin and medrol dose pack    >> Has previously undergone Physical Therapy    >> Has previously undergone spinal injection/s         bilateral L4-L5 TFESI on 10/7/2021 with 80% pain relief.          bilateral S1 TFESI on 12/29/2022 with 85% pain relief but took 2 weeks to take effect.   -09/15/2022: C7-T1 interlaminar epidural steroid injection, 100% relief  -right SIJ + GT bursa  injection on 2/16/23 with 20 % relief at 1st with full resolution of pain after Medrol Dosepak    Imaging / Labs / Studies (reviewed on 3/5/2024):      Results for orders placed during the hospital encounter of 01/23/24    X-Ray Hip 2 or 3 views Right (with Pelvis when performed)    Narrative  EXAM: XR HIP WITH PELVIS WHEN PERFORMED, 2 OR 3  VIEWS RIGHT      CLINICAL HISTORY:   [M25.551]-Pain in right hip.        COMPARISON: None available    FINDINGS:    No acute fracture or dislocation the right hip.  Joint space well-preserved.    Intact bony pelvis.  No fracture.  No osseous lesion.  Incidental linear soft tissue calcification adjacent to the right anterior superior iliac spine.  Left hip joint also well preserved.    Impression  1.    Negative right hip x-ray.  2.  3.    Finalized on: 1/23/2024 3:20 PM By:  Yury Barron MD  [facility]# [id number]      2024-01-23 15:22:[id number]    [facility]      Results for orders placed during the hospital encounter of 12/15/20    X-Ray Lumbar Complete With Flex And Ext    Narrative  EXAMINATION:  XR LUMBAR SPINE 5 VIEW WITH FLEX AND EXT    CLINICAL HISTORY:  lumbar radic;  Other spondylosis with radiculopathy, lumbar region    TECHNIQUE:  Five views of the lumbar spine plus flexion extension views were performed.    COMPARISON:  December 23, 2017    FINDINGS:  Inferior-most lumbar vertebral body designated L5 for purposes of this exam.  Vertebral height maintained.  Mild uniform loss of disc height at the L4-5 level there is grade 1 anterolisthesis L4 on L5.  Remaining disc spaces, vertebral body height and alignment maintained.  Slight increased prominence of anterior subluxation L4 on L5 on the flexion view.  Stable appearance the L4-5 level on neutral and extension lateral views.  Pedicles and SI joints intact.  Numerous calcifications lower pelvis bilaterally.  Prominent degenerative change bilateral hips and to lesser extent bilateral SI joints.    Impression  As  above.      Electronically signed by: Tariq Cortez MD  Date:    12/15/2020  Time:    20:33    Results for orders placed during the hospital encounter of 12/23/17    X-Ray Lumbar Spine AP And Lateral    Narrative  AP and lateral views of the lumbar spine    Findings: The vertebral bodies demonstrate normal height.  There is grade 1 spondylolisthesis of L4 on L5. There is mild disc space narrowing and spondylosis present at the L1-L2 through the L4-L5 levels. Prominent facet arthropathy noted bilaterally at L4-L5 level.  IMPRESSION:  As above      Electronically signed by: MOISES SANTOS D.O.  Date:     12/26/17  Time:    08:27     Results for orders placed during the hospital encounter of 02/08/24    X-Ray Lumbar Spine 5 View    Narrative  EXAMINATION:  XR LUMBAR SPINE COMPLETE 5 VIEW    CLINICAL HISTORY:  Other spondylosis with radiculopathy, lumbar region    TECHNIQUE:  AP, lateral, spot and bilateral oblique views of the lumbar spine were preformed.    COMPARISON:  12/15/2020    FINDINGS:  Grade 1 anterolisthesis of L4 on L5 appears unchanged.  Vertebral body heights are within normal limits.  There is mild disc height loss at L4-5 which appears similar to prior.  Multilevel facet arthropathy noted in the lower lumbar spine.  No pars defects visualized.  No acute fractures or subluxations are demonstrated.  The remaining visualized osseous and soft tissue structures demonstrate no appreciable abnormality.    Impression  Degenerative findings and spondylolisthesis as above      Electronically signed by: George Bojorquez MD  Date:    02/08/2024  Time:    12:30             [facility] EMG/NCS 5/3/22  IMPRESSION   1. ABNORMAL stud   2. There is electrodiagnostic evidence of an acute on chronic radiculopathy of the right C5 nerve root and a chronic radiculopathy of the C6, C8, T1 nerve roots. There is additional evidence of persistent mild demyelinating median neuropathy (Carpal tunnel syndrome) across BILATERAL wrists.        Results for orders placed during the hospital encounter of 12/08/14    X-Ray Lumbar Spine Complete 5 View    Narrative  Comparison: 03/21/12    Five views    Findings:    Osteopenia and overlying bowel limits the study.  Multilevel marginal spurring and facet arthropathy.  Uniform loss of this height at the L4 -- 5 level with grade 1 anterolisthesis L4 on L5.  No acute fracture.  No spondylolysis.  Pedicles and SI joints  are intact.  IMPRESSION:      Interval progression degenerative changes with most pronounced findings at the L4 -- 5 level.  See above.      Electronically signed by: TARIQ CORTEZ III, MD  Date:     12/08/14  Time:    09:23        Results for orders placed during the hospital encounter of 12/15/20    X-Ray Lumbar Complete With Flex And Ext    Narrative  EXAMINATION:  XR LUMBAR SPINE 5 VIEW WITH FLEX AND EXT    CLINICAL HISTORY:  lumbar radic;  Other spondylosis with radiculopathy, lumbar region    TECHNIQUE:  Five views of the lumbar spine plus flexion extension views were performed.    COMPARISON:  December 23, 2017    FINDINGS:  Inferior-most lumbar vertebral body designated L5 for purposes of this exam.  Vertebral height maintained.  Mild uniform loss of disc height at the L4-5 level there is grade 1 anterolisthesis L4 on L5.  Remaining disc spaces, vertebral body height and alignment maintained.  Slight increased prominence of anterior subluxation L4 on L5 on the flexion view.  Stable appearance the L4-5 level on neutral and extension lateral views.  Pedicles and SI joints intact.  Numerous calcifications lower pelvis bilaterally.  Prominent degenerative change bilateral hips and to lesser extent bilateral SI joints.    Impression  As above.      Electronically signed by: Tariq Cortez MD  Date:    12/15/2020  Time:    20:33    Results for orders placed during the hospital encounter of 12/23/17    X-Ray Lumbar Spine AP And Lateral    Narrative  AP and lateral views of the lumbar  spine    Findings: The vertebral bodies demonstrate normal height.  There is grade 1 spondylolisthesis of L4 on L5. There is mild disc space narrowing and spondylosis present at the L1-L2 through the L4-L5 levels. Prominent facet arthropathy noted bilaterally at L4-L5 level.  IMPRESSION:  As above      Electronically signed by: MOISES SANTOS D.O.  Date:     12/26/17  Time:    08:27    Results for orders placed during the hospital encounter of 07/05/13    X-Ray Cervical Spine AP And Lateral    Narrative  Findings: Vertebral alignment is normal.  There is mild narrowing of the C5-6 disk spaces and early anterior osteophyte formation.  There are no compression fractures or acute abnormalities are seen.  IMPRESSION:  Mild degenerative change in the cervical spine at C5-6.      Electronically signed by: LURDES SHORT MD  Date:     07/05/13  Time:    09:29      Results for orders placed during the hospital encounter of 07/22/19    X-Ray Cervical Spine Complete 5 view    Narrative  EXAMINATION:  XR CERVICAL SPINE COMPLETE 5 VIEW    CLINICAL HISTORY:  . Cervicalgia    TECHNIQUE:  AP, Lateral, bilateral oblique and open mouth views of the cervical spine were performed.    COMPARISON:  07/05/2013    FINDINGS:  There is some straightening of the normal cervical lordosis with slight retrolisthesis of C5 on C6. Anterior disc osteophyte complex production and possible slight disc height loss noted at C5-6.  No appreciable change from prior.  Posterior elements appear intact without acute fractures or subluxations demonstrated.  Odontoid process appears intact.  Atlantoaxial articulations appear normal.  Prevertebral soft tissues are within normal limits.    Impression  Degenerative changes as above.  No acute findings.      Electronically signed by: George Bojorquez MD  Date:    07/22/2019  Time:    16:21      Review of Systems:  CONSTITUTIONAL: patient denies any fever, chills, or weight loss  SKIN: patient denies any rash  "or itching  RESPIRATORY: patient denies having any shortness of breath  GASTROINTESTINAL: patient denies having any diarrhea, constipation, or bowel incontinence  GENITOURINARY: patient denies having any abnormal bladder function    MUSCULOSKELETAL:  - patient complains of the above noted pain/s (see chief pain complaint)    NEUROLOGICAL:   - pain as above  - strength in Lower extremities is intact, BILATERALLY  - sensation in Lower extremities is intact, BILATERALLY  - patient denies any loss of bowel or bladder control      PSYCHIATRIC: patient denies any change in mood    Other:  All other systems reviewed and are negative      Physical Exam:  /83   Pulse 99   Ht 5' 3" (1.6 m)   Wt 91.6 kg (201 lb 15.1 oz)   LMP  (LMP Unknown)   BMI 35.77 kg/m²  (reviewed on 3/5/2024)  Physical Exam  Constitutional:       Appearance: Normal appearance.   HENT:      Head: Normocephalic and atraumatic.   Eyes:      Extraocular Movements: Extraocular movements intact.      Pupils: Pupils are equal, round, and reactive to light.   Pulmonary:      Effort: Pulmonary effort is normal.   Abdominal:      General: Abdomen is flat.   Musculoskeletal:      Cervical back: Normal range of motion and neck supple.      Right hip: Tenderness (TTP over GTB) and bony tenderness present. No deformity or crepitus. Normal range of motion. Decreased strength.      Left hip: Normal.   Skin:     General: Skin is warm and dry.      Capillary Refill: Capillary refill takes less than 2 seconds.   Neurological:      General: No focal deficit present.      Mental Status: She is alert and oriented to person, place, and time.      Cranial Nerves: No cranial nerve deficit.      Sensory: No sensory deficit.      Motor: No weakness or abnormal muscle tone.      Gait: Gait normal.      Deep Tendon Reflexes: Babinski sign absent on the right side. Babinski sign absent on the left side.      Reflex Scores:       Patellar reflexes are 2+ on the right side " and 2+ on the left side.       Achilles reflexes are 2+ on the right side and 2+ on the left side.  Psychiatric:         Mood and Affect: Mood normal.         Behavior: Behavior normal.           Musculoskeletal:    Lumbar Exam  Incision: no  Pain with Flexion: no  Pain with Extension: no  ROM: FROM  Paraspinous TTP:  Right lumbar paraspinous  Facet TTP:  Negative bilaterally  Facet Loading:  Negative bilaterally  SLR:  Negative bilaterally  SIJ TTP:  Positive on the right   GUSTABO:  Negative bilaterally          Assessment:    Ora Henderson is a 63 y.o. year old female who is presenting with     Encounter Diagnoses   Name Primary?    Osteoarthritis of spine with radiculopathy, lumbar region     Chronic right hip pain     Greater trochanteric bursitis of right hip Yes    Lumbar radiculopathy        Greater trochanteric bursitis of right hip  -     diclofenac sodium (VOLTAREN) 1 % Gel; Apply 2 g topically 3 (three) times daily as needed (Hip Pain).  Dispense: 50 g; Refill: 0  -     IR Aspiration Injection Large Joint W FL; Future; Expected date: 03/05/2024  -     Case Request-RAD/Other Procedure Area: Right GT bursa injection    Osteoarthritis of spine with radiculopathy, lumbar region  -     Ambulatory referral/consult to Pain Clinic  -     diclofenac sodium (VOLTAREN) 1 % Gel; Apply 2 g topically 3 (three) times daily as needed (Hip Pain).  Dispense: 50 g; Refill: 0    Chronic right hip pain  -     Ambulatory referral/consult to Pain Clinic  -     diclofenac sodium (VOLTAREN) 1 % Gel; Apply 2 g topically 3 (three) times daily as needed (Hip Pain).  Dispense: 50 g; Refill: 0    Lumbar radiculopathy  -     diclofenac sodium (VOLTAREN) 1 % Gel; Apply 2 g topically 3 (three) times daily as needed (Hip Pain).  Dispense: 50 g; Refill: 0            Plan:    1. Interventional:     Schedule patient for repeat right greater trochanter bursa injection for return of right greater bursitis.   There was possible  overlapping right lumbar radiculopathy however this diagnosis is unlikely.  Patient reports continued right hip pain after injection, we will obtain MRI lumbar spine to further evaluate right hip pain.   S/p right SIJ + GT bursa injection on 2/16/23 with 20 % relief at 1st with full resolution of pain after Medrol Dosepak  -09/15/2022: C7-T1 interlaminar epidural steroid injection, 100% relief     -S/p bilateral S1 TFESI on 12/29/2022 with 85% pain relief but took 2 weeks to take effect.       -S/p bilateral L4-L5 TFESI on 10/7/2021 with 80% pain relief.     2. Pharmacologic:    Continue diclofenac 75 mg twice a day p.r.n.    3. Rehabilitative:   -continue home therapy exercises    4. Diagnostic:   -x-rays lumbar spine and right hip reviewed and findings discussed with patient.  Significant for grade 1 anterolisthesis of L4 upon L5.  X-rays of right hip are grossly normal  -if patient has continued pain after injection, we will schedule patient for MRI lumbar spine without contrast to rule out any significant overlapping right lumbar radiculopathy  -MRI cervical spine reviewed and findings discussed with patient      5. Consult:   -continue follow-up with orthopedics for carpal tunnel syndrome     Return to clinic 4 weeks after procedure      Yung Treadwell MD  Interventional Pain Medicine  [facility]

## 2024-03-05 NOTE — PROGRESS NOTES
"Interventional Pain Progress Note       Chief Pain Complaint:  Right Hip Pain    Interval History (03/05/2024):  Patient returns to clinic for return of previous right hip pain.  Patient reports 2 month history of worsening right lateral hip pain.  Patient denies any specific inciting traumas.  Pain is described as a sharp pain lateral aspect that radiates down her right lower extremity along the lateral aspect typically to the knee, but occasionally to the foot.  Patient reports that pain is typically worse right when she wakes up in the morning and she has been lying on the right side, better during the day.  Pain is currently rated a 5/10, but can increase to a 10/10 with exacerbating activities.  Patient denies any significant left-sided pain.  Patient denies any numbness or tingling associated with this pain. Denies any fevers, chills, changes in gait, saddle anesthesia, or bowel and bladder incontinence          Interval History (03/17/2023): Ora Henderson presents today for follow-up visit.  she underwent right SIJ + GT bursa injection on 2/16/23.  The patient reports that she is/was better following the procedure.  she reports she initially had 20% pain relief and 1 week after the injection she was in her kitchen when she heard and felt a loud pop in her right hip and then shortly after she was unable to stand completely upright.  She reports she had to utilize crutches because she was unable to bear weight on the right leg.  She went to [facility] where she had x-rays that ruled out any fracture and was diagnosed with a pulled muscle.  She reached out to Dr. Treadwell on and was prescribed a Medrol Dosepak.  She reports the 2nd day of the Medrol Dosepak she began to feel better..  The changes lasted 4 weeks so far.  The changes have continued through this visit.  Patient reports pain as "0/10 today.      Interval History (01/31/2023):  Patient returns to clinic for evaluation of " right hip pain.  Patient reports 100% relief in neck pain after a C7-T1 interlaminar epidural steroid injection on 09/15/2022.  Patient reports 4 month history of right hip pain.  Pain is described as an aching throbbing pain over the greater trochanter area that radiates to the right buttocks and down the lateral aspect of her right thigh to just above the knee.  Pain is worse in the morning and with prolonged walking, better at night and with stretching.  Pain is rated at 2/10, but increases to an 8/10 with exacerbating factors. Denies any fevers, chills, changes in gait, weakness, or bowel and bladder incontinence        Interval History (8/22/22):  Patient returns to clinic for  evaluation of muscle spasms in right upper extremity pain.  Patient reports 1 month history of bilateral lower-extremity muscle spasm pain primarily in the calf region.  Patient is currently on diuretic and Lipitor.  Neck pain is ongoing and described as a burning aching pain over the right lower neck and right proximal biceps area.  Pain is worse with neck rotation and lifting, better with heat and rest.  Pain is rated a 7/10. Denies any fevers, chills, changes in gait, weakness, or bowel and bladder incontinence      History of Present Illness:   Ora Henderson is a 63 y.o. female  who is presenting with a chief complaint of Lumbar Back Pain. The patient began experiencing this problem insidiously, and the pain has been gradually worsening over the past 10 month(s). The pain is described as throbbing, shooting, burning and electrical and is located in the bilateral lumbar spine . Pain is intermittent and lasts hours. The pain radiates to bilateral lower extremities. The patient rates her pain a 7 out of ten and interferes with activities of daily living a 7 out of ten. Pain is exacerbated by flexion of the lumbar spine, and is improved by rest. Patient reports no prior trauma, no prior spinal surgery     Ora HOFFMAN  Earline Henderson is a 63 y.o. female  who is presenting with a chief complaint of Hip Pain and Leg Pain  . The patient began experiencing this problem insidiously, and the pain has been gradually worsening over the past 7 month(s). The pain is described as throbbing, cramping, aching and heavy and is located in the bilateral buttocks . Pain is intermittent and lasts hours. The  pain is nonradiating. The patient rates her pain a 7 out of ten and interferes with activities of daily living a 6 out of ten. Pain is exacerbated by getting up from a seated position, and is improved by rest. Patient reports no prior trauma, no prior spinal surgery     Ora Henderson is a 63 y.o. female  who is presenting with a chief complaint of right neck shoulder pain. The patient began experiencing this problem insidiously, and the pain has been gradually worsening over the past 6 month(s). The pain is described as throbbing, cramping, aching and heavy and is located in the right neck  . Pain is intermittent and lasts hours. The pain radiates to right arm. The patient rates her pain a 7 out of ten and interferes with activities of daily living a 7 out of ten. Pain is exacerbated by extension, and is improved by rest. Patient reports no prior trauma, no prior spinal surgery         - pertinent negatives: No fever, No chills, No weight loss, No bladder dysfunction, No bowel dysfunction, No saddle anesthesia  - pertinent positives: none    - medications, other therapies tried (physical therapy, injections):     >> NSAIDs, Tylenol, gabapentin and medrol dose pack    >> Has previously undergone Physical Therapy    >> Has previously undergone spinal injection/s         bilateral L4-L5 TFESI on 10/7/2021 with 80% pain relief.          bilateral S1 TFESI on 12/29/2022 with 85% pain relief but took 2 weeks to take effect.   -09/15/2022: C7-T1 interlaminar epidural steroid injection, 100% relief  -right SIJ + GT bursa  injection on 2/16/23 with 20 % relief at 1st with full resolution of pain after Medrol Dosepak    Imaging / Labs / Studies (reviewed on 3/5/2024):      Results for orders placed during the hospital encounter of 01/23/24    X-Ray Hip 2 or 3 views Right (with Pelvis when performed)    Narrative  EXAM: XR HIP WITH PELVIS WHEN PERFORMED, 2 OR 3  VIEWS RIGHT      CLINICAL HISTORY:   [M25.551]-Pain in right hip.        COMPARISON: None available    FINDINGS:    No acute fracture or dislocation the right hip.  Joint space well-preserved.    Intact bony pelvis.  No fracture.  No osseous lesion.  Incidental linear soft tissue calcification adjacent to the right anterior superior iliac spine.  Left hip joint also well preserved.    Impression  1.    Negative right hip x-ray.  2.  3.    Finalized on: 1/23/2024 3:20 PM By:  Yury Barron MD  [facility]# [id number]      2024-01-23 15:22:[id number]    [facility]      Results for orders placed during the hospital encounter of 12/15/20    X-Ray Lumbar Complete With Flex And Ext    Narrative  EXAMINATION:  XR LUMBAR SPINE 5 VIEW WITH FLEX AND EXT    CLINICAL HISTORY:  lumbar radic;  Other spondylosis with radiculopathy, lumbar region    TECHNIQUE:  Five views of the lumbar spine plus flexion extension views were performed.    COMPARISON:  December 23, 2017    FINDINGS:  Inferior-most lumbar vertebral body designated L5 for purposes of this exam.  Vertebral height maintained.  Mild uniform loss of disc height at the L4-5 level there is grade 1 anterolisthesis L4 on L5.  Remaining disc spaces, vertebral body height and alignment maintained.  Slight increased prominence of anterior subluxation L4 on L5 on the flexion view.  Stable appearance the L4-5 level on neutral and extension lateral views.  Pedicles and SI joints intact.  Numerous calcifications lower pelvis bilaterally.  Prominent degenerative change bilateral hips and to lesser extent bilateral SI joints.    Impression  As  above.      Electronically signed by: Tariq Cortez MD  Date:    12/15/2020  Time:    20:33    Results for orders placed during the hospital encounter of 12/23/17    X-Ray Lumbar Spine AP And Lateral    Narrative  AP and lateral views of the lumbar spine    Findings: The vertebral bodies demonstrate normal height.  There is grade 1 spondylolisthesis of L4 on L5. There is mild disc space narrowing and spondylosis present at the L1-L2 through the L4-L5 levels. Prominent facet arthropathy noted bilaterally at L4-L5 level.  IMPRESSION:  As above      Electronically signed by: MOISES SANTOS D.O.  Date:     12/26/17  Time:    08:27     Results for orders placed during the hospital encounter of 02/08/24    X-Ray Lumbar Spine 5 View    Narrative  EXAMINATION:  XR LUMBAR SPINE COMPLETE 5 VIEW    CLINICAL HISTORY:  Other spondylosis with radiculopathy, lumbar region    TECHNIQUE:  AP, lateral, spot and bilateral oblique views of the lumbar spine were preformed.    COMPARISON:  12/15/2020    FINDINGS:  Grade 1 anterolisthesis of L4 on L5 appears unchanged.  Vertebral body heights are within normal limits.  There is mild disc height loss at L4-5 which appears similar to prior.  Multilevel facet arthropathy noted in the lower lumbar spine.  No pars defects visualized.  No acute fractures or subluxations are demonstrated.  The remaining visualized osseous and soft tissue structures demonstrate no appreciable abnormality.    Impression  Degenerative findings and spondylolisthesis as above      Electronically signed by: George Bojorquez MD  Date:    02/08/2024  Time:    12:30             [facility] EMG/NCS 5/3/22  IMPRESSION   1. ABNORMAL stud   2. There is electrodiagnostic evidence of an acute on chronic radiculopathy of the right C5 nerve root and a chronic radiculopathy of the C6, C8, T1 nerve roots. There is additional evidence of persistent mild demyelinating median neuropathy (Carpal tunnel syndrome) across BILATERAL wrists.        Results for orders placed during the hospital encounter of 12/08/14    X-Ray Lumbar Spine Complete 5 View    Narrative  Comparison: 03/21/12    Five views    Findings:    Osteopenia and overlying bowel limits the study.  Multilevel marginal spurring and facet arthropathy.  Uniform loss of this height at the L4 -- 5 level with grade 1 anterolisthesis L4 on L5.  No acute fracture.  No spondylolysis.  Pedicles and SI joints  are intact.  IMPRESSION:      Interval progression degenerative changes with most pronounced findings at the L4 -- 5 level.  See above.      Electronically signed by: TARIQ CORTEZ III, MD  Date:     12/08/14  Time:    09:23        Results for orders placed during the hospital encounter of 12/15/20    X-Ray Lumbar Complete With Flex And Ext    Narrative  EXAMINATION:  XR LUMBAR SPINE 5 VIEW WITH FLEX AND EXT    CLINICAL HISTORY:  lumbar radic;  Other spondylosis with radiculopathy, lumbar region    TECHNIQUE:  Five views of the lumbar spine plus flexion extension views were performed.    COMPARISON:  December 23, 2017    FINDINGS:  Inferior-most lumbar vertebral body designated L5 for purposes of this exam.  Vertebral height maintained.  Mild uniform loss of disc height at the L4-5 level there is grade 1 anterolisthesis L4 on L5.  Remaining disc spaces, vertebral body height and alignment maintained.  Slight increased prominence of anterior subluxation L4 on L5 on the flexion view.  Stable appearance the L4-5 level on neutral and extension lateral views.  Pedicles and SI joints intact.  Numerous calcifications lower pelvis bilaterally.  Prominent degenerative change bilateral hips and to lesser extent bilateral SI joints.    Impression  As above.      Electronically signed by: Tariq Cortez MD  Date:    12/15/2020  Time:    20:33    Results for orders placed during the hospital encounter of 12/23/17    X-Ray Lumbar Spine AP And Lateral    Narrative  AP and lateral views of the lumbar  spine    Findings: The vertebral bodies demonstrate normal height.  There is grade 1 spondylolisthesis of L4 on L5. There is mild disc space narrowing and spondylosis present at the L1-L2 through the L4-L5 levels. Prominent facet arthropathy noted bilaterally at L4-L5 level.  IMPRESSION:  As above      Electronically signed by: MOISES SANTOS D.O.  Date:     12/26/17  Time:    08:27    Results for orders placed during the hospital encounter of 07/05/13    X-Ray Cervical Spine AP And Lateral    Narrative  Findings: Vertebral alignment is normal.  There is mild narrowing of the C5-6 disk spaces and early anterior osteophyte formation.  There are no compression fractures or acute abnormalities are seen.  IMPRESSION:  Mild degenerative change in the cervical spine at C5-6.      Electronically signed by: LURDES SHORT MD  Date:     07/05/13  Time:    09:29      Results for orders placed during the hospital encounter of 07/22/19    X-Ray Cervical Spine Complete 5 view    Narrative  EXAMINATION:  XR CERVICAL SPINE COMPLETE 5 VIEW    CLINICAL HISTORY:  . Cervicalgia    TECHNIQUE:  AP, Lateral, bilateral oblique and open mouth views of the cervical spine were performed.    COMPARISON:  07/05/2013    FINDINGS:  There is some straightening of the normal cervical lordosis with slight retrolisthesis of C5 on C6. Anterior disc osteophyte complex production and possible slight disc height loss noted at C5-6.  No appreciable change from prior.  Posterior elements appear intact without acute fractures or subluxations demonstrated.  Odontoid process appears intact.  Atlantoaxial articulations appear normal.  Prevertebral soft tissues are within normal limits.    Impression  Degenerative changes as above.  No acute findings.      Electronically signed by: George Bojorquez MD  Date:    07/22/2019  Time:    16:21      Review of Systems:  CONSTITUTIONAL: patient denies any fever, chills, or weight loss  SKIN: patient denies any rash  "or itching  RESPIRATORY: patient denies having any shortness of breath  GASTROINTESTINAL: patient denies having any diarrhea, constipation, or bowel incontinence  GENITOURINARY: patient denies having any abnormal bladder function    MUSCULOSKELETAL:  - patient complains of the above noted pain/s (see chief pain complaint)    NEUROLOGICAL:   - pain as above  - strength in Lower extremities is intact, BILATERALLY  - sensation in Lower extremities is intact, BILATERALLY  - patient denies any loss of bowel or bladder control      PSYCHIATRIC: patient denies any change in mood    Other:  All other systems reviewed and are negative      Physical Exam:  /83   Pulse 99   Ht 5' 3" (1.6 m)   Wt 91.6 kg (201 lb 15.1 oz)   LMP  (LMP Unknown)   BMI 35.77 kg/m²  (reviewed on 3/5/2024)  Physical Exam  Constitutional:       Appearance: Normal appearance.   HENT:      Head: Normocephalic and atraumatic.   Eyes:      Extraocular Movements: Extraocular movements intact.      Pupils: Pupils are equal, round, and reactive to light.   Pulmonary:      Effort: Pulmonary effort is normal.   Abdominal:      General: Abdomen is flat.   Musculoskeletal:      Cervical back: Normal range of motion and neck supple.      Right hip: Tenderness (TTP over GTB) and bony tenderness present. No deformity or crepitus. Normal range of motion. Decreased strength.      Left hip: Normal.   Skin:     General: Skin is warm and dry.      Capillary Refill: Capillary refill takes less than 2 seconds.   Neurological:      General: No focal deficit present.      Mental Status: She is alert and oriented to person, place, and time.      Cranial Nerves: No cranial nerve deficit.      Sensory: No sensory deficit.      Motor: No weakness or abnormal muscle tone.      Gait: Gait normal.      Deep Tendon Reflexes: Babinski sign absent on the right side. Babinski sign absent on the left side.      Reflex Scores:       Patellar reflexes are 2+ on the right side " and 2+ on the left side.       Achilles reflexes are 2+ on the right side and 2+ on the left side.  Psychiatric:         Mood and Affect: Mood normal.         Behavior: Behavior normal.           Musculoskeletal:    Lumbar Exam  Incision: no  Pain with Flexion: no  Pain with Extension: no  ROM: FROM  Paraspinous TTP:  Right lumbar paraspinous  Facet TTP:  Negative bilaterally  Facet Loading:  Negative bilaterally  SLR:  Negative bilaterally  SIJ TTP:  Positive on the right   GUSTABO:  Negative bilaterally          Assessment:    Ora Henderson is a 63 y.o. year old female who is presenting with     Encounter Diagnoses   Name Primary?    Osteoarthritis of spine with radiculopathy, lumbar region     Chronic right hip pain     Greater trochanteric bursitis of right hip Yes    Lumbar radiculopathy        Greater trochanteric bursitis of right hip  -     diclofenac sodium (VOLTAREN) 1 % Gel; Apply 2 g topically 3 (three) times daily as needed (Hip Pain).  Dispense: 50 g; Refill: 0  -     IR Aspiration Injection Large Joint W FL; Future; Expected date: 03/05/2024  -     Case Request-RAD/Other Procedure Area: Right GT bursa injection    Osteoarthritis of spine with radiculopathy, lumbar region  -     Ambulatory referral/consult to Pain Clinic  -     diclofenac sodium (VOLTAREN) 1 % Gel; Apply 2 g topically 3 (three) times daily as needed (Hip Pain).  Dispense: 50 g; Refill: 0    Chronic right hip pain  -     Ambulatory referral/consult to Pain Clinic  -     diclofenac sodium (VOLTAREN) 1 % Gel; Apply 2 g topically 3 (three) times daily as needed (Hip Pain).  Dispense: 50 g; Refill: 0    Lumbar radiculopathy  -     diclofenac sodium (VOLTAREN) 1 % Gel; Apply 2 g topically 3 (three) times daily as needed (Hip Pain).  Dispense: 50 g; Refill: 0            Plan:    1. Interventional:     Schedule patient for repeat right greater trochanter bursa injection for return of right greater bursitis.   There was possible  overlapping right lumbar radiculopathy however this diagnosis is unlikely.  Patient reports continued right hip pain after injection, we will obtain MRI lumbar spine to further evaluate right hip pain.   S/p right SIJ + GT bursa injection on 2/16/23 with 20 % relief at 1st with full resolution of pain after Medrol Dosepak  -09/15/2022: C7-T1 interlaminar epidural steroid injection, 100% relief     -S/p bilateral S1 TFESI on 12/29/2022 with 85% pain relief but took 2 weeks to take effect.       -S/p bilateral L4-L5 TFESI on 10/7/2021 with 80% pain relief.     2. Pharmacologic:    Continue diclofenac 75 mg twice a day p.r.n.    3. Rehabilitative:   -continue home therapy exercises    4. Diagnostic:   -x-rays lumbar spine and right hip reviewed and findings discussed with patient.  Significant for grade 1 anterolisthesis of L4 upon L5.  X-rays of right hip are grossly normal  -if patient has continued pain after injection, we will schedule patient for MRI lumbar spine without contrast to rule out any significant overlapping right lumbar radiculopathy  -MRI cervical spine reviewed and findings discussed with patient      5. Consult:   -continue follow-up with orthopedics for carpal tunnel syndrome     Return to clinic 4 weeks after procedure      Yung Treadwell MD  Interventional Pain Medicine  [facility]

## 2024-03-13 NOTE — PRE-PROCEDURE INSTRUCTIONS
Spoke with patient regarding procedure scheduled on 4.1    Arrival time 0600     Has patient been sick with fever or on antibiotics within the last 7 days? No     Does the patient have any open wounds, sores or rashes? No     Does the patient have any recent fractures? no     Has patient received a vaccination within the last 7 days? No     Received the COVID vaccination? yes     Has the patient stopped all medications as directed? na     Does patient have a pacemaker, defibrillator, or implantable stimulator? No     Does the patient have a ride to and from procedure and someone reliable to remain with patient?        Is the patient diabetic? no     Does the patient have sleep apnea? Or use O2 at home? NATALIE     Is the patient receiving sedation? yes     Is the patient instructed to remain NPO beginning at midnight the night before their procedure? yes     Procedure location confirmed with patient? Yes     Covid- Denies signs/symptoms. Instructed to notify PAT/MD if any changes.

## 2024-04-01 ENCOUNTER — HOSPITAL ENCOUNTER (OUTPATIENT)
Facility: HOSPITAL | Age: 64
Discharge: HOME | End: 2024-04-01
Attending: ANESTHESIOLOGY | Admitting: ANESTHESIOLOGY
Payer: COMMERCIAL

## 2024-04-01 VITALS
HEIGHT: 63 IN | TEMPERATURE: 97 F | HEART RATE: 75 BPM | DIASTOLIC BLOOD PRESSURE: 81 MMHG | SYSTOLIC BLOOD PRESSURE: 155 MMHG | OXYGEN SATURATION: 95 % | WEIGHT: 202.5 LBS | BODY MASS INDEX: 35.88 KG/M2 | RESPIRATION RATE: 16 BRPM

## 2024-04-01 DIAGNOSIS — M70.61 GREATER TROCHANTERIC BURSITIS OF RIGHT HIP: Primary | ICD-10-CM

## 2024-04-01 PROCEDURE — 63600175 PHARM REV CODE 636 W HCPCS: Performed by: ANESTHESIOLOGY

## 2024-04-01 PROCEDURE — 25500020 PHARM REV CODE 255: Performed by: ANESTHESIOLOGY

## 2024-04-01 PROCEDURE — 77002 NEEDLE LOCALIZATION BY XRAY: CPT | Mod: 26,,, | Performed by: ANESTHESIOLOGY

## 2024-04-01 PROCEDURE — 25000003 PHARM REV CODE 250: Performed by: ANESTHESIOLOGY

## 2024-04-01 PROCEDURE — 20610 DRAIN/INJ JOINT/BURSA W/O US: CPT | Mod: RT | Performed by: ANESTHESIOLOGY

## 2024-04-01 PROCEDURE — 20610 DRAIN/INJ JOINT/BURSA W/O US: CPT | Mod: RT,,, | Performed by: ANESTHESIOLOGY

## 2024-04-01 RX ORDER — FENTANYL CITRATE 50 UG/ML
INJECTION, SOLUTION INTRAMUSCULAR; INTRAVENOUS
Status: DISCONTINUED | OUTPATIENT
Start: 2024-04-01 | End: 2024-04-01 | Stop reason: HOSPADM

## 2024-04-01 RX ORDER — FAMOTIDINE 10 MG/ML
INJECTION INTRAVENOUS
Status: DISCONTINUED
Start: 2024-04-01 | End: 2024-04-01 | Stop reason: HOSPADM

## 2024-04-01 RX ORDER — FAMOTIDINE 10 MG/ML
20 INJECTION INTRAVENOUS 2 TIMES DAILY
Status: DISCONTINUED | OUTPATIENT
Start: 2024-04-01 | End: 2024-04-01 | Stop reason: HOSPADM

## 2024-04-01 RX ORDER — METHYLPREDNISOLONE ACETATE 40 MG/ML
INJECTION, SUSPENSION INTRA-ARTICULAR; INTRALESIONAL; INTRAMUSCULAR; SOFT TISSUE
Status: DISCONTINUED | OUTPATIENT
Start: 2024-04-01 | End: 2024-04-01 | Stop reason: HOSPADM

## 2024-04-01 RX ORDER — ONDANSETRON HYDROCHLORIDE 2 MG/ML
4 INJECTION, SOLUTION INTRAVENOUS ONCE AS NEEDED
Status: DISCONTINUED | OUTPATIENT
Start: 2024-04-01 | End: 2024-04-01 | Stop reason: HOSPADM

## 2024-04-01 RX ORDER — LIDOCAINE HYDROCHLORIDE 10 MG/ML
INJECTION, SOLUTION EPIDURAL; INFILTRATION; INTRACAUDAL; PERINEURAL
Status: DISCONTINUED | OUTPATIENT
Start: 2024-04-01 | End: 2024-04-01 | Stop reason: HOSPADM

## 2024-04-01 RX ORDER — FAMOTIDINE 10 MG/ML
20 INJECTION INTRAVENOUS 2 TIMES DAILY
Status: DISCONTINUED | OUTPATIENT
Start: 2024-04-01 | End: 2024-04-01

## 2024-04-01 RX ORDER — MIDAZOLAM HYDROCHLORIDE 1 MG/ML
INJECTION, SOLUTION INTRAMUSCULAR; INTRAVENOUS
Status: DISCONTINUED | OUTPATIENT
Start: 2024-04-01 | End: 2024-04-01 | Stop reason: HOSPADM

## 2024-04-01 NOTE — DISCHARGE SUMMARY
Discharge Note  Short Stay      SUMMARY     Admit Date: 4/1/2024    Attending Physician: Yung Treadwell MD        Discharge Physician: Yung Treadwell MD        Discharge Date: 4/1/2024 7:12 AM    Procedure(s) (LRB):  Right GT bursa injection (Right)    Final Diagnosis: Greater trochanteric bursitis of right hip [M70.61]    Disposition: Home or self care    Patient Instructions:   Current Discharge Medication List        CONTINUE these medications which have NOT CHANGED    Details   amLODIPine (NORVASC) 10 MG tablet Take 1 tablet (10 mg total) by mouth once daily.  Qty: 30 tablet, Refills: 11    Comments: .  Associated Diagnoses: Essential hypertension      aspirin 81 MG Chew Take 81 mg by mouth as needed. Hold 5 days prior to surgery      diclofenac (VOLTAREN) 75 MG EC tablet Take 1 tablet by mouth twice daily as needed for pain  Qty: 60 tablet, Refills: 0    Associated Diagnoses: Osteoarthritis of spine with radiculopathy, lumbar region      diclofenac sodium (VOLTAREN) 1 % Gel Apply 2 g topically 3 (three) times daily as needed (Hip Pain).  Qty: 50 g, Refills: 0    Associated Diagnoses: Osteoarthritis of spine with radiculopathy, lumbar region; Chronic right hip pain; Greater trochanteric bursitis of right hip; Lumbar radiculopathy      docusate sodium (COLACE) 100 MG capsule Take 1 capsule (100 mg total) by mouth 2 (two) times daily.  Qty: 100 capsule, Refills: 0    Associated Diagnoses: RLQ abdominal pain; Constipation, unspecified constipation type      ezetimibe (ZETIA) 10 mg tablet Take 1 tablet (10 mg total) by mouth once daily.  Qty: 90 tablet, Refills: 3    Associated Diagnoses: Mixed hyperlipidemia      fluticasone propionate (FLONASE) 50 mcg/actuation nasal spray 2 sprays (100 mcg total) by Each Nostril route once daily.  Qty: 16 g, Refills: 0    Associated Diagnoses: Viral URI with cough      hydrocortisone 2.5 % ointment Apply topically 3 (three) times daily. for 7 days  Qty: 20 g, Refills: 0     Associated Diagnoses: Bedbug bite, initial encounter      L.acidophilus-Bif. animalis (PROBIOTIC) 5 billion cell CpSP Take 1 tablet by mouth once daily.  Qty: 10 capsule, Refills: 0      MULTIVIT,CALC,MINS/IRON/FOLIC (DAILY MULTIPLE FOR WOMEN ORAL) Take 1 tablet by mouth once daily. Hold 5 days prior to surgery      omega-3 fatty acids/fish oil (FISH OIL-OMEGA-3 FATTY ACIDS) 300-1,000 mg capsule Take by mouth once daily. Hold 5 days prior to surgery      pantoprazole (PROTONIX) 40 MG tablet Take 1 tablet (40 mg total) by mouth once daily.  Qty: 90 tablet, Refills: 3    Associated Diagnoses: Gastroesophageal reflux disease, unspecified whether esophagitis present      rosuvastatin (CRESTOR) 40 MG Tab Take 1 tablet (40 mg total) by mouth every evening.  Qty: 90 tablet, Refills: 1    Associated Diagnoses: Mixed hyperlipidemia      triamcinolone acetonide 0.1% (KENALOG) 0.1 % ointment Apply topically 2 (two) times daily.  Qty: 80 g, Refills: 0    Associated Diagnoses: Vulvovaginal candidiasis                 Discharge Diagnosis: Greater trochanteric bursitis of right hip [M70.61]  Condition on Discharge: Stable with no complications to procedure   Diet on Discharge: Same as before.  Activity: as per instruction sheet.  Discharge to: Home with a responsible adult.  Follow up: 2-4 weeks       Please call the office at [phone] if you experience any weakness or loss of sensation, fever > 101.5, pain uncontrolled with oral medications, persistent nausea/vomiting/or diarrhea, redness or drainage from the incisions, or any other worrisome concerns. If physician on call was not reached or could not communicate with our office for any reason please go to the nearest emergency department

## 2024-04-01 NOTE — DISCHARGE INSTRUCTIONS

## 2024-04-01 NOTE — OP NOTE
GREATER TROCHANTERIC BURSA INJECTION    INFORMED CONSENT: The procedure, risks, benefits and options were discussed with patient. There are no contraindications to the procedure. The patient expressed understanding and agreed to proceed. The personnel performing the procedure was discussed.    Date of procedure 04/01/2024    Time-out taken to identify patient and procedure side prior to starting the procedure.                     PROCEDURE:  1) right greater trochanteric bursa injection    Pre Procedure diagnosis:    Greater trochanteric bursitis of right hip [M70.61]  1. Greater trochanteric bursitis of right hip        Post-Procedure diagnosis:   same      PHYSICIAN: Yung Treadwell MD    ASSISTANTS: None    MEDICATIONS INJECTED: 1mL 40mg/ml Depo-Medrol and 4ml Lidocaine PF 1%    LOCAL ANESTHETIC USED: 3ml Lidocaine PF 1%    ESTIMATED BLOOD LOSS: None.     COMPLICATIONS: None.       Sedation: Conscious sedation provided by M.JULIO CÉSAR    SEDATION MEDICATIONS: local/IV sedation: Versed 2 mg and fentanyl 50 mcg IV.  Conscious sedation ordered by MD.  Patient reevaluated and sedation administered by MD and monitored by RN.  Total sedation time was less than 10 min.        TECHNIQUE:   Greater trochanteric bursa injection:  The area overlying the greater trochanteric bursa was identified using fluoroscopy, and the area overlying the skin was prepped and draped in usual sterile fashion. Local lidocaine was injected by raising a wheel and going down to the periosteum using a 27-gauge hypodermic needle. A 5 inch 22-gauge spinal needle was introduce into the left greater trochanteric bursa Negative pressure applied to confirm no intravascular placement. Omnipaque was injected to confirm placement and to confirm that there was no vascular runoff. 4ml Lidocaine PF 1%e + 1mL of 40mg/mL depomedrol was then injected slowly.  Displacement of the contrast after injection of the medication confirmed that the medication went into the  area of the greater trochanteric bursa                    The patient was monitored for approximately 30 minutes after the procedure. Patient was given post procedure and discharge instructions to follow at home. We will see the patient back in two weeks or the patient may call to inform of status. The patient was discharged in a stable condition

## 2024-04-01 NOTE — PROGRESS NOTES
Spoke with pharmacy IV pepcid not available in PACU or Medsurg Pyxis. It is available in surgery Pyxis; notified pain procedure nurse.

## 2024-04-16 ENCOUNTER — OFFICE VISIT (OUTPATIENT)
Dept: URGENT CARE | Facility: CLINIC | Age: 64
End: 2024-04-16
Payer: COMMERCIAL

## 2024-04-16 VITALS
OXYGEN SATURATION: 100 % | SYSTOLIC BLOOD PRESSURE: 123 MMHG | RESPIRATION RATE: 16 BRPM | BODY MASS INDEX: 35.08 KG/M2 | DIASTOLIC BLOOD PRESSURE: 83 MMHG | WEIGHT: 198 LBS | HEART RATE: 99 BPM | HEIGHT: 63 IN | TEMPERATURE: 98 F

## 2024-04-16 DIAGNOSIS — K64.4 EXTERNAL PROLAPSED HEMORRHOIDS: Primary | ICD-10-CM

## 2024-04-16 PROCEDURE — 99214 OFFICE O/P EST MOD 30 MIN: CPT | Mod: S$GLB,,, | Performed by: FAMILY MEDICINE

## 2024-04-16 RX ORDER — HYDROCORTISONE 25 MG/G
CREAM TOPICAL 2 TIMES DAILY
Qty: 28 G | Refills: 0 | Status: SHIPPED | OUTPATIENT
Start: 2024-04-16 | End: 2024-04-23

## 2024-04-16 NOTE — PROGRESS NOTES
"Subjective:      Patient ID: Ora Henderson is a 63 y.o. female.    Vitals:  height is 5' 3" (1.6 m) and weight is 89.8 kg (198 lb). Her tympanic temperature is 98.3 °F (36.8 °C). Her blood pressure is 123/83 and her pulse is 99. Her respiration is 16 and oxygen saturation is 100%.     Chief Complaint: Hemorrhoids    63 year old female pt with symptoms of hemorrhoids.. 2 days. Pt states she was hurting and saw in the mirror that she had hemorrhoids visible. Pt states she tried soaking in the tub and witch hazel wipes and hot compresses for symptoms. Pt states she has noticed blood in stool when wiping.      Abdominal Pain  This is a new problem. The current episode started yesterday. The onset quality is sudden. The problem occurs constantly. The problem has been unchanged. The pain is located in the rectum. The pain is at a severity of 10/10. The pain is severe. The quality of the pain is aching and burning. Pertinent negatives include no anorexia, arthralgias, belching, constipation, diarrhea, dysuria, fever, flatus, frequency, headaches, hematochezia or hematuria. Exacerbated by: sitting. The treatment provided no relief. There is no history of irritable bowel syndrome.       Constitution: Negative for fever.   Gastrointestinal:  Positive for abdominal pain. Negative for constipation, diarrhea and bright red blood in stool.   Genitourinary:  Negative for dysuria, frequency and hematuria.   Musculoskeletal:  Negative for joint pain.   Neurological:  Negative for headaches.      Objective:     Physical Exam   Genitourinary:         Comments: Rectum -Large 2 cm swelling protruding at 9oclock, flaming red, exquisitely tender. No definitive thrombus felt. Exam limited by her pain     Nursing note and vitals reviewed.      Assessment:     1. External prolapsed hemorrhoids        Plan:       External prolapsed hemorrhoids  -     hydrocortisone 2.5 % cream; Apply topically 2 (two) times daily. for 7 days "  Dispense: 28 g; Refill: 0        Continue sitz bath  Apply anusol cream 2-3 times daily, Rx sent to pharmacy  Follow up with your PCP if symptoms not improved in 4-5 days  OTC iburprofen for pain as needed, 200mg 2-3 pills, every 8 hours with food

## 2024-04-17 NOTE — PATIENT INSTRUCTIONS
Continue sitz bath  Apply anusol cream 2-3 times daily, Rx sent to pharmacy  Follow up with your PCP if symptoms not improved in 4-5 days  OTC iburprofen for pain as needed, 200mg 2-3 pills, every 8 hours with food

## 2024-04-23 ENCOUNTER — OFFICE VISIT (OUTPATIENT)
Dept: INTERNAL MEDICINE | Facility: CLINIC | Age: 64
End: 2024-04-23
Payer: COMMERCIAL

## 2024-04-23 ENCOUNTER — OFFICE VISIT (OUTPATIENT)
Dept: PAIN MEDICINE | Facility: CLINIC | Age: 64
End: 2024-04-23
Payer: COMMERCIAL

## 2024-04-23 VITALS
SYSTOLIC BLOOD PRESSURE: 127 MMHG | HEART RATE: 125 BPM | HEIGHT: 63 IN | BODY MASS INDEX: 35.86 KG/M2 | DIASTOLIC BLOOD PRESSURE: 80 MMHG | RESPIRATION RATE: 17 BRPM | WEIGHT: 202.38 LBS

## 2024-04-23 VITALS
BODY MASS INDEX: 35.7 KG/M2 | DIASTOLIC BLOOD PRESSURE: 88 MMHG | HEART RATE: 92 BPM | TEMPERATURE: 98 F | SYSTOLIC BLOOD PRESSURE: 124 MMHG | RESPIRATION RATE: 18 BRPM | WEIGHT: 201.5 LBS | HEIGHT: 63 IN

## 2024-04-23 DIAGNOSIS — I10 ESSENTIAL HYPERTENSION: Chronic | ICD-10-CM

## 2024-04-23 DIAGNOSIS — R05.9 COUGH, UNSPECIFIED TYPE: Primary | ICD-10-CM

## 2024-04-23 DIAGNOSIS — J34.89 NASAL DRAINAGE: ICD-10-CM

## 2024-04-23 DIAGNOSIS — M70.61 GREATER TROCHANTERIC BURSITIS OF RIGHT HIP: Primary | ICD-10-CM

## 2024-04-23 DIAGNOSIS — M79.18 MYOFASCIAL PAIN: ICD-10-CM

## 2024-04-23 DIAGNOSIS — K64.4 EXTERNAL HEMORRHOIDS: ICD-10-CM

## 2024-04-23 DIAGNOSIS — J32.9 SINOBRONCHITIS: ICD-10-CM

## 2024-04-23 DIAGNOSIS — E78.2 MIXED HYPERLIPIDEMIA: ICD-10-CM

## 2024-04-23 DIAGNOSIS — J40 SINOBRONCHITIS: ICD-10-CM

## 2024-04-23 LAB
CTP QC/QA: YES
SARS-COV-2 RDRP RESP QL NAA+PROBE: NEGATIVE

## 2024-04-23 PROCEDURE — 1160F RVW MEDS BY RX/DR IN RCRD: CPT | Mod: CPTII,S$GLB,, | Performed by: FAMILY MEDICINE

## 2024-04-23 PROCEDURE — 1159F MED LIST DOCD IN RCRD: CPT | Mod: CPTII,S$GLB,, | Performed by: ANESTHESIOLOGY

## 2024-04-23 PROCEDURE — 99999 PR PBB SHADOW E&M-EST. PATIENT-LVL V: CPT | Mod: PBBFAC,,, | Performed by: FAMILY MEDICINE

## 2024-04-23 PROCEDURE — 99212 OFFICE O/P EST SF 10 MIN: CPT | Mod: S$GLB,,, | Performed by: ANESTHESIOLOGY

## 2024-04-23 PROCEDURE — 99214 OFFICE O/P EST MOD 30 MIN: CPT | Mod: 25,S$GLB,, | Performed by: FAMILY MEDICINE

## 2024-04-23 PROCEDURE — 3074F SYST BP LT 130 MM HG: CPT | Mod: CPTII,S$GLB,, | Performed by: FAMILY MEDICINE

## 2024-04-23 PROCEDURE — 3079F DIAST BP 80-89 MM HG: CPT | Mod: CPTII,S$GLB,, | Performed by: ANESTHESIOLOGY

## 2024-04-23 PROCEDURE — 3008F BODY MASS INDEX DOCD: CPT | Mod: CPTII,S$GLB,, | Performed by: ANESTHESIOLOGY

## 2024-04-23 PROCEDURE — 3008F BODY MASS INDEX DOCD: CPT | Mod: CPTII,S$GLB,, | Performed by: FAMILY MEDICINE

## 2024-04-23 PROCEDURE — 3079F DIAST BP 80-89 MM HG: CPT | Mod: CPTII,S$GLB,, | Performed by: FAMILY MEDICINE

## 2024-04-23 PROCEDURE — 99999 PR PBB SHADOW E&M-EST. PATIENT-LVL IV: CPT | Mod: PBBFAC,,, | Performed by: ANESTHESIOLOGY

## 2024-04-23 PROCEDURE — 1159F MED LIST DOCD IN RCRD: CPT | Mod: CPTII,S$GLB,, | Performed by: FAMILY MEDICINE

## 2024-04-23 PROCEDURE — 3074F SYST BP LT 130 MM HG: CPT | Mod: CPTII,S$GLB,, | Performed by: ANESTHESIOLOGY

## 2024-04-23 PROCEDURE — 87635 SARS-COV-2 COVID-19 AMP PRB: CPT | Mod: QW,S$GLB,, | Performed by: FAMILY MEDICINE

## 2024-04-23 RX ORDER — PROMETHAZINE HYDROCHLORIDE AND DEXTROMETHORPHAN HYDROBROMIDE 6.25; 15 MG/5ML; MG/5ML
5 SYRUP ORAL NIGHTLY PRN
Qty: 118 ML | Refills: 0 | Status: SHIPPED | OUTPATIENT
Start: 2024-04-23

## 2024-04-23 RX ORDER — FLUCONAZOLE 150 MG/1
150 TABLET ORAL DAILY
Qty: 1 TABLET | Refills: 0 | Status: SHIPPED | OUTPATIENT
Start: 2024-04-23 | End: 2024-04-24

## 2024-04-23 RX ORDER — AZITHROMYCIN 250 MG/1
TABLET, FILM COATED ORAL
Qty: 6 TABLET | Refills: 0 | Status: SHIPPED | OUTPATIENT
Start: 2024-04-23 | End: 2024-04-28

## 2024-04-23 NOTE — PROGRESS NOTES
"Subjective:       Patient ID: Ora Henderson is a 63 y.o. female.    Chief Complaint: Hemorrhoids, Cough, Epistaxis, Eye Drainage, and Sinus Problem    63-year-old  female patient with Patient Active Problem List:     DJD (degenerative joint disease), cervical-mild     Mixed hyperlipidemia     Hepatomegaly     Family history of cardiovascular disease     GERD (gastroesophageal reflux disease)     History of benign pituitary tumor     Hx of colonic polyp     Essential hypertension     Osteoarthritis of spine with radiculopathy, lumbar region     Severe obesity (BMI 35.0-39.9) with comorbidity     NATALIE (obstructive sleep apnea)     Sacroiliitis  Reports that she patient started having cough and sinus pressure with eye drainage since yesterday, felt hot and cold but did not check her temperature.   Has been taking over-the-counter Robitussin with no relief and reports significant cough  Patient was told that she has external hemorrhoids which has been bleeding and started using hemorrhoidal cream, fiber rich diet      Review of Systems   Constitutional:  Positive for fever. Negative for chills.   HENT:  Positive for congestion and sinus pressure.    Respiratory:  Positive for cough. Negative for wheezing.    Gastrointestinal:  Positive for blood in stool.         /88 (BP Location: Left arm, Patient Position: Sitting, BP Method: Large (Manual))   Pulse 92   Temp 98.3 °F (36.8 °C) (Tympanic)   Resp 18   Ht 5' 3" (1.6 m)   Wt 91.4 kg (201 lb 8 oz)   LMP  (LMP Unknown)   BMI 35.69 kg/m²   Objective:      Physical Exam  Constitutional:       Appearance: She is well-developed.   HENT:      Head: Normocephalic and atraumatic.      Nose: Congestion present.      Mouth/Throat:      Mouth: Mucous membranes are moist.      Pharynx: No oropharyngeal exudate or posterior oropharyngeal erythema.      Comments: Positive for sinus pressure in bilateral maxillary sinuses  Cardiovascular:     "  Rate and Rhythm: Normal rate and regular rhythm.      Heart sounds: Normal heart sounds. No murmur heard.  Pulmonary:      Effort: Pulmonary effort is normal. No respiratory distress.      Breath sounds: Normal breath sounds. No wheezing.   Abdominal:      General: Bowel sounds are normal.      Palpations: Abdomen is soft.      Tenderness: There is no abdominal tenderness.   Lymphadenopathy:      Cervical: No cervical adenopathy.   Skin:     General: Skin is warm and dry.      Findings: No rash.   Neurological:      Mental Status: She is alert and oriented to person, place, and time.   Psychiatric:         Mood and Affect: Mood normal.           Assessment/Plan:   1. Cough, unspecified type  -     POCT COVID-19 Rapid Screening  2. Nasal drainage  -     POCT COVID-19 Rapid Screening  3. Sinobronchitis  -     azithromycin (Z-RAJESH) 250 MG tablet; Take 2 tablets by mouth on day 1; Take 1 tablet by mouth on days 2-5  Dispense: 6 tablet; Refill: 0  -     promethazine-dextromethorphan (PROMETHAZINE-DM) 6.25-15 mg/5 mL Syrp; Take 5 mLs by mouth nightly as needed (cough).  Dispense: 118 mL; Refill: 0  -     fluconazole (DIFLUCAN) 150 MG Tab; Take 1 tablet (150 mg total) by mouth once daily. for 1 day  Dispense: 1 tablet; Refill: 0    COVID test negative today  Azithromycin promethazine DM cough syrup and Diflucan has been sent to the pharmacy   Patient requested yeast medication to be sent with antibiotics  Encouraged to drink adequate fluids and take Tylenol/ibuprofen as needed for fever/chills/body aches    4. Essential hypertension  Blood pressure is stable    5. Mixed hyperlipidemia    6. External hemorrhoids  Encouraged to continue using hemorrhoidal cream and tucks  Eat fiber rich diet and avoid constipation  If any worsening patient to see general surgery

## 2024-04-23 NOTE — PROGRESS NOTES
Interventional Pain Progress Note       Chief Pain Complaint:  Right Hip Pain    Interval History (04/23/2024):  Patient returns to clinic after procedure.  Patient reports 100% relief in right hip pain after right greater trochanter bursa injection on 04/01/2024.  Patient reports that his pain has resolved.  Primarily pain today in his bilateral calf muscle spasms.  Pain primarily occurs in the middle of the night described as a ripping pain in her bilateral posterior calves.  Patient denies muscle spasms at other times.  Patient is currently taking magnesium, Flexeril, and potassium to help with these cramps.  Pain is currently rated a 0/10. Denies any fevers, chills, changes in gait, saddle anesthesia, or bowel and bladder incontinence        Interval History (03/05/2024):  Patient returns to clinic for return of previous right hip pain.  Patient reports 2 month history of worsening right lateral hip pain.  Patient denies any specific inciting traumas.  Pain is described as a sharp pain lateral aspect that radiates down her right lower extremity along the lateral aspect typically to the knee, but occasionally to the foot.  Patient reports that pain is typically worse right when she wakes up in the morning and she has been lying on the right side, better during the day.  Pain is currently rated a 5/10, but can increase to a 10/10 with exacerbating activities.  Patient denies any significant left-sided pain.  Patient denies any numbness or tingling associated with this pain. Denies any fevers, chills, changes in gait, saddle anesthesia, or bowel and bladder incontinence    Interval History (03/17/2023): Ora HOFFMAN Earline Santiago presents today for follow-up visit.  she underwent right SIJ + GT bursa injection on 2/16/23.  The patient reports that she is/was better following the procedure.  she reports she initially had 20% pain relief and 1 week after the injection she was in her kitchen when she heard and  "felt a loud pop in her right hip and then shortly after she was unable to stand completely upright.  She reports she had to utilize crutches because she was unable to bear weight on the right leg.  She went to [facility] where she had x-rays that ruled out any fracture and was diagnosed with a pulled muscle.  She reached out to Dr. Treadwell on and was prescribed a Medrol Dosepak.  She reports the 2nd day of the Medrol Dosepak she began to feel better..  The changes lasted 4 weeks so far.  The changes have continued through this visit.  Patient reports pain as "0/10 today.      Interval History (01/31/2023):  Patient returns to clinic for evaluation of right hip pain.  Patient reports 100% relief in neck pain after a C7-T1 interlaminar epidural steroid injection on 09/15/2022.  Patient reports 4 month history of right hip pain.  Pain is described as an aching throbbing pain over the greater trochanter area that radiates to the right buttocks and down the lateral aspect of her right thigh to just above the knee.  Pain is worse in the morning and with prolonged walking, better at night and with stretching.  Pain is rated at 2/10, but increases to an 8/10 with exacerbating factors. Denies any fevers, chills, changes in gait, weakness, or bowel and bladder incontinence        Interval History (8/22/22):  Patient returns to clinic for  evaluation of muscle spasms in right upper extremity pain.  Patient reports 1 month history of bilateral lower-extremity muscle spasm pain primarily in the calf region.  Patient is currently on diuretic and Lipitor.  Neck pain is ongoing and described as a burning aching pain over the right lower neck and right proximal biceps area.  Pain is worse with neck rotation and lifting, better with heat and rest.  Pain is rated a 7/10. Denies any fevers, chills, changes in gait, weakness, or bowel and bladder incontinence      History of Present Illness:   Ora Henderson" is a 63 y.o. female  who is presenting with a chief complaint of Lumbar Back Pain. The patient began experiencing this problem insidiously, and the pain has been gradually worsening over the past 10 month(s). The pain is described as throbbing, shooting, burning and electrical and is located in the bilateral lumbar spine . Pain is intermittent and lasts hours. The pain radiates to bilateral lower extremities. The patient rates her pain a 7 out of ten and interferes with activities of daily living a 7 out of ten. Pain is exacerbated by flexion of the lumbar spine, and is improved by rest. Patient reports no prior trauma, no prior spinal surgery     Ora Henderson is a 63 y.o. female  who is presenting with a chief complaint of Hip Pain (Right )  . The patient began experiencing this problem insidiously, and the pain has been gradually worsening over the past 7 month(s). The pain is described as throbbing, cramping, aching and heavy and is located in the bilateral buttocks . Pain is intermittent and lasts hours. The  pain is nonradiating. The patient rates her pain a 7 out of ten and interferes with activities of daily living a 6 out of ten. Pain is exacerbated by getting up from a seated position, and is improved by rest. Patient reports no prior trauma, no prior spinal surgery     Ora Henderson is a 63 y.o. female  who is presenting with a chief complaint of right neck shoulder pain. The patient began experiencing this problem insidiously, and the pain has been gradually worsening over the past 6 month(s). The pain is described as throbbing, cramping, aching and heavy and is located in the right neck  . Pain is intermittent and lasts hours. The pain radiates to right arm. The patient rates her pain a 7 out of ten and interferes with activities of daily living a 7 out of ten. Pain is exacerbated by extension, and is improved by rest. Patient reports no prior trauma, no prior spinal  surgery         - pertinent negatives: No fever, No chills, No weight loss, No bladder dysfunction, No bowel dysfunction, No saddle anesthesia  - pertinent positives: none    - medications, other therapies tried (physical therapy, injections):     >> NSAIDs, Tylenol, gabapentin and medrol dose pack    >> Has previously undergone Physical Therapy    >> Has previously undergone spinal injection/s         bilateral L4-L5 TFESI on 10/7/2021 with 80% pain relief.          bilateral S1 TFESI on 12/29/2022 with 85% pain relief but took 2 weeks to take effect.   -04/01/2024: Right greater trochanter bursa injection, 100% relief  -09/15/2022: C7-T1 interlaminar epidural steroid injection, 100% relief  -right SIJ + GT bursa injection on 2/16/23 with 20 % relief at 1st with full resolution of pain after Medrol Dosepak    Imaging / Labs / Studies (reviewed on 4/23/2024):      Results for orders placed during the hospital encounter of 01/23/24    X-Ray Hip 2 or 3 views Right (with Pelvis when performed)    Narrative  EXAM: XR HIP WITH PELVIS WHEN PERFORMED, 2 OR 3  VIEWS RIGHT      CLINICAL HISTORY:   [M25.551]-Pain in right hip.        COMPARISON: None available    FINDINGS:    No acute fracture or dislocation the right hip.  Joint space well-preserved.    Intact bony pelvis.  No fracture.  No osseous lesion.  Incidental linear soft tissue calcification adjacent to the right anterior superior iliac spine.  Left hip joint also well preserved.    Impression  1.    Negative right hip x-ray.  2.  3.    Finalized on: 1/23/2024 3:20 PM By:  Yury Barron MD  [facility]# [id number]      2024-01-23 15:22:[id number]    [facility]      Results for orders placed during the hospital encounter of 12/15/20    X-Ray Lumbar Complete With Flex And Ext    Narrative  EXAMINATION:  XR LUMBAR SPINE 5 VIEW WITH FLEX AND EXT    CLINICAL HISTORY:  lumbar radic;  Other spondylosis with radiculopathy, lumbar region    TECHNIQUE:  Five views of the lumbar spine plus  flexion extension views were performed.    COMPARISON:  December 23, 2017    FINDINGS:  Inferior-most lumbar vertebral body designated L5 for purposes of this exam.  Vertebral height maintained.  Mild uniform loss of disc height at the L4-5 level there is grade 1 anterolisthesis L4 on L5.  Remaining disc spaces, vertebral body height and alignment maintained.  Slight increased prominence of anterior subluxation L4 on L5 on the flexion view.  Stable appearance the L4-5 level on neutral and extension lateral views.  Pedicles and SI joints intact.  Numerous calcifications lower pelvis bilaterally.  Prominent degenerative change bilateral hips and to lesser extent bilateral SI joints.    Impression  As above.      Electronically signed by: Tariq Cortez MD  Date:    12/15/2020  Time:    20:33    Results for orders placed during the hospital encounter of 12/23/17    X-Ray Lumbar Spine AP And Lateral    Narrative  AP and lateral views of the lumbar spine    Findings: The vertebral bodies demonstrate normal height.  There is grade 1 spondylolisthesis of L4 on L5. There is mild disc space narrowing and spondylosis present at the L1-L2 through the L4-L5 levels. Prominent facet arthropathy noted bilaterally at L4-L5 level.  IMPRESSION:  As above      Electronically signed by: MOISES SANTOS D.O.  Date:     12/26/17  Time:    08:27     Results for orders placed during the hospital encounter of 02/08/24    X-Ray Lumbar Spine 5 View    Narrative  EXAMINATION:  XR LUMBAR SPINE COMPLETE 5 VIEW    CLINICAL HISTORY:  Other spondylosis with radiculopathy, lumbar region    TECHNIQUE:  AP, lateral, spot and bilateral oblique views of the lumbar spine were preformed.    COMPARISON:  12/15/2020    FINDINGS:  Grade 1 anterolisthesis of L4 on L5 appears unchanged.  Vertebral body heights are within normal limits.  There is mild disc height loss at L4-5 which appears similar to prior.  Multilevel facet arthropathy noted in the lower  lumbar spine.  No pars defects visualized.  No acute fractures or subluxations are demonstrated.  The remaining visualized osseous and soft tissue structures demonstrate no appreciable abnormality.    Impression  Degenerative findings and spondylolisthesis as above      Electronically signed by: George Bojorquez MD  Date:    02/08/2024  Time:    12:30             [facility] EMG/NCS 5/3/22  IMPRESSION   1. ABNORMAL stud   2. There is electrodiagnostic evidence of an acute on chronic radiculopathy of the right C5 nerve root and a chronic radiculopathy of the C6, C8, T1 nerve roots. There is additional evidence of persistent mild demyelinating median neuropathy (Carpal tunnel syndrome) across BILATERAL wrists.       Results for orders placed during the hospital encounter of 12/08/14    X-Ray Lumbar Spine Complete 5 View    Narrative  Comparison: 03/21/12    Five views    Findings:    Osteopenia and overlying bowel limits the study.  Multilevel marginal spurring and facet arthropathy.  Uniform loss of this height at the L4 -- 5 level with grade 1 anterolisthesis L4 on L5.  No acute fracture.  No spondylolysis.  Pedicles and SI joints  are intact.  IMPRESSION:      Interval progression degenerative changes with most pronounced findings at the L4 -- 5 level.  See above.      Electronically signed by: HENRIK LANDA III, MD  Date:     12/08/14  Time:    09:23        Results for orders placed during the hospital encounter of 12/15/20    X-Ray Lumbar Complete With Flex And Ext    Narrative  EXAMINATION:  XR LUMBAR SPINE 5 VIEW WITH FLEX AND EXT    CLINICAL HISTORY:  lumbar radic;  Other spondylosis with radiculopathy, lumbar region    TECHNIQUE:  Five views of the lumbar spine plus flexion extension views were performed.    COMPARISON:  December 23, 2017    FINDINGS:  Inferior-most lumbar vertebral body designated L5 for purposes of this exam.  Vertebral height maintained.  Mild uniform loss of disc height at the L4-5 level there  is grade 1 anterolisthesis L4 on L5.  Remaining disc spaces, vertebral body height and alignment maintained.  Slight increased prominence of anterior subluxation L4 on L5 on the flexion view.  Stable appearance the L4-5 level on neutral and extension lateral views.  Pedicles and SI joints intact.  Numerous calcifications lower pelvis bilaterally.  Prominent degenerative change bilateral hips and to lesser extent bilateral SI joints.    Impression  As above.      Electronically signed by: Tariq Cortez MD  Date:    12/15/2020  Time:    20:33    Results for orders placed during the hospital encounter of 12/23/17    X-Ray Lumbar Spine AP And Lateral    Narrative  AP and lateral views of the lumbar spine    Findings: The vertebral bodies demonstrate normal height.  There is grade 1 spondylolisthesis of L4 on L5. There is mild disc space narrowing and spondylosis present at the L1-L2 through the L4-L5 levels. Prominent facet arthropathy noted bilaterally at L4-L5 level.  IMPRESSION:  As above      Electronically signed by: MOISES SANTOS D.O.  Date:     12/26/17  Time:    08:27    Results for orders placed during the hospital encounter of 07/05/13    X-Ray Cervical Spine AP And Lateral    Narrative  Findings: Vertebral alignment is normal.  There is mild narrowing of the C5-6 disk spaces and early anterior osteophyte formation.  There are no compression fractures or acute abnormalities are seen.  IMPRESSION:  Mild degenerative change in the cervical spine at C5-6.      Electronically signed by: LURDES SHORT MD  Date:     07/05/13  Time:    09:29      Results for orders placed during the hospital encounter of 07/22/19    X-Ray Cervical Spine Complete 5 view    Narrative  EXAMINATION:  XR CERVICAL SPINE COMPLETE 5 VIEW    CLINICAL HISTORY:  . Cervicalgia    TECHNIQUE:  AP, Lateral, bilateral oblique and open mouth views of the cervical spine were performed.    COMPARISON:  07/05/2013    FINDINGS:  There is some  "straightening of the normal cervical lordosis with slight retrolisthesis of C5 on C6. Anterior disc osteophyte complex production and possible slight disc height loss noted at C5-6.  No appreciable change from prior.  Posterior elements appear intact without acute fractures or subluxations demonstrated.  Odontoid process appears intact.  Atlantoaxial articulations appear normal.  Prevertebral soft tissues are within normal limits.    Impression  Degenerative changes as above.  No acute findings.      Electronically signed by: George Bojorquez MD  Date:    07/22/2019  Time:    16:21      Review of Systems:  CONSTITUTIONAL: patient denies any fever, chills, or weight loss  SKIN: patient denies any rash or itching  RESPIRATORY: patient denies having any shortness of breath  GASTROINTESTINAL: patient denies having any diarrhea, constipation, or bowel incontinence  GENITOURINARY: patient denies having any abnormal bladder function    MUSCULOSKELETAL:  - patient complains of the above noted pain/s (see chief pain complaint)    NEUROLOGICAL:   - pain as above  - strength in Lower extremities is intact, BILATERALLY  - sensation in Lower extremities is intact, BILATERALLY  - patient denies any loss of bowel or bladder control      PSYCHIATRIC: patient denies any change in mood    Other:  All other systems reviewed and are negative      Physical Exam:  /80   Pulse (!) 125   Resp 17   Ht 5' 3" (1.6 m)   Wt 91.8 kg (202 lb 6.1 oz)   LMP  (LMP Unknown)   BMI 35.85 kg/m²  (reviewed on 4/23/2024)  Physical Exam  Constitutional:       Appearance: Normal appearance.   HENT:      Head: Normocephalic and atraumatic.   Eyes:      Extraocular Movements: Extraocular movements intact.      Pupils: Pupils are equal, round, and reactive to light.   Pulmonary:      Effort: Pulmonary effort is normal.   Abdominal:      General: Abdomen is flat.   Musculoskeletal:      Cervical back: Normal range of motion and neck supple.      " Right hip: No deformity, tenderness (TTP over GTB), bony tenderness or crepitus. Normal range of motion. Normal strength.      Left hip: Normal.   Skin:     General: Skin is warm and dry.      Capillary Refill: Capillary refill takes less than 2 seconds.   Neurological:      General: No focal deficit present.      Mental Status: She is alert and oriented to person, place, and time.      Cranial Nerves: No cranial nerve deficit.      Sensory: No sensory deficit.      Motor: No weakness or abnormal muscle tone.      Gait: Gait normal.      Deep Tendon Reflexes: Babinski sign absent on the right side. Babinski sign absent on the left side.      Reflex Scores:       Patellar reflexes are 2+ on the right side and 2+ on the left side.       Achilles reflexes are 2+ on the right side and 2+ on the left side.  Psychiatric:         Mood and Affect: Mood normal.         Behavior: Behavior normal.                 Assessment:    Ora Henderson is a 63 y.o. year old female who is presenting with     Encounter Diagnoses   Name Primary?    Greater trochanteric bursitis of right hip Yes    Myofascial pain          Greater trochanteric bursitis of right hip    Myofascial pain              Plan:    1. Interventional:    -04/01/2024: Right greater trochanter bursa injection, 100% relief  -S/p right SIJ + GT bursa injection on 2/16/23 with 20 % relief at 1st with full resolution of pain after Medrol Dosepak  -09/15/2022: C7-T1 interlaminar epidural steroid injection, 100% relief   -S/p bilateral S1 TFESI on 12/29/2022 with 85% pain relief but took 2 weeks to take effect.     -S/p bilateral L4-L5 TFESI on 10/7/2021 with 80% pain relief.     2. Pharmacologic:    Continue diclofenac 75 mg twice a day p.r.n.    3. Rehabilitative:   -continue home therapy exercises    4. Diagnostic:   -x-rays lumbar spine and right hip reviewed.  Significant for grade 1 anterolisthesis of L4 upon L5.  X-rays of right hip are grossly  normal  -MRI cervical spine reviewed and findings discussed with patient      5. Consult:   -continue follow-up with orthopedics for carpal tunnel syndrome     Return to clinic cassy Treadwell MD  Interventional Pain Medicine  [facility]

## 2024-04-30 ENCOUNTER — OFFICE VISIT (OUTPATIENT)
Dept: DERMATOLOGY | Facility: CLINIC | Age: 64
End: 2024-04-30
Payer: COMMERCIAL

## 2024-04-30 DIAGNOSIS — Z12.83 SKIN CANCER SCREENING: ICD-10-CM

## 2024-04-30 DIAGNOSIS — D18.01 HEMANGIOMA OF SKIN: Primary | ICD-10-CM

## 2024-04-30 DIAGNOSIS — L30.0 NUMMULAR ECZEMA: ICD-10-CM

## 2024-04-30 PROCEDURE — 99999 PR PBB SHADOW E&M-EST. PATIENT-LVL III: CPT | Mod: PBBFAC,,, | Performed by: DERMATOLOGY

## 2024-04-30 PROCEDURE — 1160F RVW MEDS BY RX/DR IN RCRD: CPT | Mod: CPTII,S$GLB,, | Performed by: DERMATOLOGY

## 2024-04-30 PROCEDURE — 1159F MED LIST DOCD IN RCRD: CPT | Mod: CPTII,S$GLB,, | Performed by: DERMATOLOGY

## 2024-04-30 PROCEDURE — 99203 OFFICE O/P NEW LOW 30 MIN: CPT | Mod: S$GLB,,, | Performed by: DERMATOLOGY

## 2024-04-30 NOTE — PROGRESS NOTES
Subjective:      Patient ID:  Ora Henderson is a 63 y.o. female who presents for No chief complaint on file.    History of Present Illness: The patient presents with chief complaint of skin lesions located on the breast. Told they were cherry angiomas by doctor.   Location: left breast   Duration: 1-2 months   Signs/Symptoms: none    Prior treatments: none     She c/o dry patch of the left lower leg, currently healing. Used chlorox on a cotton ball.         Review of Systems   Constitutional:  Negative for fever and chills.   Gastrointestinal:  Negative for nausea and vomiting.   Skin:  Positive for activity-related sunscreen use. Negative for daily sunscreen use and recent sunburn.   Hematologic/Lymphatic: Does not bruise/bleed easily.       Objective:   Physical Exam   Constitutional: She appears well-developed and well-nourished. No distress.   Neurological: She is alert and oriented to person, place, and time. She is not disoriented.   Psychiatric: She has a normal mood and affect.   Skin:   Areas Examined (abnormalities noted in diagram):   Head / Face Inspection Performed  Neck Inspection Performed  Chest / Axilla Inspection Performed  Back Inspection Performed  RUE Inspected  LUE Inspection Performed            Diagram Legend      Vascular papule c/w angioma      Assessment / Plan:        Hemangioma of skin  Reassurance given.  Lesions are benign.    Skin cancer screening  -     Ambulatory referral/consult to Dermatology    Nummular eczema  Recommend avoid use of direct chlorox on the skin, will give samples of ceraVe anti-itch lotion. The patient acknowledged understanding.              Follow up if symptoms worsen or fail to improve.

## 2024-07-09 ENCOUNTER — OFFICE VISIT (OUTPATIENT)
Dept: INTERNAL MEDICINE | Facility: CLINIC | Age: 64
End: 2024-07-09
Payer: COMMERCIAL

## 2024-07-09 VITALS
HEIGHT: 63 IN | BODY MASS INDEX: 34.88 KG/M2 | TEMPERATURE: 99 F | SYSTOLIC BLOOD PRESSURE: 124 MMHG | DIASTOLIC BLOOD PRESSURE: 78 MMHG | WEIGHT: 196.88 LBS

## 2024-07-09 DIAGNOSIS — U07.1 COVID-19: Primary | ICD-10-CM

## 2024-07-09 DIAGNOSIS — I10 ESSENTIAL HYPERTENSION: ICD-10-CM

## 2024-07-09 DIAGNOSIS — R21 RASH: ICD-10-CM

## 2024-07-09 DIAGNOSIS — R60.0 EDEMA OF BOTH LOWER LEGS: ICD-10-CM

## 2024-07-09 LAB
CTP QC/QA: YES
SARS-COV-2 RDRP RESP QL NAA+PROBE: POSITIVE

## 2024-07-09 PROCEDURE — 87635 SARS-COV-2 COVID-19 AMP PRB: CPT | Mod: QW,S$GLB,, | Performed by: NURSE PRACTITIONER

## 2024-07-09 PROCEDURE — 3078F DIAST BP <80 MM HG: CPT | Mod: CPTII,S$GLB,, | Performed by: NURSE PRACTITIONER

## 2024-07-09 PROCEDURE — 99999 PR PBB SHADOW E&M-EST. PATIENT-LVL IV: CPT | Mod: PBBFAC,,, | Performed by: NURSE PRACTITIONER

## 2024-07-09 PROCEDURE — 3074F SYST BP LT 130 MM HG: CPT | Mod: CPTII,S$GLB,, | Performed by: NURSE PRACTITIONER

## 2024-07-09 PROCEDURE — 99214 OFFICE O/P EST MOD 30 MIN: CPT | Mod: S$GLB,,, | Performed by: NURSE PRACTITIONER

## 2024-07-09 PROCEDURE — 1159F MED LIST DOCD IN RCRD: CPT | Mod: CPTII,S$GLB,, | Performed by: NURSE PRACTITIONER

## 2024-07-09 PROCEDURE — 3008F BODY MASS INDEX DOCD: CPT | Mod: CPTII,S$GLB,, | Performed by: NURSE PRACTITIONER

## 2024-07-09 PROCEDURE — 1160F RVW MEDS BY RX/DR IN RCRD: CPT | Mod: CPTII,S$GLB,, | Performed by: NURSE PRACTITIONER

## 2024-07-09 RX ORDER — HYDROCORTISONE 25 MG/G
CREAM TOPICAL 2 TIMES DAILY
Qty: 28 G | Refills: 1 | Status: SHIPPED | OUTPATIENT
Start: 2024-07-09 | End: 2024-07-16

## 2024-07-09 NOTE — LETTER
July 9, 2024      [facility]  [address] [place] [address]  Phone: [phone]  Fax: [phone]       Patient: Ora Henderson   YOB: 1960  Date of Visit: 07/09/2024    To Whom It May Concern:    Kassandra Henderson  was at [facility] on 7/9/24. The patient may return to work on 7/11/24 with no restrictions. If you have any questions or concerns, or if I can be of further assistance, please do not hesitate to contact me.    Sincerely,    Meri Ferro MA

## 2024-07-09 NOTE — PROGRESS NOTES
"Subjective:       Patient ID: Ora Henderson is a 64 y.o. female.    Chief Complaint: Joint Swelling (Ear pain (week)/Congestion, body aches, chills, cough started 7/7/24) and URI    Pt presents to clinic today for cough, congestion, body aches and chills  Started feeling bad over the weekend  Having headaches, feeling run down  Chills yesterday- thinks she had fever  No ill contacts  Taking otc meds with little relief   Having some ear pain as well- left ear  Feels like something is sticking in it  Lots of pressure    Pt also complaining of bilateral ankles swelling  This has been going on for a while  Swelling down in the mornings  Worse in the afternoon  Denies high salt diet  Is on norvasc for BP control           /78   Temp 98.5 °F (36.9 °C) (Tympanic)   Ht 5' 3" (1.6 m)   Wt 89.3 kg (196 lb 13.9 oz)   LMP  (LMP Unknown)   BMI 34.87 kg/m²     Review of Systems   Constitutional:  Negative for activity change, appetite change, chills, diaphoresis, fatigue, fever and unexpected weight change.   HENT:  Positive for congestion, ear pain, postnasal drip, rhinorrhea, sinus pressure, sneezing and sore throat. Negative for dental problem, drooling, ear discharge, hearing loss, mouth sores, nosebleeds, tinnitus, trouble swallowing and voice change.    Eyes:  Negative for pain, discharge and redness.   Respiratory:  Positive for cough. Negative for choking, chest tightness, shortness of breath and wheezing.    Cardiovascular:  Positive for leg swelling. Negative for chest pain and palpitations.   Gastrointestinal:  Negative for abdominal pain, diarrhea, nausea and vomiting.   Endocrine: Negative for cold intolerance and heat intolerance.   Genitourinary:  Negative for dysuria.   Musculoskeletal:  Negative for arthralgias, joint swelling, myalgias and neck pain.   Skin:  Negative for rash.   Allergic/Immunologic: Positive for environmental allergies. Negative for food allergies and " immunocompromised state.   Neurological:  Negative for syncope, light-headedness and headaches.       Objective:      Physical Exam  Vitals and nursing note reviewed.   Constitutional:       Appearance: She is well-developed. She is not diaphoretic.   HENT:      Head: Normocephalic and atraumatic.      Right Ear: Tympanic membrane, ear canal and external ear normal.      Left Ear: Tympanic membrane, ear canal and external ear normal.      Nose: Mucosal edema and rhinorrhea present.      Right Sinus: No maxillary sinus tenderness or frontal sinus tenderness.      Left Sinus: No maxillary sinus tenderness or frontal sinus tenderness.      Mouth/Throat:      Pharynx: Uvula midline. No oropharyngeal exudate or posterior oropharyngeal erythema.   Eyes:      General:         Right eye: No discharge.         Left eye: No discharge.      Conjunctiva/sclera: Conjunctivae normal.   Cardiovascular:      Rate and Rhythm: Normal rate and regular rhythm.      Heart sounds: Normal heart sounds. No murmur heard.  Pulmonary:      Effort: Pulmonary effort is normal. No accessory muscle usage or respiratory distress.      Breath sounds: Normal breath sounds. No decreased breath sounds, wheezing, rhonchi or rales.   Chest:      Chest wall: No tenderness.   Abdominal:      General: There is no distension.      Palpations: Abdomen is soft.   Musculoskeletal:         General: Normal range of motion.      Cervical back: Normal range of motion.   Skin:     General: Skin is warm and dry.   Neurological:      Mental Status: She is alert and oriented to person, place, and time.   Psychiatric:         Behavior: Behavior normal.         Assessment:       1. COVID-19    2. Edema of both lower legs    3. Essential hypertension    4. Rash    5. BMI 34.0-34.9,adult        Plan:       Ora was seen today for joint swelling and uri.    Diagnoses and all orders for this visit:    COVID-19  -     POCT COVID-19 Rapid Screening  -     POCT Influenza A/B  Molecular  - Rest, hydration, supportive treatment advised.     Edema of both lower legs    - Monitor salt in diet, compression socks, elevation    - If no improvement, may consider a trial of a different BP meds. Pt to discuss with PCP   Essential hypertension    - Chronic, stable   Rash  -     hydrocortisone 2.5 % cream; Apply topically 2 (two) times daily. for 7 days    BMI 34.0-34.9,adult      recommended rest, hydration, supportive care, deep breathing  CDC recommended quarantine discussed  Infection precautions discussed  Vitamins c, d, zinc discussed  mucinex with lots of fluids  Emergency Room if shortness of breath, issues breathing

## 2024-08-12 ENCOUNTER — TELEPHONE (OUTPATIENT)
Dept: INTERNAL MEDICINE | Facility: CLINIC | Age: 64
End: 2024-08-12
Payer: COMMERCIAL

## 2024-08-12 DIAGNOSIS — Z12.31 ENCOUNTER FOR SCREENING MAMMOGRAM FOR MALIGNANT NEOPLASM OF BREAST: Primary | ICD-10-CM

## 2024-08-12 NOTE — TELEPHONE ENCOUNTER
----- Message from Sarah Oviedo sent at 8/12/2024  8:15 AM CDT -----  Type:  Mammogram    Caller is requesting to schedule their annual mammogram appointment.  Order is not listed in EPIC.  Please enter order and contact patient to schedule.  Name of Caller:Ora Henderson,    Where would they like the mammogram performed?[facility] or  [facility]   Would the patient rather a call back or a response via [facility]?    Best Call Back Number:[phone]    Additional Information:  pt would like orders sent for mammo please call

## 2024-08-15 ENCOUNTER — PATIENT MESSAGE (OUTPATIENT)
Dept: ADMINISTRATIVE | Facility: OTHER | Age: 64
End: 2024-08-15
Payer: COMMERCIAL

## 2024-09-06 ENCOUNTER — HOSPITAL ENCOUNTER (OUTPATIENT)
Dept: RADIOLOGY | Facility: HOSPITAL | Age: 64
Discharge: HOME | End: 2024-09-06
Attending: NURSE PRACTITIONER
Payer: COMMERCIAL

## 2024-09-06 ENCOUNTER — HOSPITAL ENCOUNTER (OUTPATIENT)
Dept: RADIOLOGY | Facility: HOSPITAL | Age: 64
Discharge: HOME | End: 2024-09-06
Attending: FAMILY MEDICINE
Payer: COMMERCIAL

## 2024-09-06 ENCOUNTER — OFFICE VISIT (OUTPATIENT)
Dept: INTERNAL MEDICINE | Facility: CLINIC | Age: 64
End: 2024-09-06
Payer: COMMERCIAL

## 2024-09-06 VITALS
TEMPERATURE: 98 F | SYSTOLIC BLOOD PRESSURE: 112 MMHG | RESPIRATION RATE: 20 BRPM | OXYGEN SATURATION: 98 % | DIASTOLIC BLOOD PRESSURE: 82 MMHG | HEART RATE: 108 BPM | WEIGHT: 196.88 LBS | HEIGHT: 63 IN | BODY MASS INDEX: 34.88 KG/M2

## 2024-09-06 DIAGNOSIS — K21.9 GASTROESOPHAGEAL REFLUX DISEASE WITHOUT ESOPHAGITIS: ICD-10-CM

## 2024-09-06 DIAGNOSIS — R05.3 CHRONIC COUGH: Primary | ICD-10-CM

## 2024-09-06 DIAGNOSIS — Z12.31 ENCOUNTER FOR SCREENING MAMMOGRAM FOR MALIGNANT NEOPLASM OF BREAST: ICD-10-CM

## 2024-09-06 DIAGNOSIS — R05.3 CHRONIC COUGH: ICD-10-CM

## 2024-09-06 PROCEDURE — 77063 BREAST TOMOSYNTHESIS BI: CPT | Mod: 26,,, | Performed by: RADIOLOGY

## 2024-09-06 PROCEDURE — 77067 SCR MAMMO BI INCL CAD: CPT | Mod: TC,PO

## 2024-09-06 PROCEDURE — 71046 X-RAY EXAM CHEST 2 VIEWS: CPT | Mod: 26,,, | Performed by: RADIOLOGY

## 2024-09-06 PROCEDURE — 77067 SCR MAMMO BI INCL CAD: CPT | Mod: 26,,, | Performed by: RADIOLOGY

## 2024-09-06 PROCEDURE — 99999 PR PBB SHADOW E&M-EST. PATIENT-LVL IV: CPT | Mod: PBBFAC,,, | Performed by: NURSE PRACTITIONER

## 2024-09-06 PROCEDURE — 71046 X-RAY EXAM CHEST 2 VIEWS: CPT | Mod: TC,FY,PO

## 2024-09-06 NOTE — PROGRESS NOTES
"Subjective:       Patient ID: Ora Henderson is a 64 y.o. female.    Chief Complaint: Sinus Problem and Cough    Pt presents to clinic today for chronic cough  Has been dealing with it for a while   Does has reflux and takes her protonix daily  Feels like that helps some but the cough is still there  Does not feel bad  Coughs more at night  Denies hx of asthma  Nonsmoker, no smoke exposure  Does not feel short of breath, no chest pain         /82   Pulse 108   Temp 97.5 °F (36.4 °C)   Resp 20   Ht 5' 3" (1.6 m)   Wt 89.3 kg (196 lb 13.9 oz)   LMP  (LMP Unknown)   SpO2 98%   BMI 34.87 kg/m²     Review of Systems   Constitutional:  Negative for activity change, appetite change, chills, diaphoresis, fatigue, fever and unexpected weight change.   HENT:  Positive for rhinorrhea. Negative for congestion.    Respiratory:  Positive for cough. Negative for choking and shortness of breath.    Cardiovascular:  Negative for chest pain, palpitations and leg swelling.   Gastrointestinal: Negative.    Genitourinary: Negative.    Musculoskeletal: Negative.    Skin:  Negative for color change, pallor, rash and wound.   Allergic/Immunologic: Negative for immunocompromised state.   Neurological: Negative.  Negative for dizziness and facial asymmetry.   Hematological:  Negative for adenopathy. Does not bruise/bleed easily.   Psychiatric/Behavioral:  Negative for agitation, behavioral problems and confusion.        Objective:      Physical Exam  Vitals and nursing note reviewed.   Constitutional:       General: She is not in acute distress.     Appearance: Normal appearance. She is well-developed. She is not diaphoretic.   HENT:      Head: Normocephalic and atraumatic.      Right Ear: Tympanic membrane normal.      Left Ear: Tympanic membrane normal.      Nose: Nose normal.   Eyes:      Pupils: Pupils are equal, round, and reactive to light.   Cardiovascular:      Rate and Rhythm: Normal rate and regular " rhythm.      Heart sounds: Normal heart sounds. No murmur heard.  Pulmonary:      Effort: Pulmonary effort is normal. No respiratory distress.      Breath sounds: Normal breath sounds.   Musculoskeletal:         General: Normal range of motion.   Skin:     General: Skin is warm and dry.      Findings: No rash.   Neurological:      Mental Status: She is alert.   Psychiatric:         Mood and Affect: Mood normal.         Behavior: Behavior normal. Behavior is cooperative.         Thought Content: Thought content normal.         Judgment: Judgment normal.         Assessment:       1. Chronic cough    2. Gastroesophageal reflux disease without esophagitis    3. BMI 34.0-34.9,adult        Plan:       Ora was seen today for sinus problem and cough.    Diagnoses and all orders for this visit:    Chronic cough  -     X-Ray Chest PA And Lateral; Future    Gastroesophageal reflux disease without esophagitis    BMI 34.0-34.9,adult      Continue protonix for GERD  Will obtain chest xray today  Monitor symptoms   If continues, may consider seeing pulm for workup  Follow up for worsening or no improvement in symptoms and PRN.

## 2024-10-07 ENCOUNTER — OFFICE VISIT (OUTPATIENT)
Dept: DERMATOLOGY | Facility: CLINIC | Age: 64
End: 2024-10-07
Payer: COMMERCIAL

## 2024-10-07 DIAGNOSIS — L30.0 NUMMULAR ECZEMA: Primary | ICD-10-CM

## 2024-10-07 PROCEDURE — 99999 PR PBB SHADOW E&M-EST. PATIENT-LVL III: CPT | Mod: PBBFAC,,, | Performed by: DERMATOLOGY

## 2024-10-07 PROCEDURE — G2211 COMPLEX E/M VISIT ADD ON: HCPCS | Mod: S$GLB,,, | Performed by: DERMATOLOGY

## 2024-10-07 PROCEDURE — 99213 OFFICE O/P EST LOW 20 MIN: CPT | Mod: S$GLB,,, | Performed by: DERMATOLOGY

## 2024-10-07 PROCEDURE — 1160F RVW MEDS BY RX/DR IN RCRD: CPT | Mod: CPTII,S$GLB,, | Performed by: DERMATOLOGY

## 2024-10-07 PROCEDURE — 1159F MED LIST DOCD IN RCRD: CPT | Mod: CPTII,S$GLB,, | Performed by: DERMATOLOGY

## 2024-10-07 RX ORDER — MOMETASONE FUROATE 1 MG/G
CREAM TOPICAL
Qty: 45 G | Refills: 3 | Status: SHIPPED | OUTPATIENT
Start: 2024-10-07

## 2024-10-07 NOTE — PATIENT INSTRUCTIONS
New Skin Care Regimen  Soap: Dove sensitive skin bar, Aveeno   Moisturizer: vanicream, ceraVe or cetaphil cream  Detergent: Tide Free, All Free, or Cheer Free   Fabric softener: do not use  Colognes/Perfumes/Fragrances: do not use  Bathing: shower or bathe with lukewarm water < 10 minutes

## 2024-10-07 NOTE — PROGRESS NOTES
Subjective:      Patient ID:  Ora Henderson is a 64 y.o. female who presents for   Chief Complaint   Patient presents with    Rash     Concerning rash Tx hydrocortisone cream and kenalog/ little improvement . Rash is located on left leg and under the breast s/s itching, burning,      Hx of lesion left calf, last seen on 4/30/24.  She had dc'd chlorox use, using HTC 2.5% cream and TAC 0.1% oint bid with some improvement. Mild improvement in discoloration.  + mild pruritus.     She c/o rash of the left buttock, concern for ringworm    Current Skin Care Regimen  Soap: dial antibacterial  Moisturizer: suave coconut lotion, vaseline, baby oil  Detergent: purex, arm and hammer clear   Fabric softener: Gain  Colognes/Perfumes/Fragrances: daily   Bathing: both, lukewarm 10-20 min            Review of Systems   Constitutional:  Negative for fever and chills.   Gastrointestinal:  Negative for nausea and vomiting.   Skin:  Positive for itching, rash, dry skin and activity-related sunscreen use. Negative for daily sunscreen use and recent sunburn.   Hematologic/Lymphatic: Does not bruise/bleed easily.       Objective:   Physical Exam   Constitutional: She appears well-developed and well-nourished. No distress.   Neurological: She is alert and oriented to person, place, and time. She is not disoriented.   Psychiatric: She has a normal mood and affect.   Skin:   Areas Examined (abnormalities noted in diagram):   Head / Face Inspection Performed  Neck Inspection Performed  Chest / Axilla Inspection Performed  Abdomen Inspection Performed  Back Inspection Performed  RUE Inspected  LUE Inspection Performed  Nails and Digits Inspection Performed              Assessment / Plan:        Nummular eczema  -     mometasone 0.1% (ELOCON) 0.1 % cream; AAA bid prn. Do not use on face, underarms or groin. Stronger steroid.  Dispense: 45 g; Refill: 3  -     reviewed sensitive skin care. Hold TAC and HTC. Recommend change in  soap, moisturizer, detergent, d/c fabric softener. Will start above med.  The patient acknowledged understanding.                Follow up in about 3 months (around 1/7/2025).

## 2024-10-08 ENCOUNTER — OFFICE VISIT (OUTPATIENT)
Dept: URGENT CARE | Facility: CLINIC | Age: 64
End: 2024-10-08
Payer: COMMERCIAL

## 2024-10-08 VITALS
WEIGHT: 195 LBS | HEART RATE: 82 BPM | HEIGHT: 63 IN | BODY MASS INDEX: 34.55 KG/M2 | TEMPERATURE: 98 F | OXYGEN SATURATION: 100 % | DIASTOLIC BLOOD PRESSURE: 73 MMHG | RESPIRATION RATE: 16 BRPM | SYSTOLIC BLOOD PRESSURE: 133 MMHG

## 2024-10-08 DIAGNOSIS — H65.02 NON-RECURRENT ACUTE SEROUS OTITIS MEDIA OF LEFT EAR: ICD-10-CM

## 2024-10-08 DIAGNOSIS — R09.81 SINUS CONGESTION: ICD-10-CM

## 2024-10-08 DIAGNOSIS — R05.9 COUGH, UNSPECIFIED TYPE: ICD-10-CM

## 2024-10-08 DIAGNOSIS — R68.83 CHILLS: ICD-10-CM

## 2024-10-08 DIAGNOSIS — J01.10 ACUTE FRONTAL SINUSITIS, RECURRENCE NOT SPECIFIED: Primary | ICD-10-CM

## 2024-10-08 DIAGNOSIS — J02.9 ACUTE SORE THROAT: ICD-10-CM

## 2024-10-08 LAB
CTP QC/QA: YES
SARS-COV-2 AG RESP QL IA.RAPID: NEGATIVE

## 2024-10-08 PROCEDURE — 96372 THER/PROPH/DIAG INJ SC/IM: CPT | Mod: S$GLB,,, | Performed by: FAMILY MEDICINE

## 2024-10-08 PROCEDURE — 99213 OFFICE O/P EST LOW 20 MIN: CPT | Mod: 25,S$GLB,, | Performed by: NURSE PRACTITIONER

## 2024-10-08 PROCEDURE — 87811 SARS-COV-2 COVID19 W/OPTIC: CPT | Mod: QW,S$GLB,, | Performed by: NURSE PRACTITIONER

## 2024-10-08 RX ORDER — AZITHROMYCIN 250 MG/1
250 TABLET, FILM COATED ORAL DAILY
Qty: 6 TABLET | Refills: 0 | Status: SHIPPED | OUTPATIENT
Start: 2024-10-08 | End: 2024-10-13

## 2024-10-08 RX ORDER — FLUCONAZOLE 150 MG/1
150 TABLET ORAL ONCE
Qty: 2 TABLET | Refills: 0 | Status: SHIPPED | OUTPATIENT
Start: 2024-10-08 | End: 2024-10-08

## 2024-10-08 RX ORDER — DEXAMETHASONE SODIUM PHOSPHATE 10 MG/ML
10 INJECTION INTRAMUSCULAR; INTRAVENOUS
Status: COMPLETED | OUTPATIENT
Start: 2024-10-08 | End: 2024-10-08

## 2024-10-08 RX ADMIN — DEXAMETHASONE SODIUM PHOSPHATE 10 MG: 10 INJECTION INTRAMUSCULAR; INTRAVENOUS at 10:10

## 2024-10-08 NOTE — PATIENT INSTRUCTIONS
What care is needed at home?   Ask your doctor what you need to do when you go home. Make sure you ask questions if you do not understand what you need to do.  Try to thin the mucus.  Drink lots of liquids to stay hydrated.  Use a cool mist humidifier to avoid dry air.  Use saline nose drops or a saline nose rinse to relieve stuffiness.  Wash your hands often. This will help keep others healthy.  Do not smoke or be in smoke-filled places. Avoid things that may cause breathing problems like fumes, pollution, dust, and other common allergens and irritants.  You may want to take medicine like ibuprofen, naproxen, or acetaminophen to help with pain.      Patient Instructions   You received a steroid shot today. Please keep in mind that you should not take long term steroids unless prescribed by your physician. You should not exceed 4 steroid injections in a year and if you have multiple injections they should be spaced out adequately (atleast 3 months apart). As discussed, there are potential risks/side effects with steroid injections. This can elevate your blood pressure, elevate your blood sugar, cause water weight gain, nervous energy, increased heart rate, redness to the face and dimpling of the skin where the shot goes in. Please contact your doctor if you have any of these side effects. If you have diabetes this injection will raise your blood sugar. This will normalize over the next 2-3 days. Please make sure you monitor you blood sugar accordingly. This medication can also increase you blood pressure. If you have high blood pressure please monitor your blood pressure as discussed.         Please arrange follow up with your primary medical clinic as soon as possible. You must understand that you've received an Urgent Care treatment only and that you may be released before all of your medical problems are known or treated. You, the patient, will arrange for follow up as instructed. If your symptoms worsen or fail  to improve you should go to the Emergency Room.         Go to the Emergency Department for any new or worsening symptoms including: worsening abdominal pain, dark\black\bloody bowel movements, vomiting blood, hard abdomen, fever, chest pain, shortness of breath, loss of consciousness or any other concerns.

## 2024-10-08 NOTE — PROGRESS NOTES
"Subjective:      Patient ID: Ora Henderson is a 64 y.o. female.    Vitals:  height is 5' 3" (1.6 m) and weight is 88.5 kg (195 lb). Her oral temperature is 98.2 °F (36.8 °C). Her blood pressure is 133/73 and her pulse is 82. Her respiration is 16 and oxygen saturation is 100%.     Chief Complaint: Sinus Problem    Pt reports nasal congestion, sinus pressure, cough, sore throat, chills x 3 days. Denies known fever.     Sinus Problem  The current episode started in the past 7 days. The problem has been gradually worsening since onset. There has been no fever. Her pain is at a severity of 2/10. The pain is mild. Associated symptoms include chills, congestion, coughing, sinus pressure, sneezing and a sore throat. Pertinent negatives include no diaphoresis, ear pain, headaches, hoarse voice, neck pain, shortness of breath or swollen glands. Treatments tried: theraflu. The treatment provided no relief.       Constitution: Positive for chills. Negative for sweating.   HENT:  Positive for congestion, sinus pressure and sore throat. Negative for ear pain.    Neck: Negative for neck pain.   Respiratory:  Positive for cough. Negative for shortness of breath.    Allergic/Immunologic: Positive for sneezing.   Neurological:  Negative for headaches.      Objective:     Physical Exam   Constitutional: She is oriented to person, place, and time. She appears well-developed. She is cooperative.  Non-toxic appearance. She does not appear ill. No distress.   HENT:   Head: Normocephalic and atraumatic.   Ears:   Right Ear: Hearing, external ear and ear canal normal. Tympanic membrane is erythematous. A middle ear effusion is present.   Left Ear: Hearing, external ear and ear canal normal. There is tenderness. Tympanic membrane is erythematous and bulging. A middle ear effusion is present.   Nose: Mucosal edema, rhinorrhea and purulent discharge present. No nasal deformity. No epistaxis. Right sinus exhibits maxillary " sinus tenderness and frontal sinus tenderness. Left sinus exhibits maxillary sinus tenderness and frontal sinus tenderness.   Mouth/Throat: Uvula is midline and mucous membranes are normal. No trismus in the jaw. Normal dentition. Uvula swelling present. Posterior oropharyngeal edema and posterior oropharyngeal erythema present. No oropharyngeal exudate.   Eyes: Conjunctivae and lids are normal. No scleral icterus.   Neck: Trachea normal and phonation normal. Neck supple. No edema present. No erythema present. No neck rigidity present.   Cardiovascular: Normal rate, regular rhythm, normal heart sounds and normal pulses.   Pulmonary/Chest: Effort normal and breath sounds normal. No respiratory distress. She has no decreased breath sounds. She has no rhonchi. She exhibits tenderness.       Abdominal: Normal appearance.   Musculoskeletal: Normal range of motion.         General: No deformity. Normal range of motion.   Lymphadenopathy:        Head (right side): Submental, submandibular, tonsillar and preauricular adenopathy present.        Head (left side): Submental, submandibular, tonsillar and preauricular adenopathy present.   Neurological: She is alert and oriented to person, place, and time. She exhibits normal muscle tone. Coordination normal.   Skin: Skin is warm, dry, intact, not diaphoretic and not pale.   Psychiatric: Her speech is normal and behavior is normal. Judgment and thought content normal.   Nursing note and vitals reviewed.      Assessment:     1. Acute frontal sinusitis, recurrence not specified    2. Non-recurrent acute serous otitis media of left ear    3. Cough, unspecified type    4. Sinus congestion    5. Acute sore throat    6. Chills        Plan:     Results for orders placed or performed in visit on 10/08/24   SARS Coronavirus 2 Antigen, POCT Manual Read    Collection Time: 10/08/24 10:04 AM   Result Value Ref Range    SARS Coronavirus 2 Antigen Negative Negative      Acceptable Yes      *Note: Due to a large number of results and/or encounters for the requested time period, some results have not been displayed. A complete set of results can be found in Results Review.         Acute frontal sinusitis, recurrence not specified  -     dexAMETHasone injection 10 mg  -     azithromycin (Z-RAJESH) 250 MG tablet; Take 1 tablet (250 mg total) by mouth once daily. Take two tablets on day one and take one tablet daily for the next four days. for 5 days  Dispense: 6 tablet; Refill: 0  -     fluconazole (DIFLUCAN) 150 MG Tab; Take 1 tablet (150 mg total) by mouth once. May repeat after 72 hours if symptoms persist for 1 dose  Dispense: 2 tablet; Refill: 0    Non-recurrent acute serous otitis media of left ear  -     dexAMETHasone injection 10 mg  -     azithromycin (Z-RAJESH) 250 MG tablet; Take 1 tablet (250 mg total) by mouth once daily. Take two tablets on day one and take one tablet daily for the next four days. for 5 days  Dispense: 6 tablet; Refill: 0  -     fluconazole (DIFLUCAN) 150 MG Tab; Take 1 tablet (150 mg total) by mouth once. May repeat after 72 hours if symptoms persist for 1 dose  Dispense: 2 tablet; Refill: 0    Cough, unspecified type  -     SARS Coronavirus 2 Antigen, POCT Manual Read    Sinus congestion    Acute sore throat    Chills          Medical Decision Making:   Initial Assessment:   Symptoms started Sunday and patient has had subjective fever and chills.  Has been treated with OTC and home remedies.  Patient feels like everything is moving into her chest with pain from coughing.  Also has sore throat.  Patient has a history of bacterial sinus infections.  Exam is consistent with a bacterial sinus infection and a left ear infection.  Patient is going to be treated with oral antibiotics an IM steroid injection per her request.  Discussed pros and cons of steroid treatment and the effect with her hypertension.  Patient to monitor blood pressure for the next several  days.  Differential Diagnosis:   Flu, COVID, strep, upper respiratory virus       What care is needed at home?   Ask your doctor what you need to do when you go home. Make sure you ask questions if you do not understand what you need to do.  Try to thin the mucus.  Drink lots of liquids to stay hydrated.  Use a cool mist humidifier to avoid dry air.  Use saline nose drops or a saline nose rinse to relieve stuffiness.  Wash your hands often. This will help keep others healthy.  Do not smoke or be in smoke-filled places. Avoid things that may cause breathing problems like fumes, pollution, dust, and other common allergens and irritants.  You may want to take medicine like ibuprofen, naproxen, or acetaminophen to help with pain.      Patient Instructions   You received a steroid shot today. Please keep in mind that you should not take long term steroids unless prescribed by your physician. You should not exceed 4 steroid injections in a year and if you have multiple injections they should be spaced out adequately (atleast 3 months apart). As discussed, there are potential risks/side effects with steroid injections. This can elevate your blood pressure, elevate your blood sugar, cause water weight gain, nervous energy, increased heart rate, redness to the face and dimpling of the skin where the shot goes in. Please contact your doctor if you have any of these side effects. If you have diabetes this injection will raise your blood sugar. This will normalize over the next 2-3 days. Please make sure you monitor you blood sugar accordingly. This medication can also increase you blood pressure. If you have high blood pressure please monitor your blood pressure as discussed.

## 2024-11-04 ENCOUNTER — TELEPHONE (OUTPATIENT)
Dept: INTERNAL MEDICINE | Facility: CLINIC | Age: 64
End: 2024-11-04
Payer: COMMERCIAL

## 2024-11-04 DIAGNOSIS — Z29.9 PREVENTIVE MEASURE: Primary | ICD-10-CM

## 2024-11-04 DIAGNOSIS — E78.2 MIXED HYPERLIPIDEMIA: ICD-10-CM

## 2024-11-04 DIAGNOSIS — I10 ESSENTIAL HYPERTENSION: ICD-10-CM

## 2024-11-04 NOTE — TELEPHONE ENCOUNTER
----- Message from Joaquin Katz sent at 11/4/2024  2:31 PM CST -----  Regarding: Lab Order  Contact: [phone]  Pt requesting lab order for Lipid placed to complete. Please Advise.  ----- Message -----  From: Ruth Ann Gore  Sent: 11/4/2024   2:29 PM CST  To: [facility] Staff  Subject: Order                                            Calling to get orders for labs for  Lipid and cholesterol levels to be placed in Epic for Scheduling. Please call to confirm orders are in system or faxed to provider.  [phone]

## 2024-11-06 ENCOUNTER — OFFICE VISIT (OUTPATIENT)
Dept: PULMONOLOGY | Facility: CLINIC | Age: 64
End: 2024-11-06
Payer: COMMERCIAL

## 2024-11-06 ENCOUNTER — LAB VISIT (OUTPATIENT)
Dept: LAB | Facility: HOSPITAL | Age: 64
End: 2024-11-06
Attending: FAMILY MEDICINE
Payer: COMMERCIAL

## 2024-11-06 VITALS
DIASTOLIC BLOOD PRESSURE: 88 MMHG | WEIGHT: 196.88 LBS | BODY MASS INDEX: 34.88 KG/M2 | HEIGHT: 63 IN | SYSTOLIC BLOOD PRESSURE: 138 MMHG

## 2024-11-06 DIAGNOSIS — E78.2 MIXED HYPERLIPIDEMIA: ICD-10-CM

## 2024-11-06 DIAGNOSIS — R05.8 UPPER AIRWAY COUGH SYNDROME: ICD-10-CM

## 2024-11-06 DIAGNOSIS — Z29.9 PREVENTIVE MEASURE: ICD-10-CM

## 2024-11-06 DIAGNOSIS — J45.909 ASTHMA, UNSPECIFIED ASTHMA SEVERITY, UNSPECIFIED WHETHER COMPLICATED, UNSPECIFIED WHETHER PERSISTENT: Primary | ICD-10-CM

## 2024-11-06 DIAGNOSIS — K21.00 GASTROESOPHAGEAL REFLUX DISEASE WITH ESOPHAGITIS WITHOUT HEMORRHAGE: ICD-10-CM

## 2024-11-06 DIAGNOSIS — R05.3 CHRONIC COUGH: ICD-10-CM

## 2024-11-06 DIAGNOSIS — I10 ESSENTIAL HYPERTENSION: ICD-10-CM

## 2024-11-06 LAB
ALBUMIN SERPL BCP-MCNC: 3.9 G/DL (ref 3.5–5.2)
ALP SERPL-CCNC: 72 U/L (ref 40–150)
ALT SERPL W/O P-5'-P-CCNC: 15 U/L (ref 10–44)
AMORPH CRY UR QL COMP ASSIST: ABNORMAL
ANION GAP SERPL CALC-SCNC: 11 MMOL/L (ref 8–16)
AST SERPL-CCNC: 15 U/L (ref 10–40)
BACTERIA #/AREA URNS AUTO: ABNORMAL /HPF
BASOPHILS # BLD AUTO: 0.05 K/UL (ref 0–0.2)
BASOPHILS NFR BLD: 0.9 % (ref 0–1.9)
BILIRUB SERPL-MCNC: 0.6 MG/DL (ref 0.1–1)
BILIRUB UR QL STRIP: NEGATIVE
BUN SERPL-MCNC: 15 MG/DL (ref 8–23)
CALCIUM SERPL-MCNC: 9.9 MG/DL (ref 8.7–10.5)
CAOX CRY UR QL COMP ASSIST: ABNORMAL
CHLORIDE SERPL-SCNC: 104 MMOL/L (ref 95–110)
CHOLEST SERPL-MCNC: 218 MG/DL (ref 120–199)
CHOLEST/HDLC SERPL: 4.2 {RATIO} (ref 2–5)
CLARITY UR REFRACT.AUTO: ABNORMAL
CO2 SERPL-SCNC: 26 MMOL/L (ref 23–29)
COLOR UR AUTO: ABNORMAL
CREAT SERPL-MCNC: 1 MG/DL (ref 0.5–1.4)
DIFFERENTIAL METHOD BLD: ABNORMAL
EOSINOPHIL # BLD AUTO: 0.3 K/UL (ref 0–0.5)
EOSINOPHIL NFR BLD: 5.1 % (ref 0–8)
ERYTHROCYTE [DISTWIDTH] IN BLOOD BY AUTOMATED COUNT: 13.2 % (ref 11.5–14.5)
EST. GFR  (NO RACE VARIABLE): >60 ML/MIN/1.73 M^2
ESTIMATED AVG GLUCOSE: 120 MG/DL (ref 68–131)
GLUCOSE SERPL-MCNC: 93 MG/DL (ref 70–110)
GLUCOSE UR QL STRIP: NEGATIVE
HBA1C MFR BLD: 5.8 % (ref 4–5.6)
HCT VFR BLD AUTO: 37.9 % (ref 37–48.5)
HDLC SERPL-MCNC: 52 MG/DL (ref 40–75)
HDLC SERPL: 23.9 % (ref 20–50)
HGB BLD-MCNC: 11.9 G/DL (ref 12–16)
HGB UR QL STRIP: ABNORMAL
HYALINE CASTS UR QL AUTO: 0 /LPF
IMM GRANULOCYTES # BLD AUTO: 0.01 K/UL (ref 0–0.04)
IMM GRANULOCYTES NFR BLD AUTO: 0.2 % (ref 0–0.5)
KETONES UR QL STRIP: NEGATIVE
LDLC SERPL CALC-MCNC: 136.8 MG/DL (ref 63–159)
LEUKOCYTE ESTERASE UR QL STRIP: NEGATIVE
LYMPHOCYTES # BLD AUTO: 2.4 K/UL (ref 1–4.8)
LYMPHOCYTES NFR BLD: 43.1 % (ref 18–48)
MCH RBC QN AUTO: 29 PG (ref 27–31)
MCHC RBC AUTO-ENTMCNC: 31.4 G/DL (ref 32–36)
MCV RBC AUTO: 92 FL (ref 82–98)
MICROSCOPIC COMMENT: ABNORMAL
MONOCYTES # BLD AUTO: 0.5 K/UL (ref 0.3–1)
MONOCYTES NFR BLD: 8.2 % (ref 4–15)
NEUTROPHILS # BLD AUTO: 2.4 K/UL (ref 1.8–7.7)
NEUTROPHILS NFR BLD: 42.5 % (ref 38–73)
NITRITE UR QL STRIP: NEGATIVE
NONHDLC SERPL-MCNC: 166 MG/DL
NRBC BLD-RTO: 0 /100 WBC
PH UR STRIP: 6 [PH] (ref 5–8)
PLATELET # BLD AUTO: 333 K/UL (ref 150–450)
PMV BLD AUTO: 10.6 FL (ref 9.2–12.9)
POTASSIUM SERPL-SCNC: 3.5 MMOL/L (ref 3.5–5.1)
PROT SERPL-MCNC: 7.4 G/DL (ref 6–8.4)
PROT UR QL STRIP: ABNORMAL
RBC # BLD AUTO: 4.11 M/UL (ref 4–5.4)
RBC #/AREA URNS AUTO: 8 /HPF (ref 0–4)
SODIUM SERPL-SCNC: 141 MMOL/L (ref 136–145)
SP GR UR STRIP: >=1.03 (ref 1–1.03)
SQUAMOUS #/AREA URNS AUTO: 18 /HPF
TRIGL SERPL-MCNC: 146 MG/DL (ref 30–150)
TSH SERPL DL<=0.005 MIU/L-ACNC: 2.18 UIU/ML (ref 0.4–4)
URN SPEC COLLECT METH UR: ABNORMAL
WBC # BLD AUTO: 5.52 K/UL (ref 3.9–12.7)
WBC #/AREA URNS AUTO: 0 /HPF (ref 0–5)

## 2024-11-06 PROCEDURE — 3008F BODY MASS INDEX DOCD: CPT | Mod: CPTII,S$GLB,, | Performed by: STUDENT IN AN ORGANIZED HEALTH CARE EDUCATION/TRAINING PROGRAM

## 2024-11-06 PROCEDURE — 3075F SYST BP GE 130 - 139MM HG: CPT | Mod: CPTII,S$GLB,, | Performed by: STUDENT IN AN ORGANIZED HEALTH CARE EDUCATION/TRAINING PROGRAM

## 2024-11-06 PROCEDURE — 85025 COMPLETE CBC W/AUTO DIFF WBC: CPT | Performed by: FAMILY MEDICINE

## 2024-11-06 PROCEDURE — 36415 COLL VENOUS BLD VENIPUNCTURE: CPT | Mod: PO | Performed by: FAMILY MEDICINE

## 2024-11-06 PROCEDURE — 81001 URINALYSIS AUTO W/SCOPE: CPT | Performed by: FAMILY MEDICINE

## 2024-11-06 PROCEDURE — 99214 OFFICE O/P EST MOD 30 MIN: CPT | Mod: 25,S$GLB,, | Performed by: STUDENT IN AN ORGANIZED HEALTH CARE EDUCATION/TRAINING PROGRAM

## 2024-11-06 PROCEDURE — 3079F DIAST BP 80-89 MM HG: CPT | Mod: CPTII,S$GLB,, | Performed by: STUDENT IN AN ORGANIZED HEALTH CARE EDUCATION/TRAINING PROGRAM

## 2024-11-06 PROCEDURE — 80053 COMPREHEN METABOLIC PANEL: CPT | Performed by: FAMILY MEDICINE

## 2024-11-06 PROCEDURE — 99999 PR PBB SHADOW E&M-EST. PATIENT-LVL III: CPT | Mod: PBBFAC,,, | Performed by: STUDENT IN AN ORGANIZED HEALTH CARE EDUCATION/TRAINING PROGRAM

## 2024-11-06 PROCEDURE — 84443 ASSAY THYROID STIM HORMONE: CPT | Performed by: FAMILY MEDICINE

## 2024-11-06 PROCEDURE — 83036 HEMOGLOBIN GLYCOSYLATED A1C: CPT | Performed by: FAMILY MEDICINE

## 2024-11-06 PROCEDURE — 1159F MED LIST DOCD IN RCRD: CPT | Mod: CPTII,S$GLB,, | Performed by: STUDENT IN AN ORGANIZED HEALTH CARE EDUCATION/TRAINING PROGRAM

## 2024-11-06 PROCEDURE — 80061 LIPID PANEL: CPT | Performed by: FAMILY MEDICINE

## 2024-11-06 RX ORDER — BUDESONIDE AND FORMOTEROL FUMARATE DIHYDRATE 160; 4.5 UG/1; UG/1
2 AEROSOL RESPIRATORY (INHALATION) EVERY 12 HOURS
Qty: 1 G | Refills: 3 | Status: SHIPPED | OUTPATIENT
Start: 2024-11-06 | End: 2025-11-06

## 2024-11-06 RX ORDER — AZELASTINE 1 MG/ML
1 SPRAY, METERED NASAL 2 TIMES DAILY
Qty: 1 ML | Refills: 2 | Status: SHIPPED | OUTPATIENT
Start: 2024-11-06 | End: 2025-11-06

## 2024-11-06 NOTE — PROGRESS NOTES
Chief complaint:  Chief Complaint   Patient presents with    Cough    Shortness of Breath        History of present illness -  JEF Payan comes to clinic today for chronic cough.  She tells me she has essentially had her symptoms for most of her adult life.  If she speaks lung enough she was start coughing, this cough is nonproductive and it comes goes without any specific pattern.  She was born at term, tells me she had maybe a little bit of wheezing and definitely allergies growing up, she never grew up of her allergies.  She reports symptoms consistent with rhino sinusitis, and to make things more difficult she also has severe acid reflux.  She is currently taking her acid reflux medication religiously however she does not take her nasal spray every day.    Symptoms: Cough and Shortness of breath   Risk factors: Allergic rhinitis  Known triggers: Exercise  Apparent phenotype: T2-High  Tobacco use: Lifetime non-smoker  Occupational history: Medical billing, for the [employer].       Physical examination -   Physical Exam  Vitals and nursing note reviewed.   Constitutional:       Appearance: Normal appearance.   HENT:      Head: Normocephalic and atraumatic.      Nose: Nose normal.      Mouth/Throat:      Mouth: Mucous membranes are moist.   Eyes:      Pupils: Pupils are equal, round, and reactive to light.   Cardiovascular:      Rate and Rhythm: Normal rate and regular rhythm.      Pulses: Normal pulses.      Heart sounds: Normal heart sounds.   Pulmonary:      Effort: Pulmonary effort is normal.      Breath sounds: Normal breath sounds.   Abdominal:      General: Abdomen is flat.      Palpations: Abdomen is soft.   Musculoskeletal:         General: No swelling.   Skin:     General: Skin is warm.      Capillary Refill: Capillary refill takes less than 2 seconds.   Neurological:      General: No focal deficit present.      Mental Status: She is alert.        Vitals:    11/06/24 0911   BP: 138/88   Weight: 89.3 kg  "(196 lb 13.9 oz)   Height: 5' 3" (1.6 m)      Review of Systems   Constitutional: Negative.    HENT: Negative.     Eyes: Negative.    Respiratory: Negative.     Cardiovascular: Negative.    Gastrointestinal: Negative.    Musculoskeletal: Negative.    Skin: Negative.    Neurological: Negative.        Relevant data (Independently reviewed by me) -    Prior notes -   Primary care    Labs -   Eosinophil count on 09/06/2023 was 220 but as high as 300 four years ago    Spirometry -  Not available    Imaging -   Chest x-ray performed in September 2024 was normal    Assessment & Plan    Asthma, unspecified asthma severity, unspecified whether complicated, unspecified whether persistent  -     Spirometry with/without bronchodilator & DLCO; Future  -     Stress test, pulmonary; Future; Expected date: 11/06/2024    Upper airway cough syndrome    Gastroesophageal reflux disease with esophagitis without hemorrhage    Chronic cough    Other orders  -     azelastine (ASTELIN) 137 mcg (0.1 %) nasal spray; 1 spray (137 mcg total) by Nasal route 2 (two) times daily.  Dispense: 1 mL; Refill: 2  -     budesonide-formoterol 160-4.5 mcg (SYMBICORT) 160-4.5 mcg/actuation HFAA; Inhale 2 puffs into the lungs every 12 (twelve) hours. Controller  Dispense: 1 g; Refill: 3    Patient has the usual try fact that for her symptoms, she reports constant need to clear her throat, episodic nonproductive cough and a sensation of "something wrong with my chest" when she takes a deep breath.    With a personal and a family history of allergies the likelihood of T2 high asthma has to be entertained      We will and workup for asthma with spirometry and 6 minute walk test.    She is to continue her fluticasone nasal spray daily I will add azelastine    She is to continue her PPI every day to control her GERD.    She decided to give a trial of ics Laba 2 puffs twice a day for possible asthma.    RTC in 2 months    Med Tan   11/06/2024    "

## 2025-01-06 ENCOUNTER — CLINICAL SUPPORT (OUTPATIENT)
Dept: PULMONOLOGY | Facility: CLINIC | Age: 65
End: 2025-01-06
Payer: COMMERCIAL

## 2025-01-06 VITALS — WEIGHT: 194 LBS | BODY MASS INDEX: 33.12 KG/M2 | HEIGHT: 64 IN

## 2025-01-06 DIAGNOSIS — J45.909 ASTHMA, UNSPECIFIED ASTHMA SEVERITY, UNSPECIFIED WHETHER COMPLICATED, UNSPECIFIED WHETHER PERSISTENT: ICD-10-CM

## 2025-01-06 LAB
BRPFT: ABNORMAL
DLCO SINGLE BREATH LLN: 16.43
DLCO SINGLE BREATH PRE REF: 91.7 %
DLCO SINGLE BREATH REF: 22.16
DLCOC SBVA LLN: 3.02
DLCOC SBVA REF: 4.49
DLCOC SINGLE BREATH LLN: 16.43
DLCOC SINGLE BREATH REF: 22.16
DLCOVA LLN: 3.02
DLCOVA PRE REF: 116.1 %
DLCOVA PRE: 5.21 ML/(MIN*MMHG*L) (ref 3.02–5.96)
DLCOVA REF: 4.49
FEF 25 75 CHG: -6.9 %
FEF 25 75 LLN: 1.38
FEF 25 75 POST REF: 54.8 %
FEF 25 75 PRE REF: 58.8 %
FEF 25 75 REF: 2.78
FET100 CHG: 7.3 %
FEV1 CHG: -1.5 %
FEV1 FVC CHG: 0.2 %
FEV1 FVC LLN: 67
FEV1 FVC POST REF: 97.4 %
FEV1 FVC PRE REF: 97.2 %
FEV1 FVC REF: 79
FEV1 LLN: 1.46
FEV1 POST REF: 94.3 %
FEV1 PRE REF: 95.8 %
FEV1 REF: 2.04
FVC CHG: -1.7 %
FVC LLN: 1.87
FVC POST REF: 96.4 %
FVC PRE REF: 98 %
FVC REF: 2.59
IVC PRE: 2.53 L (ref 1.87–3.34)
IVC SINGLE BREATH LLN: 1.87
IVC SINGLE BREATH PRE REF: 97.9 %
IVC SINGLE BREATH REF: 2.59
PEF CHG: 6.4 %
PEF LLN: 3.32
PEF POST REF: 96.4 %
PEF PRE REF: 90.7 %
PEF REF: 5.32
POST FEF 25 75: 1.52 L/S (ref 1.38–4.17)
POST FET 100: 6.96 SEC
POST FEV1 FVC: 77.16 % (ref 67.02–89.74)
POST FEV1: 1.92 L (ref 1.46–2.6)
POST FVC: 2.49 L (ref 1.87–3.34)
POST PEF: 5.13 L/S (ref 3.32–7.33)
PRE DLCO: 20.33 ML/(MIN*MMHG) (ref 16.43–27.89)
PRE FEF 25 75: 1.63 L/S (ref 1.38–4.17)
PRE FET 100: 6.49 SEC
PRE FEV1 FVC: 77.02 % (ref 67.02–89.74)
PRE FEV1: 1.95 L (ref 1.46–2.6)
PRE FVC: 2.54 L (ref 1.87–3.34)
PRE PEF: 4.83 L/S (ref 3.32–7.33)
VA PRE: 3.9 L (ref 4.79–4.79)
VA SINGLE BREATH LLN: 4.79
VA SINGLE BREATH PRE REF: 81.5 %
VA SINGLE BREATH REF: 4.79

## 2025-01-06 PROCEDURE — 99999 PR PBB SHADOW E&M-EST. PATIENT-LVL I: CPT | Mod: PBBFAC,,,

## 2025-01-06 PROCEDURE — 99999 PR PBB SHADOW E&M-EST. PATIENT-LVL II: CPT | Mod: PBBFAC,,,

## 2025-01-06 NOTE — PROCEDURES
"[facility] - Pulmonary Function  Six Minute Walk     SUMMARY     Ordering Provider: Dr. Tan   Interpreting Provider: Dr. Tan  Performing nurse/tech/RT: DIXON Hall CRT  Diagnosis: Asthma  Height: 5' 4" (162.6 cm)  Weight: 88 kg (194 lb 0.1 oz)  BMI (Calculated): 33.3                Phase Oxygen Assessment Supplemental O2 Heart   Rate Blood Pressure Tatianna Dyspnea Scale Rating   Resting 100 % Room Air 72 bpm 175/85 0   Exercise        Minute        1 100 % Room Air 91 bpm     2 98 % Room Air 105 bpm     3 99 % Room Air 112 bpm     4 100 % Room Air 112 bpm     5 100 % Room Air 111 bpm     6  99 % Room Air 121 bpm 166/90 0   Recovery        Minute        1 100 % Room Air 106 bpm     2 100 % Room Air 101 bpm     3 100 % Room Air 92 bpm     4 100 % Room Air 90 bpm 159/89 0     Six Minute Walk Summary  6MWT Status: completed without stopping  Patient Reported:  (back pain)     Interpretation:  Did the patient stop or pause?: No                                         Total Time Walked (Calculated): 360 seconds  Final Partial Lap Distance (feet): 100 feet  Total Distance Meters (Calculated): 396.24 meters  Predicted Distance Meters (Calculated): 438.65 meters  Percentage of Predicted (Calculated): 90.33  Peak VO2 (Calculated): 15.87  Mets: 4.53  Has The Patient Had a Previous Six Minute Walk Test?: No       Previous 6MWT Results  Has The Patient Had a Previous Six Minute Walk Test?: No         CLINICAL INTERPRETATION:  Six minute walk distance is 386 m (90 % predicted) with no dyspnea.  During exercise, there was no significant desaturation while breathing room air.  Hypertension was present prior to exercise.  This may represent normal cardiovascular response to exercise.  The patient did complete the study, walking 360 seconds of the 360 second test.  No previous study performed.  Based upon age and body mass index, exercise capacity is normal.    Summary   This is a normal 6 minute walk test demonstrating normal " exercise capacity without associated hypoxemia      [] Mild exercise-induced hypoxemia described as an arterial oxygen saturation of 93-95% (or 3-4% less than at rest)  []  Moderate exercise-induced hypoxemia as 89-93%  []  Severe exercise induced hypoxemia as < 89% O2 saturation.  Medicare Criteria for oxygen prescription comments:   []  When arterial oxygen saturation is at or below 88% during exercise (severe exercise induced hypoxemia) then the patient falls under Medicare Group 1 criteria for supplemental oxygen

## 2025-01-08 ENCOUNTER — OFFICE VISIT (OUTPATIENT)
Dept: PULMONOLOGY | Facility: CLINIC | Age: 65
End: 2025-01-08
Payer: COMMERCIAL

## 2025-01-08 VITALS
OXYGEN SATURATION: 100 % | WEIGHT: 194 LBS | DIASTOLIC BLOOD PRESSURE: 85 MMHG | HEART RATE: 72 BPM | RESPIRATION RATE: 21 BRPM | HEIGHT: 63 IN | SYSTOLIC BLOOD PRESSURE: 175 MMHG | BODY MASS INDEX: 34.38 KG/M2

## 2025-01-08 DIAGNOSIS — R05.8 UPPER AIRWAY COUGH SYNDROME: ICD-10-CM

## 2025-01-08 DIAGNOSIS — J45.20 MILD INTERMITTENT ASTHMA WITHOUT COMPLICATION: ICD-10-CM

## 2025-01-08 DIAGNOSIS — E66.01 SEVERE OBESITY (BMI 35.0-39.9) WITH COMORBIDITY: ICD-10-CM

## 2025-01-08 DIAGNOSIS — K21.9 GASTROESOPHAGEAL REFLUX DISEASE, UNSPECIFIED WHETHER ESOPHAGITIS PRESENT: Primary | ICD-10-CM

## 2025-01-08 PROCEDURE — 3008F BODY MASS INDEX DOCD: CPT | Mod: CPTII,S$GLB,, | Performed by: STUDENT IN AN ORGANIZED HEALTH CARE EDUCATION/TRAINING PROGRAM

## 2025-01-08 PROCEDURE — 3077F SYST BP >= 140 MM HG: CPT | Mod: CPTII,S$GLB,, | Performed by: STUDENT IN AN ORGANIZED HEALTH CARE EDUCATION/TRAINING PROGRAM

## 2025-01-08 PROCEDURE — 3079F DIAST BP 80-89 MM HG: CPT | Mod: CPTII,S$GLB,, | Performed by: STUDENT IN AN ORGANIZED HEALTH CARE EDUCATION/TRAINING PROGRAM

## 2025-01-08 PROCEDURE — 99999 PR PBB SHADOW E&M-EST. PATIENT-LVL IV: CPT | Mod: PBBFAC,,, | Performed by: STUDENT IN AN ORGANIZED HEALTH CARE EDUCATION/TRAINING PROGRAM

## 2025-01-08 PROCEDURE — 99213 OFFICE O/P EST LOW 20 MIN: CPT | Mod: S$GLB,,, | Performed by: STUDENT IN AN ORGANIZED HEALTH CARE EDUCATION/TRAINING PROGRAM

## 2025-01-08 PROCEDURE — 1159F MED LIST DOCD IN RCRD: CPT | Mod: CPTII,S$GLB,, | Performed by: STUDENT IN AN ORGANIZED HEALTH CARE EDUCATION/TRAINING PROGRAM

## 2025-01-08 RX ORDER — PANTOPRAZOLE SODIUM 40 MG/1
40 TABLET, DELAYED RELEASE ORAL DAILY
Qty: 90 TABLET | Refills: 3 | Status: SHIPPED | OUTPATIENT
Start: 2025-01-08 | End: 2026-01-08

## 2025-01-08 NOTE — PROGRESS NOTES
"Chief complaint:  Chief Complaint   Patient presents with    Asthma        History of present illness -  [facility]   Established clinic patient for chronic cough that seemed to be multifactorial, patient with the perfect storm with mild intermittent asthma, upper airway cough syndrome as well as GERD.  Saw me back in November for recalcitrant cough    Interval history   01/08/2025   Patient comes back after performing all the tests.  Somewhat improvement of her cough with nasal spray as well as bronchodilator use.  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   Constitutional:       Appearance: Normal appearance.   HENT:      Head: Normocephalic and atraumatic.      Nose: Nose normal.      Mouth/Throat:      Mouth: Mucous membranes are moist.   Eyes:      Pupils: Pupils are equal, round, and reactive to light.   Cardiovascular:      Rate and Rhythm: Normal rate and regular rhythm.      Pulses: Normal pulses.      Heart sounds: Normal heart sounds.   Pulmonary:      Effort: Pulmonary effort is normal.      Breath sounds: Normal breath sounds.   Abdominal:      General: Abdomen is flat.      Palpations: Abdomen is soft.   Musculoskeletal:         General: No swelling.   Skin:     General: Skin is warm.      Capillary Refill: Capillary refill takes less than 2 seconds.   Neurological:      General: No focal deficit present.      Mental Status: She is alert.        Vitals:    01/08/25 0827   BP: (!) 175/85   BP Location: Right arm   Patient Position: Sitting   Pulse: 72   Resp: (!) 21   SpO2: 100%   Weight: 88 kg (194 lb 0.1 oz)   Height: 5' 3" (1.6 m)      Review of Systems   Constitutional: Negative.    HENT: Negative.     Eyes: Negative.    Respiratory:  Positive for cough.    Cardiovascular: Negative.    Gastrointestinal: Negative.    Musculoskeletal: Negative.    Skin: Negative.    Neurological: Negative.        Relevant " data (Independently reviewed by me) -    Spirometry -  01/06/2025 spirometry was normal   01/06/2025 6 minute walk test was normal    Imaging -   09/06/2024 chest x-ray was normal    Assessment & Plan    Gastroesophageal reflux disease, unspecified whether esophagitis present  -     pantoprazole (PROTONIX) 40 MG tablet; Take 1 tablet (40 mg total) by mouth once daily.  Dispense: 90 tablet; Refill: 3    Mild intermittent asthma without complication    Upper airway cough syndrome    Some improvement of the chronic cough with triple medication use, at this point we will continue to use her PPI daily as she has significant GERD.    Suspect a big component of upper airway cough syndrome, continue with double nasal spray therapy with fluticasone and azelastine.  I advised the patient to continue to use the above mentioned medications but to use her ics Laba in an as-needed basis.    RTC 6 months    Med Tan   01/08/2025

## 2025-02-03 ENCOUNTER — OFFICE VISIT (OUTPATIENT)
Dept: URGENT CARE | Facility: CLINIC | Age: 65
End: 2025-02-03
Payer: COMMERCIAL

## 2025-02-03 VITALS
TEMPERATURE: 98 F | OXYGEN SATURATION: 100 % | WEIGHT: 194 LBS | HEART RATE: 102 BPM | DIASTOLIC BLOOD PRESSURE: 87 MMHG | SYSTOLIC BLOOD PRESSURE: 132 MMHG | BODY MASS INDEX: 34.37 KG/M2 | RESPIRATION RATE: 18 BRPM

## 2025-02-03 DIAGNOSIS — J06.9 VIRAL URI: ICD-10-CM

## 2025-02-03 DIAGNOSIS — R05.9 COUGH, UNSPECIFIED TYPE: Primary | ICD-10-CM

## 2025-02-03 LAB
CTP QC/QA: YES
CTP QC/QA: YES
POC MOLECULAR INFLUENZA A AGN: NEGATIVE
POC MOLECULAR INFLUENZA B AGN: NEGATIVE
SARS CORONAVIRUS 2 ANTIGEN: NEGATIVE

## 2025-02-03 PROCEDURE — 96372 THER/PROPH/DIAG INJ SC/IM: CPT | Mod: S$GLB,,, | Performed by: EMERGENCY MEDICINE

## 2025-02-03 PROCEDURE — 87502 INFLUENZA DNA AMP PROBE: CPT | Mod: QW,S$GLB,, | Performed by: PHYSICIAN ASSISTANT

## 2025-02-03 PROCEDURE — 87811 SARS-COV-2 COVID19 W/OPTIC: CPT | Mod: QW,S$GLB,, | Performed by: PHYSICIAN ASSISTANT

## 2025-02-03 PROCEDURE — 99214 OFFICE O/P EST MOD 30 MIN: CPT | Mod: 25,S$GLB,, | Performed by: PHYSICIAN ASSISTANT

## 2025-02-03 RX ORDER — BROMPHENIRAMINE MALEATE, PSEUDOEPHEDRINE HYDROCHLORIDE, AND DEXTROMETHORPHAN HYDROBROMIDE 2; 30; 10 MG/5ML; MG/5ML; MG/5ML
10 SYRUP ORAL EVERY 6 HOURS PRN
Qty: 118 ML | Refills: 0 | Status: SHIPPED | OUTPATIENT
Start: 2025-02-03 | End: 2025-02-13

## 2025-02-03 RX ORDER — DEXAMETHASONE SODIUM PHOSPHATE 10 MG/ML
10 INJECTION INTRAMUSCULAR; INTRAVENOUS
Status: COMPLETED | OUTPATIENT
Start: 2025-02-03 | End: 2025-02-03

## 2025-02-03 RX ADMIN — DEXAMETHASONE SODIUM PHOSPHATE 10 MG: 10 INJECTION INTRAMUSCULAR; INTRAVENOUS at 04:02

## 2025-02-03 NOTE — PROGRESS NOTES
Subjective:      Patient ID: Ora Henderson is a 64 y.o. female.    Vitals:  weight is 88 kg (194 lb 0.1 oz). Her axillary temperature is 97.8 °F (36.6 °C). Her blood pressure is 132/87 and her pulse is 102. Her respiration is 18 and oxygen saturation is 100%.     Chief Complaint: Sinusitis    Pt presents with body aches, fatigue, cough and congestion starting yesterday. Pt states her and her  just got back yesterday from [place] they flew there.Pt states she has been taking theraflu and doing nasal sprays for sxs.    Sinusitis  This is a new problem. The current episode started yesterday. The problem has been gradually worsening since onset. The maximum temperature recorded prior to her arrival was 100.4 - 100.9 F. The fever has been present for 1 to 2 days. Her pain is at a severity of 4/10. The pain is mild. Associated symptoms include congestion, coughing, ear pain, headaches, shortness of breath, sinus pressure and a sore throat. Past treatments include oral decongestants and saline nose sprays. The treatment provided no relief.       HENT:  Positive for ear pain, congestion, sinus pressure and sore throat.    Respiratory:  Positive for cough and shortness of breath.    Neurological:  Positive for headaches.      Objective:     Physical Exam   Constitutional: She is oriented to person, place, and time. She appears well-developed.   HENT:   Head: Normocephalic and atraumatic.   Ears:   Right Ear: External ear normal.   Left Ear: External ear normal.   Nose: Congestion present.   Mouth/Throat: Oropharynx is clear and moist.   Eyes: Conjunctivae, EOM and lids are normal.   Neck: Trachea normal and phonation normal. Neck supple.   Cardiovascular: Normal rate.   Pulmonary/Chest: Effort normal. No respiratory distress. She has no wheezes. She has no rhonchi.   Musculoskeletal: Normal range of motion.         General: Normal range of motion.   Neurological: She is alert and oriented to person,  place, and time.   Skin: Skin is warm, dry and intact.   Psychiatric: Her speech is normal and behavior is normal. Judgment and thought content normal.   Nursing note and vitals reviewed.      Assessment:     1. Cough, unspecified type    2. Viral URI      Results for orders placed or performed in visit on 02/03/25   POCT Influenza A/B MOLECULAR    Collection Time: 02/03/25  4:23 PM   Result Value Ref Range    POC Molecular Influenza A Ag Negative Negative    POC Molecular Influenza B Ag Negative Negative     Acceptable Yes    SARS Coronavirus 2 Antigen, POCT Manual Read    Collection Time: 02/03/25  4:25 PM   Result Value Ref Range    SARS Coronavirus 2 Antigen Negative Negative, Presumptive Negative     Acceptable Yes      *Note: Due to a large number of results and/or encounters for the requested time period, some results have not been displayed. A complete set of results can be found in Results Review.     Patient requested steroid injection. Discussed risks & possible side effects of steroid injection. Pt verbalized understanding of risks associated with injection and wished to proceed with treatment.     Plan:   VSS. Patient non-toxic appearing. Discussed medication being prescribed.  Advised patient to follow up with PCP as needed.  Patient verbalized understanding, agrees with the plan, and is comfortable with discharge.      Cough, unspecified type  -     POCT Influenza A/B MOLECULAR  -     SARS Coronavirus 2 Antigen, POCT Manual Read  -     brompheniramine-pseudoeph-DM (BROMFED DM) 2-30-10 mg/5 mL Syrp; Take 10 mLs by mouth every 6 (six) hours as needed.  Dispense: 118 mL; Refill: 0    Viral URI    Other orders  -     dexAMETHasone injection 10 mg      Medical Decision Making:   Clinical Tests:   Lab Tests: Ordered and Reviewed       <> Summary of Lab: COVID negative  Flu negative

## 2025-02-12 ENCOUNTER — OFFICE VISIT (OUTPATIENT)
Dept: INTERNAL MEDICINE | Facility: CLINIC | Age: 65
End: 2025-02-12
Payer: COMMERCIAL

## 2025-02-12 ENCOUNTER — LAB VISIT (OUTPATIENT)
Dept: LAB | Facility: HOSPITAL | Age: 65
End: 2025-02-12
Attending: FAMILY MEDICINE
Payer: COMMERCIAL

## 2025-02-12 VITALS
BODY MASS INDEX: 34.76 KG/M2 | TEMPERATURE: 98 F | WEIGHT: 196.19 LBS | SYSTOLIC BLOOD PRESSURE: 122 MMHG | DIASTOLIC BLOOD PRESSURE: 74 MMHG | HEART RATE: 92 BPM

## 2025-02-12 DIAGNOSIS — E78.2 MIXED HYPERLIPIDEMIA: ICD-10-CM

## 2025-02-12 DIAGNOSIS — G47.33 OSA (OBSTRUCTIVE SLEEP APNEA): ICD-10-CM

## 2025-02-12 DIAGNOSIS — I10 ESSENTIAL HYPERTENSION: Chronic | ICD-10-CM

## 2025-02-12 DIAGNOSIS — Z86.0100 HX OF COLONIC POLYP: ICD-10-CM

## 2025-02-12 DIAGNOSIS — Z00.00 ROUTINE GENERAL MEDICAL EXAMINATION AT A HEALTH CARE FACILITY: Primary | ICD-10-CM

## 2025-02-12 DIAGNOSIS — Z86.018 HISTORY OF BENIGN PITUITARY TUMOR: Chronic | ICD-10-CM

## 2025-02-12 DIAGNOSIS — Z00.00 ROUTINE GENERAL MEDICAL EXAMINATION AT A HEALTH CARE FACILITY: ICD-10-CM

## 2025-02-12 DIAGNOSIS — M47.22 OSTEOARTHRITIS OF SPINE WITH RADICULOPATHY, CERVICAL REGION: Chronic | ICD-10-CM

## 2025-02-12 DIAGNOSIS — H61.23 EXCESSIVE CERUMEN IN BOTH EAR CANALS: ICD-10-CM

## 2025-02-12 DIAGNOSIS — E66.811 OBESITY (BMI 30.0-34.9): ICD-10-CM

## 2025-02-12 DIAGNOSIS — K21.9 GASTROESOPHAGEAL REFLUX DISEASE WITHOUT ESOPHAGITIS: Chronic | ICD-10-CM

## 2025-02-12 DIAGNOSIS — M47.26 OSTEOARTHRITIS OF SPINE WITH RADICULOPATHY, LUMBAR REGION: ICD-10-CM

## 2025-02-12 DIAGNOSIS — J30.1 SEASONAL ALLERGIC RHINITIS DUE TO POLLEN: ICD-10-CM

## 2025-02-12 PROBLEM — E66.01 SEVERE OBESITY (BMI 35.0-39.9) WITH COMORBIDITY: Status: RESOLVED | Noted: 2018-11-12 | Resolved: 2025-02-12

## 2025-02-12 PROBLEM — M46.1 SACROILIITIS: Status: RESOLVED | Noted: 2024-02-08 | Resolved: 2025-02-12

## 2025-02-12 LAB
ALBUMIN SERPL BCP-MCNC: 3.9 G/DL (ref 3.5–5.2)
ALP SERPL-CCNC: 71 U/L (ref 40–150)
ALT SERPL W/O P-5'-P-CCNC: 16 U/L (ref 10–44)
ANION GAP SERPL CALC-SCNC: 9 MMOL/L (ref 8–16)
AST SERPL-CCNC: 14 U/L (ref 10–40)
BASOPHILS # BLD AUTO: 0.05 K/UL (ref 0–0.2)
BASOPHILS NFR BLD: 0.9 % (ref 0–1.9)
BILIRUB SERPL-MCNC: 0.7 MG/DL (ref 0.1–1)
BUN SERPL-MCNC: 9 MG/DL (ref 8–23)
CALCIUM SERPL-MCNC: 9.4 MG/DL (ref 8.7–10.5)
CHLORIDE SERPL-SCNC: 102 MMOL/L (ref 95–110)
CHOLEST SERPL-MCNC: 179 MG/DL (ref 120–199)
CHOLEST/HDLC SERPL: 3.3 {RATIO} (ref 2–5)
CO2 SERPL-SCNC: 27 MMOL/L (ref 23–29)
CREAT SERPL-MCNC: 0.7 MG/DL (ref 0.5–1.4)
DIFFERENTIAL METHOD BLD: NORMAL
EOSINOPHIL # BLD AUTO: 0.2 K/UL (ref 0–0.5)
EOSINOPHIL NFR BLD: 3.6 % (ref 0–8)
ERYTHROCYTE [DISTWIDTH] IN BLOOD BY AUTOMATED COUNT: 12.8 % (ref 11.5–14.5)
EST. GFR  (NO RACE VARIABLE): >60 ML/MIN/1.73 M^2
ESTIMATED AVG GLUCOSE: 134 MG/DL (ref 68–131)
GLUCOSE SERPL-MCNC: 100 MG/DL (ref 70–110)
HBA1C MFR BLD: 6.3 % (ref 4–5.6)
HCT VFR BLD AUTO: 39.1 % (ref 37–48.5)
HDLC SERPL-MCNC: 55 MG/DL (ref 40–75)
HDLC SERPL: 30.7 % (ref 20–50)
HGB BLD-MCNC: 12.6 G/DL (ref 12–16)
IMM GRANULOCYTES # BLD AUTO: 0.01 K/UL (ref 0–0.04)
IMM GRANULOCYTES NFR BLD AUTO: 0.2 % (ref 0–0.5)
LDLC SERPL CALC-MCNC: 94 MG/DL (ref 63–159)
LYMPHOCYTES # BLD AUTO: 2.4 K/UL (ref 1–4.8)
LYMPHOCYTES NFR BLD: 45.5 % (ref 18–48)
MCH RBC QN AUTO: 29.4 PG (ref 27–31)
MCHC RBC AUTO-ENTMCNC: 32.2 G/DL (ref 32–36)
MCV RBC AUTO: 91 FL (ref 82–98)
MONOCYTES # BLD AUTO: 0.4 K/UL (ref 0.3–1)
MONOCYTES NFR BLD: 7.4 % (ref 4–15)
NEUTROPHILS # BLD AUTO: 2.2 K/UL (ref 1.8–7.7)
NEUTROPHILS NFR BLD: 42.4 % (ref 38–73)
NONHDLC SERPL-MCNC: 124 MG/DL
NRBC BLD-RTO: 0 /100 WBC
PLATELET # BLD AUTO: 330 K/UL (ref 150–450)
PMV BLD AUTO: 10 FL (ref 9.2–12.9)
POTASSIUM SERPL-SCNC: 3.8 MMOL/L (ref 3.5–5.1)
PROT SERPL-MCNC: 7.4 G/DL (ref 6–8.4)
RBC # BLD AUTO: 4.29 M/UL (ref 4–5.4)
SODIUM SERPL-SCNC: 138 MMOL/L (ref 136–145)
TRIGL SERPL-MCNC: 150 MG/DL (ref 30–150)
TSH SERPL DL<=0.005 MIU/L-ACNC: 1.26 UIU/ML (ref 0.4–4)
WBC # BLD AUTO: 5.27 K/UL (ref 3.9–12.7)

## 2025-02-12 PROCEDURE — 3074F SYST BP LT 130 MM HG: CPT | Mod: CPTII,S$GLB,, | Performed by: FAMILY MEDICINE

## 2025-02-12 PROCEDURE — 80053 COMPREHEN METABOLIC PANEL: CPT | Performed by: FAMILY MEDICINE

## 2025-02-12 PROCEDURE — 99396 PREV VISIT EST AGE 40-64: CPT | Mod: S$GLB,,, | Performed by: FAMILY MEDICINE

## 2025-02-12 PROCEDURE — 99999 PR PBB SHADOW E&M-EST. PATIENT-LVL IV: CPT | Mod: PBBFAC,,, | Performed by: FAMILY MEDICINE

## 2025-02-12 PROCEDURE — 36415 COLL VENOUS BLD VENIPUNCTURE: CPT | Performed by: FAMILY MEDICINE

## 2025-02-12 PROCEDURE — 85025 COMPLETE CBC W/AUTO DIFF WBC: CPT | Performed by: FAMILY MEDICINE

## 2025-02-12 PROCEDURE — 1159F MED LIST DOCD IN RCRD: CPT | Mod: CPTII,S$GLB,, | Performed by: FAMILY MEDICINE

## 2025-02-12 PROCEDURE — 3078F DIAST BP <80 MM HG: CPT | Mod: CPTII,S$GLB,, | Performed by: FAMILY MEDICINE

## 2025-02-12 PROCEDURE — 84443 ASSAY THYROID STIM HORMONE: CPT | Performed by: FAMILY MEDICINE

## 2025-02-12 PROCEDURE — 83036 HEMOGLOBIN GLYCOSYLATED A1C: CPT | Performed by: FAMILY MEDICINE

## 2025-02-12 PROCEDURE — 80061 LIPID PANEL: CPT | Performed by: FAMILY MEDICINE

## 2025-02-12 PROCEDURE — 3008F BODY MASS INDEX DOCD: CPT | Mod: CPTII,S$GLB,, | Performed by: FAMILY MEDICINE

## 2025-02-12 RX ORDER — EZETIMIBE 10 MG/1
10 TABLET ORAL DAILY
Qty: 90 TABLET | Refills: 1 | Status: SHIPPED | OUTPATIENT
Start: 2025-02-12

## 2025-02-12 RX ORDER — DICLOFENAC SODIUM 75 MG/1
75 TABLET, DELAYED RELEASE ORAL 2 TIMES DAILY PRN
Qty: 60 TABLET | Refills: 3 | Status: SHIPPED | OUTPATIENT
Start: 2025-02-12

## 2025-02-12 RX ORDER — AMLODIPINE BESYLATE 10 MG/1
10 TABLET ORAL DAILY
Qty: 90 TABLET | Refills: 1 | Status: SHIPPED | OUTPATIENT
Start: 2025-02-12

## 2025-02-12 RX ORDER — ROSUVASTATIN CALCIUM 40 MG/1
40 TABLET, COATED ORAL NIGHTLY
Qty: 90 TABLET | Refills: 1 | Status: SHIPPED | OUTPATIENT
Start: 2025-02-12

## 2025-02-12 NOTE — PROGRESS NOTES
Subjective:       Patient ID: Ora Henderson is a 64 y.o. female presents with   Patient Active Problem List   Diagnosis    DJD (degenerative joint disease), cervical-mild    Mixed hyperlipidemia    Hepatomegaly    Family history of cardiovascular disease    GERD (gastroesophageal reflux disease)    History of benign pituitary tumor    Hx of colonic polyp    Essential hypertension    Osteoarthritis of spine with radiculopathy, lumbar region    NATALIE (obstructive sleep apnea)    Obesity (BMI 30.0-34.9)        Chief Complaint: Annual Exam (No other concerns voiced)    History of Present Illness    Ora presents today for routine annual physicals and reports  persistent sinus symptoms after recent urgent care visit She was diagnosed with a sinus infection at urgent care on the 3rd following plane travel. She reports ongoing symptoms including significant sinus pressure. She tested negative for flu and COVID. She is currently using prescribed nasal spray and cough syrup for symptom management. She denies taking oral antihistamines such as Claritin or Zyrtec. She has history of mild anemia diagnosed in November requiring iron supplementation. She was found to be in early pre-diabetic range on A1c. She has arthritis managed with Diclofenac. She uses Symbicort as needed for breathing, Crestor and Zetia for cholesterol management, and melatonin for sleep. She exercises 3 times per week on Mondays, Wednesdays, and Fridays.  Patient requesting refills on few medications today.  Reports that she has clicking sensation to the right shoulder off and on lately      ROS:  General: -fever, -chills, -fatigue, -weight gain, -weight loss, -loss of appetite  Eyes: -vision changes, -blurry vision, -eye pain, -eye discharge  ENT: -ear pain, -hearing loss, -tinnitus, +nasal congestion, -sore throat, + sinus pressure  Cardiovascular: -chest pain, -palpitations, -lower extremity edema  Respiratory: -cough, -shortness of  breath, -wheezing, -sputum production  Endocrine: -polyuria, -polydipsia, -heat intolerance, -cold intolerance  Gastrointestinal: -abdominal pain, -heartburn, -nausea, -vomiting, -diarrhea, -constipation, -blood in stool  Genitourinary: -dysuria, -urgency, -frequency, -hematuria, -nocturia, -incontinence  Heme & Lymphatic: -easy or excessive bleeding, -easy bruising, -swollen lymph nodes  Musculoskeletal: -muscle pain, +back pain, -joint pain, -joint swelling  Skin: -rash, -lesion, -itching, -skin texture changes, -skin color changes  Neurological: -headache, -dizziness, -numbness, -tingling, -seizure activity, -speech difficulty, -memory loss, -confusion  Psychiatric: -anxiety, -depression, -sleep difficulty                /74 (BP Location: Right arm, Patient Position: Sitting)   Pulse 92   Temp 97.5 °F (36.4 °C) (Temporal)   Wt 89 kg (196 lb 3.4 oz)   LMP  (LMP Unknown)   BMI 34.76 kg/m²   Objective:      Physical Exam  Constitutional:       Appearance: She is well-developed.   HENT:      Head: Normocephalic and atraumatic.      Right Ear: There is impacted cerumen.      Left Ear: There is impacted cerumen.      Ears:      Comments: Noted excess cerumen in bilateral tympanic membranes     Nose: Congestion present.      Comments: Positive for sinus pressure in bilateral maxillary sinuses     Mouth/Throat:      Mouth: Mucous membranes are moist.      Pharynx: No oropharyngeal exudate.   Cardiovascular:      Rate and Rhythm: Normal rate and regular rhythm.      Heart sounds: Normal heart sounds. No murmur heard.  Pulmonary:      Effort: Pulmonary effort is normal.      Breath sounds: Normal breath sounds. No wheezing.   Abdominal:      General: Bowel sounds are normal.      Palpations: Abdomen is soft.      Tenderness: There is no abdominal tenderness.   Musculoskeletal:         General: Tenderness present.      Comments: Noted clicking sensation to the right shoulder anteriorly with minimal  tenderness  Paraspinal lumbar muscle tenderness noted to the lumbar spine in the midline   Skin:     General: Skin is warm and dry.      Findings: No rash.   Neurological:      Mental Status: She is alert and oriented to person, place, and time.   Psychiatric:         Mood and Affect: Mood normal.           Assessment/Plan:   1. Routine general medical examination at a health care facility  -     Urinalysis, Reflex to Urine Culture Urine, Clean Catch; Future; Expected date: 02/12/2025  -     Hemoglobin A1C; Future; Expected date: 02/12/2025  -     TSH; Future; Expected date: 02/12/2025  -     Lipid Panel; Future; Expected date: 02/12/2025  -     Comprehensive Metabolic Panel; Future; Expected date: 02/12/2025  -     CBC Auto Differential; Future; Expected date: 02/12/2025  Vital signs stable today.  Clinical exam stable  Continue lifestyle modifications with low-fat and low-cholesterol diet and exercise 30 minutes daily  Patient was encouraged to consider getting pneumonia and RSV vaccination, and consider shingles and tetanus later    2. Essential hypertension  -     Urinalysis, Reflex to Urine Culture Urine, Clean Catch; Future; Expected date: 02/12/2025  -     TSH; Future; Expected date: 02/12/2025  -     Lipid Panel; Future; Expected date: 02/12/2025  -     Comprehensive Metabolic Panel; Future; Expected date: 02/12/2025  -     amLODIPine (NORVASC) 10 MG tablet; Take 1 tablet (10 mg total) by mouth once daily.  Dispense: 90 tablet; Refill: 1  Blood pressure is stable currently on amlodipine 10 mg, refill given today    3. Mixed hyperlipidemia  -     Lipid Panel; Future; Expected date: 02/12/2025  -     ezetimibe (ZETIA) 10 mg tablet; Take 1 tablet (10 mg total) by mouth once daily.  Dispense: 90 tablet; Refill: 1  -     rosuvastatin (CRESTOR) 40 MG Tab; Take 1 tablet (40 mg total) by mouth every evening.  Dispense: 90 tablet; Refill: 1  Currently taking Crestor 40 mg and Zetia 10 mg, refills given today    4.  Gastroesophageal reflux disease without esophagitis  Stable on pantoprazole 40 mg daily    5. NATALIE (obstructive sleep apnea)  Overview:  Not on CPAP    Orders:  -     Ambulatory referral/consult to Pulmonology; Future; Expected date: 02/19/2025  Patient reports that she was never tested for sleep apnea, will refer to pulmonology for further evaluation    6. Osteoarthritis of spine with radiculopathy, lumbar region  Overview:  Dr Tyson s/p ARMANDO    Orders:  -     diclofenac (VOLTAREN) 75 MG EC tablet; Take 1 tablet (75 mg total) by mouth 2 (two) times daily as needed (pain).  Dispense: 60 tablet; Refill: 3  Clinically doing well on diclofenac, refill given today  If continues to have ongoing symptoms discuss further with pain management    7. Osteoarthritis of spine with radiculopathy, cervical region  Stable and asymptomatic    8. History of benign pituitary tumor  Overview:  She had pituitary Tumor s/p resection in 2001. She is asymptomatic but still concern of recurrence. She had no MRI follow up for long time. It was explained that there is remote chance of recurrence but can happens. Recent MRI of the brain did not show any changes.  Clinically doing well    9. Hx of colonic polyp    10. Obesity (BMI 30.0-34.9)  Lifestyle modifications recommended to lose weight with BMI 34    Patient's secondary to excess cerumen was advised to avoid using Q-tips and start using over-the-counter earwax drops every 2 weeks  Secondary to sinusitis encouraged to add over-the-counter Claritin or Zyrtec in addition to nasal spray Astelin, drink adequate fluids           This note was generated with the assistance of ambient listening technology. Verbal consent was obtained by the patient and accompanying visitor(s) for the recording of patient appointment to facilitate this note. I attest to having reviewed and edited the generated note for accuracy, though some syntax or spelling errors may persist. Please contact the author of this  note for any clarification.

## 2025-02-17 ENCOUNTER — PATIENT MESSAGE (OUTPATIENT)
Dept: INTERNAL MEDICINE | Facility: CLINIC | Age: 65
End: 2025-02-17
Payer: COMMERCIAL

## 2025-02-17 ENCOUNTER — PATIENT MESSAGE (OUTPATIENT)
Dept: PULMONOLOGY | Facility: CLINIC | Age: 65
End: 2025-02-17
Payer: COMMERCIAL

## 2025-02-17 DIAGNOSIS — R73.03 PREDIABETES: Primary | ICD-10-CM

## 2025-02-18 ENCOUNTER — OFFICE VISIT (OUTPATIENT)
Dept: GASTROENTEROLOGY | Facility: CLINIC | Age: 65
End: 2025-02-18
Payer: COMMERCIAL

## 2025-02-18 VITALS
HEIGHT: 63 IN | SYSTOLIC BLOOD PRESSURE: 139 MMHG | BODY MASS INDEX: 34.25 KG/M2 | HEART RATE: 77 BPM | DIASTOLIC BLOOD PRESSURE: 72 MMHG | WEIGHT: 193.31 LBS

## 2025-02-18 DIAGNOSIS — R10.13 EPIGASTRIC PAIN: ICD-10-CM

## 2025-02-18 DIAGNOSIS — K21.9 GASTROESOPHAGEAL REFLUX DISEASE, UNSPECIFIED WHETHER ESOPHAGITIS PRESENT: Primary | ICD-10-CM

## 2025-02-18 RX ORDER — METFORMIN HYDROCHLORIDE 500 MG/1
500 TABLET ORAL
Qty: 90 TABLET | Refills: 3 | Status: SHIPPED | OUTPATIENT
Start: 2025-02-18 | End: 2026-02-18

## 2025-02-18 NOTE — PROGRESS NOTES
"Clinic Follow Up:  [facility] Follow Up Note    Reason for Follow Up:  The primary encounter diagnosis was Gastroesophageal reflux disease, unspecified whether esophagitis present. A diagnosis of Epigastric pain was also pertinent to this visit.    PCP: Zuly Montes       HPI:  This is a 64 y.o. female here for follow up.   She has chronic intermittent abdominal pain. Abdominal pain is epigastric. She does notice it more when she is moving around but eating the "wrong things" can trigger the pain as well.   She has chronic GERD but is on pantoprazole 40 mg daily and feels it is well controlled.   She is concerned about cancer as the cause of her symptoms and would like further evaluation.   She does have hx of hernia repair with mesh.   Her last colonoscopy was in 2021- one TA polyp. Recall in 7 years  Last EGD (2013)- hiatal hernia; gastritis; fundic gland gastric polyp. Negative for H. Pylori.   No recent abdominal imaging.     Review of Systems   Constitutional:  Negative for activity change and appetite change.        As per interval history above   Respiratory:  Negative for cough and shortness of breath.    Cardiovascular:  Negative for chest pain.   Gastrointestinal:  Positive for abdominal pain. Negative for diarrhea and nausea.   Skin:  Negative for color change and rash.       Medical History:  Past Medical History:   Diagnosis Date    Arthritis     Encounter for blood transfusion     Fatty liver     GERD (gastroesophageal reflux disease)     Hernia, umbilical     reducible    Hyperlipidemia     Hypertension     Rotator cuff tear     Sleep apnea        Surgical History:   Past Surgical History:   Procedure Laterality Date    ARTHROSCOPIC DEBRIDEMENT OF SHOULDER Right 12/30/2019    Procedure: DEBRIDEMENT, SHOULDER, ARTHROSCOPIC;  Surgeon: Laureano Cox MD;  Location: [facility];  Service: Orthopedics;  Laterality: Right;    ARTHROSCOPY OF SHOULDER WITH DECOMPRESSION OF SUBACROMIAL " SPACE Right 2019    Procedure: ARTHROSCOPY, SHOULDER, WITH SUBACROMIAL SPACE DECOMPRESSION;  Surgeon: Laureano Cox MD;  Location: [facility] OR;  Service: Orthopedics;  Laterality: Right;    BICEPS TENDON REPAIR Right 2019    Procedure: REPAIR, TENDON, BICEPS;  Surgeon: Laureano Cox MD;  Location: [facility] OR;  Service: Orthopedics;  Laterality: Right;  arthroscopic Biceps tenodesis    BRAIN SURGERY      Pituitary tumor removal 2001 x 2     CARPAL TUNNEL RELEASE Bilateral     x 2    CERVICAL BIOPSY  W/ LOOP ELECTRODE EXCISION      CKC '81     SECTION      x 1    COLONOSCOPY N/A 2016    Procedure: COLONOSCOPY;  Surgeon: Quique Paul MD;  Location: [facility] ENDO;  Service: Endoscopy;  Laterality: N/A;    COLONOSCOPY N/A 2021    Procedure: COLONOSCOPY;  Surgeon: Mari Tucker MD;  Location: [facility] ENDO;  Service: Endoscopy;  Laterality: N/A;    DISTAL CLAVICLE EXCISION Right 2019    Procedure: EXCISION, CLAVICLE, DISTAL;  Surgeon: Laureano Cox MD;  Location: [facility] OR;  Service: Orthopedics;  Laterality: Right;  arthroscopic    EPIDURAL STEROID INJECTION INTO CERVICAL SPINE N/A 09/15/2022    Procedure: C7/T1 IL ARMANDO, Right Paramedian Approach;  Surgeon: Yung Treadwell MD;  Location: [facility] PAIN MGT;  Service: Pain Management;  Laterality: N/A;    GANGLION CYST EXCISION Left 2018    HERNIA REPAIR      HYSTERECTOMY      INJECTION OF JOINT Right 2023    Procedure: right GT bursa + right SIJ injection;  Surgeon: Yung Treadwell MD;  Location: [facility] PAIN MGT;  Service: Pain Management;  Laterality: Right;    INJECTION OF JOINT Right 2024    Procedure: Right GT bursa injection;  Surgeon: Yung Treadwell MD;  Location: [facility] PAIN MGT;  Service: Pain Management;  Laterality: Right;    REFRACTIVE SURGERY      ROTATOR CUFF REPAIR Right 2019    Procedure: REPAIR, ROTATOR CUFF;  Surgeon: Laureano Cox MD;  Location: [facility] OR;  Service:  Orthopedics;  Laterality: Right;  arthroscopic    SELECTIVE INJECTION OF ANESTHETIC AGENT AROUND LUMBAR SPINAL NERVE ROOT BY TRANSFORAMINAL APPROACH Bilateral 10/07/2021    Procedure: BLOCK, SPINAL NERVE ROOT, LUMBAR, SELECTIVE, TRANSFORAMINAL APPROACH bilateral L4-5 Rn IV sedation;  Surgeon: Damon Charlton MD;  Location: [facility] PAIN MGT;  Service: Pain Management;  Laterality: Bilateral;    SELECTIVE INJECTION OF ANESTHETIC AGENT AROUND LUMBAR SPINAL NERVE ROOT BY TRANSFORAMINAL APPROACH Bilateral 12/29/2021    Procedure: BLOCK, SPINAL NERVE ROOT, LUMBAR, SELECTIVE, TRANSFORAMINAL APPROACH bilateral S1 RN IV sedation;  Surgeon: Damon Charlton MD;  Location: [facility] PAIN MGT;  Service: Pain Management;  Laterality: Bilateral;    SYNOVECTOMY OF SHOULDER Right 12/30/2019    Procedure: SYNOVECTOMY, SHOULDER;  Surgeon: Laureano Cox MD;  Location: [facility] OR;  Service: Orthopedics;  Laterality: Right;  arthroscopic    TOTAL ABDOMINAL HYSTERECTOMY W/ BILATERAL SALPINGOOPHORECTOMY  2006    STAR/BSO secondary to endometriosis    VENTRAL HERNIA REPAIR  2016       Family History:   Family History   Problem Relation Name Age of Onset    Hypertension Mother Mk Lynch     Arthritis Mother Mk Lynch     Hypertension Father Jessica cervantes     Prostate cancer Father Jessica cervantes     Heart disease Father Jessica cervantes     Heart attack Sister Krystal 30    Asthma Sister Krystal     Heart attack Brother  32    Cancer Maternal Aunt Sherrell Weeks         pancreas    Diabetes Maternal Aunt Sung justin     Cancer Maternal Uncle Dinh lynch         pancreas    Heart disease Paternal Uncle      Heart disease Paternal Grandmother         Social History:   Social History[1]    Allergies:   Review of patient's allergies indicates:   Allergen Reactions    Sulfa (sulfonamide antibiotics) Hives       Home Medications:  Medications Ordered Prior to Encounter[2]    /72 (BP Location: Left arm,  "Patient Position: Sitting)   Pulse 77   Ht 5' 3" (1.6 m)   Wt 87.7 kg (193 lb 5.5 oz)   LMP  (LMP Unknown)   BMI 34.25 kg/m²   Body mass index is 34.25 kg/m².  Physical Exam  Constitutional:       General: She is not in acute distress.  HENT:      Head: Normocephalic.   Neurological:      General: No focal deficit present.      Mental Status: She is alert.   Psychiatric:         Mood and Affect: Mood normal.         Judgment: Judgment normal.         Labs: Pertinent labs reviewed.  CRC Screening: up to date     Assessment:   1. Gastroesophageal reflux disease, unspecified whether esophagitis present    2. Epigastric pain        Recommendations:   - continue pantoprazole  - EGD for further evaluation  - A referral has been placed for endoscopy procedure scheduling and phone/audio appointment has been scheduled.    - CT abdomen pelvis.     Gastroesophageal reflux disease, unspecified whether esophagitis present  -     CT Abdomen Pelvis With IV Contrast Routine Oral Contrast; Future; Expected date: 02/18/2025  -     Ambulatory referral/consult to Endo Procedure     Epigastric pain  -     CT Abdomen Pelvis With IV Contrast Routine Oral Contrast; Future; Expected date: 02/18/2025  -     Ambulatory referral/consult to Endo Procedure     Return to Clinic:  Follow up to be determined after results/ procedure(s).    Thank you for the opportunity to participate in the care of this patient.  RAY Spear             [1]   Social History  Tobacco Use    Smoking status: Never     Passive exposure: Never    Smokeless tobacco: Never   Substance Use Topics    Alcohol use: Yes     Alcohol/week: 1.0 standard drink of alcohol     Types: 1 Glasses of wine per week     Comment: socially    Drug use: No   [2]   Current Outpatient Medications on File Prior to Visit   Medication Sig Dispense Refill    amLODIPine (NORVASC) 10 MG tablet Take 1 tablet (10 mg total) by mouth once daily. 90 tablet 1    aspirin 81 " MG Chew Take 81 mg by mouth as needed. Hold 5 days prior to surgery      azelastine (ASTELIN) 137 mcg (0.1 %) nasal spray 1 spray (137 mcg total) by Nasal route 2 (two) times daily. 1 mL 2    budesonide-formoterol 160-4.5 mcg (SYMBICORT) 160-4.5 mcg/actuation HFAA Inhale 2 puffs into the lungs every 12 (twelve) hours. Controller 1 g 3    diclofenac (VOLTAREN) 75 MG EC tablet Take 1 tablet (75 mg total) by mouth 2 (two) times daily as needed (pain). 60 tablet 3    diclofenac sodium (VOLTAREN) 1 % Gel Apply 2 g topically 3 (three) times daily as needed (Hip Pain). 50 g 0    docusate sodium (COLACE) 100 MG capsule Take 1 capsule (100 mg total) by mouth 2 (two) times daily. 100 capsule 0    ezetimibe (ZETIA) 10 mg tablet Take 1 tablet (10 mg total) by mouth once daily. 90 tablet 1    fluticasone propionate (FLONASE) 50 mcg/actuation nasal spray 2 sprays (100 mcg total) by Each Nostril route once daily. 16 g 0    L.acidophilus-Bif. animalis (PROBIOTIC) 5 billion cell CpSP Take 1 tablet by mouth once daily. 10 capsule 0    mometasone 0.1% (ELOCON) 0.1 % cream AAA bid prn. Do not use on face, underarms or groin. Stronger steroid. 45 g 3    MULTIVIT,CALC,MINS/IRON/FOLIC (DAILY MULTIPLE FOR WOMEN ORAL) Take 1 tablet by mouth once daily. Hold 5 days prior to surgery      omega-3 fatty acids/fish oil (FISH OIL-OMEGA-3 FATTY ACIDS) 300-1,000 mg capsule Take by mouth once daily. Hold 5 days prior to surgery      pantoprazole (PROTONIX) 40 MG tablet Take 1 tablet (40 mg total) by mouth once daily. 90 tablet 3    rosuvastatin (CRESTOR) 40 MG Tab Take 1 tablet (40 mg total) by mouth every evening. 90 tablet 1    triamcinolone acetonide 0.1% (KENALOG) 0.1 % ointment Apply topically 2 (two) times daily. 80 g 0     Current Facility-Administered Medications on File Prior to Visit   Medication Dose Route Frequency Provider Last Rate Last Admin    aluminum-magnesium hydroxide-simethicone 200-200-20 mg/5 mL suspension 30 mL  30 mL Oral 1  time in Clinic/HOD Arlene Oglesby NP        lidocaine (PF) 10 mg/ml (1%) injection 5 mg  0.5 mL Subcutaneous Once Sung Warner MD        LIDOcaine HCl 2% oral solution 15 mL  15 mL Oral 1 time in Clinic/HOD Arlene Oglesby NP

## 2025-02-21 ENCOUNTER — HOSPITAL ENCOUNTER (OUTPATIENT)
Dept: PREADMISSION TESTING | Facility: HOSPITAL | Age: 65
Discharge: HOME | End: 2025-02-21
Attending: INTERNAL MEDICINE
Payer: COMMERCIAL

## 2025-02-21 DIAGNOSIS — K21.00 GASTROESOPHAGEAL REFLUX DISEASE WITH ESOPHAGITIS, UNSPECIFIED WHETHER HEMORRHAGE: Primary | ICD-10-CM

## 2025-02-21 DIAGNOSIS — R10.9 ABDOMINAL PAIN, UNSPECIFIED ABDOMINAL LOCATION: ICD-10-CM

## 2025-02-26 ENCOUNTER — HOSPITAL ENCOUNTER (OUTPATIENT)
Dept: RADIOLOGY | Facility: HOSPITAL | Age: 65
Discharge: HOME | End: 2025-02-26
Attending: NURSE PRACTITIONER
Payer: COMMERCIAL

## 2025-02-26 DIAGNOSIS — R10.13 EPIGASTRIC PAIN: ICD-10-CM

## 2025-02-26 DIAGNOSIS — K21.9 GASTROESOPHAGEAL REFLUX DISEASE, UNSPECIFIED WHETHER ESOPHAGITIS PRESENT: ICD-10-CM

## 2025-02-26 PROCEDURE — 74177 CT ABD & PELVIS W/CONTRAST: CPT | Mod: 26,,, | Performed by: RADIOLOGY

## 2025-02-26 PROCEDURE — 74177 CT ABD & PELVIS W/CONTRAST: CPT | Mod: TC

## 2025-02-26 PROCEDURE — 25500020 PHARM REV CODE 255: Performed by: NURSE PRACTITIONER

## 2025-02-26 RX ADMIN — IOHEXOL 30 ML: 350 INJECTION, SOLUTION INTRAVENOUS at 09:02

## 2025-02-26 RX ADMIN — IOHEXOL 100 ML: 350 INJECTION, SOLUTION INTRAVENOUS at 10:02

## 2025-02-27 ENCOUNTER — RESULTS FOLLOW-UP (OUTPATIENT)
Dept: GASTROENTEROLOGY | Facility: CLINIC | Age: 65
End: 2025-02-27

## 2025-03-04 ENCOUNTER — ANESTHESIA EVENT (OUTPATIENT)
Dept: ENDOSCOPY | Facility: HOSPITAL | Age: 65
End: 2025-03-04
Payer: COMMERCIAL

## 2025-03-04 NOTE — ANESTHESIA PREPROCEDURE EVALUATION
2025  Ora Henderson is a 64 y.o., female.    Past Medical History:   Diagnosis Date    Arthritis     Encounter for blood transfusion     Fatty liver     GERD (gastroesophageal reflux disease)     Hernia, umbilical     reducible    Hyperlipidemia     Hypertension     Rotator cuff tear     Sleep apnea      Past Surgical History:   Procedure Laterality Date    ARTHROSCOPIC DEBRIDEMENT OF SHOULDER Right 2019    Procedure: DEBRIDEMENT, SHOULDER, ARTHROSCOPIC;  Surgeon: Laureano Cox MD;  Location: [facility] OR;  Service: Orthopedics;  Laterality: Right;    ARTHROSCOPY OF SHOULDER WITH DECOMPRESSION OF SUBACROMIAL SPACE Right 2019    Procedure: ARTHROSCOPY, SHOULDER, WITH SUBACROMIAL SPACE DECOMPRESSION;  Surgeon: Laureano Cox MD;  Location: [facility] OR;  Service: Orthopedics;  Laterality: Right;    BICEPS TENDON REPAIR Right 2019    Procedure: REPAIR, TENDON, BICEPS;  Surgeon: Laureano Cox MD;  Location: [facility] OR;  Service: Orthopedics;  Laterality: Right;  arthroscopic Biceps tenodesis    BRAIN SURGERY      Pituitary tumor removal 2001 x 2     CARPAL TUNNEL RELEASE Bilateral     x 2    CERVICAL BIOPSY  W/ LOOP ELECTRODE EXCISION      CKC '81     SECTION      x 1    COLONOSCOPY N/A 2016    Procedure: COLONOSCOPY;  Surgeon: Quique Paul MD;  Location: [facility];  Service: Endoscopy;  Laterality: N/A;    COLONOSCOPY N/A 2021    Procedure: COLONOSCOPY;  Surgeon: Mari Tucker MD;  Location: [facility] ENDO;  Service: Endoscopy;  Laterality: N/A;    DISTAL CLAVICLE EXCISION Right 2019    Procedure: EXCISION, CLAVICLE, DISTAL;  Surgeon: Laureano Cox MD;  Location: [facility] OR;  Service: Orthopedics;  Laterality: Right;  arthroscopic    EPIDURAL STEROID INJECTION INTO CERVICAL SPINE N/A 09/15/2022    Procedure: C7/T1 IL  ARMANDO, Right Paramedian Approach;  Surgeon: Yung Treadwell MD;  Location: [facility] PAIN MGT;  Service: Pain Management;  Laterality: N/A;    GANGLION CYST EXCISION Left 12/31/2018    HERNIA REPAIR      HYSTERECTOMY      INJECTION OF JOINT Right 2/16/2023    Procedure: right GT bursa + right SIJ injection;  Surgeon: Yung Treadwell MD;  Location: [facility] PAIN MGT;  Service: Pain Management;  Laterality: Right;    INJECTION OF JOINT Right 4/1/2024    Procedure: Right GT bursa injection;  Surgeon: Yung Treadwell MD;  Location: [facility] PAIN MGT;  Service: Pain Management;  Laterality: Right;    REFRACTIVE SURGERY      ROTATOR CUFF REPAIR Right 12/30/2019    Procedure: REPAIR, ROTATOR CUFF;  Surgeon: Laureano Cox MD;  Location: [facility] OR;  Service: Orthopedics;  Laterality: Right;  arthroscopic    SELECTIVE INJECTION OF ANESTHETIC AGENT AROUND LUMBAR SPINAL NERVE ROOT BY TRANSFORAMINAL APPROACH Bilateral 10/07/2021    Procedure: BLOCK, SPINAL NERVE ROOT, LUMBAR, SELECTIVE, TRANSFORAMINAL APPROACH bilateral L4-5 Rn IV sedation;  Surgeon: Damon Charlton MD;  Location: [facility] PAIN MGT;  Service: Pain Management;  Laterality: Bilateral;    SELECTIVE INJECTION OF ANESTHETIC AGENT AROUND LUMBAR SPINAL NERVE ROOT BY TRANSFORAMINAL APPROACH Bilateral 12/29/2021    Procedure: BLOCK, SPINAL NERVE ROOT, LUMBAR, SELECTIVE, TRANSFORAMINAL APPROACH bilateral S1 RN IV sedation;  Surgeon: Damon Charlton MD;  Location: [facility] PAIN MGT;  Service: Pain Management;  Laterality: Bilateral;    SYNOVECTOMY OF SHOULDER Right 12/30/2019    Procedure: SYNOVECTOMY, SHOULDER;  Surgeon: Laureano Cox MD;  Location: [facility] OR;  Service: Orthopedics;  Laterality: Right;  arthroscopic    TOTAL ABDOMINAL HYSTERECTOMY W/ BILATERAL SALPINGOOPHORECTOMY  2006    STAR/BSO secondary to endometriosis    VENTRAL HERNIA REPAIR  2016     ,  Patient Active Problem List   Diagnosis    DJD (degenerative joint disease), cervical-mild    Mixed hyperlipidemia     Hepatomegaly    Family history of cardiovascular disease    GERD (gastroesophageal reflux disease)    History of benign pituitary tumor    Hx of colonic polyp    Essential hypertension    Osteoarthritis of spine with radiculopathy, lumbar region    NATALIE (obstructive sleep apnea)    Obesity (BMI 30.0-34.9)       Pre-op Assessment    I have reviewed the Patient Summary Reports.     I have reviewed the Nursing Notes. I have reviewed the NPO Status.   I have reviewed the Medications.     Review of Systems  Anesthesia Hx:  No problems with previous Anesthesia             Denies Family Hx of Anesthesia complications.    Denies Personal Hx of Anesthesia complications.                    Social:  Non-Smoker, Alcohol Use       Cardiovascular:     Hypertension, well controlled           hyperlipidemia                               Pulmonary:        Sleep Apnea, CPAP                Hepatic/GI:     GERD Liver Disease,  Fatty liver             Musculoskeletal:  Arthritis          Spine Disorders: lumbar            Neurological:    Neuromuscular Disease,       S/P resection of pituitary tumor, lumbar radiculopathy                            Endocrine:        Obesity / BMI > 30      Physical Exam  General: Well nourished, Cooperative, Alert and Oriented    Airway:  Mallampati: II   Mouth Opening: Normal  TM Distance: Normal  Tongue: Normal  Neck ROM: Normal ROM    Dental:  Intact        Anesthesia Plan  Type of Anesthesia, risks & benefits discussed:    Anesthesia Type: Gen Natural Airway  Intra-op Monitoring Plan: Standard ASA Monitors  Post Op Pain Control Plan: multimodal analgesia  Induction:  IV  Informed Consent: Informed consent signed with the Patient and all parties understand the risks and agree with anesthesia plan.  All questions answered. Patient consented to blood products? No  ASA Score: 3  Day of Surgery Review of History & Physical: H&P Update referred to the surgeon/provider.    Ready For Surgery From Anesthesia  Perspective.     .

## 2025-03-06 ENCOUNTER — HOSPITAL ENCOUNTER (OUTPATIENT)
Dept: ENDOSCOPY | Facility: HOSPITAL | Age: 65
Discharge: HOME | End: 2025-03-06
Attending: NURSE PRACTITIONER
Payer: COMMERCIAL

## 2025-03-06 ENCOUNTER — ANESTHESIA (OUTPATIENT)
Dept: ENDOSCOPY | Facility: HOSPITAL | Age: 65
End: 2025-03-06
Payer: COMMERCIAL

## 2025-03-06 VITALS
SYSTOLIC BLOOD PRESSURE: 155 MMHG | TEMPERATURE: 98 F | DIASTOLIC BLOOD PRESSURE: 72 MMHG | RESPIRATION RATE: 20 BRPM | HEIGHT: 63 IN | BODY MASS INDEX: 35.04 KG/M2 | WEIGHT: 197.75 LBS | HEART RATE: 82 BPM | OXYGEN SATURATION: 100 %

## 2025-03-06 DIAGNOSIS — K21.00 GASTROESOPHAGEAL REFLUX DISEASE WITH ESOPHAGITIS, UNSPECIFIED WHETHER HEMORRHAGE: ICD-10-CM

## 2025-03-06 DIAGNOSIS — R10.9 ABDOMINAL PAIN, UNSPECIFIED ABDOMINAL LOCATION: ICD-10-CM

## 2025-03-06 PROCEDURE — 37000008 HC ANESTHESIA 1ST 15 MINUTES

## 2025-03-06 PROCEDURE — 63600175 PHARM REV CODE 636 W HCPCS: Performed by: NURSE ANESTHETIST, CERTIFIED REGISTERED

## 2025-03-06 PROCEDURE — 27201012 HC FORCEPS, HOT/COLD, DISP

## 2025-03-06 PROCEDURE — 37000009 HC ANESTHESIA EA ADD 15 MINS

## 2025-03-06 RX ORDER — PROPOFOL 10 MG/ML
VIAL (ML) INTRAVENOUS
Status: DISCONTINUED | OUTPATIENT
Start: 2025-03-06 | End: 2025-03-06

## 2025-03-06 RX ORDER — LIDOCAINE HYDROCHLORIDE 20 MG/ML
INJECTION INTRAVENOUS
Status: DISCONTINUED | OUTPATIENT
Start: 2025-03-06 | End: 2025-03-06

## 2025-03-06 RX ORDER — SODIUM CHLORIDE, SODIUM LACTATE, POTASSIUM CHLORIDE, CALCIUM CHLORIDE 600; 310; 30; 20 MG/100ML; MG/100ML; MG/100ML; MG/100ML
INJECTION, SOLUTION INTRAVENOUS CONTINUOUS PRN
Status: DISCONTINUED | OUTPATIENT
Start: 2025-03-06 | End: 2025-03-06

## 2025-03-06 RX ADMIN — PROPOFOL 50 MG: 10 INJECTION, EMULSION INTRAVENOUS at 10:03

## 2025-03-06 RX ADMIN — SODIUM CHLORIDE, SODIUM LACTATE, POTASSIUM CHLORIDE, AND CALCIUM CHLORIDE: .6; .31; .03; .02 INJECTION, SOLUTION INTRAVENOUS at 10:03

## 2025-03-06 RX ADMIN — LIDOCAINE HYDROCHLORIDE 50 MG: 20 INJECTION INTRAVENOUS at 10:03

## 2025-03-06 NOTE — ANESTHESIA POSTPROCEDURE EVALUATION
Anesthesia Post Evaluation    Patient: Ora Henderson    Procedure(s) Performed: * No procedures listed *    Final Anesthesia Type: general      Patient location during evaluation: PACU  Patient participation: Yes- Able to Participate  Level of consciousness: awake and alert and oriented  Post-procedure vital signs: reviewed and stable  Pain management: adequate  Airway patency: patent    PONV status at discharge: No PONV  Anesthetic complications: no      Cardiovascular status: blood pressure returned to baseline, stable and hemodynamically stable  Respiratory status: unassisted  Hydration status: euvolemic  Follow-up not needed.              Vitals Value Taken Time   /82 03/06/25 11:11     03/06/25 11:15   Pulse 93 03/06/25 11:15   Resp 22 03/06/25 11:15   SpO2 92 % 03/06/25 11:15   Vitals shown include unfiled device data.      Event Time   Out of Recovery 11:34:00         Pain/Myles Score: Myles Score: 10 (3/6/2025 11:10 AM)

## 2025-03-06 NOTE — H&P
PRE PROCEDURE H&P    Patient Name: Ora Henderson  MRN: [id number]  : 1960  Date of Procedure:  3/6/2025  Referring Physician: Mary Ann Alcantara NP  Primary Physician: Zuly Montes MD  Procedure Physician: Prince Hemphill MD       Planned Procedure: EGD  Diagnosis: GERD  Chief Complaint: Same as above    HPI: Patient is an 64 y.o. female is here for the above.       Anticoagulation: None    Past Medical History:   Past Medical History:   Diagnosis Date    Arthritis     Encounter for blood transfusion     Fatty liver     GERD (gastroesophageal reflux disease)     Hernia, umbilical     reducible    Hyperlipidemia     Hypertension     Rotator cuff tear     Sleep apnea         Past Surgical History:  Past Surgical History:   Procedure Laterality Date    ARTHROSCOPIC DEBRIDEMENT OF SHOULDER Right 2019    Procedure: DEBRIDEMENT, SHOULDER, ARTHROSCOPIC;  Surgeon: Laureano Cox MD;  Location: [facility];  Service: Orthopedics;  Laterality: Right;    ARTHROSCOPY OF SHOULDER WITH DECOMPRESSION OF SUBACROMIAL SPACE Right 2019    Procedure: ARTHROSCOPY, SHOULDER, WITH SUBACROMIAL SPACE DECOMPRESSION;  Surgeon: Laureano Cox MD;  Location: [facility];  Service: Orthopedics;  Laterality: Right;    BICEPS TENDON REPAIR Right 2019    Procedure: REPAIR, TENDON, BICEPS;  Surgeon: Laureano Cox MD;  Location: [facility];  Service: Orthopedics;  Laterality: Right;  arthroscopic Biceps tenodesis    BRAIN SURGERY      Pituitary tumor removal 2001 x 2     CARPAL TUNNEL RELEASE Bilateral     x 2    CERVICAL BIOPSY  W/ LOOP ELECTRODE EXCISION      [facility] '81     SECTION      x 1    COLONOSCOPY N/A 2016    Procedure: COLONOSCOPY;  Surgeon: Quique Paul MD;  Location: [facility];  Service: Endoscopy;  Laterality: N/A;    COLONOSCOPY N/A 2021    Procedure: COLONOSCOPY;  Surgeon: Mari Tucker MD;  Location: [facility];  Service: Endoscopy;  Laterality:  N/A;    DISTAL CLAVICLE EXCISION Right 12/30/2019    Procedure: EXCISION, CLAVICLE, DISTAL;  Surgeon: Laureano Cox MD;  Location: [facility];  Service: Orthopedics;  Laterality: Right;  arthroscopic    EPIDURAL STEROID INJECTION INTO CERVICAL SPINE N/A 09/15/2022    Procedure: C7/T1 IL ARMANDO, Right Paramedian Approach;  Surgeon: Yung Treadwell MD;  Location: [facility];  Service: Pain Management;  Laterality: N/A;    GANGLION CYST EXCISION Left 12/31/2018    HERNIA REPAIR      HYSTERECTOMY      INJECTION OF JOINT Right 2/16/2023    Procedure: right GT bursa + right SIJ injection;  Surgeon: Yung Treadwell MD;  Location: [facility];  Service: Pain Management;  Laterality: Right;    INJECTION OF JOINT Right 4/1/2024    Procedure: Right GT bursa injection;  Surgeon: Yung Treadwell MD;  Location: [facility];  Service: Pain Management;  Laterality: Right;    REFRACTIVE SURGERY      ROTATOR CUFF REPAIR Right 12/30/2019    Procedure: REPAIR, ROTATOR CUFF;  Surgeon: Laureano Cox MD;  Location: [facility];  Service: Orthopedics;  Laterality: Right;  arthroscopic    SELECTIVE INJECTION OF ANESTHETIC AGENT AROUND LUMBAR SPINAL NERVE ROOT BY TRANSFORAMINAL APPROACH Bilateral 10/07/2021    Procedure: BLOCK, SPINAL NERVE ROOT, LUMBAR, SELECTIVE, TRANSFORAMINAL APPROACH bilateral L4-5 Rn IV sedation;  Surgeon: Damon Charlton MD;  Location: [facility];  Service: Pain Management;  Laterality: Bilateral;    SELECTIVE INJECTION OF ANESTHETIC AGENT AROUND LUMBAR SPINAL NERVE ROOT BY TRANSFORAMINAL APPROACH Bilateral 12/29/2021    Procedure: BLOCK, SPINAL NERVE ROOT, LUMBAR, SELECTIVE, TRANSFORAMINAL APPROACH bilateral S1 RN IV sedation;  Surgeon: Damon Charlton MD;  Location: [facility];  Service: Pain Management;  Laterality: Bilateral;    SYNOVECTOMY OF SHOULDER Right 12/30/2019    Procedure: SYNOVECTOMY, SHOULDER;  Surgeon: Laureano Cox MD;  Location: [facility];  Service: Orthopedics;   Laterality: Right;  arthroscopic    TOTAL ABDOMINAL HYSTERECTOMY W/ BILATERAL SALPINGOOPHORECTOMY  2006    STAR/BSO secondary to endometriosis    VENTRAL HERNIA REPAIR  2016        Home Medications:  Prior to Admission medications    Medication Sig Start Date End Date Taking? Authorizing Provider   amLODIPine (NORVASC) 10 MG tablet Take 1 tablet (10 mg total) by mouth once daily. 2/12/25  Yes Zuly Montes MD   aspirin 81 MG Chew Take 81 mg by mouth as needed. Hold 5 days prior to surgery   Yes Provider, Historical   docusate sodium (COLACE) 100 MG capsule Take 1 capsule (100 mg total) by mouth 2 (two) times daily. 2/23/21  Yes Yasmine Arreola, JERODP-VICENTE   ezetimibe (ZETIA) 10 mg tablet Take 1 tablet (10 mg total) by mouth once daily. 2/12/25  Yes Zuly Montes MD   L.acidophilus-Bif. animalis (PROBIOTIC) 5 billion cell CpSP Take 1 tablet by mouth once daily. 8/4/14  Yes Vanessa Oliva NP   omega-3 fatty acids/fish oil (FISH OIL-OMEGA-3 FATTY ACIDS) 300-1,000 mg capsule Take by mouth once daily. Hold 5 days prior to surgery   Yes Provider, Historical   pantoprazole (PROTONIX) 40 MG tablet Take 1 tablet (40 mg total) by mouth once daily. 1/8/25 1/8/26 Yes Med Tan MD   rosuvastatin (CRESTOR) 40 MG Tab Take 1 tablet (40 mg total) by mouth every evening. 2/12/25  Yes Zuly Montes MD   azelastine (ASTELIN) 137 mcg (0.1 %) nasal spray 1 spray (137 mcg total) by Nasal route 2 (two) times daily. 11/6/24 11/6/25  Med Tan MD   budesonide-formoterol 160-4.5 mcg (SYMBICORT) 160-4.5 mcg/actuation HFAA Inhale 2 puffs into the lungs every 12 (twelve) hours. Controller 11/6/24 11/6/25  Med Tan MD   diclofenac (VOLTAREN) 75 MG EC tablet Take 1 tablet (75 mg total) by mouth 2 (two) times daily as needed (pain). 2/12/25   Zuly Montes MD   diclofenac sodium (VOLTAREN) 1 % Gel Apply 2 g topically 3 (three) times daily as needed (Hip Pain). 3/5/24   Yung Treadwell MD   fluticasone propionate (FLONASE)  "50 mcg/actuation nasal spray 2 sprays (100 mcg total) by Each Nostril route once daily. 3/4/20   Yasmine Arreola, FNP-C   metFORMIN (GLUCOPHAGE) 500 MG tablet Take 1 tablet (500 mg total) by mouth daily with breakfast. 2/18/25 2/18/26  Zuly Montes MD   mometasone 0.1% (ELOCON) 0.1 % cream AAA bid prn. Do not use on face, underarms or groin. Stronger steroid. 10/7/24   Liza Anand MD   MULTIVIT,CALC,MINS/IRON/FOLIC (DAILY MULTIPLE FOR WOMEN ORAL) Take 1 tablet by mouth once daily. Hold 5 days prior to surgery    Provider, Historical   triamcinolone acetonide 0.1% (KENALOG) 0.1 % ointment Apply topically 2 (two) times daily. 4/25/23   Estefany Rm, NP        Allergies:  Review of patient's allergies indicates:   Allergen Reactions    Sulfa (sulfonamide antibiotics) Hives        Social History:   Social History[1]    Family History:  Family History   Problem Relation Name Age of Onset    Hypertension Mother Mk Lynch     Arthritis Mother Mk Lynch     Hypertension Father Jessica cervantes     Prostate cancer Father Jessica cervantes     Heart disease Father Jessica cervantes     Heart attack Sister Krystal 30    Asthma Sister Krystal     Heart attack Brother  32    Cancer Maternal Aunt Sherrell Weeks         pancreas    Diabetes Maternal Aunt Sung anderson     Cancer Maternal Uncle Dinh lynch         pancreas    Heart disease Paternal Uncle      Heart disease Paternal Grandmother         ROS: No acute cardiac events, no acute respiratory complaints.     Physical Exam (all patients):    /73   Pulse 87   Temp 97.7 °F (36.5 °C)   Resp 16   Ht 5' 3" (1.6 m)   Wt 89.7 kg (197 lb 12 oz)   LMP  (LMP Unknown)   SpO2 99%   Breastfeeding No   BMI 35.03 kg/m²   Lungs: Clear to auscultation bilaterally, respirations unlabored  Heart: Regular rate and rhythm, S1 and S2 normal, no obvious murmurs  Abdomen:         Soft, non-tender, bowel sounds normal, no masses, no " organomegaly    Lab Results   Component Value Date    WBC 5.27 02/12/2025    MCV 91 02/12/2025    RDW 12.8 02/12/2025     02/12/2025    INR 1.0 12/24/2018     02/12/2025    HGBA1C 6.3 (H) 02/12/2025    BUN 9 02/12/2025     02/12/2025    K 3.8 02/12/2025     02/12/2025        SEDATION PLAN: per anesthesia      History reviewed, vital signs satisfactory, cardiopulmonary status satisfactory, sedation options, risks and plans have been discussed with the patient  All their questions were answered and the patient agrees to the sedation procedures as planned and the patient is deemed an appropriate candidate for the sedation as planned.    Procedure explained to patient, informed consent obtained and placed in chart.    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Employer: [employer]   Tobacco Use    Smoking status: Never     Passive exposure: Never    Smokeless tobacco: Never   Substance and Sexual Activity    Alcohol use: Yes     Alcohol/week: 1.0 standard drink of alcohol     Types: 1 Glasses of wine per week     Comment: socially    Drug use: No    Sexual activity: Yes     Partners: Male     Birth control/protection: None     Social Drivers of Health     Financial Resource Strain: Low Risk  (2/7/2024)    Overall Financial Resource Strain (CARDIA)     Difficulty of Paying Living Expenses: Not hard at all   Food Insecurity: No Food Insecurity (2/7/2024)    Hunger Vital Sign     Worried About Running Out of Food in the Last Year: Never true     Ran Out of Food in the Last Year: Never true   Transportation Needs: No Transportation Needs (2/7/2024)    PRAPARE - Transportation     Lack of Transportation (Medical): No     Lack of Transportation (Non-Medical): No   Physical Activity: Insufficiently Active (2/7/2024)    Exercise Vital  Sign     Days of Exercise per Week: 4 days     Minutes of Exercise per Session: 30 min   Stress: Stress Concern Present (2/7/2024)    [employer] - Occupational Stress Questionnaire     Feeling of Stress : To some extent   Housing Stability: Low Risk  (2/7/2024)    Housing Stability Vital Sign     Unable to Pay for Housing in the Last Year: No     Number of Places Lived in the Last Year: 1     Unstable Housing in the Last Year: No

## 2025-03-06 NOTE — TRANSFER OF CARE
"Anesthesia Transfer of Care Note    Patient: Ora Henderson    Procedure(s) Performed: * No procedures listed *    Patient location: PACU    Anesthesia Type: general    Transport from OR: Transported from OR on room air with adequate spontaneous ventilation    Post pain: adequate analgesia    Post assessment: no apparent anesthetic complications    Post vital signs: stable    Level of consciousness: awake    Nausea/Vomiting: no nausea/vomiting    Complications: none    Transfer of care protocol was followed      Last vitals: Visit Vitals  BP (!) 153/84 (BP Location: Left arm, Patient Position: Lying)   Pulse 96   Temp 36.5 °C (97.7 °F)   Resp 16   Ht 5' 3" (1.6 m)   Wt 89.7 kg (197 lb 12 oz)   LMP  (LMP Unknown)   SpO2 99%   Breastfeeding No   BMI 35.03 kg/m²     "

## 2025-03-09 ENCOUNTER — OFFICE VISIT (OUTPATIENT)
Dept: URGENT CARE | Facility: CLINIC | Age: 65
End: 2025-03-09
Payer: COMMERCIAL

## 2025-03-09 VITALS
WEIGHT: 196.19 LBS | HEART RATE: 80 BPM | HEIGHT: 63 IN | DIASTOLIC BLOOD PRESSURE: 79 MMHG | BODY MASS INDEX: 34.76 KG/M2 | TEMPERATURE: 98 F | SYSTOLIC BLOOD PRESSURE: 132 MMHG | RESPIRATION RATE: 16 BRPM | OXYGEN SATURATION: 100 %

## 2025-03-09 DIAGNOSIS — R05.9 COUGH, UNSPECIFIED TYPE: Primary | ICD-10-CM

## 2025-03-09 DIAGNOSIS — R09.81 SINUS CONGESTION: ICD-10-CM

## 2025-03-09 PROCEDURE — 99214 OFFICE O/P EST MOD 30 MIN: CPT | Mod: S$GLB,,, | Performed by: PHYSICIAN ASSISTANT

## 2025-03-09 RX ORDER — PROMETHAZINE HYDROCHLORIDE AND DEXTROMETHORPHAN HYDROBROMIDE 6.25; 15 MG/5ML; MG/5ML
10 SYRUP ORAL NIGHTLY PRN
Qty: 118 ML | Refills: 0 | Status: SHIPPED | OUTPATIENT
Start: 2025-03-09

## 2025-03-09 RX ORDER — BROMPHENIRAMINE MALEATE, PSEUDOEPHEDRINE HYDROCHLORIDE, AND DEXTROMETHORPHAN HYDROBROMIDE 2; 30; 10 MG/5ML; MG/5ML; MG/5ML
10 SYRUP ORAL EVERY 6 HOURS PRN
Qty: 118 ML | Refills: 0 | Status: SHIPPED | OUTPATIENT
Start: 2025-03-09 | End: 2025-03-19

## 2025-03-09 NOTE — PROGRESS NOTES
"Subjective:      Patient ID: Ora Henderson is a 64 y.o. female.    Vitals:  height is 5' 3" (1.6 m) and weight is 89 kg (196 lb 3.4 oz). Her tympanic temperature is 97.7 °F (36.5 °C). Her blood pressure is 132/79 and her pulse is 80. Her respiration is 16 and oxygen saturation is 100%.     Chief Complaint: Cough    Patient presents with cough and runny nose that began 3 days ago.  She thinks symptoms began b/c     Cough  This is a new problem. The current episode started in the past 7 days. The problem has been gradually worsening. The problem occurs constantly. The cough is Productive of sputum. Associated symptoms include ear congestion, nasal congestion, postnasal drip and rhinorrhea. Pertinent negatives include no chest pain, chills, ear pain, fever, headaches, heartburn, hemoptysis, myalgias, rash, sore throat, shortness of breath, sweats, weight loss or wheezing. Nothing aggravates the symptoms. Treatments tried: salt water. The treatment provided mild relief. There is no history of asthma, bronchiectasis, bronchitis, COPD, emphysema, environmental allergies or pneumonia.       Constitution: Negative for chills and fever.   HENT:  Positive for postnasal drip. Negative for ear pain and sore throat.    Cardiovascular:  Negative for chest pain.   Respiratory:  Positive for cough. Negative for bloody sputum, shortness of breath and wheezing.    Gastrointestinal:  Negative for heartburn.   Musculoskeletal:  Negative for muscle ache.   Skin:  Negative for rash.   Allergic/Immunologic: Negative for environmental allergies.   Neurological:  Negative for headaches.      Objective:     Physical Exam   Constitutional: She is oriented to person, place, and time. She appears well-developed.   HENT:   Head: Normocephalic and atraumatic.   Ears:   Right Ear: External ear normal.   Left Ear: External ear normal.   Nose: Nose normal.   Mouth/Throat: Oropharynx is clear and moist.   Eyes: Conjunctivae, EOM and " Last Appt: 01/31/2018  Next Appt:   Visit date not found  Med verified in [address] lids are normal.   Neck: Trachea normal and phonation normal. Neck supple.   Cardiovascular: Normal rate.   Pulmonary/Chest: Effort normal. No respiratory distress. She has no wheezes. She has no rhonchi.   Musculoskeletal: Normal range of motion.         General: Normal range of motion.   Neurological: She is alert and oriented to person, place, and time.   Skin: Skin is warm, dry and intact.   Psychiatric: Her speech is normal and behavior is normal. Judgment and thought content normal.   Nursing note and vitals reviewed.      Assessment:     1. Cough, unspecified type    2. Sinus congestion        Plan:   VSS. Patient non-toxic appearing. Discussed medication being prescribed.  Advised patient to follow up with PCP as needed.  Patient verbalized understanding, agrees with the plan, and is comfortable with discharge.      Cough, unspecified type  -     brompheniramine-pseudoeph-DM (BROMFED DM) 2-30-10 mg/5 mL Syrp; Take 10 mLs by mouth every 6 (six) hours as needed (cough).  Dispense: 118 mL; Refill: 0  -     promethazine-dextromethorphan (PROMETHAZINE-DM) 6.25-15 mg/5 mL Syrp; Take 10 mLs by mouth nightly as needed (cough).  Dispense: 118 mL; Refill: 0    Sinus congestion

## 2025-03-09 NOTE — PATIENT INSTRUCTIONS
General Instructions for Upper Respiratory Infection (URI):    What is a URI?   An upper respiratory infection (URI), also known as the common cold, is an acute infection of the head and chest. Generally, it affects the nose, throat, airways, sinuses and/or ears. URIs are typically caused by a virus and are among the most common diagnoses in Urgent Care.   While COVID and Flu are viruses most commonly tested for in Urgent Care, there are many other viruses that can cause similar symptoms such as: Respiratory Syncytial virus (RSV), Rhinovirus, Adenovirus, Enterovirus, Steffen-Barr virus (EBV), human meta pneumovirus, Parvovirus B19 (Fifth's disease).     How long will it last?   Probably longer than youd like. Symptoms usually worsen during the first 3-5 days, followed by gradual improvement. Most URIs resolve within 10-14 days, even without treatment. People with URIs are most contagious during the first 2-3 days of symptoms and rarely after 1 week. However, a person can remain contagious for several days after symptoms subside.     Treatment   Antibiotics only work on bacterial infections, and will not treat a virus. Because URIs usually are caused by viruses, antibiotics are not an effective treatment.  If taken when not needed, antibiotics can be harmful and can expose you to unnecessary side effects such as allergic reaction (rash, anaphylaxis), yeast infection, nausea, vomiting, diarrhea, Clostridioides difficile (C. diff), immune dysregulation, longer illness and antibiotic resistance. Fortunately, there are many options that help alleviate symptoms when used as directed. The treatments below can help you feel better while your body's own defenses are fighting the virus.    Fever and/or Pain:    Use a pain reliever, such as acetaminophen (Tylenol) or Ibuprofen (Advil). Avoid NSAIDs (Ibuprofen, Aleve, Motrin, Aspirin) if you are pregnant, have advanced kidney disease or history of stomach ulcers/bleeding.      "Dosages listed below are recommended for the average size adult       Acetaminophen (Tylenol): 500mg-650mg every 4 hours, not to exceed 4000mg in 24 hours       Ibuprofen (Advil, Motrin): 400mg-600mg every 6 hours (take with food or eat at least 30 minutes before taking medication)    Nasal congestion, post nasal dripping, or sinus pressure:    Use an oral decongestant, such as pseudoephedrine or Mucinex D to reduce nasal and sinus congestion. Decongestants are available at pharmacies. Decongestants should not be used by individuals with certain medical conditions such as hypertension (high blood pressure) or any history of heart palpitations. If you DO have Hypertension or palpitations, it is safe to take Coricidin HBP for relief of sinus symptoms.   Nasal sprays, such as fluticasone (Flonase), can help relief sneezing, runny nose and nasal congestion, and may take up to one week of consistent use to manage symptoms.     Nasal rinsing with saline nasal spray or salt water (e.g., neti pot) can help relieve nasal dryness.    Use a warm mist humidifier or take a steamy shower to increase moisture in the air and soothe oral and nasal tissues that become irritated with dry air.    Non-drowsy antihistamines during the day, such as Zyrtec, Claritin, Allegra may help with runny nose, post nasal drip, and sneezing. Benadryl can be used at night as needed, unless you are already taking a "night-time" cold medication.   Vicks vapor rub may be helpful to use at night.    Zantac will help if there is reflux from the post nasal drip and helpful to take at night.    Sore throat:    Use a pain reliever, such as acetaminophen (Tylenol) or Ibuprofen (Advil).    Gargle with salt water (1/2 tsp of salt dissolved in 8 oz of warm water). Salt water can be used as often as you like.    Throat lozenges or throat sprays (Cepacol lozenges or Chloroseptic spray) may bring some relief by increasing saliva production and lubricating your " throat.    Drink plenty of fluids (e.g., water, diluted juice, tea) to soothe your throat and to stay well hydrated. Honey/lemon water or warm tea may be soothing.    Coughing:    Coughing often occurs during the later stages of a URI. It may be dry or produce phlegm or mucus.    Medications that contain dextromethorphan (helps to suppress a cough) and/or Guaifenesin (thins mucus and relieves chest congestion). Examples that include both are Robitussin DM, Mucinex DM, Delsym. Guaifenesin works best when you drink plenty of water.     Tea with honey, when taken regularly, can soothe a sore throat and help suppress a cough.    Cough drops   Vicks vapor rub and/or humidifier at night    Take care with medications    Be familiar with the individual ingredients in every medication you take, so you treat your symptoms appropriately.    Watch for ingredient overlap between products (e.g., acetaminophen, ibuprofen, pseudoephedrine). Dont accidentally take too much of any ingredient.    Dont take medications longer than recommended.    Follow any specific instructions given by your health care provider. This is especially important if you have an underlying health condition.    If you have questions or concerns, ask a pharmacist for assistance or consult with your health care provider. Note: generic medications contain the same active ingredients as name brands.     Strengthen your immune system   Make sure you eat well (e.g., a healthy, balanced variety of foods).    Avoid alcohol as it can directly suppress various immune responses.    Stay away from cigarettes, and second hand smoke.    Rest as much as possible and get plenty of sleep (at least 8 hours).     Cold-eeze (Zinc), Elderberry, Emergen-C, may help to reduce the duration of URI symptoms if taken early.    Reduce risk of spreading URI to others    URI infections are contagious; help reduce the spread.    Wash your hands frequently and/or use a hand ,  especially after touching your face or covering cough.    Cover your mouth when coughing or sneezing.    Avoid sharing items like cups and lip balm.     When to seek help    Sometimes upper respiratory infections become worse instead of better. Secondary bacterial infections or other complications can develop that require further treatment. Dont delay seeking medical evaluation if you experience any of the following symptoms:    A fever greater than 101°F for longer than 3 days.    Breathing problems like feeling short of breath, having pain or tightness in your chest, or wheezing (high pitched whistling sounds when you breath in)    A cough that is painful, worsening, or lasting longer than two weeks.    A bad sore throat that lasts longer than three days, or worsens.    Very swollen glands in your neck that arent going away    Pain in your face or teeth that is not improving after a few days of decongestant use    A headache that lasts longer than a few days or is very severe    A new rash    Persistent abdominal pain    Inability to tolerate oral fluids without vomiting   Severe weakness, dizziness, or passing out   Significant drowsiness or confusion     Please follow up with your primary care provider if your signs and symptoms have not resolved after 7-10 days or sooner if symptoms worsen.   If your condition worsens or fails to improve we recommend that you receive another evaluation at the emergency  room immediately or contact your primary medical clinic to discuss your concerns.   You must understand that you have received Urgent Care treatment only and that you may be released before all of  your medical problems are known or treated. You, the patient, are responsible to arrange for follow up care as instructed.    More information    Medicine Plus: nlm.nih.gov/medlineplus/ commoncold.html    Centers for Disease Control &Prevention: cdc.gov/getsmart/community/ materials-references/print-materials/  hcp/adult-tract-infection.pdf

## 2025-03-10 ENCOUNTER — RESULTS FOLLOW-UP (OUTPATIENT)
Dept: ENDOSCOPY | Facility: HOSPITAL | Age: 65
End: 2025-03-10

## 2025-03-12 ENCOUNTER — PATIENT MESSAGE (OUTPATIENT)
Dept: GASTROENTEROLOGY | Facility: CLINIC | Age: 65
End: 2025-03-12
Payer: COMMERCIAL

## 2025-03-24 ENCOUNTER — OFFICE VISIT (OUTPATIENT)
Dept: GASTROENTEROLOGY | Facility: CLINIC | Age: 65
End: 2025-03-24
Payer: COMMERCIAL

## 2025-03-24 VITALS
DIASTOLIC BLOOD PRESSURE: 81 MMHG | HEART RATE: 84 BPM | WEIGHT: 196.31 LBS | BODY MASS INDEX: 34.78 KG/M2 | SYSTOLIC BLOOD PRESSURE: 121 MMHG | HEIGHT: 63 IN

## 2025-03-24 DIAGNOSIS — K21.9 GASTROESOPHAGEAL REFLUX DISEASE WITHOUT ESOPHAGITIS: Primary | Chronic | ICD-10-CM

## 2025-03-24 PROCEDURE — 1160F RVW MEDS BY RX/DR IN RCRD: CPT | Mod: CPTII,S$GLB,, | Performed by: NURSE PRACTITIONER

## 2025-03-24 PROCEDURE — 99213 OFFICE O/P EST LOW 20 MIN: CPT | Mod: S$GLB,,, | Performed by: NURSE PRACTITIONER

## 2025-03-24 PROCEDURE — 3074F SYST BP LT 130 MM HG: CPT | Mod: CPTII,S$GLB,, | Performed by: NURSE PRACTITIONER

## 2025-03-24 PROCEDURE — 99999 PR PBB SHADOW E&M-EST. PATIENT-LVL IV: CPT | Mod: PBBFAC,,, | Performed by: NURSE PRACTITIONER

## 2025-03-24 PROCEDURE — 3008F BODY MASS INDEX DOCD: CPT | Mod: CPTII,S$GLB,, | Performed by: NURSE PRACTITIONER

## 2025-03-24 PROCEDURE — 1159F MED LIST DOCD IN RCRD: CPT | Mod: CPTII,S$GLB,, | Performed by: NURSE PRACTITIONER

## 2025-03-24 PROCEDURE — 3044F HG A1C LEVEL LT 7.0%: CPT | Mod: CPTII,S$GLB,, | Performed by: NURSE PRACTITIONER

## 2025-03-24 PROCEDURE — 3079F DIAST BP 80-89 MM HG: CPT | Mod: CPTII,S$GLB,, | Performed by: NURSE PRACTITIONER

## 2025-03-24 NOTE — PROGRESS NOTES
Clinic Follow Up:  [facility] Follow Up Note    Reason for Follow Up:  The encounter diagnosis was Gastroesophageal reflux disease without esophagitis.    PCP: Zuly Montes   No address on file    HPI:  This is a 64 y.o. female here for follow up.   She had an EGD on 03/06/2025 which showed a medium-sized hiatal hernia; negative for H pylori.  She is currently on pantoprazole 40 mg daily and feels this controls her reflux well.  She does have constipation that was seen on CT scan. Clean out with magnesium citrate followed by daily Miralax recommended.     EGD (2025)- medium hiatal hernia; negative for HP.   CT scan abdomen pelvis (2025)- small hiatal hernia; constipation.   Colonoscopy (2021)- one TA polyp. Recall in 7 years  EGD (2013)- hiatal hernia; gastritis; fundic gland gastric polyp. Negative for H. Pylori.     Review of Systems   Constitutional:  Negative for activity change and appetite change.        As per interval history above   Respiratory:  Negative for cough and shortness of breath.    Cardiovascular:  Negative for chest pain.   Gastrointestinal:  Positive for abdominal pain. Negative for constipation, diarrhea, nausea and vomiting.   Skin:  Negative for color change and rash.       Medical History:  Past Medical History:   Diagnosis Date    Arthritis     Encounter for blood transfusion     Fatty liver     GERD (gastroesophageal reflux disease)     Hernia, umbilical     reducible    Hyperlipidemia     Hypertension     Rotator cuff tear     Sleep apnea        Surgical History:   Past Surgical History:   Procedure Laterality Date    ARTHROSCOPIC DEBRIDEMENT OF SHOULDER Right 12/30/2019    Procedure: DEBRIDEMENT, SHOULDER, ARTHROSCOPIC;  Surgeon: Laureano Cox MD;  Location: [facility];  Service: Orthopedics;  Laterality: Right;    ARTHROSCOPY OF SHOULDER WITH DECOMPRESSION OF SUBACROMIAL SPACE Right 12/30/2019    Procedure: ARTHROSCOPY, SHOULDER, WITH SUBACROMIAL SPACE  DECOMPRESSION;  Surgeon: Laureano Cox MD;  Location: [facility] OR;  Service: Orthopedics;  Laterality: Right;    BICEPS TENDON REPAIR Right 2019    Procedure: REPAIR, TENDON, BICEPS;  Surgeon: Laureano Cox MD;  Location: [facility] OR;  Service: Orthopedics;  Laterality: Right;  arthroscopic Biceps tenodesis    BRAIN SURGERY  2001    Pituitary tumor removal 2001 x 2     CARPAL TUNNEL RELEASE Bilateral     x 2    CERVICAL BIOPSY  W/ LOOP ELECTRODE EXCISION      CKC '81     SECTION      x 1    COLONOSCOPY N/A 2016    Procedure: COLONOSCOPY;  Surgeon: Quique Paul MD;  Location: [facility] ENDO;  Service: Endoscopy;  Laterality: N/A;    COLONOSCOPY N/A 2021    Procedure: COLONOSCOPY;  Surgeon: Mari Tucker MD;  Location: [facility] ENDO;  Service: Endoscopy;  Laterality: N/A;    DISTAL CLAVICLE EXCISION Right 2019    Procedure: EXCISION, CLAVICLE, DISTAL;  Surgeon: Laureano Cox MD;  Location: [facility] OR;  Service: Orthopedics;  Laterality: Right;  arthroscopic    EPIDURAL STEROID INJECTION INTO CERVICAL SPINE N/A 09/15/2022    Procedure: C7/T1 IL ARMANDO, Right Paramedian Approach;  Surgeon: Yung Treadwell MD;  Location: [facility] PAIN MGT;  Service: Pain Management;  Laterality: N/A;    GANGLION CYST EXCISION Left 2018    HERNIA REPAIR      HYSTERECTOMY      INJECTION OF JOINT Right 2023    Procedure: right GT bursa + right SIJ injection;  Surgeon: Yung Treadwell MD;  Location: [facility] PAIN MGT;  Service: Pain Management;  Laterality: Right;    INJECTION OF JOINT Right 2024    Procedure: Right GT bursa injection;  Surgeon: Yung Treadwell MD;  Location: [facility] PAIN MGT;  Service: Pain Management;  Laterality: Right;    REFRACTIVE SURGERY      ROTATOR CUFF REPAIR Right 2019    Procedure: REPAIR, ROTATOR CUFF;  Surgeon: Laureano Cox MD;  Location: [facility] OR;  Service: Orthopedics;  Laterality: Right;  arthroscopic    SELECTIVE INJECTION OF ANESTHETIC  "AGENT AROUND LUMBAR SPINAL NERVE ROOT BY TRANSFORAMINAL APPROACH Bilateral 10/07/2021    Procedure: BLOCK, SPINAL NERVE ROOT, LUMBAR, SELECTIVE, TRANSFORAMINAL APPROACH bilateral L4-5 Rn IV sedation;  Surgeon: Damon Charlton MD;  Location: [facility] PAIN MGT;  Service: Pain Management;  Laterality: Bilateral;    SELECTIVE INJECTION OF ANESTHETIC AGENT AROUND LUMBAR SPINAL NERVE ROOT BY TRANSFORAMINAL APPROACH Bilateral 12/29/2021    Procedure: BLOCK, SPINAL NERVE ROOT, LUMBAR, SELECTIVE, TRANSFORAMINAL APPROACH bilateral S1 RN IV sedation;  Surgeon: Damon Charlton MD;  Location: [facility] PAIN MGT;  Service: Pain Management;  Laterality: Bilateral;    SYNOVECTOMY OF SHOULDER Right 12/30/2019    Procedure: SYNOVECTOMY, SHOULDER;  Surgeon: Laureano Cox MD;  Location: [facility] OR;  Service: Orthopedics;  Laterality: Right;  arthroscopic    TOTAL ABDOMINAL HYSTERECTOMY W/ BILATERAL SALPINGOOPHORECTOMY  2006    STAR/BSO secondary to endometriosis    VENTRAL HERNIA REPAIR  2016       Family History:   Family History   Problem Relation Name Age of Onset    Hypertension Mother Mk Lynch     Arthritis Mother Mk Lynch     Hypertension Father Jessica cervantes     Prostate cancer Father Jessica cervantes     Heart disease Father Jessica cervantes     Heart attack Sister Krystal 30    Asthma Sister Krystal     Heart attack Brother  32    Cancer Maternal Aunt Sherrell Weeks         pancreas    Diabetes Maternal Aunt Sung anderson     Cancer Maternal Uncle Dinh lynch         pancreas    Heart disease Paternal Uncle      Heart disease Paternal Grandmother         Social History:   Social History[1]    Allergies:   Review of patient's allergies indicates:   Allergen Reactions    Sulfa (sulfonamide antibiotics) Hives       Home Medications:  Medications Ordered Prior to Encounter[2]    /81 (BP Location: Right arm, Patient Position: Sitting)   Pulse 84   Ht 5' 3" (1.6 m)   Wt 89 kg (196 lb 4.8 oz)   " LMP  (LMP Unknown)   BMI 34.77 kg/m²   Body mass index is 34.77 kg/m².  Physical Exam  Constitutional:       General: She is not in acute distress.  HENT:      Head: Normocephalic.   Neurological:      General: No focal deficit present.      Mental Status: She is alert.   Psychiatric:         Mood and Affect: Mood normal.         Judgment: Judgment normal.         Labs: Pertinent labs reviewed.    Assessment:   1. Gastroesophageal reflux disease without esophagitis      Recommendations:   - continue dietary modifications/ GERD diet.   - continue pantoprazole daily-- discussed risks and benefits of long term use.     Gastroesophageal reflux disease without esophagitis    Return to Clinic:  Follow up in about 1 year (around 3/24/2026).    Thank you for the opportunity to participate in the care of this patient.  RAY Spear             [1]   Social History  Tobacco Use    Smoking status: Never     Passive exposure: Never    Smokeless tobacco: Never   Substance Use Topics    Alcohol use: Yes     Alcohol/week: 1.0 standard drink of alcohol     Types: 1 Glasses of wine per week     Comment: socially    Drug use: No   [2]   Current Outpatient Medications on File Prior to Visit   Medication Sig Dispense Refill    amLODIPine (NORVASC) 10 MG tablet Take 1 tablet (10 mg total) by mouth once daily. 90 tablet 1    aspirin 81 MG Chew Take 81 mg by mouth as needed. Hold 5 days prior to surgery      azelastine (ASTELIN) 137 mcg (0.1 %) nasal spray 1 spray (137 mcg total) by Nasal route 2 (two) times daily. 1 mL 2    budesonide-formoterol 160-4.5 mcg (SYMBICORT) 160-4.5 mcg/actuation HFAA Inhale 2 puffs into the lungs every 12 (twelve) hours. Controller 1 g 3    diclofenac (VOLTAREN) 75 MG EC tablet Take 1 tablet (75 mg total) by mouth 2 (two) times daily as needed (pain). 60 tablet 3    diclofenac sodium (VOLTAREN) 1 % Gel Apply 2 g topically 3 (three) times daily as needed (Hip Pain). 50 g 0    docusate sodium (COLACE)  100 MG capsule Take 1 capsule (100 mg total) by mouth 2 (two) times daily. 100 capsule 0    ezetimibe (ZETIA) 10 mg tablet Take 1 tablet (10 mg total) by mouth once daily. 90 tablet 1    fluticasone propionate (FLONASE) 50 mcg/actuation nasal spray 2 sprays (100 mcg total) by Each Nostril route once daily. 16 g 0    L.acidophilus-Bif. animalis (PROBIOTIC) 5 billion cell CpSP Take 1 tablet by mouth once daily. 10 capsule 0    mometasone 0.1% (ELOCON) 0.1 % cream AAA bid prn. Do not use on face, underarms or groin. Stronger steroid. 45 g 3    MULTIVIT,CALC,MINS/IRON/FOLIC (DAILY MULTIPLE FOR WOMEN ORAL) Take 1 tablet by mouth once daily. Hold 5 days prior to surgery      omega-3 fatty acids/fish oil (FISH OIL-OMEGA-3 FATTY ACIDS) 300-1,000 mg capsule Take by mouth once daily. Hold 5 days prior to surgery      pantoprazole (PROTONIX) 40 MG tablet Take 1 tablet (40 mg total) by mouth once daily. 90 tablet 3    promethazine-dextromethorphan (PROMETHAZINE-DM) 6.25-15 mg/5 mL Syrp Take 10 mLs by mouth nightly as needed (cough). 118 mL 0    rosuvastatin (CRESTOR) 40 MG Tab Take 1 tablet (40 mg total) by mouth every evening. 90 tablet 1    triamcinolone acetonide 0.1% (KENALOG) 0.1 % ointment Apply topically 2 (two) times daily. 80 g 0    metFORMIN (GLUCOPHAGE) 500 MG tablet Take 1 tablet (500 mg total) by mouth daily with breakfast. (Patient not taking: Reported on 3/24/2025) 90 tablet 3     Current Facility-Administered Medications on File Prior to Visit   Medication Dose Route Frequency Provider Last Rate Last Admin    aluminum-magnesium hydroxide-simethicone 200-200-20 mg/5 mL suspension 30 mL  30 mL Oral 1 time in Clinic/HOD Arlene Oglesby, NP        lidocaine (PF) 10 mg/ml (1%) injection 5 mg  0.5 mL Subcutaneous Once Sung Warner MD        LIDOcaine HCl 2% oral solution 15 mL  15 mL Oral 1 time in Clinic/HOD Arlene Oglesby, BASILIO

## 2025-05-16 ENCOUNTER — OFFICE VISIT (OUTPATIENT)
Dept: PODIATRY | Facility: CLINIC | Age: 65
End: 2025-05-16
Payer: COMMERCIAL

## 2025-05-16 VITALS — WEIGHT: 196.19 LBS | HEIGHT: 63 IN | BODY MASS INDEX: 34.76 KG/M2

## 2025-05-16 DIAGNOSIS — L60.0 ONYCHOCRYPTOSIS: Primary | ICD-10-CM

## 2025-05-16 PROCEDURE — 99999 PR PBB SHADOW E&M-EST. PATIENT-LVL III: CPT | Mod: PBBFAC,,, | Performed by: PODIATRIST

## 2025-05-16 NOTE — PROGRESS NOTES
Subjective:     Patient ID: Ora Henderson is a 64 y.o. female.    Chief Complaint: Ingrown Toenail (RLE Great toe , medial border. Disolored nail. Diabetic. 0/10 pain, exacerbated by pressure. Pt wears sandals no socks.Pt last PCP visit with Dr Zuly Montes was 2/12/2025.)    Ora is a 64 y.o. female who presents to the clinic complaining of painful ingrown toenail on the right foot. Patient points to right medial nail border. Patient denies pain in toe at this time. Patient has no other pedal complaints at this time.     Problem List[1]    Medication List with Changes/Refills   Current Medications    AMLODIPINE (NORVASC) 10 MG TABLET    Take 1 tablet (10 mg total) by mouth once daily.    ASPIRIN 81 MG CHEW    Take 81 mg by mouth as needed. Hold 5 days prior to surgery    AZELASTINE (ASTELIN) 137 MCG (0.1 %) NASAL SPRAY    1 spray (137 mcg total) by Nasal route 2 (two) times daily.    BUDESONIDE-FORMOTEROL 160-4.5 MCG (SYMBICORT) 160-4.5 MCG/ACTUATION HFAA    Inhale 2 puffs into the lungs every 12 (twelve) hours. Controller    DICLOFENAC (VOLTAREN) 75 MG EC TABLET    Take 1 tablet (75 mg total) by mouth 2 (two) times daily as needed (pain).    DICLOFENAC SODIUM (VOLTAREN) 1 % GEL    Apply 2 g topically 3 (three) times daily as needed (Hip Pain).    DOCUSATE SODIUM (COLACE) 100 MG CAPSULE    Take 1 capsule (100 mg total) by mouth 2 (two) times daily.    EZETIMIBE (ZETIA) 10 MG TABLET    Take 1 tablet (10 mg total) by mouth once daily.    FLUTICASONE PROPIONATE (FLONASE) 50 MCG/ACTUATION NASAL SPRAY    2 sprays (100 mcg total) by Each Nostril route once daily.    L.ACIDOPHILUS-BIF. ANIMALIS (PROBIOTIC) 5 BILLION CELL CPSP    Take 1 tablet by mouth once daily.    METFORMIN (GLUCOPHAGE) 500 MG TABLET    Take 1 tablet (500 mg total) by mouth daily with breakfast.    MOMETASONE 0.1% (ELOCON) 0.1 % CREAM    AAA bid prn. Do not use on face, underarms or groin. Stronger steroid.     MULTIVIT,CALC,MINS/IRON/FOLIC (DAILY MULTIPLE FOR WOMEN ORAL)    Take 1 tablet by mouth once daily. Hold 5 days prior to surgery    OMEGA-3 FATTY ACIDS/FISH OIL (FISH OIL-OMEGA-3 FATTY ACIDS) 300-1,000 MG CAPSULE    Take by mouth once daily. Hold 5 days prior to surgery    PANTOPRAZOLE (PROTONIX) 40 MG TABLET    Take 1 tablet (40 mg total) by mouth once daily.    PROMETHAZINE-DEXTROMETHORPHAN (PROMETHAZINE-DM) 6.25-15 MG/5 ML SYRP    Take 10 mLs by mouth nightly as needed (cough).    ROSUVASTATIN (CRESTOR) 40 MG TAB    Take 1 tablet (40 mg total) by mouth every evening.    TRIAMCINOLONE ACETONIDE 0.1% (KENALOG) 0.1 % OINTMENT    Apply topically 2 (two) times daily.       Review of patient's allergies indicates:   Allergen Reactions    Sulfa (sulfonamide antibiotics) Hives       Past Surgical History:   Procedure Laterality Date    ARTHROSCOPIC DEBRIDEMENT OF SHOULDER Right 2019    Procedure: DEBRIDEMENT, SHOULDER, ARTHROSCOPIC;  Surgeon: Laureano Cox MD;  Location: [facility];  Service: Orthopedics;  Laterality: Right;    ARTHROSCOPY OF SHOULDER WITH DECOMPRESSION OF SUBACROMIAL SPACE Right 2019    Procedure: ARTHROSCOPY, SHOULDER, WITH SUBACROMIAL SPACE DECOMPRESSION;  Surgeon: Laureano Cox MD;  Location: [facility] OR;  Service: Orthopedics;  Laterality: Right;    BICEPS TENDON REPAIR Right 2019    Procedure: REPAIR, TENDON, BICEPS;  Surgeon: Laureano Cxo MD;  Location: [facility] OR;  Service: Orthopedics;  Laterality: Right;  arthroscopic Biceps tenodesis    BRAIN SURGERY      Pituitary tumor removal 2001 x 2     CARPAL TUNNEL RELEASE Bilateral     x 2    CERVICAL BIOPSY  W/ LOOP ELECTRODE EXCISION      CKC '81     SECTION      x 1    COLONOSCOPY N/A 2016    Procedure: COLONOSCOPY;  Surgeon: Quique Paul MD;  Location: [facility];  Service: Endoscopy;  Laterality: N/A;    COLONOSCOPY N/A 2021    Procedure: COLONOSCOPY;  Surgeon: Mari Tucker MD;   Location: [facility] ENDO;  Service: Endoscopy;  Laterality: N/A;    DISTAL CLAVICLE EXCISION Right 12/30/2019    Procedure: EXCISION, CLAVICLE, DISTAL;  Surgeon: Laureano Cox MD;  Location: [facility] OR;  Service: Orthopedics;  Laterality: Right;  arthroscopic    EPIDURAL STEROID INJECTION INTO CERVICAL SPINE N/A 09/15/2022    Procedure: C7/T1 IL ARMANDO, Right Paramedian Approach;  Surgeon: Yung Treadwell MD;  Location: [facility] PAIN MGT;  Service: Pain Management;  Laterality: N/A;    GANGLION CYST EXCISION Left 12/31/2018    HERNIA REPAIR      HYSTERECTOMY      INJECTION OF JOINT Right 2/16/2023    Procedure: right GT bursa + right SIJ injection;  Surgeon: Yung Treadwell MD;  Location: [facility] PAIN MGT;  Service: Pain Management;  Laterality: Right;    INJECTION OF JOINT Right 4/1/2024    Procedure: Right GT bursa injection;  Surgeon: Yung Treadwell MD;  Location: [facility] PAIN MGT;  Service: Pain Management;  Laterality: Right;    REFRACTIVE SURGERY      ROTATOR CUFF REPAIR Right 12/30/2019    Procedure: REPAIR, ROTATOR CUFF;  Surgeon: Laureano Cox MD;  Location: [facility] OR;  Service: Orthopedics;  Laterality: Right;  arthroscopic    SELECTIVE INJECTION OF ANESTHETIC AGENT AROUND LUMBAR SPINAL NERVE ROOT BY TRANSFORAMINAL APPROACH Bilateral 10/07/2021    Procedure: BLOCK, SPINAL NERVE ROOT, LUMBAR, SELECTIVE, TRANSFORAMINAL APPROACH bilateral L4-5 Rn IV sedation;  Surgeon: Damon Charlton MD;  Location: [facility] PAIN MGT;  Service: Pain Management;  Laterality: Bilateral;    SELECTIVE INJECTION OF ANESTHETIC AGENT AROUND LUMBAR SPINAL NERVE ROOT BY TRANSFORAMINAL APPROACH Bilateral 12/29/2021    Procedure: BLOCK, SPINAL NERVE ROOT, LUMBAR, SELECTIVE, TRANSFORAMINAL APPROACH bilateral S1 RN IV sedation;  Surgeon: Damon Charlton MD;  Location: [facility] PAIN MGT;  Service: Pain Management;  Laterality: Bilateral;    SYNOVECTOMY OF SHOULDER Right 12/30/2019    Procedure: SYNOVECTOMY, SHOULDER;  Surgeon: Laureano Cox MD;  " Location: [facility] OR;  Service: Orthopedics;  Laterality: Right;  arthroscopic    TOTAL ABDOMINAL HYSTERECTOMY W/ BILATERAL SALPINGOOPHORECTOMY  2006    STAR/BSO secondary to endometriosis    VENTRAL HERNIA REPAIR  2016       Family History   Problem Relation Name Age of Onset    Hypertension Mother Mk Lynch     Arthritis Mother Mk Lynch     Hypertension Father Jessica cervantes     Prostate cancer Father Jessica cervantes     Heart disease Father Jessica cervantes     Heart attack Sister Krystal 30    Asthma Sister Krystal     Heart attack Brother  32    Cancer Maternal Aunt Sherrell Weeks         pancreas    Diabetes Maternal Aunt Sung anderosn     Cancer Maternal Uncle Dinh lynch         pancreas    Heart disease Paternal Uncle      Heart disease Paternal Grandmother         Social History[2]    Vitals:    05/16/25 0825   Weight: 89 kg (196 lb 3.4 oz)   Height: 5' 3" (1.6 m)       Hemoglobin A1C   Date Value Ref Range Status   02/12/2025 6.3 (H) 4.0 - 5.6 % Final     Comment:     ADA Screening Guidelines:  5.7-6.4%  Consistent with prediabetes  >or=6.5%  Consistent with diabetes    High levels of fetal hemoglobin interfere with the HbA1C  assay. Heterozygous hemoglobin variants (HbS, HgC, etc)do  not significantly interfere with this assay.   However, presence of multiple variants may affect accuracy.     11/06/2024 5.8 (H) 4.0 - 5.6 % Final     Comment:     ADA Screening Guidelines:  5.7-6.4%  Consistent with prediabetes  >or=6.5%  Consistent with diabetes    High levels of fetal hemoglobin interfere with the HbA1C  assay. Heterozygous hemoglobin variants (HbS, HgC, etc)do  not significantly interfere with this assay.   However, presence of multiple variants may affect accuracy.     09/06/2023 5.8 (H) 4.0 - 5.6 % Final     Comment:     ADA Screening Guidelines:  5.7-6.4%  Consistent with prediabetes  >or=6.5%  Consistent with diabetes    High levels of fetal hemoglobin interfere " with the HbA1C  assay. Heterozygous hemoglobin variants (HbS, HgC, etc)do  not significantly interfere with this assay.   However, presence of multiple variants may affect accuracy.         Review of Systems   Constitutional:  Negative for chills and fever.   Respiratory:  Negative for shortness of breath.    Cardiovascular:  Negative for chest pain, palpitations, orthopnea, claudication and leg swelling.   Gastrointestinal:  Negative for diarrhea, nausea and vomiting.   Musculoskeletal:  Negative for joint pain.   Skin:  Negative for rash.   Neurological:  Negative for dizziness, tingling, sensory change, focal weakness and weakness.   Psychiatric/Behavioral: Negative.               Objective:      PHYSICAL EXAM: Apperance: Alert and orient in no distress,well developed, and with good attention to grooming and body habits  Lower Extremity Exam   VASCULAR: Dorsalis pedis pulses 2/4 bilateral and Posterior Tibial pulses 2/4 bilateral.   DERMATOLOGICAL: No skin rash, subcutaneous nodules, lesions or ulcers observed. Right hallux nail observed to be mildly incurvated at distal medial borders and slight obstructed in the nail grooves with soft tissue.  No purulent drainage, no odor, and no increased temperature observed to right hallux.   NEUROLOGICAL: Light touch, sharp-dull, proprioception all present and equal bilaterally.    MUSCULOSKELETAL: Muscle strength 5/5 for all foot inverters, everters, plantarflexors, and  dorsiflexors bilateral. No ain on palpation of right hallux medial nail border. No pain on palpation of dorsal nail plate right hallux.         Assessment:       ICD-10-CM ICD-9-CM   1. Onychocryptosis - Right Foot  L60.0 703.0       Plan:   Onychocryptosis - Right Foot      I counseled the patient on her conditions, regarding findings of my examination, my impressions, and usual treatment plan.   Conservatively we did discuss proper nail cutting for incurvated toenails. Surgically we briefly discussed  temporary vs. permanent nail avulsion procedures with patient. The patient elects for conservative management at this time.          Lisandra Garcia DPM  [facility]          [1]   Patient Active Problem List  Diagnosis    DJD (degenerative joint disease), cervical-mild    Mixed hyperlipidemia    Hepatomegaly    Family history of cardiovascular disease    GERD (gastroesophageal reflux disease)    History of benign pituitary tumor    Hx of colonic polyp    Essential hypertension    Osteoarthritis of spine with radiculopathy, lumbar region    NATALIE (obstructive sleep apnea)    Obesity (BMI 30.0-34.9)   [2]   Social History  Socioeconomic History    Marital status:     Number of children: 1   Occupational History    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Employer: [employer]   Tobacco Use    Smoking status: Never     Passive exposure: Never    Smokeless tobacco: Never   Substance and Sexual Activity    Alcohol use: Yes     Alcohol/week: 1.0 standard drink of alcohol     Types: 1 Glasses of wine per week     Comment: socially    Drug use: No    Sexual activity: Yes     Partners: Male     Birth control/protection: None     Social Drivers of Health     Financial Resource Strain: Low Risk  (2/7/2024)    Overall Financial Resource Strain (CARDIA)     Difficulty of Paying Living Expenses: Not hard at all   Food Insecurity: No Food Insecurity (2/7/2024)    Hunger Vital Sign     Worried About Running Out of Food in the Last Year: Never true     Ran Out of Food in the Last Year: Never true   Transportation Needs: No Transportation Needs (2/7/2024)    PRAPARE - Transportation     Lack of Transportation (Medical): No     Lack of Transportation (Non-Medical): No   Physical Activity: Insufficiently Active (2/7/2024)    Exercise Vital Sign     Days of Exercise per Week: 4 days     Minutes of Exercise per Session: 30 min   Stress: Stress Concern Present (2/7/2024)    [employer] -  Occupational Stress Questionnaire     Feeling of Stress : To some extent   Housing Stability: Low Risk  (2/7/2024)    Housing Stability Vital Sign     Unable to Pay for Housing in the Last Year: No     Number of Places Lived in the Last Year: 1     Unstable Housing in the Last Year: No

## 2025-06-19 ENCOUNTER — OFFICE VISIT (OUTPATIENT)
Dept: DERMATOLOGY | Facility: CLINIC | Age: 65
End: 2025-06-19
Payer: COMMERCIAL

## 2025-06-19 DIAGNOSIS — L81.9 DYSCHROMIA: ICD-10-CM

## 2025-06-19 DIAGNOSIS — L30.0 NUMMULAR ECZEMA: Primary | ICD-10-CM

## 2025-06-19 PROCEDURE — 1160F RVW MEDS BY RX/DR IN RCRD: CPT | Mod: CPTII,S$GLB,, | Performed by: PHYSICIAN ASSISTANT

## 2025-06-19 PROCEDURE — 3288F FALL RISK ASSESSMENT DOCD: CPT | Mod: CPTII,S$GLB,, | Performed by: PHYSICIAN ASSISTANT

## 2025-06-19 PROCEDURE — 99999 PR PBB SHADOW E&M-EST. PATIENT-LVL III: CPT | Mod: PBBFAC,,, | Performed by: PHYSICIAN ASSISTANT

## 2025-06-19 PROCEDURE — 3044F HG A1C LEVEL LT 7.0%: CPT | Mod: CPTII,S$GLB,, | Performed by: PHYSICIAN ASSISTANT

## 2025-06-19 PROCEDURE — 1159F MED LIST DOCD IN RCRD: CPT | Mod: CPTII,S$GLB,, | Performed by: PHYSICIAN ASSISTANT

## 2025-06-19 PROCEDURE — 1101F PT FALLS ASSESS-DOCD LE1/YR: CPT | Mod: CPTII,S$GLB,, | Performed by: PHYSICIAN ASSISTANT

## 2025-06-19 PROCEDURE — 99214 OFFICE O/P EST MOD 30 MIN: CPT | Mod: S$GLB,,, | Performed by: PHYSICIAN ASSISTANT

## 2025-06-19 RX ORDER — MOMETASONE FUROATE 1 MG/G
CREAM TOPICAL
Qty: 45 G | Refills: 3 | Status: SHIPPED | OUTPATIENT
Start: 2025-06-19

## 2025-06-19 NOTE — PROGRESS NOTES
Subjective:      Patient ID:  Ora Henderson is a 65 y.o. female who presents for   Chief Complaint   Patient presents with    Rash     Whole body and breast itchy all the time     Hx of nummular eczema and angioma, last seen by Dr. Anand on 4/30/24. Here for c/o rash under breasts, left trunk, and left leg. She notes general itching of breasts all the time, but rash mostly under breast and of trunk and leg.  Current tx: otc HC and otc antibiotic cream.  Previous tx: mometasone 0.1% cream (out of rx).              Review of Systems   Constitutional:  Negative for fever and chills.   Gastrointestinal:  Negative for nausea and vomiting.   Skin:  Positive for itching, rash, dry skin and activity-related sunscreen use. Negative for sun sensitivity, daily sunscreen use, recent sunburn and dry lips.   Hematologic/Lymphatic: Does not bruise/bleed easily.       Objective:   Physical Exam   Constitutional: She appears well-developed and well-nourished. No distress.   Neurological: She is alert and oriented to person, place, and time. She is not disoriented.   Psychiatric: She has a normal mood and affect.   Skin:   Areas Examined (abnormalities noted in diagram):   Head / Face Inspection Performed  Neck Inspection Performed  Chest / Axilla Inspection Performed  Abdomen Inspection Performed  Back Inspection Performed  RUE Inspected  LUE Inspection Performed  RLE Inspected  LLE Inspection Performed            Diagram Legend     Erythematous scaling macule/papule c/w actinic keratosis       Vascular papule c/w angioma      Pigmented verrucoid papule/plaque c/w seborrheic keratosis      Yellow umbilicated papule c/w sebaceous hyperplasia      Irregularly shaped tan macule c/w lentigo     1-2 mm smooth white papules consistent with Milia      Movable subcutaneous cyst with punctum c/w epidermal inclusion cyst      Subcutaneous movable cyst c/w pilar cyst      Firm pink to brown papule c/w dermatofibroma       Pedunculated fleshy papule(s) c/w skin tag(s)      Evenly pigmented macule c/w junctional nevus     Mildly variegated pigmented, slightly irregular-bordered macule c/w mildly atypical nevus      Flesh colored to evenly pigmented papule c/w intradermal nevus       Pink pearly papule/plaque c/w basal cell carcinoma      Erythematous hyperkeratotic cursted plaque c/w SCC      Surgical scar with no sign of skin cancer recurrence      Open and closed comedones      Inflammatory papules and pustules      Verrucoid papule consistent consistent with wart     Erythematous eczematous patches and plaques     Dystrophic onycholytic nail with subungual debris c/w onychomycosis     Umbilicated papule    Erythematous-base heme-crusted tan verrucoid plaque consistent with inflamed seborrheic keratosis     Erythematous Silvery Scaling Plaque c/w Psoriasis     See annotation      Assessment / Plan:        Nummular eczema  Dyschromia  -     mometasone 0.1% (ELOCON) 0.1 % cream; AAA bid prn. Do not use on face, underarms or groin. Stronger steroid.  Dispense: 45 g; Refill: 3  Agree to above rx refill PRN flares. Encouraged strict sensitive skin care and trial of All Free and Clear detergent, liberal emollients. D/c cocoa butter.              No follow-ups on file.

## 2025-06-19 NOTE — PATIENT INSTRUCTIONS
New Skin Care Regimen  Soap: Dove sensitive skin bar or Cetaphil Gentle Cleanser liquid or Vanicream Bar or Gentle Cleanser or La Roche Posay Gentle Cleansing Oil  Moisturizer: ceraVe or cetaphil cream or La Roche Posay Triple Repair Cream  Detergent: Tide Free, All Free, or Cheer Free   Fabric softener: do not use  Colognes/Perfumes/Fragrances: do not use  Bathing: shower or bathe with lukewarm water < 10 minutes

## 2025-06-27 ENCOUNTER — RESULTS FOLLOW-UP (OUTPATIENT)
Dept: INTERNAL MEDICINE | Facility: CLINIC | Age: 65
End: 2025-06-27

## 2025-06-27 ENCOUNTER — OFFICE VISIT (OUTPATIENT)
Dept: INTERNAL MEDICINE | Facility: CLINIC | Age: 65
End: 2025-06-27
Payer: COMMERCIAL

## 2025-06-27 ENCOUNTER — LAB VISIT (OUTPATIENT)
Dept: LAB | Facility: HOSPITAL | Age: 65
End: 2025-06-27
Attending: FAMILY MEDICINE
Payer: COMMERCIAL

## 2025-06-27 VITALS
WEIGHT: 194.88 LBS | SYSTOLIC BLOOD PRESSURE: 132 MMHG | RESPIRATION RATE: 18 BRPM | BODY MASS INDEX: 34.53 KG/M2 | DIASTOLIC BLOOD PRESSURE: 80 MMHG | OXYGEN SATURATION: 96 % | HEIGHT: 63 IN | HEART RATE: 74 BPM

## 2025-06-27 DIAGNOSIS — Z12.31 BREAST CANCER SCREENING BY MAMMOGRAM: ICD-10-CM

## 2025-06-27 DIAGNOSIS — I10 ESSENTIAL HYPERTENSION: Chronic | ICD-10-CM

## 2025-06-27 DIAGNOSIS — E66.811 OBESITY (BMI 30.0-34.9): ICD-10-CM

## 2025-06-27 DIAGNOSIS — R60.0 PERIPHERAL EDEMA: Primary | ICD-10-CM

## 2025-06-27 DIAGNOSIS — R60.0 PERIPHERAL EDEMA: ICD-10-CM

## 2025-06-27 DIAGNOSIS — R73.03 PREDIABETES: ICD-10-CM

## 2025-06-27 LAB
ABSOLUTE EOSINOPHIL (OHS): 0.26 K/UL
ABSOLUTE MONOCYTE (OHS): 0.58 K/UL (ref 0.3–1)
ABSOLUTE NEUTROPHIL COUNT (OHS): 2.76 K/UL (ref 1.8–7.7)
ANION GAP (OHS): 10 MMOL/L (ref 8–16)
BASOPHILS # BLD AUTO: 0.03 K/UL
BASOPHILS NFR BLD AUTO: 0.5 %
BNP SERPL-MCNC: <10 PG/ML (ref 0–99)
BUN SERPL-MCNC: 14 MG/DL (ref 8–23)
CALCIUM SERPL-MCNC: 9.5 MG/DL (ref 8.7–10.5)
CHLORIDE SERPL-SCNC: 104 MMOL/L (ref 95–110)
CO2 SERPL-SCNC: 26 MMOL/L (ref 23–29)
CREAT SERPL-MCNC: 0.8 MG/DL (ref 0.5–1.4)
EAG (OHS): 117 MG/DL (ref 68–131)
ERYTHROCYTE [DISTWIDTH] IN BLOOD BY AUTOMATED COUNT: 13.1 % (ref 11.5–14.5)
GFR SERPLBLD CREATININE-BSD FMLA CKD-EPI: >60 ML/MIN/1.73/M2
GLUCOSE SERPL-MCNC: 97 MG/DL (ref 70–110)
HBA1C MFR BLD: 5.7 % (ref 4–5.6)
HCT VFR BLD AUTO: 37.7 % (ref 37–48.5)
HGB BLD-MCNC: 12.1 GM/DL (ref 12–16)
IMM GRANULOCYTES # BLD AUTO: 0.02 K/UL (ref 0–0.04)
IMM GRANULOCYTES NFR BLD AUTO: 0.3 % (ref 0–0.5)
LYMPHOCYTES # BLD AUTO: 2.58 K/UL (ref 1–4.8)
MCH RBC QN AUTO: 29.4 PG (ref 27–31)
MCHC RBC AUTO-ENTMCNC: 32.1 G/DL (ref 32–36)
MCV RBC AUTO: 92 FL (ref 82–98)
NUCLEATED RBC (/100WBC) (OHS): 0 /100 WBC
PLATELET # BLD AUTO: 269 K/UL (ref 150–450)
PMV BLD AUTO: 10.2 FL (ref 9.2–12.9)
POTASSIUM SERPL-SCNC: 4.2 MMOL/L (ref 3.5–5.1)
RBC # BLD AUTO: 4.12 M/UL (ref 4–5.4)
RELATIVE EOSINOPHIL (OHS): 4.2 %
RELATIVE LYMPHOCYTE (OHS): 41.4 % (ref 18–48)
RELATIVE MONOCYTE (OHS): 9.3 % (ref 4–15)
RELATIVE NEUTROPHIL (OHS): 44.3 % (ref 38–73)
SODIUM SERPL-SCNC: 140 MMOL/L (ref 136–145)
TSH SERPL-ACNC: 2.34 UIU/ML (ref 0.4–4)
WBC # BLD AUTO: 6.23 K/UL (ref 3.9–12.7)

## 2025-06-27 PROCEDURE — 36415 COLL VENOUS BLD VENIPUNCTURE: CPT

## 2025-06-27 PROCEDURE — 99999 PR PBB SHADOW E&M-EST. PATIENT-LVL IV: CPT | Mod: PBBFAC,,, | Performed by: FAMILY MEDICINE

## 2025-06-27 PROCEDURE — 83880 ASSAY OF NATRIURETIC PEPTIDE: CPT

## 2025-06-27 PROCEDURE — 85025 COMPLETE CBC W/AUTO DIFF WBC: CPT

## 2025-06-27 PROCEDURE — 83036 HEMOGLOBIN GLYCOSYLATED A1C: CPT

## 2025-06-27 PROCEDURE — 84443 ASSAY THYROID STIM HORMONE: CPT

## 2025-06-27 PROCEDURE — 82374 ASSAY BLOOD CARBON DIOXIDE: CPT

## 2025-06-27 RX ORDER — FUROSEMIDE 20 MG/1
20 TABLET ORAL DAILY PRN
Qty: 30 TABLET | Refills: 0 | Status: SHIPPED | OUTPATIENT
Start: 2025-06-27 | End: 2026-06-27

## 2025-06-27 NOTE — PROGRESS NOTES
Subjective:       Patient ID: Ora Henderson is a 65 y.o. female presents with Problem List[1]     Chief Complaint: Foot Swelling and Joint Swelling (Ankles)    History of Present Illness    Ora presents today for bilateral leg swelling. She reports bilateral leg swelling that started approximately one week ago, primarily affecting the ankles and feet. The swelling appears symmetric, though slightly more prominent on the right side. She describes a sensation of heaviness without associated pain and notes increased foot sweating. She denies recent travel or changes in medications. She has compression stockings available for daytime use. She denies breathing difficulties or other associated symptoms. Her A1C was 6.3 in February, approaching diabetic range. She has acid reflux which is exacerbated by orange juice, to which she is allergic. She denies excessive salt intake and currently performs breathing exercises regularly.      ROS:  General: -fever, -chills, -fatigue, -weight gain, -weight loss, -loss of appetite  Eyes: -vision changes, -blurry vision, -eye pain, -eye discharge  ENT: -ear pain, -hearing loss, -tinnitus, -nasal congestion, -sore throat  Cardiovascular: -chest pain, -palpitations, +lower extremity edema  Respiratory: -cough, -shortness of breath, -wheezing, -sputum production  Endocrine: -polyuria, -polydipsia, -heat intolerance, -cold intolerance  Gastrointestinal: -abdominal pain, +heartburn, -nausea, -vomiting, -diarrhea, -constipation, -blood in stool  Genitourinary: -dysuria, -urgency, -frequency, -hematuria, -nocturia, -incontinence  Heme & Lymphatic: -easy or excessive bleeding, -easy bruising, -swollen lymph nodes  Musculoskeletal: -muscle pain, -back pain, -joint pain, -joint swelling, +limb swelling  Skin: -rash, -lesion, -itching, -skin texture changes, -skin color changes, +excessive sweating  Neurological: -headache, -dizziness, -numbness, -tingling, -seizure activity,  "-speech difficulty, -memory loss, -confusion  Psychiatric: -anxiety, -depression, -sleep difficulty  Allergic: +food allergies                /80 (BP Location: Right arm, Patient Position: Sitting)   Pulse 74   Resp 18   Ht 5' 3" (1.6 m)   Wt 88.4 kg (194 lb 14.2 oz)   LMP  (LMP Unknown)   SpO2 96%   BMI 34.52 kg/m²   Objective:      Physical Exam  Constitutional:       Appearance: She is well-developed.   HENT:      Head: Normocephalic and atraumatic.   Cardiovascular:      Rate and Rhythm: Normal rate and regular rhythm.      Heart sounds: Normal heart sounds. No murmur heard.  Pulmonary:      Effort: Pulmonary effort is normal.      Breath sounds: Normal breath sounds. No wheezing.   Abdominal:      General: Bowel sounds are normal.      Palpations: Abdomen is soft.      Tenderness: There is no abdominal tenderness.   Musculoskeletal:      Right lower leg: Edema present.      Left lower leg: Edema present.      Comments: Positive for swelling noted to bilateral ankles and feet   Skin:     General: Skin is warm and dry.      Findings: No rash.   Neurological:      Mental Status: She is alert and oriented to person, place, and time.   Psychiatric:         Mood and Affect: Mood normal.           Assessment/Plan:   1. Peripheral edema  -     CBC Auto Differential; Future; Expected date: 06/27/2025  -     Basic Metabolic Panel; Future; Expected date: 06/27/2025  -     BNP; Future; Expected date: 06/27/2025  -     TSH; Future; Expected date: 06/27/2025  -     Hemoglobin A1C; Future; Expected date: 06/27/2025  -     furosemide (LASIX) 20 MG tablet; Take 1 tablet (20 mg total) by mouth daily as needed (edema).  Dispense: 30 tablet; Refill: 0  Will check further labs and Lasix prescribed today for symptomatic relief but patient was encouraged to elevate lower extremities and use compression stockings  Restrict salt intake    2. Essential hypertension  Blood pressure is stable currently on amlodipine 10 " mg    3. Obesity (BMI 30.0-34.9)  Lifestyle modifications recommended to lose weight with BMI 34    4. Prediabetes  Currently taking metformin 500 mg daily    5. Breast cancer screening by mammogram  -     Mammo Digital Screening Bilat w/ Gautam (XPD); Future; Expected date: 09/08/2025  Due for mammogram soon         This note was generated with the assistance of ambient listening technology. Verbal consent was obtained by the patient and accompanying visitor(s) for the recording of patient appointment to facilitate this note. I attest to having reviewed and edited the generated note for accuracy, though some syntax or spelling errors may persist. Please contact the author of this note for any clarification.            [1]   Patient Active Problem List  Diagnosis    DJD (degenerative joint disease), cervical-mild    Mixed hyperlipidemia    Hepatomegaly    Family history of cardiovascular disease    GERD (gastroesophageal reflux disease)    History of benign pituitary tumor    Hx of colonic polyp    Essential hypertension    Osteoarthritis of spine with radiculopathy, lumbar region    NATALIE (obstructive sleep apnea)    Obesity (BMI 30.0-34.9)

## 2025-07-23 DIAGNOSIS — R60.0 PERIPHERAL EDEMA: ICD-10-CM

## 2025-07-23 NOTE — TELEPHONE ENCOUNTER
No care due was identified.  [facility] Embedded Care Due Messages. Reference number: [id number].   7/23/2025 9:53:13 AM CDT

## 2025-07-23 NOTE — TELEPHONE ENCOUNTER
Refill Routing Note   Medication(s) are not appropriate for processing by [facility] for the following reason(s):        Outside of protocol    [facility] action(s):  Route      Medication Therapy Plan: PRN LASIX USAGE OUTSIDE OF [facility] PROTOCOL      Appointments  past 12m or future 3m with PCP    Date Provider   Last Visit   6/27/2025 Zuly Montes MD   Next Visit   Visit date not found Zuly Montes MD   ED visits in past 90 days: 0        Note composed:4:21 PM 07/23/2025

## 2025-07-24 RX ORDER — FUROSEMIDE 20 MG/1
TABLET ORAL
Qty: 30 TABLET | Refills: 0 | Status: SHIPPED | OUTPATIENT
Start: 2025-07-24

## 2025-08-10 DIAGNOSIS — I10 ESSENTIAL HYPERTENSION: Chronic | ICD-10-CM

## 2025-08-10 DIAGNOSIS — E78.2 MIXED HYPERLIPIDEMIA: ICD-10-CM

## 2025-08-10 RX ORDER — ROSUVASTATIN CALCIUM 40 MG/1
40 TABLET, COATED ORAL NIGHTLY
Qty: 90 TABLET | Refills: 1 | Status: SHIPPED | OUTPATIENT
Start: 2025-08-10

## 2025-08-10 RX ORDER — EZETIMIBE 10 MG/1
10 TABLET ORAL
Qty: 90 TABLET | Refills: 1 | Status: SHIPPED | OUTPATIENT
Start: 2025-08-10

## 2025-08-11 RX ORDER — AMLODIPINE BESYLATE 10 MG/1
10 TABLET ORAL
Qty: 90 TABLET | Refills: 3 | Status: SHIPPED | OUTPATIENT
Start: 2025-08-11

## 2025-08-24 DIAGNOSIS — R60.0 PERIPHERAL EDEMA: ICD-10-CM

## 2025-08-26 RX ORDER — FUROSEMIDE 20 MG/1
TABLET ORAL
Qty: 30 TABLET | Refills: 0 | Status: SHIPPED | OUTPATIENT
Start: 2025-08-26

## (undated) DEVICE — SYR 30CC LUER LOCK

## (undated) DEVICE — GLOVE 7.5 PROTEXIS PI ORTHO PF

## (undated) DEVICE — SEE MEDLINE ITEM 157117

## (undated) DEVICE — UNDERGLOVES BIOGEL PI SIZE 8

## (undated) DEVICE — SEE MEDLINE ITEM 157166

## (undated) DEVICE — DRAPE STERI U-SHAPED 47X51IN

## (undated) DEVICE — ADHESIVE MASTISOL VIAL 48/BX

## (undated) DEVICE — CUTTER MENISCUS AGGRESSIVE 4.0

## (undated) DEVICE — MANIFOLD 4 PORT

## (undated) DEVICE — SEE MEDLINE ITEM 152530

## (undated) DEVICE — SEE MEDLINE ITEM 157131

## (undated) DEVICE — SEE MEDLINE ITEM 157027

## (undated) DEVICE — DRESSING XEROFORM FOIL PK 1X8

## (undated) DEVICE — TUBING PUMP ARTHROSCOPY STRL

## (undated) DEVICE — DRAPE PLASTIC U 60X72

## (undated) DEVICE — COVER LIGHT HANDLE 80/CA

## (undated) DEVICE — SOL IRR NACL .9% 3000ML

## (undated) DEVICE — UNDERGLOVES BIOGEL PI SIZE 7.5

## (undated) DEVICE — GLOVE SURG BIOGEL LATEX SZ 7.5

## (undated) DEVICE — SUT 0 30IN PROLENE BL MONO

## (undated) DEVICE — SUT PROLENE 1 CTX 30IN BLUE

## (undated) DEVICE — SEE MEDLINE ITEM 157216

## (undated) DEVICE — CANNULA MTK THRD CLR 7X75MM

## (undated) DEVICE — TIP SUCTION YANKAUER

## (undated) DEVICE — ELECTRODE COOLPULSE 90 W/HAND

## (undated) DEVICE — KIT TRIMANO CHIN

## (undated) DEVICE — KIT TRIMANO

## (undated) DEVICE — COVER CAMERA OPERATING ROOM

## (undated) DEVICE — SLING ARM ULTRA III PAD LG

## (undated) DEVICE — PACK FLUID CONTROL SHOULDER

## (undated) DEVICE — GAUZE SPONGE 4X4 12PLY

## (undated) DEVICE — Device

## (undated) DEVICE — CANNULA MTK THRD CLR 8.5X75MM

## (undated) DEVICE — APPLICATOR CHLORAPREP ORN 26ML

## (undated) DEVICE — DRAPE INCISE IOBAN 2 23X33IN

## (undated) DEVICE — SEE MEDLINE ITEM 152739

## (undated) DEVICE — SLEEVE SHOULDER TRACTN/ROTATN

## (undated) DEVICE — NDL SUREFIRE SCORPION RC

## (undated) DEVICE — ALCOHOL 70% ISOP RUBBING 4OZ

## (undated) DEVICE — TAPE SURGICAL MICROFOAM 4IN

## (undated) DEVICE — PAD ABD 8X10 STERILE

## (undated) DEVICE — CLOSURE SKIN STERI STRIP 1/2X4